# Patient Record
Sex: FEMALE | Race: WHITE | Employment: OTHER | URBAN - METROPOLITAN AREA
[De-identification: names, ages, dates, MRNs, and addresses within clinical notes are randomized per-mention and may not be internally consistent; named-entity substitution may affect disease eponyms.]

---

## 2017-05-30 ENCOUNTER — HOSPITAL ENCOUNTER (OUTPATIENT)
Dept: LAB | Age: 52
Discharge: HOME | End: 2017-05-30
Payer: MEDICARE

## 2017-05-30 PROCEDURE — 88175 CYTOPATH C/V AUTO FLUID REDO: CPT | Performed by: PHYSICIAN ASSISTANT

## 2017-05-30 PROCEDURE — 87624 HPV HI-RISK TYP POOLED RSLT: CPT | Performed by: PHYSICIAN ASSISTANT

## 2017-05-30 PROCEDURE — 87491 CHLMYD TRACH DNA AMP PROBE: CPT | Performed by: PHYSICIAN ASSISTANT

## 2017-05-31 LAB
C TRACH RRNA SPEC QL NAA+PROBE: NEGATIVE
N GONORRHOEA RRNA SPEC QL NAA+PROBE: NEGATIVE
SPECIMEN SOURCE: NORMAL

## 2017-09-12 ENCOUNTER — APPOINTMENT (OUTPATIENT)
Dept: CT IMAGING | Age: 52
DRG: 438 | End: 2017-09-12
Attending: EMERGENCY MEDICINE
Payer: MEDICARE

## 2017-09-12 ENCOUNTER — HOSPITAL ENCOUNTER (EMERGENCY)
Age: 52
Discharge: HOME | DRG: 438 | End: 2017-09-12
Attending: EMERGENCY MEDICINE | Admitting: EMERGENCY MEDICINE
Payer: MEDICARE

## 2017-09-12 VITALS
HEIGHT: 63 IN | SYSTOLIC BLOOD PRESSURE: 129 MMHG | WEIGHT: 120 LBS | HEART RATE: 85 BPM | OXYGEN SATURATION: 97 % | TEMPERATURE: 98.1 F | DIASTOLIC BLOOD PRESSURE: 78 MMHG | RESPIRATION RATE: 18 BRPM | BODY MASS INDEX: 21.26 KG/M2

## 2017-09-12 DIAGNOSIS — K85.00 IDIOPATHIC ACUTE PANCREATITIS, UNSPECIFIED COMPLICATION STATUS: Primary | ICD-10-CM

## 2017-09-12 DIAGNOSIS — R79.89 ELEVATED LFTS: ICD-10-CM

## 2017-09-12 LAB
ALBUMIN SERPL-MCNC: 3.2 G/DL (ref 3.4–5)
ALBUMIN/GLOB SERPL: 1 {RATIO} (ref 0.8–1.7)
ALP SERPL-CCNC: 143 U/L (ref 45–117)
ALT SERPL-CCNC: 158 U/L (ref 13–56)
ANION GAP SERPL CALC-SCNC: 11 MMOL/L (ref 3–18)
APPEARANCE UR: CLEAR
AST SERPL-CCNC: 472 U/L (ref 15–37)
BASOPHILS # BLD: 0 K/UL (ref 0–0.06)
BASOPHILS NFR BLD: 0 % (ref 0–2)
BILIRUB SERPL-MCNC: 1.3 MG/DL (ref 0.2–1)
BILIRUB UR QL: NEGATIVE
BUN SERPL-MCNC: 6 MG/DL (ref 7–18)
BUN/CREAT SERPL: 10 (ref 12–20)
CALCIUM SERPL-MCNC: 9.6 MG/DL (ref 8.5–10.1)
CHLORIDE SERPL-SCNC: 100 MMOL/L (ref 100–108)
CO2 SERPL-SCNC: 24 MMOL/L (ref 21–32)
COLOR UR: YELLOW
CREAT SERPL-MCNC: 0.62 MG/DL (ref 0.6–1.3)
DIFFERENTIAL METHOD BLD: ABNORMAL
EOSINOPHIL # BLD: 0.1 K/UL (ref 0–0.4)
EOSINOPHIL NFR BLD: 1 % (ref 0–5)
ERYTHROCYTE [DISTWIDTH] IN BLOOD BY AUTOMATED COUNT: 15.7 % (ref 11.6–14.5)
ETHANOL SERPL-MCNC: <3 MG/DL (ref 0–3)
GLOBULIN SER CALC-MCNC: 3.3 G/DL (ref 2–4)
GLUCOSE SERPL-MCNC: 111 MG/DL (ref 74–99)
GLUCOSE UR STRIP.AUTO-MCNC: NEGATIVE MG/DL
HCT VFR BLD AUTO: 36.7 % (ref 35–45)
HGB BLD-MCNC: 12.7 G/DL (ref 12–16)
HGB UR QL STRIP: NEGATIVE
KETONES UR QL STRIP.AUTO: ABNORMAL MG/DL
LEUKOCYTE ESTERASE UR QL STRIP.AUTO: NEGATIVE
LIPASE SERPL-CCNC: 1091 U/L (ref 73–393)
LYMPHOCYTES # BLD: 1.3 K/UL (ref 0.9–3.6)
LYMPHOCYTES NFR BLD: 29 % (ref 21–52)
MAGNESIUM SERPL-MCNC: 1.5 MG/DL (ref 1.6–2.6)
MCH RBC QN AUTO: 34.8 PG (ref 24–34)
MCHC RBC AUTO-ENTMCNC: 34.6 G/DL (ref 31–37)
MCV RBC AUTO: 100.5 FL (ref 74–97)
MONOCYTES # BLD: 0.2 K/UL (ref 0.05–1.2)
MONOCYTES NFR BLD: 5 % (ref 3–10)
NEUTS SEG # BLD: 2.9 K/UL (ref 1.8–8)
NEUTS SEG NFR BLD: 65 % (ref 40–73)
NITRITE UR QL STRIP.AUTO: NEGATIVE
PH UR STRIP: 7 [PH] (ref 5–8)
PLATELET # BLD AUTO: 91 K/UL (ref 135–420)
PMV BLD AUTO: 11.2 FL (ref 9.2–11.8)
POTASSIUM SERPL-SCNC: 3.7 MMOL/L (ref 3.5–5.5)
PROT SERPL-MCNC: 6.5 G/DL (ref 6.4–8.2)
PROT UR STRIP-MCNC: NEGATIVE MG/DL
RBC # BLD AUTO: 3.65 M/UL (ref 4.2–5.3)
SODIUM SERPL-SCNC: 135 MMOL/L (ref 136–145)
SP GR UR REFRACTOMETRY: 1.01 (ref 1–1.03)
UROBILINOGEN UR QL STRIP.AUTO: 0.2 EU/DL (ref 0.2–1)
WBC # BLD AUTO: 4.5 K/UL (ref 4.6–13.2)

## 2017-09-12 RX ORDER — HYDROMORPHONE HYDROCHLORIDE 2 MG/1
2 TABLET ORAL
Status: COMPLETED | OUTPATIENT
Start: 2017-09-12 | End: 2017-09-12

## 2017-09-12 RX ORDER — LORAZEPAM 2 MG/ML
1 INJECTION INTRAMUSCULAR
Status: DISCONTINUED | OUTPATIENT
Start: 2017-09-12 | End: 2017-09-12 | Stop reason: HOSPADM

## 2017-09-12 RX ORDER — ONDANSETRON 8 MG/1
8 TABLET, ORALLY DISINTEGRATING ORAL
Qty: 6 TAB | Refills: 0 | Status: ON HOLD | OUTPATIENT
Start: 2017-09-12 | End: 2018-03-15

## 2017-09-12 RX ORDER — LORAZEPAM 2 MG/ML
3 INJECTION INTRAMUSCULAR
Status: DISCONTINUED | OUTPATIENT
Start: 2017-09-12 | End: 2017-09-12 | Stop reason: HOSPADM

## 2017-09-12 RX ORDER — LORAZEPAM 1 MG/1
1 TABLET ORAL
Status: DISCONTINUED | OUTPATIENT
Start: 2017-09-12 | End: 2017-09-12 | Stop reason: HOSPADM

## 2017-09-12 RX ORDER — SODIUM CHLORIDE 0.9 % (FLUSH) 0.9 %
5-10 SYRINGE (ML) INJECTION AS NEEDED
Status: DISCONTINUED | OUTPATIENT
Start: 2017-09-12 | End: 2017-09-12 | Stop reason: HOSPADM

## 2017-09-12 RX ORDER — LORAZEPAM 2 MG/ML
2 INJECTION INTRAMUSCULAR
Status: DISCONTINUED | OUTPATIENT
Start: 2017-09-12 | End: 2017-09-12 | Stop reason: HOSPADM

## 2017-09-12 RX ORDER — HYDROMORPHONE HYDROCHLORIDE 2 MG/1
2 TABLET ORAL
Qty: 2 TAB | Refills: 0 | Status: ON HOLD | OUTPATIENT
Start: 2017-09-12 | End: 2017-09-15

## 2017-09-12 RX ORDER — LORAZEPAM 1 MG/1
2 TABLET ORAL
Status: DISCONTINUED | OUTPATIENT
Start: 2017-09-12 | End: 2017-09-12 | Stop reason: HOSPADM

## 2017-09-12 RX ORDER — SODIUM CHLORIDE 0.9 % (FLUSH) 0.9 %
5-10 SYRINGE (ML) INJECTION EVERY 8 HOURS
Status: DISCONTINUED | OUTPATIENT
Start: 2017-09-12 | End: 2017-09-12 | Stop reason: HOSPADM

## 2017-09-12 RX ADMIN — FOLIC ACID: 5 INJECTION, SOLUTION INTRAMUSCULAR; INTRAVENOUS; SUBCUTANEOUS at 09:39

## 2017-09-12 RX ADMIN — HYDROMORPHONE HYDROCHLORIDE 2 MG: 2 TABLET ORAL at 12:57

## 2017-09-12 RX ADMIN — IOPAMIDOL 100 ML: 612 INJECTION, SOLUTION INTRAVENOUS at 10:25

## 2017-09-12 RX ADMIN — LORAZEPAM 1 MG: 2 INJECTION INTRAMUSCULAR; INTRAVENOUS at 09:14

## 2017-09-12 NOTE — ED PROVIDER NOTES
HPI Comments: 9:11 AM Trini Banegas is a 47 y.o. female with h/o Bipolar disorder, COPD, Anxiety, Depression, Alchol Abuse, and HTN who presents to ED complaining of abdominal pain, CP, and alcohol intoxication. The pt states that the symptoms started after being on a 3 week alchol binge due to her being depressed about her son going to college. Pt says she also has not been eating or sleeping for the past three days. Pt also reports some vomiting and feeling some nausea. Pt states she does smoke marijuana. Pt denies having problems with alchol withdrawls, syncope, SI, blood in stool, hematuria, trauma, drug abuse, and smoking. Pt had no other complaints or concerns. PCP: Jamila Canela MD        The history is provided by the patient. No  was used. Past Medical History:   Diagnosis Date    Alcohol abuse     Anxiety     Asthma     Bipolar disorder ([address]     Common migraine     COPD (chronic obstructive pulmonary disease) (HCC)     Depression     Esophageal reflux     Gout     Hypertension     Inverted nipple     Left breast always have. Past Surgical History:   Procedure Laterality Date    HX CARPAL TUNNEL RELEASE      HX GYN      tubal ligation    HX HEENT      HX LUMBAR LAMINECTOMY      HX ORTHOPAEDIC           Family History:   Problem Relation Age of Onset    Family history unknown: Yes       Social History     Social History    Marital status:      Spouse name: N/A    Number of children: N/A    Years of education: N/A     Occupational History    Not on file.      Social History Main Topics    Smoking status: Current Every Day Smoker     Packs/day: 1.00    Smokeless tobacco: Never Used    Alcohol use Yes      Comment: daily, liquor    Drug use: No    Sexual activity: Not on file     Other Topics Concern    Not on file     Social History Narrative         ALLERGIES: Lithium; Elavil; and Morphine    Review of Systems   Cardiovascular: Positive for chest pain. Gastrointestinal: Positive for abdominal pain, nausea and vomiting. Negative for blood in stool. Genitourinary: Negative for hematuria. Neurological: Negative for tremors and syncope. Psychiatric/Behavioral: Negative for suicidal ideas. Feeling Depressed    All other systems reviewed and are negative. Vitals:    09/12/17 0833 09/12/17 1017   BP: 137/85 141/70   Pulse: 95 85   Resp: 17 18   Temp: 98.1 °F (36.7 °C)    SpO2: 99% 100%   Weight: 54.4 kg (120 lb)    Height: 5' 2.5\" (1.588 m)             Physical Exam   Constitutional: She is oriented to person, place, and time. She appears well-developed and well-nourished. No distress. HENT:   Head: Normocephalic and atraumatic. Mouth/Throat: Oropharynx is clear and moist.   Dry mucosal membrane   Eyes: Conjunctivae and EOM are normal. Pupils are equal, round, and reactive to light. No scleral icterus. Neck: Normal range of motion. Neck supple. Cardiovascular: Normal rate, regular rhythm and normal heart sounds. No murmur heard. Pulmonary/Chest: Effort normal and breath sounds normal. No respiratory distress. Abdominal: Soft. Bowel sounds are normal. She exhibits no distension. There is tenderness in the epigastric area. Epigastric tenderness   Musculoskeletal: She exhibits no edema. Lymphadenopathy:     She has no cervical adenopathy. Neurological: She is alert and oriented to person, place, and time. Coordination normal.   Skin: Skin is warm and dry. No rash noted. Psychiatric: She has a normal mood and affect. Her behavior is normal.   Nursing note and vitals reviewed.        MDM  Number of Diagnoses or Management Options  Elevated LFTs:   Idiopathic acute pancreatitis, unspecified complication status:   Diagnosis management comments: H/o alcohol abuse currently coming off binge with increased n/v pancreatitis by labs placed on ciwa protocol in Ed       Ekg: nsr rate 97 no acute st changes     Pt has had no vomiting in ED would like opportunity to try outpt management discussed with CY Pemberton NP agrees to see in 24 hours for recheck will dc home        Amount and/or Complexity of Data Reviewed  Clinical lab tests: ordered and reviewed  Tests in the radiology section of CPT®: ordered and reviewed  Independent visualization of images, tracings, or specimens: yes    Risk of Complications, Morbidity, and/or Mortality  Presenting problems: high  Diagnostic procedures: moderate  Management options: moderate      ED Course       Procedures    Vitals:  Patient Vitals for the past 12 hrs:   Temp Pulse Resp BP SpO2   09/12/17 1017 - 85 18 141/70 100 %   09/12/17 0833 98.1 °F (36.7 °C) 95 17 137/85 99 %       Medications Ordered:  Medications   sodium chloride (NS) flush 5-10 mL (not administered)   sodium chloride (NS) flush 5-10 mL (not administered)   LORazepam (ATIVAN) tablet 1 mg ( Oral See Alternative 9/12/17 0914)     Or   LORazepam (ATIVAN) injection 1 mg (1 mg IntraVENous Given 9/12/17 0914)   LORazepam (ATIVAN) tablet 2 mg (not administered)     Or   LORazepam (ATIVAN) injection 2 mg (not administered)   LORazepam (ATIVAN) injection 3 mg (not administered)   0.9% sodium chloride 1,000 mL with mvi, adult no. 4 with vit K 10 mL, thiamine 083 mg, folic acid 1 mg, magnesium sulfate 2 g infusion ( IntraVENous New Bag 9/12/17 0939)   iopamidol (ISOVUE 300) 61 % contrast injection 100 mL (100 mL IntraVENous Given 9/12/17 1025)       Lab Findings:  Recent Results (from the past 12 hour(s))   EKG, 12 LEAD, INITIAL    Collection Time: 09/12/17  8:32 AM   Result Value Ref Range    Ventricular Rate 97 BPM    Atrial Rate 97 BPM    P-R Interval 130 ms    QRS Duration 76 ms    Q-T Interval 352 ms    QTC Calculation (Bezet) 447 ms    Calculated P Axis 55 degrees    Calculated R Axis 59 degrees    Calculated T Axis 52 degrees    Diagnosis       Normal sinus rhythm  Possible Left atrial enlargement  Borderline ECG  When compared with ECG of 04-NOV-2016 12:00,  No significant change was found     CBC WITH AUTOMATED DIFF    Collection Time: 09/12/17  8:40 AM   Result Value Ref Range    WBC 4.5 (L) 4.6 - 13.2 K/uL    RBC 3.65 (L) 4.20 - 5.30 M/uL    HGB 12.7 12.0 - 16.0 g/dL    HCT 36.7 35.0 - 45.0 %    .5 (H) 74.0 - 97.0 FL    MCH 34.8 (H) 24.0 - 34.0 PG    MCHC 34.6 31.0 - 37.0 g/dL    RDW 15.7 (H) 11.6 - 14.5 %    PLATELET 91 (L) 332 - 420 K/uL    MPV 11.2 9.2 - 11.8 FL    NEUTROPHILS 65 40 - 73 %    LYMPHOCYTES 29 21 - 52 %    MONOCYTES 5 3 - 10 %    EOSINOPHILS 1 0 - 5 %    BASOPHILS 0 0 - 2 %    ABS. NEUTROPHILS 2.9 1.8 - 8.0 K/UL    ABS. LYMPHOCYTES 1.3 0.9 - 3.6 K/UL    ABS. MONOCYTES 0.2 0.05 - 1.2 K/UL    ABS. EOSINOPHILS 0.1 0.0 - 0.4 K/UL    ABS. BASOPHILS 0.0 0.0 - 0.06 K/UL    DF AUTOMATED     METABOLIC PANEL, COMPREHENSIVE    Collection Time: 09/12/17  8:40 AM   Result Value Ref Range    Sodium 135 (L) 136 - 145 mmol/L    Potassium 3.7 3.5 - 5.5 mmol/L    Chloride 100 100 - 108 mmol/L    CO2 24 21 - 32 mmol/L    Anion gap 11 3.0 - 18 mmol/L    Glucose 111 (H) 74 - 99 mg/dL    BUN 6 (L) 7.0 - 18 MG/DL    Creatinine 0.62 0.6 - 1.3 MG/DL    BUN/Creatinine ratio 10 (L) 12 - 20      GFR est AA >60 >60 ml/min/1.73m2    GFR est non-AA >60 >60 ml/min/1.73m2    Calcium 9.6 8.5 - 10.1 MG/DL    Bilirubin, total 1.3 (H) 0.2 - 1.0 MG/DL    ALT (SGPT) 158 (H) 13 - 56 U/L    AST (SGOT) 472 (H) 15 - 37 U/L    Alk.  phosphatase 143 (H) 45 - 117 U/L    Protein, total 6.5 6.4 - 8.2 g/dL    Albumin 3.2 (L) 3.4 - 5.0 g/dL    Globulin 3.3 2.0 - 4.0 g/dL    A-G Ratio 1.0 0.8 - 1.7     LIPASE    Collection Time: 09/12/17  8:40 AM   Result Value Ref Range    Lipase 1091 (H) 73 - 393 U/L   MAGNESIUM    Collection Time: 09/12/17  8:40 AM   Result Value Ref Range    Magnesium 1.5 (L) 1.6 - 2.6 mg/dL   ETHYL ALCOHOL    Collection Time: 09/12/17  8:40 AM   Result Value Ref Range    ALCOHOL(ETHYL),SERUM <3 0 - 3 MG/DL   URINALYSIS W/ RFLX MICROSCOPIC    Collection Time: 09/12/17  8:55 AM   Result Value Ref Range    Color YELLOW      Appearance CLEAR      Specific gravity 1.006 1.005 - 1.030      pH (UA) 7.0 5.0 - 8.0      Protein NEGATIVE  NEG mg/dL    Glucose NEGATIVE  NEG mg/dL    Ketone TRACE (A) NEG mg/dL    Bilirubin NEGATIVE  NEG      Blood NEGATIVE  NEG      Urobilinogen 0.2 0.2 - 1.0 EU/dL    Nitrites NEGATIVE  NEG      Leukocyte Esterase NEGATIVE  NEG         EKG Interpretation by ED physician:      X-ray, CT or radiology findings or impressions:  CT ABD PELV W CONT   Final Result          Progress notes, consult notes, or additional procedure notes:      Reevaluation of the patient:   Stable in ED     Diagnosis:   1. Idiopathic acute pancreatitis, unspecified complication status    2. Elevated LFTs        Disposition: home     Follow-up Information     Follow up With Details Comments [facility] EMERGENCY DEPT  As needed, If symptoms worsen [address] [facility]  [address] [place] [address]) [address]    C Mamadou Rose MD Schedule an appointment as soon as possible for a visit to be seen tomorrow for recheck  [address] [place] [address] [phone]             Patient's Medications   Start Taking    HYDROMORPHONE (DILAUDID) 2 MG TABLET    Take 1 Tab by mouth every four (4) hours as needed for Pain. Max Daily Amount: 12 mg.    ONDANSETRON (ZOFRAN ODT) 8 MG DISINTEGRATING TABLET    Take 1 Tab by mouth every eight (8) hours as needed for Nausea. Continue Taking    ALBUTEROL (PROVENTIL VENTOLIN) 2.5 MG /3 ML (0.083 %) NEBULIZER SOLUTION    1.5 mL by Nebulization route every four (4) hours as needed for Wheezing or Shortness of Breath. AZITHROMYCIN (ZITHROMAX Z-RICHA) 250 MG TABLET    Take two tablets today then one tablet daily    BUPROPION XL (WELLBUTRIN XL) 300 MG XL TABLET    Take 300 mg by mouth every morning. CLONIDINE HCL (CATAPRES) 0.2 MG TABLET    Take 0.5 Tabs by mouth two (2) times a day.     DIAZEPAM (VALIUM) 5 MG TABLET    Take 0.5 Tabs by mouth every eight (8) hours as needed for Anxiety. Max Daily Amount: 7.5 mg. GABAPENTIN (NEURONTIN) 600 MG TABLET    Take 600 mg by mouth four (4) times daily. IBUPROFEN (MOTRIN) 600 MG TABLET    Take 1 Tab by mouth every six (6) hours as needed for Pain. IPRATROPIUM-ALBUTEROL (COMBIVENT)  MCG/ACTUATION INHALER    Take 2 Puffs by inhalation every six (6) hours. LIPASE-PROTEASE-AMYLASE (ZENPEP 10,000) CAPSULE    Take 1 Cap by mouth three (3) times daily (with meals). NEBULIZER & COMPRESSOR MACHINE    1 Each by Does Not Apply route every six (6) hours as needed. OXYCODONE IR (ROXICODONE) 5 MG IMMEDIATE RELEASE TABLET    Take 1 Tab by mouth every eight (8) hours as needed for Pain. Max Daily Amount: 15 mg. PREDNISONE (DELTASONE) 10 MG TABLET    Take 5 tabs daily for 1 days followed by 4 tabs daily for one day followed by 3 tabs daily for one day followed by 2 tabs daily for 1 day then 1 tab daily for one day then stop . Annamary Ripa .  15 tabs    QUETIAPINE FUMARATE (QUETIAPINE PO)    Take 3 Tabs by mouth daily. Patient states she takes 3 tablets of the 200 mg strength to equal 600 mg total dose once a day    SILVER SULFADIAZINE (SILVADENE) 1 % TOPICAL CREAM    Apply  to affected area two (2) times a day. THIAMINE MONONITRATE (B-1) 100 MG TABLET    Take 1 Tab by mouth daily. TOPIRAMATE (TOPAMAX) 200 MG TABLET    Take 100 mg by mouth daily. These Medications have changed    No medications on file   Stop Taking    No medications on file       Scribe Attestation      Yessica Casarez acting as a scribe for and in the presence of Eloisa Matamoros      September 12, 2017 at 9:11 AM       Provider Attestation:      I personally performed the services described in the documentation, reviewed the documentation, as recorded by the scribe in my presence, and it accurately and completely records my words and actions.  September 12, 2017 at 9:11 AM - Kervin Loaiza MD

## 2017-09-12 NOTE — DISCHARGE INSTRUCTIONS
Pancreatitis: Care Instructions  Your Care Instructions    The pancreas is an organ behind the stomach. It makes hormones and enzymes to help your body digest food. But if these enzymes attack the pancreas, it can get inflamed. This is called pancreatitis. Most cases are caused by gallstones or by heavy alcohol use. If you take care of yourself at home, it will help you get better. It will also help you avoid more problems with your pancreas. Follow-up care is a key part of your treatment and safety. Be sure to make and go to all appointments, and call your doctor if you are having problems. It's also a good idea to know your test results and keep a list of the medicines you take. How can you care for yourself at home? · Drink clear liquids and eat bland foods until you feel better. [place] foods include rice, dry toast, and crackers. They also include bananas and applesauce. · Eat a low-fat diet until your doctor says your pancreas is healed. · Do not drink alcohol. Tell your doctor if you need help to quit. Counseling, support groups, and sometimes medicines can help you stay sober. · Be safe with medicines. Read and follow all instructions on the label. ¨ If the doctor gave you a prescription medicine for pain, take it as prescribed. ¨ If you are not taking a prescription pain medicine, ask your doctor if you can take an over-the-counter medicine. · If your doctor prescribed antibiotics, take them as directed. Do not stop taking them just because you feel better. You need to take the full course of antibiotics. · Get extra rest until you feel better. To prevent future problems with your pancreas  · Do not drink alcohol. · Tell your doctors and pharmacist that you've had pancreatitis. They can help you avoid medicines that may cause this problem again. When should you call for help? Call your doctor now or seek immediate medical care if:  · You have new or severe belly pain.   · You have a new or higher fever. · You can't keep fluid or medicines down. Watch closely for changes in your health, and be sure to contact your doctor if:  · The symptoms you had when you first started feeling sick come back. · You do not get better as expected. · You need help to stop drinking alcohol. Where can you learn more? Go to http://shannon-heriberto.info/. Enter T678 in the search box to learn more about \"Pancreatitis: Care Instructions. \"  Current as of: August 9, 2016  Content Version: 11.3  © 3208-6661 [employer]. Care instructions adapted under license by [employer] (which disclaims liability or warranty for this information). If you have questions about a medical condition or this instruction, always ask your healthcare professional. [address] any warranty or liability for your use of this information.

## 2017-09-12 NOTE — ED TRIAGE NOTES
Pt with 3 week alcohol binge due to stressors in life. States last drank 2200 last night, been drinking daily as much liquor as she can. Pt with LUQ abdominal pain and left sided chest pain that started this morning with diarrhea. Pt has history of pancreatitis.

## 2017-09-12 NOTE — ED NOTES
Attempted to discharge patient. Patient very drowsy and wakes to sternal rub then goes back to sleep.  Will allow patient to sleep for another 30 minutes and attempt to get patient up and discharge at that time

## 2017-09-13 ENCOUNTER — HOSPITAL ENCOUNTER (INPATIENT)
Age: 52
LOS: 2 days | Discharge: HOME | DRG: 438 | End: 2017-09-16
Attending: EMERGENCY MEDICINE | Admitting: FAMILY MEDICINE
Payer: MEDICARE

## 2017-09-13 DIAGNOSIS — R42 DIZZINESS: ICD-10-CM

## 2017-09-13 DIAGNOSIS — K85.20 ALCOHOL-INDUCED ACUTE PANCREATITIS, UNSPECIFIED COMPLICATION STATUS: Primary | ICD-10-CM

## 2017-09-13 DIAGNOSIS — E87.6 HYPOKALEMIA: ICD-10-CM

## 2017-09-13 DIAGNOSIS — T50.905A MEDICATION ADVERSE EFFECT, INITIAL ENCOUNTER: ICD-10-CM

## 2017-09-13 LAB
ATRIAL RATE: 97 BPM
CALCULATED P AXIS, ECG09: 55 DEGREES
CALCULATED R AXIS, ECG10: 59 DEGREES
CALCULATED T AXIS, ECG11: 52 DEGREES
DIAGNOSIS, 93000: NORMAL
P-R INTERVAL, ECG05: 130 MS
Q-T INTERVAL, ECG07: 352 MS
QRS DURATION, ECG06: 76 MS
QTC CALCULATION (BEZET), ECG08: 447 MS
VENTRICULAR RATE, ECG03: 97 BPM

## 2017-09-13 PROCEDURE — 96365 THER/PROPH/DIAG IV INF INIT: CPT

## 2017-09-13 PROCEDURE — 96367 TX/PROPH/DG ADDL SEQ IV INF: CPT

## 2017-09-13 PROCEDURE — 96366 THER/PROPH/DIAG IV INF ADDON: CPT

## 2017-09-13 PROCEDURE — 96375 TX/PRO/DX INJ NEW DRUG ADDON: CPT

## 2017-09-13 PROCEDURE — 99285 EMERGENCY DEPT VISIT HI MDM: CPT

## 2017-09-13 RX ORDER — MAGNESIUM SULFATE HEPTAHYDRATE 40 MG/ML
2 INJECTION, SOLUTION INTRAVENOUS ONCE
Status: DISCONTINUED | OUTPATIENT
Start: 2017-09-13 | End: 2017-09-14

## 2017-09-13 NOTE — IP AVS SNAPSHOT
[facility] [address] [address] 
[phone] Patient: Manuel Cheema MRN: [id number] :1965 Current Discharge Medication List  
  
CONTINUE these medications which have CHANGED Dose & Instructions Dispensing Information Comments Morning Noon Evening Bedtime  
 oxyCODONE IR 5 mg immediate release tablet Commonly known as:  Luis Alonso What changed:   
- how much to take - when to take this Your last dose was: Your next dose is:    
   
   
 Dose:  10 mg Take 2 Tabs by mouth every six (6) hours as needed for Pain. Max Daily Amount: 40 mg.  
 Quantity:  20 Tab Refills:  0 CONTINUE these medications which have NOT CHANGED Dose & Instructions Dispensing Information Comments Morning Noon Evening Bedtime  
 albuterol 2.5 mg /3 mL (0.083 %) nebulizer solution Commonly known as:  PROVENTIL VENTOLIN Your last dose was: Your next dose is:    
   
   
 Dose:  1.25 mg  
1.5 mL by Nebulization route every four (4) hours as needed for Wheezing or Shortness of Breath. Quantity:  24 Each Refills:  0  
     
   
   
   
  
 cloNIDine HCl 0.2 mg tablet Commonly known as:  CATAPRES Your last dose was: Your next dose is:    
   
   
 Dose:  0.1 mg Take 0.5 Tabs by mouth two (2) times a day. Quantity:  30 Tab Refills:  0  
     
   
   
   
  
 diazePAM 5 mg tablet Commonly known as:  VALIUM Your last dose was: Your next dose is:    
   
   
 Dose:  2.5 mg Take 0.5 Tabs by mouth every eight (8) hours as needed for Anxiety. Max Daily Amount: 7.5 mg.  
 Quantity:  20 Tab Refills:  0  
     
   
   
   
  
 gabapentin 600 mg tablet Commonly known as:  NEURONTIN Your last dose was: Your next dose is:    
   
   
 Dose:  600 mg Take 600 mg by mouth four (4) times daily. Refills:  0  
     
   
   
   
  
 ibuprofen 600 mg tablet Commonly known as:  MOTRIN Your last dose was: Your next dose is:    
   
   
 Dose:  600 mg Take 1 Tab by mouth every six (6) hours as needed for Pain. Quantity:  20 Tab Refills:  0  
     
   
   
   
  
 ipratropium-albuterol  mcg/actuation inhaler Commonly known as:  COMBIVENT Your last dose was: Your next dose is:    
   
   
 Dose:  2 Puff Take 2 Puffs by inhalation every six (6) hours. Refills:  0  
     
   
   
   
  
 lipase-protease-amylase capsule Commonly known as:  ZENPEP 10,000 Your last dose was: Your next dose is:    
   
   
 Dose:  1 Cap Take 1 Cap by mouth three (3) times daily (with meals). Quantity:  200 Cap Refills:  5 Nebulizer & Compressor machine Your last dose was: Your next dose is:    
   
   
 Dose:  1 Each  
1 Each by Does Not Apply route every six (6) hours as needed. Quantity:  1 Each Refills:  0  
     
   
   
   
  
 ondansetron 8 mg disintegrating tablet Commonly known as:  ZOFRAN ODT Your last dose was: Your next dose is:    
   
   
 Dose:  8 mg Take 1 Tab by mouth every eight (8) hours as needed for Nausea. Quantity:  6 Tab Refills:  0 QUETIAPINE PO Your last dose was: Your next dose is:    
   
   
 Dose:  3 Tab Take 3 Tabs by mouth daily. Patient states she takes 3 tablets of the 200 mg strength to equal 600 mg total dose once a day Refills:  0  
     
   
   
   
  
 silver sulfADIAZINE 1 % topical cream  
Commonly known as:  SILVADENE Your last dose was: Your next dose is:    
   
   
 Apply  to affected area two (2) times a day. Quantity:  50 g Refills:  0 Thiamine Mononitrate 100 mg tablet Commonly known as:  B-1 Your last dose was: Your next dose is:    
   
   
 Dose:  100 mg Take 1 Tab by mouth daily. Quantity:  7 Tab Refills:  0  
     
   
   
   
  
 TOPAMAX 200 mg tablet Generic drug:  topiramate Your last dose was: Your next dose is:    
   
   
 Dose:  100 mg Take 100 mg by mouth daily. Refills:  0 WELLBUTRIN  mg XL tablet Generic drug:  buPROPion XL Your last dose was: Your next dose is:    
   
   
 Dose:  300 mg Take 300 mg by mouth every morning. Refills:  0 STOP taking these medications   
 azithromycin 250 mg tablet Commonly known as:  ZITHROMAX Z-RICHA  
   
  
 predniSONE 10 mg tablet Commonly known as:  Suzanne Narrow your doctor about these medications Dose & Instructions Dispensing Information Comments Morning Noon Evening Bedtime HYDROmorphone 2 mg tablet Commonly known as:  DILAUDID Your last dose was: Your next dose is:    
   
   
 Dose:  2 mg Take 1 Tab by mouth every four (4) hours as needed for Pain. Max Daily Amount: 12 mg. Quantity:  2 Tab Refills:  0 Where to Get Your Medications Information on where to get these meds will be given to you by the nurse or doctor. ! Ask your nurse or doctor about these medications  
  oxyCODONE IR 5 mg immediate release tablet

## 2017-09-13 NOTE — IP AVS SNAPSHOT
Michelle Bailey 
 
 
 [address] [place] [address] 
[phone] Patient: Wally Selby MRN: [id number] :1965 You are allergic to the following Allergen Reactions Lithium Anaphylaxis Elavil Other (comments) Left leg went numb Morphine Hives Recent Documentation OB Status Smoking Status Postmenopausal Current Every Day Smoker Emergency Contacts Name Discharge Info Relation Home Work Mobile Teddy Gutierrez A DISCHARGE CAREGIVER [3] Spouse [3] [phone] About your hospitalization You were admitted on:  2017 You last received care in the:  [facility] [address] You were discharged on:  2017 Unit phone number:  [phone] Why you were hospitalized Your primary diagnosis was:  Encephalopathy Your diagnoses also included:  Dizziness, Pancreatitis, Elevated Lfts, Hypokalemia, Anemia Providers Seen During Your Hospitalizations Provider Role Specialty Primary office phone Gunnar Branham MD Attending Provider Emergency Medicine [phone] Abby Bear MD Attending Provider [facility] [phone] Oz Peterson DO Attending Provider Internal Medicine [phone] Your Primary Care Physician (PCP) Primary Care Physician Office Phone Office Fax Anderson Dickeyt [phone] Follow-up Information Follow up With Details Comments Contact Info Nila Rogers MD On 2017 1pm  [address] [address] [phone] Denise Aguirre MD  GI for chronic pancreatitis [address] [facility] [address] [facility] [address] [phone] Current Discharge Medication List  
  
CONTINUE these medications which have CHANGED Dose & Instructions Dispensing Information Comments Morning Noon Evening Bedtime  
 oxyCODONE IR 5 mg immediate release tablet Commonly known as:  Bertrand Oleary What changed:   
- how much to take - when to take this Your last dose was: Your next dose is:    
   
   
 Dose:  10 mg Take 2 Tabs by mouth every six (6) hours as needed for Pain. Max Daily Amount: 40 mg.  
 Quantity:  20 Tab Refills:  0 CONTINUE these medications which have NOT CHANGED Dose & Instructions Dispensing Information Comments Morning Noon Evening Bedtime  
 albuterol 2.5 mg /3 mL (0.083 %) nebulizer solution Commonly known as:  PROVENTIL VENTOLIN Your last dose was: Your next dose is:    
   
   
 Dose:  1.25 mg  
1.5 mL by Nebulization route every four (4) hours as needed for Wheezing or Shortness of Breath. Quantity:  24 Each Refills:  0  
     
   
   
   
  
 cloNIDine HCl 0.2 mg tablet Commonly known as:  CATAPRES Your last dose was: Your next dose is:    
   
   
 Dose:  0.1 mg Take 0.5 Tabs by mouth two (2) times a day. Quantity:  30 Tab Refills:  0  
     
   
   
   
  
 diazePAM 5 mg tablet Commonly known as:  VALIUM Your last dose was: Your next dose is:    
   
   
 Dose:  2.5 mg Take 0.5 Tabs by mouth every eight (8) hours as needed for Anxiety. Max Daily Amount: 7.5 mg.  
 Quantity:  20 Tab Refills:  0  
     
   
   
   
  
 gabapentin 600 mg tablet Commonly known as:  NEURONTIN Your last dose was: Your next dose is:    
   
   
 Dose:  600 mg Take 600 mg by mouth four (4) times daily. Refills:  0  
     
   
   
   
  
 ibuprofen 600 mg tablet Commonly known as:  MOTRIN Your last dose was: Your next dose is:    
   
   
 Dose:  600 mg Take 1 Tab by mouth every six (6) hours as needed for Pain. Quantity:  20 Tab Refills:  0  
     
   
   
   
  
 ipratropium-albuterol  mcg/actuation inhaler Commonly known as:  COMBIVENT Your last dose was: Your next dose is:    
   
   
 Dose:  2 Puff Take 2 Puffs by inhalation every six (6) hours. Refills:  0  
     
   
   
   
  
 lipase-protease-amylase capsule Commonly known as:  ZENPEP 10,000 Your last dose was: Your next dose is:    
   
   
 Dose:  1 Cap Take 1 Cap by mouth three (3) times daily (with meals). Quantity:  200 Cap Refills:  5 Nebulizer & Compressor machine Your last dose was: Your next dose is:    
   
   
 Dose:  1 Each  
1 Each by Does Not Apply route every six (6) hours as needed. Quantity:  1 Each Refills:  0  
     
   
   
   
  
 ondansetron 8 mg disintegrating tablet Commonly known as:  ZOFRAN ODT Your last dose was: Your next dose is:    
   
   
 Dose:  8 mg Take 1 Tab by mouth every eight (8) hours as needed for Nausea. Quantity:  6 Tab Refills:  0 QUETIAPINE PO Your last dose was: Your next dose is:    
   
   
 Dose:  3 Tab Take 3 Tabs by mouth daily. Patient states she takes 3 tablets of the 200 mg strength to equal 600 mg total dose once a day Refills:  0  
     
   
   
   
  
 silver sulfADIAZINE 1 % topical cream  
Commonly known as:  SILVADENE Your last dose was: Your next dose is:    
   
   
 Apply  to affected area two (2) times a day. Quantity:  50 g Refills:  0 Thiamine Mononitrate 100 mg tablet Commonly known as:  B-1 Your last dose was: Your next dose is:    
   
   
 Dose:  100 mg Take 1 Tab by mouth daily. Quantity:  7 Tab Refills:  0  
     
   
   
   
  
 TOPAMAX 200 mg tablet Generic drug:  topiramate Your last dose was: Your next dose is:    
   
   
 Dose:  100 mg Take 100 mg by mouth daily. Refills:  0 WELLBUTRIN  mg XL tablet Generic drug:  buPROPion XL Your last dose was: Your next dose is:    
   
   
 Dose:  300 mg Take 300 mg by mouth every morning. Refills:  0 STOP taking these medications   
 azithromycin 250 mg tablet Commonly known as:  ZITHROMAX Z-RICHA  
   
  
 predniSONE 10 mg tablet Commonly known as:  Kimani Kahn your doctor about these medications Dose & Instructions Dispensing Information Comments Morning Noon Evening Bedtime HYDROmorphone 2 mg tablet Commonly known as:  DILAUDID Your last dose was: Your next dose is:    
   
   
 Dose:  2 mg Take 1 Tab by mouth every four (4) hours as needed for Pain. Max Daily Amount: 12 mg. Quantity:  2 Tab Refills:  0 Where to Get Your Medications Information on where to get these meds will be given to you by the nurse or doctor. ! Ask your nurse or doctor about these medications  
  oxyCODONE IR 5 mg immediate release tablet Discharge Instructions DISCHARGE SUMMARY from Nurse The following personal items are in your possession at time of discharge: 
 
Dental Appliances: None Visual Aid: Glasses, With patient Home Medications:  (states taken in ER) Jewelry: Earrings, Bracelet, Ring Clothing: Jolene Villanueva Other Valuables: Teresita, [address] [facility], [facility] [address] PATIENT INSTRUCTIONS: 
 
 
F-face looks uneven A-arms unable to move or move unevenly S-speech slurred or non-existent T-time-call 911 as soon as signs and symptoms begin-DO NOT go Back to bed or wait to see if you get better-TIME IS BRAIN. Warning Signs of HEART ATTACK Call 911 if you have these symptoms: 
? Chest discomfort. Most heart attacks involve discomfort in the center of the chest that lasts more than a few minutes, or that goes away and comes back. It can feel like uncomfortable pressure, squeezing, fullness, or pain. ? Discomfort in other areas of the upper body. Symptoms can include pain or discomfort in one or both arms, the back, neck, jaw, or stomach. ? Shortness of breath with or without chest discomfort. ? Other signs may include breaking out in a cold sweat, nausea, or lightheadedness. Don't wait more than five minutes to call [address]! Fast action can save your life. Calling 911 is almost always the fastest way to get lifesaving treatment. Emergency Medical Services staff can begin treatment when they arrive  up to an hour sooner than if someone gets to the hospital by car. The discharge information has been reviewed with the patient. The patient verbalized understanding. Discharge medications reviewed with the patient and spouse and appropriate educational materials and side effects teaching were provided. DISCHARGE SUMMARY from Nurse The following personal items are in your possession at time of discharge: 
 
Dental Appliances: None Visual Aid: Glasses, With patient Home Medications:  (states taken in ER) Jewelry: Earrings, Bracelet, Ring Clothing: Sanjana Ketyoel Other Valuables: Teresita, [address] [facility], [employer], [address] PATIENT INSTRUCTIONS: 
 
 
F-face looks uneven A-arms unable to move or move unevenly S-speech slurred or non-existent T-time-call 911 as soon as signs and symptoms begin-DO NOT go Back to bed or wait to see if you get better-TIME IS BRAIN. Warning Signs of HEART ATTACK Call 911 if you have these symptoms: 
? Chest discomfort. Most heart attacks involve discomfort in the center of the chest that lasts more than a few minutes, or that goes away and comes back. It can feel like uncomfortable pressure, squeezing, fullness, or pain. ? Discomfort in other areas of the upper body. Symptoms can include pain or discomfort in one or both arms, the back, neck, jaw, or stomach. ? Shortness of breath with or without chest discomfort. ? Other signs may include breaking out in a cold sweat, nausea, or lightheadedness. Don't wait more than five minutes to call [address]! Fast action can save your life. Calling 911 is almost always the fastest way to get lifesaving treatment. Emergency Medical Services staff can begin treatment when they arrive  up to an hour sooner than if someone gets to the hospital by car. The discharge information has been reviewed with the patient. The patient verbalized understanding. Discharge medications reviewed with the patient and appropriate educational materials and side effects teaching were provided. Learning About Acute Pancreatitis What is acute pancreatitis? The pancreas is an organ behind the stomach. It makes hormones like insulin that help control how your body uses sugar. It also makes enzymes that help you digest food. Usually these enzymes flow from the pancreas to the intestines. But if they leak into the pancreas, they can irritate it and cause pain and swelling. When this happens suddenly, it's called acute pancreatitis. What causes it? Most of the time, acute pancreatitis is caused by gallstones or by heavy alcohol use. But several other things can cause it, such as: 
· High levels of fats in the blood. · An injury. · A problem after a surgery or a procedure. · Certain medicines. What are the symptoms? The main symptom is pain in the upper belly. The pain can be severe. You also may have a fever, nausea, or vomiting. Some people get so sick that they have problems breathing. They also may have low blood pressure. How is it diagnosed? Your doctor will diagnose pancreatitis with an exam and by looking at your lab tests. Your doctor may think that you have this problem based on your symptoms and where you have pain in your belly. You may have blood tests of enzymes called amylase and lipase. In pancreatitis, the level of these enzymes is usually much higher than normal. 
You also may have imaging tests of the belly. These may include an ultrasound, a CT scan, or an MRI. A special MRI called MRCP can show images of the bile ducts. This test can be very helpful when gallstones are causing the problem. How is it treated? Treatment of acute pancreatitis usually takes place in the hospital. It focuses on taking care of pain and supporting your body while your pancreas heals. In severe cases, treatment may occur in an intensive care unit to support breathing, treat serious infections, or help raise very low blood pressure. If a gallstone is causing the problem, the gallstone may need to be removed. This is done during a procedure called ERCP. The doctor puts a scope in your mouth and moves it gently through the stomach and into the ducts from the pancreas and gallbladder. The doctor is then able to see a stone and remove it. Sometimes the gallbladder, which makes gallstones, needs to be removed by surgery. People with pancreatitis often need a lot of fluid to help support their other organs and their blood pressure. They get fluids through a vein (intravenous, or IV). Instead of food by mouth, nutrition is sometimes given through a vein while the pancreas heals. Follow-up care is a key part of your treatment and safety. Be sure to make and go to all appointments, and call your doctor if you are having problems. It's also a good idea to know your test results and keep a list of the medicines you take. Where can you learn more? Go to http://shannon-heriberto.info/. Enter X880 in the search box to learn more about \"Learning About Acute Pancreatitis. \" Current as of: March 22, 2017 Content Version: 11.3 © 6101-4963 [employer]. Care instructions adapted under license by [employer] (which disclaims liability or warranty for this information). If you have questions about a medical condition or this instruction, always ask your healthcare professional. [address] any warranty or liability for your use of this information. Pain Medicine Side Effects: Care Instructions Your Care Instructions When you go to a medical facility in pain, you may get a strong medicine to give you relief. The medicine may be given in a vein (by IV) or as an injection (shot). Examples of this type of pain medicine include fentanyl, hydromorphone, and morphine. While these medicines help relieve pain, they also have side effects. For your safety, it's important that you know how this strong pain medicine affects you. Common side effects can include: 
· Nausea or vomiting. · Feeling dizzy or lightheaded. · Feeling sleepy. The doctor has checked you carefully, but problems can develop later. If you notice any problems or new symptoms, get medical treatment right away. Follow-up care is a key part of your treatment and safety. Be sure to make and go to all appointments, and call your doctor if you are having problems. It's also a good idea to know your test results and keep a list of the medicines you take. How can you care for yourself at home? Activity · Don't do anything for 24 hours that requires attention to detail.  This medicine makes your mind foggy. It takes time for the effects to wear off completely. · Don't drive a car until you are sure the effects from the medicine are gone. Medicines · Be safe with medicines. Read and follow all instructions on the label. ¨ If the doctor gave you a prescription medicine for pain, take it as prescribed. ¨ If you are not taking a prescription pain medicine, ask your doctor if you can take an over-the-counter medicine. Diet · You can eat your normal diet, unless your doctor gives you other instructions. If your stomach is upset, try clear liquids and bland, low-fat foods like plain toast or rice. · Drink plenty of fluids (unless your doctor tells you not to). · Don't drink alcohol for 24 hours. When should you call for help? Call 911 anytime you think you may need emergency care. For example, call if: 
· You have trouble breathing. · You passed out (lost consciousness). Call your doctor now or seek immediate medical care if: 
· You have new or worse pain. Watch closely for changes in your health, and be sure to contact your doctor if: 
· You do not get better as expected. Where can you learn more? Go to http://shannon-heriberto.info/. Enter G910 in the search box to learn more about \"Pain Medicine Side Effects: Care Instructions. \" Current as of: October 14, 2016 Content Version: 11.3 © 8251-8585 [employer]. Care instructions adapted under license by [employer] (which disclaims liability or warranty for this information). If you have questions about a medical condition or this instruction, always ask your healthcare professional. [place] [address] any warranty or liability for your use of this information. Electrolyte Imbalance: Care Instructions Your Care Instructions Electrolytes are minerals in your blood.  They include sodium, potassium, calcium, and magnesium. When they are not at the right levels, you can feel very ill. You may not know what is causing it, but you know something is wrong. You may feel weak or numb, have muscle spasms, or twitch. Your heart may beat fast. Symptoms are different with each mineral. Too much is as bad as too little. Minerals help keep your body working as it should. Vomiting, diarrhea, and fever can cause you to lose minerals. A problem with your kidneys can tip a mineral out of balance. So can taking certain medicines. Your doctor may do more tests. He or she may change your medicine and diet. If you are low in one or more minerals, they may be given through a tube into your vein (IV). Your doctor may have you take or drink special fluids or pills. If your kidneys are failing, your blood may be filtered. This is called dialysis. It can put the minerals back in balance. Follow-up care is a key part of your treatment and safety. Be sure to make and go to all appointments, and call your doctor if you are having problems. It's also a good idea to know your test results and keep a list of the medicines you take. How can you care for yourself at home? · Take your medicines exactly as prescribed. Call your doctor if you have any problems with your medicines. You will get more details on the specific medicines your doctor prescribes. · Do not take any medicine without talking to your doctor first. This includes prescription, over-the-counter, and herbal medicines. · If you have kidney, heart, or liver disease and have to limit fluids, talk with your doctor before you increase the amount of fluids you drink. · Your doctor or dietitian may give you a diet plan to help balance your minerals. Follow the diet carefully. When should you call for help? Call 911 anytime you think you may need emergency care. For example, call if: 
· You passed out (lost consciousness). · Your heart seems to be speeding up and then slowing down or skipping beats. Call your doctor now or seek immediate medical care if: 
· You are very tired and have no energy. · You have trouble thinking or concentrating. Watch closely for changes in your health, and be sure to contact your doctor if: 
· You want advice on what to do to keep your minerals in balance. · You do not get better as expected. Where can you learn more? Go to http://shannon-heriberto.info/. Enter H916 in the search box to learn more about \"Electrolyte Imbalance: Care Instructions. \" Current as of: July 26, 2016 Content Version: 11.3 © 1615-1555 OneTouch. Care instructions adapted under license by [employer] (which disclaims liability or warranty for this information). If you have questions about a medical condition or this instruction, always ask your healthcare professional. [address] any warranty or liability for your use of this information. Hypokalemia: Care Instructions Your Care Instructions Hypokalemia (say \"xu-lc-[id number]-BLANCA-gonzalo-uh\") is a low level of potassium. The heart, muscles, kidneys, and nervous system all need potassium to work well. This problem has many different causes. Kidney problems, diet, and medicines like diuretics and laxatives can cause it. So can vomiting or diarrhea. In some cases, cancer is the cause. Your doctor may do tests to find the cause of your low potassium levels. You may need medicines to bring your potassium levels back to normal. You may also need regular blood tests to check your potassium. If you have very low potassium, you may need intravenous (IV) medicines. You also may need tests to check the electrical activity of your heart. Heart problems caused by low potassium levels can be very serious. Follow-up care is a key part of your treatment and safety.  Be sure to make and go to all appointments, and call your doctor if you are having problems. It's also a good idea to know your test results and keep a list of the medicines you take. How can you care for yourself at home? · If your doctor recommends it, eat foods that have a lot of potassium. These include fresh fruits, juices, and vegetables. They also include nuts, beans, and milk. · Be safe with medicines. If your doctor prescribes medicines or potassium supplements, take them exactly as directed. Call your doctor if you have any problems with your medicines. · Get your potassium levels tested as often as your doctor tells you. When should you call for help? Call 911 anytime you think you may need emergency care. For example, call if: 
· You feel like your heart is missing beats. Heart problems caused by low potassium can cause death. · You passed out (lost consciousness). · You have a seizure. Call your doctor now or seek immediate medical care if: 
· You feel weak or unusually tired. · You have severe arm or leg cramps. · You have tingling or numbness. · You feel sick to your stomach, or you vomit. · You have belly cramps. · You feel bloated or constipated. · You have to urinate a lot. · You feel very thirsty most of the time. · You are dizzy or lightheaded, or you feel like you may faint. · You feel depressed, or you lose touch with reality. Watch closely for changes in your health, and be sure to contact your doctor if: 
· You do not get better as expected. Where can you learn more? Go to http://shannon-heriberto.info/. Enter G358 in the search box to learn more about \"Hypokalemia: Care Instructions. \" Current as of: July 28, 2016 Content Version: 11.3 © 1456-5355 [employer]ace[employer]. Care instructions adapted under license by [employer]istica (which disclaims liability or warranty for this information).  If you have questions about a medical condition or this instruction, always ask your healthcare professional. [address] any warranty or liability for your use of this information. Patient armband removed and shredded. Discharge Instructions Attachments/References ANEMIA (ENGLISH) PANCREATITIS (ENGLISH) PANCREATITIS: CHRONIC DIET (ENGLISH) METABOLIC ENCEPHALOPATHY: GENERAL INFO (ENGLISH) OXYCODONE, RAPID RELEASE (BY MOUTH) (ENGLISH) Discharge Orders None [facility] Mobile Announcement We are excited to announce that we are making your provider's discharge notes available to you in Power2Switch. You will see these notes when they are completed and signed by the physician that discharged you from your recent hospital stay. If you have any questions or concerns about any information you see in Power2Switch, please call the Health Information Department where you were seen or reach out to your Primary Care Provider for more information about your plan of care. Introducing [facility] [place] [facility]! Dear Agustina Nixon: Thank you for requesting a Power2Switch account. Our records indicate that you already have an active Power2Switch account. You can access your account anytime at [email or web address] Did you know that you can access your hospital and ER discharge instructions at any time in Power2Switch? You can also review all of your test results from your hospital stay or ER visit. Additional Information If you have questions, please visit the Frequently Asked Questions section of the Power2Switch website at [email or web address] Remember, Power2Switch is NOT to be used for urgent needs. For medical emergencies, dial 911. Now available from your iPhone and Android! General Information Please provide this summary of care documentation to your next provider. Patient Signature:  ____________________________________________________________ Date:  ____________________________________________________________  
  
Paulina Kingsleyncer Provider Signature:  ____________________________________________________________ Date:  ____________________________________________________________ More Information Anemia: Care Instructions Your Care Instructions Anemia is a low level of red blood cells, which carry oxygen throughout your body. Many things can cause anemia. Lack of iron is one of the most common causes. Your body needs iron to make hemoglobin, a substance in red blood cells that carries oxygen from the lungs to your body's cells. Without enough iron, the body produces fewer and smaller red blood cells. As a result, your body's cells do not get enough oxygen, and you feel tired and weak. And you may have trouble concentrating. Bleeding is the most common cause of a lack of iron. You may have heavy menstrual bleeding or bleeding caused by conditions such as ulcers, hemorrhoids, or cancer. Regular use of aspirin or other anti-inflammatory medicines (such as ibuprofen) also can cause bleeding in some people. A lack of iron in your diet also can cause anemia, especially at times when the body needs more iron, such as during pregnancy, infancy, and the teen years. Your doctor may have prescribed iron pills. It may take several months of treatment for your iron levels to return to normal. Your doctor also may suggest that you eat foods that are rich in iron, such as meat and beans. There are many other causes of anemia. It is not always due to a lack of iron. Finding the specific cause of your anemia will help your doctor find the right treatment for you. Follow-up care is a key part of your treatment and safety. Be sure to make and go to all appointments, and call your doctor if you are having problems. It's also a good idea to know your test results and keep a list of the medicines you take. How can you care for yourself at home? · Take your medicines exactly as prescribed. Call your doctor if you think you are having a problem with your medicine. · If your doctor recommends iron pills, take them as directed: ¨ Try to take the pills on an empty stomach about 1 hour before or 2 hours after meals. But you may need to take iron with food to avoid an upset stomach. ¨ Do not take antacids or drink milk or caffeine drinks (such as coffee, tea, or cola) at the same time or within 2 hours of the time that you take your iron. They can make it hard for your body to absorb the iron. ¨ Vitamin C (from food or supplements) helps your body absorb iron. Try taking iron pills with a glass of orange juice or some other food that is high in vitamin C, such as citrus fruits. ¨ Iron pills may cause stomach problems, such as heartburn, nausea, diarrhea, constipation, and cramps. Be sure to drink plenty of fluids, and include fruits, vegetables, and fiber in your diet each day. Iron pills often make your bowel movements dark or green. ¨ If you forget to take an iron pill, do not take a double dose of iron the next time you take a pill. ¨ Keep iron pills out of the reach of small children. An overdose of iron can be very dangerous. · Follow your doctor's advice about eating iron-rich foods. These include red meat, shellfish, poultry, eggs, beans, raisins, whole-grain bread, and leafy green vegetables. · Steam vegetables to help them keep their iron content. When should you call for help? Call 911 anytime you think you may need emergency care. For example, call if: 
· You have symptoms of a heart attack. These may include: ¨ Chest pain or pressure, or a strange feeling in the chest. 
¨ Sweating. ¨ Shortness of breath. ¨ Nausea or vomiting. ¨ Pain, pressure, or a strange feeling in the back, neck, jaw, or upper belly or in one or both shoulders or arms. ¨ Lightheadedness or sudden weakness. ¨ A fast or irregular heartbeat. After you call 911, the  may tell you to chew 1 adult-strength or 2 to 4 low-dose aspirin. Wait for an ambulance. Do not try to drive yourself. · You passed out (lost consciousness). Call your doctor now or seek immediate medical care if: 
· You have new or increased shortness of breath. · You are dizzy or lightheaded, or you feel like you may faint. · Your fatigue and weakness continue or get worse. · You have any abnormal bleeding, such as: 
¨ Nosebleeds. ¨ Vaginal bleeding that is different (heavier, more frequent, at a different time of the month) than what you are used to. ¨ Bloody or black stools, or rectal bleeding. ¨ Bloody or pink urine. Watch closely for changes in your health, and be sure to contact your doctor if: 
· You do not get better as expected. Where can you learn more? Go to http://shannon-heriberto.info/. Enter R301 in the search box to learn more about \"Anemia: Care Instructions. \" Current as of: October 13, 2016 Content Version: 11.3 © 5973-6171 [employer]. Care instructions adapted under license by [employer] (which disclaims liability or warranty for this information). If you have questions about a medical condition or this instruction, always ask your healthcare professional. [place] [address] any warranty or liability for your use of this information. Pancreatitis: Care Instructions Your Care Instructions The pancreas is an organ behind the stomach. It makes hormones and enzymes to help your body digest food. But if these enzymes attack the pancreas, it can get inflamed. This is called pancreatitis. Most cases are caused by gallstones or by heavy alcohol use. If you take care of yourself at home, it will help you get better. It will also help you avoid more problems with your pancreas. Follow-up care is a key part of your treatment and safety.  Be sure to make and go to all appointments, and call your doctor if you are having problems. It's also a good idea to know your test results and keep a list of the medicines you take. How can you care for yourself at home? · Drink clear liquids and eat bland foods until you feel better. [place] foods include rice, dry toast, and crackers. They also include bananas and applesauce. · Eat a low-fat diet until your doctor says your pancreas is healed. · Do not drink alcohol. Tell your doctor if you need help to quit. Counseling, support groups, and sometimes medicines can help you stay sober. · Be safe with medicines. Read and follow all instructions on the label. ¨ If the doctor gave you a prescription medicine for pain, take it as prescribed. ¨ If you are not taking a prescription pain medicine, ask your doctor if you can take an over-the-counter medicine. · If your doctor prescribed antibiotics, take them as directed. Do not stop taking them just because you feel better. You need to take the full course of antibiotics. · Get extra rest until you feel better. To prevent future problems with your pancreas · Do not drink alcohol. · Tell your doctors and pharmacist that you've had pancreatitis. They can help you avoid medicines that may cause this problem again. When should you call for help? Call your doctor now or seek immediate medical care if: 
· You have new or severe belly pain. · You have a new or higher fever. · You can't keep fluid or medicines down. Watch closely for changes in your health, and be sure to contact your doctor if: · The symptoms you had when you first started feeling sick come back. · You do not get better as expected. · You need help to stop drinking alcohol. Where can you learn more? Go to http://shannon-heriberto.info/. Enter X786 in the search box to learn more about \"Pancreatitis: Care Instructions. \" Current as of: August 9, 2016 Content Version: 11.3 © 6714-4540 [employer]. Care instructions adapted under license by [employer] (which disclaims liability or warranty for this information). If you have questions about a medical condition or this instruction, always ask your healthcare professional. [address] any warranty or liability for your use of this information. Diet for Chronic Pancreatitis: Care Instructions Your Care Instructions The pancreas is an organ behind the stomach that makes hormones and enzymes to help your body digest food. Sometimes the enzymes attack another part of the pancreas, which can cause pain and swelling. This is called pancreatitis. Chronic pancreatitis may cause you to be in pain much of the time. You may be able to help the pain by avoiding alcohol and eating a low-fat diet. Your doctor and dietitian can help you make an eating plan that does not irritate your digestive system. Always talk with your doctor or dietitian before you make changes in your diet. Follow-up care is a key part of your treatment and safety. Be sure to make and go to all appointments, and call your doctor if you are having problems. It's also a good idea to know your test results and keep a list of the medicines you take. How can you care for yourself at home? · Do not drink alcohol. It may make your pain worse and cause other problems. Tell your doctor if you need help to quit. Counseling, support groups, and sometimes medicines can help you stay sober. · Ask your doctor if you need to take pancreatic enzyme pills to help your body digest fat and protein. · Drink plenty of fluids, enough so that your urine is light yellow or clear like water. If you have kidney, heart, or liver disease and have to limit fluids, talk with your doctor before you increase the amount of fluids you drink. Eat a low-fat diet · Eat many small meals and snacks each day instead of three large meals. · Choose lean meats. ¨ Eat no more than 5 to 6½ ounces of meat a day. ¨ Cut off all fat you can see. ¨ Eat chicken and turkey without the skin. ¨ Many types of fish, such as salmon, lake trout, tuna, and herring, provide healthy omega-3 fat. But avoid fish canned in oil, such as sardines in olive oil. ¨ Bake, broil, or grill meats, poultry, or fish instead of frying them in butter or fat. · Drink or eat nonfat or low-fat milk, yogurt, cheese, or other milk products each day. ¨ Read the labels on cheeses, and choose those with less than 5 grams of fat an ounce. ¨ Try fat-free sour cream, cream cheese, or yogurt. ¨ Avoid cream soups and cream sauces on pasta. ¨ Eat low-fat ice cream, frozen yogurt, or sorbet. Avoid regular ice cream. 
· Eat whole-grain cereals, breads, crackers, rice, or pasta. Avoid high-fat foods such as croissants, scones, biscuits, waffles, doughnuts, muffins, granola, and high-fat breads. · Flavor your foods with herbs and spices (such as basil, tarragon, or mint), fat-free sauces, or lemon juice instead of butter. You can also use butter substitutes, fat-free mayonnaise, or fat-free dressing. · Try applesauce, prune puree, or mashed bananas to replace some or all of the fat when you bake. · Limit fats and oils, such as butter, margarine, mayonnaise, and salad dressing, to no more than 1 tablespoon a meal. 
· Avoid high-fat foods, such as: 
¨ Chocolate, whole milk, ice cream, processed cheese, and egg yolks. ¨ Fried or buttered foods. ¨ Sausage, salami, and mi. ¨ Cinnamon rolls, cakes, pies, cookies, and other pastries. ¨ Prepared snack foods, such as potato chips, nut and granola bars, and mixed nuts. ¨ Coconut and avocado. · Learn how to read food labels for serving sizes and ingredients. Fast-food and convenience-food meals often have lots of fat. Where can you learn more? Go to http://shannon-heriberto.info/. Enter O456 in the search box to learn more about \"Diet for Chronic Pancreatitis: Care Instructions. \" Current as of: July 26, 2016 Content Version: 11.3 © 2995-5365 [employer]. Care instructions adapted under license by [employer] (which disclaims liability or warranty for this information). If you have questions about a medical condition or this instruction, always ask your healthcare professional. [address] any warranty or liability for your use of this information. Learning About Metabolic Encephalopathy What is metabolic encephalopathy? Metabolic encephalopathy is a problem in the brain. It is caused by a chemical imbalance in the blood. The imbalance is caused by an illness or organs that are not working as well as they should. It is not caused by a head injury. When the imbalance affects the brain, it can lead to personality changes. It can also make it harder to think clearly and remember things. The problems may only last a short time if you get treatment right away. But this depends on the cause. If the imbalance has been building up because you've been sick for a long time, the mental changes may be more severe. They may also last longer. What happens when you have this problem? When things are working right, your body has many ways to keep the chemicals in your blood in balance. For example, your liver and kidneys remove waste from your blood. The kidneys also help keep fluids and sodium in balance. And your pancreas makes insulin. It is a hormone that helps control the amount of sugar in your blood. But the chemicals in your blood can get out of balance and damage parts of your body because of a medical problem. This may be kidney or liver failure. Or it could be diabetes that isn't controlled well. When the imbalance affects the brain, normal thinking and behavior can change. What are the symptoms? Symptoms may include: · Confusion. · Problems with thinking and remembering. · Being grouchy and depressed. · Feeling drowsy. · Not being able to sleep. · Passing out (fainting) now and then. How is it treated? The doctor will try to find the illness that's causing the problem. He or she may ask questions about your past health. The doctor will also do tests to find what is causing the chemical imbalance and to see how severe it is. The doctor may treat the organ system that's causing the problem. For example, if it's a kidney problem, you may have treatment to help your kidneys work better. If you have an infection, you may need antibiotics. If the doctor can't treat the cause of the problem, he or she will treat the symptoms. The doctor will carefully watch your blood chemicals to make sure that your treatment is being done safely. Follow-up care is a key part of your treatment and safety. Be sure to make and go to all appointments, and call your doctor if you are having problems. It's also a good idea to know your test results and keep a list of the medicines you take. Where can you learn more? Go to http://shannon-heriberto.info/. Enter M706 in the search box to learn more about \"Learning About Metabolic Encephalopathy. \" Current as of: August 9, 2016 Content Version: 11.3 © 7285-9801 [employer]. Care instructions adapted under license by [employer] (which disclaims liability or warranty for this information). If you have questions about a medical condition or this instruction, always ask your healthcare professional. [place] [address] any warranty or liability for your use of this information. Oxycodone, Rapid Release (By mouth) Oxycodone Hydrochloride (vt-a-GQL-done orville-droe-KLOR-yajaira) Treats moderate to severe pain. This medicine is a narcotic pain reliever. Brand Name(s): Oxaydo, Oxy IR, Roxicodone There may be other brand names for this medicine. When This Medicine Should Not Be Used: This medicine is not right for everyone. Do not use it if you had an allergic reaction to oxycodone, codeine, hydrocodone, dihydrocodeine, or morphine, or you have a stomach or bowel blockage. How to Use This Medicine:  
Capsule, Liquid, Tablet · Take your medicine as directed. Your dose may need to be changed several times to find what works best for you. · An overdose can be dangerous. Follow directions carefully so you do not get too much medicine at one time. · Oral liquid: Measure the oral liquid medicine with a marked measuring spoon, oral syringe, or medicine cup. · Oxaydo® tablet: Swallow it whole with enough water to swallow it completely. Do not break, crush, chew, or dissolve it. Do not wet the tablet before you put it in your mouth. · This medicine should come with a Medication Guide. Ask your pharmacist for a copy if you do not have one. · Missed dose: Take a dose as soon as you remember. If it is almost time for your next dose, wait until then and take a regular dose. Do not take extra medicine to make up for a missed dose. · Store the medicine in a closed container at room temperature, away from heat, moisture, and direct light. Store the medicine in a secure place to prevent others from getting it. Ask your pharmacist about the best way to dispose of medicine you do not use. Drugs and Foods to Avoid: Ask your doctor or pharmacist before using any other medicine, including over-the-counter medicines, vitamins, and herbal products. · Do not use this medicine if you are using or have used an MAO inhibitor within the past 14 days. · Some medicines can affect how oxycodone works. Tell your doctor if you are using any of the following: ¨ Amiodarone, carbamazepine, erythromycin, ketoconazole, phenytoin, quinidine, rifampin, ritonavir ¨ Diuretic (water pill) ¨ Medicine to treat depression or anxiety ¨ Medicine to treat migraine headaches ¨ Phenothiazine medicine · Tell your doctor if you use anything else that makes you sleepy. Some examples are allergy medicine, narcotic pain medicine, and alcohol. Tell your doctor if you are using buprenorphine, butorphanol, nalbuphine, pentazocine, or a muscle relaxer. · Do not drink alcohol while you are using this medicine. Warnings While Using This Medicine: · Tell your doctor if you are pregnant or breastfeeding, or if you have kidney disease, liver disease, heart disease, low blood pressure, lung disease or breathing problems (such as asthma, COPD), scoliosis, an enlarged prostate or trouble urinating, an underactive thyroid, [place] disease, gallbladder or pancreas problems, or digestion problems. Tell your doctor if you have a history of head injury, brain tumor, mental health problems, seizures, or alcohol or drug addiction. · This medicine may cause the following problems: 
¨ High risk of overdose, which can lead to death ¨ Respiratory depression (serious breathing problem that can be life-threatening) ¨ Serotonin syndrome, when used with certain medicines · This medicine may make you dizzy, drowsy, or faint. Do not drive or do anything else that could be dangerous until you know how this medicine affects you. Sit or lie down if you feel dizzy. Stand up carefully. · This medicine can be habit-forming. Do not use more than your prescribed dose. Call your doctor if you think your medicine is not working. · Do not stop using this medicine suddenly. Your doctor will need to slowly decrease your dose before you stop it completely. · This medicine may cause constipation, especially with long-term use. Ask your doctor if you should use a laxative to prevent and treat constipation. Drink plenty of liquids to help avoid constipation. · This medicine could cause infertility. Talk with your doctor before using this medicine if you plan to have children. · Keep all medicine out of the reach of children. Never share your medicine with anyone. Possible Side Effects While Using This Medicine:  
Call your doctor right away if you notice any of these side effects: · Allergic reaction: Itching or hives, swelling in your face or hands, swelling or tingling in your mouth or throat, chest tightness, trouble breathing · Anxiety, restlessness, fast heartbeat, fever, sweating, muscle spasms, twitching, nausea, vomiting, diarrhea, seeing or hearing things that are not there · Blue lips, fingernails, or skin, trouble breathing · Extreme dizziness or weakness, shallow breathing, slow heartbeat, sweating, cold or clammy skin, seizures · Lightheadedness, dizziness, fainting · Severe constipation, stomach pain If you notice these less serious side effects, talk with your doctor: · Mild constipation · Sleepiness, tiredness If you notice other side effects that you think are caused by this medicine, tell your doctor. Call your doctor for medical advice about side effects. You may report side effects to FDA at 6-036-FDA-9969 © 2017 [facility] Information is for End User's use only and may not be sold, redistributed or otherwise used for commercial purposes. The above information is an  only. It is not intended as medical advice for individual conditions or treatments. Talk to your doctor, nurse or pharmacist before following any medical regimen to see if it is safe and effective for you.

## 2017-09-14 ENCOUNTER — APPOINTMENT (OUTPATIENT)
Dept: MRI IMAGING | Age: 52
DRG: 438 | End: 2017-09-14
Attending: INTERNAL MEDICINE
Payer: MEDICARE

## 2017-09-14 ENCOUNTER — APPOINTMENT (OUTPATIENT)
Dept: CT IMAGING | Age: 52
DRG: 438 | End: 2017-09-14
Attending: EMERGENCY MEDICINE
Payer: MEDICARE

## 2017-09-14 PROBLEM — G93.40 ENCEPHALOPATHY: Status: ACTIVE | Noted: 2017-09-14

## 2017-09-14 PROBLEM — K85.90 PANCREATITIS: Status: ACTIVE | Noted: 2017-09-14

## 2017-09-14 PROBLEM — R42 DIZZINESS: Status: ACTIVE | Noted: 2017-09-14

## 2017-09-14 PROBLEM — D64.9 ANEMIA: Status: ACTIVE | Noted: 2017-09-14

## 2017-09-14 PROBLEM — E87.6 HYPOKALEMIA: Status: ACTIVE | Noted: 2017-09-14

## 2017-09-14 LAB
ALBUMIN SERPL-MCNC: 2.8 G/DL (ref 3.4–5)
ALBUMIN SERPL-MCNC: 2.9 G/DL (ref 3.4–5)
ALBUMIN/GLOB SERPL: 0.9 {RATIO} (ref 0.8–1.7)
ALBUMIN/GLOB SERPL: 1 {RATIO} (ref 0.8–1.7)
ALP SERPL-CCNC: 154 U/L (ref 45–117)
ALP SERPL-CCNC: 164 U/L (ref 45–117)
ALT SERPL-CCNC: 148 U/L (ref 13–56)
ALT SERPL-CCNC: 153 U/L (ref 13–56)
AMPHET UR QL SCN: NEGATIVE
ANION GAP SERPL CALC-SCNC: 12 MMOL/L (ref 3–18)
ANION GAP SERPL CALC-SCNC: 7 MMOL/L (ref 3–18)
APPEARANCE UR: CLEAR
AST SERPL-CCNC: 358 U/L (ref 15–37)
AST SERPL-CCNC: 396 U/L (ref 15–37)
ATRIAL RATE: 78 BPM
BARBITURATES UR QL SCN: NEGATIVE
BASOPHILS # BLD: 0 K/UL (ref 0–0.06)
BASOPHILS # BLD: 0 K/UL (ref 0–0.06)
BASOPHILS NFR BLD: 0 % (ref 0–2)
BASOPHILS NFR BLD: 0 % (ref 0–2)
BENZODIAZ UR QL: NEGATIVE
BILIRUB DIRECT SERPL-MCNC: 0.2 MG/DL (ref 0–0.2)
BILIRUB DIRECT SERPL-MCNC: 0.3 MG/DL (ref 0–0.2)
BILIRUB SERPL-MCNC: 0.4 MG/DL (ref 0.2–1)
BILIRUB SERPL-MCNC: 0.5 MG/DL (ref 0.2–1)
BILIRUB UR QL: NEGATIVE
BUN SERPL-MCNC: 4 MG/DL (ref 7–18)
BUN SERPL-MCNC: 5 MG/DL (ref 7–18)
BUN/CREAT SERPL: 8 (ref 12–20)
BUN/CREAT SERPL: 8 (ref 12–20)
CALCIUM SERPL-MCNC: 7.8 MG/DL (ref 8.5–10.1)
CALCIUM SERPL-MCNC: 8.1 MG/DL (ref 8.5–10.1)
CALCULATED P AXIS, ECG09: 59 DEGREES
CALCULATED R AXIS, ECG10: 62 DEGREES
CALCULATED T AXIS, ECG11: 53 DEGREES
CANNABINOIDS UR QL SCN: NEGATIVE
CHLORIDE SERPL-SCNC: 103 MMOL/L (ref 100–108)
CHLORIDE SERPL-SCNC: 108 MMOL/L (ref 100–108)
CO2 SERPL-SCNC: 24 MMOL/L (ref 21–32)
CO2 SERPL-SCNC: 26 MMOL/L (ref 21–32)
COCAINE UR QL SCN: NEGATIVE
COLOR UR: YELLOW
CREAT SERPL-MCNC: 0.52 MG/DL (ref 0.6–1.3)
CREAT SERPL-MCNC: 0.59 MG/DL (ref 0.6–1.3)
DIAGNOSIS, 93000: NORMAL
DIFFERENTIAL METHOD BLD: ABNORMAL
DIFFERENTIAL METHOD BLD: ABNORMAL
EOSINOPHIL # BLD: 0.1 K/UL (ref 0–0.4)
EOSINOPHIL # BLD: 0.1 K/UL (ref 0–0.4)
EOSINOPHIL NFR BLD: 2 % (ref 0–5)
EOSINOPHIL NFR BLD: 3 % (ref 0–5)
ERYTHROCYTE [DISTWIDTH] IN BLOOD BY AUTOMATED COUNT: 15.8 % (ref 11.6–14.5)
ERYTHROCYTE [DISTWIDTH] IN BLOOD BY AUTOMATED COUNT: 16.2 % (ref 11.6–14.5)
ETHANOL SERPL-MCNC: <3 MG/DL (ref 0–3)
FOLATE SERPL-MCNC: >20 NG/ML (ref 3.1–17.5)
GLOBULIN SER CALC-MCNC: 2.9 G/DL (ref 2–4)
GLOBULIN SER CALC-MCNC: 3.1 G/DL (ref 2–4)
GLUCOSE BLD STRIP.AUTO-MCNC: 68 MG/DL (ref 70–110)
GLUCOSE BLD STRIP.AUTO-MCNC: 77 MG/DL (ref 70–110)
GLUCOSE BLD STRIP.AUTO-MCNC: 82 MG/DL (ref 70–110)
GLUCOSE BLD STRIP.AUTO-MCNC: 91 MG/DL (ref 70–110)
GLUCOSE SERPL-MCNC: 84 MG/DL (ref 74–99)
GLUCOSE SERPL-MCNC: 85 MG/DL (ref 74–99)
GLUCOSE UR STRIP.AUTO-MCNC: NEGATIVE MG/DL
HCT VFR BLD AUTO: 32 % (ref 35–45)
HCT VFR BLD AUTO: 33.3 % (ref 35–45)
HDSCOM,HDSCOM: ABNORMAL
HGB BLD-MCNC: 10.9 G/DL (ref 12–16)
HGB BLD-MCNC: 10.9 G/DL (ref 12–16)
HGB UR QL STRIP: NEGATIVE
INR PPP: 1 (ref 0.8–1.2)
KETONES UR QL STRIP.AUTO: NEGATIVE MG/DL
LEUKOCYTE ESTERASE UR QL STRIP.AUTO: NEGATIVE
LIPASE SERPL-CCNC: 1826 U/L (ref 73–393)
LYMPHOCYTES # BLD: 2.2 K/UL (ref 0.9–3.6)
LYMPHOCYTES # BLD: 2.5 K/UL (ref 0.9–3.6)
LYMPHOCYTES NFR BLD: 44 % (ref 21–52)
LYMPHOCYTES NFR BLD: 47 % (ref 21–52)
MAGNESIUM SERPL-MCNC: 1.7 MG/DL (ref 1.6–2.6)
MAGNESIUM SERPL-MCNC: 2.5 MG/DL (ref 1.6–2.6)
MCH RBC QN AUTO: 34.1 PG (ref 24–34)
MCH RBC QN AUTO: 34.2 PG (ref 24–34)
MCHC RBC AUTO-ENTMCNC: 32.7 G/DL (ref 31–37)
MCHC RBC AUTO-ENTMCNC: 34.1 G/DL (ref 31–37)
MCV RBC AUTO: 100.3 FL (ref 74–97)
MCV RBC AUTO: 104.1 FL (ref 74–97)
METHADONE UR QL: NEGATIVE
MONOCYTES # BLD: 0.3 K/UL (ref 0.05–1.2)
MONOCYTES # BLD: 0.3 K/UL (ref 0.05–1.2)
MONOCYTES NFR BLD: 6 % (ref 3–10)
MONOCYTES NFR BLD: 6 % (ref 3–10)
NEUTS SEG # BLD: 2 K/UL (ref 1.8–8)
NEUTS SEG # BLD: 2.7 K/UL (ref 1.8–8)
NEUTS SEG NFR BLD: 44 % (ref 40–73)
NEUTS SEG NFR BLD: 48 % (ref 40–73)
NITRITE UR QL STRIP.AUTO: NEGATIVE
OPIATES UR QL: POSITIVE
P-R INTERVAL, ECG05: 152 MS
PCP UR QL: NEGATIVE
PH UR STRIP: 7 [PH] (ref 5–8)
PLATELET # BLD AUTO: 93 K/UL (ref 135–420)
PLATELET # BLD AUTO: 95 K/UL (ref 135–420)
PMV BLD AUTO: 10 FL (ref 9.2–11.8)
PMV BLD AUTO: 10.1 FL (ref 9.2–11.8)
POTASSIUM SERPL-SCNC: 2.8 MMOL/L (ref 3.5–5.5)
POTASSIUM SERPL-SCNC: 3.1 MMOL/L (ref 3.5–5.5)
PROT SERPL-MCNC: 5.8 G/DL (ref 6.4–8.2)
PROT SERPL-MCNC: 5.9 G/DL (ref 6.4–8.2)
PROT UR STRIP-MCNC: NEGATIVE MG/DL
PROTHROMBIN TIME: 12.4 SEC (ref 11.5–15.2)
Q-T INTERVAL, ECG07: 392 MS
QRS DURATION, ECG06: 82 MS
QTC CALCULATION (BEZET), ECG08: 446 MS
RBC # BLD AUTO: 3.19 M/UL (ref 4.2–5.3)
RBC # BLD AUTO: 3.2 M/UL (ref 4.2–5.3)
SODIUM SERPL-SCNC: 139 MMOL/L (ref 136–145)
SODIUM SERPL-SCNC: 141 MMOL/L (ref 136–145)
SP GR UR REFRACTOMETRY: 1.01 (ref 1–1.03)
UROBILINOGEN UR QL STRIP.AUTO: 1 EU/DL (ref 0.2–1)
VALPROATE SERPL-MCNC: 53 UG/ML (ref 50–100)
VENTRICULAR RATE, ECG03: 78 BPM
VIT B12 SERPL-MCNC: >2000 PG/ML (ref 211–911)
WBC # BLD AUTO: 4.5 K/UL (ref 4.6–13.2)
WBC # BLD AUTO: 5.6 K/UL (ref 4.6–13.2)

## 2017-09-14 PROCEDURE — 74011250636 HC RX REV CODE- 250/636: Performed by: FAMILY MEDICINE

## 2017-09-14 PROCEDURE — 82962 GLUCOSE BLOOD TEST: CPT

## 2017-09-14 PROCEDURE — 74011250636 HC RX REV CODE- 250/636: Performed by: EMERGENCY MEDICINE

## 2017-09-14 PROCEDURE — 74011250637 HC RX REV CODE- 250/637: Performed by: HOSPITALIST

## 2017-09-14 PROCEDURE — 80076 HEPATIC FUNCTION PANEL: CPT | Performed by: EMERGENCY MEDICINE

## 2017-09-14 PROCEDURE — 80307 DRUG TEST PRSMV CHEM ANLYZR: CPT | Performed by: EMERGENCY MEDICINE

## 2017-09-14 PROCEDURE — 74011000250 HC RX REV CODE- 250: Performed by: EMERGENCY MEDICINE

## 2017-09-14 PROCEDURE — 85025 COMPLETE CBC W/AUTO DIFF WBC: CPT | Performed by: EMERGENCY MEDICINE

## 2017-09-14 PROCEDURE — 97116 GAIT TRAINING THERAPY: CPT

## 2017-09-14 PROCEDURE — 85610 PROTHROMBIN TIME: CPT | Performed by: EMERGENCY MEDICINE

## 2017-09-14 PROCEDURE — 36415 COLL VENOUS BLD VENIPUNCTURE: CPT | Performed by: FAMILY MEDICINE

## 2017-09-14 PROCEDURE — 83735 ASSAY OF MAGNESIUM: CPT | Performed by: EMERGENCY MEDICINE

## 2017-09-14 PROCEDURE — 93005 ELECTROCARDIOGRAM TRACING: CPT

## 2017-09-14 PROCEDURE — 77030020263 HC SOL INJ SOD CL0.9% LFCR 1000ML

## 2017-09-14 PROCEDURE — 80048 BASIC METABOLIC PNL TOTAL CA: CPT | Performed by: FAMILY MEDICINE

## 2017-09-14 PROCEDURE — 70551 MRI BRAIN STEM W/O DYE: CPT

## 2017-09-14 PROCEDURE — 80048 BASIC METABOLIC PNL TOTAL CA: CPT | Performed by: EMERGENCY MEDICINE

## 2017-09-14 PROCEDURE — 65660000000 HC RM CCU STEPDOWN

## 2017-09-14 PROCEDURE — 74011250637 HC RX REV CODE- 250/637: Performed by: EMERGENCY MEDICINE

## 2017-09-14 PROCEDURE — 97162 PT EVAL MOD COMPLEX 30 MIN: CPT

## 2017-09-14 PROCEDURE — 83690 ASSAY OF LIPASE: CPT | Performed by: EMERGENCY MEDICINE

## 2017-09-14 PROCEDURE — 95819 EEG AWAKE AND ASLEEP: CPT | Performed by: FAMILY MEDICINE

## 2017-09-14 PROCEDURE — 82746 ASSAY OF FOLIC ACID SERUM: CPT | Performed by: FAMILY MEDICINE

## 2017-09-14 PROCEDURE — 82607 VITAMIN B-12: CPT | Performed by: FAMILY MEDICINE

## 2017-09-14 PROCEDURE — 80164 ASSAY DIPROPYLACETIC ACD TOT: CPT | Performed by: EMERGENCY MEDICINE

## 2017-09-14 PROCEDURE — 80076 HEPATIC FUNCTION PANEL: CPT | Performed by: FAMILY MEDICINE

## 2017-09-14 PROCEDURE — 70450 CT HEAD/BRAIN W/O DYE: CPT

## 2017-09-14 PROCEDURE — 81003 URINALYSIS AUTO W/O SCOPE: CPT | Performed by: EMERGENCY MEDICINE

## 2017-09-14 PROCEDURE — 83735 ASSAY OF MAGNESIUM: CPT | Performed by: FAMILY MEDICINE

## 2017-09-14 PROCEDURE — 74011250637 HC RX REV CODE- 250/637: Performed by: FAMILY MEDICINE

## 2017-09-14 RX ORDER — ONDANSETRON 2 MG/ML
4 INJECTION INTRAMUSCULAR; INTRAVENOUS
Status: COMPLETED | OUTPATIENT
Start: 2017-09-14 | End: 2017-09-14

## 2017-09-14 RX ORDER — GUAIFENESIN 100 MG/5ML
324 LIQUID (ML) ORAL
Status: COMPLETED | OUTPATIENT
Start: 2017-09-14 | End: 2017-09-14

## 2017-09-14 RX ORDER — LORAZEPAM 1 MG/1
1 TABLET ORAL
Status: DISCONTINUED | OUTPATIENT
Start: 2017-09-14 | End: 2017-09-16 | Stop reason: HOSPADM

## 2017-09-14 RX ORDER — LORAZEPAM 1 MG/1
2 TABLET ORAL
Status: DISCONTINUED | OUTPATIENT
Start: 2017-09-14 | End: 2017-09-16 | Stop reason: HOSPADM

## 2017-09-14 RX ORDER — LORAZEPAM 2 MG/ML
3 INJECTION INTRAMUSCULAR
Status: DISCONTINUED | OUTPATIENT
Start: 2017-09-14 | End: 2017-09-16 | Stop reason: HOSPADM

## 2017-09-14 RX ORDER — IBUPROFEN 200 MG
1 TABLET ORAL EVERY 24 HOURS
Status: DISCONTINUED | OUTPATIENT
Start: 2017-09-14 | End: 2017-09-16 | Stop reason: HOSPADM

## 2017-09-14 RX ORDER — LORAZEPAM 2 MG/ML
2 INJECTION INTRAMUSCULAR
Status: DISCONTINUED | OUTPATIENT
Start: 2017-09-14 | End: 2017-09-16 | Stop reason: HOSPADM

## 2017-09-14 RX ORDER — SODIUM CHLORIDE 0.9 % (FLUSH) 0.9 %
5-10 SYRINGE (ML) INJECTION AS NEEDED
Status: DISCONTINUED | OUTPATIENT
Start: 2017-09-14 | End: 2017-09-16 | Stop reason: HOSPADM

## 2017-09-14 RX ORDER — MAGNESIUM SULFATE HEPTAHYDRATE 40 MG/ML
2 INJECTION, SOLUTION INTRAVENOUS ONCE
Status: COMPLETED | OUTPATIENT
Start: 2017-09-14 | End: 2017-09-14

## 2017-09-14 RX ORDER — DIAZEPAM 5 MG/1
2.5 TABLET ORAL
Status: DISCONTINUED | OUTPATIENT
Start: 2017-09-14 | End: 2017-09-14

## 2017-09-14 RX ORDER — QUETIAPINE FUMARATE 50 MG/1
200 TABLET, EXTENDED RELEASE ORAL DAILY
Status: COMPLETED | OUTPATIENT
Start: 2017-09-14 | End: 2017-09-15

## 2017-09-14 RX ORDER — LORAZEPAM 2 MG/ML
1 INJECTION INTRAMUSCULAR
Status: DISCONTINUED | OUTPATIENT
Start: 2017-09-14 | End: 2017-09-16 | Stop reason: HOSPADM

## 2017-09-14 RX ORDER — POTASSIUM CHLORIDE 750 MG/1
40 TABLET, FILM COATED, EXTENDED RELEASE ORAL 2 TIMES DAILY
Status: DISCONTINUED | OUTPATIENT
Start: 2017-09-14 | End: 2017-09-16 | Stop reason: HOSPADM

## 2017-09-14 RX ORDER — HYDROMORPHONE HYDROCHLORIDE 1 MG/ML
0.5 INJECTION, SOLUTION INTRAMUSCULAR; INTRAVENOUS; SUBCUTANEOUS
Status: COMPLETED | OUTPATIENT
Start: 2017-09-14 | End: 2017-09-14

## 2017-09-14 RX ORDER — ENOXAPARIN SODIUM 100 MG/ML
40 INJECTION SUBCUTANEOUS EVERY 24 HOURS
Status: DISCONTINUED | OUTPATIENT
Start: 2017-09-14 | End: 2017-09-16 | Stop reason: HOSPADM

## 2017-09-14 RX ORDER — SODIUM CHLORIDE 0.9 % (FLUSH) 0.9 %
5-10 SYRINGE (ML) INJECTION EVERY 8 HOURS
Status: DISCONTINUED | OUTPATIENT
Start: 2017-09-14 | End: 2017-09-16 | Stop reason: HOSPADM

## 2017-09-14 RX ORDER — POTASSIUM CHLORIDE 750 MG/1
40 TABLET, FILM COATED, EXTENDED RELEASE ORAL DAILY
Status: DISCONTINUED | OUTPATIENT
Start: 2017-09-14 | End: 2017-09-14

## 2017-09-14 RX ORDER — NALOXONE HYDROCHLORIDE 0.4 MG/ML
0.4 INJECTION, SOLUTION INTRAMUSCULAR; INTRAVENOUS; SUBCUTANEOUS AS NEEDED
Status: DISCONTINUED | OUTPATIENT
Start: 2017-09-14 | End: 2017-09-16 | Stop reason: HOSPADM

## 2017-09-14 RX ORDER — GABAPENTIN 300 MG/1
600 CAPSULE ORAL 4 TIMES DAILY
Status: DISCONTINUED | OUTPATIENT
Start: 2017-09-14 | End: 2017-09-16 | Stop reason: HOSPADM

## 2017-09-14 RX ORDER — HYDROMORPHONE HYDROCHLORIDE 1 MG/ML
1 INJECTION, SOLUTION INTRAMUSCULAR; INTRAVENOUS; SUBCUTANEOUS
Status: DISCONTINUED | OUTPATIENT
Start: 2017-09-14 | End: 2017-09-15

## 2017-09-14 RX ORDER — SODIUM CHLORIDE 9 MG/ML
100 INJECTION, SOLUTION INTRAVENOUS CONTINUOUS
Status: DISCONTINUED | OUTPATIENT
Start: 2017-09-14 | End: 2017-09-16

## 2017-09-14 RX ORDER — POTASSIUM CHLORIDE 20 MEQ/1
40 TABLET, EXTENDED RELEASE ORAL
Status: COMPLETED | OUTPATIENT
Start: 2017-09-14 | End: 2017-09-14

## 2017-09-14 RX ADMIN — Medication 10 ML: at 21:12

## 2017-09-14 RX ADMIN — POTASSIUM CHLORIDE 40 MEQ: 750 TABLET, FILM COATED, EXTENDED RELEASE ORAL at 08:40

## 2017-09-14 RX ADMIN — Medication 10 ML: at 14:00

## 2017-09-14 RX ADMIN — GABAPENTIN 600 MG: 300 CAPSULE ORAL at 21:09

## 2017-09-14 RX ADMIN — HYDROMORPHONE HYDROCHLORIDE 1 MG: 1 INJECTION, SOLUTION INTRAMUSCULAR; INTRAVENOUS; SUBCUTANEOUS at 05:55

## 2017-09-14 RX ADMIN — POTASSIUM CHLORIDE 40 MEQ: 750 TABLET, FILM COATED, EXTENDED RELEASE ORAL at 18:36

## 2017-09-14 RX ADMIN — ASPIRIN 81 MG 324 MG: 81 TABLET ORAL at 02:59

## 2017-09-14 RX ADMIN — ONDANSETRON 4 MG: 2 INJECTION INTRAMUSCULAR; INTRAVENOUS at 01:37

## 2017-09-14 RX ADMIN — GABAPENTIN 600 MG: 300 CAPSULE ORAL at 14:58

## 2017-09-14 RX ADMIN — HYDROMORPHONE HYDROCHLORIDE 1 MG: 1 INJECTION, SOLUTION INTRAMUSCULAR; INTRAVENOUS; SUBCUTANEOUS at 11:09

## 2017-09-14 RX ADMIN — HYDROMORPHONE HYDROCHLORIDE 1 MG: 1 INJECTION, SOLUTION INTRAMUSCULAR; INTRAVENOUS; SUBCUTANEOUS at 15:55

## 2017-09-14 RX ADMIN — HYDROMORPHONE HYDROCHLORIDE 0.5 MG: 1 INJECTION, SOLUTION INTRAMUSCULAR; INTRAVENOUS; SUBCUTANEOUS at 01:40

## 2017-09-14 RX ADMIN — SODIUM CHLORIDE 100 ML/HR: 900 INJECTION, SOLUTION INTRAVENOUS at 05:26

## 2017-09-14 RX ADMIN — POTASSIUM CHLORIDE 40 MEQ: 1500 TABLET, FILM COATED, EXTENDED RELEASE ORAL at 01:35

## 2017-09-14 RX ADMIN — GABAPENTIN 600 MG: 300 CAPSULE ORAL at 08:40

## 2017-09-14 RX ADMIN — HYDROMORPHONE HYDROCHLORIDE 1 MG: 1 INJECTION, SOLUTION INTRAMUSCULAR; INTRAVENOUS; SUBCUTANEOUS at 21:09

## 2017-09-14 RX ADMIN — QUETIAPINE 200 MG: 50 TABLET, EXTENDED RELEASE ORAL at 08:41

## 2017-09-14 RX ADMIN — GABAPENTIN 600 MG: 300 CAPSULE ORAL at 18:37

## 2017-09-14 RX ADMIN — SODIUM CHLORIDE 1000 ML: 900 INJECTION, SOLUTION INTRAVENOUS at 01:11

## 2017-09-14 RX ADMIN — MAGNESIUM SULFATE HEPTAHYDRATE 2 G: 40 INJECTION, SOLUTION INTRAVENOUS at 01:13

## 2017-09-14 RX ADMIN — FOLIC ACID: 5 INJECTION, SOLUTION INTRAMUSCULAR; INTRAVENOUS; SUBCUTANEOUS at 01:11

## 2017-09-14 RX ADMIN — Medication 10 ML: at 08:43

## 2017-09-14 NOTE — PROGRESS NOTES
Speech Therapy Note:    12:15 attempted to arouse pt for speech therapy evaluation; unable to remain alert. 14:00 2nd attempt for evaluation; pt unable to remain alert; d/w RN. Will follow up later this day if time permits. [address]aquina [address]., [id number] [facility]  Office: [phone]  Pager: [phone]

## 2017-09-14 NOTE — ROUTINE PROCESS
0- Assumed care. No acute distress. In bed awake. 0800- Shift assessment completed. No respiratory distress noted. Alert and oriented x4. No complaints of pain. Concerned about when she can eat. Will consult Dr. Rene Zhu. Due meds given. Tolerated well. Lovenox held for platelets 26,698. Call bell in reach. 0830- Diet order clear liquids now per Dr. Tien Blanco verbal order. 0845- Pt taken to EEG. 1100- 1 mg IV dilaudid given for abdominal pain 8/10.     1130- Pt states pain is now a 6/10. No acute distress. 1400- Pt sleeping. No respiratory distress. 1151- 1 mg dilaudid given for abdominal pain 8/10. Tolerated well. 1230- Pt sleeping. No respiratory distress. 1654- Pt taken to MRI. 1800- Pt back from MRI. Pt diet changed to full liquid.

## 2017-09-14 NOTE — PROGRESS NOTES
Daily Progress Note: 9/14/2017 1:15 PM   Admit Date: 9/13/2017    Patient seen in follow up for multiple medical problems as listed below:  Patient Active Problem List   Diagnosis Code    Bipolar disorder (manic depression) ([facility] F31.9    Tachycardia R00.0    Alcohol dependence ([facility] F10.20    Esophageal stricture K22.2    Dilated pancreatic duct K86.89    Common bile duct dilation K83.8    Elevated LFTs R94.5    Bipolar depression ([facility] F31.30    HTN (hypertension) I10    Acute pancreatitis K85.90    Pancreatitis, alcoholic, acute T61.75    Tylenol overdose T39.1X1A    SIRS (systemic inflammatory response syndrome) ([facility]) R65.10    Pneumonia J18.9    COPD (chronic obstructive pulmonary disease) ([facility]) J44.9    Nicotine withdrawal F17.203    Dizziness R42    Pancreatitis K85.90    Hypokalemia E87.6    Anemia D64.9    Encephalopathy G93.40       Assesment     47 y.o. female who presents with dizziness onset 2 days ago. She denies vertigo. She however claims to have left sides facial numbness. She had gait abnormality due to the dizziness and had to support he self by holding objects while walking. No visual change, no speech change , no focal weakness. In the ED she was seen by tele neurology. CT head done. No acute change. She also has pancreatitis 2 days ago . She is a known alcoholic. Her last drink was 2 days ago. Patient has elevated LFT's but alcohol level is < 3. Encephalopathy/AMS - likely 2/2 polypharmacy. Per tele neurology d/c Depakote and hold Topamax. resolved 9/14. EEGx1     Dizziness - No vertigo. CT head negative. PT/OT eval. Fall precaution. MRI x1. Resolved. Suspect anxiety/drug associated     Acute Pancreatitis - alcohol induced. CT abd 9/12 with confirmation. On clears. Monitor pain. On IV dilaudid     Hypokalemia - repleting     Anemia - Macrocytic. B12, Folate wnl.  Likely ETOH and liver disease     Copd - Home O2, not needed while in hospital     Smoker - Transdermal nicotine patch / 24 hr. Advise smoking cessation     Bipolar disorder - d/c Depakote per tele neurology. Now on Seroquel      Alcohol abuse - CIWA protocol . High risk for withdrawal     Depression/ Anxiety -BDZ prn on CIWA -high risk for misuse will stop on discharge    DVT Protocol Active: yes  Code Status:  Full Code     Disposition: Home tomorrow likely    Subjective:     CC: Dizziness    Interval History: mentation at baseline. Her dizzy/tingling symptoms resolved and the history is unclear. Still with epigastric pain but quite hungry. Planned lipase in am. Need MRI head. ROS: 11 point ROS negative except for    Objective:     Visit Vitals    /70    Pulse (!) 112    Temp 98.2 °F (36.8 °C)    Resp 16    LMP 2013    SpO2 96%       Temp (24hrs), Av.2 °F (36.8 °C), Min:97.8 °F (36.6 °C), Max:98.5 °F (36.9 °C)      No intake or output data in the 24 hours ending 17 1315    Gen: AOx3, NAD  HEENT:  ARIS, EOMI. Neck: No Bruits/JVD   Lungs:   CTAB. Good respiratory effort  Heart:   RR S1 S2 without M/R/G  Abdomen: ND,mild upper abd ttp, BSX4,   Extremities:   No LE edema. No cyanosis. Skin:  no jaundice/lesions  Neuro: CN2-12 intact. Strength 5/5 UE/LE sensation 5/5 UE/LE no pronator drift.  Normal FNF      Data Review:     Meds/Labs/Tests reviewed    Current Shift:     Last three shifts:     Recent Labs      17   0717   0020  17   0840   WBC  4.5*  5.6  4.5*   RBC  3.20*  3.19*  3.65*   HGB  10.9*  10.9*  12.7   HCT  33.3*  32.0*  36.7   PLT  95*  93*  91*   GRANS  44  48  65   LYMPH  47  44  29   EOS  3  2  1       Recent Labs      17   0717   0020  17   0840   BUN  4*  5*  6*   CREA  0.52*  0.59*  0.62   CA  7.8*  8.1*  9.6   ALB  2.9*  2.8*  3.2*   K  3.1*  2.8*  3.7   NA  141  139  135*   CL  108  103  100   CO2  26  24  24   GLU  85  84  111*        Lab Results   Component Value Date/Time    Glucose 85 2017 07:05 AM Glucose 84 09/14/2017 12:20 AM    Glucose 111 09/12/2017 08:40 AM    Glucose 88 11/04/2016 12:18 PM    Glucose 96 02/25/2016 10:57 AM          Care coordination with Nursing/Consultants/staff: 15  Prior history, labs, and charting reviewed: 15    Procedures/Imaging:  CT head  CT abd  MRI head  EEG    Total time spent with chart review, patient examination/education, discussion with staff on case,documentation and medication management / adjustment  :  30 Minutes      Dr Do Vera  [phone]

## 2017-09-14 NOTE — PROGRESS NOTES
Problem: Pancreatitis  Goal: *Labs within defined limits  Outcome: Not Met  Lipase 1826    Problem: Falls - Risk of  Goal: *Absence of Falls  Document Thelma Fall Risk and appropriate interventions in the flowsheet.    Outcome: Progressing Towards Goal  Fall Risk Interventions:  Mobility Interventions: Patient to call before getting OOB           Medication Interventions: Patient to call before getting OOB

## 2017-09-14 NOTE — ED NOTES
Assumed care of pt. Pt awake and alert, currently going through her own medications stating \"I can keep my patches right? \"

## 2017-09-14 NOTE — ED TRIAGE NOTES
Alert female c/o worsening dizziness x2 days, pt reports last etoh 2 days ago when dx with pancreatitis. Pt reports normally has 1/2gallon vodka every day.

## 2017-09-14 NOTE — ED NOTES
In at pt bs to restart peripheral IV, at this time pt yelling \"that other nurse was a bitch because she didn't take all my blankets off\". pts blankets removed and pt given emotional support. Pt sobbing states \"it scared me when the doctor asked me that\" when referring to code status. Pt notified of ready room once IV restarted and inpatient orders available. Pt again given emotional support. Pt asking for ED RN name, given follow up information. Molly BRIDGES RN to transport pt to telemetry unit.

## 2017-09-14 NOTE — PROGRESS NOTES
OT order received and chart reviewed. 3 attempts for OT evaluation. 1054: 1st attempt; pt long sitting in bed, requesting that she eat breakfast prior to working with therapy as she just got back from EEG.  1249: 2nd attempt; pt sleeping very deeply, not waking to voice or touch. 8121-1839: 3rd attempt; attempted to wake pt again. Per JARRETT Hurd, she was also unable to wake pt. Pt opened eyes breifly with sternal rub, however, immediately began snoring again. 1345: 4th attempt; still unable to wake pt. Will follow up.     Nisa Collins MS OTR/L  Office Ext: [phone]  Pager: [phone]

## 2017-09-14 NOTE — PROGRESS NOTES
Nutrition initial assessment/Plan of care      RECOMMENDATIONS:     1. Advance diet when medically indicated/as tolerated  2. Monitor weight, labs and PO intake  3. RD to follow     GOALS:     1. PO intake meets >75% of protein/calorie needs by 9/19  2. Weight Maintenance (+/- 1-2 lb by 9/19)    ASSESSMENT:     Weight status is classified as normal per BMI of 20.6. Currently not meeting nutrition needs due to NPO diet order. Labs noted. Elevated LFT's. Nutrition recommendations listed. RD to follow. Nutrition Diagnoses: Altered nutrition related lab values related to pancreatitis as evidenced by lipase level of 1826. Nutrition Risk:  [] High  [x] Moderate []  Low    SUBJECTIVE/OBJECTIVE:      Patient admitted with pancreatitis. History of alcohol abuse. Patient not wanting to answer questions as she has been interrupted by several staff already. Asked me to leave. Per chart review- normally drinking 1/2 gallon vodka every day. No known food allergies on record. Will monitor diet advancement. Information Obtained from:    [x] Chart Review   [x] Patient   [] Family/Caregiver   [] Nurse/Physician   [] Interdisciplinary Meeting/Rounds    Diet: NPO  Medications: [x] Reviewed    Allergies: [x] Reviewed   Encounter Diagnoses     ICD-10-CM ICD-9-CM   1. Alcohol-induced acute pancreatitis, unspecified complication status N71.36 577.0   2. Hypokalemia E87.6 276.8   3. Dizziness R42 780.4   4. Medication adverse effect, initial encounter T88. Naomi Foot U406.0     Past Medical History:   Diagnosis Date    Alcohol abuse     Anxiety     Asthma     [facility]     Common migraine     [facility] obstructive [facility] disease) [facility])     Depression     Esophageal reflux     Gout     Hypertension     Inverted nipple     Left breast always have.       Labs:    Lab Results   Component Value Date/Time    Sodium 141 09/14/2017 07:05 AM    Potassium 3.1 09/14/2017 07:05 AM    Chloride 108 09/14/2017 07:05 AM    CO2 26 09/14/2017 07:05 AM    Anion gap 7 09/14/2017 07:05 AM    Glucose 85 09/14/2017 07:05 AM    BUN 4 09/14/2017 07:05 AM    Creatinine 0.52 09/14/2017 07:05 AM    Calcium 7.8 09/14/2017 07:05 AM    Magnesium 2.5 09/14/2017 07:05 AM    Phosphorus 3.0 02/20/2016 03:30 AM    Albumin 2.9 09/14/2017 07:05 AM     Anthropometrics:  BMI: 20.6  There were no vitals filed for this visit. Ht Readings from Last 1 Encounters:   09/12/17 5' 2.5\" (1.588 m)   Current weight not on record. 2/22/17  114 lb  No data found. ( nutrition needs assessed with weight from 2/22/17- will reassess when current weight available.)    IBW: 113 lb %IBW: 101%    Estimated Nutrition Needs: [x] MSJ   Calories: 1440 Kcal Based on:   [x] Actual BW    Protein:  62 g Based on:   [x] Actual BW    Fluid:      0125-8028 ml Based on:   [x] Actual BW      [x] No Cultural, Muslim or ethnic dietary need identified.     [] Cultural, Muslim and ethnic food preferences identified and addressed     Wt Status:  [x] Normal (18.6 - 24.9) [] Underweight (<18.5) [] Overweight (25 - 29.9) [] Mild Obesity (30 - 34.9)  [] Moderate Obesity (35 - 39.9) [] Morbid Obesity (40+)     Nutrition Problems Identified:   [x] Suboptimal PO intake vs NPO  [] Food Allergies  [] Difficulty chewing/swallowing/poor dentition  [] Constipation/Diarrhea   [x] Nausea/Vomiting   [] None  [] Other:     Plan:   [] Therapeutic Diet  []  Obtained/adjusted food preferences/tolerances and/or snacks options   []  Supplements added   [] Occupational therapy following for feeding techniques  []  HS snack added   []  Modify diet texture   []  Modify diet for food allergies   []  Educate patient   []  Assist with menu selection   []  Monitor PO intake on meal rounds   [x]  Continue inpatient monitoring and intervention   []  Participated in discharge planning/Interdisciplinary rounds/Team meetings   []  Other:     Education Needs:   [x] Not appropriate for teaching at this time due to: NPO   [] Identified and addressed    Nutrition Monitoring and Evaluation:  [x] Continue ongoing monitoring and intervention  [] Chyna Johnson

## 2017-09-14 NOTE — PROGRESS NOTES
0515: Assumed patient care. Patient ambulated to the bathroom with weak gait required assistance with one person. In no signs of distress. States she is experiencing abdominal pain. 0730: Bedside and Verbal shift change report given to Sonido Jarrett and Tamia RN (oncoming nurse) by Connie Montoya RN (offgoing nurse). Report included the following information SBAR, Kardex and MAR.

## 2017-09-14 NOTE — PROGRESS NOTES
Problem: Mobility Impaired (Adult and Pediatric)  Goal: *Acute Goals and Plan of Care (Insert Text)  Physical Therapy Goals  Initiated 9/14/2017 and to be accomplished within 7 day(s)  1. Patient will transfer from bed to chair and chair to bed with modified independence using the least restrictive device. 2. Patient will perform sit to stand with modified independence. 3. Patient will ambulate with modified independence for 150 feet with the least restrictive device. 4. Patient will ascend/descend 5 stairs with handrail(s) with supervision/set-up. Outcome: Progressing Towards Goal  PHYSICAL THERAPY EVALUATION     Patient: Ramses Morales (25 y.o. female)  Date: 9/14/2017  Primary Diagnosis: Pancreatitis  Dizziness  Encephalopathy  Hypokalemia  Elevated LFTs  Anemia  Precautions:  (none)      ASSESSMENT :  Patient requires between supervision/set-up and modified independence for bed mobility, transfers and ambulation. Mod I with bed mobility. Seated balance intact. Supervision for sit to stand. Donned hospital gown with min A for line management. Standing without symptoms fair balance. Upon beginning amb, patient reports dizziness. Amb 15ft x2 with supervision and IV pole as AD. Dizziness remained. Returned to seated at EOB and assessed vitals. /70, O2 saturation 96%, . Assisted patient back to bed. Mod I for bed mobility. Reports dizziness ceased upon sitting down. Denies shortness of breath; however reports using 2.5L home oxygen. Unable to receive clear answer on if O2 is continuous or PRN. No O2 in room. O2 saturation is 96% post mobility on room air. SEB Echevarria made aware. Patient tearful throughout session about symptoms and reasons for hospital stay. Offered therapeutic listening. Seated in bed; all needs in reach at end of session. SEB Echevarria notified.    Patient presents with deficits in:  Transfers, Gait, Balance and Stairs     Patient will benefit from skilled intervention to address the above impairments. Patients rehabilitation potential is considered to be Good  Factors which may influence rehabilitation potential include:   [ ]         None noted  [ ]         Mental ability/status  [X]         Medical condition  [ ]         Home/family situation and support systems  [ ]         Safety awareness  [ ]         Pain tolerance/management  [ ]         Other:        PLAN :  Recommendations and Planned Interventions:  [X]           Bed Mobility Training             [X]    Neuromuscular Re-Education  [X]           Transfer Training                   [ ]    Orthotic/Prosthetic Training  [X]           Gait Training                          [ ]    Modalities  [X]           Therapeutic Exercises          [ ]    Edema Management/Control  [X]           Therapeutic Activities            [X]    Patient and Family Training/Education  [ ]           Other (comment):     Frequency/Duration: Patient will be followed by physical therapy 3-5 times a week to address goals. Discharge Recommendations: Home Health  Further Equipment Recommendations for Discharge: N/A       SUBJECTIVE:   Patient stated We can do this.       OBJECTIVE DATA SUMMARY:       Past Medical History:   Diagnosis Date    Alcohol abuse      Anxiety      Asthma      [facility]      Common migraine      COPD [facility]chronic obstructive pulmonary disease) ([facility]      Depression      Esophageal reflux      Gout      Hypertension      Inverted nipple       Left breast always have. Past Surgical History:   Procedure Laterality Date    HX CARPAL TUNNEL RELEASE        HX GYN         tubal ligation    HX HEENT        HX LUMBAR LAMINECTOMY        HX ORTHOPAEDIC         Barriers to Learning/Limitations: None  Compensate with: visual, verbal, tactile, kinesthetic cues/model     G CODES:Mobility  Current  CK= 40-59%   Goal  CI= 1-19%.   The severity rating is based on the Other [employer] Scale 3/5     Eval Complexity: History: MEDIUM  Complexity : 1-2 comorbidities / personal factors will impact the outcome/ POC Exam:MEDIUM Complexity : 3 Standardized tests and measures addressing body structure, function, activity limitation and / or participation in recreation  Presentation: MEDIUM Complexity : Evolving with changing characteristics  Clinical Decision Making:Medium Complexity [facility] Standing Balance Scale 3/5 Overall Complexity:MEDIUM     [address] Sitting Balance Scale 4/5  0: Pt performs 25% or less of sitting activity (Max assist) CN, 100% impaired. 1: Pt supports self with upper extremities but requires therapist assistance. Pt performs 25-50% of effort (Mod assist) CM, 80% to <100% impaired. 1+: Pt supports self with upper extremities but requires therapist assistance. Pt performs >50% effort. (Min assist). CL, 60% to <80% impaired. 2: Pt supports self independently with both upper extremities. CL, 60% to <80% impaired. 2+: Pt support self independently with 1 upper extremity. CK, 40% to <60% impaired. 3: Pt sits without upper extremity support for up to 30 seconds. CK, 40% to <60% impaired. 3+: Pt sits without upper extremity support for 30 seconds or greater. CJ, 20% to <40% impaired. 4: Pt moves and returns trunkal midpoint 1-2 inches in one plane. CJ, 20% to <40% impaired. 4+: Pt moves and returns trunkal midpoint 1-2 inches in multiple planes. CI, 1% to <20% impaired. 5: Pt moves and returns trunkal midpoint in all planes greater than 2 inches. CH, 0% impaired. [address] Standing Balance Scale 3/5  0: Pt performs 25% or less of standing activity (Max assist) CN, 100% impaired. 1: Pt supports self with upper extremities but requires therapist assistance. Pt performs 25-50% of effort (Mod assist) CM, 80% to <100% impaired. 1+: Pt supports self with upper extremities but requires therapist assistance. Pt performs >50% effort. (Min assist).  CL, 60% to <80% impaired. 2: Pt supports self independently with both upper extremities (walker, crutches, parallel bars). CL, 60% to <80% impaired. 2+: Pt support self independently with 1 upper extremity (cane, crutch, 1 parallel bar). CK, 40% to <60% impaired. 3: Pt stands without upper extremity support for up to 30 seconds. CK, 40% to <60% impaired. 3+: Pt stands without upper extremity support for 30 seconds or greater. CJ, 20% to <40% impaired. 4: Pt independently moves and returns center of gravity 1-2 inches in one plane. CJ, 20% to <40% impaired. 4+: Pt independently moves and returns center of gravity 1-2 inches in multiple planes. CI, 1% to <20% impaired. 5: Pt independently moves and returns center of gravity in all planes greater than 2 inches. CH, 0% impaired. Prior Level of Function/Home Situation:   Home Situation  Home Environment: Private residence  # Steps to Enter: 5  One/Two Story Residence: One story  # of Interior Steps: 14  Height of Each Step (in): 6 inches  Interior Rails: Right  Lift Chair Available: No  Living Alone: No  Support Systems: Spouse/Significant Other/Partner  Patient Expects to be Discharged to[de-identified] Private residence  Current DME Used/Available at Home: Oxygen, portable  Critical Behavior:  Neurologic State: Alert  Orientation Level: Oriented X4  Cognition: Follows commands  Safety/Judgement: Awareness of environment; Fall prevention  Strength:    Strength:  Within functional limits  Tone & Sensation:   Tone: Normal  Sensation: Intact  Range Of Motion:  AROM: Within functional limits  Functional Mobility:  Bed Mobility:  Supine to Sit: Modified independent  Sit to Supine: Modified independent  Transfers:  Sit to Stand: Supervision  Stand to Sit: Supervision  Balance:   Sitting: Intact  Standing: Impaired  Standing - Static: Fair  Standing - Dynamic : Fair  Ambulation/Gait Training:  Distance (ft):  (15x2)  Assistive Device: Other (comment) (IV pole)  Ambulation - Level of Assistance: Supervision  Gait Description (WDL): Exceptions to WDL  Gait Abnormalities: Trunk sway increased  Therapeutic Exercises:   Sit to stand  Standing balance 1 min   Amb   Pain:  Pre treatment pain level:  Post treatment pain level:  Pain Scale 1: Numeric (0 - 10)  Pain Intensity 1: 6  Pain Location 1: Abdomen  Pain Orientation 1: Anterior  Pain Description 1: Constant  Pain Intervention(s) 1: Medication (see MAR)  Activity Tolerance:   Fair  Please refer to the flowsheet for vital signs taken during this treatment. After treatment:   [ ]         Patient left in no apparent distress sitting up in chair  [X]         Patient left in no apparent distress in bed  [X]         Call bell left within reach  [X]         Nursing notified  [ ]         Caregiver present  [ ]         Bed alarm activated      COMMUNICATION/EDUCATION:   [X]         Fall prevention education was provided and the patient/caregiver indicated understanding. [X]         Patient/family have participated as able in goal setting and plan of care. [X]         Patient/family agree to work toward stated goals and plan of care. [ ]         Patient understands intent and goals of therapy, but is neutral about his/her participation. [ ]         Patient is unable to participate in goal setting and plan of care. Patient educated on the role of physical therapy during the acute stay  and the importance of mobility. TRAE.        Thank you for this referral.  Ying Gutierres, PT   Time Calculation: 13 mins

## 2017-09-14 NOTE — ED PROVIDER NOTES
HPI Comments: Alexandra Guzman is a 47 y.o. Female who was seen day prior to presentation here for pancreatitis, offered admission refused, returns for concerns of possible tia, seizure. States she had taken her depakote and topamax and several min later noted feeling lightheaded, off balance with numbness to left side. No syncope, change in vision. Sx improved after arrival. Also c/o continued upper abd pain, nv. No melena. Pain is constant, severe sharp. No fever. Pain worse with po intake. No prior h/o cva in past.     The history is provided by the patient and medical records. Past Medical History:   Diagnosis Date    Alcohol abuse     Anxiety     Asthma     Bipolar disorder ([facility] [address]     Common migraine     COPD (chronic obstructive pulmonary disease) ([facility])     Depression     Esophageal reflux     Gout     Hypertension     Inverted nipple     Left breast always have. Past Surgical History:   Procedure Laterality Date    HX CARPAL TUNNEL RELEASE      HX GYN      tubal ligation    HX HEENT      HX LUMBAR LAMINECTOMY      HX ORTHOPAEDIC           Family History:   Problem Relation Age of Onset    Family history unknown: Yes       Social History     Social History    Marital status:      Spouse name: N/A    Number of children: N/A    Years of education: N/A     Occupational History    Not on file. Social History Main Topics    Smoking status: Current Every Day Smoker     Packs/day: 1.00    Smokeless tobacco: Never Used    Alcohol use Yes      Comment: daily, liquor    Drug use: No    Sexual activity: Not on file     Other Topics Concern    Not on file     Social History Narrative         ALLERGIES: Lithium; Elavil; and Morphine    Review of Systems   Constitutional: Positive for appetite change. Negative for fever. HENT: Negative for sore throat and trouble swallowing. Eyes: Negative for visual disturbance. Respiratory: Negative for shortness of breath. Cardiovascular: Negative for chest pain. Gastrointestinal: Positive for abdominal pain. Endocrine: Negative for polyuria. Genitourinary: Negative for difficulty urinating. Musculoskeletal: Positive for gait problem. Skin: Negative for rash. Allergic/Immunologic: Negative for immunocompromised state. Neurological: Positive for dizziness and light-headedness. Negative for syncope and speech difficulty. Psychiatric/Behavioral: Positive for confusion and sleep disturbance. Vitals:    09/14/17 0115 09/14/17 0130 09/14/17 0230 09/14/17 0245   BP: 92/75 107/72 119/84 (!) 137/117   Pulse: 82 84  84   Resp: 14 20 17   Temp:       SpO2: 100% 99%  94%            Physical Exam   Constitutional: She is oriented to person, place, and time. She appears well-developed and well-nourished. She appears distressed (appears in pain). HENT:   Head: Normocephalic and atraumatic. Right Ear: External ear normal.   Left Ear: External ear normal.   Nose: Nose normal.   Mouth/Throat: Uvula is midline, oropharynx is clear and moist and mucous membranes are normal.   Eyes: Conjunctivae are normal. No scleral icterus. Neck: Neck supple. Cardiovascular: Normal rate, regular rhythm, normal heart sounds and intact distal pulses. Pulmonary/Chest: Effort normal and breath sounds normal.   Abdominal: Soft. She exhibits no distension and no mass. There is tenderness in the epigastric area and left upper quadrant. There is no rebound and no guarding. Musculoskeletal: She exhibits no edema. Neurological: She is alert and oriented to person, place, and time. She displays no tremor. No cranial nerve deficit (3-12) or sensory deficit (gross touch intact). She exhibits normal muscle tone. She displays no seizure activity. Coordination (slight ataxia FN) abnormal. GCS eye subscore is 4. GCS verbal subscore is 5. GCS motor subscore is 6. Skin: Skin is warm and dry. She is not diaphoretic.    Psychiatric: Her behavior is normal.   Nursing note and vitals reviewed. [facility]  ED Course       Procedures    Vitals:  Patient Vitals for the past 12 hrs:   Temp Pulse Resp BP SpO2   09/14/17 0245 - 84 17 (!) 137/117 94 %   09/14/17 0230 - - - 119/84 -   09/14/17 0130 - 84 20 107/72 99 %   09/14/17 0115 - 82 14 92/75 100 %   09/14/17 0100 - 80 14 109/69 100 %   09/14/17 0045 - 81 11 98/64 99 %   09/14/17 0000 - 88 14 - 97 %   09/13/17 2345 - 91 13 100/68 100 %   09/13/17 2334 98.5 °F (36.9 °C) 100 14 112/68 99 %   09/13/17 2330 - 94 15 103/59 100 %         Medications ordered:   Medications   0.9% sodium chloride 1,000 mL with mvi, adult no. 4 with vit K 10 mL, thiamine 994 mg, folic acid 1 mg infusion ( IntraVENous Stopped 9/14/17 0343)   sodium chloride 0.9 % bolus infusion 1,000 mL (0 mL IntraVENous Stopped 9/14/17 0343)   magnesium sulfate 2 g/50 ml IVPB (premix or compounded) (0 g IntraVENous IV Completed 9/14/17 0151)   potassium chloride (K-DUR, KLOR-CON) SR tablet 40 mEq (40 mEq Oral Given 9/14/17 0135)   HYDROmorphone (PF) (DILAUDID) injection 0.5 mg (0.5 mg IntraVENous Given 9/14/17 0140)   ondansetron (ZOFRAN) injection 4 mg (4 mg IntraVENous Given 9/14/17 0137)   aspirin chewable tablet 324 mg (324 mg Oral Given 9/14/17 0259)         Lab findings:  Recent Results (from the past 12 hour(s))   URINALYSIS W/ RFLX MICROSCOPIC    Collection Time: 09/14/17 12:15 AM   Result Value Ref Range    Color YELLOW      Appearance CLEAR      Specific gravity 1.007 1.005 - 1.030      pH (UA) 7.0 5.0 - 8.0      Protein NEGATIVE  NEG mg/dL    Glucose NEGATIVE  NEG mg/dL    Ketone NEGATIVE  NEG mg/dL    Bilirubin NEGATIVE  NEG      Blood NEGATIVE  NEG      Urobilinogen 1.0 0.2 - 1.0 EU/dL    Nitrites NEGATIVE  NEG      Leukocyte Esterase NEGATIVE  NEG     DRUG SCREEN, URINE    Collection Time: 09/14/17 12:15 AM   Result Value Ref Range    BENZODIAZEPINE NEGATIVE  NEG      BARBITURATES NEGATIVE  NEG      THC (TH-CANNABINOL) NEGATIVE  NEG OPIATES POSITIVE (A) NEG      PCP(PHENCYCLIDINE) NEGATIVE  NEG      COCAINE NEGATIVE  NEG      AMPHETAMINES NEGATIVE  NEG      METHADONE NEGATIVE       HDSCOM (NOTE)    CBC WITH AUTOMATED DIFF    Collection Time: 09/14/17 12:20 AM   Result Value Ref Range    WBC 5.6 4.6 - 13.2 K/uL    RBC 3.19 (L) 4.20 - 5.30 M/uL    HGB 10.9 (L) 12.0 - 16.0 g/dL    HCT 32.0 (L) 35.0 - 45.0 %    .3 (H) 74.0 - 97.0 FL    MCH 34.2 (H) 24.0 - 34.0 PG    MCHC 34.1 31.0 - 37.0 g/dL    RDW 15.8 (H) 11.6 - 14.5 %    PLATELET 93 (L) 162 - 420 K/uL    MPV 10.0 9.2 - 11.8 FL    NEUTROPHILS 48 40 - 73 %    LYMPHOCYTES 44 21 - 52 %    MONOCYTES 6 3 - 10 %    EOSINOPHILS 2 0 - 5 %    BASOPHILS 0 0 - 2 %    ABS. NEUTROPHILS 2.7 1.8 - 8.0 K/UL    ABS. LYMPHOCYTES 2.5 0.9 - 3.6 K/UL    ABS. MONOCYTES 0.3 0.05 - 1.2 K/UL    ABS. EOSINOPHILS 0.1 0.0 - 0.4 K/UL    ABS. BASOPHILS 0.0 0.0 - 0.06 K/UL    DF AUTOMATED     PROTHROMBIN TIME + INR    Collection Time: 09/14/17 12:20 AM   Result Value Ref Range    Prothrombin time 12.4 11.5 - 15.2 sec    INR 1.0 0.8 - 1.2     HEPATIC FUNCTION PANEL    Collection Time: 09/14/17 12:20 AM   Result Value Ref Range    Protein, total 5.9 (L) 6.4 - 8.2 g/dL    Albumin 2.8 (L) 3.4 - 5.0 g/dL    Globulin 3.1 2.0 - 4.0 g/dL    A-G Ratio 0.9 0.8 - 1.7      Bilirubin, total 0.4 0.2 - 1.0 MG/DL    Bilirubin, direct 0.2 0.0 - 0.2 MG/DL    Alk.  phosphatase 154 (H) 45 - 117 U/L    AST (SGOT) 358 (H) 15 - 37 U/L    ALT (SGPT) 148 (H) 13 - 56 U/L   LIPASE    Collection Time: 09/14/17 12:20 AM   Result Value Ref Range    Lipase 1826 (H) 73 - 289 U/L   METABOLIC PANEL, BASIC    Collection Time: 09/14/17 12:20 AM   Result Value Ref Range    Sodium 139 136 - 145 mmol/L    Potassium 2.8 (LL) 3.5 - 5.5 mmol/L    Chloride 103 100 - 108 mmol/L    CO2 24 21 - 32 mmol/L    Anion gap 12 3.0 - 18 mmol/L    Glucose 84 74 - 99 mg/dL    BUN 5 (L) 7.0 - 18 MG/DL    Creatinine 0.59 (L) 0.6 - 1.3 MG/DL    BUN/Creatinine ratio 8 (L) 12 - 20      GFR est AA >60 >60 ml/min/1.73m2    GFR est non-AA >60 >60 ml/min/1.73m2    Calcium 8.1 (L) 8.5 - 10.1 MG/DL   ETHYL ALCOHOL    Collection Time: 09/14/17 12:20 AM   Result Value Ref Range    ALCOHOL(ETHYL),SERUM <3 0 - 3 MG/DL   MAGNESIUM    Collection Time: 09/14/17 12:20 AM   Result Value Ref Range    Magnesium 1.7 1.6 - 2.6 mg/dL   EKG, 12 LEAD, INITIAL    Collection Time: 09/14/17  1:19 AM   Result Value Ref Range    Ventricular Rate 78 BPM    Atrial Rate 78 BPM    P-R Interval 152 ms    QRS Duration 82 ms    Q-T Interval 392 ms    QTC Calculation (Bezet) 446 ms    Calculated P Axis 59 degrees    Calculated R Axis 62 degrees    Calculated T Axis 53 degrees    Diagnosis       Normal sinus rhythm  Normal ECG  When compared with ECG of 12-SEP-2017 08:32,  No significant change was found         EKG interpretation by ED Physician:  nsr with no acute st tw changes  Rate 78, qtc 446  No sig change from recent    Cardiac monitor: nl rate, regular rhythm. No ectopy    X-Ray, CT or other radiology findings or impressions:  CT HEAD WO CONT    (Results Pending)     Nothing acute prelim read  Progress notes, Consult notes or additional Procedure notes:   Seen by dr Fred Hansen from [employer] who feels not c/w cva, but more likely medication related. rec admission for further work up  D/w pt need for admission  Doubt need for repeat ct abd    D/w Dr Chetna Carter who will admit    ED Critical Care Note    System at risk for life threatening failure: cardiac, gi, neuro  Associated problems: hypokalemia, pancreatitis, anemia    Critical Care services provided: bedside management, consult  Excluded procedures (time not included in critical care): ecg interp    Total Critical Care Time (in minutes) 48          Reevaluation of patient:   Stable for admission    Disposition:  Diagnosis:   1. Alcohol-induced acute pancreatitis, unspecified complication status    2. Hypokalemia    3. Dizziness    4.  Medication adverse effect, initial encounter        Disposition: admit    Follow-up Information     None            Patient's Medications   Start Taking    No medications on file   Continue Taking    ALBUTEROL (PROVENTIL VENTOLIN) 2.5 MG /3 ML (0.083 %) NEBULIZER SOLUTION    1.5 mL by Nebulization route every four (4) hours as needed for Wheezing or Shortness of Breath. AZITHROMYCIN (ZITHROMAX Z-RICHA) 250 MG TABLET    Take two tablets today then one tablet daily    BUPROPION XL (WELLBUTRIN XL) 300 MG XL TABLET    Take 300 mg by mouth every morning. CLONIDINE HCL (CATAPRES) 0.2 MG TABLET    Take 0.5 Tabs by mouth two (2) times a day. DIAZEPAM (VALIUM) 5 MG TABLET    Take 0.5 Tabs by mouth every eight (8) hours as needed for Anxiety. Max Daily Amount: 7.5 mg. GABAPENTIN (NEURONTIN) 600 MG TABLET    Take 600 mg by mouth four (4) times daily. HYDROMORPHONE (DILAUDID) 2 MG TABLET    Take 1 Tab by mouth every four (4) hours as needed for Pain. Max Daily Amount: 12 mg. IBUPROFEN (MOTRIN) 600 MG TABLET    Take 1 Tab by mouth every six (6) hours as needed for Pain. IPRATROPIUM-ALBUTEROL (COMBIVENT)  MCG/ACTUATION INHALER    Take 2 Puffs by inhalation every six (6) hours. LIPASE-PROTEASE-AMYLASE (ZENPEP 10,000) CAPSULE    Take 1 Cap by mouth three (3) times daily (with meals). NEBULIZER & COMPRESSOR MACHINE    1 Each by Does Not Apply route every six (6) hours as needed. ONDANSETRON (ZOFRAN ODT) 8 MG DISINTEGRATING TABLET    Take 1 Tab by mouth every eight (8) hours as needed for Nausea. OXYCODONE IR (ROXICODONE) 5 MG IMMEDIATE RELEASE TABLET    Take 1 Tab by mouth every eight (8) hours as needed for Pain. Max Daily Amount: 15 mg. PREDNISONE (DELTASONE) 10 MG TABLET    Take 5 tabs daily for 1 days followed by 4 tabs daily for one day followed by 3 tabs daily for one day followed by 2 tabs daily for 1 day then 1 tab daily for one day then stop . Arbutus Ring .  15 tabs    QUETIAPINE FUMARATE (QUETIAPINE PO)    Take 3 Tabs by mouth daily. Patient states she takes 3 tablets of the 200 mg strength to equal 600 mg total dose once a day    SILVER SULFADIAZINE (SILVADENE) 1 % TOPICAL CREAM    Apply  to affected area two (2) times a day. THIAMINE MONONITRATE (B-1) 100 MG TABLET    Take 1 Tab by mouth daily. TOPIRAMATE (TOPAMAX) 200 MG TABLET    Take 100 mg by mouth daily.    These Medications have changed    No medications on file   Stop Taking    No medications on file

## 2017-09-14 NOTE — ED NOTES
pts medications taken to house supervisor Leslie GRIGSBY, seroquel counted and verified with chrissy RN. 3 bags to house supervisor.  Janeal Mcardle [id number], [id number], [id number]

## 2017-09-14 NOTE — PROGRESS NOTES
[facility]   Discharge Planning/ Assessment    Reasons for Intervention: Chart reviewed. Met with pt., verified all demographics. [facility] has MCR/ ins. [facility] Dr. Johanna Sim is her PCP. NOK: Arthur Lee, spouse: [phone], with whom she lives with. Has nebulizer & home O2([employer]). Independent with ADL's prior to admit. PLAN: home when medically stable. Will cont to follow for any needs. Meg ElliottRN,ext. [phone].     High Risk Criteria  [x] Yes  []No   Physician Referral  [] Yes  [x]No        Date    Nursing Referral  [] Yes  [x]No        Date    Patient/Family Request  [] Yes  [x]No        Date       Resources:    Medicare  [x] Yes  []No   Medicaid  [] Yes  [x]No   No Resources  [] Yes  [x]No   Private Insurance  [x] Yes  []No    Name/Phone Number    Other  [] Yes  [x]No        (i.e. Workman's Comp)         Prior Services:    Prior Services  [] Yes  [x]No   Home Health  [] Yes  [x]No   [address]  [] Yes  [x]No        Number of [address]  [] Yes  [x]No       Meals on Wheels  [] Yes  [x]No   Office on Aging  [] Yes  [x]No   Transportation Services  [] Yes  [x]No   Nursing Home  [] Yes  [x]No        Nursing Home Name    [address]  [] Yes  [x]No        [address] Name    Other       Information Source:      Information obtained from  [x] Patient  [] Parent   [] [address]  [] Child  [] Spouse   [] Significant Other/Partner   [] Friend      [] EMS    [] Nursing Home Chart          [] Other:   Chart Review  [x] Yes  []No     Family/Support System:    Patient lives with  [] Alone    [x] Spouse   [] Significant Other  [] Children  [] Caretaker   [] Parent  [] Sibling     [] Other       Other Support System:    Is the patient responsible for care of others  [] Yes  [x]No   Information of person caring for patient on  discharge    Managers financial affairs independently  [x] Yes  []No   If no, explain:      Status Prior to Admission:    Mental Status  [x] Awake  [x] Alert  [x] Oriented  [x] Quiet/Calm [] Lethargic/Sedated   [] Disoriented  [] Restless/Anxious  [] Combative   Personal Care  [] Dependent  [x] Independent Personal Care  [] Requires Assistance   Meal Preparation Ability  [x] Independent   [] Standby Assistance   [] Minimal Assistance   [] Moderate Assistance  [] Maximum Assistance     [] Total Assistance   Chores  [x] Independent with Chores   [] N/A Nursing Home Resident   [] Requires Assistance   Bowel/Bladder  [x] Continent  [] Catheter  [] Incontinent  [] Ostomy Self-Care    [] Urine Diversion Self-Care  [] Maximum Assistance     [] Total Assistance   Number of Persons needed for assistance    DME at home  [] [address], [place]  [] [address] [facility], [address], Straight   [] Commode    [] Bathroom/Grab Bars  [] Hospital Bed  [x] Nebulizer  [x] Oxygen           [] Raised Toilet Seat  [] Shower Chair  [] Side Rails for Bed   [] Tub Transfer Bench   [] Mick Powell  [] Antelmo Najera      [] Other:   Vendor      Treatment Presently Receiving:    Current Treatments  [] Chemotherapy  [] Dialysis  [] Insulin  [] IVAB [x] IVF   [] O2  [] PCA   [x] PT   [] RT   [] Tube Feedings   [] Wound Care     Psychosocial Evaluation:    Verbalized Knowledge of Disease Process  [] Patient  []Family   Coping with Disease Process  [] Patient  []Family   Requires Further Counseling Coping with Disease Process  [] Patient  []Family     Identified Projected Needs:    Home Health Aid  [] Yes  [x]No   Transportation  [] Yes  [x]No   Education  [] Yes  [x]No        Specific Education     Financial Counseling  [] Yes  [x]No   Inability to Care for Self/Will Require 24 hour care  [] Yes  [x]No   Pain Management  [] Yes  [x]No   Home Infusion Therapy  [] Yes  [x]No   Oxygen Therapy  [] Yes  [x]No   DME  [] Yes  [x]No   Long Term Care Placement  [] Yes  [x]No   Rehab  [] Yes  [x]No   Physical Therapy  [] Yes  [x]No   Needs Anticipated At This Time  [] Yes  [x]No Intra-Hospital Referral:    [address]  [] Yes  [x]No     [] Yes  [x]No   Patient Representative  [] Yes  [x]No   Staff for Teaching Needs  [] Yes  [x]No   Specialty Teaching Needs     Diabetic Educator  [] Yes  [x]No   Referral for Diabetic Educator Needed  [] Yes  [x]No  If Yes, place order for Nutritionist or Diabetic Consult     Tentative Discharge Plan:    Home with No Services  [x] Yes  []No   Home with [address]  [] Yes  [x]No        If Yes, specify type    Home Care Program  [] Yes  [x]No        If Yes, specify type    Meals on Wheels  [] Yes  [x]No   Office of Aging  [] Yes  [x]No   NHP  [] Yes  [x]No   Return to the Nursing Home  [] Yes  [x]No   Rehab Therapy  [] Yes  [x]No   Acute Rehab  [] Yes  [x]No   Subacute Rehab  [] Yes  [x]No   Private Care  [] Yes  [x]No   Substance Abuse Referral  [] Yes  [x]No   Transportation  [] Yes  [x]No   Chore Service  [] Yes  [x]No   Inpatient Hospice  [] Yes  [x]No   OP RT  [] Yes  [x] No   OP Hemo  [] Yes  [x] No   OP PT  [] Yes  [x]No   Support Group  [] Yes  [x]No   Reach to Recovery  [] Yes  [x]No   OP Oncology Clinic  [] Yes  [x]No   Clinic Appointment  [] Yes  [x]No   DME  [] Yes  [x]No   Comments    Name of D/C Planner or  Given to Patient or Family Dakota Ramírez   Phone Number Pager: [phone]  [phone]   Date 09-   Time    If you are discharged home, whom do you designate to participate in your discharge plan and receive any information needed?      Enter name of designee         Phone # of designee         Address of designee         Updated         Patient refused to designate any           individual Declined

## 2017-09-14 NOTE — PROGRESS NOTES
5665: 2nd PT attempt. Patient off floor at EEG. Will follow up.   8765: PT orders received and chart reviewed. SE[facility] [place] in room administering morning meds. Educated patient on role of PT. Will follow up.      Thank you,     Kyle Pederson, PT, DPT

## 2017-09-14 NOTE — ACP (ADVANCE CARE PLANNING)
Patient has designated ________________________ to participate in his/her discharge plan and to receive any needed information.      Name:   Address:  Phone number:    [employer]

## 2017-09-14 NOTE — ED NOTES
Pt requesting a warm blanket, notified pt that there are no warm blankets at this time. Pt given 2 room temperature blankets. Pt not satisfied. States \"there should be warm blankets when I ask for one. I saw other people getting warm blankets. \"  Notified pt that blanket warmer was just refilled and the blankets are not warm yet. Pt making rude comments to staff.

## 2017-09-14 NOTE — H&P
History and Physical    Patient: Lui Louis               Sex: female          DOA: 9/13/2017       YOB: 1965      Age:  47 y.o.        LOS:  LOS: 0 days        Chief Complaint   Patient presents with    Dizziness         HPI:     Lui Louis is a 47 y.o. female who presents with dizziness onset 2 days ago. She denies vertigo. She however claims to have left sides facial numbness. She had gait abnormality due to the dizziness and had to support he self by holding objects while walking. No visual change, no speech change , no focal weakness. In the ED she was seen by tele neurology. CT head done. No acute change. She also has pancreatitis 2 days ago . She is a known alcoholic. Her last drink was 2 days ago. Patient has elevated LFT's but alcohol level is < 3. Past Medical History:   Diagnosis Date    Alcohol abuse     Anxiety     Asthma     Bipolar disorder ([address]     Common migraine     COPD (chronic obstructive pulmonary disease) (HCC)     Depression     Esophageal reflux     Gout     Hypertension     Inverted nipple     Left breast always have. .    Past Surgical History:   Procedure Laterality Date    HX CARPAL TUNNEL RELEASE      HX GYN      tubal ligation    HX HEENT      HX LUMBAR LAMINECTOMY      HX ORTHOPAEDIC         No current facility-administered medications on file prior to encounter. Current Outpatient Prescriptions on File Prior to Encounter   Medication Sig Dispense Refill    HYDROmorphone (DILAUDID) 2 mg tablet Take 1 Tab by mouth every four (4) hours as needed for Pain. Max Daily Amount: 12 mg. 2 Tab 0    ondansetron (ZOFRAN ODT) 8 mg disintegrating tablet Take 1 Tab by mouth every eight (8) hours as needed for Nausea. 6 Tab 0    ibuprofen (MOTRIN) 600 mg tablet Take 1 Tab by mouth every six (6) hours as needed for Pain. 20 Tab 0    lipase-protease-amylase (ZENPEP 10,000) capsule Take 1 Cap by mouth three (3) times daily (with meals).  200 Cap 5    diazepam (VALIUM) 5 mg tablet Take 0.5 Tabs by mouth every eight (8) hours as needed for Anxiety. Max Daily Amount: 7.5 mg. 20 Tab 0    silver sulfADIAZINE (SILVADENE) 1 % topical cream Apply  to affected area two (2) times a day. 50 g 0    Thiamine Mononitrate (B-1) 100 mg tablet Take 1 Tab by mouth daily. 7 Tab 0    albuterol (PROVENTIL VENTOLIN) 2.5 mg /3 mL (0.083 %) nebulizer solution 1.5 mL by Nebulization route every four (4) hours as needed for Wheezing or Shortness of Breath. 24 Each 0    predniSONE (DELTASONE) 10 mg tablet Take 5 tabs daily for 1 days followed by 4 tabs daily for one day followed by 3 tabs daily for one day followed by 2 tabs daily for 1 day then 1 tab daily for one day then stop . Marly Almeida .  15 tabs 15 Tab 0    Nebulizer & Compressor machine 1 Each by Does Not Apply route every six (6) hours as needed. 1 Each 0    azithromycin (ZITHROMAX Z-RICHA) 250 mg tablet Take two tablets today then one tablet daily 6 Tab 0    cloNIDine HCl (CATAPRES) 0.2 mg tablet Take 0.5 Tabs by mouth two (2) times a day. 30 Tab 0    oxyCODONE IR (ROXICODONE) 5 mg immediate release tablet Take 1 Tab by mouth every eight (8) hours as needed for Pain. Max Daily Amount: 15 mg. 6 Tab 0    QUETIAPINE FUMARATE (QUETIAPINE PO) Take 3 Tabs by mouth daily. Patient states she takes 3 tablets of the 200 mg strength to equal 600 mg total dose once a day      ipratropium-albuterol (COMBIVENT)  mcg/actuation inhaler Take 2 Puffs by inhalation every six (6) hours.  gabapentin (NEURONTIN) 600 mg tablet Take 600 mg by mouth four (4) times daily.  buPROPion XL (WELLBUTRIN XL) 300 mg XL tablet Take 300 mg by mouth every morning.  topiramate (TOPAMAX) 200 mg tablet Take 100 mg by mouth daily. Social History     Social History    Marital status:      Spouse name: N/A    Number of children: N/A    Years of education: N/A     Occupational History    Not on file.      Social History Main Topics    Smoking status: Current Every Day Smoker     Packs/day: 1.00    Smokeless tobacco: Never Used    Alcohol use Yes      Comment: daily, liquor    Drug use: No    Sexual activity: Not on file     Other Topics Concern    Not on file     Social History Narrative       Prior to Admission Medications   Prescriptions Last Dose Informant Patient Reported? Taking? HYDROmorphone (DILAUDID) 2 mg tablet   No No   Sig: Take 1 Tab by mouth every four (4) hours as needed for Pain. Max Daily Amount: 12 mg. Nebulizer & Compressor machine   No No   Si Each by Does Not Apply route every six (6) hours as needed. QUETIAPINE FUMARATE (QUETIAPINE PO)   Yes No   Sig: Take 3 Tabs by mouth daily. Patient states she takes 3 tablets of the 200 mg strength to equal 600 mg total dose once a day   Thiamine Mononitrate (B-1) 100 mg tablet   No No   Sig: Take 1 Tab by mouth daily. albuterol (PROVENTIL VENTOLIN) 2.5 mg /3 mL (0.083 %) nebulizer solution   No No   Si.5 mL by Nebulization route every four (4) hours as needed for Wheezing or Shortness of Breath. azithromycin (ZITHROMAX Z-RICHA) 250 mg tablet   No No   Sig: Take two tablets today then one tablet daily   buPROPion XL (WELLBUTRIN XL) 300 mg XL tablet   Yes No   Sig: Take 300 mg by mouth every morning. cloNIDine HCl (CATAPRES) 0.2 mg tablet   No No   Sig: Take 0.5 Tabs by mouth two (2) times a day. diazepam (VALIUM) 5 mg tablet   No No   Sig: Take 0.5 Tabs by mouth every eight (8) hours as needed for Anxiety. Max Daily Amount: 7.5 mg.   gabapentin (NEURONTIN) 600 mg tablet   Yes No   Sig: Take 600 mg by mouth four (4) times daily. ibuprofen (MOTRIN) 600 mg tablet   No No   Sig: Take 1 Tab by mouth every six (6) hours as needed for Pain.   ipratropium-albuterol (COMBIVENT)  mcg/actuation inhaler   Yes No   Sig: Take 2 Puffs by inhalation every six (6) hours.    lipase-protease-amylase (ZENPEP 10,000) capsule   No No   Sig: Take 1 Cap by mouth three (3) times daily (with meals). ondansetron (ZOFRAN ODT) 8 mg disintegrating tablet   No No   Sig: Take 1 Tab by mouth every eight (8) hours as needed for Nausea. oxyCODONE IR (ROXICODONE) 5 mg immediate release tablet   No No   Sig: Take 1 Tab by mouth every eight (8) hours as needed for Pain. Max Daily Amount: 15 mg.   predniSONE (DELTASONE) 10 mg tablet   No No   Sig: Take 5 tabs daily for 1 days followed by 4 tabs daily for one day followed by 3 tabs daily for one day followed by 2 tabs daily for 1 day then 1 tab daily for one day then stop . Adrianne Warren .  15 tabs   silver sulfADIAZINE (SILVADENE) 1 % topical cream   No No   Sig: Apply  to affected area two (2) times a day. topiramate (TOPAMAX) 200 mg tablet   Yes No   Sig: Take 100 mg by mouth daily. Facility-Administered Medications: None       Family History   Problem Relation Age of Onset    Family history unknown: Yes       Allergies   Allergen Reactions    Lithium Anaphylaxis    Elavil Other (comments)     Left leg went numb    Morphine Hives       Review of Systems   Constitutional: Negative. HENT: Negative. Eyes: Negative. Respiratory: Negative. Cardiovascular: Negative. Gastrointestinal: Positive for abdominal pain. Genitourinary: Negative. Musculoskeletal: Negative. Skin: Negative. Neurological: Positive for dizziness and tingling. Negative for sensory change, speech change, focal weakness, seizures and loss of consciousness. Psychiatric/Behavioral: Negative. Physical Exam:       Visit Vitals    /76 (BP 1 Location: Left arm, BP Patient Position: At rest)    Pulse 79    Temp 97.8 °F (36.6 °C)    Resp 20    SpO2 97%       Physical Exam   Constitutional: She is oriented to person, place, and time. She appears well-developed and well-nourished. No distress. HENT:   Head: Normocephalic and atraumatic. Mouth/Throat: No oropharyngeal exudate.    Eyes: Conjunctivae and EOM are normal. Pupils are equal, round, and reactive to light. No scleral icterus. Neck: Neck supple. Cardiovascular: Normal rate, regular rhythm and normal heart sounds. No murmur heard. Pulmonary/Chest: Effort normal and breath sounds normal. No respiratory distress. She has no wheezes. She has no rales. Abdominal: Soft. Bowel sounds are normal. She exhibits no distension. There is tenderness. There is no guarding. Musculoskeletal: She exhibits no edema. Neurological: She is alert and oriented to person, place, and time. Skin: Skin is warm. No rash noted. She is not diaphoretic. No erythema. Psychiatric: Her mood appears anxious. Ancillary Studies: All lab and imaging reviewed for the past 24 hours.     Recent Results (from the past 24 hour(s))   URINALYSIS W/ RFLX MICROSCOPIC    Collection Time: 09/14/17 12:15 AM   Result Value Ref Range    Color YELLOW      Appearance CLEAR      Specific gravity 1.007 1.005 - 1.030      pH (UA) 7.0 5.0 - 8.0      Protein NEGATIVE  NEG mg/dL    Glucose NEGATIVE  NEG mg/dL    Ketone NEGATIVE  NEG mg/dL    Bilirubin NEGATIVE  NEG      Blood NEGATIVE  NEG      Urobilinogen 1.0 0.2 - 1.0 EU/dL    Nitrites NEGATIVE  NEG      Leukocyte Esterase NEGATIVE  NEG     DRUG SCREEN, URINE    Collection Time: 09/14/17 12:15 AM   Result Value Ref Range    BENZODIAZEPINE NEGATIVE  NEG      BARBITURATES NEGATIVE  NEG      THC (TH-CANNABINOL) NEGATIVE  NEG      OPIATES POSITIVE (A) NEG      PCP(PHENCYCLIDINE) NEGATIVE  NEG      COCAINE NEGATIVE  NEG      AMPHETAMINES NEGATIVE  NEG      METHADONE NEGATIVE       HDSCOM (NOTE)    CBC WITH AUTOMATED DIFF    Collection Time: 09/14/17 12:20 AM   Result Value Ref Range    WBC 5.6 4.6 - 13.2 K/uL    RBC 3.19 (L) 4.20 - 5.30 M/uL    HGB 10.9 (L) 12.0 - 16.0 g/dL    HCT 32.0 (L) 35.0 - 45.0 %    .3 (H) 74.0 - 97.0 FL    MCH 34.2 (H) 24.0 - 34.0 PG    MCHC 34.1 31.0 - 37.0 g/dL    RDW 15.8 (H) 11.6 - 14.5 %    PLATELET 93 (L) 737 - 420 K/uL    MPV 10.0 9.2 - 11.8 FL NEUTROPHILS 48 40 - 73 %    LYMPHOCYTES 44 21 - 52 %    MONOCYTES 6 3 - 10 %    EOSINOPHILS 2 0 - 5 %    BASOPHILS 0 0 - 2 %    ABS. NEUTROPHILS 2.7 1.8 - 8.0 K/UL    ABS. LYMPHOCYTES 2.5 0.9 - 3.6 K/UL    ABS. MONOCYTES 0.3 0.05 - 1.2 K/UL    ABS. EOSINOPHILS 0.1 0.0 - 0.4 K/UL    ABS. BASOPHILS 0.0 0.0 - 0.06 K/UL    DF AUTOMATED     PROTHROMBIN TIME + INR    Collection Time: 09/14/17 12:20 AM   Result Value Ref Range    Prothrombin time 12.4 11.5 - 15.2 sec    INR 1.0 0.8 - 1.2     HEPATIC FUNCTION PANEL    Collection Time: 09/14/17 12:20 AM   Result Value Ref Range    Protein, total 5.9 (L) 6.4 - 8.2 g/dL    Albumin 2.8 (L) 3.4 - 5.0 g/dL    Globulin 3.1 2.0 - 4.0 g/dL    A-G Ratio 0.9 0.8 - 1.7      Bilirubin, total 0.4 0.2 - 1.0 MG/DL    Bilirubin, direct 0.2 0.0 - 0.2 MG/DL    Alk.  phosphatase 154 (H) 45 - 117 U/L    AST (SGOT) 358 (H) 15 - 37 U/L    ALT (SGPT) 148 (H) 13 - 56 U/L   LIPASE    Collection Time: 09/14/17 12:20 AM   Result Value Ref Range    Lipase 1826 (H) 73 - 804 U/L   METABOLIC PANEL, BASIC    Collection Time: 09/14/17 12:20 AM   Result Value Ref Range    Sodium 139 136 - 145 mmol/L    Potassium 2.8 (LL) 3.5 - 5.5 mmol/L    Chloride 103 100 - 108 mmol/L    CO2 24 21 - 32 mmol/L    Anion gap 12 3.0 - 18 mmol/L    Glucose 84 74 - 99 mg/dL    BUN 5 (L) 7.0 - 18 MG/DL    Creatinine 0.59 (L) 0.6 - 1.3 MG/DL    BUN/Creatinine ratio 8 (L) 12 - 20      GFR est AA >60 >60 ml/min/1.73m2    GFR est non-AA >60 >60 ml/min/1.73m2    Calcium 8.1 (L) 8.5 - 10.1 MG/DL   ETHYL ALCOHOL    Collection Time: 09/14/17 12:20 AM   Result Value Ref Range    ALCOHOL(ETHYL),SERUM <3 0 - 3 MG/DL   MAGNESIUM    Collection Time: 09/14/17 12:20 AM   Result Value Ref Range    Magnesium 1.7 1.6 - 2.6 mg/dL   VALPROIC ACID    Collection Time: 09/14/17 12:20 AM   Result Value Ref Range    Valproic acid 53 50 - 100 ug/ml   EKG, 12 LEAD, INITIAL    Collection Time: 09/14/17  1:19 AM   Result Value Ref Range    Ventricular Rate 78 BPM    Atrial Rate 78 BPM    P-R Interval 152 ms    QRS Duration 82 ms    Q-T Interval 392 ms    QTC Calculation (Bezet) 446 ms    Calculated P Axis 59 degrees    Calculated R Axis 62 degrees    Calculated T Axis 53 degrees    Diagnosis       Normal sinus rhythm  Normal ECG  When compared with ECG of 12-SEP-2017 08:32,  No significant change was found     GLUCOSE, POC    Collection Time: 09/14/17  5:20 AM   Result Value Ref Range    Glucose (POC) 68 (L) 70 - 110 mg/dL   GLUCOSE, POC    Collection Time: 09/14/17  5:56 AM   Result Value Ref Range    Glucose (POC) 77 70 - 110 mg/dL       Assessment/Plan     Principal Problem:    Encephalopathy (9/14/2017)    Active Problems:    Elevated LFTs (1/28/2016)      Dizziness (9/14/2017)      Pancreatitis (9/14/2017)      Hypokalemia (9/14/2017)      Anemia (9/14/2017)      Alcohol abuse disorder    Active smoker       PLAN:    Encephalopathy - likely 2/2 polypharmacy. Per tele neurology d/c Depakote and hold Topamax. Dizziness - No vertigo. CT head negative. PT/OT eval. Fall precaution. Neurology consult in AM. Recommend MRI brain if no improvement in 48 hr     Pancreatitis - alcohol induced. Pain control , advance diet as tolerable. Hypokalemia - replete     Anemia - Macrocytic. B12, Folate pending    Copd - Home O2    Smoker - Transdermal nicotine patch / 24 hr. Advise smoking cessation    Bipolar disorder - d/c Depakote per tele neurology. Start Seroquel and increase dose as tolerable     Alcohol abuse - CIWA protocol .  High risk for withdrawal    Depression/ Anxiety - Valium prn     DVT prophylaxis     Full code      Hannah Aguayo MD  9/14/2017  3:35 AM

## 2017-09-15 ENCOUNTER — APPOINTMENT (OUTPATIENT)
Dept: ULTRASOUND IMAGING | Age: 52
DRG: 438 | End: 2017-09-15
Attending: INTERNAL MEDICINE
Payer: MEDICARE

## 2017-09-15 LAB
ANION GAP SERPL CALC-SCNC: 11 MMOL/L (ref 3–18)
BUN SERPL-MCNC: 4 MG/DL (ref 7–18)
BUN/CREAT SERPL: 10 (ref 12–20)
CALCIUM SERPL-MCNC: 8.1 MG/DL (ref 8.5–10.1)
CHLORIDE SERPL-SCNC: 111 MMOL/L (ref 100–108)
CO2 SERPL-SCNC: 21 MMOL/L (ref 21–32)
CREAT SERPL-MCNC: 0.39 MG/DL (ref 0.6–1.3)
GLUCOSE SERPL-MCNC: 73 MG/DL (ref 74–99)
INR PPP: 0.9 (ref 0.8–1.2)
LIPASE SERPL-CCNC: 1326 U/L (ref 73–393)
POTASSIUM SERPL-SCNC: 4 MMOL/L (ref 3.5–5.5)
PROTHROMBIN TIME: 11.9 SEC (ref 11.5–15.2)
SODIUM SERPL-SCNC: 143 MMOL/L (ref 136–145)

## 2017-09-15 PROCEDURE — 85610 PROTHROMBIN TIME: CPT | Performed by: FAMILY MEDICINE

## 2017-09-15 PROCEDURE — 83690 ASSAY OF LIPASE: CPT | Performed by: FAMILY MEDICINE

## 2017-09-15 PROCEDURE — 74011250637 HC RX REV CODE- 250/637: Performed by: HOSPITALIST

## 2017-09-15 PROCEDURE — 77030020263 HC SOL INJ SOD CL0.9% LFCR 1000ML

## 2017-09-15 PROCEDURE — 65660000000 HC RM CCU STEPDOWN

## 2017-09-15 PROCEDURE — 74011250637 HC RX REV CODE- 250/637: Performed by: INTERNAL MEDICINE

## 2017-09-15 PROCEDURE — 74011250637 HC RX REV CODE- 250/637: Performed by: FAMILY MEDICINE

## 2017-09-15 PROCEDURE — 76705 ECHO EXAM OF ABDOMEN: CPT

## 2017-09-15 PROCEDURE — 74011250636 HC RX REV CODE- 250/636: Performed by: FAMILY MEDICINE

## 2017-09-15 PROCEDURE — 80048 BASIC METABOLIC PNL TOTAL CA: CPT | Performed by: INTERNAL MEDICINE

## 2017-09-15 PROCEDURE — 97165 OT EVAL LOW COMPLEX 30 MIN: CPT

## 2017-09-15 PROCEDURE — 36415 COLL VENOUS BLD VENIPUNCTURE: CPT | Performed by: FAMILY MEDICINE

## 2017-09-15 RX ORDER — CLONIDINE HYDROCHLORIDE 0.1 MG/1
0.1 TABLET ORAL 2 TIMES DAILY
Status: DISCONTINUED | OUTPATIENT
Start: 2017-09-15 | End: 2017-09-16 | Stop reason: HOSPADM

## 2017-09-15 RX ORDER — BUPROPION HYDROCHLORIDE 150 MG/1
300 TABLET ORAL
Status: DISCONTINUED | OUTPATIENT
Start: 2017-09-16 | End: 2017-09-16 | Stop reason: HOSPADM

## 2017-09-15 RX ORDER — HYDROCODONE BITARTRATE AND ACETAMINOPHEN 5; 325 MG/1; MG/1
1 TABLET ORAL
Status: DISCONTINUED | OUTPATIENT
Start: 2017-09-15 | End: 2017-09-15

## 2017-09-15 RX ORDER — OXYCODONE HYDROCHLORIDE 5 MG/1
10 TABLET ORAL
Status: DISCONTINUED | OUTPATIENT
Start: 2017-09-15 | End: 2017-09-16 | Stop reason: HOSPADM

## 2017-09-15 RX ADMIN — HYDROMORPHONE HYDROCHLORIDE 1 MG: 1 INJECTION, SOLUTION INTRAMUSCULAR; INTRAVENOUS; SUBCUTANEOUS at 03:01

## 2017-09-15 RX ADMIN — OXYCODONE HYDROCHLORIDE 10 MG: 5 TABLET ORAL at 17:25

## 2017-09-15 RX ADMIN — CLONIDINE HYDROCHLORIDE 0.1 MG: 0.1 TABLET ORAL at 18:00

## 2017-09-15 RX ADMIN — OXYCODONE HYDROCHLORIDE 10 MG: 5 TABLET ORAL at 21:27

## 2017-09-15 RX ADMIN — LORAZEPAM 1 MG: 1 TABLET ORAL at 12:56

## 2017-09-15 RX ADMIN — SODIUM CHLORIDE 100 ML/HR: 900 INJECTION, SOLUTION INTRAVENOUS at 22:51

## 2017-09-15 RX ADMIN — POTASSIUM CHLORIDE 40 MEQ: 750 TABLET, FILM COATED, EXTENDED RELEASE ORAL at 09:48

## 2017-09-15 RX ADMIN — Medication 10 ML: at 17:25

## 2017-09-15 RX ADMIN — GABAPENTIN 600 MG: 300 CAPSULE ORAL at 12:30

## 2017-09-15 RX ADMIN — LORAZEPAM 1 MG: 1 TABLET ORAL at 23:44

## 2017-09-15 RX ADMIN — GABAPENTIN 600 MG: 300 CAPSULE ORAL at 17:25

## 2017-09-15 RX ADMIN — POTASSIUM CHLORIDE 40 MEQ: 750 TABLET, FILM COATED, EXTENDED RELEASE ORAL at 17:24

## 2017-09-15 RX ADMIN — QUETIAPINE 200 MG: 50 TABLET, EXTENDED RELEASE ORAL at 09:48

## 2017-09-15 RX ADMIN — GABAPENTIN 600 MG: 300 CAPSULE ORAL at 09:47

## 2017-09-15 RX ADMIN — SODIUM CHLORIDE 100 ML/HR: 900 INJECTION, SOLUTION INTRAVENOUS at 03:06

## 2017-09-15 RX ADMIN — OXYCODONE HYDROCHLORIDE 10 MG: 5 TABLET ORAL at 12:30

## 2017-09-15 RX ADMIN — HYDROMORPHONE HYDROCHLORIDE 1 MG: 1 INJECTION, SOLUTION INTRAMUSCULAR; INTRAVENOUS; SUBCUTANEOUS at 06:53

## 2017-09-15 RX ADMIN — Medication 10 ML: at 06:57

## 2017-09-15 RX ADMIN — CLONIDINE HYDROCHLORIDE 0.1 MG: 0.1 TABLET ORAL at 12:56

## 2017-09-15 RX ADMIN — GABAPENTIN 600 MG: 300 CAPSULE ORAL at 21:27

## 2017-09-15 NOTE — ROUTINE PROCESS
0930- Assumed care. Pt in bed watching TV. No acute distress. Call bell in reach. 1000- Due medications given. Tolerated well. Shift assessment completed. No respiratory distress noted. Alert and oriented x4. Complaints of abdominal pain rated 7/10. Pt educated on timing of next dose of dilaudid. She states \"it is not that bad, I can wait\". No further concerns at this time. Call bell in reach. Will continue to monitor. 1145- Interdisciplinary roundiong completed. Dr. Velazquez made aware of pt systolic BP in the 652'S. New order: Clonidine placed. No further concerns at this time. 1230- Pt made NPO for abdominal U/S.    1235- Prn oxycodone given with sips of water for abdominal pain 7/10. Tolerated well. No acute distress. 1300- Pt sleeping. 1430- Pt taken to ultrasound. 1525-Pt back from ultrasound. 1545- Reassessment complete. No changes to previous. Pt says she \"just wants to sleep\". Will continue to monitor. 1940-Bedside and Verbal shift change report given to  Ana Villalta (oncoming nurse) by Melisa Dubin, RN   (offgoing nurse). Report included the following information SBAR, Kardex, Intake/Output, MAR and Recent Results.

## 2017-09-15 NOTE — PROGRESS NOTES
conducted an initial consultation and Spiritual Assessment for Damion Hahn, who is a 47 y.o.,female. Patients Primary Language is: [place]. According to the patients EMR Hoahaoism Affiliation is: [place]. The reason the Patient came to the hospital is:   Patient Active Problem List    Diagnosis Date Noted    Dizziness 09/14/2017    Pancreatitis 09/14/2017    Hypokalemia 09/14/2017    Anemia 09/14/2017    Encephalopathy 09/14/2017    Nicotine withdrawal 02/27/2016    Pneumonia 02/25/2016    COPD (chronic obstructive pulmonary disease) ([facility] [facility] 02/25/2016    Tylenol overdose 02/20/2016    SIRS (systemic inflammatory response syndrome) ([facility] [facility] 02/20/2016    Acute pancreatitis 02/19/2016    Pancreatitis, alcoholic, acute 74/61/9246    Tachycardia 01/28/2016    Alcohol dependence ([facility] 01/28/2016    Esophageal stricture 01/28/2016    Dilated pancreatic duct 01/28/2016    Common bile duct dilation 01/28/2016    Elevated LFTs 01/28/2016    Bipolar depression ([facility] 01/28/2016    HTN (hypertension) 01/28/2016    Bipolar disorder (manic depression) ([facility] 03/08/2013        The  provided the following Interventions:  Initiated a relationship of care and support. Explored issues of owen, spirituality and/or Restoration needs while hospitalized. Listened empathically. Provided chaplaincy education. Provided information about Spiritual Care Services. Offered prayer and assurance of continued prayers on patient's behalf. Chart reviewed. The following outcomes were achieved:  Patient shared some information about their medical narrative and spiritual journey/beliefs. Patient processed feeling about current hospitalization. Patient expressed gratitude for the 's visit. Assessment:  Patient did not indicate any spiritual or Restoration issues which require Spiritual Care Services interventions at this time.    Patient does not have any Restoration/cultural needs that will affect patients preferences in health care. Plan:  Chaplains will continue to follow and will provide pastoral care on an as needed or requested basis.  recommends bedside caregivers page  on duty if patient shows signs of acute spiritual or emotional distress.     [facility]   Staff [address] [place] [address]   [phone]

## 2017-09-15 NOTE — PROGRESS NOTES
1444: 2nd PT attempt. Patient off the floor. 1336: 1st PT attempt. Patient sleeping soundly. Will follow up.      Thank you,     Shaye Pina, PT, DPT

## 2017-09-15 NOTE — PROGRESS NOTES
Problem: Pancreatitis  Goal: *Labs within defined limits  Outcome: Not Met  Lipase 1326    Problem: Falls - Risk of  Goal: *Absence of Falls  Document Thelma Fall Risk and appropriate interventions in the flowsheet.    Outcome: Progressing Towards Goal  Fall Risk Interventions:  Mobility Interventions: Patient to call before getting OOB           Medication Interventions: Patient to call before getting OOB

## 2017-09-15 NOTE — PROGRESS NOTES
Bedside shift change report given to Elizabeth (oncoming nurse) by Iain Garcia RN (offgoing nurse). Report included the following information SBAR and Kardex.

## 2017-09-15 NOTE — PROCEDURES
[facility]    Name:  Hilda Guillaume  MR#:  [id number]  :  1965  Account #:  [de-identified]  Date of Adm:  2017  Date of Service:  2017      ELECTROENCEPHALOGRAM NUMBER:     REFERRING PHYSICIAN: Erika Dawn MD    This routine EEG was requested for this 48-year-old woman to  evaluate for encephalopathy. MEDICATIONS  1. Dilaudid. 2. Lovenox. 3. Neurontin. 4. Seroquel. The EEG was obtained with the patient in the awake, drowsy and  sleeping states. The International 10-20 system was used during this  recording. In the beginning of the recording the patient was asleep with normal  sleep pattern with vertex sharp waves and sleep spindles. When the  patient was awakened there was symmetrical posterior dominant 9 Hz  alpha rhythm that attenuated well with eye opening. A mixture of alpha  and beta activity were seen anteriorly. Five minutes of hyperventilation did not produce any significant change  in tracing. Photic stimulation did not produce any significant change in tracing. CLINICAL INTERPRETATION: This is a normal EEG which was  obtained in the awake, drowsy and sleeping states.         MD SCOUT Pimentel / JONATAN  D:  2017   15:48  T:  2017   20:12  Job #:  [id number]

## 2017-09-15 NOTE — ROUTINE PROCESS
0840 - Bedside, Verbal and Written shift change report given to Alma Oreilly, RN/Leisa Salinas RN (oncoming nurse) by Gail Houser RN (offgoing nurse). Report included the following information SBAR, Kardex, Intake/Output, MAR, Recent Results, Med Rec Status, Procedure Summary and Cardiac Rhythm NSR.       2930 - Dr Velazquez returned call, notified pt is NPO and unsure the reasoning, as well as pt's BP elevated this morning and pt has not resumed home medications. Dr Velazquez ordered Full Liquid Diet and will review pt's medication list.    1935 - Bedside, Verbal and Written shift change report given to Americo Charles RN (oncoming nurse) by Alma Oreilly RN/Leisa Rodrigez RN (offgoing nurse). Report included the following information SBAR, Kardex, Intake/Output, MAR, Recent Results, Med Rec Status, Procedure Summary and Cardiac Rhythm NSR.

## 2017-09-15 NOTE — PROGRESS NOTES
Problem: Self Care Deficits Care Plan (Adult)  Goal: *Acute Goals and Plan of Care (Insert Text)  Outcome: Resolved/Met Date Met:  09/15/17  OCCUPATIONAL THERAPY EVALUATION/DISCHARGE     Patient: Richard Hughes (33 y.o. female)  Date: 9/15/2017  Primary Diagnosis: Pancreatitis  Dizziness  Encephalopathy  Hypokalemia  Elevated LFTs  Anemia        Precautions:    (None)      ASSESSMENT AND RECOMMENDATIONS:  Based on the objective data described below, the patient is able to perform basic self care tasks and functional transfers without assistance. Discussed home safety tips especially related to her dizziness in standing. Patient verbalized understanding. Skilled occupational therapy is not indicated at this time. Discharge Recommendations: None  Further Equipment Recommendations for Discharge: N/A        SUBJECTIVE:   Patient stated I anticipate no problems when I get home.       OBJECTIVE DATA SUMMARY:       Past Medical History:   Diagnosis Date    Alcohol abuse      Anxiety      Asthma      [facility] disorder [facility]      Common migraine      COPD (chronic obstructive pulmonary disease) ([facility]      Depression      Esophageal reflux      Gout      Hypertension      Inverted nipple       Left breast always have. Past Surgical History:   Procedure Laterality Date    HX CARPAL TUNNEL RELEASE        HX GYN         tubal ligation    HX HEENT        HX LUMBAR LAMINECTOMY        HX ORTHOPAEDIC         Barriers to Learning/Limitations: None  Compensate with: visual, verbal, tactile, kinesthetic cues/model  G CODES:  Self Care  Current  CI= 1-19%   Goal  CI= 1-19%   D/C  CI= 1-19%. The severity rating is based on the Other :functional assessment     Eval Complexity: History: LOW Complexity : Brief history review ;  Examination: LOW Complexity : 1-3 performance deficits relating to physical, cognitive , or psychosocial skils that result in activity limitations and / or participation restrictions ; Decision Making:LOW Complexity : No comorbidities that affect functional and no verbal or physical assistance needed to complete eval tasks   Prior Level of Function/Home Situation: Pt was independent with basic self care tasks and functional mobility PTA. Home Situation  Home Environment: Private residence  # Steps to Enter: 5  One/Two Story Residence: One story  # of Interior Steps: 14  Height of Each Step (in): 6 inches  Interior Rails: Right  Lift Chair Available: No  Living Alone: No  Support Systems: Spouse/Significant Other/Partner  Patient Expects to be Discharged to[de-identified] Private residence  Current DME Used/Available at Home: Oxygen, portable  Tub or Shower Type: Tub/Shower combination  [X]     Right hand dominant       [ ]     Left hand dominant  Cognitive/Behavioral Status:  Neurologic State: Alert  Orientation Level: Oriented X4  Cognition: Appropriate decision making; Follows commands  Safety/Judgement: Awareness of environment; Fall prevention  Skin: Intact on UEs  Edema: None noted in UEs  Vision/Perceptual:    Acuity: Within Defined Limits    Coordination:  Coordination: Within functional limits (UEs)  Fine Motor Skills-Upper: Left Intact; Right Intact    Gross Motor Skills-Upper: Left Intact; Right Intact  Balance:  Sitting: Intact  Standing: Intact  Strength:  Strength:  Within functional limits (UEs)  Tone & Sensation:  Tone: Normal (UEs)  Sensation: Intact (UEs)  Range of Motion:  AROM: Within functional limits (UEs)  Functional Mobility and Transfers for ADLs:  Bed Mobility:  Supine to Sit: Modified independent  Sit to Supine: Modified independent  Transfers:  Sit to Stand: Modified independent              Toilet Transfer : Modified independent  ADL Assessment:  Feeding: Independent     Oral Facial Hygiene/Grooming: Independent     Bathing: Modified independent     Upper Body Dressing: Independent     Lower Body Dressing: Modified independent     Toileting: Independent Pain:  Pre-treatment pain level :7/10; nurse notified  Post treatment pain level  Pain Scale 1: Numeric (0 - 10)  Pain Intensity 1: 7  Pain Location 1: Abdomen  Pain Orientation 1: Anterior  Pain Description 1: Constant  Pain Intervention(s) 1: Repositioned;Relaxation technique (Teaching on when next dose of prn pain meds done)  Activity Tolerance:   Good  Please refer to the flowsheet for vital signs taken during this treatment. After treatment:   [ ]  Patient left in no apparent distress sitting up in chair  [X]  Patient left in no apparent distress in bed  [X]  Call bell left within reach  [ ]  Nursing notified  [ ]  Caregiver present  [ ]  Bed alarm activated      COMMUNICATION/EDUCATION: Patient educated on role of OT and POC. She verbalized understanding. Communication/Collaboration:  [X]      Home safety education was provided and the patient/caregiver indicated understanding. [X]      Patient/family have participated as able and agree with findings and recommendations. [ ]      Patient is unable to participate in plan of care at this time.      Anish Faria MS OTR/L  Time Calculation: 15 mins

## 2017-09-15 NOTE — PROGRESS NOTES
Chart reviewed. Plan remains home when medically stable. Will cont to follow for any needs. Meg Elliott RN,ext. [phone].

## 2017-09-15 NOTE — PROGRESS NOTES
Problem: Falls - Risk of  Goal: *Absence of Falls  Document Thelma Fall Risk and appropriate interventions in the flowsheet.    Outcome: Progressing Towards Goal  Fall Risk Interventions:  Mobility Interventions: Patient to call before getting OOB           Medication Interventions: Patient to call before getting OOB

## 2017-09-15 NOTE — PROGRESS NOTES
Daily Progress Note: 9/15/2017 1:15 PM   Admit Date: 9/13/2017    Patient seen in follow up for multiple medical problems as listed below:  Patient Active Problem List   Diagnosis Code    Bipolar disorder (manic depression) ([facility] F31.9    Tachycardia R00.0    Alcohol dependence ([facility] F10.20    Esophageal stricture K22.2    Dilated pancreatic duct K86.89    Common bile duct dilation K83.8    Elevated LFTs R94.5    Bipolar depression ([facility] F31.30    HTN (hypertension) I10    Acute pancreatitis K85.90    Pancreatitis, alcoholic, acute K09.06    Tylenol overdose T39.1X1A    SIRS (systemic inflammatory response syndrome) (HCC) R65.10    Pneumonia J18.9    COPD (chronic obstructive pulmonary disease) (HCC) J44.9    Nicotine withdrawal F17.203    Dizziness R42    Pancreatitis K85.90    Hypokalemia E87.6    Anemia D64.9    Encephalopathy G93.40       Assesment     47 y.o. female who presents with dizziness onset 2 days ago. She denies vertigo. She however claims to have left sides facial numbness. She had gait abnormality due to the dizziness and had to support he self by holding objects while walking. No visual change, no speech change , no focal weakness. In the ED she was seen by tele neurology. CT head done. No acute change. She also has pancreatitis 2 days ago . She is a known alcoholic. Her last drink was 2 days ago. Patient has elevated LFT's suggestive of alcoholic pancreatitis. Encephalopathy/AMS - likely 2/2 polypharmacy. Per tele neurology d/c Depakote and hold Topamax. resolved 9/14. EEGx1     Dizziness - No vertigo. CT head negative. PT/OT eval. Fall precaution. MRI x1. Resolved. Suspect anxiety/drug associated. resolved     Acute Pancreatitis - alcohol induced. CT abd 9/12 with confirmation. On clears. Monitor pain. On IV dilaudid. RUQ Us x1     Alcoholic hepatitis - monitor LFTs. T bili only 0.4 AST /153    Hypokalemia - repleted     Anemia - Macrocytic. B12, Folate wnl. Likely ETOH and liver disease     Copd - Home O2, not needed while in hospital     Smoker - Transdermal nicotine patch / 24 hr. Advise smoking cessation     Bipolar disorder - d/c Depakote per tele neurology. Now on Seroquel      Alcohol abuse - CIWA protocol . High risk for withdrawal. Stop dilaudid due to drug seeking behavoir     Depression/ Anxiety -BDZ prn on CIWA -high risk for misuse will stop on discharge    HTN - clonidine    DVT Protocol Active: yes  Code Status:  Full Code     Disposition: Home tomorrow likely    Subjective:     CC: Dizziness    Interval History: mentation at baseline. MRI neg. Lipase still elevated need RUQ US r/o stones although alcohol binge c/w alcoholic pancreatitis. Advance to soft diet tonight/tomorrow. Epigastric pain 7/10 but hungry    ROS: 11 point ROS negative except for headache. Objective:     Visit Vitals    BP (!) 151/91 (BP 1 Location: Left arm, BP Patient Position: At rest)    Pulse 99    Temp 98.7 °F (37.1 °C)    Resp 16    LMP 2013    SpO2 97%       Temp (24hrs), Av.4 °F (36.9 °C), Min:97.6 °F (36.4 °C), Max:98.8 °F (37.1 °C)        Intake/Output Summary (Last 24 hours) at 09/15/17 1156  Last data filed at 09/15/17 0600   Gross per 24 hour   Intake           998.33 ml   Output                0 ml   Net           998.33 ml       Gen: AOx3, NAD  HEENT:  ARIS, EOMI. Neck: No Bruits/JVD   Lungs:   CTAB. Good respiratory effort  Heart:   RR S1 S2 without M/R/G  Abdomen: ND,mild upper abd ttp, BSX4,   Extremities:   No LE edema. No cyanosis. Skin:  no jaundice/lesions  Neuro: CN2-12 intact. Strength 5/5 UE/LE sensation 5/5 UE/LE no pronator drift.  Normal FNF      Data Review:     Meds/Labs/Tests reviewed    Current Shift:     Last three shifts:   1901 - 09/15 0700  In: 998.3 [I.V.:998.3]  Out: -   Recent Labs      17   0705  17   0020   WBC  4.5*  5.6   RBC  3.20*  3.19*   HGB  10.9*  10.9*   HCT  33.3*  32.0*   PLT  95*  93*   GRANS 44  48   LYMPH  47  44   EOS  3  2       Recent Labs      09/15/17   0420  09/14/17   0705  09/14/17   0020   BUN  4*  4*  5*   CREA  0.39*  0.52*  0.59*   CA  8.1*  7.8*  8.1*   ALB   --   2.9*  2.8*   K  4.0  3.1*  2.8*   NA  143  141  139   CL  111*  108  103   CO2  21  26  24   GLU  73*  85  84        Lab Results   Component Value Date/Time    Glucose 73 09/15/2017 04:20 AM    Glucose 85 09/14/2017 07:05 AM    Glucose 84 09/14/2017 12:20 AM    Glucose 111 09/12/2017 08:40 AM    Glucose 88 11/04/2016 12:18 PM          Care coordination with Nursing/Consultants/staff: 15  Prior history, labs, and charting reviewed: 15    Procedures/Imaging:  CT head  CT abd  MRI head  EEG  RUQ US    Total time spent with chart review, patient examination/education, discussion with staff on case,documentation and medication management / adjustment  :  30 Minutes      Dr Lauren Meza  [phone]

## 2017-09-15 NOTE — PROGRESS NOTES
@2024 Assumed care of patient. Received report from [facility]. Patient currently in. No complaints/concerns at at this time. Belongings within reach. RN to continue to monitor patient.

## 2017-09-15 NOTE — PROGRESS NOTES
SLP rec'd evaluation & treat orders. Chart reviewed and upon initial interview, it is deemed that a formal evaluation is not indicated. Pt A&Ox4; effective communicator. Pt's basic cognitive-linguistic function appears intact at this time. Observed pt with serial swallows of thin liquids; 0 overt distress noted. After d/w Dr. Aristeo Riley, per pt labs, she cannot have solids  Accordingly, SLP will discontinue MD orders, as evaluation not indicated. SLP available for formal evaluation if further indicated by MD.    SLP educated pt on role of speech therapist in current setting with re: speech/swallow; verbalized comprehension. Results d/w MD.    Thank you for this referral.  Malik Colón.  [place] [id number] [facility]  Office: [phone]  Pager: [phone]

## 2017-09-16 VITALS
TEMPERATURE: 98.8 F | SYSTOLIC BLOOD PRESSURE: 159 MMHG | DIASTOLIC BLOOD PRESSURE: 95 MMHG | OXYGEN SATURATION: 97 % | RESPIRATION RATE: 17 BRPM | HEART RATE: 100 BPM

## 2017-09-16 LAB
ANION GAP SERPL CALC-SCNC: 9 MMOL/L (ref 3–18)
BASOPHILS # BLD: 0 K/UL (ref 0–0.06)
BASOPHILS NFR BLD: 0 % (ref 0–2)
BUN SERPL-MCNC: 3 MG/DL (ref 7–18)
BUN/CREAT SERPL: 8 (ref 12–20)
CALCIUM SERPL-MCNC: 7.8 MG/DL (ref 8.5–10.1)
CHLORIDE SERPL-SCNC: 109 MMOL/L (ref 100–108)
CO2 SERPL-SCNC: 24 MMOL/L (ref 21–32)
CREAT SERPL-MCNC: 0.4 MG/DL (ref 0.6–1.3)
DIFFERENTIAL METHOD BLD: ABNORMAL
EOSINOPHIL # BLD: 0.1 K/UL (ref 0–0.4)
EOSINOPHIL NFR BLD: 2 % (ref 0–5)
ERYTHROCYTE [DISTWIDTH] IN BLOOD BY AUTOMATED COUNT: 16.8 % (ref 11.6–14.5)
GLUCOSE SERPL-MCNC: 121 MG/DL (ref 74–99)
HCT VFR BLD AUTO: 33.4 % (ref 35–45)
HGB BLD-MCNC: 10.8 G/DL (ref 12–16)
LYMPHOCYTES # BLD: 1.7 K/UL (ref 0.9–3.6)
LYMPHOCYTES NFR BLD: 43 % (ref 21–52)
MCH RBC QN AUTO: 34.1 PG (ref 24–34)
MCHC RBC AUTO-ENTMCNC: 32.3 G/DL (ref 31–37)
MCV RBC AUTO: 105.4 FL (ref 74–97)
MONOCYTES # BLD: 0.5 K/UL (ref 0.05–1.2)
MONOCYTES NFR BLD: 13 % (ref 3–10)
NEUTS SEG # BLD: 1.7 K/UL (ref 1.8–8)
NEUTS SEG NFR BLD: 42 % (ref 40–73)
PLATELET # BLD AUTO: 89 K/UL (ref 135–420)
PMV BLD AUTO: 10.7 FL (ref 9.2–11.8)
POTASSIUM SERPL-SCNC: 4.2 MMOL/L (ref 3.5–5.5)
RBC # BLD AUTO: 3.17 M/UL (ref 4.2–5.3)
SODIUM SERPL-SCNC: 142 MMOL/L (ref 136–145)
WBC # BLD AUTO: 3.9 K/UL (ref 4.6–13.2)

## 2017-09-16 PROCEDURE — 74011250637 HC RX REV CODE- 250/637: Performed by: INTERNAL MEDICINE

## 2017-09-16 PROCEDURE — 36415 COLL VENOUS BLD VENIPUNCTURE: CPT | Performed by: INTERNAL MEDICINE

## 2017-09-16 PROCEDURE — 74011250637 HC RX REV CODE- 250/637: Performed by: FAMILY MEDICINE

## 2017-09-16 PROCEDURE — 80048 BASIC METABOLIC PNL TOTAL CA: CPT | Performed by: INTERNAL MEDICINE

## 2017-09-16 PROCEDURE — 74011250637 HC RX REV CODE- 250/637: Performed by: HOSPITALIST

## 2017-09-16 PROCEDURE — 74011250636 HC RX REV CODE- 250/636: Performed by: FAMILY MEDICINE

## 2017-09-16 PROCEDURE — 85025 COMPLETE CBC W/AUTO DIFF WBC: CPT | Performed by: INTERNAL MEDICINE

## 2017-09-16 RX ORDER — ONDANSETRON 2 MG/ML
4 INJECTION INTRAMUSCULAR; INTRAVENOUS
Status: DISCONTINUED | OUTPATIENT
Start: 2017-09-16 | End: 2017-09-16 | Stop reason: HOSPADM

## 2017-09-16 RX ORDER — OXYCODONE HYDROCHLORIDE 5 MG/1
10 TABLET ORAL
Qty: 20 TAB | Refills: 0 | Status: ON HOLD | OUTPATIENT
Start: 2017-09-16 | End: 2018-03-15

## 2017-09-16 RX ADMIN — OXYCODONE HYDROCHLORIDE 10 MG: 5 TABLET ORAL at 09:32

## 2017-09-16 RX ADMIN — POTASSIUM CHLORIDE 40 MEQ: 750 TABLET, FILM COATED, EXTENDED RELEASE ORAL at 08:59

## 2017-09-16 RX ADMIN — OXYCODONE HYDROCHLORIDE 10 MG: 5 TABLET ORAL at 01:23

## 2017-09-16 RX ADMIN — ONDANSETRON 4 MG: 2 INJECTION INTRAMUSCULAR; INTRAVENOUS at 04:21

## 2017-09-16 RX ADMIN — CLONIDINE HYDROCHLORIDE 0.1 MG: 0.1 TABLET ORAL at 08:59

## 2017-09-16 RX ADMIN — OXYCODONE HYDROCHLORIDE 10 MG: 5 TABLET ORAL at 05:31

## 2017-09-16 RX ADMIN — Medication 10 ML: at 05:31

## 2017-09-16 RX ADMIN — GABAPENTIN 600 MG: 300 CAPSULE ORAL at 08:59

## 2017-09-16 RX ADMIN — ENOXAPARIN SODIUM 40 MG: 40 INJECTION SUBCUTANEOUS at 05:30

## 2017-09-16 RX ADMIN — GABAPENTIN 600 MG: 300 CAPSULE ORAL at 12:28

## 2017-09-16 RX ADMIN — OXYCODONE HYDROCHLORIDE 10 MG: 5 TABLET ORAL at 14:09

## 2017-09-16 NOTE — ROUTINE PROCESS
Bedside shift change report given to Radha ANGELES RN (oncoming nurse) by Pascale Rodriguez RN (offgoing nurse). Report included the following information SBAR, Kardex, Intake/Output, MAR, Recent Results and Cardiac Rhythm ST, .

## 2017-09-16 NOTE — PROGRESS NOTES
Problem: Mobility Impaired (Adult and Pediatric)  Goal: *Acute Goals and Plan of Care (Insert Text)  Physical Therapy Goals  Initiated 9/14/2017 and to be accomplished within 7 day(s)  1. Patient will transfer from bed to chair and chair to bed with modified independence using the least restrictive device. 2. Patient will perform sit to stand with modified independence. 3. Patient will ambulate with modified independence for 150 feet with the least restrictive device. 4. Patient will ascend/descend 5 stairs with handrail(s) with supervision/set-up. Outcome: Progressing Towards Goal  PHYSICAL THERAPY TREATMENT     Patient: Michael Green (40 y.o. female)  Date: 9/16/2017  Diagnosis: Pancreatitis  Dizziness  Encephalopathy  Hypokalemia  Elevated LFTs  Anemia Encephalopathy  Precautions:  (None)   Chart, physical therapy assessment, plan of care and goals were reviewed. ASSESSMENT:  Pt ambulated 120ft without an assistive device, steady slow pace, reciprocal gt pattern, no LOB or path deviations. Safely negotiated a full flight of stairs holding R hand rail only and a reciprocal pattern. Education: ambulate at home to build strength, avoid straining  Progression toward goals:  [ ]      Improving appropriately and progressing toward goals  [X]      Improving slowly and progressing toward goals  [ ]      Not making progress toward goals and plan of care will be adjusted       PLAN:  Patient continues to benefit from skilled intervention to address the above impairments. Continue treatment per established plan of care. Discharge Recommendations:  None  Further Equipment Recommendations for Discharge:  N/A       SUBJECTIVE:   Patient stated I am real tender through here.  (placing hands on lower anterior rib cage)      OBJECTIVE DATA SUMMARY:   Critical Behavior:  Neurologic State: Alert  Orientation Level: Oriented X4  Cognition: Follows commands  Safety/Judgement: Awareness of environment, Fall prevention  Functional Mobility Training:  Bed Mobility:  Supine to Sit: Independent  Sit to Supine: Independent  Transfers:  Sit to Stand: Independent  Stand to Sit: Independent  Balance:  Sitting: Intact; Without support  Standing: Intact; Without support  Standing - Static: Good  Standing - Dynamic : Good  Ambulation/Gait Training:  Distance (ft): 120 Feet (ft)  Assistive Device: Gait belt  Ambulation - Level of Assistance: Independent  Speed/Padma: Slow  Stairs:  Number of Stairs Trained: 10  Stairs - Level of Assistance: Supervision  Rail Use: Right   Pain:  Pre:8  Post:8  Pain Scale 1: Numeric (0 - 10)     Pain Location 1: Abdomen  Pain Orientation 1: Anterior  Pain Description 1: Aching     Activity Tolerance:   Good  Please refer to the flowsheet for vital signs taken during this treatment.   After treatment:   [ ] Patient left in no apparent distress sitting up in chair  [X] Patient left in no apparent distress in bed  [X] Call bell left within reach  [ ] Nursing notified  [ ] Caregiver present  [ ] Bed alarm activated      [address], PTA   Time Calculation: 13 mins

## 2017-09-16 NOTE — DISCHARGE INSTRUCTIONS
DISCHARGE SUMMARY from Nurse    The following personal items are in your possession at time of discharge:    Dental Appliances: None  Visual Aid: Glasses, With patient     Home Medications:  (states taken in ER)  Jewelry: Earrings, Bracelet, Ring  Clothing: Shirt, Shorts  Other Valuables: Mariel Lo Purse             PATIENT INSTRUCTIONS:    After general anesthesia or intravenous sedation, for 24 hours or while taking prescription Narcotics:  · Limit your activities  · Do not drive and operate hazardous machinery  · Do not make important personal or business decisions  · Do  not drink alcoholic beverages  · If you have not urinated within 8 hours after discharge, please contact your surgeon on call. Report the following to your surgeon:  · Excessive pain, swelling, redness or odor of or around the surgical area  · Temperature over 100.5  · Nausea and vomiting lasting longer than 4 hours or if unable to take medications  · Any signs of decreased circulation or nerve impairment to extremity: change in color, persistent  numbness, tingling, coldness or increase pain  · Any questions        What to do at Home:  Recommended activity: Activity as tolerated. If you experience any of the following symptoms chest pain, shortness of breath or abdominal pain or any concerns, please follow up with Primary Care physician. *  Please give a list of your current medications to your Primary Care Provider. *  Please update this list whenever your medications are discontinued, doses are      changed, or new medications (including over-the-counter products) are added. *  Please carry medication information at all times in case of emergency situations. These are general instructions for a healthy lifestyle:    No smoking/ No tobacco products/ Avoid exposure to second hand smoke    Surgeon General's Warning:  Quitting smoking now greatly reduces serious risk to your health.     Obesity, smoking, and sedentary lifestyle greatly increases your risk for illness    A healthy diet, regular physical exercise & weight monitoring are important for maintaining a healthy lifestyle    You may be retaining fluid if you have a history of heart failure or if you experience any of the following symptoms:  Weight gain of 3 pounds or more overnight or 5 pounds in a week, increased swelling in our hands or feet or shortness of breath while lying flat in bed. Please call your doctor as soon as you notice any of these symptoms; do not wait until your next office visit. Recognize signs and symptoms of STROKE:    F-face looks uneven    A-arms unable to move or move unevenly    S-speech slurred or non-existent    T-time-call 911 as soon as signs and symptoms begin-DO NOT go       Back to bed or wait to see if you get better-TIME IS BRAIN. Warning Signs of HEART ATTACK     Call 911 if you have these symptoms:   Chest discomfort. Most heart attacks involve discomfort in the center of the chest that lasts more than a few minutes, or that goes away and comes back. It can feel like uncomfortable pressure, squeezing, fullness, or pain.  Discomfort in other areas of the upper body. Symptoms can include pain or discomfort in one or both arms, the back, neck, jaw, or stomach.  Shortness of breath with or without chest discomfort.  Other signs may include breaking out in a cold sweat, nausea, or lightheadedness. Don't wait more than five minutes to call 911 - MINUTES MATTER! Fast action can save your life. Calling 911 is almost always the fastest way to get lifesaving treatment. Emergency Medical Services staff can begin treatment when they arrive -- up to an hour sooner than if someone gets to the hospital by car. The discharge information has been reviewed with the patient. The patient verbalized understanding.     Discharge medications reviewed with the patient and spouse and appropriate educational materials and side effects teaching were provided. DISCHARGE SUMMARY from Nurse    The following personal items are in your possession at time of discharge:    Dental Appliances: None  Visual Aid: Glasses, With patient     Home Medications:  (states taken in ER)  Jewelry: Earrings, Bracelet, Ring  Clothing: Shirt, Shorts  Other Valuables: [employer], Colene Snellen, Purse             PATIENT INSTRUCTIONS:    After general anesthesia or intravenous sedation, for 24 hours or while taking prescription Narcotics:  · Limit your activities  · Do not drive and operate hazardous machinery  · Do not make important personal or business decisions  · Do  not drink alcoholic beverages  · If you have not urinated within 8 hours after discharge, please contact your surgeon on call. Report the following to your surgeon:  · Excessive pain, swelling, redness or odor of or around the surgical area  · Temperature over 100.5  · Nausea and vomiting lasting longer than 4 hours or if unable to take medications  · Any signs of decreased circulation or nerve impairment to extremity: change in color, persistent  numbness, tingling, coldness or increase pain  · Any questions        What to do at Home:  Recommended activity: as physician advised    If you experience any of the following symptoms listed under Warning Signs of a Heart Attack, Signs and Symptoms of a Stroke, and \"When to Call for Help\" listed under discharge teaching, please follow up with your primary care physician and/or call 911. *  Please give a list of your current medications to your Primary Care Provider. *  Please update this list whenever your medications are discontinued, doses are      changed, or new medications (including over-the-counter products) are added. *  Please carry medication information at all times in case of emergency situations.           These are general instructions for a healthy lifestyle:    No smoking/ No tobacco products/ Avoid exposure to second hand smoke    Surgeon General's Warning:  Quitting smoking now greatly reduces serious risk to your health. Obesity, smoking, and sedentary lifestyle greatly increases your risk for illness    A healthy diet, regular physical exercise & weight monitoring are important for maintaining a healthy lifestyle    You may be retaining fluid if you have a history of heart failure or if you experience any of the following symptoms:  Weight gain of 3 pounds or more overnight or 5 pounds in a week, increased swelling in our hands or feet or shortness of breath while lying flat in bed. Please call your doctor as soon as you notice any of these symptoms; do not wait until your next office visit. Recognize signs and symptoms of STROKE:    F-face looks uneven    A-arms unable to move or move unevenly    S-speech slurred or non-existent    T-time-call 911 as soon as signs and symptoms begin-DO NOT go       Back to bed or wait to see if you get better-TIME IS BRAIN. Warning Signs of HEART ATTACK     Call 911 if you have these symptoms:   Chest discomfort. Most heart attacks involve discomfort in the center of the chest that lasts more than a few minutes, or that goes away and comes back. It can feel like uncomfortable pressure, squeezing, fullness, or pain.  Discomfort in other areas of the upper body. Symptoms can include pain or discomfort in one or both arms, the back, neck, jaw, or stomach.  Shortness of breath with or without chest discomfort.  Other signs may include breaking out in a cold sweat, nausea, or lightheadedness. Don't wait more than five minutes to call 911 - MINUTES MATTER! Fast action can save your life. Calling 911 is almost always the fastest way to get lifesaving treatment. Emergency Medical Services staff can begin treatment when they arrive -- up to an hour sooner than if someone gets to the hospital by car.        The discharge information has been reviewed with the patient. The patient verbalized understanding. Discharge medications reviewed with the patient and appropriate educational materials and side effects teaching were provided. Learning About Acute Pancreatitis  What is acute pancreatitis? The pancreas is an organ behind the stomach. It makes hormones like insulin that help control how your body uses sugar. It also makes enzymes that help you digest food. Usually these enzymes flow from the pancreas to the intestines. But if they leak into the pancreas, they can irritate it and cause pain and swelling. When this happens suddenly, it's called acute pancreatitis. What causes it? Most of the time, acute pancreatitis is caused by gallstones or by heavy alcohol use. But several other things can cause it, such as:  · High levels of fats in the blood. · An injury. · A problem after a surgery or a procedure. · Certain medicines. What are the symptoms? The main symptom is pain in the upper belly. The pain can be severe. You also may have a fever, nausea, or vomiting. Some people get so sick that they have problems breathing. They also may have low blood pressure. How is it diagnosed? Your doctor will diagnose pancreatitis with an exam and by looking at your lab tests. Your doctor may think that you have this problem based on your symptoms and where you have pain in your belly. You may have blood tests of enzymes called amylase and lipase. In pancreatitis, the level of these enzymes is usually much higher than normal.  You also may have imaging tests of the belly. These may include an ultrasound, a CT scan, or an MRI. A special MRI called MRCP can show images of the bile ducts. This test can be very helpful when gallstones are causing the problem. How is it treated? Treatment of acute pancreatitis usually takes place in the hospital. It focuses on taking care of pain and supporting your body while your pancreas heals.  In severe cases, treatment may occur in an intensive care unit to support breathing, treat serious infections, or help raise very low blood pressure. If a gallstone is causing the problem, the gallstone may need to be removed. This is done during a procedure called ERCP. The doctor puts a scope in your mouth and moves it gently through the stomach and into the ducts from the pancreas and gallbladder. The doctor is then able to see a stone and remove it. Sometimes the gallbladder, which makes gallstones, needs to be removed by surgery. People with pancreatitis often need a lot of fluid to help support their other organs and their blood pressure. They get fluids through a vein (intravenous, or IV). Instead of food by mouth, nutrition is sometimes given through a vein while the pancreas heals. Follow-up care is a key part of your treatment and safety. Be sure to make and go to all appointments, and call your doctor if you are having problems. It's also a good idea to know your test results and keep a list of the medicines you take. Where can you learn more? Go to http://shannon-heriberto.info/. Enter C556 in the search box to learn more about \"Learning About Acute Pancreatitis. \"  Current as of: March 22, 2017  Content Version: 11.3  © 5620-9015 [employer]. Care instructions adapted under license by [employer] (which disclaims liability or warranty for this information). If you have questions about a medical condition or this instruction, always ask your healthcare professional. [place] [address] any warranty or liability for your use of this information. Pain Medicine Side Effects: Care Instructions  Your Care Instructions  When you go to a medical facility in pain, you may get a strong medicine to give you relief. The medicine may be given in a vein (by IV) or as an injection (shot). Examples of this type of pain medicine include fentanyl, hydromorphone, and morphine.  While these medicines help relieve pain, they also have side effects. For your safety, it's important that you know how this strong pain medicine affects you. Common side effects can include:  · Nausea or vomiting. · Feeling dizzy or lightheaded. · Feeling sleepy. The doctor has checked you carefully, but problems can develop later. If you notice any problems or new symptoms, get medical treatment right away. Follow-up care is a key part of your treatment and safety. Be sure to make and go to all appointments, and call your doctor if you are having problems. It's also a good idea to know your test results and keep a list of the medicines you take. How can you care for yourself at home? Activity  · Don't do anything for 24 hours that requires attention to detail. This medicine makes your mind foggy. It takes time for the effects to wear off completely. · Don't drive a car until you are sure the effects from the medicine are gone. Medicines  · Be safe with medicines. Read and follow all instructions on the label. ¨ If the doctor gave you a prescription medicine for pain, take it as prescribed. ¨ If you are not taking a prescription pain medicine, ask your doctor if you can take an over-the-counter medicine. Diet  · You can eat your normal diet, unless your doctor gives you other instructions. If your stomach is upset, try clear liquids and bland, low-fat foods like plain toast or rice. · Drink plenty of fluids (unless your doctor tells you not to). · Don't drink alcohol for 24 hours. When should you call for help? Call 911 anytime you think you may need emergency care. For example, call if:  · You have trouble breathing. · You passed out (lost consciousness). Call your doctor now or seek immediate medical care if:  · You have new or worse pain. Watch closely for changes in your health, and be sure to contact your doctor if:  · You do not get better as expected. Where can you learn more?   Go to http://shannon-heriberto.info/. Enter G910 in the search box to learn more about \"Pain Medicine Side Effects: Care Instructions. \"  Current as of: October 14, 2016  Content Version: 11.3  © 2787-3087 [employer]. Care instructions adapted under license by [employer] (which disclaims liability or warranty for this information). If you have questions about a medical condition or this instruction, always ask your healthcare professional. [address] any warranty or liability for your use of this information. Electrolyte Imbalance: Care Instructions  Your Care Instructions  Electrolytes are minerals in your blood. They include sodium, potassium, calcium, and magnesium. When they are not at the right levels, you can feel very ill. You may not know what is causing it, but you know something is wrong. You may feel weak or numb, have muscle spasms, or twitch. Your heart may beat fast. Symptoms are different with each mineral. Too much is as bad as too little. Minerals help keep your body working as it should. Vomiting, diarrhea, and fever can cause you to lose minerals. A problem with your kidneys can tip a mineral out of balance. So can taking certain medicines. Your doctor may do more tests. He or she may change your medicine and diet. If you are low in one or more minerals, they may be given through a tube into your vein (IV). Your doctor may have you take or drink special fluids or pills. If your kidneys are failing, your blood may be filtered. This is called dialysis. It can put the minerals back in balance. Follow-up care is a key part of your treatment and safety. Be sure to make and go to all appointments, and call your doctor if you are having problems. It's also a good idea to know your test results and keep a list of the medicines you take. How can you care for yourself at home? · Take your medicines exactly as prescribed.  Call your doctor if you have any problems with your medicines. You will get more details on the specific medicines your doctor prescribes. · Do not take any medicine without talking to your doctor first. This includes prescription, over-the-counter, and herbal medicines. · If you have kidney, heart, or liver disease and have to limit fluids, talk with your doctor before you increase the amount of fluids you drink. · Your doctor or dietitian may give you a diet plan to help balance your minerals. Follow the diet carefully. When should you call for help? Call 911 anytime you think you may need emergency care. For example, call if:  · You passed out (lost consciousness). · Your heart seems to be speeding up and then slowing down or skipping beats. Call your doctor now or seek immediate medical care if:  · You are very tired and have no energy. · You have trouble thinking or concentrating. Watch closely for changes in your health, and be sure to contact your doctor if:  · You want advice on what to do to keep your minerals in balance. · You do not get better as expected. Where can you learn more? Go to http://shannon-heriberto.info/. Enter E169 in the search box to learn more about \"Electrolyte Imbalance: Care Instructions. \"  Current as of: July 26, 2016  Content Version: 11.3  © 4566-9606 [employer]. Care instructions adapted under license by [employer] (which disclaims liability or warranty for this information). If you have questions about a medical condition or this instruction, always ask your healthcare professional. [place] [address] any warranty or liability for your use of this information. Hypokalemia: Care Instructions  Your Care Instructions  Hypokalemia (say \"[id number]-BLANCA-gonzalo-[id number]\") is a low level of potassium. The heart, muscles, kidneys, and nervous system all need potassium to work well. This problem has many different causes.  Kidney problems, diet, and medicines like diuretics and laxatives can cause it. So can vomiting or diarrhea. In some cases, cancer is the cause. Your doctor may do tests to find the cause of your low potassium levels. You may need medicines to bring your potassium levels back to normal. You may also need regular blood tests to check your potassium. If you have very low potassium, you may need intravenous (IV) medicines. You also may need tests to check the electrical activity of your heart. Heart problems caused by low potassium levels can be very serious. Follow-up care is a key part of your treatment and safety. Be sure to make and go to all appointments, and call your doctor if you are having problems. It's also a good idea to know your test results and keep a list of the medicines you take. How can you care for yourself at home? · If your doctor recommends it, eat foods that have a lot of potassium. These include fresh fruits, juices, and vegetables. They also include nuts, beans, and milk. · Be safe with medicines. If your doctor prescribes medicines or potassium supplements, take them exactly as directed. Call your doctor if you have any problems with your medicines. · Get your potassium levels tested as often as your doctor tells you. When should you call for help? Call 911 anytime you think you may need emergency care. For example, call if:  · You feel like your heart is missing beats. Heart problems caused by low potassium can cause death. · You passed out (lost consciousness). · You have a seizure. Call your doctor now or seek immediate medical care if:  · You feel weak or unusually tired. · You have severe arm or leg cramps. · You have tingling or numbness. · You feel sick to your stomach, or you vomit. · You have belly cramps. · You feel bloated or constipated. · You have to urinate a lot. · You feel very thirsty most of the time.   · You are dizzy or lightheaded, or you feel like you may faint.  · You feel depressed, or you lose touch with reality. Watch closely for changes in your health, and be sure to contact your doctor if:  · You do not get better as expected. Where can you learn more? Go to http://shannon-heriberto.info/. Enter G358 in the search box to learn more about \"Hypokalemia: Care Instructions. \"  Current as of: July 28, 2016  Content Version: 11.3  © 0755-3531 [employer]. Care instructions adapted under license by [employer] (which disclaims liability or warranty for this information). If you have questions about a medical condition or this instruction, always ask your healthcare professional. [address] any warranty or liability for your use of this information. Patient armband removed and shredded.

## 2017-09-16 NOTE — PROGRESS NOTES
Assumed pt's care while she was asleep, in no apparent distress. 2130: During shift assessment, pt have some piv complain issues, new piv placed and prn pain med administered. Pt request for Ativan, I explained to her that ativan administration is based on CIWA protocol grading scale as well as physical assessment. No signs of distress/withdrawal noted at this moment and CIWA score is 6.

## 2017-09-16 NOTE — PROGRESS NOTES
2240: Bedside verbal shift report received from [facility]. Pt is awake in bed, no signs of distress, instructed to press call light if assistance is needed, call light within reach. 0400: pt reports nausea, paged Dr. Asael oMrris, received an order for zofran 4mg IV Q4 PRN    0700: Pt has scheduled Wellbutrin scheduled at this time. Pt states that per her psychiatrist, she is not to take this medicine. Pt refused this medicine. 5011: Bedside and Verbal shift change report given to Juan J Rose RN (oncoming nurse) by Araseli Vallejo RN (offgoing nurse). Report included the following information SBAR, Kardex, Intake/Output, MAR and Recent Results.

## 2017-09-16 NOTE — PROGRESS NOTES
0791 Received report from off going RN. Patient sitting up in bed with respirations even and nonlabored. On telemetry Box #36. Callbell within reach. 0935 Up ambulating in room. 1310 IV lock and telemetry discontinued. Discharge instructions given with questions answered. Prescription given. Waiting for  for pickup. [phone] Patient discharged home with belongings.

## 2017-09-16 NOTE — PROGRESS NOTES
Problem: Falls - Risk of  Goal: *Absence of Falls  Document Thelma Fall Risk and appropriate interventions in the flowsheet.    Outcome: Progressing Towards Goal  Fall Risk Interventions:  Mobility Interventions: Patient to call before getting OOB           Medication Interventions: Patient to call before getting OOB, Teach patient to arise slowly

## 2017-09-16 NOTE — DISCHARGE SUMMARY
2 [facility]  Hospitalist Division    Discharge Summary      Patient: Reza Morton MRN: [id number]  CSN: [id number]    YOB: 1965  Age: 47 y.o. Sex: female    DOA: 9/13/2017 LOS:  LOS: 2 days   Discharge Date: 09/16/17     PCP:  Michael Quigley MD    Chief Complaint:    Chief Complaint   Patient presents with    Dizziness     Encephalopathy    Admission Diagnosis:   Hospital Problems as of 9/16/2017  Date Reviewed: 2/2/2016          Codes Class Noted - Resolved POA    Dizziness ICD-10-CM: R42  ICD-9-CM: 780.4  9/14/2017 - Present Unknown        Pancreatitis ICD-10-CM: K85.90  ICD-9-CM: 882.6  9/14/2017 - Present Unknown        Hypokalemia ICD-10-CM: E87.6  ICD-9-CM: 276.8  9/14/2017 - Present Unknown        Anemia ICD-10-CM: D64.9  ICD-9-CM: 285.9  9/14/2017 - Present Unknown        * (Principal)Encephalopathy ICD-10-CM: G93.40  ICD-9-CM: 348.30  9/14/2017 - Present Unknown        Elevated LFTs ICD-10-CM: R94.5  ICD-9-CM: 790.6  1/28/2016 - Present Unknown              Discharge Diagnoses:    Encephalopathy/AMS - likely 2/2 polypharmacy. Per tele neurology d/c Depakote and hold Topamax. resolved 9/14. EEGx1      Dizziness - No vertigo. CT head negative. PT/OT eval. Fall precaution. MRI x1. Resolved. Suspect anxiety/drug associated. resolved      Acute on chronic Pancreatitis - alcohol induced. CT abd 9/12 with confirmation. On clears. Monitor pain. Already on pancreatic enzymes. Long history of ETOH induced pancreatitis. RUQ US no stone obstruction.       Alcoholic hepatitis - monitor LFTs. T bili only 0.4 AST /153     Hypokalemia - repleted      Anemia - Macrocytic. B12, Folate wnl. Likely ETOH and liver disease      Copd - Home O2, not needed while in hospital      Smoker - Transdermal nicotine patch / 24 hr. Advise smoking cessation      Bipolar disorder - d/c Depakote per tele neurology. Now on Seroquel       Alcohol abuse - CIWA protocol .  High risk for withdrawal. Stop dilaudid due to drug seeking behavoir      Depression/ Anxiety -BDZ prn on CIWA -high risk for misuse will stop on discharge     HTN - clonidine    Hospital Course:   47 y. o. female who presents with dizziness onset 2 days ago. She denies vertigo. She however claims to have left sides facial numbness. She had gait abnormality due to the dizziness and had to support he self by holding objects while walking. No visual change, no speech change , no focal weakness. In the ED she was seen by tele neurology. CT head done. No acute change. MRI completed with no acute findings. She is a known alcoholic. Her last drink was 2 days prior to admission. Patient has elevated LFT's suggestive of alcoholic hepatitis, fatty changes and disease seen already on RU US. She has a history of acute on chronic pancreatitis from alcohol abuse and is already on pancreatic enzymes. She was given information for alcohol rehabilitation last admission and again this admission. She was tolerating a regular diet on discharge with mild epigastric pain. Significant Diagnostic Studies:  CT head  CT abd  MRI head  EEG-not read  RU US    Consults:  Treatment Team: Attending Provider: Skyler Ramirez DO; Consulting Provider: Greg Young MD; Care Manager: David Rose; Utilization Review: Miles Valenzuela; Occupational Therapist: Krystyna Jung OT; Care Manager: Amber Lagos RN; Physical Therapy Assistant: Harshad Chadwick PTA    Operative Procedures:  N/A    Disposition:  Home    Diet:  Gastric bland. Discharge Condition:   Good    Discharge Medications:    Current Discharge Medication List      CONTINUE these medications which have CHANGED    Details   oxyCODONE IR (ROXICODONE) 5 mg immediate release tablet Take 2 Tabs by mouth every six (6) hours as needed for Pain.  Max Daily Amount: 40 mg.  Qty: 20 Tab, Refills: 0         CONTINUE these medications which have NOT CHANGED    Details ondansetron (ZOFRAN ODT) 8 mg disintegrating tablet Take 1 Tab by mouth every eight (8) hours as needed for Nausea. Qty: 6 Tab, Refills: 0      ibuprofen (MOTRIN) 600 mg tablet Take 1 Tab by mouth every six (6) hours as needed for Pain. Qty: 20 Tab, Refills: 0      lipase-protease-amylase (ZENPEP 10,000) capsule Take 1 Cap by mouth three (3) times daily (with meals). Qty: 200 Cap, Refills: 5      diazepam (VALIUM) 5 mg tablet Take 0.5 Tabs by mouth every eight (8) hours as needed for Anxiety. Max Daily Amount: 7.5 mg.  Qty: 20 Tab, Refills: 0      silver sulfADIAZINE (SILVADENE) 1 % topical cream Apply  to affected area two (2) times a day. Qty: 50 g, Refills: 0      Thiamine Mononitrate (B-1) 100 mg tablet Take 1 Tab by mouth daily. Qty: 7 Tab, Refills: 0      albuterol (PROVENTIL VENTOLIN) 2.5 mg /3 mL (0.083 %) nebulizer solution 1.5 mL by Nebulization route every four (4) hours as needed for Wheezing or Shortness of Breath. Qty: 24 Each, Refills: 0      Nebulizer & Compressor machine 1 Each by Does Not Apply route every six (6) hours as needed. Qty: 1 Each, Refills: 0      cloNIDine HCl (CATAPRES) 0.2 mg tablet Take 0.5 Tabs by mouth two (2) times a day. Qty: 30 Tab, Refills: 0      QUETIAPINE FUMARATE (QUETIAPINE PO) Take 3 Tabs by mouth daily. Patient states she takes 3 tablets of the 200 mg strength to equal 600 mg total dose once a day      ipratropium-albuterol (COMBIVENT)  mcg/actuation inhaler Take 2 Puffs by inhalation every six (6) hours. gabapentin (NEURONTIN) 600 mg tablet Take 600 mg by mouth four (4) times daily. buPROPion XL (WELLBUTRIN XL) 300 mg XL tablet Take 300 mg by mouth every morning. topiramate (TOPAMAX) 200 mg tablet Take 100 mg by mouth daily.          STOP taking these medications       predniSONE (DELTASONE) 10 mg tablet Comments:   Reason for Stopping:         azithromycin (ZITHROMAX Z-RICHA) 250 mg tablet Comments:   Reason for Stopping: Recent Results (from the past 24 hour(s))   CBC WITH AUTOMATED DIFF    Collection Time: 09/16/17  5:05 AM   Result Value Ref Range    WBC 3.9 (L) 4.6 - 13.2 K/uL    RBC 3.17 (L) 4.20 - 5.30 M/uL    HGB 10.8 (L) 12.0 - 16.0 g/dL    HCT 33.4 (L) 35.0 - 45.0 %    .4 (H) 74.0 - 97.0 FL    MCH 34.1 (H) 24.0 - 34.0 PG    MCHC 32.3 31.0 - 37.0 g/dL    RDW 16.8 (H) 11.6 - 14.5 %    PLATELET 89 (L) 816 - 420 K/uL    MPV 10.7 9.2 - 11.8 FL    NEUTROPHILS 42 40 - 73 %    LYMPHOCYTES 43 21 - 52 %    MONOCYTES 13 (H) 3 - 10 %    EOSINOPHILS 2 0 - 5 %    BASOPHILS 0 0 - 2 %    ABS. NEUTROPHILS 1.7 (L) 1.8 - 8.0 K/UL    ABS. LYMPHOCYTES 1.7 0.9 - 3.6 K/UL    ABS. MONOCYTES 0.5 0.05 - 1.2 K/UL    ABS. EOSINOPHILS 0.1 0.0 - 0.4 K/UL    ABS. BASOPHILS 0.0 0.0 - 0.06 K/UL    DF AUTOMATED     METABOLIC PANEL, BASIC    Collection Time: 09/16/17  5:05 AM   Result Value Ref Range    Sodium 142 136 - 145 mmol/L    Potassium 4.2 3.5 - 5.5 mmol/L    Chloride 109 (H) 100 - 108 mmol/L    CO2 24 21 - 32 mmol/L    Anion gap 9 3.0 - 18 mmol/L    Glucose 121 (H) 74 - 99 mg/dL    BUN 3 (L) 7.0 - 18 MG/DL    Creatinine 0.40 (L) 0.6 - 1.3 MG/DL    BUN/Creatinine ratio 8 (L) 12 - 20      GFR est AA >60 >60 ml/min/1.73m2    GFR est non-AA >60 >60 ml/min/1.73m2    Calcium 7.8 (L) 8.5 - 10.1 MG/DL       Follow-Up And Discharge Instructions:    Follow-up Information     Follow up With Details Comments [address] [facility] Mallika Nolan MD On 9/18/2017 1pm  [address]      Laila Thapa MD  GI for chronic pancreatitis [address] [facility]              Wound Care/Supplies:   N/A      [facility]  Hospitalist Division        Time Spent:  35min    Cc: Vanesa Swan MD

## 2017-10-04 NOTE — ANCILLARY DISCHARGE INSTRUCTIONS
[facility]  Discharge Phone Call       After-Care Discharge Phone Call Questions: no answer     Were you able to get your prescriptions filled? Comment:      [] Yes  []No    Comment if answer is \"No\"   Are you taking your medication(s) as your doctor ordered? Do you understand the purpose of your medications? Comment:    [] Yes  []No    Comment if answer is \"No\"   Are you taking any other medications that are not on the list?  Comment:      [] Yes  []No    Comment if answer is \"Yes\"   Do you have any questions about your medications? Are you aware of potential side effects? Comment:    [] Yes  []No    Comment if answer is \"Yes\"   Did you make your follow-up appointments (if the hospital did not do this before  discharge)? Comment:    [] Yes  []No    Comment if answer is \"No\"   Is there any reason you might not be able to keep your follow-up appointments? Comment:     [] Yes  []No    Comment if answer is \"Yes\"   Do you have any questions about your care plan? Are you aware of what health problems to be alert for? Comment:    [] Yes  []No    Comment if answer is \"Yes\"   Do you have a good understanding of how you should manage your health? Comment:    [] Yes  []No    Comment if answer is \"Yes\"   Do you know which symptoms to watch for that would mean you would need to call your doctor right away? Comment:      [] Yes  []No    Comment if answer is \"No\"   Do you have any questions about the follow up process or any instructions that we have provided? Comment:    [] Yes  []No    Comment if answer is \"Yes\"   Did staff take your preferences into account?         [] Yes  []No    Comment if answer is \"Yes\"

## 2018-02-09 ENCOUNTER — HOSPITAL ENCOUNTER (EMERGENCY)
Age: 53
Discharge: HOME | End: 2018-02-09
Attending: EMERGENCY MEDICINE
Payer: MEDICARE

## 2018-02-09 ENCOUNTER — APPOINTMENT (OUTPATIENT)
Dept: GENERAL RADIOLOGY | Age: 53
End: 2018-02-09
Attending: PHYSICIAN ASSISTANT
Payer: MEDICARE

## 2018-02-09 VITALS
TEMPERATURE: 98.3 F | SYSTOLIC BLOOD PRESSURE: 115 MMHG | DIASTOLIC BLOOD PRESSURE: 81 MMHG | RESPIRATION RATE: 18 BRPM | OXYGEN SATURATION: 100 % | HEART RATE: 100 BPM

## 2018-02-09 DIAGNOSIS — F10.920 ALCOHOLIC INTOXICATION WITHOUT COMPLICATION (HCC): Primary | ICD-10-CM

## 2018-02-09 DIAGNOSIS — R07.9 CHEST PAIN, UNSPECIFIED TYPE: ICD-10-CM

## 2018-02-09 LAB
AMPHET UR QL SCN: NEGATIVE
ANION GAP SERPL CALC-SCNC: 25 MMOL/L (ref 3–18)
APPEARANCE UR: ABNORMAL
BACTERIA URNS QL MICRO: ABNORMAL /HPF
BARBITURATES UR QL SCN: NEGATIVE
BASOPHILS # BLD: 0.1 K/UL (ref 0–0.06)
BASOPHILS NFR BLD: 1 % (ref 0–2)
BENZODIAZ UR QL: NEGATIVE
BILIRUB UR QL: NEGATIVE
BNP SERPL-MCNC: 102 PG/ML (ref 0–900)
BUN SERPL-MCNC: 26 MG/DL (ref 7–18)
BUN/CREAT SERPL: 27 (ref 12–20)
CALCIUM SERPL-MCNC: 8.4 MG/DL (ref 8.5–10.1)
CANNABINOIDS UR QL SCN: POSITIVE
CHLORIDE SERPL-SCNC: 102 MMOL/L (ref 100–108)
CK MB CFR SERPL CALC: 1.5 % (ref 0–4)
CK MB SERPL-MCNC: 1.9 NG/ML (ref 5–25)
CK SERPL-CCNC: 131 U/L (ref 26–192)
CO2 SERPL-SCNC: 14 MMOL/L (ref 21–32)
COCAINE UR QL SCN: NEGATIVE
COLOR UR: YELLOW
CREAT SERPL-MCNC: 0.96 MG/DL (ref 0.6–1.3)
DIFFERENTIAL METHOD BLD: ABNORMAL
EOSINOPHIL # BLD: 0 K/UL (ref 0–0.4)
EOSINOPHIL NFR BLD: 0 % (ref 0–5)
EPITH CASTS URNS QL MICRO: ABNORMAL /LPF (ref 0–5)
ERYTHROCYTE [DISTWIDTH] IN BLOOD BY AUTOMATED COUNT: 15.5 % (ref 11.6–14.5)
ETHANOL SERPL-MCNC: 248 MG/DL (ref 0–3)
GLUCOSE BLD STRIP.AUTO-MCNC: 180 MG/DL (ref 70–110)
GLUCOSE SERPL-MCNC: 31 MG/DL (ref 74–99)
GLUCOSE UR STRIP.AUTO-MCNC: NEGATIVE MG/DL
HCT VFR BLD AUTO: 40.8 % (ref 35–45)
HDSCOM,HDSCOM: ABNORMAL
HGB BLD-MCNC: 13.5 G/DL (ref 12–16)
HGB UR QL STRIP: ABNORMAL
KETONES UR QL STRIP.AUTO: ABNORMAL MG/DL
LEUKOCYTE ESTERASE UR QL STRIP.AUTO: NEGATIVE
LYMPHOCYTES # BLD: 4.6 K/UL (ref 0.9–3.6)
LYMPHOCYTES NFR BLD: 54 % (ref 21–52)
MCH RBC QN AUTO: 33.5 PG (ref 24–34)
MCHC RBC AUTO-ENTMCNC: 33.1 G/DL (ref 31–37)
MCV RBC AUTO: 101.2 FL (ref 74–97)
METHADONE UR QL: NEGATIVE
MONOCYTES # BLD: 0.3 K/UL (ref 0.05–1.2)
MONOCYTES NFR BLD: 4 % (ref 3–10)
NEUTS SEG # BLD: 3.5 K/UL (ref 1.8–8)
NEUTS SEG NFR BLD: 41 % (ref 40–73)
NITRITE UR QL STRIP.AUTO: NEGATIVE
OPIATES UR QL: NEGATIVE
PCP UR QL: NEGATIVE
PH UR STRIP: 5 [PH] (ref 5–8)
PLATELET # BLD AUTO: 271 K/UL (ref 135–420)
PMV BLD AUTO: 10.3 FL (ref 9.2–11.8)
POTASSIUM SERPL-SCNC: 4.1 MMOL/L (ref 3.5–5.5)
PROT UR STRIP-MCNC: ABNORMAL MG/DL
RBC # BLD AUTO: 4.03 M/UL (ref 4.2–5.3)
RBC #/AREA URNS HPF: NEGATIVE /HPF (ref 0–5)
SODIUM SERPL-SCNC: 141 MMOL/L (ref 136–145)
SP GR UR REFRACTOMETRY: 1.02 (ref 1–1.03)
TROPONIN I SERPL-MCNC: <0.02 NG/ML (ref 0–0.04)
UROBILINOGEN UR QL STRIP.AUTO: 0.2 EU/DL (ref 0.2–1)
WBC # BLD AUTO: 8.5 K/UL (ref 4.6–13.2)
WBC URNS QL MICRO: ABNORMAL /HPF (ref 0–4)

## 2018-02-09 PROCEDURE — 96361 HYDRATE IV INFUSION ADD-ON: CPT

## 2018-02-09 PROCEDURE — 74011250636 HC RX REV CODE- 250/636: Performed by: PHYSICIAN ASSISTANT

## 2018-02-09 PROCEDURE — 81001 URINALYSIS AUTO W/SCOPE: CPT

## 2018-02-09 PROCEDURE — 82553 CREATINE MB FRACTION: CPT

## 2018-02-09 PROCEDURE — 96374 THER/PROPH/DIAG INJ IV PUSH: CPT

## 2018-02-09 PROCEDURE — 85025 COMPLETE CBC W/AUTO DIFF WBC: CPT

## 2018-02-09 PROCEDURE — 71045 X-RAY EXAM CHEST 1 VIEW: CPT

## 2018-02-09 PROCEDURE — 80307 DRUG TEST PRSMV CHEM ANLYZR: CPT

## 2018-02-09 PROCEDURE — 83880 ASSAY OF NATRIURETIC PEPTIDE: CPT

## 2018-02-09 PROCEDURE — 74011250637 HC RX REV CODE- 250/637: Performed by: PHYSICIAN ASSISTANT

## 2018-02-09 PROCEDURE — 82962 GLUCOSE BLOOD TEST: CPT

## 2018-02-09 PROCEDURE — 99284 EMERGENCY DEPT VISIT MOD MDM: CPT

## 2018-02-09 PROCEDURE — 93005 ELECTROCARDIOGRAM TRACING: CPT

## 2018-02-09 PROCEDURE — 96375 TX/PRO/DX INJ NEW DRUG ADDON: CPT

## 2018-02-09 PROCEDURE — 74011000250 HC RX REV CODE- 250: Performed by: EMERGENCY MEDICINE

## 2018-02-09 PROCEDURE — 80048 BASIC METABOLIC PNL TOTAL CA: CPT

## 2018-02-09 RX ORDER — DEXTROSE 50 % IN WATER (D50W) INTRAVENOUS SYRINGE
25
Status: COMPLETED | OUTPATIENT
Start: 2018-02-09 | End: 2018-02-09

## 2018-02-09 RX ORDER — ONDANSETRON 2 MG/ML
4 INJECTION INTRAMUSCULAR; INTRAVENOUS
Status: COMPLETED | OUTPATIENT
Start: 2018-02-09 | End: 2018-02-09

## 2018-02-09 RX ORDER — GUAIFENESIN 100 MG/5ML
324 LIQUID (ML) ORAL
Status: COMPLETED | OUTPATIENT
Start: 2018-02-09 | End: 2018-02-09

## 2018-02-09 RX ADMIN — DEXTROSE MONOHYDRATE 25 G: 25 INJECTION, SOLUTION INTRAVENOUS at 20:42

## 2018-02-09 RX ADMIN — ONDANSETRON 4 MG: 2 INJECTION INTRAMUSCULAR; INTRAVENOUS at 20:29

## 2018-02-09 RX ADMIN — SODIUM CHLORIDE 1000 ML: 900 INJECTION, SOLUTION INTRAVENOUS at 20:25

## 2018-02-09 RX ADMIN — ASPIRIN 81 MG 324 MG: 81 TABLET ORAL at 20:29

## 2018-02-10 LAB
ATRIAL RATE: 98 BPM
CALCULATED P AXIS, ECG09: 64 DEGREES
CALCULATED R AXIS, ECG10: 82 DEGREES
CALCULATED T AXIS, ECG11: 75 DEGREES
DIAGNOSIS, 93000: NORMAL
P-R INTERVAL, ECG05: 140 MS
Q-T INTERVAL, ECG07: 380 MS
QRS DURATION, ECG06: 84 MS
QTC CALCULATION (BEZET), ECG08: 485 MS
VENTRICULAR RATE, ECG03: 98 BPM

## 2018-02-10 NOTE — ED NOTES
I have reviewed discharge instructions with the patient. The patient verbalized understanding. Patient armband removed and shredded. Patient discharged ambulatory to lobby with mother at the bedside to drive home.

## 2018-02-10 NOTE — ED PROVIDER NOTES
HPI Comments: Patient is a 46 y/o female w/ PMH HTN, Bipolar D/O, Gout, Migraines, Asthma, COPD, Depression, ETOH abuse, who presents to the ER via EMS c/o chest pain. Patient states her symptoms began about 3-4 hours ago while she was laying down. Patient is unable to rate her pain and states it just hurts. She reports associated nausea, but no vomiting. She denied any ETOH tonight, but has a strong smell about her. She denied any fevers, chills, SOB, abd pain, vomiting, dysuria, palpitations and has no other complaints. Patient is a 47 y.o. female presenting with chest pain. The history is provided by the patient. Chest Pain (Angina)    Pertinent negatives include no abdominal pain, no cough, no dizziness, no fever, no headaches, no nausea, no palpitations, no shortness of breath, no vomiting and no weakness. Past Medical History:   Diagnosis Date    Alcohol abuse     Anxiety     Asthma     Bipolar disorder ([address]     Common migraine     [facility]     Depression     Esophageal reflux     Gout     Hypertension     Inverted nipple     Left breast always have. Past Surgical History:   Procedure Laterality Date    HX CARPAL TUNNEL RELEASE      HX GYN      tubal ligation    HX HEENT      HX LUMBAR LAMINECTOMY      HX ORTHOPAEDIC           Family History:   Problem Relation Age of Onset    Family history unknown: Yes       Social History     Social History    Marital status:      Spouse name: N/A    Number of children: N/A    Years of education: N/A     Occupational History    Not on file.      Social History Main Topics    Smoking status: Current Every Day Smoker     Packs/day: 1.00    Smokeless tobacco: Never Used    Alcohol use Yes      Comment: daily, liquor    Drug use: No    Sexual activity: Not on file     Other Topics Concern    Not on file     Social History Narrative         ALLERGIES: Lithium; Elavil; and Morphine    Review of Systems   Constitutional: Negative for chills, fatigue and fever. HENT: Negative. Negative for sore throat. Eyes: Negative. Respiratory: Negative for cough and shortness of breath. Cardiovascular: Positive for chest pain. Negative for palpitations. Gastrointestinal: Negative for abdominal pain, nausea and vomiting. Genitourinary: Negative for dysuria. Musculoskeletal: Negative. Skin: Negative. Neurological: Negative for dizziness, weakness, light-headedness and headaches. Psychiatric/Behavioral: Negative. All other systems reviewed and are negative. Vitals:    02/09/18 2047 02/09/18 2058 02/09/18 2145   BP:   94/53   Pulse: 100     Resp: 18     Temp:  98.3 °F (36.8 °C)    SpO2: 100%              Physical Exam   Constitutional: She is oriented to person, place, and time. She appears well-developed and well-nourished. She appears distressed. Strong smell of ETOH about herself   HENT:   Head: Normocephalic and atraumatic. Mouth/Throat: Oropharynx is clear and moist.   Eyes: Conjunctivae are normal. No scleral icterus. Neck: Neck supple. No JVD present. No tracheal deviation present. Cardiovascular: Normal rate, regular rhythm and normal heart sounds. Pulmonary/Chest: Effort normal and breath sounds normal. No respiratory distress. She has no wheezes. She has no rales. She exhibits tenderness. Left chest wall tenderness on exam   Abdominal: Soft. Bowel sounds are normal. She exhibits no distension. There is no tenderness. There is no rebound and no guarding. Musculoskeletal: Normal range of motion. Neurological: She is alert and oriented to person, place, and time. She has normal strength. Gait normal. GCS eye subscore is 4. GCS verbal subscore is 5. GCS motor subscore is 6. Skin: Skin is warm and dry. She is not diaphoretic. Psychiatric: She has a normal mood and affect. Nursing note and vitals reviewed.        MDM  Number of Diagnoses or Management Options  Alcoholic intoxication without complication [facility]):   Chest pain, unspecified type:   Diagnosis management comments: 9:15 PM  46 y/o female brought in by EMS c/o chest pain x 3-4 hours. Strong suspicion for ETOH abuse tonight. EKG NSR with peaked T waves. Will plan on labs, fluid bolus and will reeval.  Per RN, critical lab value of glucose of 31. Pt given D50 and will reeval.  Keren Loomis PA-C    9:30 PM  Pt ripped out IV, stating she wants to put on her jacket. No cooperating with anyone at this time. Will reassess glucose. Noted to be 180. Pt with ETOH of 248. Keren Loomis PA-C    Patient refusing any further labs or blood work. Pt does not want any more IV's placed and states she has no pain. 10:29 PM  \"I informed the pt that she needed repeat troponin/labs for adequate evaluation for her chest pain and that by refusing the above, she is at risk for worsening pain, shortness of breath, heart attack, any including death. She is awake, alert, and she understands her condition and the risks involved in leaving. She is clinically aware of her surroundings and able to ask appropriate questions, the patient and the nurse present confirms she is not clinically intoxicated and able to make medical decisions. She verbalized knowing the risks and understood it was recommended that she stay and could also return at any time. She was provided with warnings regarding worsening of her condition and were provided instructions to follow up with Mehrdad Alvarez MD on Monday or return to the Emergency Room as soon as possible. This discussion was witnessed by nurse Rae Kathleen.       Clinical Impression:  ETOH intoxication, chest pain         Amount and/or Complexity of Data Reviewed  Clinical lab tests: ordered and reviewed  Tests in the radiology section of CPT®: ordered and reviewed    Risk of Complications, Morbidity, and/or Mortality  Presenting problems: moderate  Diagnostic procedures: moderate  Management options: moderate    Patient Progress  Patient progress: stable        ED Course       Procedures           Vitals:  Patient Vitals for the past 12 hrs:   Temp Pulse Resp BP SpO2   02/09/18 2145 - - - 94/53 -   02/09/18 2058 98.3 °F (36.8 °C) - - - -   02/09/18 2047 - 100 18 - 100 %         Medications ordered:   Medications   ondansetron (ZOFRAN) injection 4 mg (4 mg IntraVENous Given 2/9/18 2029)   sodium chloride 0.9 % bolus infusion 1,000 mL (1,000 mL IntraVENous New Bag 2/9/18 2025)   aspirin chewable tablet 324 mg (324 mg Oral Given 2/9/18 2029)   dextrose (D50W) injection syrg 25 g (25 g IntraVENous Given 2/9/18 2042)         Lab findings:  Recent Results (from the past 12 hour(s))   CBC WITH AUTOMATED DIFF    Collection Time: 02/09/18  8:00 PM   Result Value Ref Range    WBC 8.5 4.6 - 13.2 K/uL    RBC 4.03 (L) 4.20 - 5.30 M/uL    HGB 13.5 12.0 - 16.0 g/dL    HCT 40.8 35.0 - 45.0 %    .2 (H) 74.0 - 97.0 FL    MCH 33.5 24.0 - 34.0 PG    MCHC 33.1 31.0 - 37.0 g/dL    RDW 15.5 (H) 11.6 - 14.5 %    PLATELET 023 156 - 181 K/uL    MPV 10.3 9.2 - 11.8 FL    NEUTROPHILS 41 40 - 73 %    LYMPHOCYTES 54 (H) 21 - 52 %    MONOCYTES 4 3 - 10 %    EOSINOPHILS 0 0 - 5 %    BASOPHILS 1 0 - 2 %    ABS. NEUTROPHILS 3.5 1.8 - 8.0 K/UL    ABS. LYMPHOCYTES 4.6 (H) 0.9 - 3.6 K/UL    ABS. MONOCYTES 0.3 0.05 - 1.2 K/UL    ABS. EOSINOPHILS 0.0 0.0 - 0.4 K/UL    ABS.  BASOPHILS 0.1 (H) 0.0 - 0.06 K/UL    DF AUTOMATED     METABOLIC PANEL, BASIC    Collection Time: 02/09/18  8:00 PM   Result Value Ref Range    Sodium 141 136 - 145 mmol/L    Potassium 4.1 3.5 - 5.5 mmol/L    Chloride 102 100 - 108 mmol/L    CO2 14 (L) 21 - 32 mmol/L    Anion gap 25 (H) 3.0 - 18 mmol/L    Glucose 31 (LL) 74 - 99 mg/dL    BUN 26 (H) 7.0 - 18 MG/DL    Creatinine 0.96 0.6 - 1.3 MG/DL    BUN/Creatinine ratio 27 (H) 12 - 20      GFR est AA >60 >60 ml/min/1.73m2    GFR est non-AA >60 >60 ml/min/1.73m2    Calcium 8.4 (L) 8.5 - 10.1 MG/DL   CARDIAC PANEL,(CK, CKMB & TROPONIN)    Collection Time: 02/09/18  8:00 PM   Result Value Ref Range     26 - 192 U/L    CK - MB 1.9 <3.6 ng/ml    CK-MB Index 1.5 0.0 - 4.0 %    Troponin-I, Qt. <0.02 0.0 - 0.045 NG/ML   ETHYL ALCOHOL    Collection Time: 02/09/18  8:00 PM   Result Value Ref Range    ALCOHOL(ETHYL),SERUM 248 (H) 0 - 3 MG/DL   NT-PRO BNP    Collection Time: 02/09/18  8:00 PM   Result Value Ref Range    NT pro- 0 - 900 PG/ML   DRUG SCREEN, URINE    Collection Time: 02/09/18  8:05 PM   Result Value Ref Range    BENZODIAZEPINES NEGATIVE  NEG      BARBITURATES NEGATIVE  NEG      THC (TH-CANNABINOL) POSITIVE (A) NEG      OPIATES NEGATIVE  NEG      PCP(PHENCYCLIDINE) NEGATIVE  NEG      COCAINE NEGATIVE  NEG      AMPHETAMINES NEGATIVE  NEG      METHADONE NEGATIVE  NEG      HDSCOM (NOTE)    URINALYSIS W/ RFLX MICROSCOPIC    Collection Time: 02/09/18  8:05 PM   Result Value Ref Range    Color YELLOW      Appearance CLOUDY      Specific gravity 1.021 1.005 - 1.030      pH (UA) 5.0 5.0 - 8.0      Protein TRACE (A) NEG mg/dL    Glucose NEGATIVE  NEG mg/dL    Ketone TRACE (A) NEG mg/dL    Bilirubin NEGATIVE  NEG      Blood TRACE (A) NEG      Urobilinogen 0.2 0.2 - 1.0 EU/dL    Nitrites NEGATIVE  NEG      Leukocyte Esterase NEGATIVE  NEG     URINE MICROSCOPIC ONLY    Collection Time: 02/09/18  8:05 PM   Result Value Ref Range    WBC 4 to 10 0 - 4 /hpf    RBC NEGATIVE  0 - 5 /hpf    Epithelial cells 2+ 0 - 5 /lpf    Bacteria 4+ (A) NEG /hpf   EKG, 12 LEAD, INITIAL    Collection Time: 02/09/18  8:20 PM   Result Value Ref Range    Ventricular Rate 98 BPM    Atrial Rate 98 BPM    P-R Interval 140 ms    QRS Duration 84 ms    Q-T Interval 380 ms    QTC Calculation (Bezet) 485 ms    Calculated P Axis 64 degrees    Calculated R Axis 82 degrees    Calculated T Axis 75 degrees    Diagnosis       Normal sinus rhythm  Possible Left atrial enlargement  Prolonged QT  Abnormal ECG  When compared with ECG of 14-SEP-2017 01:19,  No significant change was found     GLUCOSE, POC    Collection Time: 02/09/18  9:30 PM   Result Value Ref Range    Glucose (POC) 180 (H) 70 - 110 mg/dL       EKG interpretation by ED Physician:  NSR rate 98 w/ peaked T waves; no STEMI    X-Ray, CT or other radiology findings or impressions:  XR CHEST PORT    (Results Pending)       Progress notes, Consult notes or additional Procedure notes:       Reevaluation of patient:       Disposition:  Diagnosis:   1. Alcoholic intoxication without complication ([address]    2. Chest pain, unspecified type        Disposition: Discharged    Follow-up Information     Follow up With Details Comments Contact Info    [facility] EMERGENCY DEPT  If symptoms worsen [address] [place] [address] 51    Levell Level., MD Call in 3 days As needed for ER follow up [address] [place] [address]  [phone]             Patient's Medications   Start Taking    No medications on file   Continue Taking    ALBUTEROL (PROVENTIL VENTOLIN) 2.5 MG /3 ML (0.083 %) NEBULIZER SOLUTION    1.5 mL by Nebulization route every four (4) hours as needed for Wheezing or Shortness of Breath. BUPROPION XL (WELLBUTRIN XL) 300 MG XL TABLET    Take 300 mg by mouth every morning. CLONIDINE HCL (CATAPRES) 0.2 MG TABLET    Take 0.5 Tabs by mouth two (2) times a day. DIAZEPAM (VALIUM) 5 MG TABLET    Take 0.5 Tabs by mouth every eight (8) hours as needed for Anxiety. Max Daily Amount: 7.5 mg. GABAPENTIN (NEURONTIN) 600 MG TABLET    Take 600 mg by mouth four (4) times daily. IBUPROFEN (MOTRIN) 600 MG TABLET    Take 1 Tab by mouth every six (6) hours as needed for Pain. IPRATROPIUM-ALBUTEROL (COMBIVENT)  MCG/ACTUATION INHALER    Take 2 Puffs by inhalation every six (6) hours. LIPASE-PROTEASE-AMYLASE (ZENPEP 10,000) CAPSULE    Take 1 Cap by mouth three (3) times daily (with meals).     NEBULIZER & COMPRESSOR MACHINE    1 Each by Does Not Apply route every six (6) hours as needed. ONDANSETRON (ZOFRAN ODT) 8 MG DISINTEGRATING TABLET    Take 1 Tab by mouth every eight (8) hours as needed for Nausea. OXYCODONE IR (ROXICODONE) 5 MG IMMEDIATE RELEASE TABLET    Take 2 Tabs by mouth every six (6) hours as needed for Pain. Max Daily Amount: 40 mg. QUETIAPINE FUMARATE (QUETIAPINE PO)    Take 3 Tabs by mouth daily. Patient states she takes 3 tablets of the 200 mg strength to equal 600 mg total dose once a day    SILVER SULFADIAZINE (SILVADENE) 1 % TOPICAL CREAM    Apply  to affected area two (2) times a day. THIAMINE MONONITRATE (B-1) 100 MG TABLET    Take 1 Tab by mouth daily. TOPIRAMATE (TOPAMAX) 200 MG TABLET    Take 100 mg by mouth daily.    These Medications have changed    No medications on file   Stop Taking    No medications on file

## 2018-02-10 NOTE — ED TRIAGE NOTES
Patients chief complaint for EMS was that she has pain all over. Patient complained only of chest pain for 3 hours.

## 2018-02-10 NOTE — DISCHARGE INSTRUCTIONS
Chest Pain: Care Instructions  Your Care Instructions    There are many things that can cause chest pain. Some are not serious and will get better on their own in a few days. But some kinds of chest pain need more testing and treatment. Your doctor may have recommended a follow-up visit in the next 8 to 12 hours. If you are not getting better, you may need more tests or treatment. Even though your doctor has released you, you still need to watch for any problems. The doctor carefully checked you, but sometimes problems can develop later. If you have new symptoms or if your symptoms do not get better, get medical care right away. If you have worse or different chest pain or pressure that lasts more than 5 minutes or you passed out (lost consciousness), call 911 or seek other emergency help right away. A medical visit is only one step in your treatment. Even if you feel better, you still need to do what your doctor recommends, such as going to all suggested follow-up appointments and taking medicines exactly as directed. This will help you recover and help prevent future problems. How can you care for yourself at home? · Rest until you feel better. · Take your medicine exactly as prescribed. Call your doctor if you think you are having a problem with your medicine. · Do not drive after taking a prescription pain medicine. When should you call for help? Call 911 if:  ? · You passed out (lost consciousness). ? · You have severe difficulty breathing. ? · You have symptoms of a heart attack. These may include:  ¨ Chest pain or pressure, or a strange feeling in your chest.  ¨ Sweating. ¨ Shortness of breath. ¨ Nausea or vomiting. ¨ Pain, pressure, or a strange feeling in your back, neck, jaw, or upper belly or in one or both shoulders or arms. ¨ Lightheadedness or sudden weakness. ¨ A fast or irregular heartbeat.   After you call 911, the  may tell you to chew 1 adult-strength or 2 to 4 low-dose aspirin. Wait for an ambulance. Do not try to drive yourself. ?Call your doctor today if:  ? · You have any trouble breathing. ? · Your chest pain gets worse. ? · You are dizzy or lightheaded, or you feel like you may faint. ? · You are not getting better as expected. ? · You are having new or different chest pain. Where can you learn more? Go to http://shannon-heriberto.info/. Enter A120 in the search box to learn more about \"Chest Pain: Care Instructions. \"  Current as of: March 20, 2017  Content Version: 11.4  © 0080-8704 [employer]. Care instructions adapted under license by [employer] (which disclaims liability or warranty for this information). If you have questions about a medical condition or this instruction, always ask your healthcare professional. [address] any warranty or liability for your use of this information.

## 2018-02-14 ENCOUNTER — HOSPITAL ENCOUNTER (EMERGENCY)
Age: 53
Discharge: HOME | End: 2018-02-14
Attending: EMERGENCY MEDICINE
Payer: MEDICARE

## 2018-02-14 VITALS
SYSTOLIC BLOOD PRESSURE: 120 MMHG | TEMPERATURE: 97.6 F | OXYGEN SATURATION: 99 % | RESPIRATION RATE: 16 BRPM | DIASTOLIC BLOOD PRESSURE: 77 MMHG

## 2018-02-14 DIAGNOSIS — K85.20 ALCOHOL-INDUCED ACUTE PANCREATITIS, UNSPECIFIED COMPLICATION STATUS: Primary | ICD-10-CM

## 2018-02-14 LAB
ALBUMIN SERPL-MCNC: 4.3 G/DL (ref 3.4–5)
ALBUMIN/GLOB SERPL: 1 {RATIO} (ref 0.8–1.7)
ALP SERPL-CCNC: 108 U/L (ref 45–117)
ALT SERPL-CCNC: 32 U/L (ref 13–56)
ANION GAP SERPL CALC-SCNC: 12 MMOL/L (ref 3–18)
AST SERPL-CCNC: 55 U/L (ref 15–37)
BASOPHILS # BLD: 0 K/UL (ref 0–0.06)
BASOPHILS NFR BLD: 0 % (ref 0–2)
BILIRUB SERPL-MCNC: 1 MG/DL (ref 0.2–1)
BUN SERPL-MCNC: 12 MG/DL (ref 7–18)
BUN/CREAT SERPL: 11 (ref 12–20)
CALCIUM SERPL-MCNC: 9.9 MG/DL (ref 8.5–10.1)
CHLORIDE SERPL-SCNC: 89 MMOL/L (ref 100–108)
CK MB CFR SERPL CALC: NORMAL % (ref 0–4)
CK MB SERPL-MCNC: <1 NG/ML (ref 5–25)
CK SERPL-CCNC: 54 U/L (ref 26–192)
CO2 SERPL-SCNC: 29 MMOL/L (ref 21–32)
CREAT SERPL-MCNC: 1.11 MG/DL (ref 0.6–1.3)
DIFFERENTIAL METHOD BLD: ABNORMAL
EOSINOPHIL # BLD: 0 K/UL (ref 0–0.4)
EOSINOPHIL NFR BLD: 0 % (ref 0–5)
ERYTHROCYTE [DISTWIDTH] IN BLOOD BY AUTOMATED COUNT: 15.7 % (ref 11.6–14.5)
ETHANOL SERPL-MCNC: <3 MG/DL (ref 0–3)
GLOBULIN SER CALC-MCNC: 4.3 G/DL (ref 2–4)
GLUCOSE SERPL-MCNC: 95 MG/DL (ref 74–99)
HCT VFR BLD AUTO: 41.7 % (ref 35–45)
HGB BLD-MCNC: 14.3 G/DL (ref 12–16)
LIPASE SERPL-CCNC: 397 U/L (ref 73–393)
LYMPHOCYTES # BLD: 2.6 K/UL (ref 0.9–3.6)
LYMPHOCYTES NFR BLD: 37 % (ref 21–52)
MCH RBC QN AUTO: 33.5 PG (ref 24–34)
MCHC RBC AUTO-ENTMCNC: 34.3 G/DL (ref 31–37)
MCV RBC AUTO: 97.7 FL (ref 74–97)
MONOCYTES # BLD: 0.3 K/UL (ref 0.05–1.2)
MONOCYTES NFR BLD: 4 % (ref 3–10)
NEUTS SEG # BLD: 4.2 K/UL (ref 1.8–8)
NEUTS SEG NFR BLD: 59 % (ref 40–73)
PLATELET # BLD AUTO: 154 K/UL (ref 135–420)
PMV BLD AUTO: 10 FL (ref 9.2–11.8)
POTASSIUM SERPL-SCNC: 3 MMOL/L (ref 3.5–5.5)
PROT SERPL-MCNC: 8.6 G/DL (ref 6.4–8.2)
RBC # BLD AUTO: 4.27 M/UL (ref 4.2–5.3)
SODIUM SERPL-SCNC: 130 MMOL/L (ref 136–145)
TROPONIN I SERPL-MCNC: <0.02 NG/ML (ref 0–0.04)
WBC # BLD AUTO: 7.2 K/UL (ref 4.6–13.2)

## 2018-02-14 PROCEDURE — 85025 COMPLETE CBC W/AUTO DIFF WBC: CPT | Performed by: EMERGENCY MEDICINE

## 2018-02-14 PROCEDURE — 80307 DRUG TEST PRSMV CHEM ANLYZR: CPT | Performed by: EMERGENCY MEDICINE

## 2018-02-14 PROCEDURE — 74011250636 HC RX REV CODE- 250/636: Performed by: EMERGENCY MEDICINE

## 2018-02-14 PROCEDURE — 93005 ELECTROCARDIOGRAM TRACING: CPT

## 2018-02-14 PROCEDURE — 83690 ASSAY OF LIPASE: CPT | Performed by: EMERGENCY MEDICINE

## 2018-02-14 PROCEDURE — 80053 COMPREHEN METABOLIC PANEL: CPT | Performed by: EMERGENCY MEDICINE

## 2018-02-14 PROCEDURE — 99284 EMERGENCY DEPT VISIT MOD MDM: CPT

## 2018-02-14 PROCEDURE — 82553 CREATINE MB FRACTION: CPT | Performed by: EMERGENCY MEDICINE

## 2018-02-14 PROCEDURE — 74011250637 HC RX REV CODE- 250/637: Performed by: EMERGENCY MEDICINE

## 2018-02-14 PROCEDURE — 96374 THER/PROPH/DIAG INJ IV PUSH: CPT

## 2018-02-14 RX ORDER — HYDROMORPHONE HYDROCHLORIDE 1 MG/ML
1 INJECTION, SOLUTION INTRAMUSCULAR; INTRAVENOUS; SUBCUTANEOUS ONCE
Status: COMPLETED | OUTPATIENT
Start: 2018-02-14 | End: 2018-02-14

## 2018-02-14 RX ORDER — FAMOTIDINE 20 MG/1
20 TABLET, FILM COATED ORAL
Status: COMPLETED | OUTPATIENT
Start: 2018-02-14 | End: 2018-02-14

## 2018-02-14 RX ADMIN — SODIUM CHLORIDE 1000 ML: 900 INJECTION, SOLUTION INTRAVENOUS at 11:51

## 2018-02-14 RX ADMIN — HYDROMORPHONE HYDROCHLORIDE 1 MG: 1 INJECTION, SOLUTION INTRAMUSCULAR; INTRAVENOUS; SUBCUTANEOUS at 14:35

## 2018-02-14 RX ADMIN — FAMOTIDINE 20 MG: 20 TABLET, FILM COATED ORAL at 13:37

## 2018-02-14 NOTE — ED NOTES
I have reviewed discharge instructions with the patient. The patient verbalized understanding. Patient armband removed and given to patient to take home. Patient was informed of the privacy risks if armband lost or stolen. Pt alert, oriented x4 and ambulatory out of ER in [facility] at this time. VSS.

## 2018-02-14 NOTE — ED PROVIDER NOTES
EMERGENCY DEPARTMENT HISTORY AND PHYSICAL EXAM    11:50 AM      Date: 2/14/2018  Patient Name: Alena Hickman    History of Presenting Illness     Chief Complaint   Patient presents with    Abdominal Pain         History Provided By: Patient     Chief Complaint: Abd pain  Duration:  Hours  Timing:  Worsening  Location: Upper abd  Quality: Burning  Severity: Severe   Modifying Factors: none   Associated Symptoms: vomiting      Additional History (Context): Alena Hickman is a 47 y.o. female presents with a h/o pancreatitis, EtOH abuse, HTN, COPD, and Bipolar Disorder presents to the ED complaining of severe burning diffuse upper abd pain that began this morning with associated vomiting. The patient that she has been trying to detox from EtOH at home. She remarks that the only thing she is able to keep down is water. She reports coming to the ED 2/11/18 for similar sx. Patient notes that she has Toradol at home prescribed by her GI but did not take it because she could not find it. No modifying factors noted. No other complaints or concerns at this time. The patient's GI is Marga Ricci MD.     PCP: Stu Carr., MD    Current Outpatient Prescriptions   Medication Sig Dispense Refill    oxyCODONE IR (ROXICODONE) 5 mg immediate release tablet Take 2 Tabs by mouth every six (6) hours as needed for Pain. Max Daily Amount: 40 mg. 20 Tab 0    ondansetron (ZOFRAN ODT) 8 mg disintegrating tablet Take 1 Tab by mouth every eight (8) hours as needed for Nausea. 6 Tab 0    ibuprofen (MOTRIN) 600 mg tablet Take 1 Tab by mouth every six (6) hours as needed for Pain. 20 Tab 0    lipase-protease-amylase (ZENPEP 10,000) capsule Take 1 Cap by mouth three (3) times daily (with meals). 200 Cap 5    diazepam (VALIUM) 5 mg tablet Take 0.5 Tabs by mouth every eight (8) hours as needed for Anxiety.  Max Daily Amount: 7.5 mg. 20 Tab 0    silver sulfADIAZINE (SILVADENE) 1 % topical cream Apply  to affected area two (2) times a day. 50 g 0    Thiamine Mononitrate (B-1) 100 mg tablet Take 1 Tab by mouth daily. 7 Tab 0    albuterol (PROVENTIL VENTOLIN) 2.5 mg /3 mL (0.083 %) nebulizer solution 1.5 mL by Nebulization route every four (4) hours as needed for Wheezing or Shortness of Breath. 24 Each 0    Nebulizer & Compressor machine 1 Each by Does Not Apply route every six (6) hours as needed. 1 Each 0    cloNIDine HCl (CATAPRES) 0.2 mg tablet Take 0.5 Tabs by mouth two (2) times a day. 30 Tab 0    QUETIAPINE FUMARATE (QUETIAPINE PO) Take 3 Tabs by mouth daily. Patient states she takes 3 tablets of the 200 mg strength to equal 600 mg total dose once a day      ipratropium-albuterol (COMBIVENT)  mcg/actuation inhaler Take 2 Puffs by inhalation every six (6) hours.  gabapentin (NEURONTIN) 600 mg tablet Take 600 mg by mouth four (4) times daily.  buPROPion XL (WELLBUTRIN XL) 300 mg XL tablet Take 300 mg by mouth every morning.  topiramate (TOPAMAX) 200 mg tablet Take 100 mg by mouth daily. Past History     Past Medical History:  Past Medical History:   Diagnosis Date    Alcohol abuse     Anxiety     Asthma     [facility]     Common migraine     COPD [facility]chronic obstructive pulmonary disease) ([facility]     Depression     Esophageal reflux     Gout     Hypertension     Inverted nipple     Left breast always have. Past Surgical History:  Past Surgical History:   Procedure Laterality Date    HX CARPAL TUNNEL RELEASE      HX GYN      tubal ligation    HX HEENT      HX LUMBAR LAMINECTOMY      HX ORTHOPAEDIC         Family History:  Family History   Problem Relation Age of Onset    Family history unknown: Yes       Social History:  Social History   Substance Use Topics    Smoking status: Current Every Day Smoker     Packs/day: 1.00    Smokeless tobacco: Never Used    Alcohol use Yes      Comment: daily, liquor       Allergies:   Allergies   Allergen Reactions    Lithium Anaphylaxis    Elavil Other (comments)     Left leg went numb    Morphine Hives         Review of Systems     Review of Systems   Constitutional: Negative for diaphoresis and fever. HENT: Negative for congestion and sore throat. Eyes: Negative for pain and itching. Respiratory: Negative for cough and shortness of breath. Cardiovascular: Negative for chest pain and palpitations. Gastrointestinal: Positive for abdominal pain, nausea and vomiting. Negative for diarrhea. Endocrine: Negative for polydipsia and polyuria. Genitourinary: Negative for dysuria and hematuria. Musculoskeletal: Negative for arthralgias and myalgias. Skin: Negative for rash and wound. Neurological: Negative for seizures and syncope. Hematological: Does not bruise/bleed easily. Psychiatric/Behavioral: Negative for agitation and hallucinations. Physical Exam     Visit Vitals    /77 (BP 1 Location: Right arm, BP Patient Position: At rest)    Temp 97.6 °F (36.4 °C)    Resp 16    LMP 04/01/2013    SpO2 99%       Physical Exam   Constitutional: She appears well-developed and well-nourished. She appears distressed (mild). HENT:   Head: Normocephalic and atraumatic. Eyes: Conjunctivae are normal. No scleral icterus. Neck: Normal range of motion. Neck supple. No JVD present. Cardiovascular: Regular rhythm and normal heart sounds. Tachycardia present. 4 intact extremity pulses   Pulmonary/Chest: Effort normal and breath sounds normal.   Abdominal: Soft. She exhibits no mass. There is tenderness (diffuse, upper abd). Musculoskeletal: Normal range of motion. Lymphadenopathy:     She has no cervical adenopathy. Neurological: She is alert. Skin: Skin is warm and dry. Nursing note and vitals reviewed.         Diagnostic Study Results     Labs -  Recent Results (from the past 12 hour(s))   EKG, 12 LEAD, INITIAL    Collection Time: 02/14/18 11:55 AM   Result Value Ref Range    Ventricular Rate 121 BPM    Atrial Rate 125 BPM    P-R Interval 118 ms    QRS Duration 74 ms    Q-T Interval 312 ms    QTC Calculation (Bezet) 443 ms    Calculated P Axis 80 degrees    Calculated R Axis 89 degrees    Calculated T Axis 81 degrees    Diagnosis       Sinus tachycardia  Nonspecific ST abnormality  Abnormal ECG  When compared with ECG of 09-FEB-2018 20:20,  No significant change was found     CBC WITH AUTOMATED DIFF    Collection Time: 02/14/18 12:10 PM   Result Value Ref Range    WBC 7.2 4.6 - 13.2 K/uL    RBC 4.27 4.20 - 5.30 M/uL    HGB 14.3 12.0 - 16.0 g/dL    HCT 41.7 35.0 - 45.0 %    MCV 97.7 (H) 74.0 - 97.0 FL    MCH 33.5 24.0 - 34.0 PG    MCHC 34.3 31.0 - 37.0 g/dL    RDW 15.7 (H) 11.6 - 14.5 %    PLATELET 875 617 - 681 K/uL    MPV 10.0 9.2 - 11.8 FL    NEUTROPHILS 59 40 - 73 %    LYMPHOCYTES 37 21 - 52 %    MONOCYTES 4 3 - 10 %    EOSINOPHILS 0 0 - 5 %    BASOPHILS 0 0 - 2 %    ABS. NEUTROPHILS 4.2 1.8 - 8.0 K/UL    ABS. LYMPHOCYTES 2.6 0.9 - 3.6 K/UL    ABS. MONOCYTES 0.3 0.05 - 1.2 K/UL    ABS. EOSINOPHILS 0.0 0.0 - 0.4 K/UL    ABS. BASOPHILS 0.0 0.0 - 0.06 K/UL    DF AUTOMATED     METABOLIC PANEL, COMPREHENSIVE    Collection Time: 02/14/18 12:10 PM   Result Value Ref Range    Sodium 130 (L) 136 - 145 mmol/L    Potassium 3.0 (L) 3.5 - 5.5 mmol/L    Chloride 89 (L) 100 - 108 mmol/L    CO2 29 21 - 32 mmol/L    Anion gap 12 3.0 - 18 mmol/L    Glucose 95 74 - 99 mg/dL    BUN 12 7.0 - 18 MG/DL    Creatinine 1.11 0.6 - 1.3 MG/DL    BUN/Creatinine ratio 11 (L) 12 - 20      GFR est AA >60 >60 ml/min/1.73m2    GFR est non-AA 51 (L) >60 ml/min/1.73m2    Calcium 9.9 8.5 - 10.1 MG/DL    Bilirubin, total 1.0 0.2 - 1.0 MG/DL    ALT (SGPT) 32 13 - 56 U/L    AST (SGOT) 55 (H) 15 - 37 U/L    Alk.  phosphatase 108 45 - 117 U/L    Protein, total 8.6 (H) 6.4 - 8.2 g/dL    Albumin 4.3 3.4 - 5.0 g/dL    Globulin 4.3 (H) 2.0 - 4.0 g/dL    A-G Ratio 1.0 0.8 - 1.7     CARDIAC PANEL,(CK, CKMB & TROPONIN)    Collection Time: 02/14/18 12:10 PM   Result Value Ref Range    CK 54 26 - 192 U/L    CK - MB <1.0 <3.6 ng/ml    CK-MB Index  0.0 - 4.0 %     CALCULATION NOT PERFORMED WHEN RESULT IS BELOW LINEAR LIMIT    Troponin-I, Qt. <0.02 0.0 - 0.045 NG/ML   LIPASE    Collection Time: 02/14/18 12:10 PM   Result Value Ref Range    Lipase 397 (H) 73 - 393 U/L   ETHYL ALCOHOL    Collection Time: 02/14/18 12:10 PM   Result Value Ref Range    ALCOHOL(ETHYL),SERUM <3 0 - 3 MG/DL       Radiologic Studies -   No orders to display     No results found. Medical Decision Making   Initial Medical Decision Making and DDx:  EtOH withdrawal, pancreatitis, dehydration, doubt ACS    ED Course: Progress Notes, Reevaluation, and Consults:  3:24 PM The patient states that she now has minimal pain when swallowing. She knows that she needs to stop drinking. She has a supply of Tramadol at home. Patient states that she will follow up with PCP. All questions answered and the patient is happy with the plan for discharge. Patient took po successfully. I am the first provider for this patient. I reviewed the vital signs, available nursing notes, past medical history, past surgical history, family history and social history. Vital Signs-Reviewed the patient's vital signs. EKG: Interpreted by the EP. Time Interpreted: 1155   Rate: 121   Rhythm: sinus tachycardia    Interpretation: no acute process   Comparison:       Diagnosis     Clinical Impression:   1.  Alcohol-induced acute pancreatitis, unspecified complication status        Disposition: Discharged     Follow-up Information     Follow up With Details Comments Contact Info    Raissa Neely MD In 2 days  [address] [place] [address]  [phone]             Patient's Medications   Start Taking    No medications on file   Continue Taking    ALBUTEROL (PROVENTIL VENTOLIN) 2.5 MG /3 ML (0.083 %) NEBULIZER SOLUTION    1.5 mL by Nebulization route every four (4) hours as needed for Wheezing or Shortness of Breath. BUPROPION XL (WELLBUTRIN XL) 300 MG XL TABLET    Take 300 mg by mouth every morning. CLONIDINE HCL (CATAPRES) 0.2 MG TABLET    Take 0.5 Tabs by mouth two (2) times a day. DIAZEPAM (VALIUM) 5 MG TABLET    Take 0.5 Tabs by mouth every eight (8) hours as needed for Anxiety. Max Daily Amount: 7.5 mg. GABAPENTIN (NEURONTIN) 600 MG TABLET    Take 600 mg by mouth four (4) times daily. IBUPROFEN (MOTRIN) 600 MG TABLET    Take 1 Tab by mouth every six (6) hours as needed for Pain. IPRATROPIUM-ALBUTEROL (COMBIVENT)  MCG/ACTUATION INHALER    Take 2 Puffs by inhalation every six (6) hours. LIPASE-PROTEASE-AMYLASE (ZENPEP 10,000) CAPSULE    Take 1 Cap by mouth three (3) times daily (with meals). NEBULIZER & COMPRESSOR MACHINE    1 Each by Does Not Apply route every six (6) hours as needed. ONDANSETRON (ZOFRAN ODT) 8 MG DISINTEGRATING TABLET    Take 1 Tab by mouth every eight (8) hours as needed for Nausea. OXYCODONE IR (ROXICODONE) 5 MG IMMEDIATE RELEASE TABLET    Take 2 Tabs by mouth every six (6) hours as needed for Pain. Max Daily Amount: 40 mg. QUETIAPINE FUMARATE (QUETIAPINE PO)    Take 3 Tabs by mouth daily. Patient states she takes 3 tablets of the 200 mg strength to equal 600 mg total dose once a day    SILVER SULFADIAZINE (SILVADENE) 1 % TOPICAL CREAM    Apply  to affected area two (2) times a day. THIAMINE MONONITRATE (B-1) 100 MG TABLET    Take 1 Tab by mouth daily. TOPIRAMATE (TOPAMAX) 200 MG TABLET    Take 100 mg by mouth daily.    These Medications have changed    No medications on file   Stop Taking    No medications on file     _______________________________    Attestations:  [id number] acting as a scribe for and in the presence of Shaunna Murray MD      February 14, 2018 at 2:47 PM       Provider Attestation:      I personally performed the services described in the documentation, reviewed the documentation, as recorded by the scribe in my presence, and it accurately and completely records my words and actions.  February 14, 2018 at 2:47 PM - Lorenza Mata MD    _______________________________

## 2018-02-14 NOTE — DISCHARGE INSTRUCTIONS
Pancreatitis: Care Instructions  Your Care Instructions    The pancreas is an organ behind the stomach. It makes hormones and enzymes to help your body digest food. But if these enzymes attack the pancreas, it can get inflamed. This is called pancreatitis. Most cases are caused by gallstones or by heavy alcohol use. If you take care of yourself at home, it will help you get better. It will also help you avoid more problems with your pancreas. Follow-up care is a key part of your treatment and safety. Be sure to make and go to all appointments, and call your doctor if you are having problems. It's also a good idea to know your test results and keep a list of the medicines you take. How can you care for yourself at home? · Drink clear liquids and eat bland foods until you feel better. Evangeline foods include rice, dry toast, and crackers. They also include bananas and applesauce. · Eat a low-fat diet until your doctor says your pancreas is healed. · Do not drink alcohol. Tell your doctor if you need help to quit. Counseling, support groups, and sometimes medicines can help you stay sober. · Be safe with medicines. Read and follow all instructions on the label. ¨ If the doctor gave you a prescription medicine for pain, take it as prescribed. ¨ If you are not taking a prescription pain medicine, ask your doctor if you can take an over-the-counter medicine. · If your doctor prescribed antibiotics, take them as directed. Do not stop taking them just because you feel better. You need to take the full course of antibiotics. · Get extra rest until you feel better. To prevent future problems with your pancreas  · Do not drink alcohol. · Tell your doctors and pharmacist that you've had pancreatitis. They can help you avoid medicines that may cause this problem again. When should you call for help? Call 911 anytime you think you may need emergency care.  For example, call if:  ? · You vomit blood or what looks like coffee grounds. ? · Your stools are maroon or very bloody. ?Call your doctor now or seek immediate medical care if:  ? · You have new or worse belly pain. ? · Your stools are black and look like tar, or they have streaks of blood. ? · You are vomiting. ? Watch closely for changes in your health, and be sure to contact your doctor if:  ? · You do not get better as expected. Where can you learn more? Go to http://shannon-heriberto.info/. Enter M871 in the search box to learn more about \"Pancreatitis: Care Instructions. \"  Current as of: May 12, 2017  Content Version: 11.4  © 4969-7051 [employer]. Care instructions adapted under license by [employer] (which disclaims liability or warranty for this information). If you have questions about a medical condition or this instruction, always ask your healthcare professional. [address] any warranty or liability for your use of this information.

## 2018-02-14 NOTE — ED TRIAGE NOTES
Per EMS-\"Patient complains of abdominal pain and states she was here on Sunday for the same. Patient states she is having anxiety, also. \"

## 2018-02-15 LAB
ATRIAL RATE: 125 BPM
CALCULATED P AXIS, ECG09: 80 DEGREES
CALCULATED R AXIS, ECG10: 89 DEGREES
CALCULATED T AXIS, ECG11: 81 DEGREES
DIAGNOSIS, 93000: NORMAL
P-R INTERVAL, ECG05: 118 MS
Q-T INTERVAL, ECG07: 312 MS
QRS DURATION, ECG06: 74 MS
QTC CALCULATION (BEZET), ECG08: 443 MS
VENTRICULAR RATE, ECG03: 121 BPM

## 2018-03-08 ENCOUNTER — APPOINTMENT (OUTPATIENT)
Dept: GENERAL RADIOLOGY | Age: 53
DRG: 380 | End: 2018-03-08
Attending: EMERGENCY MEDICINE
Payer: MEDICARE

## 2018-03-08 ENCOUNTER — APPOINTMENT (OUTPATIENT)
Dept: CT IMAGING | Age: 53
DRG: 380 | End: 2018-03-08
Attending: EMERGENCY MEDICINE
Payer: MEDICARE

## 2018-03-08 ENCOUNTER — HOSPITAL ENCOUNTER (INPATIENT)
Age: 53
LOS: 7 days | Discharge: NURSING, REHAB OR LONG-TERM CARE | DRG: 380 | End: 2018-03-15
Attending: EMERGENCY MEDICINE | Admitting: HOSPITALIST
Payer: MEDICARE

## 2018-03-08 DIAGNOSIS — F10.930 ALCOHOL WITHDRAWAL SYNDROME WITHOUT COMPLICATION (HCC): ICD-10-CM

## 2018-03-08 DIAGNOSIS — K92.0 GASTROINTESTINAL HEMORRHAGE WITH HEMATEMESIS: ICD-10-CM

## 2018-03-08 DIAGNOSIS — E87.6 HYPOKALEMIA: ICD-10-CM

## 2018-03-08 DIAGNOSIS — E87.1 HYPONATREMIA: ICD-10-CM

## 2018-03-08 DIAGNOSIS — E83.52 HYPERCALCEMIA: ICD-10-CM

## 2018-03-08 DIAGNOSIS — R55 SYNCOPE, UNSPECIFIED SYNCOPE TYPE: Primary | ICD-10-CM

## 2018-03-08 DIAGNOSIS — K85.20 ALCOHOL-INDUCED ACUTE PANCREATITIS WITHOUT INFECTION OR NECROSIS: ICD-10-CM

## 2018-03-08 DIAGNOSIS — K85.20 ALCOHOL-INDUCED ACUTE PANCREATITIS, UNSPECIFIED COMPLICATION STATUS: ICD-10-CM

## 2018-03-08 PROBLEM — K92.2 GI BLEED: Status: ACTIVE | Noted: 2018-03-08

## 2018-03-08 PROBLEM — F10.939 ALCOHOL WITHDRAWAL (HCC): Status: ACTIVE | Noted: 2018-03-08

## 2018-03-08 LAB
25(OH)D3 SERPL-MCNC: 36.1 NG/ML (ref 30–100)
ABO + RH BLD: NORMAL
ALBUMIN SERPL-MCNC: 3.8 G/DL (ref 3.4–5)
ALBUMIN/GLOB SERPL: 1.1 {RATIO} (ref 0.8–1.7)
ALP SERPL-CCNC: 90 U/L (ref 45–117)
ALT SERPL-CCNC: 40 U/L (ref 13–56)
AMMONIA PLAS-SCNC: 34 UMOL/L (ref 11–32)
ANION GAP SERPL CALC-SCNC: 30 MMOL/L (ref 3–18)
APTT PPP: <20 SEC (ref 23–36.4)
AST SERPL-CCNC: 86 U/L (ref 15–37)
ATRIAL RATE: 122 BPM
BASOPHILS # BLD: 0 K/UL (ref 0–0.06)
BASOPHILS NFR BLD: 0 % (ref 0–2)
BILIRUB SERPL-MCNC: 0.7 MG/DL (ref 0.2–1)
BLOOD GROUP ANTIBODIES SERPL: NORMAL
BUN SERPL-MCNC: 46 MG/DL (ref 7–18)
BUN/CREAT SERPL: 39 (ref 12–20)
CALCIUM SERPL-MCNC: 17.5 MG/DL (ref 8.5–10.1)
CALCIUM SERPL-MCNC: 17.5 MG/DL (ref 8.5–10.1)
CALCULATED P AXIS, ECG09: 69 DEGREES
CALCULATED R AXIS, ECG10: 72 DEGREES
CALCULATED T AXIS, ECG11: 68 DEGREES
CHLORIDE SERPL-SCNC: 68 MMOL/L (ref 100–108)
CK MB CFR SERPL CALC: 4.8 % (ref 0–4)
CK MB SERPL-MCNC: 5.4 NG/ML (ref 5–25)
CK SERPL-CCNC: 112 U/L (ref 26–192)
CO2 SERPL-SCNC: 29 MMOL/L (ref 21–32)
CREAT SERPL-MCNC: 1.18 MG/DL (ref 0.6–1.3)
DIAGNOSIS, 93000: NORMAL
DIFFERENTIAL METHOD BLD: ABNORMAL
EOSINOPHIL # BLD: 0 K/UL (ref 0–0.4)
EOSINOPHIL NFR BLD: 0 % (ref 0–5)
ERYTHROCYTE [DISTWIDTH] IN BLOOD BY AUTOMATED COUNT: 15.1 % (ref 11.6–14.5)
ETHANOL SERPL-MCNC: 134 MG/DL (ref 0–3)
GLOBULIN SER CALC-MCNC: 3.4 G/DL (ref 2–4)
GLUCOSE SERPL-MCNC: 86 MG/DL (ref 74–99)
HCT VFR BLD AUTO: 41.9 % (ref 35–45)
HGB BLD-MCNC: 14.5 G/DL (ref 12–16)
INR PPP: 1 (ref 0.8–1.2)
LIPASE SERPL-CCNC: 2172 U/L (ref 73–393)
LYMPHOCYTES # BLD: 3 K/UL (ref 0.9–3.6)
LYMPHOCYTES NFR BLD: 21 % (ref 21–52)
MAGNESIUM SERPL-MCNC: 1.9 MG/DL (ref 1.6–2.6)
MCH RBC QN AUTO: 32.8 PG (ref 24–34)
MCHC RBC AUTO-ENTMCNC: 34.6 G/DL (ref 31–37)
MCV RBC AUTO: 94.8 FL (ref 74–97)
MONOCYTES # BLD: 0.8 K/UL (ref 0.05–1.2)
MONOCYTES NFR BLD: 5 % (ref 3–10)
NEUTS SEG # BLD: 10.4 K/UL (ref 1.8–8)
NEUTS SEG NFR BLD: 74 % (ref 40–73)
P-R INTERVAL, ECG05: 130 MS
PHOSPHATE SERPL-MCNC: 4.6 MG/DL (ref 2.5–4.9)
PLATELET # BLD AUTO: 406 K/UL (ref 135–420)
PMV BLD AUTO: 9.3 FL (ref 9.2–11.8)
POTASSIUM SERPL-SCNC: 3.2 MMOL/L (ref 3.5–5.5)
PROT SERPL-MCNC: 7.2 G/DL (ref 6.4–8.2)
PROTHROMBIN TIME: 12.4 SEC (ref 11.5–15.2)
PTH-INTACT SERPL-MCNC: 13.1 PG/ML (ref 18.4–88)
Q-T INTERVAL, ECG07: 344 MS
QRS DURATION, ECG06: 86 MS
QTC CALCULATION (BEZET), ECG08: 490 MS
RBC # BLD AUTO: 4.42 M/UL (ref 4.2–5.3)
SODIUM SERPL-SCNC: 127 MMOL/L (ref 136–145)
SPECIMEN EXP DATE BLD: NORMAL
TROPONIN I SERPL-MCNC: 0.02 NG/ML (ref 0–0.04)
VENTRICULAR RATE, ECG03: 122 BPM
VIT B12 SERPL-MCNC: 606 PG/ML (ref 211–911)
WBC # BLD AUTO: 14.2 K/UL (ref 4.6–13.2)

## 2018-03-08 PROCEDURE — 86901 BLOOD TYPING SEROLOGIC RH(D): CPT

## 2018-03-08 PROCEDURE — 74011250636 HC RX REV CODE- 250/636: Performed by: EMERGENCY MEDICINE

## 2018-03-08 PROCEDURE — 82306 VITAMIN D 25 HYDROXY: CPT | Performed by: HOSPITALIST

## 2018-03-08 PROCEDURE — 83735 ASSAY OF MAGNESIUM: CPT | Performed by: HOSPITALIST

## 2018-03-08 PROCEDURE — C9113 INJ PANTOPRAZOLE SODIUM, VIA: HCPCS | Performed by: HOSPITALIST

## 2018-03-08 PROCEDURE — 80053 COMPREHEN METABOLIC PANEL: CPT

## 2018-03-08 PROCEDURE — C9113 INJ PANTOPRAZOLE SODIUM, VIA: HCPCS | Performed by: EMERGENCY MEDICINE

## 2018-03-08 PROCEDURE — 82607 VITAMIN B-12: CPT | Performed by: HOSPITALIST

## 2018-03-08 PROCEDURE — 83690 ASSAY OF LIPASE: CPT

## 2018-03-08 PROCEDURE — 82397 CHEMILUMINESCENT ASSAY: CPT | Performed by: HOSPITALIST

## 2018-03-08 PROCEDURE — 74011250636 HC RX REV CODE- 250/636: Performed by: INTERNAL MEDICINE

## 2018-03-08 PROCEDURE — 77030032490 HC SLV COMPR SCD KNE COVD -B

## 2018-03-08 PROCEDURE — 82553 CREATINE MB FRACTION: CPT

## 2018-03-08 PROCEDURE — 65660000000 HC RM CCU STEPDOWN

## 2018-03-08 PROCEDURE — 93306 TTE W/DOPPLER COMPLETE: CPT

## 2018-03-08 PROCEDURE — 70450 CT HEAD/BRAIN W/O DYE: CPT

## 2018-03-08 PROCEDURE — 74011000250 HC RX REV CODE- 250: Performed by: EMERGENCY MEDICINE

## 2018-03-08 PROCEDURE — 74011000258 HC RX REV CODE- 258: Performed by: EMERGENCY MEDICINE

## 2018-03-08 PROCEDURE — 74022 RADEX COMPL AQT ABD SERIES: CPT

## 2018-03-08 PROCEDURE — 99285 EMERGENCY DEPT VISIT HI MDM: CPT

## 2018-03-08 PROCEDURE — 85730 THROMBOPLASTIN TIME PARTIAL: CPT

## 2018-03-08 PROCEDURE — 84100 ASSAY OF PHOSPHORUS: CPT | Performed by: HOSPITALIST

## 2018-03-08 PROCEDURE — 96375 TX/PRO/DX INJ NEW DRUG ADDON: CPT

## 2018-03-08 PROCEDURE — 36415 COLL VENOUS BLD VENIPUNCTURE: CPT | Performed by: HOSPITALIST

## 2018-03-08 PROCEDURE — 82140 ASSAY OF AMMONIA: CPT | Performed by: HOSPITALIST

## 2018-03-08 PROCEDURE — 85610 PROTHROMBIN TIME: CPT

## 2018-03-08 PROCEDURE — 80307 DRUG TEST PRSMV CHEM ANLYZR: CPT

## 2018-03-08 PROCEDURE — 96374 THER/PROPH/DIAG INJ IV PUSH: CPT

## 2018-03-08 PROCEDURE — 77030020263 HC SOL INJ SOD CL0.9% LFCR 1000ML

## 2018-03-08 PROCEDURE — 85025 COMPLETE CBC W/AUTO DIFF WBC: CPT

## 2018-03-08 PROCEDURE — 83970 ASSAY OF PARATHORMONE: CPT | Performed by: HOSPITALIST

## 2018-03-08 PROCEDURE — 96361 HYDRATE IV INFUSION ADD-ON: CPT

## 2018-03-08 PROCEDURE — 74011250636 HC RX REV CODE- 250/636: Performed by: HOSPITALIST

## 2018-03-08 PROCEDURE — 93005 ELECTROCARDIOGRAM TRACING: CPT

## 2018-03-08 PROCEDURE — 77030020255 HC SOL INJ LR 1000ML BG

## 2018-03-08 PROCEDURE — 74011250637 HC RX REV CODE- 250/637: Performed by: HOSPITALIST

## 2018-03-08 RX ORDER — LORAZEPAM 2 MG/ML
1 INJECTION INTRAMUSCULAR
Status: DISCONTINUED | OUTPATIENT
Start: 2018-03-08 | End: 2018-03-15 | Stop reason: HOSPADM

## 2018-03-08 RX ORDER — SODIUM CHLORIDE 0.9 % (FLUSH) 0.9 %
5-10 SYRINGE (ML) INJECTION AS NEEDED
Status: DISCONTINUED | OUTPATIENT
Start: 2018-03-08 | End: 2018-03-15 | Stop reason: HOSPADM

## 2018-03-08 RX ORDER — SODIUM CHLORIDE 9 MG/ML
125 INJECTION, SOLUTION INTRAVENOUS CONTINUOUS
Status: DISCONTINUED | OUTPATIENT
Start: 2018-03-08 | End: 2018-03-08

## 2018-03-08 RX ORDER — LORAZEPAM 1 MG/1
2 TABLET ORAL
Status: DISCONTINUED | OUTPATIENT
Start: 2018-03-08 | End: 2018-03-15 | Stop reason: HOSPADM

## 2018-03-08 RX ORDER — SODIUM CHLORIDE 0.9 % (FLUSH) 0.9 %
5-10 SYRINGE (ML) INJECTION EVERY 8 HOURS
Status: DISCONTINUED | OUTPATIENT
Start: 2018-03-08 | End: 2018-03-10

## 2018-03-08 RX ORDER — PANTOPRAZOLE SODIUM 40 MG/10ML
40 INJECTION, POWDER, LYOPHILIZED, FOR SOLUTION INTRAVENOUS
Status: COMPLETED | OUTPATIENT
Start: 2018-03-08 | End: 2018-03-08

## 2018-03-08 RX ORDER — ALBUTEROL SULFATE 0.83 MG/ML
2.5 SOLUTION RESPIRATORY (INHALATION)
Status: DISCONTINUED | OUTPATIENT
Start: 2018-03-08 | End: 2018-03-15 | Stop reason: HOSPADM

## 2018-03-08 RX ORDER — SODIUM CHLORIDE, SODIUM LACTATE, POTASSIUM CHLORIDE, CALCIUM CHLORIDE 600; 310; 30; 20 MG/100ML; MG/100ML; MG/100ML; MG/100ML
250 INJECTION, SOLUTION INTRAVENOUS CONTINUOUS
Status: DISPENSED | OUTPATIENT
Start: 2018-03-08 | End: 2018-03-09

## 2018-03-08 RX ORDER — POTASSIUM CHLORIDE 7.45 MG/ML
10 INJECTION INTRAVENOUS
Status: COMPLETED | OUTPATIENT
Start: 2018-03-08 | End: 2018-03-09

## 2018-03-08 RX ORDER — CLONIDINE HYDROCHLORIDE 0.1 MG/1
0.1 TABLET ORAL 2 TIMES DAILY
Status: DISCONTINUED | OUTPATIENT
Start: 2018-03-08 | End: 2018-03-15 | Stop reason: HOSPADM

## 2018-03-08 RX ORDER — HYDROMORPHONE HYDROCHLORIDE 2 MG/ML
0.5 INJECTION, SOLUTION INTRAMUSCULAR; INTRAVENOUS; SUBCUTANEOUS
Status: DISCONTINUED | OUTPATIENT
Start: 2018-03-08 | End: 2018-03-14 | Stop reason: CLARIF

## 2018-03-08 RX ORDER — ENOXAPARIN SODIUM 100 MG/ML
40 INJECTION SUBCUTANEOUS EVERY 24 HOURS
Status: DISCONTINUED | OUTPATIENT
Start: 2018-03-08 | End: 2018-03-08 | Stop reason: ALTCHOICE

## 2018-03-08 RX ORDER — LORAZEPAM 2 MG/ML
2 INJECTION INTRAMUSCULAR
Status: DISCONTINUED | OUTPATIENT
Start: 2018-03-08 | End: 2018-03-15 | Stop reason: HOSPADM

## 2018-03-08 RX ORDER — HYDROMORPHONE HYDROCHLORIDE 1 MG/ML
0.5 INJECTION, SOLUTION INTRAMUSCULAR; INTRAVENOUS; SUBCUTANEOUS
Status: COMPLETED | OUTPATIENT
Start: 2018-03-08 | End: 2018-03-08

## 2018-03-08 RX ORDER — LORAZEPAM 2 MG/ML
3 INJECTION INTRAMUSCULAR
Status: DISCONTINUED | OUTPATIENT
Start: 2018-03-08 | End: 2018-03-15 | Stop reason: HOSPADM

## 2018-03-08 RX ORDER — BUPROPION HYDROCHLORIDE 150 MG/1
300 TABLET ORAL
Status: DISCONTINUED | OUTPATIENT
Start: 2018-03-08 | End: 2018-03-13

## 2018-03-08 RX ORDER — THERA TABS 400 MCG
1 TAB ORAL DAILY
Status: DISCONTINUED | OUTPATIENT
Start: 2018-03-08 | End: 2018-03-15 | Stop reason: HOSPADM

## 2018-03-08 RX ORDER — TOPIRAMATE 100 MG/1
100 TABLET, FILM COATED ORAL DAILY
Status: DISCONTINUED | OUTPATIENT
Start: 2018-03-08 | End: 2018-03-13

## 2018-03-08 RX ORDER — FOLIC ACID 1 MG/1
1 TABLET ORAL DAILY
Status: DISCONTINUED | OUTPATIENT
Start: 2018-03-08 | End: 2018-03-15 | Stop reason: HOSPADM

## 2018-03-08 RX ORDER — ONDANSETRON 2 MG/ML
4 INJECTION INTRAMUSCULAR; INTRAVENOUS
Status: COMPLETED | OUTPATIENT
Start: 2018-03-08 | End: 2018-03-08

## 2018-03-08 RX ORDER — ASPIRIN 325 MG/1
100 TABLET, FILM COATED ORAL DAILY
Status: DISCONTINUED | OUTPATIENT
Start: 2018-03-08 | End: 2018-03-15 | Stop reason: HOSPADM

## 2018-03-08 RX ORDER — ONDANSETRON 2 MG/ML
4 INJECTION INTRAMUSCULAR; INTRAVENOUS
Status: DISCONTINUED | OUTPATIENT
Start: 2018-03-08 | End: 2018-03-15 | Stop reason: HOSPADM

## 2018-03-08 RX ORDER — LORAZEPAM 1 MG/1
1 TABLET ORAL
Status: DISCONTINUED | OUTPATIENT
Start: 2018-03-08 | End: 2018-03-15 | Stop reason: HOSPADM

## 2018-03-08 RX ORDER — PANTOPRAZOLE SODIUM 40 MG/1
40 TABLET, DELAYED RELEASE ORAL DAILY
COMMUNITY
End: 2018-03-15

## 2018-03-08 RX ORDER — DIAZEPAM 5 MG/1
5 TABLET ORAL EVERY 8 HOURS
Status: DISCONTINUED | OUTPATIENT
Start: 2018-03-08 | End: 2018-03-15 | Stop reason: HOSPADM

## 2018-03-08 RX ADMIN — HYDROMORPHONE HYDROCHLORIDE 0.5 MG: 1 INJECTION, SOLUTION INTRAMUSCULAR; INTRAVENOUS; SUBCUTANEOUS at 06:35

## 2018-03-08 RX ADMIN — Medication 10 ML: at 16:49

## 2018-03-08 RX ADMIN — Medication 10 ML: at 21:41

## 2018-03-08 RX ADMIN — DIAZEPAM 5 MG: 5 TABLET ORAL at 23:39

## 2018-03-08 RX ADMIN — SODIUM CHLORIDE, SODIUM LACTATE, POTASSIUM CHLORIDE, AND CALCIUM CHLORIDE 250 ML/HR: 600; 310; 30; 20 INJECTION, SOLUTION INTRAVENOUS at 23:40

## 2018-03-08 RX ADMIN — SODIUM CHLORIDE, SODIUM LACTATE, POTASSIUM CHLORIDE, AND CALCIUM CHLORIDE 250 ML/HR: 600; 310; 30; 20 INJECTION, SOLUTION INTRAVENOUS at 19:39

## 2018-03-08 RX ADMIN — SODIUM CHLORIDE 1000 ML: 900 INJECTION, SOLUTION INTRAVENOUS at 04:42

## 2018-03-08 RX ADMIN — HYDROMORPHONE HYDROCHLORIDE 0.5 MG: 2 INJECTION INTRAMUSCULAR; INTRAVENOUS; SUBCUTANEOUS at 16:45

## 2018-03-08 RX ADMIN — PROMETHAZINE HYDROCHLORIDE 12.5 MG: 25 INJECTION INTRAMUSCULAR; INTRAVENOUS at 05:46

## 2018-03-08 RX ADMIN — Medication 10 ML: at 09:44

## 2018-03-08 RX ADMIN — CLONIDINE HYDROCHLORIDE 0.1 MG: 0.1 TABLET ORAL at 09:46

## 2018-03-08 RX ADMIN — ONDANSETRON 4 MG: 2 INJECTION INTRAMUSCULAR; INTRAVENOUS at 09:38

## 2018-03-08 RX ADMIN — PANTOPRAZOLE SODIUM 40 MG: 40 INJECTION, POWDER, FOR SOLUTION INTRAVENOUS at 21:40

## 2018-03-08 RX ADMIN — DIAZEPAM 5 MG: 5 TABLET ORAL at 09:46

## 2018-03-08 RX ADMIN — CLONIDINE HYDROCHLORIDE 0.1 MG: 0.1 TABLET ORAL at 18:00

## 2018-03-08 RX ADMIN — FOLIC ACID: 5 INJECTION, SOLUTION INTRAMUSCULAR; INTRAVENOUS; SUBCUTANEOUS at 05:30

## 2018-03-08 RX ADMIN — PANTOPRAZOLE SODIUM 40 MG: 40 INJECTION, POWDER, FOR SOLUTION INTRAVENOUS at 09:37

## 2018-03-08 RX ADMIN — POTASSIUM CHLORIDE 10 MEQ: 7.46 INJECTION, SOLUTION INTRAVENOUS at 07:35

## 2018-03-08 RX ADMIN — HYDROMORPHONE HYDROCHLORIDE 0.5 MG: 2 INJECTION INTRAMUSCULAR; INTRAVENOUS; SUBCUTANEOUS at 21:40

## 2018-03-08 RX ADMIN — HYDROMORPHONE HYDROCHLORIDE 0.5 MG: 2 INJECTION INTRAMUSCULAR; INTRAVENOUS; SUBCUTANEOUS at 12:38

## 2018-03-08 RX ADMIN — SODIUM CHLORIDE 125 ML/HR: 900 INJECTION, SOLUTION INTRAVENOUS at 09:57

## 2018-03-08 RX ADMIN — PANTOPRAZOLE SODIUM 40 MG: 40 INJECTION, POWDER, FOR SOLUTION INTRAVENOUS at 04:42

## 2018-03-08 RX ADMIN — ONDANSETRON 4 MG: 2 INJECTION INTRAMUSCULAR; INTRAVENOUS at 04:42

## 2018-03-08 RX ADMIN — POTASSIUM CHLORIDE 10 MEQ: 7.46 INJECTION, SOLUTION INTRAVENOUS at 07:01

## 2018-03-08 RX ADMIN — DIAZEPAM 5 MG: 5 TABLET ORAL at 16:49

## 2018-03-08 NOTE — PROGRESS NOTES
conducted an initial consultation and Spiritual Assessment for Raegan Fontanez, who is a 47 y.o.,female. Patients Primary Language is: [place]. According to the patients EMR Scientologist Affiliation is: Lazarus Barlow. The reason the Patient came to the hospital is:   Patient Active Problem List    Diagnosis Date Noted    Syncope 03/08/2018    Alcohol withdrawal ([facility] 03/08/2018    GI bleed 03/08/2018    Hyponatremia 03/08/2018    Hypercalcemia 03/08/2018    Dizziness 09/14/2017    Pancreatitis 09/14/2017    Hypokalemia 09/14/2017    Anemia 09/14/2017    Encephalopathy 09/14/2017    Nicotine withdrawal 02/27/2016    Pneumonia 02/25/2016    COPD [facility] 02/25/2016    Tylenol overdose 02/20/2016    SIRS (systemic inflammatory response syndrome) ([facility] 02/20/2016    Acute pancreatitis 02/19/2016    Pancreatitis, alcoholic, acute 85/66/3061    Tachycardia 01/28/2016    Alcohol dependence ([facility] 01/28/2016    Esophageal stricture 01/28/2016    Dilated pancreatic duct 01/28/2016    Common bile duct dilation 01/28/2016    Elevated LFTs 01/28/2016    Bipolar depression ([facility] 01/28/2016    HTN (hypertension) 01/28/2016    Bipolar disorder (manic depression) ([facility] 03/08/2013        The  provided the following Interventions:  Initiated a relationship of care and support. Explored issues of owen, spirituality and/or Anabaptist needs while hospitalized. Listened empathically. Provided chaplaincy education. Provided information about Spiritual Care Services. Offered prayer and assurance of continued prayers on patient's behalf. Chart reviewed. The following outcomes were achieved:  Patient shared some information about their medical narrative and spiritual journey/beliefs. Patient processed feeling about current hospitalization. Patient expressed gratitude for the 's visit.     Assessment:  Patient did not indicate any spiritual or Gnosticist issues which require Spiritual Care Services interventions at this time. Patient does not have any Gnosticist/cultural needs that will affect patients preferences in health care. Plan:  Chaplains will continue to follow and will provide pastoral care on an as needed or requested basis.  recommends bedside caregivers page  on duty if patient shows signs of acute spiritual or emotional distress.     [facility]   Staff [facility]   [phone] Pulses equal bilaterally, no edema present.

## 2018-03-08 NOTE — H&P
Medicine History and Physical    Patient: Ha Lacy   Age:  47 y.o. Assessment   Principal Problem:    Syncope (3/8/2018)    Active Problems:    Acute pancreatitis (2/19/2016)      Alcohol withdrawal ([facility] [address] (3/8/2018)      GI bleed (3/8/2018)      Hyponatremia (3/8/2018)      Hypercalcemia (3/8/2018)          Plan     1)  Syncope   - tele monitor   - echo   - likely 2/2 gi/dehydration ? Passed out from alcoholism    2)  Hyponatremia   - 2/2 dehydration    3)  GI bleed   - ordered GI consult- please call in am   - protonix bid iv   - monitor CBC    4)  Hypercalcemia   - very high wouldn't expect this high from dehydration alone   - monitor with IVF   - PTH, pTHrp and vit d ordered    5)  Acute pancreatitis   - NPO, IVF, monitor    DISPO  -Pt to be admitted  at this time for reasons addressed above, continued hospitalization for ongoing assessment and treatment indicated     Anticipated Date of Discharge: 2-3 days  Anticipated Disposition (home, SNF) : home    Chief Complaint:   Chief Complaint   Patient presents with    Syncope    Abdominal Pain    Vomiting         HPI:   Ha Lacy is a 47y.o. year old female who presents with  Syncope and GI bleed. For the last 3 days she has been having coffee ground type emesis. Complains of diffuse abdominal pain. No CP SOB or fever. She was using the restroom when she passed out. Her son apparently found her. In ed she continues to have some vomiting. To note she has an alcohol level is a chronic alcoholic and has a hx of acute pancreatits which she has today. She is noted in ED to have low Na level and very high Calcium levels. Review of Systems - positive responses in bold type   Constitutional: Negative for fever, chills, diaphoresis and unexpected weight change. HENT: Negative for ear pain, congestion, sore throat, rhinorrhea, drooling, trouble swallowing, neck pain and tinnitus. Eyes: Negative for photophobia, pain, redness and visual disturbance. Respiratory: negative for shortness of breath, cough, choking, chest tightness, wheezing or stridor. Cardiovascular: Negative for chest pain, palpitations and leg swelling. Gastrointestinal: Negative for nausea, vomiting, abdominal pain, diarrhea, constipation, blood in stool, abdominal distention and anal bleeding. Genitourinary: Negative for dysuria, urgency, frequency, hematuria, flank pain and difficulty urinating. Musculoskeletal: Negative for back pain and arthralgias. Skin: Negative for color change, rash and wound. Neurological: Negative for dizziness, seizures, syncope, speech difficulty, light-headedness or headaches. Hematological: Does not bruise/bleed easily. Psychiatric/Behavioral: Negative for suicidal ideas, hallucinations, behavioral problems, self-injury or agitation       Past Medical History:  Past Medical History:   Diagnosis Date    Alcohol abuse     Anxiety     Asthma     Bipolar disorder ([facility] [address]     Common migraine     COPD (chronic obstructive pulmonary disease) ([facility] [address]     Depression     Esophageal reflux     Gout     Hypertension     Inverted nipple     Left breast always have.        Past Surgical History:  Past Surgical History:   Procedure Laterality Date    HX CARPAL TUNNEL RELEASE      HX GYN      tubal ligation    HX HEENT      HX LUMBAR LAMINECTOMY      HX ORTHOPAEDIC         Family History:  Family History   Problem Relation Age of Onset    Family history unknown: Yes       Social History:  Social History     Social History    Marital status:      Spouse name: N/A    Number of children: N/A    Years of education: N/A     Social History Main Topics    Smoking status: Current Every Day Smoker     Packs/day: 1.00    Smokeless tobacco: Never Used    Alcohol use Yes      Comment: daily, liquor    Drug use: No    Sexual activity: Not on file     Other Topics Concern    Not on file     Social History Narrative       Home Medications:  Prior to Admission medications    Medication Sig Start Date End Date Taking? Authorizing Provider   oxyCODONE IR (ROXICODONE) 5 mg immediate release tablet Take 2 Tabs by mouth every six (6) hours as needed for Pain. Max Daily Amount: 40 mg. 9/16/17   Baldo Dixon DO   ondansetron (ZOFRAN ODT) 8 mg disintegrating tablet Take 1 Tab by mouth every eight (8) hours as needed for Nausea. 9/12/17   Khushboo Duenas MD   ibuprofen (MOTRIN) 600 mg tablet Take 1 Tab by mouth every six (6) hours as needed for Pain. 11/4/16   Yonathan Senior NP   lipase-protease-amylase (ZENPEP 10,000) capsule Take 1 Cap by mouth three (3) times daily (with meals). 2/25/16   Saige Hawkins MD   diazepam (VALIUM) 5 mg tablet Take 0.5 Tabs by mouth every eight (8) hours as needed for Anxiety. Max Daily Amount: 7.5 mg. 2/25/16   Saige Hawkins MD   silver sulfADIAZINE (SILVADENE) 1 % topical cream Apply  to affected area two (2) times a day. 2/25/16   Saige Hawkins MD   Thiamine Mononitrate (B-1) 100 mg tablet Take 1 Tab by mouth daily. 2/25/16   Saige Hawkins MD   albuterol (PROVENTIL VENTOLIN) 2.5 mg /3 mL (0.083 %) nebulizer solution 1.5 mL by Nebulization route every four (4) hours as needed for Wheezing or Shortness of Breath. 2/25/16   Saige Hawkins MD   Nebulizer & Compressor machine 1 Each by Does Not Apply route every six (6) hours as needed. 2/25/16   Saige Hawkins MD   cloNIDine HCl (CATAPRES) 0.2 mg tablet Take 0.5 Tabs by mouth two (2) times a day. 2/25/16   Saige Hawkins MD   QUETIAPINE FUMARATE (QUETIAPINE PO) Take 3 Tabs by mouth daily. Patient states she takes 3 tablets of the 200 mg strength to equal 600 mg total dose once a day    Historical Provider   ipratropium-albuterol (COMBIVENT)  mcg/actuation inhaler Take 2 Puffs by inhalation every six (6) hours. Historical Provider   gabapentin (NEURONTIN) 600 mg tablet Take 600 mg by mouth four (4) times daily.     Historical Provider   buPROPion XL (WELLBUTRIN XL) 300 mg XL tablet Take 300 mg by mouth every morning. Nawaf Clemente MD   topiramate (TOPAMAX) 200 mg tablet Take 100 mg by mouth daily. Nawaf Clemente MD       Allergies:   Allergies   Allergen Reactions    Lithium Anaphylaxis    Elavil Other (comments)     Left leg went numb    Morphine Hives           Physical Exam:     Visit Vitals    /82    Pulse (!) 127    Resp 18    SpO2 96%       Physical Exam:  General appearance: alert, cooperative, no distress, appears stated age  Head: Normocephalic, without obvious abnormality, atraumatic  Neck: supple, trachea midline  Lungs: clear to auscultation bilaterally  Heart: regular rate and rhythm, S1, S2 normal, no murmur, click, rub or gallop  Abdomen: soft, mild diffuse b/l lower quadrant pain  Extremities: extremities normal, atraumatic, no cyanosis or edema  Skin: Skin color, texture, turgor normal. No rashes or lesions  Neurologic: Grossly normal    Intake and Output:  Current Shift:     Last three shifts:       Lab/Data Reviewed:  CMP:   Lab Results   Component Value Date/Time     (L) 03/08/2018 04:40 AM    K 3.2 (L) 03/08/2018 04:40 AM    CL 68 (L) 03/08/2018 04:40 AM    CO2 29 03/08/2018 04:40 AM    AGAP 30 (H) 03/08/2018 04:40 AM    GLU 86 03/08/2018 04:40 AM    BUN 46 (H) 03/08/2018 04:40 AM    CREA 1.18 03/08/2018 04:40 AM    GFRAA 58 (L) 03/08/2018 04:40 AM    GFRNA 48 (L) 03/08/2018 04:40 AM    CA 17.5 (HH) 03/08/2018 04:40 AM    ALB 3.8 03/08/2018 04:40 AM    TP 7.2 03/08/2018 04:40 AM    GLOB 3.4 03/08/2018 04:40 AM    AGRAT 1.1 03/08/2018 04:40 AM    SGOT 86 (H) 03/08/2018 04:40 AM    ALT 40 03/08/2018 04:40 AM     CBC:   Lab Results   Component Value Date/Time    WBC 14.2 (H) 03/08/2018 04:40 AM    HGB 14.5 03/08/2018 04:40 AM    HCT 41.9 03/08/2018 04:40 AM     03/08/2018 04:40 AM     All Cardiac Markers in the last 24 hours:   Lab Results   Component Value Date/Time     03/08/2018 04:40 AM    CKMB 5.4 (H) 03/08/2018 04:40 AM    CKND1 4.8 (H) 03/08/2018 04:40 AM    TROIQ 0.02 03/08/2018 04:40 AM         Florence Leo MD    March 8, 2018

## 2018-03-08 NOTE — ED NOTES
Verbal shift change report given to [address] (oncoming nurse) by Cori Rodriguez RN (offgoing nurse). Report included the following information SBAR.

## 2018-03-08 NOTE — CONSULTS
Consult Note    Patient: Real Rojas               Sex: female          DOA: 3/8/2018         YOB: 1965      Age:  47 y.o.        LOS:  LOS: 0 days              HPI:     Real Rojas is a 47 y.o. female with a hx of known alcohol abuse, and previous pancreatitis admitted early this morning with a three day hx of a severe, constant, sharp, epigastric pain spreading throughout the abdomen, with nausea and dark red and coffee-ground emesis. The patient sustained a syncopal episode at home and was found by her son who called emergency services who brought her to the ER. The patient admitted to drinking a gallon of alcohol over two days prior to the onset of the sx. Past Medical History:   Diagnosis Date    Alcohol abuse     Anxiety     Asthma     Bipolar disorder ([address]     Common migraine     [facility]     Depression     Esophageal reflux     Gout     Hypertension     Inverted nipple     Left breast always have. Past Surgical History:   Procedure Laterality Date    HX CARPAL TUNNEL RELEASE      HX GYN      tubal ligation    HX HEENT      HX LUMBAR LAMINECTOMY      HX ORTHOPAEDIC         Family History   Problem Relation Age of Onset    Family history unknown: Yes       Social History     Social History    Marital status:      Spouse name: N/A    Number of children: N/A    Years of education: N/A     Social History Main Topics    Smoking status: Current Every Day Smoker     Packs/day: 1.00    Smokeless tobacco: Never Used    Alcohol use Yes      Comment: daily, liquor    Drug use: No    Sexual activity: Not on file     Other Topics Concern    Not on file     Social History Narrative       Prior to Admission medications    Medication Sig Start Date End Date Taking? Authorizing Provider   pantoprazole (PROTONIX) 40 mg tablet Take 40 mg by mouth daily.    Yes Historical Provider   ibuprofen (MOTRIN) 600 mg tablet Take 1 Tab by mouth every six (6) hours as needed for Pain. 11/4/16  Yes Ashley Clark NP   lipase-protease-amylase (ZENPEP 10,000) capsule Take 1 Cap by mouth three (3) times daily (with meals). Patient taking differently: Take 1 Cap by mouth three (3) times daily (with meals). Indications: takes the creon 2/25/16  Yes Brandie Root MD   Thiamine Mononitrate (B-1) 100 mg tablet Take 1 Tab by mouth daily. 2/25/16  Yes Brandie Root MD   cloNIDine HCl (CATAPRES) 0.2 mg tablet Take 0.5 Tabs by mouth two (2) times a day. 2/25/16  Yes Brandie Root MD   QUETIAPINE FUMARATE (QUETIAPINE PO) Take 3 Tabs by mouth daily. Patient states she takes 3 tablets of the 200 mg strength to equal 600 mg total dose once a day   Yes Historical Provider   ipratropium-albuterol (COMBIVENT)  mcg/actuation inhaler Take 2 Puffs by inhalation every six (6) hours. Yes Historical Provider   topiramate (TOPAMAX) 200 mg tablet Take 100 mg by mouth daily. Yes Nawaf Clemente MD   oxyCODONE IR (ROXICODONE) 5 mg immediate release tablet Take 2 Tabs by mouth every six (6) hours as needed for Pain. Max Daily Amount: 40 mg. 9/16/17   Leydi Rodriguez DO   ondansetron (ZOFRAN ODT) 8 mg disintegrating tablet Take 1 Tab by mouth every eight (8) hours as needed for Nausea. 9/12/17   Nicolas Adrian MD   diazepam (VALIUM) 5 mg tablet Take 0.5 Tabs by mouth every eight (8) hours as needed for Anxiety. Max Daily Amount: 7.5 mg. 2/25/16   Brandie Root MD   silver sulfADIAZINE (SILVADENE) 1 % topical cream Apply  to affected area two (2) times a day. 2/25/16   Brandie Root MD   albuterol (PROVENTIL VENTOLIN) 2.5 mg /3 mL (0.083 %) nebulizer solution 1.5 mL by Nebulization route every four (4) hours as needed for Wheezing or Shortness of Breath. 2/25/16   Brandie Root MD   Nebulizer & Compressor machine 1 Each by Does Not Apply route every six (6) hours as needed.  2/25/16   Brandie Root MD   gabapentin (NEURONTIN) 600 mg tablet Take 600 mg by mouth four (4) times daily. Historical Provider   buPROPion XL (WELLBUTRIN XL) 300 mg XL tablet Take 300 mg by mouth every morning. Nawaf Clemente, MD       Allergies   Allergen Reactions    Lithium Anaphylaxis    Elavil Other (comments)     Left leg went numb    Morphine Hives       Review of Systems  A comprehensive review of systems was negative except for that written in the History of Present Illness. Physical Exam:      Visit Vitals    /86    Pulse (!) 129    Temp 98.1 °F (36.7 °C)    Resp 17    Ht 5' 2\" (1.575 m)    Wt 49.1 kg (108 lb 3.2 oz)    SpO2 96%    BMI 19.79 kg/m2       Physical Exam:  Constitutional: negative  Eyes: negative  Ears, nose, mouth, throat, and face: negative  Respiratory: negative  Cardiovascular: negative  Gastrointestinal: soft, diffusely tender, but especially in the epigastrium, no rebound, no masses.   Genitourinary:negative  Integument/breast: negative  Hematologic/lymphatic: negative  Musculoskeletal:negative  Neurological: negative    Labs Reviewed:  CMP:   Lab Results   Component Value Date/Time     (L) 03/08/2018 04:40 AM    K 3.2 (L) 03/08/2018 04:40 AM    CL 68 (L) 03/08/2018 04:40 AM    CO2 29 03/08/2018 04:40 AM    AGAP 30 (H) 03/08/2018 04:40 AM    GLU 86 03/08/2018 04:40 AM    BUN 46 (H) 03/08/2018 04:40 AM    CREA 1.18 03/08/2018 04:40 AM    GFRAA 58 (L) 03/08/2018 04:40 AM    GFRNA 48 (L) 03/08/2018 04:40 AM    CA 17.5 (HH) 03/08/2018 08:50 AM    MG 1.9 03/08/2018 08:50 AM    PHOS 4.6 03/08/2018 08:50 AM    ALB 3.8 03/08/2018 04:40 AM    TP 7.2 03/08/2018 04:40 AM    GLOB 3.4 03/08/2018 04:40 AM    AGRAT 1.1 03/08/2018 04:40 AM    SGOT 86 (H) 03/08/2018 04:40 AM    ALT 40 03/08/2018 04:40 AM     CBC:   Lab Results   Component Value Date/Time    WBC 14.2 (H) 03/08/2018 04:40 AM    HGB 14.5 03/08/2018 04:40 AM    HCT 41.9 03/08/2018 04:40 AM     03/08/2018 04:40 AM       Lab Results   Component Value Date/Time    APTT <20.0 (L) 03/08/2018 04:40 AM    PTP 12.4 03/08/2018 04:40 AM    INR 1.0 03/08/2018 04:40 AM     Liver Panel:   Lab Results   Component Value Date/Time    ALB 3.8 03/08/2018 04:40 AM    TP 7.2 03/08/2018 04:40 AM    GLOB 3.4 03/08/2018 04:40 AM    AGRAT 1.1 03/08/2018 04:40 AM    SGOT 86 (H) 03/08/2018 04:40 AM    ALT 40 03/08/2018 04:40 AM    AP 90 03/08/2018 04:40 AM     Pancreatic Markers:   Lab Results   Component Value Date/Time    LPSE 2172 (H) 03/08/2018 04:40 AM       Imaging:  plain films abdomen unremarkable (left renal calculus). Assessment/Plan   Wilian Gonzales is a 48 yo alcoholic woman admitted with exacerbation of pancreatitis and profound intravascular volume depletion related to pancreatitis and vomiting. The hematemesis is likely secondary to a MW tear. Acid peptic disease or variceal bleeding are additional considerations. She is hemodynamically stable at this time. Aggressive hydration is critical in the first 24-48 hours of presentation. Change IV to Lactated Ringers at 250 cc/hour. Monitor labs closely  Continue PPI therapy. Dilaudid for pain. Titrate dose per attending. EGD tomorrow. CT of the abdomen down the road if she does not improve.      Teddy Gomez MD.

## 2018-03-08 NOTE — PROGRESS NOTES
Nutrition initial assessment/Plan of care    RECOMMENDATIONS:     1. NPO; Advance diet when medically indicated  2. Monitor weight, labs and PO intake  3. RD to follow     GOALS:     1. PO intake meets >75% of protein/calorie needs by 3/13  2. Weight Maintenance (+/- 1-2 lb by 3/13)       ASSESSMENT:     Weight status is classified as normal per BMI of 19.8. However, patient with 12.9% unintentional weight loss over past 1.5 months. Currently not meeting nutrition needs due to NPO diet order. Labs noted. Nutrition recommendations listed. RD to follow. Nutrition Diagnoses: Altered nutrition related lab values related to pancreatitis as evidenced by lipase level of 2172. Unintentional weight loss related to excessive alcohol intake as evidenced by 12.9% weight loss over past 1.5 months. Nutrition Risk:  [] High  [x] Moderate []  Low    SUBJECTIVE/OBJECTIVE:      Admitted with acute pancreatitis and alcohol withdrawal syndrome. Patient reports she is detoxing from alcohol and has lost 16 lb in past 1.5 months. States weight was 124 lb 1.5 months ago. Reports not eating anything for past 3 days and had poor appetite prior to 3-day time period. No known food allergies. Will monitor diet advancement. Information Obtained from:    [x] Chart Review   [x] Patient   [] Family/Caregiver   [] Nurse/Physician   [] Interdisciplinary Meeting/Rounds    Diet: NPO  Medications: [x] Reviewed    Allergies: [x] Reviewed   Encounter Diagnoses     ICD-10-CM ICD-9-CM   1. Syncope, unspecified syncope type R55 780.2   2. Alcohol withdrawal syndrome without complication (HCC) V58.404 291.81   3. Gastrointestinal hemorrhage with hematemesis K92.0 578.0   4. Hyponatremia E87.1 276.1   5. Hypercalcemia E83.52 275.42   6. Alcohol-induced acute pancreatitis, unspecified complication status L30.82 577.0   7.  Hypokalemia E87.6 276.8     Past Medical History:   Diagnosis Date    Alcohol abuse     Anxiety     Asthma     B[facility] disorder ([facility] [address]     Common migraine     COPD (chronic obstructive pulmonary disease) (HCC)     Depression     Esophageal reflux     Gout     Hypertension     Inverted nipple     Left breast always have. Labs:    Lab Results   Component Value Date/Time    Sodium 127 (L) 03/08/2018 04:40 AM    Potassium 3.2 (L) 03/08/2018 04:40 AM    Chloride 68 (L) 03/08/2018 04:40 AM    CO2 29 03/08/2018 04:40 AM    Anion gap 30 (H) 03/08/2018 04:40 AM    Glucose 86 03/08/2018 04:40 AM    BUN 46 (H) 03/08/2018 04:40 AM    Creatinine 1.18 03/08/2018 04:40 AM    Calcium PENDING 03/08/2018 08:50 AM    Magnesium 1.9 03/08/2018 08:50 AM    Phosphorus 4.6 03/08/2018 08:50 AM    Albumin 3.8 03/08/2018 04:40 AM     Anthropometrics: BMI (calculated): 19.8  Last 3 Recorded Weights in this Encounter    03/08/18 0808   Weight: 49.1 kg (108 lb 3.2 oz)      Ht Readings from Last 1 Encounters:   03/08/18 5' 2\" (1.575 m)     No data found. IBW: 110 lb %IBW: 98% UBW: 124lb %UBW: 87%   [x] Weight Loss (12.9% x 1.5 mth) [] Weight Gain [] Weight Stable    Estimated Nutrition Needs: [x] MSJ  [] Other:  Calories: 1365 Kcal Based on:   [x] Actual BW    Protein:   60 g Based on:   [x] Actual BW    Fluid:       1500 ml Based on:   [x] Actual BW      [x] No Cultural, Christianity or ethnic dietary need identified.     [] Cultural, Christianity and ethnic food preferences identified and addressed     Wt Status:  [x] Normal (18.6 - 24.9) [] Underweight (<18.5) [] Overweight (25 - 29.9) [] Mild Obesity (30 - 34.9)  [] Moderate Obesity (35 - 39.9) [] Morbid Obesity (40+)     Nutrition Problems Identified:   [x] Suboptimal PO intake vs NPO  [] Food Allergies  [] Difficulty chewing/swallowing/poor dentition  [] Constipation/Diarrhea   [x] Nausea/Vomiting   [] None  [] Other:     Plan:   [] Therapeutic Diet  []  Obtained/adjusted food preferences/tolerances and/or snacks options   []  Supplements added   [] Occupational therapy following for feeding techniques  []  HS snack added   []  Modify diet texture   []  Modify diet for food allergies   []  Educate patient   []  Assist with menu selection   []  Monitor PO intake on meal rounds   [x]  Continue inpatient monitoring and intervention   []  Participated in discharge planning/Interdisciplinary rounds/Team meetings   []  Other:     Education Needs:   [x] Not appropriate for teaching at this time due to: NPO   [] Identified and addressed    Nutrition Monitoring and Evaluation:  [x] Continue ongoing monitoring and intervention  [] Other    Abdias Kaba

## 2018-03-08 NOTE — ROUTINE PROCESS
0740-received patient from ED, oriented to room, initial assessment completed, tele-box applied by [facility], call bell within reach, no distress noted. 0943-am due medications given. 1238-medicated for pain per patient request. No changes in condition. 1330-patient taken to [facility] via stretcher. 1440-patient returned from [facility], tele-box reapplied. 1645-no change in condition, no distress noted, medicated for pain per patient request.  1800-pm due medications given. 1945-Bedside and Verbal shift change report given to Fransisca gonzalez (oncoming nurse) by Ortiz Duran (offgoing nurse). Report included the following information SBAR, MAR and Recent Results.

## 2018-03-08 NOTE — PROGRESS NOTES
[facility]   Discharge Planning/ Assessment    Reasons for Intervention: Spoke with patient in room, she stated that she lives with her spouse. She verified demographics, PCP,  Insurance. She stated that  she uses oxygen at home supplied via [employer] and has a portable and concentrator at home. She also has a nebulizer at home. She stated that she has been independent with ADL's. She plans to return home upon discharge and may be interested in [facility].     Plan Home with [facility]    High Risk Criteria  [x] Yes  []No   Physician Referral  [] Yes  []No        Date    Nursing Referral  [] Yes  []No        Date    Patient/Family Request  [] Yes  []No        Date       Resources:    Medicare  [x] Yes  []No   Medicaid  [] Yes  []No   No Resources  [] Yes  []No   Private Insurance  [x] Yes  []No    Name/Phone Number    Other  [] Yes  []No        (i.e. Workman's Comp)         Prior Services:    Prior Services  [] Yes  [x]No   Home Health  [] Yes  []No   [facility]  [] Yes  []No        Number of [address]  [] Yes  []No       Meals on Wheels  [] Yes  []No   Office on Aging  [] Yes  []No   Transportation Services  [] Yes  []No   Nursing Home  [] Yes  []No        Nursing Home Name    [address]  [] Yes  []No        [address] Name    Other       Information Source:      Information obtained from  [x] Patient  [] Parent   [] [address]  [] Child  [] Spouse   [] Significant Other/Partner   [] Friend      [] EMS    [] Nursing Home Chart          [] Other:   Chart Review  [x] Yes  []No     Family/Support System:    Patient lives with  [] Alone    [x] Spouse   [] Significant Other  [] Children  [] Caretaker   [] Parent  [] Sibling     [] Other       Other Support System:    Is the patient responsible for care of others  [] Yes  []No   Information of person caring for patient on  discharge    Managers financial affairs independently  [] Yes  []No   If no, explain:      Status Prior to Admission:    Mental Status  [x] Awake  [x] Alert  [x] Oriented  [] Quiet/Calm [] Lethargic/Sedated   [] Disoriented  [] Restless/Anxious  [] Combative   Personal Care  [] Dependent  [x] Independent Personal Care  [] Requires Assistance   Meal Preparation Ability  [x] Independent   [] Standby Assistance   [] Minimal Assistance   [] Moderate Assistance  [] Maximum Assistance     [] Total Assistance   Chores  [x] Independent with Chores   [] N/A Nursing Home Resident   [] Requires Assistance   Bowel/Bladder  [] Continent  [] Catheter  [] Incontinent  [] Ostomy Self-Care    [] Urine Diversion Self-Care  [] Maximum Assistance     [] Total Assistance   Number of Persons needed for assistance    DME at home  [] Zohra Cheantwan, Thuan Pastures  [] Maralyn Cheadle, Straight   [] Commode    [] Bathroom/Grab Bars  [] Hospital Bed  [x] Nebulizer  [] Oxygen           [] Raised Toilet Seat  [] Shower Chair  [] Side Rails for Bed   [] Tub Transfer Bench   [] Wolfgang Pierre  [] Polk Serum, Standard      [] Other:   Vendor      Treatment Presently Receiving:    Current Treatments  [] Chemotherapy  [] Dialysis  [] Insulin  [] IVAB [] IVF   [] O2  [] PCA   [] PT   [] RT   [] Tube Feedings   [] Wound Care     Psychosocial Evaluation:    Verbalized Knowledge of Disease Process  [] Patient  []Family   Coping with Disease Process  [] Patient  []Family   Requires Further Counseling Coping with Disease Process  [] Patient  []Family     Identified Projected Needs:    Home Health Aid  [] Yes  []No   Transportation  [] Yes  []No   Education  [] Yes  []No        Specific Education     Financial Counseling  [] Yes  []No   Inability to Care for Self/Will Require 24 hour care  [] Yes  []No   Pain Management  [] Yes  []No   Home Infusion Therapy  [] Yes  []No   Oxygen Therapy  [] Yes  []No   DME  [] Yes  []No   Long Term Care Placement  [] Yes  []No   Rehab  [] Yes  []No   Physical Therapy  [] Yes  []No   Needs Anticipated At This Time  [] Yes  []No Intra-Hospital Referral:    [address]  [] Yes  []No     [] Yes  []No   Patient Representative  [] Yes  []No   Staff for Teaching Needs  [] Yes  []No   Specialty Teaching Needs     Diabetic Educator  [] Yes  []No   Referral for Diabetic Educator Needed  [] Yes  []No  If Yes, place order for Nutritionist or Diabetic Consult     Tentative Discharge Plan:    Home with No Services  [] Yes  []No   Home with [address]  [] Yes  []No        If Yes, specify type    [address]  [] Yes  []No        If Yes, specify type    Meals on Wheels  [] Yes  []No   Office of Aging  [] Yes  []No   NHP  [] Yes  []No   Return to the Nursing Home  [] Yes  []No   Rehab Therapy  [] Yes  []No   Acute Rehab  [] Yes  []No   Subacute Rehab  [] Yes  []No   Private Care  [] Yes  []No   Substance Abuse Referral  [] Yes  []No   Transportation  [] Yes  []No   Chore Service  [] Yes  []No   Inpatient Hospice  [] Yes  []No   OP RT  [] Yes  [] No   OP Hemo  [] Yes  [] No   OP PT  [] Yes  []No   Support Group  [] Yes  []No   Reach to Recovery  [] Yes  []No   OP Oncology Clinic  [] Yes  []No   Clinic Appointment  [] Yes  []No   DME  [] Yes  []No   Comments    Name of D/C Planner or  Given to Patient or [facility] [address]  [phone]   Phone Number         Extension    Date 3/8/17   Time    If you are discharged home, whom do you designate to participate in your discharge plan and receive any information needed?      Enter name of designee         Phone # of designee         Address of designee         Updated         Patient refused to designate any           individual

## 2018-03-08 NOTE — ED TRIAGE NOTES
Pt presents by EMS with c/o syncope after trying to go to the bathroom tonight. Pt states she woke up in the bathtub. States she has been drinking today. Drinks daily, and has been having severe abd pains with coffee grounds emesis x 2 days.

## 2018-03-08 NOTE — PROGRESS NOTES
IDR Summary      Patient: Ha Lacy MRN: [id number]    Age: 47 y.o.  : 1965     Admit Diagnosis: Syncope      Problems pertinent to hospital stay:     Consults:Case Management     Testing due for patient today? YES    Nutrition plan:Yes     Mobility needs: Yes      Lines/Tubes:   IV: YES   Needed: YES  Stanley: NO  Needed:NO  Central Line: NO Needed: NO      VTE Prophylaxis: Mechanical    Influenza Vaccine received? NO- to receive prior to discharge       Care Management:  Discharge plan: home    PCP: Severiano Blessing., MD    : NO  Financial concerns:No   Interventions:       LOS: 0 days     Expected days until discharge: TBD      Signed:      [facility]  Hospitalist Division  Pager:  [phone]  Office:  [phone]

## 2018-03-08 NOTE — ED PROVIDER NOTES
EMERGENCY DEPARTMENT HISTORY AND PHYSICAL EXAM    5:05 AM      Date: 3/8/2018  Patient Name: Frankey Pacini    History of Presenting Illness     Chief Complaint   Patient presents with    Syncope    Abdominal Pain    Vomiting         History Provided By: Patient and EMS    Chief Complaint: Fall, Abdominal pain  Duration:  PTA  Timing:  Acute  Location: Diffuse abdomin  Quality: Cramping  Severity: N/A  Modifying Factors: No identifiable modifying factors   Associated Symptoms: Exhibits nausea and emesis; Denies diarrhea, CP, SOB, and diarrhea      Additional History (Context): Frankey Pacini is a 47 y.o. female with HTN, asthma, COPD, gout, GERD, EtOH abuse, bipolar, anxiety, and depression who presents to the ED for evaluation following an unwitnessed fall PTA. Per EMS, pt was in a bathroom of her home this morning when her son heard a \"thud\"; pt's son went to the bathroom and found that the pt had fallen backwards, landing in the bathtub. Pt remembers going to the bathroom but does not remember falling, however she was A&O upon EMS arrival. Pt is a chronic alcoholic and notes she has been trying to detoxify from alcohol. Although she has been trying to \"slowly wean off\" she admits to drinking two gallons of vodka over the last seven days. Pt does not think she hit her head because she has no pain in her head at this time. Pt has been nauseous since the fall and has experienced multiple episodes of very dark emesis. Pt takes a baby Asprin daily however she denies taking blood thinners; pt has also taken one tylenol everyday for the last four days for her abdominal pain. Pt denies diarrhea, CP, SOB, and diarrhea. Pt's last drink was about three hours ago and she denies seizures and hallucinations. Pt notes she was diagnosed with esophagitis early last week by her GI doctor.     PCP: Jimena Jimenez MD    Current Facility-Administered Medications   Medication Dose Route Frequency Provider Last Rate Last Dose    promethazine (PHENERGAN) 12.5 mg in 0.9% sodium chloride 50 mL IVPB  12.5 mg IntraVENous ONCE Ruby Lincoln MD   12.5 mg at 03/08/18 0546    HYDROmorphone (PF) (DILAUDID) injection 0.5 mg  0.5 mg IntraVENous Buzz Chavira MD        0.9% sodium chloride 1,000 mL with mvi, adult no. 4 with vit K 10 mL, thiamine 694 mg, folic acid 1 mg infusion   IntraVENous ONCE Clifford Rios MD        sodium chloride (NS) flush 5-10 mL  5-10 mL IntraVENous Q8H Clifford Rios MD        sodium chloride (NS) flush 5-10 mL  5-10 mL IntraVENous PRN Clifford Rios MD        LORazepam (ATIVAN) tablet 1 mg  1 mg Oral Q1H PRN Ruby Lincoln MD        Or    LORazepam (ATIVAN) injection 1 mg  1 mg IntraVENous Q1H PRN Clifford Rios MD        LORazepam (ATIVAN) tablet 2 mg  2 mg Oral Q1H PRN Ruby Lincoln MD        Or    LORazepam (ATIVAN) injection 2 mg  2 mg IntraVENous Q1H PRN Clifford Rios MD        LORazepam (ATIVAN) injection 3 mg  3 mg IntraVENous Q15MIN PRN Ruby Lincoln MD         Current Outpatient Prescriptions   Medication Sig Dispense Refill    oxyCODONE IR (ROXICODONE) 5 mg immediate release tablet Take 2 Tabs by mouth every six (6) hours as needed for Pain. Max Daily Amount: 40 mg. 20 Tab 0    ondansetron (ZOFRAN ODT) 8 mg disintegrating tablet Take 1 Tab by mouth every eight (8) hours as needed for Nausea. 6 Tab 0    ibuprofen (MOTRIN) 600 mg tablet Take 1 Tab by mouth every six (6) hours as needed for Pain. 20 Tab 0    lipase-protease-amylase (ZENPEP 10,000) capsule Take 1 Cap by mouth three (3) times daily (with meals). 200 Cap 5    diazepam (VALIUM) 5 mg tablet Take 0.5 Tabs by mouth every eight (8) hours as needed for Anxiety. Max Daily Amount: 7.5 mg. 20 Tab 0    silver sulfADIAZINE (SILVADENE) 1 % topical cream Apply  to affected area two (2) times a day. 50 g 0    Thiamine Mononitrate (B-1) 100 mg tablet Take 1 Tab by mouth daily.  7 Tab 0    albuterol (PROVENTIL VENTOLIN) 2.5 mg /3 mL (0.083 %) nebulizer solution 1.5 mL by Nebulization route every four (4) hours as needed for Wheezing or Shortness of Breath. 24 Each 0    Nebulizer & Compressor machine 1 Each by Does Not Apply route every six (6) hours as needed. 1 Each 0    cloNIDine HCl (CATAPRES) 0.2 mg tablet Take 0.5 Tabs by mouth two (2) times a day. 30 Tab 0    QUETIAPINE FUMARATE (QUETIAPINE PO) Take 3 Tabs by mouth daily. Patient states she takes 3 tablets of the 200 mg strength to equal 600 mg total dose once a day      ipratropium-albuterol (COMBIVENT)  mcg/actuation inhaler Take 2 Puffs by inhalation every six (6) hours.  gabapentin (NEURONTIN) 600 mg tablet Take 600 mg by mouth four (4) times daily.  buPROPion XL (WELLBUTRIN XL) 300 mg XL tablet Take 300 mg by mouth every morning.  topiramate (TOPAMAX) 200 mg tablet Take 100 mg by mouth daily. Past History     Past Medical History:  Past Medical History:   Diagnosis Date    Alcohol abuse     Anxiety     Asthma     [facility] disorder [facility] [address]     Common migraine     COPD (chronic obstructive pulmonary disease) ([facility] [address]     Depression     Esophageal reflux     Gout     Hypertension     Inverted nipple     Left breast always have. Past Surgical History:  Past Surgical History:   Procedure Laterality Date    HX CARPAL TUNNEL RELEASE      HX GYN      tubal ligation    HX HEENT      HX LUMBAR LAMINECTOMY      HX ORTHOPAEDIC         Family History:  Family History   Problem Relation Age of Onset    Family history unknown: Yes       Social History:  Social History   Substance Use Topics    Smoking status: Current Every Day Smoker     Packs/day: 1.00    Smokeless tobacco: Never Used    Alcohol use Yes      Comment: daily, liquor       Allergies: Allergies   Allergen Reactions    Lithium Anaphylaxis    Elavil Other (comments)     Left leg went numb    Morphine Hives         Review of Systems     Review of Systems   Constitutional: Negative for chills.    HENT: Negative for congestion and sore throat. Respiratory: Negative for cough and shortness of breath. Cardiovascular: Negative for chest pain. Gastrointestinal: Positive for abdominal pain, nausea and vomiting. Negative for diarrhea. Genitourinary: Negative for dysuria. Musculoskeletal: Negative for back pain. Skin: Negative for rash. Neurological: Negative for dizziness and headaches. All other systems reviewed and are negative. Physical Exam     Visit Vitals    /82    Pulse (!) 127    Resp 18    LMP 04/01/2013    SpO2 96%       Physical Exam   Constitutional: She is oriented to person, place, and time. She appears well-developed and well-nourished. Non-toxic appearance. She appears ill (chronic). She appears distressed. HENT:   Head: Normocephalic and atraumatic. Mouth/Throat: Oropharynx is clear and moist.   Eyes: Conjunctivae and EOM are normal. Pupils are equal, round, and reactive to light. Right eye exhibits no discharge. Left eye exhibits no discharge. Neck: Normal range of motion. Neck supple. Cardiovascular: Regular rhythm, normal heart sounds and intact distal pulses. Tachycardia present. Exam reveals no gallop and no friction rub. No murmur heard. Pulmonary/Chest: Effort normal and breath sounds normal. No respiratory distress. She has no wheezes. She has no rales. Abdominal: Soft. Bowel sounds are normal. She exhibits no distension. There is tenderness in the epigastric area. There is no rebound and no guarding. Actively vomiting coffee ground emesis   Musculoskeletal: Normal range of motion. She exhibits no edema or tenderness. Lymphadenopathy:     She has no cervical adenopathy. Neurological: She is alert and oriented to person, place, and time. She has normal strength. No cranial nerve deficit or sensory deficit. Gait normal. GCS eye subscore is 4. GCS verbal subscore is 5. GCS motor subscore is 6. Skin: Skin is warm and dry. No rash noted.    Psychiatric: She has a normal mood and affect. Nursing note and vitals reviewed. Diagnostic Study Results     Labs -  Recent Results (from the past 12 hour(s))   CBC WITH AUTOMATED DIFF    Collection Time: 03/08/18  4:40 AM   Result Value Ref Range    WBC 14.2 (H) 4.6 - 13.2 K/uL    RBC 4.42 4.20 - 5.30 M/uL    HGB 14.5 12.0 - 16.0 g/dL    HCT 41.9 35.0 - 45.0 %    MCV 94.8 74.0 - 97.0 FL    MCH 32.8 24.0 - 34.0 PG    MCHC 34.6 31.0 - 37.0 g/dL    RDW 15.1 (H) 11.6 - 14.5 %    PLATELET 066 996 - 219 K/uL    MPV 9.3 9.2 - 11.8 FL    NEUTROPHILS 74 (H) 40 - 73 %    LYMPHOCYTES 21 21 - 52 %    MONOCYTES 5 3 - 10 %    EOSINOPHILS 0 0 - 5 %    BASOPHILS 0 0 - 2 %    ABS. NEUTROPHILS 10.4 (H) 1.8 - 8.0 K/UL    ABS. LYMPHOCYTES 3.0 0.9 - 3.6 K/UL    ABS. MONOCYTES 0.8 0.05 - 1.2 K/UL    ABS. EOSINOPHILS 0.0 0.0 - 0.4 K/UL    ABS. BASOPHILS 0.0 0.0 - 0.06 K/UL    DF AUTOMATED     METABOLIC PANEL, COMPREHENSIVE    Collection Time: 03/08/18  4:40 AM   Result Value Ref Range    Sodium 127 (L) 136 - 145 mmol/L    Potassium 3.2 (L) 3.5 - 5.5 mmol/L    Chloride 68 (L) 100 - 108 mmol/L    CO2 29 21 - 32 mmol/L    Anion gap 30 (H) 3.0 - 18 mmol/L    Glucose 86 74 - 99 mg/dL    BUN 46 (H) 7.0 - 18 MG/DL    Creatinine 1.18 0.6 - 1.3 MG/DL    BUN/Creatinine ratio 39 (H) 12 - 20      GFR est AA 58 (L) >60 ml/min/1.73m2    GFR est non-AA 48 (L) >60 ml/min/1.73m2    Calcium 17.5 (HH) 8.5 - 10.1 MG/DL    Bilirubin, total 0.7 0.2 - 1.0 MG/DL    ALT (SGPT) 40 13 - 56 U/L    AST (SGOT) 86 (H) 15 - 37 U/L    Alk.  phosphatase 90 45 - 117 U/L    Protein, total 7.2 6.4 - 8.2 g/dL    Albumin 3.8 3.4 - 5.0 g/dL    Globulin 3.4 2.0 - 4.0 g/dL    A-G Ratio 1.1 0.8 - 1.7     LIPASE    Collection Time: 03/08/18  4:40 AM   Result Value Ref Range    Lipase 2172 (H) 73 - 393 U/L   TYPE & SCREEN    Collection Time: 03/08/18  4:40 AM   Result Value Ref Range    Crossmatch Expiration 03/11/2018     ABO/Rh(D) PENDING     Antibody screen PENDING    ETHYL ALCOHOL Collection Time: 03/08/18  4:40 AM   Result Value Ref Range    ALCOHOL(ETHYL),SERUM 134 (H) 0 - 3 MG/DL   PROTHROMBIN TIME + INR    Collection Time: 03/08/18  4:40 AM   Result Value Ref Range    Prothrombin time 12.4 11.5 - 15.2 sec    INR 1.0 0.8 - 1.2     PTT    Collection Time: 03/08/18  4:40 AM   Result Value Ref Range    aPTT <20.0 (L) 23.0 - 36.4 SEC   CARDIAC PANEL,(CK, CKMB & TROPONIN)    Collection Time: 03/08/18  4:40 AM   Result Value Ref Range     26 - 192 U/L    CK - MB 5.4 (H) <3.6 ng/ml    CK-MB Index 4.8 (H) 0.0 - 4.0 %    Troponin-I, Qt. 0.02 0.0 - 0.045 NG/ML   EKG, 12 LEAD, INITIAL    Collection Time: 03/08/18  5:03 AM   Result Value Ref Range    Ventricular Rate 122 BPM    Atrial Rate 122 BPM    P-R Interval 130 ms    QRS Duration 86 ms    Q-T Interval 344 ms    QTC Calculation (Bezet) 490 ms    Calculated P Axis 69 degrees    Calculated R Axis 72 degrees    Calculated T Axis 68 degrees    Diagnosis       Sinus tachycardia  Possible Left atrial enlargement  ST abnormality, possible digitalis effect  Abnormal ECG  When compared with ECG of 14-FEB-2018 11:55,  ST no longer elevated in Inferior leads  Non-specific change in ST segment in Lateral leads         Radiologic Studies -   CT HEAD WO CONT    (Results Pending)   XR ABD ACUTE W 1 V CHEST    (Results Pending)         Medical Decision Making   I am the first provider for this patient. I reviewed the vital signs, available nursing notes, past medical history, past surgical history, family history and social history. Vital Signs-Reviewed the patient's vital signs. Pulse Oximetry Analysis -  96% on room air     Cardiac Monitor:  Rate: 127  Rhythm:  Sinus Tachycardia     EKG: Interpreted by the EP.    Time Interpreted: 5:03 AM   Rate: 122   Rhythm: Sinus Tachycardia    Interpretation: No STEMI    Records Reviewed: Nursing Notes and Old Medical Records (Time of Review: 5:05 AM)    ED Course: Progress Notes, Reevaluation, and Consults:    6:34 AM, 3/8/2018   Consult:  Discussed care with Dr. Amanda Espinosa, hospitalist. Standard discussion; including history of patients chief complaint, available diagnostic results, and treatment course. Agrees to admit. Provider Notes (Medical Decision Making): Pt with h/o etoh abuse presenting after questionable syncope at home with signs of upper GI bleed. Tachycardic on arrival and actively vomiting coffee ground emesis. Labs and IV established, fluid bolus given. CT head without acute process. EKG and cardiac panel reviewed. Syncope likely related to ETOH use. Vomiting improved. No free air on XR. Abdomen with epigastric tenderness, lipase elevated - h/o alcohol induced pancreatitis. No need for CT abd/pelv at this time. Pt meets SIRS criteria with WBC and HR. However, I do not feel her presentation is consistent with sepsis and feel her HR is likely from pain, dehydration from poor PO intake and start of alcohol withdrawal. Will hold on cultures and antibiotics at this time. Noted to have low Na and very high Ca. K replacement initiated. Pt also received banana bag and started on CIWA protocol. Procedures:   US guided IV  Date/Time: 3/8/2018 6:28 AM  Performed by: OLAF CARLSON  Authorized by: CARLSON, [employer]     Consent:     Consent obtained:  Verbal    Consent given by:  Patient    Risks discussed:  Bleeding, infection and pain    Alternatives discussed:  No treatment  Indications:     Indications:  Poor venous access   Pre-procedure details:     Skin preparation:  ChloraPrep    Preparation: Patient was prepped and draped in the usual sterile fashion    Post-procedure details:     Patient tolerance of procedure: Tolerated well, no immediate complications  Comments:      20 G diffusic placed in L AC under US guidance. Draws and flushes well, secured with tegaderm. Critical Care Time:   Total critical care time [60 min].  Total critical care time documented does not include time spent on separately billed procedures or the services of residents, students, nurses or physician assistants. I personally saw and examined the patient. I have reviewed all diagnostic interpretations and treatment plans as written. I was present for the key portions of any procedures performed and the inclusive time noted in any critical care statement. Critical care time includes patient management by me, time spent at the patients bedside, time to review lab and imaging results, discussing patient care, documentation in the medical record, and time spent with the family or caregiver. For Hospitalized Patients:    1. Hospitalization Decision Time:  The decision to hospitalize the patient was made by Dr. Tanisha Rodriguez at 6:34 AM  on 3/8/2018    2. Aspirin: Aspirin was not given because the patient did not present with a stroke at the time of their Emergency Department evaluation    Diagnosis     Clinical Impression:     ICD-10-CM ICD-9-CM   1. Syncope, unspecified syncope type R55 780.2   2. Alcohol withdrawal syndrome without complication (HCC) X11.618 291.81   3. Gastrointestinal hemorrhage with hematemesis K92.0 578.0   4. Hyponatremia E87.1 276.1   5. Hypercalcemia E83.52 275.42   6. Alcohol-induced acute pancreatitis, unspecified complication status N92.86 577.0   7. Hypokalemia E87.6 276.8         Disposition: Admit    Follow-up Information     None           Patient's Medications   Start Taking    No medications on file   Continue Taking    ALBUTEROL (PROVENTIL VENTOLIN) 2.5 MG /3 ML (0.083 %) NEBULIZER SOLUTION    1.5 mL by Nebulization route every four (4) hours as needed for Wheezing or Shortness of Breath. BUPROPION XL (WELLBUTRIN XL) 300 MG XL TABLET    Take 300 mg by mouth every morning. CLONIDINE HCL (CATAPRES) 0.2 MG TABLET    Take 0.5 Tabs by mouth two (2) times a day. DIAZEPAM (VALIUM) 5 MG TABLET    Take 0.5 Tabs by mouth every eight (8) hours as needed for Anxiety.  Max Daily Amount: 7.5 mg. GABAPENTIN (NEURONTIN) 600 MG TABLET    Take 600 mg by mouth four (4) times daily. IBUPROFEN (MOTRIN) 600 MG TABLET    Take 1 Tab by mouth every six (6) hours as needed for Pain. IPRATROPIUM-ALBUTEROL (COMBIVENT)  MCG/ACTUATION INHALER    Take 2 Puffs by inhalation every six (6) hours. LIPASE-PROTEASE-AMYLASE (ZENPEP 10,000) CAPSULE    Take 1 Cap by mouth three (3) times daily (with meals). NEBULIZER & COMPRESSOR MACHINE    1 Each by Does Not Apply route every six (6) hours as needed. ONDANSETRON (ZOFRAN ODT) 8 MG DISINTEGRATING TABLET    Take 1 Tab by mouth every eight (8) hours as needed for Nausea. OXYCODONE IR (ROXICODONE) 5 MG IMMEDIATE RELEASE TABLET    Take 2 Tabs by mouth every six (6) hours as needed for Pain. Max Daily Amount: 40 mg. QUETIAPINE FUMARATE (QUETIAPINE PO)    Take 3 Tabs by mouth daily. Patient states she takes 3 tablets of the 200 mg strength to equal 600 mg total dose once a day    SILVER SULFADIAZINE (SILVADENE) 1 % TOPICAL CREAM    Apply  to affected area two (2) times a day. THIAMINE MONONITRATE (B-1) 100 MG TABLET    Take 1 Tab by mouth daily. TOPIRAMATE (TOPAMAX) 200 MG TABLET    Take 100 mg by mouth daily. These Medications have changed    No medications on file   Stop Taking    No medications on file     _______________________________    Attestations:  [address] [place] [address] acting as a scribe for and in the presence of Melre Stafford MD      March 08, 2018 at 5:05 AM       Provider Attestation:      I personally performed the services described in the documentation, reviewed the documentation, as recorded by the scribe in my presence, and it accurately and completely records my words and actions.  March 08, 2018 at 5:05 AM - Merle Stafford MD    _______________________________

## 2018-03-08 NOTE — PROGRESS NOTES
1330: PT orders received and chart reviewed. Off floor for ECHO. Will follow up.   1354: 2nd PT attempt. Remains off the floor. Will follow up.      Thank you,     Tri Will, PT, DPT

## 2018-03-08 NOTE — PROGRESS NOTES
Problem: Discharge Planning  Goal: *Discharge to safe environment  Outcome: Progressing Towards Goal  Home with [facility]

## 2018-03-09 ENCOUNTER — APPOINTMENT (OUTPATIENT)
Dept: GENERAL RADIOLOGY | Age: 53
DRG: 380 | End: 2018-03-09
Attending: INTERNAL MEDICINE
Payer: MEDICARE

## 2018-03-09 ENCOUNTER — APPOINTMENT (OUTPATIENT)
Dept: CT IMAGING | Age: 53
DRG: 380 | End: 2018-03-09
Attending: INTERNAL MEDICINE
Payer: MEDICARE

## 2018-03-09 ENCOUNTER — ANESTHESIA EVENT (OUTPATIENT)
Dept: ENDOSCOPY | Age: 53
DRG: 380 | End: 2018-03-09
Payer: MEDICARE

## 2018-03-09 ENCOUNTER — ANESTHESIA (OUTPATIENT)
Dept: ENDOSCOPY | Age: 53
DRG: 380 | End: 2018-03-09
Payer: MEDICARE

## 2018-03-09 LAB
ALBUMIN SERPL-MCNC: 2.6 G/DL (ref 3.4–5)
ALBUMIN/GLOB SERPL: 1.2 {RATIO} (ref 0.8–1.7)
ALP SERPL-CCNC: 70 U/L (ref 45–117)
ALT SERPL-CCNC: 56 U/L (ref 13–56)
ANION GAP SERPL CALC-SCNC: 9 MMOL/L (ref 3–18)
APPEARANCE UR: CLEAR
AST SERPL-CCNC: 134 U/L (ref 15–37)
BACTERIA URNS QL MICRO: ABNORMAL /HPF
BASOPHILS # BLD: 0 K/UL (ref 0–0.06)
BASOPHILS NFR BLD: 0 % (ref 0–2)
BILIRUB SERPL-MCNC: 0.8 MG/DL (ref 0.2–1)
BILIRUB UR QL: NEGATIVE
BUN SERPL-MCNC: 49 MG/DL (ref 7–18)
BUN/CREAT SERPL: 49 (ref 12–20)
CALCIUM SERPL-MCNC: 13.9 MG/DL (ref 8.5–10.1)
CHLORIDE SERPL-SCNC: 89 MMOL/L (ref 100–108)
CO2 SERPL-SCNC: 34 MMOL/L (ref 21–32)
COLOR UR: YELLOW
CREAT SERPL-MCNC: 0.99 MG/DL (ref 0.6–1.3)
DIFFERENTIAL METHOD BLD: ABNORMAL
EOSINOPHIL # BLD: 0 K/UL (ref 0–0.4)
EOSINOPHIL NFR BLD: 0 % (ref 0–5)
EPITH CASTS URNS QL MICRO: NEGATIVE /LPF (ref 0–5)
ERYTHROCYTE [DISTWIDTH] IN BLOOD BY AUTOMATED COUNT: 15.3 % (ref 11.6–14.5)
GLOBULIN SER CALC-MCNC: 2.2 G/DL (ref 2–4)
GLUCOSE BLD STRIP.AUTO-MCNC: 92 MG/DL (ref 70–110)
GLUCOSE SERPL-MCNC: 105 MG/DL (ref 74–99)
GLUCOSE UR STRIP.AUTO-MCNC: NEGATIVE MG/DL
HCT VFR BLD AUTO: 36 % (ref 35–45)
HGB BLD-MCNC: 12.1 G/DL (ref 12–16)
HGB UR QL STRIP: NEGATIVE
KETONES UR QL STRIP.AUTO: 40 MG/DL
LEUKOCYTE ESTERASE UR QL STRIP.AUTO: ABNORMAL
LYMPHOCYTES # BLD: 1 K/UL (ref 0.9–3.6)
LYMPHOCYTES NFR BLD: 5 % (ref 21–52)
MCH RBC QN AUTO: 31.9 PG (ref 24–34)
MCHC RBC AUTO-ENTMCNC: 33.6 G/DL (ref 31–37)
MCV RBC AUTO: 95 FL (ref 74–97)
MONOCYTES # BLD: 0.5 K/UL (ref 0.05–1.2)
MONOCYTES NFR BLD: 3 % (ref 3–10)
NEUTS SEG # BLD: 17.6 K/UL (ref 1.8–8)
NEUTS SEG NFR BLD: 92 % (ref 40–73)
NITRITE UR QL STRIP.AUTO: NEGATIVE
PH UR STRIP: 6 [PH] (ref 5–8)
PLATELET # BLD AUTO: 243 K/UL (ref 135–420)
PMV BLD AUTO: 10.1 FL (ref 9.2–11.8)
POTASSIUM SERPL-SCNC: 4.3 MMOL/L (ref 3.5–5.5)
PROT SERPL-MCNC: 4.8 G/DL (ref 6.4–8.2)
PROT UR STRIP-MCNC: NEGATIVE MG/DL
RBC # BLD AUTO: 3.79 M/UL (ref 4.2–5.3)
RBC #/AREA URNS HPF: ABNORMAL /HPF (ref 0–5)
SODIUM SERPL-SCNC: 132 MMOL/L (ref 136–145)
SP GR UR REFRACTOMETRY: 1.02 (ref 1–1.03)
UROBILINOGEN UR QL STRIP.AUTO: 0.2 EU/DL (ref 0.2–1)
WBC # BLD AUTO: 19.2 K/UL (ref 4.6–13.2)
WBC URNS QL MICRO: ABNORMAL /HPF (ref 0–4)

## 2018-03-09 PROCEDURE — 82962 GLUCOSE BLOOD TEST: CPT

## 2018-03-09 PROCEDURE — 65660000000 HC RM CCU STEPDOWN

## 2018-03-09 PROCEDURE — 74011250636 HC RX REV CODE- 250/636

## 2018-03-09 PROCEDURE — 74011000258 HC RX REV CODE- 258: Performed by: INTERNAL MEDICINE

## 2018-03-09 PROCEDURE — 81001 URINALYSIS AUTO W/SCOPE: CPT | Performed by: INTERNAL MEDICINE

## 2018-03-09 PROCEDURE — 87086 URINE CULTURE/COLONY COUNT: CPT | Performed by: INTERNAL MEDICINE

## 2018-03-09 PROCEDURE — 77030020255 HC SOL INJ LR 1000ML BG

## 2018-03-09 PROCEDURE — 76040000019: Performed by: INTERNAL MEDICINE

## 2018-03-09 PROCEDURE — 74011250636 HC RX REV CODE- 250/636: Performed by: INTERNAL MEDICINE

## 2018-03-09 PROCEDURE — 85025 COMPLETE CBC W/AUTO DIFF WBC: CPT | Performed by: HOSPITALIST

## 2018-03-09 PROCEDURE — 74011000250 HC RX REV CODE- 250

## 2018-03-09 PROCEDURE — 74011000258 HC RX REV CODE- 258

## 2018-03-09 PROCEDURE — 88305 TISSUE EXAM BY PATHOLOGIST: CPT | Performed by: INTERNAL MEDICINE

## 2018-03-09 PROCEDURE — 87186 SC STD MICRODIL/AGAR DIL: CPT | Performed by: INTERNAL MEDICINE

## 2018-03-09 PROCEDURE — 74011250637 HC RX REV CODE- 250/637: Performed by: HOSPITALIST

## 2018-03-09 PROCEDURE — 76060000031 HC ANESTHESIA FIRST 0.5 HR: Performed by: INTERNAL MEDICINE

## 2018-03-09 PROCEDURE — 77030009426 HC FCPS BIOP ENDOSC BSC -B: Performed by: INTERNAL MEDICINE

## 2018-03-09 PROCEDURE — 74011250636 HC RX REV CODE- 250/636: Performed by: HOSPITALIST

## 2018-03-09 PROCEDURE — 71045 X-RAY EXAM CHEST 1 VIEW: CPT

## 2018-03-09 PROCEDURE — 77030011943

## 2018-03-09 PROCEDURE — 0DB38ZX EXCISION OF LOWER ESOPHAGUS, VIA NATURAL OR ARTIFICIAL OPENING ENDOSCOPIC, DIAGNOSTIC: ICD-10-PCS | Performed by: INTERNAL MEDICINE

## 2018-03-09 PROCEDURE — 77030032490 HC SLV COMPR SCD KNE COVD -B

## 2018-03-09 PROCEDURE — 87040 BLOOD CULTURE FOR BACTERIA: CPT | Performed by: INTERNAL MEDICINE

## 2018-03-09 PROCEDURE — 71250 CT THORAX DX C-: CPT

## 2018-03-09 PROCEDURE — 87077 CULTURE AEROBIC IDENTIFY: CPT | Performed by: INTERNAL MEDICINE

## 2018-03-09 PROCEDURE — C9113 INJ PANTOPRAZOLE SODIUM, VIA: HCPCS | Performed by: HOSPITALIST

## 2018-03-09 PROCEDURE — 36415 COLL VENOUS BLD VENIPUNCTURE: CPT | Performed by: HOSPITALIST

## 2018-03-09 PROCEDURE — 80053 COMPREHEN METABOLIC PANEL: CPT | Performed by: HOSPITALIST

## 2018-03-09 RX ORDER — SODIUM CHLORIDE, SODIUM LACTATE, POTASSIUM CHLORIDE, CALCIUM CHLORIDE 600; 310; 30; 20 MG/100ML; MG/100ML; MG/100ML; MG/100ML
250 INJECTION, SOLUTION INTRAVENOUS CONTINUOUS
Status: CANCELLED | OUTPATIENT
Start: 2018-03-09 | End: 2018-03-10

## 2018-03-09 RX ORDER — PROPOFOL 10 MG/ML
INJECTION, EMULSION INTRAVENOUS
Status: DISCONTINUED | OUTPATIENT
Start: 2018-03-09 | End: 2018-03-09 | Stop reason: HOSPADM

## 2018-03-09 RX ORDER — SODIUM CHLORIDE, SODIUM LACTATE, POTASSIUM CHLORIDE, CALCIUM CHLORIDE 600; 310; 30; 20 MG/100ML; MG/100ML; MG/100ML; MG/100ML
INJECTION, SOLUTION INTRAVENOUS
Status: DISCONTINUED | OUTPATIENT
Start: 2018-03-09 | End: 2018-03-09 | Stop reason: HOSPADM

## 2018-03-09 RX ORDER — PROPOFOL 10 MG/ML
INJECTION, EMULSION INTRAVENOUS AS NEEDED
Status: DISCONTINUED | OUTPATIENT
Start: 2018-03-09 | End: 2018-03-09 | Stop reason: HOSPADM

## 2018-03-09 RX ORDER — LIDOCAINE HYDROCHLORIDE 20 MG/ML
INJECTION, SOLUTION EPIDURAL; INFILTRATION; INTRACAUDAL; PERINEURAL AS NEEDED
Status: DISCONTINUED | OUTPATIENT
Start: 2018-03-09 | End: 2018-03-09 | Stop reason: HOSPADM

## 2018-03-09 RX ADMIN — HYDROMORPHONE HYDROCHLORIDE 0.5 MG: 2 INJECTION INTRAMUSCULAR; INTRAVENOUS; SUBCUTANEOUS at 01:40

## 2018-03-09 RX ADMIN — HYDROMORPHONE HYDROCHLORIDE 0.5 MG: 2 INJECTION INTRAMUSCULAR; INTRAVENOUS; SUBCUTANEOUS at 21:22

## 2018-03-09 RX ADMIN — Medication 100 MG: at 10:42

## 2018-03-09 RX ADMIN — HYDROMORPHONE HYDROCHLORIDE 0.5 MG: 2 INJECTION INTRAMUSCULAR; INTRAVENOUS; SUBCUTANEOUS at 05:59

## 2018-03-09 RX ADMIN — SODIUM CHLORIDE, SODIUM LACTATE, POTASSIUM CHLORIDE, AND CALCIUM CHLORIDE 250 ML/HR: 600; 310; 30; 20 INJECTION, SOLUTION INTRAVENOUS at 07:39

## 2018-03-09 RX ADMIN — SODIUM CHLORIDE, SODIUM LACTATE, POTASSIUM CHLORIDE, CALCIUM CHLORIDE: 600; 310; 30; 20 INJECTION, SOLUTION INTRAVENOUS at 16:40

## 2018-03-09 RX ADMIN — LIDOCAINE HYDROCHLORIDE 40 MG: 20 INJECTION, SOLUTION EPIDURAL; INFILTRATION; INTRACAUDAL; PERINEURAL at 16:46

## 2018-03-09 RX ADMIN — FOLIC ACID 1 MG: 1 TABLET ORAL at 10:42

## 2018-03-09 RX ADMIN — PROPOFOL 100 MG: 10 INJECTION, EMULSION INTRAVENOUS at 16:46

## 2018-03-09 RX ADMIN — THERA TABS 1 TABLET: TAB at 10:43

## 2018-03-09 RX ADMIN — Medication 10 ML: at 14:25

## 2018-03-09 RX ADMIN — Medication 10 ML: at 05:59

## 2018-03-09 RX ADMIN — Medication 10 ML: at 21:23

## 2018-03-09 RX ADMIN — PIPERACILLIN SODIUM,TAZOBACTAM SODIUM 3.38 G: 3; .375 INJECTION, POWDER, FOR SOLUTION INTRAVENOUS at 10:45

## 2018-03-09 RX ADMIN — SODIUM CHLORIDE, SODIUM LACTATE, POTASSIUM CHLORIDE, AND CALCIUM CHLORIDE 250 ML/HR: 600; 310; 30; 20 INJECTION, SOLUTION INTRAVENOUS at 03:29

## 2018-03-09 RX ADMIN — PROPOFOL 300 MCG/KG/MIN: 10 INJECTION, EMULSION INTRAVENOUS at 16:46

## 2018-03-09 RX ADMIN — PROPOFOL 100 MG: 10 INJECTION, EMULSION INTRAVENOUS at 16:48

## 2018-03-09 RX ADMIN — CLONIDINE HYDROCHLORIDE 0.1 MG: 0.1 TABLET ORAL at 10:42

## 2018-03-09 RX ADMIN — DIAZEPAM 5 MG: 5 TABLET ORAL at 21:22

## 2018-03-09 RX ADMIN — Medication 10 ML: at 14:24

## 2018-03-09 RX ADMIN — PIPERACILLIN SODIUM,TAZOBACTAM SODIUM 3.38 G: 3; .375 INJECTION, POWDER, FOR SOLUTION INTRAVENOUS at 18:51

## 2018-03-09 RX ADMIN — HYDROMORPHONE HYDROCHLORIDE 0.5 MG: 2 INJECTION INTRAMUSCULAR; INTRAVENOUS; SUBCUTANEOUS at 11:55

## 2018-03-09 RX ADMIN — TOPIRAMATE 100 MG: 100 TABLET, FILM COATED ORAL at 10:43

## 2018-03-09 RX ADMIN — PANTOPRAZOLE SODIUM 40 MG: 40 INJECTION, POWDER, FOR SOLUTION INTRAVENOUS at 10:44

## 2018-03-09 RX ADMIN — DIAZEPAM 5 MG: 5 TABLET ORAL at 05:59

## 2018-03-09 RX ADMIN — PANTOPRAZOLE SODIUM 40 MG: 40 INJECTION, POWDER, FOR SOLUTION INTRAVENOUS at 21:22

## 2018-03-09 NOTE — PROGRESS NOTES
OT order received, chart reviewed. 1532: 1st attempt for OT evaluation, pt extremely drowsy, unable to respond to therapist's questions. Per RN, pt received valium earlier. -130.   1303: 2nd attempt; pt sleeping soundly, does not wake to voice. Family member at bedside. Pt's HR 120s at rest.     Holding OT evaluation for today.     Flo Eugene MS OTR/L  Office Ext: [phone]  Pager: [phone]

## 2018-03-09 NOTE — PROGRESS NOTES
Hospitalist Progress Note  Yuri Best MD  Internal medicine/ Hospitalist    Daily Progress Note: 3/9/2018 9:48 AM      Interval history / Subjective:   Ellie Burt is a 47y.o. year old female who presented with  Syncope and GI bleed. Reported  3 days of coffee ground type emesis prior er visit,diffuse abdominal pain. She was using the restroom when she passed out. Continued to have some vomiting. Noted with elevated alcohol level,h/o chronic alcoholic and pancreatitis. In ED,amylase 2172,calcium 17.5,hyponatremia. Admitted to [facility] for acute pancreatitis,hypercalcemia,hyponatremia,gi bleed,alcohol abuse. Started on iv fluid,ppi,ciwa protocol. GI consulted,for EGD today. Work-up for hypercalcemia initiated.     Current Facility-Administered Medications   Medication Dose Route Frequency    piperacillin-tazobactam (ZOSYN) 3.375 g in 0.9% sodium chloride (MBP/ADV) 100 mL MBP  3.375 g IntraVENous Q6H    sodium chloride (NS) flush 5-10 mL  5-10 mL IntraVENous Q8H    sodium chloride (NS) flush 5-10 mL  5-10 mL IntraVENous PRN    LORazepam (ATIVAN) tablet 1 mg  1 mg Oral Q1H PRN    Or    LORazepam (ATIVAN) injection 1 mg  1 mg IntraVENous Q1H PRN    LORazepam (ATIVAN) tablet 2 mg  2 mg Oral Q1H PRN    Or    LORazepam (ATIVAN) injection 2 mg  2 mg IntraVENous Q1H PRN    LORazepam (ATIVAN) injection 3 mg  3 mg IntraVENous Q15MIN PRN    albuterol (PROVENTIL VENTOLIN) nebulizer solution 2.5 mg  2.5 mg Nebulization Q4H PRN    buPROPion XL (WELLBUTRIN XL) tablet 300 mg  300 mg Oral 7am    cloNIDine HCl (CATAPRES) tablet 0.1 mg  0.1 mg Oral BID    diazePAM (VALIUM) tablet 5 mg  5 mg Oral Q8H    lipase-protease-amylase (ZENPEP 10,000) capsule 1 Cap  1 Cap Oral TID WITH MEALS    topiramate (TOPAMAX) tablet 100 mg  100 mg Oral DAILY    pantoprazole (PROTONIX) 40 mg in sodium chloride 10 mL injection  40 mg IntraVENous Q12H    sodium chloride (NS) flush 5-10 mL  5-10 mL IntraVENous Q8H    sodium chloride (NS) flush 5-10 mL  5-10 mL IntraVENous PRN    LORazepam (ATIVAN) tablet 1 mg  1 mg Oral Q1H PRN    Or    LORazepam (ATIVAN) injection 1 mg  1 mg IntraVENous Q1H PRN    LORazepam (ATIVAN) tablet 2 mg  2 mg Oral Q1H PRN    Or    LORazepam (ATIVAN) injection 2 mg  2 mg IntraVENous Q1H PRN    LORazepam (ATIVAN) injection 3 mg  3 mg IntraVENous [id number] PRN    folic acid (FOLVITE) tablet 1 mg  1 mg Oral DAILY    Thiamine Mononitrate (B-1) tablet 100 mg  100 mg Oral DAILY    therapeutic multivitamin (THERAGRAN) tablet 1 Tab  1 Tab Oral DAILY    ondansetron (ZOFRAN) injection 4 mg  4 mg IntraVENous Q6H PRN    influenza vaccine 2017- (3 yrs+)(PF) (FLUZONE QUAD/FLUARIX QUAD) injection 0.5 mL  0.5 mL IntraMUSCular PRIOR TO DISCHARGE    HYDROmorphone (DILAUDID) injection 0.5 mg  0.5 mg IntraVENous Q4H PRN    lactated Ringers infusion  250 mL/hr IntraVENous CONTINUOUS        Review of Systems  Patient feeling sick this morning,asking for help,saying that she knows that she is alcoholic. Nurse has reported elevated wbc and patient looking anxious. Objective:     Visit Vitals    /77 (BP 1 Location: Right arm, BP Patient Position: Supine)    Pulse (!) 129    Temp 98 °F (36.7 °C)    Resp 18    Ht 5' 2\" (1.575 m)    Wt 49.1 kg (108 lb 3.2 oz)    LMP 2013    SpO2 (!) 84%    BMI 19.79 kg/m2      O2 Device: Room air    Temp (24hrs), Av.8 °F (36.6 °C), Min:97.2 °F (36.2 °C), Max:98 °F (36.7 °C)          1901 -  0700  In: 4248.3 [I.V.:4248.3]  Out: 4944 [Urine:1150]  P/E  NAD,looking anxious,weak. Heent:perrla,at/nc,mouth dry. Lungs ctab  Heart s1s2+,tachycardic. Abdm:soft,diffuse discomfort,bs present. Extr:no edema,pedis pulses present. Neuro:awake,looking weak,answering questions appropriately.   Data Review    Recent Results (from the past 12 hour(s))   METABOLIC PANEL, COMPREHENSIVE    Collection Time: 18  3:55 AM   Result Value Ref Range    Sodium 132 (L) 136 - 145 mmol/L    Potassium 4.3 3.5 - 5.5 mmol/L    Chloride 89 (L) 100 - 108 mmol/L    CO2 34 (H) 21 - 32 mmol/L    Anion gap 9 3.0 - 18 mmol/L    Glucose 105 (H) 74 - 99 mg/dL    BUN 49 (H) 7.0 - 18 MG/DL    Creatinine 0.99 0.6 - 1.3 MG/DL    BUN/Creatinine ratio 49 (H) 12 - 20      GFR est AA >60 >60 ml/min/1.73m2    GFR est non-AA 59 (L) >60 ml/min/1.73m2    Calcium 13.9 (HH) 8.5 - 10.1 MG/DL    Bilirubin, total 0.8 0.2 - 1.0 MG/DL    ALT (SGPT) 56 13 - 56 U/L    AST (SGOT) 134 (H) 15 - 37 U/L    Alk. phosphatase 70 45 - 117 U/L    Protein, total 4.8 (L) 6.4 - 8.2 g/dL    Albumin 2.6 (L) 3.4 - 5.0 g/dL    Globulin 2.2 2.0 - 4.0 g/dL    A-G Ratio 1.2 0.8 - 1.7     CBC WITH AUTOMATED DIFF    Collection Time: 03/09/18  3:55 AM   Result Value Ref Range    WBC 19.2 (H) 4.6 - 13.2 K/uL    RBC 3.79 (L) 4.20 - 5.30 M/uL    HGB 12.1 12.0 - 16.0 g/dL    HCT 36.0 35.0 - 45.0 %    MCV 95.0 74.0 - 97.0 FL    MCH 31.9 24.0 - 34.0 PG    MCHC 33.6 31.0 - 37.0 g/dL    RDW 15.3 (H) 11.6 - 14.5 %    PLATELET 658 946 - 071 K/uL    MPV 10.1 9.2 - 11.8 FL    NEUTROPHILS 92 (H) 40 - 73 %    LYMPHOCYTES 5 (L) 21 - 52 %    MONOCYTES 3 3 - 10 %    EOSINOPHILS 0 0 - 5 %    BASOPHILS 0 0 - 2 %    ABS. NEUTROPHILS 17.6 (H) 1.8 - 8.0 K/UL    ABS. LYMPHOCYTES 1.0 0.9 - 3.6 K/UL    ABS. MONOCYTES 0.5 0.05 - 1.2 K/UL    ABS. EOSINOPHILS 0.0 0.0 - 0.4 K/UL    ABS.  BASOPHILS 0.0 0.0 - 0.06 K/UL    DF AUTOMATED     CULTURE, BLOOD    Collection Time: 03/09/18  8:55 AM   Result Value Ref Range    Special Requests: PERIPHERAL      Culture result: PENDING    CULTURE, BLOOD    Collection Time: 03/09/18  9:00 AM   Result Value Ref Range    Special Requests: PERIPHERAL      Culture result: PENDING    URINALYSIS W/ RFLX MICROSCOPIC    Collection Time: 03/09/18  9:20 AM   Result Value Ref Range    Color YELLOW      Appearance CLEAR      Specific gravity 1.024 1.005 - 1.030      pH (UA) 6.0 5.0 - 8.0      Protein NEGATIVE  NEG mg/dL    Glucose NEGATIVE  NEG mg/dL    Ketone 40 (A) NEG mg/dL    Bilirubin NEGATIVE  NEG      Blood NEGATIVE  NEG      Urobilinogen 0.2 0.2 - 1.0 EU/dL    Nitrites NEGATIVE  NEG      Leukocyte Esterase TRACE (A) NEG           Assessment/Plan:     Principal Problem:    Syncope (3/8/2018)    Active Problems:    Acute pancreatitis (2/19/2016)      Alcohol withdrawal ([facility] [address] (3/8/2018)      GI bleed (3/8/2018)      Hyponatremia (3/8/2018)      Hypercalcemia (3/8/2018)      Leukocytosis      Care Plan   1-Syncope - multi-factorial:dehydration,intoxicated with etoh,hypercalcemia,gi bleed,nausea/vomiting. -CT head w/o acute process. -ECHO with EF 70%,no wall abnormality,grade 1 diastolic dysfunction    -On iv fluid    -Continue tele monitoring. Monitor mentation    -Continue treating pt for potential causes. 2-Acute pancreatitis    -NPO    -Pain management    -IV fluid    -Zofran for nausea. 3-GI bleed    -On ppi    -GI consulted. EGD today. 4-Hypercalcemia:    -Improving.    -On iv fluid. -PTH- related peptide pending results    -PTH intact 13.1    -Vit D 25 hydroxy 36.1    -CXR:possible left upper lobe pulmonary nodule ->will order CT chest.    5-Leukocytosis:    -CXR w/o pneumonia    -UA ordered.    -Started empirically on zosyn    6-Alcohol abuse with signs of Withdrawal    -On [facility] protocol    -MVI,Thiamine,Folic acid    -Ongoing problem for this patient who has been unable to quit despite multiple admissions to psych institutions - 11 admissions the last few years as per patient. Reporting strong family support.     7-Hyponatremia due to alcohol abuse    -Na 127 on admission    -Improving - on iv fluid - Na 132 today    8-Mental benigno issues    -Continue routine psych medications    DVT prophylaxis:scds  Full code  Disposition:tbd

## 2018-03-09 NOTE — ROUTINE PROCESS
1945: Assumed care. Sleepy. In bed. Denies any pain or discomfort at this time. Call light within reach. IVF changed to LR at 250 ml/hr per order. Will continue to monitor. 2120: Patient awake. A&Ox4. Consent form for EGD signed & witnessed. 2140: Due med given. Medicated for pain per patient request. Will continue to monitor. 2339: Due med given. 0140: Medicated for pain per patient request. Will  continue to monitor. 0455: No change from previous assessment. 0559: Due med given. Medicated for pain per patient request. Will continue to monitor. 4754: Slept on & off thru night. Needs attended. Received critical result from lab. Calcium level is 13.9. Paged Dr. Vincent Hope: Dr. Ayers Bounds back. Informed of the critical result. No order received. 7454: Bedside and Verbal shift change report given to SEB Rodas (oncoming nurse) by me (offgoing nurse). Report included the following information SBAR, Kardex, Intake/Output, MAR and Recent Results.

## 2018-03-09 NOTE — PROGRESS NOTES
Orders recieved and chart reviewed patient currently off the floor for  Procedure with discussion with nurse, Amy Wong RN wait until tomorrow to see

## 2018-03-09 NOTE — PERIOP NOTES
Patient transferred to PACU from Phase II because of  low blood pressure readings and elevated heart rate.

## 2018-03-09 NOTE — PROCEDURES
Endoscopy Procedure Note    Patient: Priscilla Jennings MRN: [id number]  SSN: [id number]    YOB: 1965  Age: 47 y.o. Sex: female      Date/Time:  3/9/2018 5:03 PM    Esophagogastroduodenoscopy (EGD) Procedure Note    Procedure: Esophagogastroduodenoscopy with biopsy    IMPRESSION:   1. Severe, erosive/ulcerative esophagitis in volving the distal two thirds of the esophagus. 2. Sliding hiatus hernia. 3. Thick coffee-ground adherent material in the stomach precluding optimal visualization. 4. No evidence of active bleeding. RECOMMENDATIONS:  1. -Pantoprazole 40 IV q 12.   2. -Await pathology. 3. -Continue management of ETOH pancreatitis. 4. -Will need repeat EGD after 2-3 months of high-dose PPI therapy to re-evaluate the esophagus. Indication: Acute pancreatitis, with hematemesis. :  Klever Morris MD  Referring Provider:   Clovis Mckeon MD  History: The history and physical exam were reviewed and updated. Endoscope: [id number]  Extent of Exam: second portion of the duodenum  ASA: ASA 3 - Patient with moderate systemic disease with functional limitations  Anethesia/Sedation:  MAC anesthesia    Description of the procedure: The procedure was discussed with the patient including risks, benefits, alternatives including risks of iv sedation, bleeding, perforation and aspiration. A safety timeout was performed. The patient was placed in the left lateral decubitus position. A bite block was placed. The patient was given incremental doses of intravenous sedation until moderate sedation was achieved. The patients vital signs were monitored at all times including heart rate/rhythm, blood pressure and oxygen saturation. The endoscope was then passed under direct visualization to the second portion of the duodenum. The endoscope was then slowly withdrawn while visualizing the mucosa. In the stomach a retroflexion was performed and gastric fundus and cardia visualized.   The endoscope was then slowly withdrawn. The patient was then transferred to recovery in stable condition. Findings:    Esophagus: Diffuse, continuous, circumferential erosion/ulceration of the esophagus, involving the distal two thirds of the esophagus. Overlying coffee-ground material noted. Biopsies taken. A sliding hiatus hernia was seen. Ring-like narrowing of the lumen noted in the proximal third of the esophagus. No varices were seen. Stomach: The gastric mucosa was without ulcers, erosions, vascular, or mass lesions. The gastric folds were normal. The mucosa of the stomach was covered with thick adherent coffee-ground material precluding optimal visualization. No fresh blood or signs of active bleeding were noted. The pylorus was not deformed. Duodenum: The duodenum mucosa was without ulcers, erosions, inflammatory changes, vascular or mass lesions. No fresh blood was seen. Specimens:   ID Type Source Tests Collected by Time Destination   1 : bx esophagus r/o  severe esophagitis Preservative Esophagus  Desiree Jackson MD 3/9/2018 4869 Pathology               Complications:   None; patient tolerated the procedure well.     EBL:Minimal    Desiree Jackson MD  March 9, 2018  5:03 PM

## 2018-03-09 NOTE — ANESTHESIA POSTPROCEDURE EVALUATION
Post-Anesthesia Evaluation and Assessment    Patient: Raegan Fontanez MRN: [id number]  SSN: [id number]    YOB: 1965  Age: 47 y.o. Sex: female       Cardiovascular Function/Vital Signs  Visit Vitals    /84    Pulse (!) 125    Temp 36.4 °C (97.5 °F)    Resp 20    Ht 5' 2\" (1.575 m)    Wt 49.1 kg (108 lb 3.2 oz)    SpO2 97%    BMI 19.79 kg/m2       Patient is status post MAC anesthesia for Procedure(s):  ESOPHAGOGASTRODUODENOSCOPY (EGD). Nausea/Vomiting: None    Postoperative hydration reviewed and adequate. Pain:  Pain Scale 1: Numeric (0 - 10) (03/09/18 1157)  Pain Intensity 1: 10 (03/09/18 1157)   Managed    Neurological Status:   Neuro  Neurologic State: Alert (03/08/18 1945)  Orientation Level: Oriented X4 (03/08/18 1945)  Cognition: Follows commands (03/08/18 1945)  Speech: Clear (03/08/18 1945)  Assessment L Pupil: Brisk;Round (03/08/18 0750)  Size L Pupil (mm): 2 (03/08/18 0750)  Assessment R Pupil: Brisk;Round (03/08/18 0750)  Size R Pupil (mm): 2 (03/08/18 0750)  LUE Motor Response: Purposeful (03/08/18 0750)  LLE Motor Response: Purposeful (03/08/18 0750)  RUE Motor Response: Purposeful (03/08/18 0750)  RLE Motor Response: Purposeful (03/08/18 0750)   At baseline    Mental Status and Level of Consciousness: Alert and oriented     Pulmonary Status:   O2 Device: Nasal cannula (03/09/18 1659)   Adequate oxygenation and airway patent    Complications related to anesthesia: None    Post-anesthesia assessment completed.  No concerns    Signed By: Sol CourserMARGARETH     March 9, 2018

## 2018-03-09 NOTE — ANESTHESIA PREPROCEDURE EVALUATION
Anesthetic History               Review of Systems / Medical History  Patient summary reviewed and pertinent labs reviewed    Pulmonary    COPD      Smoker  Asthma        Neuro/Psych   Within defined limits           Cardiovascular    Hypertension                   GI/Hepatic/Renal           Liver disease     Endo/Other        Anemia     Other Findings   Comments: Documentation of current medication  Current medications obtained, documented and obtained? YES      Risk Factors for Postoperative nausea/vomiting:       History of postoperative nausea/vomiting? NO       Female? YES       Motion sickness? NO       Intended opioid administration for postoperative analgesia? NO      Smoking Abstinence:  Current Smoker? YES  Elective Surgery? YES  Seen preoperatively by anesthesiologist or proxy prior to day of surgery? YES  Pt abstained from smoking 24 hours prior to anesthesia?  YES    Preventive care/screening for High Blood Pressure:  Aged 18 years and older: YES  Screened for high blood pressure: YES  Patients with high blood pressure referred to primary care provider   for BP management: YES                 Physical Exam    Airway  Mallampati: III  TM Distance: 4 - 6 cm  Neck ROM: decreased range of motion   Mouth opening: Diminished (comment)     Cardiovascular    Rhythm: irregular  Rate: normal         Dental    Dentition: Poor dentition and Upper partial plate     Pulmonary  Breath sounds clear to auscultation               Abdominal  GI exam deferred       Other Findings            Anesthetic Plan    ASA: 3  Anesthesia type: MAC            Anesthetic plan and risks discussed with: Patient

## 2018-03-09 NOTE — PERIOP NOTES
1710:  Patient arrived to PACU [facility] assigned as Nurse, Report received from Anesthesia & OR Nurse, (Procedure, I &O, Pain Meds & Vitals)  Pt connected to monitor, Post Op pain & nursing assessment complete, will continue to monitor. Pt arousasble, Follows command  Face mask placed       1719: Pt placed on 4L NC; continues @ baseline pre-procedure    1735:  TRANSFER - OUT REPORT:    Verbal report given to [facility] [place], operated by [facility], RN(name) on Chema Levine  being transferred to [facility] Diboll Peyman  (unit) for routine progression of care       Report consisted of patients Situation, Background, Assessment and   Recommendations(SBAR). Information from the following report(s) SBAR, Procedure Summary, Intake/Output and Cardiac Rhythm ST was reviewed with the receiving nurse. Lines:   Peripheral IV 01/06/05 Left Basilic (Active)   Site Assessment Clean, dry, & intact 3/8/2018  5:40 PM   Phlebitis Assessment 0 3/8/2018  5:40 PM   Infiltration Assessment 0 3/8/2018  5:40 PM   Dressing Status Clean, dry, & intact 3/8/2018  5:40 PM   Dressing Type Transparent 3/8/2018  5:40 PM   Hub Color/Line Status Pink 3/8/2018  5:40 PM   Alcohol Cap Used Yes 3/8/2018  5:40 PM       Peripheral IV 03/08/18 Left Hand (Active)        Opportunity for questions and clarification was provided.       Patient transported with:   O2 @ 4 liters        Visit Vitals    /62    Pulse (!) 120    Temp 98.1 °F (36.7 °C)    Resp 20    Ht 5' 2\" (1.575 m)    Wt 49.1 kg (108 lb 3.2 oz)    SpO2 100%    BMI 19.79 kg/m2

## 2018-03-09 NOTE — PROGRESS NOTES
IDR Summary      Patient: Caroline Kingston MRN: [id number]    Age: 47 y.o.  : 1965     Admit Diagnosis: Syncope  epigastric pain and coffee-ground emesis      Problems pertinent to hospital stay:     Consults:Case Management     Testing due for patient today? YES- EGD    Nutrition plan:Yes     Mobility needs: Yes      Lines/Tubes:   IV: YES   Needed: YES  Stanley: NO  Needed:NO  Central Line: NO Needed: NO      VTE Prophylaxis: Mechanical    Influenza Vaccine received? NO- to receive prior to discharge       Care Management:  Discharge plan: home w/ [facility]   PCP: Sheryle Drilling., MD    : NO  Financial concerns:No   Interventions:       LOS: 1 days     Expected days until discharge: TBD      Signed:      BRIDGER Parks  [facility]  Hospitalist Division  Pager:  [phone]  Office:  [phone]

## 2018-03-10 LAB
ALBUMIN SERPL-MCNC: 2.4 G/DL (ref 3.4–5)
ALBUMIN/GLOB SERPL: 1 {RATIO} (ref 0.8–1.7)
ALP SERPL-CCNC: 84 U/L (ref 45–117)
ALT SERPL-CCNC: 91 U/L (ref 13–56)
ANION GAP SERPL CALC-SCNC: 6 MMOL/L (ref 3–18)
AST SERPL-CCNC: 151 U/L (ref 15–37)
BASOPHILS # BLD: 0 K/UL (ref 0–0.06)
BASOPHILS NFR BLD: 0 % (ref 0–2)
BILIRUB SERPL-MCNC: 0.5 MG/DL (ref 0.2–1)
BUN SERPL-MCNC: 38 MG/DL (ref 7–18)
BUN/CREAT SERPL: 48 (ref 12–20)
CALCIUM SERPL-MCNC: 11.3 MG/DL (ref 8.5–10.1)
CHLORIDE SERPL-SCNC: 98 MMOL/L (ref 100–108)
CO2 SERPL-SCNC: 32 MMOL/L (ref 21–32)
CREAT SERPL-MCNC: 0.79 MG/DL (ref 0.6–1.3)
DIFFERENTIAL METHOD BLD: ABNORMAL
EOSINOPHIL # BLD: 0.4 K/UL (ref 0–0.4)
EOSINOPHIL NFR BLD: 3 % (ref 0–5)
ERYTHROCYTE [DISTWIDTH] IN BLOOD BY AUTOMATED COUNT: 15.5 % (ref 11.6–14.5)
GLOBULIN SER CALC-MCNC: 2.5 G/DL (ref 2–4)
GLUCOSE SERPL-MCNC: 72 MG/DL (ref 74–99)
HCT VFR BLD AUTO: 31.8 % (ref 35–45)
HGB BLD-MCNC: 10.6 G/DL (ref 12–16)
LIPASE SERPL-CCNC: 1194 U/L (ref 73–393)
LYMPHOCYTES # BLD: 1.7 K/UL (ref 0.9–3.6)
LYMPHOCYTES NFR BLD: 14 % (ref 21–52)
MCH RBC QN AUTO: 32.4 PG (ref 24–34)
MCHC RBC AUTO-ENTMCNC: 33.3 G/DL (ref 31–37)
MCV RBC AUTO: 97.2 FL (ref 74–97)
MONOCYTES # BLD: 0.3 K/UL (ref 0.05–1.2)
MONOCYTES NFR BLD: 3 % (ref 3–10)
NEUTS SEG # BLD: 9.9 K/UL (ref 1.8–8)
NEUTS SEG NFR BLD: 80 % (ref 40–73)
PLATELET # BLD AUTO: 129 K/UL (ref 135–420)
PMV BLD AUTO: 10.1 FL (ref 9.2–11.8)
POTASSIUM SERPL-SCNC: 4.1 MMOL/L (ref 3.5–5.5)
PROT SERPL-MCNC: 4.9 G/DL (ref 6.4–8.2)
RBC # BLD AUTO: 3.27 M/UL (ref 4.2–5.3)
SODIUM SERPL-SCNC: 136 MMOL/L (ref 136–145)
WBC # BLD AUTO: 12.3 K/UL (ref 4.6–13.2)

## 2018-03-10 PROCEDURE — 77010033678 HC OXYGEN DAILY

## 2018-03-10 PROCEDURE — 36415 COLL VENOUS BLD VENIPUNCTURE: CPT | Performed by: INTERNAL MEDICINE

## 2018-03-10 PROCEDURE — 97530 THERAPEUTIC ACTIVITIES: CPT

## 2018-03-10 PROCEDURE — 74011250636 HC RX REV CODE- 250/636: Performed by: HOSPITALIST

## 2018-03-10 PROCEDURE — 85025 COMPLETE CBC W/AUTO DIFF WBC: CPT | Performed by: INTERNAL MEDICINE

## 2018-03-10 PROCEDURE — 97161 PT EVAL LOW COMPLEX 20 MIN: CPT

## 2018-03-10 PROCEDURE — C9113 INJ PANTOPRAZOLE SODIUM, VIA: HCPCS | Performed by: HOSPITALIST

## 2018-03-10 PROCEDURE — 74011250636 HC RX REV CODE- 250/636: Performed by: EMERGENCY MEDICINE

## 2018-03-10 PROCEDURE — 74011250637 HC RX REV CODE- 250/637: Performed by: HOSPITALIST

## 2018-03-10 PROCEDURE — 80053 COMPREHEN METABOLIC PANEL: CPT | Performed by: INTERNAL MEDICINE

## 2018-03-10 PROCEDURE — 74011000258 HC RX REV CODE- 258: Performed by: INTERNAL MEDICINE

## 2018-03-10 PROCEDURE — 77030020256 HC SOL INJ NACL 0.9%  500ML

## 2018-03-10 PROCEDURE — 83690 ASSAY OF LIPASE: CPT | Performed by: INTERNAL MEDICINE

## 2018-03-10 PROCEDURE — 65660000000 HC RM CCU STEPDOWN

## 2018-03-10 PROCEDURE — 74011250636 HC RX REV CODE- 250/636: Performed by: INTERNAL MEDICINE

## 2018-03-10 RX ORDER — SODIUM CHLORIDE 9 MG/ML
100 INJECTION, SOLUTION INTRAVENOUS CONTINUOUS
Status: DISCONTINUED | OUTPATIENT
Start: 2018-03-10 | End: 2018-03-11

## 2018-03-10 RX ORDER — IPRATROPIUM BROMIDE AND ALBUTEROL SULFATE 2.5; .5 MG/3ML; MG/3ML
3 SOLUTION RESPIRATORY (INHALATION)
Status: DISCONTINUED | OUTPATIENT
Start: 2018-03-10 | End: 2018-03-15 | Stop reason: HOSPADM

## 2018-03-10 RX ADMIN — PANTOPRAZOLE SODIUM 40 MG: 40 INJECTION, POWDER, FOR SOLUTION INTRAVENOUS at 10:34

## 2018-03-10 RX ADMIN — Medication 10 ML: at 13:15

## 2018-03-10 RX ADMIN — PIPERACILLIN SODIUM,TAZOBACTAM SODIUM 3.38 G: 3; .375 INJECTION, POWDER, FOR SOLUTION INTRAVENOUS at 10:31

## 2018-03-10 RX ADMIN — Medication 10 ML: at 05:44

## 2018-03-10 RX ADMIN — TOPIRAMATE 100 MG: 100 TABLET, FILM COATED ORAL at 10:38

## 2018-03-10 RX ADMIN — PANTOPRAZOLE SODIUM 40 MG: 40 INJECTION, POWDER, FOR SOLUTION INTRAVENOUS at 20:39

## 2018-03-10 RX ADMIN — FOLIC ACID 1 MG: 1 TABLET ORAL at 10:38

## 2018-03-10 RX ADMIN — Medication 100 MG: at 10:39

## 2018-03-10 RX ADMIN — DIAZEPAM 5 MG: 5 TABLET ORAL at 05:43

## 2018-03-10 RX ADMIN — HYDROMORPHONE HYDROCHLORIDE 0.5 MG: 2 INJECTION INTRAMUSCULAR; INTRAVENOUS; SUBCUTANEOUS at 07:01

## 2018-03-10 RX ADMIN — PIPERACILLIN SODIUM,TAZOBACTAM SODIUM 3.38 G: 3; .375 INJECTION, POWDER, FOR SOLUTION INTRAVENOUS at 18:54

## 2018-03-10 RX ADMIN — HYDROMORPHONE HYDROCHLORIDE 0.5 MG: 2 INJECTION INTRAMUSCULAR; INTRAVENOUS; SUBCUTANEOUS at 22:00

## 2018-03-10 RX ADMIN — PIPERACILLIN SODIUM,TAZOBACTAM SODIUM 3.38 G: 3; .375 INJECTION, POWDER, FOR SOLUTION INTRAVENOUS at 04:01

## 2018-03-10 RX ADMIN — LORAZEPAM 1 MG: 2 INJECTION INTRAMUSCULAR; INTRAVENOUS at 23:22

## 2018-03-10 RX ADMIN — SODIUM CHLORIDE 100 ML/HR: 900 INJECTION, SOLUTION INTRAVENOUS at 16:10

## 2018-03-10 RX ADMIN — THERA TABS 1 TABLET: TAB at 10:38

## 2018-03-10 RX ADMIN — HYDROMORPHONE HYDROCHLORIDE 0.5 MG: 2 INJECTION INTRAMUSCULAR; INTRAVENOUS; SUBCUTANEOUS at 01:39

## 2018-03-10 RX ADMIN — CLONIDINE HYDROCHLORIDE 0.1 MG: 0.1 TABLET ORAL at 17:33

## 2018-03-10 RX ADMIN — HYDROMORPHONE HYDROCHLORIDE 0.5 MG: 2 INJECTION INTRAMUSCULAR; INTRAVENOUS; SUBCUTANEOUS at 13:14

## 2018-03-10 RX ADMIN — HYDROMORPHONE HYDROCHLORIDE 0.5 MG: 2 INJECTION INTRAMUSCULAR; INTRAVENOUS; SUBCUTANEOUS at 17:45

## 2018-03-10 RX ADMIN — DIAZEPAM 5 MG: 5 TABLET ORAL at 22:55

## 2018-03-10 NOTE — PROGRESS NOTES
1900 -- Bedside, Verbal and Written shift change report given to [facility] (oncoming nurse) by [address] [place] [address]). Allergy band placed on pt. NPO except meds sign placed at door. Report included the following information SBAR, Kardex, Intake/Output, MAR and Recent Results. Skin assessment completed.      2122 -- PM and PRN pain medications administered, pt tolerated with ease, will continue to monitor.      0000 -- Shift reassessment, pt condition unchanged, will continue to monitor. 0145 -- PRN pain medications administered, pt tolerated with ease, will continue to monitor. Pt provided with incontinence care, gown and bedding changed.      0400 --  Shift reassessment, pt condition unchanged, will continue to monitor.      0655 -- PRN pain medications administered, pt tolerated with ease, will continue to monitor. 0715 -- Bedside, Verbal and Written shift change report given to [facility] (oncoming nurse) by ANABELLA (offgoing nurse). Report included the following information SBAR, Kardex, Intake/Output, MAR and Recent Results. Skin assessment completed.

## 2018-03-10 NOTE — PROGRESS NOTES
Problem: Falls - Risk of  Goal: *Absence of Falls  Document Thelma Fall Risk and appropriate interventions in the flowsheet.    Outcome: Progressing Towards Goal  Fall Risk Interventions:  Mobility Interventions: Bed/chair exit alarm, Patient to call before getting OOB         Medication Interventions: Patient to call before getting OOB, Teach patient to arise slowly    Elimination Interventions: Call light in reach, Toileting schedule/hourly rounds, Toilet paper/wipes in reach, Patient to call for help with toileting needs    History of Falls Interventions: Door open when patient unattended, Investigate reason for fall, Room close to nurse's station

## 2018-03-10 NOTE — PROGRESS NOTES
Problem: Mobility Impaired (Adult and Pediatric)  Goal: *Acute Goals and Plan of Care (Insert Text)  Physical Therapy Goals  Initiated 3/10/2018 and to be accomplished within 7 day(s)  1. Patient will move from supine to sit and sit to supine , scoot up and down and roll side to side in bed with independence. 2.  Patient will transfer from bed to chair and chair to bed with independence using the least restrictive device. 3.  Patient will perform sit to stand with independence. 4.  Patient will ambulate with independence for >/= 150 feet with the least restrictive device. physical Therapy EVALUATION    Patient: Matthew Herrera (68 y.o. female)  Date: 3/10/2018  Primary Diagnosis: Syncope  epigastric pain and coffee-ground emesis  Procedure(s) (LRB):  ESOPHAGOGASTRODUODENOSCOPY (EGD) (N/A) 1 Day Post-Op   Precautions:       PLOF: Independent gait with no assistive device. ASSESSMENT :   Patient requires between minimal assistance/contact guard assist for bed mobility, transfers and ambulation. Patient anxious and tearful requiring increase encouragement to participate with therapy. C/o feeling weak, thinks she is dying and c/o of 10/10 pain abdominal area. Nurse [facility] made aware of above. Will benefit from skilled PT intervention to increase overall functional mobility independence and safety. Per mom, patient will be going home with her when discharged from hospital.    Patient presents with deficits in:   Bed Mobility, Transfers, Gait, Strength and Balance    Patient will benefit from skilled intervention to address the above impairments.   Patients rehabilitation potential is considered to be Good  Factors which may influence rehabilitation potential include:   []         None noted  []         Mental ability/status  [x]         Medical condition  []         Home/family situation and support systems  []         Safety awareness  [x]         Pain tolerance/management  []         Other: Recommendations for nursing:   Written on communication board: OOB with assist of 1  Verbally communicated to: nurse Yasemin     PLAN :  Recommendations and Planned Interventions:*  [x]           Bed Mobility Training             []    Neuromuscular Re-Education  [x]           Transfer Training                   []    Orthotic/Prosthetic Training  [x]           Gait Training                          []    Modalities  []           Therapeutic Exercises          []    Edema Management/Control  []           Therapeutic Activities            [x]    Patient and Family Training/Education*  [x]           Other (comment):Plan of care, importance of OOB activities to facilitate recovery, bed mobility, transfers and gait with rolling walker    Frequency/Duration: Patient will be followed by physical therapy 1 time per day/3-5 days per week to address goals. Discharge Recommendations: ? Home Health  Further Equipment Recommendations for Discharge: ? - per mother, has a rolling walker and BSC at home they can use if needed     SUBJECTIVE:   Patient stated I'm really weak. \"  I'm dying. \"  \"I'm having a lot of pain. \"  \"I need to use the restroom.     OBJECTIVE DATA SUMMARY:     Past Medical History:   Diagnosis Date    Alcohol abuse     Anxiety     Asthma     [facility] disorder [facility]     Common migraine     COPD (chronic obstructive pulmonary disease) ([facility]     Depression     Esophageal reflux     Gout     Hypertension     Inverted nipple     Left breast always have. Past Surgical History:   Procedure Laterality Date    HX CARPAL TUNNEL RELEASE      HX GYN      tubal ligation    HX HEENT      HX LUMBAR LAMINECTOMY      HX ORTHOPAEDIC       Barriers to Learning/Limitations: yes;  emotional  Compensate with: visual, verbal, tactile, kinesthetic cues/model    G CODE:Mobility  Current  CL= 60-79%   Goal  CH= 0%.   The severity rating is based on the Other [employer] Scale    Eval Complexity: History: LOW Complexity : Zero comorbidities / personal factors that will impact the outcome / POCExam:MEDIUM Complexity : 3 Standardized tests and measures addressing body structure, function, activity limitation and / or participation in recreation  Presentation: LOW Complexity : Stable, uncomplicated  Clinical Decision Making:Medium Complexity [facility] Standing Balance Scale Overall Complexity:LOW     [facility] Sitting Balance Scale  0: Pt performs 25% or less of sitting activity (Max assist) CN, 100% impaired. 1: Pt supports self with upper extremities but requires therapist assistance. Pt performs 25-50% of effort (Mod assist) CM, 80% to <100% impaired. 1+: Pt supports self with upper extremities but requires therapist assistance. Pt performs >50% effort. (Min assist). CL, 60% to <80% impaired. 2: Pt supports self independently with both upper extremities. CL, 60% to <80% impaired. 2+: Pt support self independently with 1 upper extremity. CK, 40% to <60% impaired. 3: Pt sits without upper extremity support for up to 30 seconds. CK, 40% to <60% impaired. 3+: Pt sits without upper extremity support for 30 seconds or greater. CJ, 20% to <40% impaired. 4: Pt moves and returns trunkal midpoint 1-2 inches in one plane. CJ, 20% to <40% impaired. 4+: Pt moves and returns trunkal midpoint 1-2 inches in multiple planes. CI, 1% to <20% impaired. 5: Pt moves and returns trunkal midpoint in all planes greater than 2 inches. CH, 0% impaired. [employer] Standing Balance Scale  0: Pt performs 25% or less of standing activity (Max assist) CN, 100% impaired. 1: Pt supports self with upper extremities but requires therapist assistance. Pt performs 25-50% of effort (Mod assist) CM, 80% to <100% impaired. 1+: Pt supports self with upper extremities but requires therapist assistance. Pt performs >50% effort. (Min assist). CL, 60% to <80% impaired.   2: Pt supports self independently with both upper extremities (walker, crutches, parallel bars). CL, 60% to <80% impaired. 2+: Pt support self independently with 1 upper extremity (cane, crutch, 1 parallel bar). CK, 40% to <60% impaired. 3: Pt stands without upper extremity support for up to 30 seconds. CK, 40% to <60% impaired. 3+: Pt stands without upper extremity support for 30 seconds or greater. CJ, 20% to <40% impaired. 4: Pt independently moves and returns center of gravity 1-2 inches in one plane. CJ, 20% to <40% impaired. 4+: Pt independently moves and returns center of gravity 1-2 inches in multiple planes. CI, 1% to <20% impaired. 5: Pt independently moves and returns center of gravity in all planes greater than 2 inches. CH, 0% impaired. Prior Level of Function/Home Situation: Independent gait with no assistive device. Home Situation  Home Environment: Private residence  # Steps to Enter: 5  One/Two Story Residence: Two story  Living Alone: No  Support Systems: Family member(s)  Patient Expects to be Discharged to[de-identified] Private residence  Current DME Used/Available at Home: Oxygen, portable  Critical Behavior:  Neurologic State: Alert  Orientation Level: Oriented to person;Oriented to place  Cognition: Follows commands  Psychosocial  Patient Behaviors: Anxious; Tearful  Family  Behaviors: Calm;Supportive  Purposeful Interaction: Yes  Family  Behaviors: Calm;Supportive      Strength:    Strength: Generally decreased, functional (both LE)      Tone & Sensation:   Tone: Normal (both LE)       Range Of Motion:  AROM: Generally decreased, functional (both LE)      Functional Mobility:  Bed Mobility:  Supine to Sit: Contact guard assistance;Minimum assistance  Sit to Supine: Minimum assistance  Transfers:  Sit to Stand: Contact guard assistance;Minimum assistance  Stand to Sit: Contact guard assistance;Minimum assistance  Bed to Chair: Minimum assistance (bed<>BSC)  Balance:   Sitting: With support  Sitting - Static: Fair (occasional)  Sitting - Dynamic: Fair (occasional) (Minus)  Standing: With support  Standing - Static: Poor (Plus/Fair Minus)  Standing - Dynamic : Poor (Plus)  Ambulation/Gait Training:  Distance (ft): 4 Feet (ft)  Assistive Device: Walker, rolling  Ambulation - Level of Assistance: Minimal assistance  Gait Abnormalities: Decreased step clearance  Base of Support: Center of gravity altered;Narrowed  Step Length: Left shortened;Right shortened (side-stepping)     Pain:  Pre treatment pain level:  10, abdominal area  Post treatment pain level:  10, abdominal area  Pain Scale 1: Numeric (0 - 10)  Pain Intensity 1: 10  Pain Location 1: Abdomen  Pain Description 1: Aching  Pain Intervention(s) 1: Medication (see MAR)     Activity Tolerance:  Fair Minus    Please refer to the flowsheet for vital signs taken during this treatment. After treatment:   []         Patient left in no apparent distress sitting up in chair  [x]         Patient left in no apparent distress in bed  [x]         Call bell left within reach  [x]         Nursing notified  [x]         Mother present  []         Bed alarm activated    COMMUNICATION/EDUCATION:   [x]         Fall prevention education was provided and the patient/caregiver indicated understanding. [x]         Patient/family have participated as able in goal setting and plan of care. [x]         Patient/family agree to work toward stated goals and plan of care. []         Patient understands intent and goals of therapy, but is neutral about his/her participation. []         Patient is unable to participate in goal setting and plan of care.     Thank you for this referral.  Joshua Olivera, PT   Time Calculation: 26 mins

## 2018-03-10 NOTE — PROGRESS NOTES
Hospitalist Progress Note  Austin Francois MD  Internal medicine/ Hospitalist    Daily Progress Note: 3/10/2018 9:48 AM      Interval history / Subjective:   Ha Lacy is a 47y.o. year old female who presented with  Syncope and GI bleed. Reported  3 days of coffee ground type emesis prior er visit,diffuse abdominal pain. She was using the restroom when she passed out. Continued to have some vomiting. Noted with elevated alcohol level,h/o chronic alcoholic and pancreatitis. In ED,amylase lipase,calcium 17.5,hyponatremia. Admitted to [facility] for acute pancreatitis,hypercalcemia,hyponatremia,gi bleed,alcohol abuse. Started on iv fluid,ppi,ciwa protocol. GI consulted,EGD on 3/9 showing severe, erosive/ulcerative esophagitis  . Work-up for hypercalcemia initiated. PTH intact 13.1,vit D nl. PTH-related peptide pending result.     Current Facility-Administered Medications   Medication Dose Route Frequency    0.9% sodium chloride infusion  100 mL/hr IntraVENous CONTINUOUS    piperacillin-tazobactam (ZOSYN) 3.375 g in  mL ##EXTENDED 4 HRS INFUSION  3.375 g IntraVENous Q8H    sodium chloride (NS) flush 5-10 mL  5-10 mL IntraVENous Q8H    sodium chloride (NS) flush 5-10 mL  5-10 mL IntraVENous PRN    LORazepam (ATIVAN) tablet 1 mg  1 mg Oral Q1H PRN    Or    LORazepam (ATIVAN) injection 1 mg  1 mg IntraVENous Q1H PRN    LORazepam (ATIVAN) tablet 2 mg  2 mg Oral Q1H PRN    Or    LORazepam (ATIVAN) injection 2 mg  2 mg IntraVENous Q1H PRN    LORazepam (ATIVAN) injection 3 mg  3 mg IntraVENous Q15MIN PRN    albuterol (PROVENTIL VENTOLIN) nebulizer solution 2.5 mg  2.5 mg Nebulization Q4H PRN    buPROPion XL (WELLBUTRIN XL) tablet 300 mg  300 mg Oral 7am    cloNIDine HCl (CATAPRES) tablet 0.1 mg  0.1 mg Oral BID    diazePAM (VALIUM) tablet 5 mg  5 mg Oral Q8H    lipase-protease-amylase (ZENPEP 10,000) capsule 1 Cap  1 Cap Oral TID WITH MEALS    topiramate (TOPAMAX) tablet 100 mg  100 mg Oral DAILY    pantoprazole (PROTONIX) 40 mg in sodium chloride 10 mL injection  40 mg IntraVENous Q12H    sodium chloride (NS) flush 5-10 mL  5-10 mL IntraVENous Q8H    sodium chloride (NS) flush 5-10 mL  5-10 mL IntraVENous PRN    LORazepam (ATIVAN) tablet 1 mg  1 mg Oral Q1H PRN    Or    LORazepam (ATIVAN) injection 1 mg  1 mg IntraVENous Q1H PRN    LORazepam (ATIVAN) tablet 2 mg  2 mg Oral Q1H PRN    Or    LORazepam (ATIVAN) injection 2 mg  2 mg IntraVENous Q1H PRN    LORazepam (ATIVAN) injection 3 mg  3 mg IntraVENous O66LIT PRN    folic acid (FOLVITE) tablet 1 mg  1 mg Oral DAILY    Thiamine Mononitrate (B-1) tablet 100 mg  100 mg Oral DAILY    therapeutic multivitamin (THERAGRAN) tablet 1 Tab  1 Tab Oral DAILY    ondansetron (ZOFRAN) injection 4 mg  4 mg IntraVENous Q6H PRN    influenza vaccine - (3 yrs+)(PF) (FLUZONE QUAD/FLUARIX QUAD) injection 0.5 mL  0.5 mL IntraMUSCular PRIOR TO DISCHARGE    HYDROmorphone (DILAUDID) injection 0.5 mg  0.5 mg IntraVENous Q4H PRN        Review of Systems  Patient feeling sick this morning. Objective:     Visit Vitals    /80 (BP 1 Location: Right arm, BP Patient Position: At rest)    Pulse (!) 108    Temp 97.4 °F (36.3 °C)    Resp 20    Ht 5' 2\" (1.575 m)    Wt 49.1 kg (108 lb 3.2 oz)    LMP 2013    SpO2 99%    BMI 19.79 kg/m2    O2 Flow Rate (L/min): 2 l/min O2 Device: Nasal cannula    Temp (24hrs), Av.8 °F (36.6 °C), Min:97.2 °F (36.2 °C), Max:98.1 °F (36.7 °C)         1901 - 03/10 0700  In: 3448.3 [I.V.:3448.3]  Out:  [Urine:]  P/E  NAD,looking anxious,weak. Heent:perrla,at/nc,mouth dry. Lungs ctab  Heart s1s2+,tachycardic. Abdm:soft,diffuse tenderness to palpation,bs present. Extr:no edema,pedis pulses present. Neuro:awake,looking weak,answering questions appropriately.   Data Review    Recent Results (from the past 12 hour(s))   CBC WITH AUTOMATED DIFF    Collection Time: 03/10/18  4:31 AM   Result Value Ref Range    WBC 12.3 4.6 - 13.2 K/uL    RBC 3.27 (L) 4.20 - 5.30 M/uL    HGB 10.6 (L) 12.0 - 16.0 g/dL    HCT 31.8 (L) 35.0 - 45.0 %    MCV 97.2 (H) 74.0 - 97.0 FL    MCH 32.4 24.0 - 34.0 PG    MCHC 33.3 31.0 - 37.0 g/dL    RDW 15.5 (H) 11.6 - 14.5 %    PLATELET 127 (L) 505 - 420 K/uL    MPV 10.1 9.2 - 11.8 FL    NEUTROPHILS 80 (H) 40 - 73 %    LYMPHOCYTES 14 (L) 21 - 52 %    MONOCYTES 3 3 - 10 %    EOSINOPHILS 3 0 - 5 %    BASOPHILS 0 0 - 2 %    ABS. NEUTROPHILS 9.9 (H) 1.8 - 8.0 K/UL    ABS. LYMPHOCYTES 1.7 0.9 - 3.6 K/UL    ABS. MONOCYTES 0.3 0.05 - 1.2 K/UL    ABS. EOSINOPHILS 0.4 0.0 - 0.4 K/UL    ABS. BASOPHILS 0.0 0.0 - 0.06 K/UL    DF AUTOMATED     METABOLIC PANEL, COMPREHENSIVE    Collection Time: 03/10/18  4:31 AM   Result Value Ref Range    Sodium 136 136 - 145 mmol/L    Potassium 4.1 3.5 - 5.5 mmol/L    Chloride 98 (L) 100 - 108 mmol/L    CO2 32 21 - 32 mmol/L    Anion gap 6 3.0 - 18 mmol/L    Glucose 72 (L) 74 - 99 mg/dL    BUN 38 (H) 7.0 - 18 MG/DL    Creatinine 0.79 0.6 - 1.3 MG/DL    BUN/Creatinine ratio 48 (H) 12 - 20      GFR est AA >60 >60 ml/min/1.73m2    GFR est non-AA >60 >60 ml/min/1.73m2    Calcium 11.3 (H) 8.5 - 10.1 MG/DL    Bilirubin, total 0.5 0.2 - 1.0 MG/DL    ALT (SGPT) 91 (H) 13 - 56 U/L    AST (SGOT) 151 (H) 15 - 37 U/L    Alk. phosphatase 84 45 - 117 U/L    Protein, total 4.9 (L) 6.4 - 8.2 g/dL    Albumin 2.4 (L) 3.4 - 5.0 g/dL    Globulin 2.5 2.0 - 4.0 g/dL    A-G Ratio 1.0 0.8 - 1.7     LIPASE    Collection Time: 03/10/18 12:48 PM   Result Value Ref Range    Lipase 1194 (H) 73 - 393 U/L         Assessment/Plan:     Principal Problem:    Syncope (3/8/2018)    Active Problems:    Acute pancreatitis (2/19/2016)      [facility] (3/8/2018)      GI bleed (3/8/2018)      Hyponatremia (3/8/2018)      Hypercalcemia (3/8/2018)      Leukocytosis      Care Plan   1-Syncope - multi-factorial:dehydration,intoxicated with etoh,hypercalcemia,gi bleed,nausea/vomiting. -CT head w/o acute process.     -ECHO with EF 70%,no wall abnormality,grade 1 diastolic dysfunction    -On iv fluid    -Continue tele monitoring. Monitor mentation    -Continue treating pt for potential causes. No recurrence of syncope so far    2-Acute pancreatitis:    -Lipase 2172 on admission. Today 1194    -Still c/o a lot of abdominal pain with dilaudid    -NPO    -Pain management     -IV fluid    -Zofran for nausea. -GI following    -Lipase in am    3-GI bleed    -On ppi    -GI consulted. -EGD on 3/9 c/w severe, erosive/ulcerative esophagitis in volving the distal two thirds of the esophagus. Hiatus hernia. Thick coffee-ground adherent material in the stomach precluding optimal visualization. 4-Hypercalcemia:17.5 on admission    -Improving.    -On iv fluid. -PTH- related peptide pending results    -PTH intact 13.1    -Vit D 25 hydroxy 36.1    -CXR:possible left upper lobe pulmonary nodule ->CT chest did not reveal any mass. -Differential including dehydration,hyperthyroid,malignancy. PTH intact low,If PTH related peptide high will order malignancy work-up,such as ordering  Bone scan    5-UTI    -Ucx on 3/9 c/w GNRs     -On atbx. Adjust atbx based on results. 6-acute bronchitis on CT scan    -Duo-neb    -On atbx    7-Alcohol abuse with signs of Withdrawal    -On [facility] protocol    -MVI,Thiamine,Folic acid    -Ongoing problem for this patient who has been unable to quit despite multiple admissions to psych institutions - 11 admissions the last few years as per patient. Reporting strong family support.     8-Hyponatremia due to alcohol abuse    -Na 127 on admission    -Resolved    9-Mental benigno issues    -Continue routine psych medications    DVT prophylaxis:scds  Full code  Disposition:tbd

## 2018-03-10 NOTE — ROUTINE PROCESS
0800-Assessment completed, call bell within reach, no distress noted, voiced no complaints at this time. 1043-am due medications given. 1430-patient gone to endo. 1750-patient returned from Endo, tele-box reapplied, no distress noted. Held clonidine since b/p is low. 1935-Bedside and Verbal shift change report given to Juana Villasenor (oncoming nurse) by Anabella Moody (offgoing nurse). Report included the following information SBAR, MAR and Recent Results.

## 2018-03-11 PROBLEM — J40 BRONCHITIS: Status: ACTIVE | Noted: 2018-03-11

## 2018-03-11 PROBLEM — N39.0 UTI (URINARY TRACT INFECTION): Status: ACTIVE | Noted: 2018-03-11

## 2018-03-11 LAB
ALBUMIN SERPL-MCNC: 2 G/DL (ref 3.4–5)
ALBUMIN/GLOB SERPL: 0.8 {RATIO} (ref 0.8–1.7)
ALP SERPL-CCNC: 87 U/L (ref 45–117)
ALT SERPL-CCNC: 81 U/L (ref 13–56)
ANION GAP SERPL CALC-SCNC: 6 MMOL/L (ref 3–18)
AST SERPL-CCNC: 116 U/L (ref 15–37)
BASOPHILS # BLD: 0 K/UL (ref 0–0.06)
BASOPHILS NFR BLD: 0 % (ref 0–2)
BILIRUB SERPL-MCNC: 0.5 MG/DL (ref 0.2–1)
BUN SERPL-MCNC: 25 MG/DL (ref 7–18)
BUN/CREAT SERPL: 51 (ref 12–20)
CALCIUM SERPL-MCNC: 9.5 MG/DL (ref 8.5–10.1)
CHLORIDE SERPL-SCNC: 103 MMOL/L (ref 100–108)
CO2 SERPL-SCNC: 31 MMOL/L (ref 21–32)
CREAT SERPL-MCNC: 0.49 MG/DL (ref 0.6–1.3)
DIFFERENTIAL METHOD BLD: ABNORMAL
EOSINOPHIL # BLD: 0.3 K/UL (ref 0–0.4)
EOSINOPHIL NFR BLD: 3 % (ref 0–5)
ERYTHROCYTE [DISTWIDTH] IN BLOOD BY AUTOMATED COUNT: 15.7 % (ref 11.6–14.5)
GLOBULIN SER CALC-MCNC: 2.6 G/DL (ref 2–4)
GLUCOSE BLD STRIP.AUTO-MCNC: 107 MG/DL (ref 70–110)
GLUCOSE BLD STRIP.AUTO-MCNC: 67 MG/DL (ref 70–110)
GLUCOSE SERPL-MCNC: 63 MG/DL (ref 74–99)
HCT VFR BLD AUTO: 28.1 % (ref 35–45)
HGB BLD-MCNC: 9 G/DL (ref 12–16)
LIPASE SERPL-CCNC: 441 U/L (ref 73–393)
LYMPHOCYTES # BLD: 1.8 K/UL (ref 0.9–3.6)
LYMPHOCYTES NFR BLD: 21 % (ref 21–52)
MCH RBC QN AUTO: 32.4 PG (ref 24–34)
MCHC RBC AUTO-ENTMCNC: 32 G/DL (ref 31–37)
MCV RBC AUTO: 101.1 FL (ref 74–97)
MONOCYTES # BLD: 0.3 K/UL (ref 0.05–1.2)
MONOCYTES NFR BLD: 4 % (ref 3–10)
NEUTS SEG # BLD: 6 K/UL (ref 1.8–8)
NEUTS SEG NFR BLD: 72 % (ref 40–73)
PLATELET # BLD AUTO: 65 K/UL (ref 135–420)
PMV BLD AUTO: 10.5 FL (ref 9.2–11.8)
POTASSIUM SERPL-SCNC: 3.9 MMOL/L (ref 3.5–5.5)
PROT SERPL-MCNC: 4.6 G/DL (ref 6.4–8.2)
RBC # BLD AUTO: 2.78 M/UL (ref 4.2–5.3)
SODIUM SERPL-SCNC: 140 MMOL/L (ref 136–145)
WBC # BLD AUTO: 8.4 K/UL (ref 4.6–13.2)

## 2018-03-11 PROCEDURE — 74011000250 HC RX REV CODE- 250: Performed by: INTERNAL MEDICINE

## 2018-03-11 PROCEDURE — 80053 COMPREHEN METABOLIC PANEL: CPT | Performed by: INTERNAL MEDICINE

## 2018-03-11 PROCEDURE — 74011000258 HC RX REV CODE- 258: Performed by: INTERNAL MEDICINE

## 2018-03-11 PROCEDURE — C9113 INJ PANTOPRAZOLE SODIUM, VIA: HCPCS | Performed by: HOSPITALIST

## 2018-03-11 PROCEDURE — 74011250636 HC RX REV CODE- 250/636: Performed by: INTERNAL MEDICINE

## 2018-03-11 PROCEDURE — 77030020263 HC SOL INJ SOD CL0.9% LFCR 1000ML

## 2018-03-11 PROCEDURE — 97535 SELF CARE MNGMENT TRAINING: CPT

## 2018-03-11 PROCEDURE — 65660000000 HC RM CCU STEPDOWN

## 2018-03-11 PROCEDURE — 97165 OT EVAL LOW COMPLEX 30 MIN: CPT

## 2018-03-11 PROCEDURE — 83690 ASSAY OF LIPASE: CPT | Performed by: INTERNAL MEDICINE

## 2018-03-11 PROCEDURE — 74011250637 HC RX REV CODE- 250/637: Performed by: HOSPITALIST

## 2018-03-11 PROCEDURE — 74011250636 HC RX REV CODE- 250/636: Performed by: HOSPITALIST

## 2018-03-11 PROCEDURE — 77010033678 HC OXYGEN DAILY

## 2018-03-11 PROCEDURE — 82962 GLUCOSE BLOOD TEST: CPT

## 2018-03-11 PROCEDURE — 85025 COMPLETE CBC W/AUTO DIFF WBC: CPT | Performed by: INTERNAL MEDICINE

## 2018-03-11 RX ORDER — DEXTROSE MONOHYDRATE 25 G/50ML
INJECTION, SOLUTION INTRAVENOUS
Status: DISPENSED
Start: 2018-03-11 | End: 2018-03-12

## 2018-03-11 RX ORDER — DEXTROSE MONOHYDRATE AND SODIUM CHLORIDE 5; .9 G/100ML; G/100ML
100 INJECTION, SOLUTION INTRAVENOUS CONTINUOUS
Status: DISCONTINUED | OUTPATIENT
Start: 2018-03-11 | End: 2018-03-15 | Stop reason: HOSPADM

## 2018-03-11 RX ORDER — DEXTROSE 50 % IN WATER (D50W) INTRAVENOUS SYRINGE
25
Status: COMPLETED | OUTPATIENT
Start: 2018-03-11 | End: 2018-03-11

## 2018-03-11 RX ADMIN — PIPERACILLIN SODIUM,TAZOBACTAM SODIUM 3.38 G: 3; .375 INJECTION, POWDER, FOR SOLUTION INTRAVENOUS at 03:18

## 2018-03-11 RX ADMIN — TOPIRAMATE 100 MG: 100 TABLET, FILM COATED ORAL at 08:03

## 2018-03-11 RX ADMIN — HYDROMORPHONE HYDROCHLORIDE 0.5 MG: 2 INJECTION INTRAMUSCULAR; INTRAVENOUS; SUBCUTANEOUS at 07:58

## 2018-03-11 RX ADMIN — THERA TABS 1 TABLET: TAB at 08:00

## 2018-03-11 RX ADMIN — HYDROMORPHONE HYDROCHLORIDE 0.5 MG: 2 INJECTION INTRAMUSCULAR; INTRAVENOUS; SUBCUTANEOUS at 03:24

## 2018-03-11 RX ADMIN — PANTOPRAZOLE SODIUM 40 MG: 40 INJECTION, POWDER, FOR SOLUTION INTRAVENOUS at 21:36

## 2018-03-11 RX ADMIN — PANCRELIPASE 1 CAPSULE: 10000; 34000; 55000 CAPSULE, DELAYED RELEASE ORAL at 08:00

## 2018-03-11 RX ADMIN — DEXTROSE MONOHYDRATE 12.5 G: 25 INJECTION, SOLUTION INTRAVENOUS at 14:00

## 2018-03-11 RX ADMIN — CLONIDINE HYDROCHLORIDE 0.1 MG: 0.1 TABLET ORAL at 08:00

## 2018-03-11 RX ADMIN — DIAZEPAM 5 MG: 5 TABLET ORAL at 15:47

## 2018-03-11 RX ADMIN — DIAZEPAM 5 MG: 5 TABLET ORAL at 21:36

## 2018-03-11 RX ADMIN — HYDROMORPHONE HYDROCHLORIDE 0.5 MG: 2 INJECTION INTRAMUSCULAR; INTRAVENOUS; SUBCUTANEOUS at 23:06

## 2018-03-11 RX ADMIN — PANTOPRAZOLE SODIUM 40 MG: 40 INJECTION, POWDER, FOR SOLUTION INTRAVENOUS at 08:00

## 2018-03-11 RX ADMIN — HYDROMORPHONE HYDROCHLORIDE 0.5 MG: 2 INJECTION INTRAMUSCULAR; INTRAVENOUS; SUBCUTANEOUS at 12:09

## 2018-03-11 RX ADMIN — FOLIC ACID 1 MG: 1 TABLET ORAL at 08:03

## 2018-03-11 RX ADMIN — PIPERACILLIN SODIUM,TAZOBACTAM SODIUM 3.38 G: 3; .375 INJECTION, POWDER, FOR SOLUTION INTRAVENOUS at 17:24

## 2018-03-11 RX ADMIN — DIAZEPAM 5 MG: 5 TABLET ORAL at 07:37

## 2018-03-11 RX ADMIN — DEXTROSE AND SODIUM CHLORIDE 100 ML/HR: 5; 900 INJECTION, SOLUTION INTRAVENOUS at 13:21

## 2018-03-11 RX ADMIN — Medication 100 MG: at 08:03

## 2018-03-11 RX ADMIN — PIPERACILLIN SODIUM,TAZOBACTAM SODIUM 3.38 G: 3; .375 INJECTION, POWDER, FOR SOLUTION INTRAVENOUS at 12:09

## 2018-03-11 RX ADMIN — CLONIDINE HYDROCHLORIDE 0.1 MG: 0.1 TABLET ORAL at 17:23

## 2018-03-11 RX ADMIN — BUPROPION HYDROCHLORIDE 300 MG: 150 TABLET, FILM COATED, EXTENDED RELEASE ORAL at 07:36

## 2018-03-11 RX ADMIN — SODIUM CHLORIDE 100 ML/HR: 900 INJECTION, SOLUTION INTRAVENOUS at 11:08

## 2018-03-11 NOTE — PROGRESS NOTES
Hospitalist Progress Note  Eileen Edwards MD  Internal medicine/ Hospitalist    Daily Progress Note: 3/11/2018 9:48 AM      Interval history / Subjective:   Krystin Martinez is a 47y.o. year old female who presented with  Syncope and GI bleed. Reported  3 days of coffee ground type emesis prior er visit,diffuse abdominal pain. She was using the restroom when she passed out. Continued to have some vomiting. Noted with elevated alcohol level,h/o chronic alcoholic and pancreatitis. In ED,amylase lipase,calcium 17.5,hyponatremia. Admitted to tele for acute pancreatitis,hypercalcemia,hyponatremia,gi bleed,alcohol abuse. Started on iv fluid,ppi,ciwa protocol. GI consulted,EGD on 3/9 showing severe, erosive/ulcerative esophagitis  . Work-up for hypercalcemia initiated. PTH intact 13.1,vit D nl. PTH-related peptide pending result. Ucx grew Klebsiella and possible 2nd GNRs. Pt currently on zosyn.     Current Facility-Administered Medications   Medication Dose Route Frequency    dextrose 5% and 0.9% NaCl infusion  100 mL/hr IntraVENous CONTINUOUS    dextrose (D50) infusion        albuterol-ipratropium (DUO-NEB) 2.5 MG-0.5 MG/3 ML  3 mL Nebulization Q4H PRN    piperacillin-tazobactam (ZOSYN) 3.375 g in  mL ##EXTENDED 4 HRS INFUSION  3.375 g IntraVENous Q8H    sodium chloride (NS) flush 5-10 mL  5-10 mL IntraVENous PRN    LORazepam (ATIVAN) tablet 1 mg  1 mg Oral Q1H PRN    Or    LORazepam (ATIVAN) injection 1 mg  1 mg IntraVENous Q1H PRN    LORazepam (ATIVAN) tablet 2 mg  2 mg Oral Q1H PRN    Or    LORazepam (ATIVAN) injection 2 mg  2 mg IntraVENous Q1H PRN    LORazepam (ATIVAN) injection 3 mg  3 mg IntraVENous Q15MIN PRN    albuterol (PROVENTIL VENTOLIN) nebulizer solution 2.5 mg  2.5 mg Nebulization Q4H PRN    buPROPion XL (WELLBUTRIN XL) tablet 300 mg  300 mg Oral 7am    cloNIDine HCl (CATAPRES) tablet 0.1 mg  0.1 mg Oral BID    diazePAM (VALIUM) tablet 5 mg  5 mg Oral Q8H    lipase-protease-amylase (ZENPEP 10,000) capsule 1 Cap  1 Cap Oral TID WITH MEALS    topiramate (TOPAMAX) tablet 100 mg  100 mg Oral DAILY    pantoprazole (PROTONIX) 40 mg in sodium chloride 10 mL injection  40 mg IntraVENous Q12H    sodium chloride (NS) flush 5-10 mL  5-10 mL IntraVENous PRN    LORazepam (ATIVAN) tablet 1 mg  1 mg Oral Q1H PRN    Or    LORazepam (ATIVAN) injection 1 mg  1 mg IntraVENous Q1H PRN    LORazepam (ATIVAN) tablet 2 mg  2 mg Oral Q1H PRN    Or    LORazepam (ATIVAN) injection 2 mg  2 mg IntraVENous Q1H PRN    LORazepam (ATIVAN) injection 3 mg  3 mg IntraVENous [id number] PRN    folic acid (FOLVITE) tablet 1 mg  1 mg Oral DAILY    Thiamine Mononitrate (B-1) tablet 100 mg  100 mg Oral DAILY    therapeutic multivitamin (THERAGRAN) tablet 1 Tab  1 Tab Oral DAILY    ondansetron (ZOFRAN) injection 4 mg  4 mg IntraVENous Q6H PRN    influenza vaccine 2017- (3 yrs+)(PF) (FLUZONE QUAD/FLUARIX QUAD) injection 0.5 mL  0.5 mL IntraMUSCular PRIOR TO DISCHARGE    HYDROmorphone (DILAUDID) injection 0.5 mg  0.5 mg IntraVENous Q4H PRN        Review of Systems  Still c/o abdominal pain. Objective:     Visit Vitals    /76 (BP 1 Location: Left arm)    Pulse 97    Temp 98 °F (36.7 °C)    Resp 18    Ht 5' 2\" (1.575 m)    Wt 54.6 kg (120 lb 4.8 oz)    LMP 2013    SpO2 97%    BMI 22 kg/m2    O2 Flow Rate (L/min): 2 l/min O2 Device: Nasal cannula    Temp (24hrs), Av.9 °F (36.6 °C), Min:97.4 °F (36.3 °C), Max:98.2 °F (36.8 °C)          1901 -  0700  In: 581.7 [I.V.:581.7]  Out: 701 [Urine:700]  P/E  NAD,looking anxious,weak. Heent:perrla,at/nc,mouth dry. Lungs ctab  Heart s1s2+,tachycardic. Abdm:soft,diffuse tenderness to palpation,bs present. Extr:no edema,pedis pulses present. Neuro:awake,looking weak,answering questions appropriately.   Data Review    Recent Results (from the past 12 hour(s))   METABOLIC PANEL, COMPREHENSIVE    Collection Time: 18 11:30 AM   Result Value Ref Range    Sodium 140 136 - 145 mmol/L    Potassium 3.9 3.5 - 5.5 mmol/L    Chloride 103 100 - 108 mmol/L    CO2 31 21 - 32 mmol/L    Anion gap 6 3.0 - 18 mmol/L    Glucose 63 (L) 74 - 99 mg/dL    BUN 25 (H) 7.0 - 18 MG/DL    Creatinine 0.49 (L) 0.6 - 1.3 MG/DL    BUN/Creatinine ratio 51 (H) 12 - 20      GFR est AA >60 >60 ml/min/1.73m2    GFR est non-AA >60 >60 ml/min/1.73m2    Calcium 9.5 8.5 - 10.1 MG/DL    Bilirubin, total 0.5 0.2 - 1.0 MG/DL    ALT (SGPT) 81 (H) 13 - 56 U/L    AST (SGOT) 116 (H) 15 - 37 U/L    Alk. phosphatase 87 45 - 117 U/L    Protein, total 4.6 (L) 6.4 - 8.2 g/dL    Albumin 2.0 (L) 3.4 - 5.0 g/dL    Globulin 2.6 2.0 - 4.0 g/dL    A-G Ratio 0.8 0.8 - 1.7     LIPASE    Collection Time: 03/11/18 11:30 AM   Result Value Ref Range    Lipase 441 (H) 73 - 393 U/L   CBC WITH AUTOMATED DIFF    Collection Time: 03/11/18 11:30 AM   Result Value Ref Range    WBC 8.4 4.6 - 13.2 K/uL    RBC 2.78 (L) 4.20 - 5.30 M/uL    HGB 9.0 (L) 12.0 - 16.0 g/dL    HCT 28.1 (L) 35.0 - 45.0 %    .1 (H) 74.0 - 97.0 FL    MCH 32.4 24.0 - 34.0 PG    MCHC 32.0 31.0 - 37.0 g/dL    RDW 15.7 (H) 11.6 - 14.5 %    PLATELET 65 (L) 413 - 420 K/uL    MPV 10.5 9.2 - 11.8 FL    NEUTROPHILS 72 40 - 73 %    LYMPHOCYTES 21 21 - 52 %    MONOCYTES 4 3 - 10 %    EOSINOPHILS 3 0 - 5 %    BASOPHILS 0 0 - 2 %    ABS. NEUTROPHILS 6.0 1.8 - 8.0 K/UL    ABS. LYMPHOCYTES 1.8 0.9 - 3.6 K/UL    ABS. MONOCYTES 0.3 0.05 - 1.2 K/UL    ABS. EOSINOPHILS 0.3 0.0 - 0.4 K/UL    ABS.  BASOPHILS 0.0 0.0 - 0.06 K/UL    DF AUTOMATED     GLUCOSE, POC    Collection Time: 03/11/18  1:04 PM   Result Value Ref Range    Glucose (POC) 67 (L) 70 - 110 mg/dL   GLUCOSE, POC    Collection Time: 03/11/18  1:43 PM   Result Value Ref Range    Glucose (POC) 107 70 - 110 mg/dL         Assessment/Plan:     Principal Problem:    Syncope (3/8/2018)    Active Problems:    Acute pancreatitis (2/19/2016)      Alcohol withdrawal ([facility] [address] (3/8/2018)      GI bleed (3/8/2018)      Hyponatremia (3/8/2018)      Hypercalcemia (3/8/2018)      Leukocytosis      UTI    Bronchitis      Care Plan   1-Syncope - multi-factorial:dehydration,intoxicated with etoh,hypercalcemia,gi bleed,nausea/vomiting,infection    -CT head w/o acute process. -ECHO with EF 70%,no wall abnormality,grade 1 diastolic dysfunction    -On iv fluid    -Continue tele monitoring. Monitor mentation    -Continue treating pt for potential causes. No recurrence of syncope so far    2-Acute pancreatitis:    -Lipase 2172 on admission. Today 441    -Still c/o a lot of abdominal pain. -NPO    -Pain management with dilaudid. On oral pancreatic enzymes. -IV fluid    -Zofran for nausea. -GI following    -Lipase in am    -If persistent pain,will order CT abdomen    3-GI bleed    -On ppi    -GI consulted. -EGD on 3/9 c/w severe, erosive/ulcerative esophagitis in volving the distal two thirds of the esophagus. Hiatus hernia. Thick coffee-ground adherent material in the stomach precluding optimal visualization. 4-Hypercalcemia:17.5 on admission    -Resolved    -On iv fluid. -PTH- related peptide pending results    -PTH intact 13.1    -Vit D 25 hydroxy 36.1    -CXR:possible left upper lobe pulmonary nodule ->CT chest did not reveal any mass. -Differential including dehydration,hyperthyroid,malignancy. PTH intact low. If PTH related peptide high will continue malignancy work-up,such as ordering  Bone scan    5-UTI    -Ucx on 3/9 c/w Klebsiella and possible 2nd GNR    -Currently on zosyn - to be adjusted if necessary    6-acute bronchitis on CT scan    -Duo-neb    -On atbx    7-Alcohol abuse with signs of Withdrawal    -On ciwa protocol    -MVI,Thiamine,Folic acid    -Ongoing problem for this patient who has been unable to quit despite multiple admissions to [facility] institutions - 11 admissions the last few years as per patient. Reporting strong family support.     8-Hyponatremia due to alcohol abuse    -Na 127 on admission -Resolved    9-Mental benigno issues    -Continue routine psych medications    DVT prophylaxis:scds  Full code  Disposition:tbd

## 2018-03-11 NOTE — ROUTINE PROCESS
0730-Assessment completed, call bell within reach, no distress noted, patient very drowsy but arouses with touch. 1030-am medications given, mother at bedside. 1230-no change in condition, no distress noted. 1300-PT into see patient. 1520-mediated for pain per patient request.  1630-no change in condition, no distress noted. 1754-medicated for pain per patient request.  1850-pm due medications given. 1915-Bedside and Verbal shift change report given to Carolina (oncoming nurse) by Oritz Duran (offgoing nurse). Report included the following information SBAR, MAR and Recent Results.

## 2018-03-11 NOTE — ROUTINE PROCESS
0715 Bedside, Verbal and Written shift change report given to SEB Zelaya (oncoming nurse) by Connie Pak RN (offgoing nurse). Report included the following information SBAR and Kardex. Pt resting in bed, drowsy, alert to self only.     1300 glucose 63- notified Dr Clark Arreaga- ordered D5 0.9% normal saline at 100 ml/hr, 25 ml D50 now    1343 glucose 107

## 2018-03-11 NOTE — PROGRESS NOTES
1912: Assumed pt care from [facility]. Received pt resting in bed with eyes closed, opens eyes to stimulus. No signs of distress. On 2 Lpm via NC. Bed on lowest position, wheels locked, call bell within reach. 2258: patient anxious, repeatedly saying \"I'm dead, I'm dead! \". Reassured patient. Scheduled valium given. 2355: patient sleeping, no signs of distress. 3-11-18    2836: Bedside and Verbal shift change report given to SEB Painting (oncoming nurse) by Marly Lakhani   (offgoing nurse). Report included the following information SBAR, Kardex, Intake/Output, MAR and Recent Results.

## 2018-03-11 NOTE — PROGRESS NOTES
Problem: Falls - Risk of  Goal: *Absence of Falls  Document Thelma Fall Risk and appropriate interventions in the flowsheet.    Outcome: Progressing Towards Goal  Fall Risk Interventions:  Mobility Interventions: Bed/chair exit alarm, Communicate number of staff needed for ambulation/transfer, Patient to call before getting OOB         Medication Interventions: Bed/chair exit alarm, Evaluate medications/consider consulting pharmacy, Teach patient to arise slowly, Patient to call before getting OOB    Elimination Interventions: Call light in reach, Bed/chair exit alarm, Patient to call for help with toileting needs, Toileting schedule/hourly rounds    History of Falls Interventions: Door open when patient unattended, Evaluate medications/consider consulting pharmacy

## 2018-03-11 NOTE — PROGRESS NOTES
Problem: Self Care Deficits Care Plan (Adult)  Goal: *Acute Goals and Plan of Care (Insert Text)  Occupational Therapy Goals  Initiated 3/11/2018 within 7 day(s). Will implement 3 day trial to determine pt's ability to actively participate with skilled therapy. Continue per POC if appropriate. 1.  Patient will perform grooming tasks at EOB with supervision for balance. 2.  Patient will perform lower body dressing with supervision. 3.  Patient will perform functional task in standing for 8 minutes with supervision for balance to increase activity tolerance for ADLs. 4.  Patient will perform toilet transfers with supervision/set-up. 5.  Patient will perform all aspects of toileting with supervision/set-up. 6.  Patient will participate in upper extremity therapeutic exercise/activities with supervision/set-up for 8 minutes to increase BUE strength for functional transfers & ADLs. 7.  Patient will utilize energy conservation techniques during functional activities with minimal verbal cues. Outcome: Progressing Towards Goal  Occupational Therapy EVALUATION    Patient: Nadia Corrales (30 y.o. female)  Date: 3/11/2018  Primary Diagnosis: Syncope  epigastric pain and coffee-ground emesis  Procedure(s) (LRB):  ESOPHAGOGASTRODUODENOSCOPY (EGD) (N/A) 2 Days Post-Op   Precautions:  Fall  PLOF: Pt was independent with basic self care tasks and functional mobility PTA. ASSESSMENT :  Based on the objective data described below, the patient presents with  impairments with regard to bed mobility, activity tolerance and independence in ADLs. Pt supine on arrival, A&O x2; decreased attention & command following due to drowsiness. Pt difficult to understand majority of session; no pain reported & cooperative t/o session. Complaining that she has not yet eaten. SBA for bed mobility. Min A/additional time to maneuver from EOB-->BSC.  Pt voided; mod A for clothing management & pericare due to fair/fair- sitting/standing balance and poor activity tolerance. Set-up/min A for facial hygiene at bed level due to decreased attention & command following. Will implement 3 day trial to determine pt's ability to actively participate with therapy. Pt left supine with bd alarm on and needs within reach. Patient will benefit from skilled intervention to address the above impairments. Patients rehabilitation potential is considered to be Fair  Factors which may influence rehabilitation potential include:   []             None noted  []             Mental ability/status  [x]             Medical condition  []             Home/family situation and support systems  []             Safety awareness  []             Pain tolerance/management  []             Other:     Recommendations for nursing: up with assist x1 to [facility]       PLAN :  Recommendations and Planned Interventions:  [x]               Self Care Training                  [x]        Therapeutic Activities  [x]               Functional Mobility Training    []        Cognitive Retraining  [x]               Therapeutic Exercises           [x]        Endurance Activities  [x]               Balance Training                   []        Neuromuscular Re-Education  []               Visual/Perceptual Training     [x]   Home Safety Training  [x]               Patient Education                 [x]        Family Training/Education  []               Other (comment):    Frequency/Duration: Patient will be followed by occupational therapy 3 times a week for a 3 day trial to address goals. Discharge Recommendations: Rehab vs. Home health (pending progress during hospital stay)  Further Equipment Recommendations for Discharge: shower chair; bedside commode     SUBJECTIVE:   Patient stated \"Am I giving you the right answers?     OBJECTIVE DATA SUMMARY:     Past Medical History:   Diagnosis Date    Alcohol abuse     Anxiety     Asthma     Bipolar disorder ([facility] [address]     Common migraine     COPD [facility]chronic obstructive pulmonary disease) ([facility] [address]     Depression     Esophageal reflux     Gout     Hypertension     Inverted nipple     Left breast always have. Past Surgical History:   Procedure Laterality Date    HX CARPAL TUNNEL RELEASE      HX GYN      tubal ligation    HX HEENT      HX LUMBAR LAMINECTOMY      HX ORTHOPAEDIC       Barriers to Learning/Limitations: yes;  altered mental status (i.e.Sedation, Confusion)  Compensate with: visual, verbal, tactile, kinesthetic cues/model    GCODES:  Self Care  Current  CK= 40-59%   Goal  CI= 1-19%. The severity rating is based on the Other Fucntional Assessment, MMT, ROM    Eval Complexity: History: MEDIUM Complexity : Expanded review of history including physical, cognitive and psychosocial  history ; Examination: LOW Complexity : 1-3 performance deficits relating to physical, cognitive , or psychosocial skils that result in activity limitations and / or participation restrictions ; Decision Making:MEDIUM Complexity : Patient may present with comorbidities that affect occupational performnce. Miniml to moderate modification of tasks or assistance (eg, physical or verbal ) with assesment(s) is necessary to enable patient to complete evaluation     Prior Level of Function/Home Situation: Pt was independent with basic self care tasks and functional mobility PTA. Home Situation  Home Environment: Private residence  # Steps to Enter: 5  One/Two Story Residence: Two story  Living Alone: No  Support Systems: Family member(s)  Patient Expects to be Discharged to[de-identified] Private residence  Current DME Used/Available at Home: Oxygen, portable  Tub or Shower Type: Tub/Shower combination  [x]  Right hand dominant   []  Left hand dominant    Cognitive/Behavioral Status:  Neurologic State: Drowsy  Orientation Level: Oriented to person;Oriented to place  Cognition: Decreased attention/concentration;Decreased command following  Safety/Judgement: Awareness of environment; Fall prevention Skin: Intact (BUEs)  Edema: None noted (BUEs)    Vision/Perceptual:    Acuity:  (unable to accurately assess)      Coordination:  Coordination: Within functional limits (BUES)  Fine Motor Skills-Upper: Right Intact; Left Intact    Gross Motor Skills-Upper: Right Intact; Left Intact     Balance:  Sitting: Impaired  Sitting - Static: Fair (occasional)  Sitting - Dynamic: Fair (occasional)  Standing: Impaired; With support  Standing - Static: Fair  Standing - Dynamic : Fair (Fair-)     Strength:  Strength: Generally decreased, functional (BUEs: 4/5)    Tone & Sensation:  Tone: Normal (BUEs)    Range of Motion:  AROM: Generally decreased, functional (BUEs)    Functional Mobility and Transfers for ADLs:  Bed Mobility:   Supine to Sit: Stand-by assistance  Sit to Supine: Stand-by assistance     Transfers:  Sit to Stand: Contact guard assistance;Minimum assistance   Toilet Transfer : Minimum assistance; Additional time    ADL Assessment:  Feeding: Setup;Minimum assistance  Oral Facial Hygiene/Grooming: Setup;Minimum assistance  Bathing: Moderate assistance  Upper Body Dressing: Minimum assistance  Lower Body Dressing: Moderate assistance  Toileting: Moderate assistance    ADL Intervention:  Grooming  Grooming Assistance: Supervision/set up;Minimum assistance  Washing Face: Supervision/set-up; Minimum assistance  Cues: Physical assistance;Verbal cues provided    Toileting  Toileting Assistance: Moderate assistance  Bladder Hygiene: Moderate assistance  Clothing Management: Moderate assistance  Cues: Physical assistance for pants up;Physical assistance for pants down;Verbal cues provided  Adaptive Equipment: Elevated seat    Min A/additional time to maneuver from EOB-->BSC. Pt voided; mod A for clothing management & pericare due to fair/fair- sitting/standing balance and poor activity tolerance. Set-up/min A for facial hygiene due to decreased attention & command following.     Cognitive Retraining  Safety/Judgement: Awareness of environment; Fall prevention    Pain:  Pre treatment pain level: 0/10  Post treatment pain level: 0/10  Pain Scale 1: Adult Nonverbal Pain Scale    Activity Tolerance:  Poor+  Please refer to the flowsheet for vital signs taken during this treatment. After treatment:   [] Patient left in no apparent distress sitting up in chair  [x] Patient left in no apparent distress in bed  [x] Call bell left within reach  [x] Nursing notified  [] Caregiver present  [] Bed alarm activated    COMMUNICATION/EDUCATION: Pt educated on role of OT, POC and home safety. She needs reinforcement. [] Home safety education was provided and the patient/caregiver indicated understanding. [x] Patient/family have participated as able in goal setting and plan of care. [x] Patient/family agree to work toward stated goals and plan of care. [] Patient understands intent and goals of therapy, but is neutral about his/her participation. [] Patient is unable to participate in goal setting and plan of care.     Thank you for this referral.    Arjun Farris MS OTR/L  Time Calculation: 17 mins

## 2018-03-11 NOTE — PROGRESS NOTES
Gastrointestinal Progress Note    Patient Name: Priscilla Jennings    AYYUT'V Date: 3/11/2018    Admit Date: 3/8/2018    Subjective:     Priscilla Jennings is a 47 y.o. Female who we are seeing b/o alcoholic pancreatitis and severe erosive esophagitis resulting  In coffee ground emesis. The patient reports significant improvement in her epigastric pain since admission and return of hunger. She requests liquids but also wants solid food. She denies chest pain/burning.       Current Facility-Administered Medications   Medication Dose Route Frequency    dextrose 5% and 0.9% NaCl infusion  100 mL/hr IntraVENous CONTINUOUS    dextrose (D50) infusion        albuterol-ipratropium (DUO-NEB) 2.5 MG-0.5 MG/3 ML  3 mL Nebulization Q4H PRN    piperacillin-tazobactam (ZOSYN) 3.375 g in  mL ##EXTENDED 4 HRS INFUSION  3.375 g IntraVENous Q8H    sodium chloride (NS) flush 5-10 mL  5-10 mL IntraVENous PRN    LORazepam (ATIVAN) tablet 1 mg  1 mg Oral Q1H PRN    Or    LORazepam (ATIVAN) injection 1 mg  1 mg IntraVENous Q1H PRN    LORazepam (ATIVAN) tablet 2 mg  2 mg Oral Q1H PRN    Or    LORazepam (ATIVAN) injection 2 mg  2 mg IntraVENous Q1H PRN    LORazepam (ATIVAN) injection 3 mg  3 mg IntraVENous Q15MIN PRN    albuterol (PROVENTIL VENTOLIN) nebulizer solution 2.5 mg  2.5 mg Nebulization Q4H PRN    buPROPion XL (WELLBUTRIN XL) tablet 300 mg  300 mg Oral 7am    cloNIDine HCl (CATAPRES) tablet 0.1 mg  0.1 mg Oral BID    diazePAM (VALIUM) tablet 5 mg  5 mg Oral Q8H    lipase-protease-amylase (ZENPEP 10,000) capsule 1 Cap  1 Cap Oral TID WITH MEALS    topiramate (TOPAMAX) tablet 100 mg  100 mg Oral DAILY    pantoprazole (PROTONIX) 40 mg in sodium chloride 10 mL injection  40 mg IntraVENous Q12H    sodium chloride (NS) flush 5-10 mL  5-10 mL IntraVENous PRN    LORazepam (ATIVAN) tablet 1 mg  1 mg Oral Q1H PRN    Or    LORazepam (ATIVAN) injection 1 mg  1 mg IntraVENous Q1H PRN    LORazepam (ATIVAN) tablet 2 mg  2 mg Oral Q1H PRN    Or    LORazepam (ATIVAN) injection 2 mg  2 mg IntraVENous Q1H PRN    LORazepam (ATIVAN) injection 3 mg  3 mg IntraVENous K68LDH PRN    folic acid (FOLVITE) tablet 1 mg  1 mg Oral DAILY    Thiamine Mononitrate (B-1) tablet 100 mg  100 mg Oral DAILY    therapeutic multivitamin (THERAGRAN) tablet 1 Tab  1 Tab Oral DAILY    ondansetron (ZOFRAN) injection 4 mg  4 mg IntraVENous Q6H PRN    influenza vaccine 2017-18 (3 yrs+)(PF) (FLUZONE QUAD/FLUARIX QUAD) injection 0.5 mL  0.5 mL IntraMUSCular PRIOR TO DISCHARGE    HYDROmorphone (DILAUDID) injection 0.5 mg  0.5 mg IntraVENous Q4H PRN          Objective:     Visit Vitals    /76 (BP 1 Location: Left arm)    Pulse 97    Temp 98 °F (36.7 °C)    Resp 18    Ht 5' 2\" (1.575 m)    Wt 54.6 kg (120 lb 4.8 oz)    SpO2 97%    BMI 22 kg/m2       General appearance: alert, cooperative, no distress, appears stated age, frail, chronically ill appearing  Abdomen: soft, minimal tenderness to deep palpation in the epigastrium. Bowel sounds normal. No masses,  no organomegaly    Data Review:  Esophageal Bx: pending    Labs: Results:       Chemistry Recent Labs      03/11/18   1130  03/10/18   0431  03/09/18   0355   GLU  63*  72*  105*   NA  140  136  132*   K  3.9  4.1  4.3   CL  103  98*  89*   CO2  31  32  34*   BUN  25*  38*  49*   CREA  0.49*  0.79  0.99   CA  9.5  11.3*  13.9*   AGAP  6  6  9   BUCR  51*  48*  49*      CBC w/Diff Recent Labs      03/11/18   1130  03/10/18   0431  03/09/18   0355   WBC  8.4  12.3  19.2*   RBC  2.78*  3.27*  3.79*   HGB  9.0*  10.6*  12.1   HCT  28.1*  31.8*  36.0   PLT  65*  129*  243   GRANS  72  80*  92*   LYMPH  21  14*  5*   EOS  3  3  0      Coagulation No results for input(s): PTP, INR, APTT in the last 72 hours.     No lab exists for component: INREXT    Liver Enzymes Recent Labs      03/11/18   1130  03/10/18   0431  03/09/18   0355   TP  4.6*  4.9*  4.8*   ALB  2.0*  2.4*  2.6*   TBILI  0.5  0.5  0.8   AP  87  84  70 SGOT  116*  151*  134*   ALT  81*  91*  56      Lipase Recent Labs      03/11/18   1130  03/10/18   1248   LPSE  441*  1194*               Assessment:   1. Acute alcoholic pancreatitis----improving with conservative management  2. Severe erosive esophagitis---?? Related to vomiting from pancreatitis or GERD or a combination of the 2    Recommendation:   1. Low fat diet  2. Continue IV hydration for now  3. Continue Pantoprazole--change to PO tomorrow if continues to improve and tolerates food  4. Will need PO Pantoprazole and PO Iron at discharege  5. No Alcoholic beverages in the future  6.  ?? Additional imaging of the pancreas as outpatient after 2-3 months of healing to further exclude small pancreatic neoplasm        Shady Langford MD, Shelby Wiggins, [address]  March 11, 2018  Woodie Cowden

## 2018-03-11 NOTE — PROGRESS NOTES
Problem: Falls - Risk of  Goal: *Absence of Falls  Document Thelma Fall Risk and appropriate interventions in the flowsheet.    Outcome: Progressing Towards Goal  Fall Risk Interventions:  Mobility Interventions: Patient to call before getting OOB, Bed/chair exit alarm         Medication Interventions: Patient to call before getting OOB    Elimination Interventions: Call light in reach, Patient to call for help with toileting needs    History of Falls Interventions: Door open when patient unattended

## 2018-03-12 LAB
BACTERIA SPEC CULT: ABNORMAL
BACTERIA SPEC CULT: ABNORMAL
SERVICE CMNT-IMP: ABNORMAL

## 2018-03-12 PROCEDURE — 97530 THERAPEUTIC ACTIVITIES: CPT

## 2018-03-12 PROCEDURE — 74011000258 HC RX REV CODE- 258: Performed by: INTERNAL MEDICINE

## 2018-03-12 PROCEDURE — 74011250636 HC RX REV CODE- 250/636: Performed by: HOSPITALIST

## 2018-03-12 PROCEDURE — 77010033678 HC OXYGEN DAILY

## 2018-03-12 PROCEDURE — 97535 SELF CARE MNGMENT TRAINING: CPT

## 2018-03-12 PROCEDURE — 74011250637 HC RX REV CODE- 250/637: Performed by: HOSPITALIST

## 2018-03-12 PROCEDURE — 77030020245 HC SOL INJ 5% D/0.9%NACL

## 2018-03-12 PROCEDURE — 74011250636 HC RX REV CODE- 250/636: Performed by: INTERNAL MEDICINE

## 2018-03-12 PROCEDURE — C9113 INJ PANTOPRAZOLE SODIUM, VIA: HCPCS | Performed by: HOSPITALIST

## 2018-03-12 PROCEDURE — 65660000000 HC RM CCU STEPDOWN

## 2018-03-12 RX ADMIN — PANTOPRAZOLE SODIUM 40 MG: 40 INJECTION, POWDER, FOR SOLUTION INTRAVENOUS at 21:03

## 2018-03-12 RX ADMIN — DIAZEPAM 5 MG: 5 TABLET ORAL at 06:32

## 2018-03-12 RX ADMIN — PANTOPRAZOLE SODIUM 40 MG: 40 INJECTION, POWDER, FOR SOLUTION INTRAVENOUS at 08:27

## 2018-03-12 RX ADMIN — THERA TABS 1 TABLET: TAB at 08:33

## 2018-03-12 RX ADMIN — HYDROMORPHONE HYDROCHLORIDE 0.5 MG: 2 INJECTION INTRAMUSCULAR; INTRAVENOUS; SUBCUTANEOUS at 13:15

## 2018-03-12 RX ADMIN — PIPERACILLIN SODIUM,TAZOBACTAM SODIUM 3.38 G: 3; .375 INJECTION, POWDER, FOR SOLUTION INTRAVENOUS at 03:01

## 2018-03-12 RX ADMIN — CLONIDINE HYDROCHLORIDE 0.1 MG: 0.1 TABLET ORAL at 08:27

## 2018-03-12 RX ADMIN — PIPERACILLIN SODIUM,TAZOBACTAM SODIUM 3.38 G: 3; .375 INJECTION, POWDER, FOR SOLUTION INTRAVENOUS at 21:02

## 2018-03-12 RX ADMIN — TOPIRAMATE 100 MG: 100 TABLET, FILM COATED ORAL at 08:27

## 2018-03-12 RX ADMIN — HYDROMORPHONE HYDROCHLORIDE 0.5 MG: 2 INJECTION INTRAMUSCULAR; INTRAVENOUS; SUBCUTANEOUS at 18:33

## 2018-03-12 RX ADMIN — BUPROPION HYDROCHLORIDE 300 MG: 150 TABLET, FILM COATED, EXTENDED RELEASE ORAL at 08:38

## 2018-03-12 RX ADMIN — Medication 100 MG: at 08:39

## 2018-03-12 RX ADMIN — PANCRELIPASE 1 CAPSULE: 10000; 34000; 55000 CAPSULE, DELAYED RELEASE ORAL at 18:34

## 2018-03-12 RX ADMIN — DEXTROSE AND SODIUM CHLORIDE 100 ML/HR: 5; 900 INJECTION, SOLUTION INTRAVENOUS at 08:41

## 2018-03-12 RX ADMIN — FOLIC ACID 1 MG: 1 TABLET ORAL at 08:33

## 2018-03-12 RX ADMIN — CLONIDINE HYDROCHLORIDE 0.1 MG: 0.1 TABLET ORAL at 18:33

## 2018-03-12 RX ADMIN — HYDROMORPHONE HYDROCHLORIDE 0.5 MG: 2 INJECTION INTRAMUSCULAR; INTRAVENOUS; SUBCUTANEOUS at 23:05

## 2018-03-12 RX ADMIN — PANCRELIPASE 1 CAPSULE: 10000; 34000; 55000 CAPSULE, DELAYED RELEASE ORAL at 08:45

## 2018-03-12 RX ADMIN — PIPERACILLIN SODIUM,TAZOBACTAM SODIUM 3.38 G: 3; .375 INJECTION, POWDER, FOR SOLUTION INTRAVENOUS at 13:08

## 2018-03-12 RX ADMIN — HYDROMORPHONE HYDROCHLORIDE 0.5 MG: 2 INJECTION INTRAMUSCULAR; INTRAVENOUS; SUBCUTANEOUS at 08:27

## 2018-03-12 RX ADMIN — DIAZEPAM 5 MG: 5 TABLET ORAL at 21:03

## 2018-03-12 NOTE — PROGRESS NOTES
conducted a Follow up consultation and Spiritual Assessment for Gladys Porras, who is a 47 y.o.,female. The  provided the following Interventions:  Continued the relationship of care and support. Listened empathically. Offered prayer and assurance of continued prayer on patients behalf. Chart reviewed. The following outcomes were achieved:  Patient expressed gratitude for pastoral care visit. Assessment:  There are no further spiritual or Yarsanism issues which require Spiritual Care Services interventions at this time. Plan:  Chaplains will continue to follow and will provide pastoral care on an as needed/requested basis.  recommends bedside caregivers page  on duty if patient shows signs of acute spiritual or emotional distress.      [address] [facility]   Staff [address] [place] [address]   [phone]

## 2018-03-12 NOTE — PROGRESS NOTES
PROGRESS NOTE   PATIENT:  Real Rojas           MRN: [id number]           Jaime, [birth year]           3/12/2018, 6:59 PM      SUBJECTIVE:  Abdominal pain better, no vomiting. Could not tolerate a regular diet. Made the pain worse. OBJECTIVE:  Patient Vitals for the past 24 hrs:   Temp Pulse Resp BP SpO2   03/12/18 1823 98 °F (36.7 °C) 98 20 104/72 99 %   03/12/18 1244 97.8 °F (36.6 °C) 91 18 126/84 99 %   03/12/18 0848 98.1 °F (36.7 °C) 94 18 122/83 94 %   03/12/18 0322 97.6 °F (36.4 °C) 89 18 114/76 100 %   03/11/18 2320 97.5 °F (36.4 °C) 90 18 112/73 100 %   03/11/18 1946 97.7 °F (36.5 °C) (!) 105 18 119/79 96 %         Intake/Output Summary (Last 24 hours) at 03/12/18 1859  Last data filed at 03/12/18 1446   Gross per 24 hour   Intake             1320 ml   Output              400 ml   Net              920 ml       Gen: NAD  Heent: No pallor, icterus  Lungs: Clear B/L   CVS exam: Regular rate and rhythm   Abd  : Soft, less tender in the epigastrium, BS +, No masses felt. Labs: Results:   Chemistry Recent Labs      03/11/18   1130  03/10/18   0431   GLU  63*  72*   NA  140  136   K  3.9  4.1   CL  103  98*   CO2  31  32   BUN  25*  38*   CREA  0.49*  0.79   CA  9.5  11.3*   AGAP  6  6   BUCR  51*  48*   AP  87  84   TP  4.6*  4.9*   ALB  2.0*  2.4*   GLOB  2.6  2.5   AGRAT  0.8  1.0    Estimated Creatinine Clearance: 105 mL/min (based on Cr of 0.49). CBC w/Diff Recent Labs      03/11/18   1130  03/10/18   0431   WBC  8.4  12.3   RBC  2.78*  3.27*   HGB  9.0*  10.6*   HCT  28.1*  31.8*   PLT  65*  129*   GRANS  72  80*   LYMPH  21  14*   EOS  3  3      Cardiac Enzymes No results for input(s): CPK, CKND1, GUSTAVO in the last 72 hours. No lab exists for component: CKRMB, TROIP   Coagulation No results for input(s): PTP, INR, APTT in the last 72 hours.     No lab exists for component: INREXT    Hepatitis Panel Lab Results   Component Value Date/Time    Hepatitis A, IgM NEGATIVE  01/28/2016 09:35 AM    Hepatitis B surface Ag 0.1 01/28/2016 09:35 AM    Hepatitis B core, IgM NEGATIVE  01/28/2016 09:35 AM    Hepatitis C virus Ab <0.02 01/28/2016 09:35 AM      Amylase Lipase    Liver Enzymes Recent Labs      03/11/18   1130  03/10/18   0431   TP  4.6*  4.9*   ALB  2.0*  2.4*   TBILI  0.5  0.5   AP  87  84   SGOT  116*  151*   ALT  81*  91*      Thyroid Studies No results for input(s): T4, T3U, TSH, TSHEXT in the last 72 hours.     No lab exists for component: T3RU     Pathology pathology         Allergies   Allergen Reactions    Lithium Anaphylaxis    Elavil Other (comments)     Left leg went numb    Morphine Hives       Current Facility-Administered Medications   Medication Dose Route Frequency    dextrose 5% and 0.9% NaCl infusion  100 mL/hr IntraVENous CONTINUOUS    albuterol-ipratropium (DUO-NEB) 2.5 MG-0.5 MG/3 ML  3 mL Nebulization Q4H PRN    piperacillin-tazobactam (ZOSYN) 3.375 g in  mL ##EXTENDED 4 HRS INFUSION  3.375 g IntraVENous Q8H    sodium chloride (NS) flush 5-10 mL  5-10 mL IntraVENous PRN    LORazepam (ATIVAN) tablet 1 mg  1 mg Oral Q1H PRN    Or    LORazepam (ATIVAN) injection 1 mg  1 mg IntraVENous Q1H PRN    LORazepam (ATIVAN) tablet 2 mg  2 mg Oral Q1H PRN    Or    LORazepam (ATIVAN) injection 2 mg  2 mg IntraVENous Q1H PRN    LORazepam (ATIVAN) injection 3 mg  3 mg IntraVENous Q15MIN PRN    albuterol (PROVENTIL VENTOLIN) nebulizer solution 2.5 mg  2.5 mg Nebulization Q4H PRN    buPROPion XL (WELLBUTRIN XL) tablet 300 mg  300 mg Oral 7am    cloNIDine HCl (CATAPRES) tablet 0.1 mg  0.1 mg Oral BID    diazePAM (VALIUM) tablet 5 mg  5 mg Oral Q8H    lipase-protease-amylase (ZENPEP 10,000) capsule 1 Cap  1 Cap Oral TID WITH MEALS    topiramate (TOPAMAX) tablet 100 mg  100 mg Oral DAILY    pantoprazole (PROTONIX) 40 mg in sodium chloride 10 mL injection  40 mg IntraVENous Q12H    sodium chloride (NS) flush 5-10 mL  5-10 mL IntraVENous PRN    LORazepam (ATIVAN) tablet 1 mg  1 mg Oral Q1H PRN    Or    LORazepam (ATIVAN) injection 1 mg  1 mg IntraVENous Q1H PRN    LORazepam (ATIVAN) tablet 2 mg  2 mg Oral Q1H PRN    Or    LORazepam (ATIVAN) injection 2 mg  2 mg IntraVENous Q1H PRN    LORazepam (ATIVAN) injection 3 mg  3 mg IntraVENous [id number] PRN    folic acid (FOLVITE) tablet 1 mg  1 mg Oral DAILY    Thiamine Mononitrate (B-1) tablet 100 mg  100 mg Oral DAILY    therapeutic multivitamin (THERAGRAN) tablet 1 Tab  1 Tab Oral DAILY    ondansetron (ZOFRAN) injection 4 mg  4 mg IntraVENous Q6H PRN    influenza vaccine 2017-18 (3 yrs+)(PF) (FLUZONE QUAD/FLUARIX QUAD) injection 0.5 mL  0.5 mL IntraMUSCular PRIOR TO DISCHARGE    HYDROmorphone (DILAUDID) injection 0.5 mg  0.5 mg IntraVENous Q4H PRN       ASSESSMENT:  Principal Problem:    Syncope (3/8/2018)    Active Problems:    Acute pancreatitis (2/19/2016)      Alcohol withdrawal ([facility] [address] (3/8/2018)      GI bleed (3/8/2018)      Hyponatremia (3/8/2018)      Hypercalcemia (3/8/2018)      UTI (urinary tract infection) (3/11/2018)      Bronchitis (3/11/2018)        ETOH pancreatitis. Improving. Try full liquids (low fat) tomorrow.      Klever Morris MD

## 2018-03-12 NOTE — PROGRESS NOTES
Problem: Self Care Deficits Care Plan (Adult)  Goal: *Acute Goals and Plan of Care (Insert Text)  Occupational Therapy Goals  Initiated 3/11/2018 within 7 day(s). Will implement 3 day trial to determine pt's ability to actively participate with skilled therapy. Continue per POC if appropriate. 1.  Patient will perform grooming tasks at EOB with supervision for balance. 2.  Patient will perform lower body dressing with supervision. 3.  Patient will perform functional task in standing for 8 minutes with supervision for balance to increase activity tolerance for ADLs. 4.  Patient will perform toilet transfers with supervision/set-up. 5.  Patient will perform all aspects of toileting with supervision/set-up. 6.  Patient will participate in upper extremity therapeutic exercise/activities with supervision/set-up for 8 minutes to increase BUE strength for functional transfers & ADLs. 7.  Patient will utilize energy conservation techniques during functional activities with minimal verbal cues. Outcome: Progressing Towards Goal  Occupational Therapy TREATMENT    Patient: Paola Alas (73 y.o. female)  Date: 3/12/2018  Diagnosis: Syncope  epigastric pain and coffee-ground emesis Syncope  Procedure(s) (LRB):  ESOPHAGOGASTRODUODENOSCOPY (EGD) (N/A) 3 Days Post-Op  Precautions: Fall  Chart, occupational therapy assessment, plan of care, and goals were reviewed. PLOF: Pt was independent with basic self care tasks and functional mobility PTA. ASSESSMENT: Pt sleeping on arrival, wakes to voice. No c/o pain, Agreeable to tx session. Pt demo's decreased strength & activity tolerance. SBA/supervision for bed mobility. Fair sitting balance. CGA/min A to transfer to [facility]. Mod A for clothing management; CGA for bladder hygiene while seated to ensure balance. ADLs performed at EOB with supervision/min A & fair balance to conserve energy due to decreased activity tolerance & strength.  Pt becoming tearful at end of session due to feelings with regard to generalized weakness & inability to perform at [facility]; pt educated on importance of mobility and increasing tolerance as pt's medical stability improves; pt verbalized understanding. Left supine, needs within reach, bed alarm on. Recommend continued therapy. EDUCATION: Pt educated on importance of mobility. Reinforced BUE positioning to ensure safety during functional transfers. Progression toward goals:  [x]          Improving appropriately and progressing toward goals  []          Improving slowly and progressing toward goals  []          Not making progress toward goals and plan of care will be adjusted     PLAN:  Patient continues to benefit from skilled intervention to address the above impairments. Continue treatment per established plan of care. Discharge Recommendations:  Rehab  Further Equipment Recommendations for Discharge:  Shower chair     SUBJECTIVE:   Patient stated Milderd Tato is my body doing this to me.     OBJECTIVE DATA SUMMARY:     G CODES:  Self Care  Current  CK= 40-59%  [id number] Goal  CI= 1-19%. The severity rating is based on the Other Functional Assessment, MMT< ROM    Cognitive/Behavioral Status:  Neurologic State: Alert  Orientation Level: Oriented X4  Cognition: Appropriate decision making, Follows commands  Safety/Judgement: Awareness of environment, Fall prevention     Functional Mobility and Transfers for ADLs:   Bed Mobility:  Supine to Sit: Stand-by assistance  Sit to Supine: Stand-by assistance     Transfers:  Sit to Stand: Contact guard assistance   Toilet Transfer : Contact guard assistance;Minimum assistance    Balance:  Sitting - Static: Fair (occasional)  Sitting - Dynamic: Fair (occasional)  Standing: With support  Standing - Static: Fair  Standing - Dynamic :  (fair-)     ADL Intervention:  Grooming  Grooming Assistance: Supervision/set up;Minimum assistance  Washing Face: Supervision/set-up  Brushing Teeth: Supervision/set-up; Minimum assistance (for container management)  Cues: Physical assistance;Verbal cues provided    Toileting  Toileting Assistance: Moderate assistance  Bladder Hygiene: Contact guard assistance;Minimum assistance  Clothing Management: Moderate assistance  Cues: Physical assistance for pants up;Physical assistance for pants down;Verbal cues provided  Adaptive Equipment: Elevated seat    CGA/min A to transfer to [facility]. Mod A for clothing management; CGA for bladder hygiene while seated to ensure balance. ADLs (facial hygiene & oral care) performed at EOB with fair balance to conserve energy due to decreased activity tolerance & strength. Min A for container management & kidney basin management. Cognitive Retraining  Safety/Judgement: Awareness of environment; Fall prevention    Therapeutic Activity:  Functional reach & retrieve at EOB to challenge sitting balance in prep for item retrieval during ADLs. Functional transfer from EOB-->BSC with CGA/min A and skilled instruction on BUE positioning to ensure safety. Pain:  Pre Treatment:  Post Treatment:  Pain Scale 1: Visual  Pain Intensity 1: 0    Activity Tolerance:  Fair  Please refer to the flowsheet for vital signs taken during this treatment.   After treatment:   []  Patient left in no apparent distress sitting up in chair  [x]  Patient left in no apparent distress in bed  [x]  Call bell left within reach  [x]  Nursing notified  []  Caregiver present  [x]  Bed alarm activated    Stella Cabot, MS OTR/L  Time Calculation: 26 mins

## 2018-03-12 NOTE — PROGRESS NOTES
Progress Note      Patient: Real Rojas               Sex: female          DOA: 3/8/2018       YOB: 1965      Age:  47 y.o.        LOS:  LOS: 4 days             CHIEF COMPLAINT:  Pancreatitis    Subjective:     Abdominal pain, sometimes with food  Little nausea    Objective:      Visit Vitals    /84 (BP 1 Location: Left arm, BP Patient Position: At rest)    Pulse 91    Temp 97.8 °F (36.6 °C)    Resp 18    Ht 5' 2\" (1.575 m)    Wt 55.9 kg (123 lb 3.2 oz)    SpO2 99%    BMI 22.53 kg/m2       Physical Exam:  Gen:  No distress, no complaint  Lungs:  Clear bilaterally, no wheeze or rhonchi  Heart:  Regular rate and rhythm, no murmurs or gallops  Abdomen:  Soft, non-tender, normal bowel sounds        Lab/Data Reviewed:    Labs reviewed      Assessment/Plan     Principal Problem:    Syncope (3/8/2018)    Active Problems:    Acute pancreatitis (2/19/2016)      Alcohol withdrawal ([facility] [address] (3/8/2018)      GI bleed (3/8/2018)      Hyponatremia (3/8/2018)      Hypercalcemia (3/8/2018)      UTI (urinary tract infection) (3/11/2018)      Bronchitis (3/11/2018)        Plan:  Patient has ongoing pain. She says this is her second episode of pancreatitis. Go back to clear liquids  Lipase in AM  Patient has significant sedation. Will try to decrease pain medication/Sedation.

## 2018-03-12 NOTE — PROGRESS NOTES
Problem: Mobility Impaired (Adult and Pediatric)  Goal: *Acute Goals and Plan of Care (Insert Text)  Physical Therapy Goals  Initiated 3/10/2018 and to be accomplished within 7 day(s)  1. Patient will move from supine to sit and sit to supine , scoot up and down and roll side to side in bed with independence. 2.  Patient will transfer from bed to chair and chair to bed with independence using the least restrictive device. 3.  Patient will perform sit to stand with independence. 4.  Patient will ambulate with independence for >/= 150 feet with the least restrictive device. Outcome: Progressing Towards Goal  physical Therapy TREATMENT    Patient: Alena Hickman (55 y.o. female)  Date: 3/12/2018  Diagnosis: Syncope  epigastric pain and coffee-ground emesis Syncope  Procedure(s) (LRB):  ESOPHAGOGASTRODUODENOSCOPY (EGD) (N/A) 3 Days Post-Op  Precautions: Fall  Chart, physical therapy assessment, plan of care and goals were reviewed. ASSESSMENT:  Pt transferred supine to sit with SBA. Pt complained of dizziness with initial position change that decreased with time. Pt stood with CGA from the edge of the bed and ambulated 4 feet with RW. Pt declined further mobility due to not feeling well and patient was not left sitting up in chair due to drowsiness. Progression toward goals:  []      Improving appropriately and progressing toward goals  [x]      Improving slowly and progressing toward goals  []      Not making progress toward goals and plan of care will be adjusted     PLAN:  Patient continues to benefit from skilled intervention to address the above impairments. Continue treatment per established plan of care. Discharge Recommendations:  Sandeep Dudley  Further Equipment Recommendations for Discharge:  N/A     G-CODES:     Mobility  Current  CJ= 20-39%   Goal  CI= 1-19%. The severity rating is based on the Level of Assistance required for Functional Mobility and ADLs.       SUBJECTIVE: Patient stated I feel so weak.     OBJECTIVE DATA SUMMARY:   Critical Behavior:  Neurologic State: Alert  Orientation Level: Oriented X4  Cognition: Follows commands  Safety/Judgement: Awareness of environment, Fall prevention  Functional Mobility Training:  Bed Mobility:  Supine to Sit: Stand-by assistance  Sit to Supine: Stand-by assistance      Transfers:  Sit to Stand: Contact guard assistance  Stand to Sit: Contact guard assistance  Balance:  Sitting - Static: Fair (occasional)  Sitting - Dynamic: Fair (occasional)  Standing: With support  Standing - Static: Fair  Standing - Dynamic :  (fair-)  Ambulation/Gait Training:  Distance (ft): 4 Feet (ft)  Assistive Device: Walker, rolling  Ambulation - Level of Assistance: Contact guard assistance  Gait Abnormalities: Shuffling gait  Base of Support: Narrowed  Step Length: Left shortened;Right shortened  Therapeutic Exercises: Ankle pumps, heel slides  Pain:  Pt reports 3/10 pain or discomfort prior to treatment.    Pt reports 3/10 pain or discomfort post treatment. Activity Tolerance:   poor  Please refer to the flowsheet for vital signs taken during this treatment.   After treatment:   [] Patient left in no apparent distress sitting up in chair  [x] Patient left in no apparent distress in bed  [x] Call bell left within reach  [x] Nursing notified  [] Caregiver present  [] Bed alarm activated   Educated patient on increasing activity with assistance and performing LE exercise during the day     Rod Marquez, PT   Time Calculation: 15 mins

## 2018-03-13 LAB
ALBUMIN SERPL-MCNC: 1.9 G/DL (ref 3.4–5)
ALBUMIN/GLOB SERPL: 0.7 {RATIO} (ref 0.8–1.7)
ALP SERPL-CCNC: 85 U/L (ref 45–117)
ALT SERPL-CCNC: 70 U/L (ref 13–56)
ANION GAP SERPL CALC-SCNC: 8 MMOL/L (ref 3–18)
AST SERPL-CCNC: 70 U/L (ref 15–37)
BASOPHILS # BLD: 0 K/UL (ref 0–0.06)
BASOPHILS NFR BLD: 0 % (ref 0–2)
BILIRUB SERPL-MCNC: 0.4 MG/DL (ref 0.2–1)
BUN SERPL-MCNC: 10 MG/DL (ref 7–18)
BUN/CREAT SERPL: 22 (ref 12–20)
CALCIUM SERPL-MCNC: 8.3 MG/DL (ref 8.5–10.1)
CHLORIDE SERPL-SCNC: 108 MMOL/L (ref 100–108)
CO2 SERPL-SCNC: 25 MMOL/L (ref 21–32)
CREAT SERPL-MCNC: 0.46 MG/DL (ref 0.6–1.3)
DIFFERENTIAL METHOD BLD: ABNORMAL
EOSINOPHIL # BLD: 0.2 K/UL (ref 0–0.4)
EOSINOPHIL NFR BLD: 5 % (ref 0–5)
ERYTHROCYTE [DISTWIDTH] IN BLOOD BY AUTOMATED COUNT: 16.1 % (ref 11.6–14.5)
GLOBULIN SER CALC-MCNC: 2.7 G/DL (ref 2–4)
GLUCOSE SERPL-MCNC: 114 MG/DL (ref 74–99)
HCT VFR BLD AUTO: 22.1 % (ref 35–45)
HGB BLD-MCNC: 7.1 G/DL (ref 12–16)
LIPASE SERPL-CCNC: 1069 U/L (ref 73–393)
LIPASE SERPL-CCNC: 848 U/L (ref 73–393)
LYMPHOCYTES # BLD: 1.4 K/UL (ref 0.9–3.6)
LYMPHOCYTES NFR BLD: 31 % (ref 21–52)
MAGNESIUM SERPL-MCNC: 1.3 MG/DL (ref 1.6–2.6)
MCH RBC QN AUTO: 31.8 PG (ref 24–34)
MCHC RBC AUTO-ENTMCNC: 32.1 G/DL (ref 31–37)
MCV RBC AUTO: 99.1 FL (ref 74–97)
MONOCYTES # BLD: 0.6 K/UL (ref 0.05–1.2)
MONOCYTES NFR BLD: 14 % (ref 3–10)
NEUTS SEG # BLD: 2.2 K/UL (ref 1.8–8)
NEUTS SEG NFR BLD: 50 % (ref 40–73)
PLATELET # BLD AUTO: 127 K/UL (ref 135–420)
PMV BLD AUTO: 10.3 FL (ref 9.2–11.8)
POTASSIUM SERPL-SCNC: 3 MMOL/L (ref 3.5–5.5)
PROT SERPL-MCNC: 4.6 G/DL (ref 6.4–8.2)
RBC # BLD AUTO: 2.23 M/UL (ref 4.2–5.3)
SODIUM SERPL-SCNC: 141 MMOL/L (ref 136–145)
WBC # BLD AUTO: 4.5 K/UL (ref 4.6–13.2)

## 2018-03-13 PROCEDURE — 74011000258 HC RX REV CODE- 258: Performed by: INTERNAL MEDICINE

## 2018-03-13 PROCEDURE — 74011250637 HC RX REV CODE- 250/637: Performed by: HOSPITALIST

## 2018-03-13 PROCEDURE — 65660000000 HC RM CCU STEPDOWN

## 2018-03-13 PROCEDURE — 85025 COMPLETE CBC W/AUTO DIFF WBC: CPT | Performed by: INTERNAL MEDICINE

## 2018-03-13 PROCEDURE — 97535 SELF CARE MNGMENT TRAINING: CPT

## 2018-03-13 PROCEDURE — 83690 ASSAY OF LIPASE: CPT | Performed by: INTERNAL MEDICINE

## 2018-03-13 PROCEDURE — 83690 ASSAY OF LIPASE: CPT | Performed by: HOSPITALIST

## 2018-03-13 PROCEDURE — 74011250637 HC RX REV CODE- 250/637: Performed by: INTERNAL MEDICINE

## 2018-03-13 PROCEDURE — 83735 ASSAY OF MAGNESIUM: CPT | Performed by: NURSE PRACTITIONER

## 2018-03-13 PROCEDURE — 74011250636 HC RX REV CODE- 250/636: Performed by: INTERNAL MEDICINE

## 2018-03-13 PROCEDURE — 80053 COMPREHEN METABOLIC PANEL: CPT | Performed by: INTERNAL MEDICINE

## 2018-03-13 PROCEDURE — 74011250636 HC RX REV CODE- 250/636: Performed by: NURSE PRACTITIONER

## 2018-03-13 PROCEDURE — 74011250637 HC RX REV CODE- 250/637: Performed by: NURSE PRACTITIONER

## 2018-03-13 PROCEDURE — 97530 THERAPEUTIC ACTIVITIES: CPT

## 2018-03-13 PROCEDURE — 36415 COLL VENOUS BLD VENIPUNCTURE: CPT | Performed by: INTERNAL MEDICINE

## 2018-03-13 PROCEDURE — 77030020245 HC SOL INJ 5% D/0.9%NACL

## 2018-03-13 RX ORDER — SUCRALFATE 1 G/10ML
1 SUSPENSION ORAL
Status: DISCONTINUED | OUTPATIENT
Start: 2018-03-13 | End: 2018-03-15 | Stop reason: HOSPADM

## 2018-03-13 RX ORDER — PANTOPRAZOLE SODIUM 40 MG/1
40 TABLET, DELAYED RELEASE ORAL
Status: DISCONTINUED | OUTPATIENT
Start: 2018-03-13 | End: 2018-03-14

## 2018-03-13 RX ORDER — QUETIAPINE FUMARATE 200 MG/1
600 TABLET, FILM COATED ORAL
Status: DISCONTINUED | OUTPATIENT
Start: 2018-03-13 | End: 2018-03-15 | Stop reason: HOSPADM

## 2018-03-13 RX ORDER — ALBUMIN HUMAN 250 G/1000ML
50 SOLUTION INTRAVENOUS ONCE
Status: COMPLETED | OUTPATIENT
Start: 2018-03-13 | End: 2018-03-14

## 2018-03-13 RX ORDER — MAGNESIUM SULFATE HEPTAHYDRATE 40 MG/ML
2 INJECTION, SOLUTION INTRAVENOUS ONCE
Status: COMPLETED | OUTPATIENT
Start: 2018-03-13 | End: 2018-03-13

## 2018-03-13 RX ORDER — TOPIRAMATE 100 MG/1
200 TABLET, FILM COATED ORAL 2 TIMES DAILY
Status: DISCONTINUED | OUTPATIENT
Start: 2018-03-13 | End: 2018-03-15 | Stop reason: HOSPADM

## 2018-03-13 RX ORDER — POTASSIUM CHLORIDE 20 MEQ/1
40 TABLET, EXTENDED RELEASE ORAL EVERY 4 HOURS
Status: COMPLETED | OUTPATIENT
Start: 2018-03-13 | End: 2018-03-13

## 2018-03-13 RX ORDER — BUSPIRONE HYDROCHLORIDE 5 MG/1
15 TABLET ORAL 2 TIMES DAILY
Status: DISCONTINUED | OUTPATIENT
Start: 2018-03-13 | End: 2018-03-15 | Stop reason: HOSPADM

## 2018-03-13 RX ADMIN — HYDROMORPHONE HYDROCHLORIDE 0.5 MG: 2 INJECTION INTRAMUSCULAR; INTRAVENOUS; SUBCUTANEOUS at 19:53

## 2018-03-13 RX ADMIN — HYDROMORPHONE HYDROCHLORIDE 0.5 MG: 2 INJECTION INTRAMUSCULAR; INTRAVENOUS; SUBCUTANEOUS at 15:37

## 2018-03-13 RX ADMIN — FOLIC ACID 1 MG: 1 TABLET ORAL at 08:25

## 2018-03-13 RX ADMIN — CLONIDINE HYDROCHLORIDE 0.1 MG: 0.1 TABLET ORAL at 19:02

## 2018-03-13 RX ADMIN — CLONIDINE HYDROCHLORIDE 0.1 MG: 0.1 TABLET ORAL at 08:24

## 2018-03-13 RX ADMIN — TOPIRAMATE 200 MG: 100 TABLET, FILM COATED ORAL at 19:02

## 2018-03-13 RX ADMIN — PANTOPRAZOLE SODIUM 40 MG: 40 TABLET, DELAYED RELEASE ORAL at 07:02

## 2018-03-13 RX ADMIN — POTASSIUM CHLORIDE 40 MEQ: 1500 TABLET, FILM COATED, EXTENDED RELEASE ORAL at 15:38

## 2018-03-13 RX ADMIN — QUETIAPINE FUMARATE 600 MG: 200 TABLET ORAL at 21:28

## 2018-03-13 RX ADMIN — PIPERACILLIN SODIUM,TAZOBACTAM SODIUM 3.38 G: 3; .375 INJECTION, POWDER, FOR SOLUTION INTRAVENOUS at 03:11

## 2018-03-13 RX ADMIN — TOPIRAMATE 100 MG: 100 TABLET, FILM COATED ORAL at 08:25

## 2018-03-13 RX ADMIN — POTASSIUM CHLORIDE 40 MEQ: 1500 TABLET, FILM COATED, EXTENDED RELEASE ORAL at 12:20

## 2018-03-13 RX ADMIN — DEXTROSE AND SODIUM CHLORIDE 100 ML/HR: 5; 900 INJECTION, SOLUTION INTRAVENOUS at 08:52

## 2018-03-13 RX ADMIN — HYDROMORPHONE HYDROCHLORIDE 0.5 MG: 2 INJECTION INTRAMUSCULAR; INTRAVENOUS; SUBCUTANEOUS at 08:24

## 2018-03-13 RX ADMIN — PIPERACILLIN SODIUM,TAZOBACTAM SODIUM 3.38 G: 3; .375 INJECTION, POWDER, FOR SOLUTION INTRAVENOUS at 10:34

## 2018-03-13 RX ADMIN — PANCRELIPASE 1 CAPSULE: 10000; 34000; 55000 CAPSULE, DELAYED RELEASE ORAL at 19:02

## 2018-03-13 RX ADMIN — HYDROMORPHONE HYDROCHLORIDE 0.5 MG: 2 INJECTION INTRAMUSCULAR; INTRAVENOUS; SUBCUTANEOUS at 03:11

## 2018-03-13 RX ADMIN — DIAZEPAM 5 MG: 5 TABLET ORAL at 13:48

## 2018-03-13 RX ADMIN — PANCRELIPASE 1 CAPSULE: 10000; 34000; 55000 CAPSULE, DELAYED RELEASE ORAL at 08:25

## 2018-03-13 RX ADMIN — POTASSIUM CHLORIDE 40 MEQ: 1500 TABLET, FILM COATED, EXTENDED RELEASE ORAL at 10:34

## 2018-03-13 RX ADMIN — Medication 100 MG: at 08:25

## 2018-03-13 RX ADMIN — PIPERACILLIN SODIUM,TAZOBACTAM SODIUM 3.38 G: 3; .375 INJECTION, POWDER, FOR SOLUTION INTRAVENOUS at 19:54

## 2018-03-13 RX ADMIN — PANTOPRAZOLE SODIUM 40 MG: 40 TABLET, DELAYED RELEASE ORAL at 15:37

## 2018-03-13 RX ADMIN — MAGNESIUM SULFATE HEPTAHYDRATE 2 G: 40 INJECTION, SOLUTION INTRAVENOUS at 12:20

## 2018-03-13 RX ADMIN — BUSPIRONE HYDROCHLORIDE 15 MG: 5 TABLET ORAL at 19:10

## 2018-03-13 RX ADMIN — THERA TABS 1 TABLET: TAB at 08:25

## 2018-03-13 RX ADMIN — SUCRALFATE 1 G: 1 SUSPENSION ORAL at 21:28

## 2018-03-13 RX ADMIN — DIAZEPAM 5 MG: 5 TABLET ORAL at 07:02

## 2018-03-13 RX ADMIN — DIAZEPAM 5 MG: 5 TABLET ORAL at 21:28

## 2018-03-13 NOTE — PROGRESS NOTES
Problem: Self Care Deficits Care Plan (Adult)  Goal: *Acute Goals and Plan of Care (Insert Text)  Occupational Therapy Goals  Initiated 3/11/2018 within 7 day(s). Will implement 3 day trial to determine pt's ability to actively participate with skilled therapy. Continue per POC if appropriate. 1.  Patient will perform grooming tasks at EOB with supervision for balance. 2.  Patient will perform lower body dressing with supervision. 3.  Patient will perform functional task in standing for 8 minutes with supervision for balance to increase activity tolerance for ADLs. 4.  Patient will perform toilet transfers with supervision/set-up. 5.  Patient will perform all aspects of toileting with supervision/set-up. 6.  Patient will participate in upper extremity therapeutic exercise/activities with supervision/set-up for 8 minutes to increase BUE strength for functional transfers & ADLs. 7.  Patient will utilize energy conservation techniques during functional activities with minimal verbal cues. Outcome: Progressing Towards Goal  Occupational Therapy TREATMENT    Patient: Krystin Martinez (86 y.o. female)  Date: 3/13/2018  Diagnosis: Syncope  epigastric pain and coffee-ground emesis Syncope  Procedure(s) (LRB):  ESOPHAGOGASTRODUODENOSCOPY (EGD) (N/A) 4 Days Post-Op  Precautions: Fall  Chart, occupational therapy assessment, plan of care, and goals were reviewed. PLOF: Pt was independent with basic self care tasks and functional mobility PTA. ASSESSMENT: Pt supine on arrival, agreeable to therapy. Supervision for bed mobility; intact sitting balance. CGA to maneuver to [facility] (therapist increased challenge by increasing distance from EOB-->BS); fair balance. Pt demo'd improved function in  managed brief management w/ CGA this session; mod A for bowel hygiene. Lunch arrived, pt deferred further tx due to lunch. Pt declined to sit at EOB or up in chair. Left supine with HOB elevated.  Min A for container & utensil management. Pt reporting 9/10 pain when eating. Needs within reach. Pt demo'ing slow improvement towards functional goals. EDUCATION: Pt educated on gradation of functional tasks to increase challenge as pt improves functionally. Progression toward goals:  [x]          Improving appropriately and progressing toward goals  []          Improving slowly and progressing toward goals  []          Not making progress toward goals and plan of care will be adjusted     PLAN:  Patient continues to benefit from skilled intervention to address the above impairments. Continue treatment per established plan of care. Discharge Recommendations:  Rehab  Further Equipment Recommendations for Discharge:  Shower chair     SUBJECTIVE:   Patient stated It hurts when I swallow.     OBJECTIVE DATA SUMMARY:     G CODES:  Self Care  Current  CJ= 20-39%  [id number] Goal  CI= 1-19%. The severity rating is based on the Other Functional Assessment, MMT, ROM    Cognitive/Behavioral Status:  Neurologic State: Alert  Orientation Level: Oriented X4  Cognition: Appropriate decision making, Appropriate for age attention/concentration, Follows commands  Safety/Judgement: Awareness of environment, Fall prevention     Functional Mobility and Transfers for ADLs:   Bed Mobility:  Supine to Sit: Supervision  Sit to Supine: Supervision     Transfers:  Sit to Stand: Contact guard assistance   Toilet Transfer : Contact guard assistance     Balance:  Sitting: Intact  Standing: Impaired  Standing - Static: Fair  Standing - Dynamic : Fair     ADL Intervention:  Feeding  Feeding Assistance: Supervision/set-up  Container Management: Stand-by assistance  Utensil Management: Supervision/set-up  Cues: Verbal cues provided;Physical assistance (physical assistance to set up)    Toileting  Toileting Assistance: Moderate assistance  Bladder Hygiene: Contact guard assistance  Bowel Hygiene:  Moderate assistance  Clothing Management: Contact guard assistance  Cues: Verbal cues provided  Adaptive Equipment: Elevated seat    CGA to maneuver to [facility] (therapist increased challenge by increasing distance from EOB-->BSC); fair balance. Pt managed brief management with CGA this session; mod A for bowel hygiene. Cognitive Retraining  Safety/Judgement: Awareness of environment; Fall prevention    Pain:  Pre Treatment: 9/10  Post Treatment: 9/10 (when swallowing food)  Pain Scale 1: Numeric (0 - 10)    Activity Tolerance:  Fair  Please refer to the flowsheet for vital signs taken during this treatment.   After treatment:   []  Patient left in no apparent distress sitting up in chair  [x]  Patient left in no apparent distress in bed  [x]  Call bell left within reach  [x]  Nursing notified  []  Caregiver present  []  Bed alarm activated    Tony Daniels MS OTR/L  Time Calculation: 12 mins

## 2018-03-13 NOTE — PROGRESS NOTES
PROGRESS NOTE   PATIENT:  Dawood Pena           MRN: [id number]           [facility], [address]           3/13/2018, 7:09 PM          SUBJECTIVE:  Abdominal pain continues to improve. No vomiting. Tolerating full liquids. OBJECTIVE:  Patient Vitals for the past 24 hrs:   Temp Pulse Resp BP SpO2   03/13/18 1415 98.6 °F (37 °C) 88 20 124/88 97 %   03/13/18 1221 98.2 °F (36.8 °C) 81 18 130/80 99 %   03/13/18 0758 97.9 °F (36.6 °C) 87 18 133/87 98 %   03/13/18 0414 97.4 °F (36.3 °C) 89 20 125/81 97 %   03/12/18 2313 98 °F (36.7 °C) 88 20 110/72 98 %   03/12/18 1945 98.2 °F (36.8 °C) 90 20 110/68 96 %         Intake/Output Summary (Last 24 hours) at 03/13/18 1909  Last data filed at 03/13/18 1906   Gross per 24 hour   Intake          2221.67 ml   Output             1700 ml   Net           521.67 ml       Gen: NAD  Heent: No pallor, icterus  Lungs: Clear B/L   CVS exam: Regular rate and rhythm   Abd  : Soft, mild tenderness in the upper abdomen, BS present, No masses felt. Labs: Results:   Chemistry Recent Labs      03/13/18   0400  03/11/18   1130   GLU  114*  63*   NA  141  140   K  3.0*  3.9   CL  108  103   CO2  25  31   BUN  10  25*   CREA  0.46*  0.49*   CA  8.3*  9.5   AGAP  8  6   BUCR  22*  51*   AP  85  87   TP  4.6*  4.6*   ALB  1.9*  2.0*   GLOB  2.7  2.6   AGRAT  0.7*  0.8    Estimated Creatinine Clearance: 111.9 mL/min (based on Cr of 0.46). CBC w/Diff Recent Labs      03/13/18   0400  03/11/18   1130   WBC  4.5*  8.4   RBC  2.23*  2.78*   HGB  7.1*  9.0*   HCT  22.1*  28.1*   PLT  127*  65*   GRANS  50  72   LYMPH  31  21   EOS  5  3      Cardiac Enzymes No results for input(s): CPK, CKND1, GUSTAVO in the last 72 hours. No lab exists for component: CKRMB, TROIP   Coagulation No results for input(s): PTP, INR, APTT in the last 72 hours.     No lab exists for component: INREXT    Hepatitis Panel Lab Results   Component Value Date/Time    Hepatitis A, IgM NEGATIVE  01/28/2016 09:35 AM Hepatitis B surface Ag 0.1 01/28/2016 09:35 AM    Hepatitis B core, IgM NEGATIVE  01/28/2016 09:35 AM    Hepatitis C virus Ab <0.02 01/28/2016 09:35 AM      Amylase Lipase    Liver Enzymes Recent Labs      03/13/18   0400  03/11/18   1130   TP  4.6*  4.6*   ALB  1.9*  2.0*   TBILI  0.4  0.5   AP  85  87   SGOT  70*  116*   ALT  70*  81*      Thyroid Studies No results for input(s): T4, T3U, TSH, TSHEXT in the last 72 hours.     No lab exists for component: T3RU     Pathology pathology         Allergies   Allergen Reactions    Lithium Anaphylaxis    Elavil Other (comments)     Left leg went numb    Morphine Hives       Current Facility-Administered Medications   Medication Dose Route Frequency    pantoprazole (PROTONIX) tablet 40 mg  40 mg Oral ACB&D    topiramate (TOPAMAX) tablet 200 mg  200 mg Oral BID    busPIRone (BUSPAR) tablet 15 mg  15 mg Oral BID    QUEtiapine (SEROquel) tablet 600 mg  600 mg Oral QHS    dextrose 5% and 0.9% NaCl infusion  100 mL/hr IntraVENous CONTINUOUS    albuterol-ipratropium (DUO-NEB) 2.5 MG-0.5 MG/3 ML  3 mL Nebulization Q4H PRN    piperacillin-tazobactam (ZOSYN) 3.375 g in  mL ##EXTENDED 4 HRS INFUSION  3.375 g IntraVENous Q8H    sodium chloride (NS) flush 5-10 mL  5-10 mL IntraVENous PRN    LORazepam (ATIVAN) tablet 1 mg  1 mg Oral Q1H PRN    Or    LORazepam (ATIVAN) injection 1 mg  1 mg IntraVENous Q1H PRN    LORazepam (ATIVAN) tablet 2 mg  2 mg Oral Q1H PRN    Or    LORazepam (ATIVAN) injection 2 mg  2 mg IntraVENous Q1H PRN    LORazepam (ATIVAN) injection 3 mg  3 mg IntraVENous Q15MIN PRN    albuterol (PROVENTIL VENTOLIN) nebulizer solution 2.5 mg  2.5 mg Nebulization Q4H PRN    cloNIDine HCl (CATAPRES) tablet 0.1 mg  0.1 mg Oral BID    diazePAM (VALIUM) tablet 5 mg  5 mg Oral Q8H    lipase-protease-amylase (ZENPEP 10,000) capsule 1 Cap  1 Cap Oral TID WITH MEALS    sodium chloride (NS) flush 5-10 mL  5-10 mL IntraVENous PRN    LORazepam (ATIVAN) tablet 1 mg  1 mg Oral Q1H PRN    Or    LORazepam (ATIVAN) injection 1 mg  1 mg IntraVENous Q1H PRN    LORazepam (ATIVAN) tablet 2 mg  2 mg Oral Q1H PRN    Or    LORazepam (ATIVAN) injection 2 mg  2 mg IntraVENous Q1H PRN    LORazepam (ATIVAN) injection 3 mg  3 mg IntraVENous [id number] PRN    folic acid (FOLVITE) tablet 1 mg  1 mg Oral DAILY    Thiamine Mononitrate (B-1) tablet 100 mg  100 mg Oral DAILY    therapeutic multivitamin (THERAGRAN) tablet 1 Tab  1 Tab Oral DAILY    ondansetron (ZOFRAN) injection 4 mg  4 mg IntraVENous Q6H PRN    influenza vaccine 2017-18 (3 yrs+)(PF) (FLUZONE QUAD/FLUARIX QUAD) injection 0.5 mL  0.5 mL IntraMUSCular PRIOR TO DISCHARGE    HYDROmorphone (DILAUDID) injection 0.5 mg  0.5 mg IntraVENous Q4H PRN       ASSESSMENT:  Acute ETOH pancreatitis, improving. Alcoholic liver disease. Dropping Hg in the absence of overt Gi bleeding, partly hemodilution, rule out splenic vein thrombosis related to pancreatitis, as platelet and white count are dropping as well. Rule out B12/folate deficiency. Intra-abdominal bleed less likely. Continue Full liquids for now in spite of bump in lipase. Check B12 and Folate. Check iron studies. Get a CT of the abdomen and pelvis.      Nupur Malone MD

## 2018-03-13 NOTE — ANCILLARY DISCHARGE INSTRUCTIONS
Patient and/or next of kin has been given the [facility] Important Message From Medicare About Your Rights\" letter and all questions were answered.

## 2018-03-13 NOTE — PROGRESS NOTES
Patient has accepting beds from [facility] and [facility] and spoke with the patient and her mother Jakob Raman via phone. They have chosen [facility] LVM left for [place] Stephen / admission  notify her of patient's choice.

## 2018-03-13 NOTE — PROGRESS NOTES
IDR Summary      Patient: Raegan Fontanez MRN: [id number]    Age: 47 y.o.  : 1965     Admit Diagnosis: Syncope  epigastric pain and coffee-ground emesis      Problems pertinent to hospital stay:     Consults:Case Management     Testing due for patient today? YES- EGD    Nutrition plan:Yes     Mobility needs: Yes      Lines/Tubes:   IV: YES   Needed: YES  Stanley: NO  Needed:NO  Central Line: NO Needed: NO      VTE Prophylaxis: Mechanical    Influenza Vaccine received? NO- to receive prior to discharge       Care Management:  Discharge plan: [address]  PCP: Carmen Dahl MD    : NO  Financial concerns:No   Interventions:       LOS: 5 days     Expected days until discharge: TBD      Signed:      [facility]  Hospitalist Division  Pager:  [phone]  Office:  [phone]

## 2018-03-13 NOTE — PROGRESS NOTES
Spoke with patient in room and she stated that she is interested in short term rehab Referred to [facility]. SNF list given to patient.

## 2018-03-13 NOTE — PROGRESS NOTES
[facility]  Hospitalist Division        Inpatient Daily Progress Note    Daily progress Note    Patient: Wilian Gonzales MRN: [id number]  CSN: [id number]    YOB: 1965  Age: 47 y.o. Sex: female    DOA: 3/8/2018 LOS:  LOS: 5 days                    Chief Complaint:  Syncope, Pancreatitis, GIB    Interval History: 47 y.o.  female with hx of COPD, HTN, bipolar disorder, ETOH abuse, prior hx of pancreatitis, depression, presented to the ED on 3/8/2018 with syncope and GI bleed. Reported three days of coffee ground type emesis and diffuse abdominal pain. She was using the restroom when she passed out. Noted with elevated alcohol level. Pt admits to drinking a gallon of alcohol over two days prior to onset of symptoms. Lipase on admission 2172, ammonia 34, AST 86, electrolyte imbalance, WBC 14.2, normal H/H.  UA on admission with trace leukocyte esterase, 4+ bacteria and 4 to 10 WBC's. Urine culture collected 3/9/2018 with >100,000 colonies Klebsiella pneumonia, 100,000 colonies klebsiella pneumonia 2nd morphotye. Susceptible to Zosyn (started 3/9/2018). Blood cultures collected 3/9/2018 NGTD x 2. She was started on IV fluids, PPI, CIWA protocol and admitted to [facility] for further progression of care. GI consulted. She had an EGD on 3/9/2018 showing severe, erosive/ulcerative esophagitis. Work-up for hypercalcemia initiated.   PTH intact 13.1, Vitamin D normal.  PTH-related peptide pending result.           Assessment/Plan:     Patient Active Problem List   Diagnosis Code    Bipolar disorder (manic depression) ([facility]) F31.9    Tachycardia R00.0    Alcohol dependence ([facility] [address] F10.20    Esophageal stricture K22.2    Dilated pancreatic duct K86.89    Common bile duct dilation K83.8    Elevated LFTs R79.89    Bipolar depression ([facility]) F31.30    HTN (hypertension) I10    Acute pancreatitis K85.90    Pancreatitis, alcoholic, acute N42.22    Tylenol overdose T39.1X1A    SIRS (systemic inflammatory response syndrome) ([facility]) R65.10    Pneumonia J18.9    COPD (chronic obstructive pulmonary disease) ([facility]) J44.9    Nicotine withdrawal F17.203    Dizziness R42    Pancreatitis K85.90    Hypokalemia E87.6    Anemia D64.9    Encephalopathy G93.40    Syncope R55    Alcohol withdrawal ([facility]) F10.239    GI bleed K92.2    Hyponatremia E87.1    Hypercalcemia E83.52    UTI (urinary tract infection) N39.0    Bronchitis J40       A/P:  Acute alcoholic pancreatitis  - GI Following, appreciate input  - Lipase 2172 - -> 1194 - -> 441 - -> 848  - tried to advance to regular diet yesterday but experienced abdominal pain  - back to full liquids  - Lipase this morning increased from 441 to 848- still has abdominal pain and nausea despite pain meds  - ALT 70 / AST 70 today  - continue pancreatic enzymes TID w/ meals  - Zofran PRN nausea  - pain control  - per GI, will need additional imaging of the pancreas outpatient after 2-3 months of healing to r/o small pancreatic neoplasm    Syncope  - multifactorial- dehydration, ETOH intoxication, hypercalcemia, GIB, N/V, infection  - CT head 3/8/2018 no acute intracranial abnormality  - Echo on 3/8/2018 with EF 70%, no wall abnormalities, Grade 1 Diastolic Dysfunction  - continue tele monitoring  - monitor mental status    GI Bleed  - GI following, appreciate input  - s/p EGD on 3/9/2018 with severe, erosive/ulcerative esophagitis involving distal two thirds of esophagus, hiatal hernia, thick coffee-ground adherent material in the stomach precluding optimal visualization  - Protonix 40 mg po BID    UTI  - UA collected 3/9/2018 trace leukocyte esterase, 4+ bacteria and 4 to 10 WBC's  - Urine culture collected 3/9/2018 with >100,000 colonies Klebsiella pneumonia, 100,000 colonies klebsiella pneumonia 2nd morphotype  - Susceptible to Zosyn (started 3/9/2018)- continue    ETOH abuse and withdrawal  - [facility] protocol  - MVI, thiamine, folic acid  - Ongoing problem for this patient who has been unable to quit despite multiple admissions to [facility] institutions - 11 admissions the last few years as per patient. Reporting strong family support    PT/OT following  - recommend rehab/SNF, shower chair    DVT Prophylaxis  - SCD's BLE    Disposition  - per GI, will need additional imaging of the pancreas outpatient after 2-3 months of healing to r/o small pancreatic neoplasm  - Short term rehab, case management following  - has been accepted to [address]  Pager:  [phone]  Office:  [phone]        Subjective:      Still c/o abdominal pain and nausea, even with full liquid diet. Drowsy, medicated @ 8:30am for pain w/ dilaudid but still c/o significant abdominal pain. Objective:      Visit Vitals    /87    Pulse 87    Temp 97.9 °F (36.6 °C)    Resp 18    Ht 5' 2\" (1.575 m)    Wt 57.1 kg (125 lb 14.1 oz)    SpO2 98%    BMI 23.02 kg/m2           Physical Exam:  General appearance: alert, cooperative, no distress, appears stated age  Head: Normocephalic, without obvious abnormality, atraumatic  Lungs: clear to auscultation bilaterally  Heart: regular rate and rhythm, S1, S2 normal, no murmur, click, rub or gallop  Abdomen: soft, TTP upper abdomen, non distended. Normoactive bowel sounds. Extremities: extremities normal, atraumatic, no cyanosis or edema  Skin: Skin color, texture, turgor normal. No rashes or lesions  Neurologic: drowsy, arouses to verbal stimuli, A/O x 4, VARELA, follows commands  PSY: drowsy, arouses to verbal stimuli        Intake and Output:  Current Shift:  03/13 0701 - 03/13 1900  In: -   Out: 400 [Urine:400]  Last three shifts:  03/11 1901 - 03/13 0700  In: 3918.3 [P.O.:120;  I.V.:3798.3]  Out: 1400 [Urine:1400]    Recent Results (from the past 24 hour(s))   CBC WITH AUTOMATED DIFF    Collection Time: 03/13/18  4:00 AM   Result Value Ref Range    WBC 4.5 (L) 4.6 - 13.2 K/uL    RBC 2.23 (L) 4.20 - 5.30 M/uL    HGB 7.1 (L) 12.0 - 16.0 g/dL    HCT 22.1 (L) 35.0 - 45.0 %    MCV 99.1 (H) 74.0 - 97.0 FL    MCH 31.8 24.0 - 34.0 PG    MCHC 32.1 31.0 - 37.0 g/dL    RDW 16.1 (H) 11.6 - 14.5 %    PLATELET 188 (L) 344 - 420 K/uL    MPV 10.3 9.2 - 11.8 FL    NEUTROPHILS 50 40 - 73 %    LYMPHOCYTES 31 21 - 52 %    MONOCYTES 14 (H) 3 - 10 %    EOSINOPHILS 5 0 - 5 %    BASOPHILS 0 0 - 2 %    ABS. NEUTROPHILS 2.2 1.8 - 8.0 K/UL    ABS. LYMPHOCYTES 1.4 0.9 - 3.6 K/UL    ABS. MONOCYTES 0.6 0.05 - 1.2 K/UL    ABS. EOSINOPHILS 0.2 0.0 - 0.4 K/UL    ABS. BASOPHILS 0.0 0.0 - 0.06 K/UL    DF AUTOMATED     METABOLIC PANEL, COMPREHENSIVE    Collection Time: 03/13/18  4:00 AM   Result Value Ref Range    Sodium 141 136 - 145 mmol/L    Potassium 3.0 (L) 3.5 - 5.5 mmol/L    Chloride 108 100 - 108 mmol/L    CO2 25 21 - 32 mmol/L    Anion gap 8 3.0 - 18 mmol/L    Glucose 114 (H) 74 - 99 mg/dL    BUN 10 7.0 - 18 MG/DL    Creatinine 0.46 (L) 0.6 - 1.3 MG/DL    BUN/Creatinine ratio 22 (H) 12 - 20      GFR est AA >60 >60 ml/min/1.73m2    GFR est non-AA >60 >60 ml/min/1.73m2    Calcium 8.3 (L) 8.5 - 10.1 MG/DL    Bilirubin, total 0.4 0.2 - 1.0 MG/DL    ALT (SGPT) 70 (H) 13 - 56 U/L    AST (SGOT) 70 (H) 15 - 37 U/L    Alk. phosphatase 85 45 - 117 U/L    Protein, total 4.6 (L) 6.4 - 8.2 g/dL    Albumin 1.9 (L) 3.4 - 5.0 g/dL    Globulin 2.7 2.0 - 4.0 g/dL    A-G Ratio 0.7 (L) 0.8 - 1.7     LIPASE    Collection Time: 03/13/18  4:00 AM   Result Value Ref Range    Lipase 848 (H) 73 - 393 U/L           Lab Results   Component Value Date/Time    Glucose 114 (H) 03/13/2018 04:00 AM    Glucose 63 (L) 03/11/2018 11:30 AM    Glucose 72 (L) 03/10/2018 04:31 AM    Glucose 105 (H) 03/09/2018 03:55 AM    Glucose 86 03/08/2018 04:40 AM        Imaging:  No results found.     Medication List Reviewed:  Current Facility-Administered Medications   Medication Dose Route Frequency    pantoprazole (PROTONIX) tablet 40 mg  40 mg Oral ACB&D    dextrose 5% and 0.9% NaCl infusion  100 mL/hr IntraVENous CONTINUOUS    albuterol-ipratropium (DUO-NEB) 2.5 MG-0.5 MG/3 ML  3 mL Nebulization Q4H PRN    piperacillin-tazobactam (ZOSYN) 3.375 g in  mL ##EXTENDED 4 HRS INFUSION  3.375 g IntraVENous Q8H    sodium chloride (NS) flush 5-10 mL  5-10 mL IntraVENous PRN    LORazepam (ATIVAN) tablet 1 mg  1 mg Oral Q1H PRN    Or    LORazepam (ATIVAN) injection 1 mg  1 mg IntraVENous Q1H PRN    LORazepam (ATIVAN) tablet 2 mg  2 mg Oral Q1H PRN    Or    LORazepam (ATIVAN) injection 2 mg  2 mg IntraVENous Q1H PRN    LORazepam (ATIVAN) injection 3 mg  3 mg IntraVENous Q15MIN PRN    albuterol (PROVENTIL VENTOLIN) nebulizer solution 2.5 mg  2.5 mg Nebulization Q4H PRN    buPROPion XL (WELLBUTRIN XL) tablet 300 mg  300 mg Oral 7am    cloNIDine HCl (CATAPRES) tablet 0.1 mg  0.1 mg Oral BID    diazePAM (VALIUM) tablet 5 mg  5 mg Oral Q8H    lipase-protease-amylase (ZENPEP 10,000) capsule 1 Cap  1 Cap Oral TID WITH MEALS    topiramate (TOPAMAX) tablet 100 mg  100 mg Oral DAILY    sodium chloride (NS) flush 5-10 mL  5-10 mL IntraVENous PRN    LORazepam (ATIVAN) tablet 1 mg  1 mg Oral Q1H PRN    Or    LORazepam (ATIVAN) injection 1 mg  1 mg IntraVENous Q1H PRN    LORazepam (ATIVAN) tablet 2 mg  2 mg Oral Q1H PRN    Or    LORazepam (ATIVAN) injection 2 mg  2 mg IntraVENous Q1H PRN    LORazepam (ATIVAN) injection 3 mg  3 mg IntraVENous [id number] PRN    folic acid (FOLVITE) tablet 1 mg  1 mg Oral DAILY    Thiamine Mononitrate (B-1) tablet 100 mg  100 mg Oral DAILY    therapeutic multivitamin (THERAGRAN) tablet 1 Tab  1 Tab Oral DAILY    ondansetron (ZOFRAN) injection 4 mg  4 mg IntraVENous Q6H PRN    influenza vaccine 2017-18 (3 yrs+)(PF) (FLUZONE QUAD/FLUARIX QUAD) injection 0.5 mL  0.5 mL IntraMUSCular PRIOR TO DISCHARGE    HYDROmorphone (DILAUDID) injection 0.5 mg  0.5 mg IntraVENous Q4H PRN

## 2018-03-13 NOTE — PROGRESS NOTES
I was called to patient's bedside to talk with pt and her mom. Mom, Ms Wil Jimenez said she traveled out to [address] [facility] in [place] but the traffic was horrible getting there. The pt and her mom knows they have been accepted and confirmed to [address] [facility], but want to try to post to facilities nearer to [address] [facility]. Mom says that now, since she see's all the options on the snf list, would like a chance to be accepted to facilities near her. I posted in [place] to [address] [facility], [facility], [facility] and [address] [facility], and [facility]. I will email to  Rafael to let her know also. Eric Bill.  [facility]  [phone], beeper [phone]

## 2018-03-13 NOTE — PROGRESS NOTES
Nutrition follow up/Plan of care    RECOMMENDATIONS:     1. Full liquids; low fat diet  2. Ensure Clear BID  3. Monitor weight, labs and PO intake  4. RD to follow     GOALS:     1. Ongoing: PO intake meets >75% of protein/calorie needs by 3/18  2. Met/Ongoing: Weight Maintenance (+/- 1-2 lb by 3/18)       ASSESSMENT:     Weight status is classified as normal per BMI of 23. However, patient with poor intake and not meeting nutrition needs. Added Ensure Clear BID for additional calories/protein. Labs noted. Nutrition recommendations listed. RD to follow. Nutrition Risk:  [] High  [x] Moderate []  Low    SUBJECTIVE/OBJECTIVE:      Admitted with acute pancreatitis and alcohol withdrawal syndrome. Patient reports continuing to have nausea and abdominal pain. Observed patient drinking liquids during lunch period. Will monitor diet advancement. Information Obtained from:    [x] Chart Review   [x] Patient   [] Family/Caregiver   [] Nurse/Physician   [] Interdisciplinary Meeting/Rounds    Diet: Full liquids; low fat diet  Medications: [x] Reviewed    Allergies: [x] Reviewed   Encounter Diagnoses     ICD-10-CM ICD-9-CM   1. Syncope, unspecified syncope type R55 780.2   2. Alcohol withdrawal syndrome without complication (HCC) Y57.867 291.81   3. Gastrointestinal hemorrhage with hematemesis K92.0 578.0   4. Hyponatremia E87.1 276.1   5. Hypercalcemia E83.52 275.42   6. Alcohol-induced acute pancreatitis, unspecified complication status I43.92 577.0   7. Hypokalemia E87.6 276.8     Past Medical History:   Diagnosis Date    Alcohol abuse     Anxiety     Asthma     [facility] disorder [facility]     Common migraine     COPD (chronic obstructive pulmonary disease) ([facility]     Depression     Esophageal reflux     Gout     Hypertension     Inverted nipple     Left breast always have.       Labs:    Lab Results   Component Value Date/Time    Sodium 141 03/13/2018 04:00 AM    Potassium 3.0 (L) 03/13/2018 04:00 AM Chloride 108 03/13/2018 04:00 AM    CO2 25 03/13/2018 04:00 AM    Anion gap 8 03/13/2018 04:00 AM    Glucose 114 (H) 03/13/2018 04:00 AM    BUN 10 03/13/2018 04:00 AM    Creatinine 0.46 (L) 03/13/2018 04:00 AM    Calcium 8.3 (L) 03/13/2018 04:00 AM    Magnesium 1.3 (L) 03/13/2018 04:00 AM    Phosphorus 4.6 03/08/2018 08:50 AM    Albumin 1.9 (L) 03/13/2018 04:00 AM     Anthropometrics: BMI (calculated): 23  Last 3 Recorded Weights in this Encounter    03/11/18 0506 03/12/18 0322 03/13/18 0414   Weight: 54.6 kg (120 lb 4.8 oz) 55.9 kg (123 lb 3.2 oz) 57.1 kg (125 lb 14.1 oz)      Ht Readings from Last 1 Encounters:   03/13/18 5' 2\" (1.575 m)     Patient Vitals for the past 100 hrs:   % Diet Eaten   03/12/18 1446 25 %     Estimated Nutrition Needs: [x] MSJ  [] Other:  Calories: 1365 Kcal Based on:   [x] Actual BW    Protein:   60 g Based on:   [x] Actual BW    Fluid:       1500 ml Based on:   [x] Actual BW      [x] No Cultural, Rastafari or ethnic dietary need identified.     [] Cultural, Rastafari and ethnic food preferences identified and addressed     Wt Status:  [x] Normal (18.6 - 24.9) [] Underweight (<18.5) [] Overweight (25 - 29.9) [] Mild Obesity (30 - 34.9)  [] Moderate Obesity (35 - 39.9) [] Morbid Obesity (40+)     Nutrition Problems Identified:   [x] Suboptimal PO intake   [] Food Allergies  [] Difficulty chewing/swallowing/poor dentition  [] Constipation/Diarrhea   [x] Nausea/Vomiting   [] None  [] Other:     Plan:   [x] Therapeutic Diet  []  Obtained/adjusted food preferences/tolerances and/or snacks options   [x]  Supplements added   [] Occupational therapy following for feeding techniques  []  HS snack added   []  Modify diet texture   []  Modify diet for food allergies   []  Assist with menu selection   [x]  Monitor PO intake on meal rounds   [x]  Continue inpatient monitoring and intervention   []  Participated in discharge planning/Interdisciplinary rounds/Team meetings   []  Other:     Education Needs:   [] Not appropriate for teaching at this time due to:    [x] Identified and addressed    Nutrition Monitoring and Evaluation:  [x] Continue ongoing monitoring and intervention  [] Chyna Tejeda

## 2018-03-14 ENCOUNTER — APPOINTMENT (OUTPATIENT)
Dept: CT IMAGING | Age: 53
DRG: 380 | End: 2018-03-14
Attending: INTERNAL MEDICINE
Payer: MEDICARE

## 2018-03-14 LAB
ALBUMIN SERPL-MCNC: 2.5 G/DL (ref 3.4–5)
ALBUMIN/GLOB SERPL: 0.9 {RATIO} (ref 0.8–1.7)
ALP SERPL-CCNC: 86 U/L (ref 45–117)
ALT SERPL-CCNC: 60 U/L (ref 13–56)
ANION GAP SERPL CALC-SCNC: 5 MMOL/L (ref 3–18)
AST SERPL-CCNC: 38 U/L (ref 15–37)
BASOPHILS # BLD: 0 K/UL (ref 0–0.06)
BASOPHILS NFR BLD: 0 % (ref 0–2)
BILIRUB SERPL-MCNC: 0.3 MG/DL (ref 0.2–1)
BUN SERPL-MCNC: 5 MG/DL (ref 7–18)
BUN/CREAT SERPL: 11 (ref 12–20)
CALCIUM SERPL-MCNC: 8 MG/DL (ref 8.5–10.1)
CHLORIDE SERPL-SCNC: 116 MMOL/L (ref 100–108)
CO2 SERPL-SCNC: 23 MMOL/L (ref 21–32)
CREAT SERPL-MCNC: 0.45 MG/DL (ref 0.6–1.3)
DIFFERENTIAL METHOD BLD: ABNORMAL
EOSINOPHIL # BLD: 0.1 K/UL (ref 0–0.4)
EOSINOPHIL NFR BLD: 3 % (ref 0–5)
ERYTHROCYTE [DISTWIDTH] IN BLOOD BY AUTOMATED COUNT: 16.3 % (ref 11.6–14.5)
FERRITIN SERPL-MCNC: 341 NG/ML (ref 8–388)
FOLATE SERPL-MCNC: 17.8 NG/ML (ref 3.1–17.5)
GLOBULIN SER CALC-MCNC: 2.7 G/DL (ref 2–4)
GLUCOSE SERPL-MCNC: 111 MG/DL (ref 74–99)
HCT VFR BLD AUTO: 22.2 % (ref 35–45)
HGB BLD-MCNC: 7.2 G/DL (ref 12–16)
IRON SATN MFR SERPL: 10 %
IRON SERPL-MCNC: 15 UG/DL (ref 50–175)
LIPASE SERPL-CCNC: 952 U/L (ref 73–393)
LYMPHOCYTES # BLD: 1.8 K/UL (ref 0.9–3.6)
LYMPHOCYTES NFR BLD: 40 % (ref 21–52)
MCH RBC QN AUTO: 32 PG (ref 24–34)
MCHC RBC AUTO-ENTMCNC: 32.4 G/DL (ref 31–37)
MCV RBC AUTO: 98.7 FL (ref 74–97)
MONOCYTES # BLD: 0.7 K/UL (ref 0.05–1.2)
MONOCYTES NFR BLD: 17 % (ref 3–10)
NEUTS SEG # BLD: 1.8 K/UL (ref 1.8–8)
NEUTS SEG NFR BLD: 40 % (ref 40–73)
PLATELET # BLD AUTO: 188 K/UL (ref 135–420)
PMV BLD AUTO: 10.6 FL (ref 9.2–11.8)
POTASSIUM SERPL-SCNC: 3.6 MMOL/L (ref 3.5–5.5)
PROT SERPL-MCNC: 5.2 G/DL (ref 6.4–8.2)
RBC # BLD AUTO: 2.25 M/UL (ref 4.2–5.3)
SODIUM SERPL-SCNC: 144 MMOL/L (ref 136–145)
TIBC SERPL-MCNC: 155 UG/DL (ref 250–450)
VIT B12 SERPL-MCNC: 827 PG/ML (ref 211–911)
WBC # BLD AUTO: 4.5 K/UL (ref 4.6–13.2)

## 2018-03-14 PROCEDURE — 77030020245 HC SOL INJ 5% D/0.9%NACL

## 2018-03-14 PROCEDURE — 74011250637 HC RX REV CODE- 250/637: Performed by: HOSPITALIST

## 2018-03-14 PROCEDURE — 80053 COMPREHEN METABOLIC PANEL: CPT | Performed by: INTERNAL MEDICINE

## 2018-03-14 PROCEDURE — 83690 ASSAY OF LIPASE: CPT | Performed by: INTERNAL MEDICINE

## 2018-03-14 PROCEDURE — 82728 ASSAY OF FERRITIN: CPT | Performed by: INTERNAL MEDICINE

## 2018-03-14 PROCEDURE — 74011250637 HC RX REV CODE- 250/637: Performed by: INTERNAL MEDICINE

## 2018-03-14 PROCEDURE — 74177 CT ABD & PELVIS W/CONTRAST: CPT

## 2018-03-14 PROCEDURE — 82607 VITAMIN B-12: CPT | Performed by: INTERNAL MEDICINE

## 2018-03-14 PROCEDURE — 74011636320 HC RX REV CODE- 636/320: Performed by: HOSPITALIST

## 2018-03-14 PROCEDURE — 85025 COMPLETE CBC W/AUTO DIFF WBC: CPT | Performed by: INTERNAL MEDICINE

## 2018-03-14 PROCEDURE — 74011000258 HC RX REV CODE- 258: Performed by: INTERNAL MEDICINE

## 2018-03-14 PROCEDURE — 74011250636 HC RX REV CODE- 250/636: Performed by: INTERNAL MEDICINE

## 2018-03-14 PROCEDURE — 83540 ASSAY OF IRON: CPT | Performed by: INTERNAL MEDICINE

## 2018-03-14 PROCEDURE — 82746 ASSAY OF FOLIC ACID SERUM: CPT | Performed by: INTERNAL MEDICINE

## 2018-03-14 PROCEDURE — 65660000000 HC RM CCU STEPDOWN

## 2018-03-14 PROCEDURE — 36415 COLL VENOUS BLD VENIPUNCTURE: CPT | Performed by: INTERNAL MEDICINE

## 2018-03-14 PROCEDURE — 77030020256 HC SOL INJ NACL 0.9%  500ML

## 2018-03-14 PROCEDURE — 74011250636 HC RX REV CODE- 250/636: Performed by: HOSPITALIST

## 2018-03-14 PROCEDURE — P9047 ALBUMIN (HUMAN), 25%, 50ML: HCPCS | Performed by: INTERNAL MEDICINE

## 2018-03-14 RX ORDER — HYDROMORPHONE HYDROCHLORIDE 2 MG/ML
0.5 INJECTION, SOLUTION INTRAMUSCULAR; INTRAVENOUS; SUBCUTANEOUS
Status: DISCONTINUED | OUTPATIENT
Start: 2018-03-14 | End: 2018-03-15 | Stop reason: HOSPADM

## 2018-03-14 RX ORDER — PANTOPRAZOLE SODIUM 40 MG/1
40 GRANULE, DELAYED RELEASE ORAL
Status: DISCONTINUED | OUTPATIENT
Start: 2018-03-14 | End: 2018-03-15

## 2018-03-14 RX ORDER — MORPHINE SULFATE 4 MG/ML
3-4 INJECTION, SOLUTION INTRAMUSCULAR; INTRAVENOUS
Status: DISCONTINUED | OUTPATIENT
Start: 2018-03-14 | End: 2018-03-14

## 2018-03-14 RX ADMIN — CLONIDINE HYDROCHLORIDE 0.1 MG: 0.1 TABLET ORAL at 09:39

## 2018-03-14 RX ADMIN — DIAZEPAM 5 MG: 5 TABLET ORAL at 14:31

## 2018-03-14 RX ADMIN — HYDROMORPHONE HYDROCHLORIDE 0.5 MG: 2 INJECTION INTRAMUSCULAR; INTRAVENOUS; SUBCUTANEOUS at 11:44

## 2018-03-14 RX ADMIN — SUCRALFATE 1 G: 1 SUSPENSION ORAL at 09:40

## 2018-03-14 RX ADMIN — PANCRELIPASE 1 CAPSULE: 10000; 34000; 55000 CAPSULE, DELAYED RELEASE ORAL at 09:40

## 2018-03-14 RX ADMIN — PANTOPRAZOLE SODIUM 40 MG: 40 TABLET, DELAYED RELEASE ORAL at 09:39

## 2018-03-14 RX ADMIN — SUCRALFATE 1 G: 1 SUSPENSION ORAL at 21:41

## 2018-03-14 RX ADMIN — HYDROMORPHONE HYDROCHLORIDE 0.5 MG: 2 INJECTION INTRAMUSCULAR; INTRAVENOUS; SUBCUTANEOUS at 21:34

## 2018-03-14 RX ADMIN — PIPERACILLIN SODIUM,TAZOBACTAM SODIUM 3.38 G: 3; .375 INJECTION, POWDER, FOR SOLUTION INTRAVENOUS at 18:06

## 2018-03-14 RX ADMIN — ALBUMIN (HUMAN) 50 G: 0.25 INJECTION, SOLUTION INTRAVENOUS at 00:16

## 2018-03-14 RX ADMIN — HYDROMORPHONE HYDROCHLORIDE 0.5 MG: 2 INJECTION INTRAMUSCULAR; INTRAVENOUS; SUBCUTANEOUS at 16:33

## 2018-03-14 RX ADMIN — FOLIC ACID 1 MG: 1 TABLET ORAL at 09:39

## 2018-03-14 RX ADMIN — TOPIRAMATE 200 MG: 100 TABLET, FILM COATED ORAL at 09:39

## 2018-03-14 RX ADMIN — PIPERACILLIN SODIUM,TAZOBACTAM SODIUM 3.38 G: 3; .375 INJECTION, POWDER, FOR SOLUTION INTRAVENOUS at 11:43

## 2018-03-14 RX ADMIN — THERA TABS 1 TABLET: TAB at 09:39

## 2018-03-14 RX ADMIN — HYDROMORPHONE HYDROCHLORIDE 0.5 MG: 2 INJECTION INTRAMUSCULAR; INTRAVENOUS; SUBCUTANEOUS at 00:13

## 2018-03-14 RX ADMIN — TOPIRAMATE 200 MG: 100 TABLET, FILM COATED ORAL at 17:35

## 2018-03-14 RX ADMIN — PANCRELIPASE 1 CAPSULE: 10000; 34000; 55000 CAPSULE, DELAYED RELEASE ORAL at 11:45

## 2018-03-14 RX ADMIN — SUCRALFATE 1 G: 1 SUSPENSION ORAL at 16:31

## 2018-03-14 RX ADMIN — HYDROMORPHONE HYDROCHLORIDE 0.5 MG: 2 INJECTION INTRAMUSCULAR; INTRAVENOUS; SUBCUTANEOUS at 04:32

## 2018-03-14 RX ADMIN — CLONIDINE HYDROCHLORIDE 0.1 MG: 0.1 TABLET ORAL at 17:35

## 2018-03-14 RX ADMIN — PANTOPRAZOLE SODIUM 40 MG: 40 GRANULE, DELAYED RELEASE ORAL at 16:31

## 2018-03-14 RX ADMIN — PIPERACILLIN SODIUM,TAZOBACTAM SODIUM 3.38 G: 3; .375 INJECTION, POWDER, FOR SOLUTION INTRAVENOUS at 03:54

## 2018-03-14 RX ADMIN — Medication 100 MG: at 09:40

## 2018-03-14 RX ADMIN — DEXTROSE AND SODIUM CHLORIDE 100 ML/HR: 5; 900 INJECTION, SOLUTION INTRAVENOUS at 18:05

## 2018-03-14 RX ADMIN — QUETIAPINE FUMARATE 600 MG: 200 TABLET ORAL at 21:34

## 2018-03-14 RX ADMIN — PANCRELIPASE 1 CAPSULE: 10000; 34000; 55000 CAPSULE, DELAYED RELEASE ORAL at 16:31

## 2018-03-14 RX ADMIN — DEXTROSE AND SODIUM CHLORIDE 100 ML/HR: 5; 900 INJECTION, SOLUTION INTRAVENOUS at 02:10

## 2018-03-14 RX ADMIN — IOPAMIDOL 100 ML: 612 INJECTION, SOLUTION INTRAVENOUS at 08:33

## 2018-03-14 RX ADMIN — BUSPIRONE HYDROCHLORIDE 15 MG: 5 TABLET ORAL at 17:35

## 2018-03-14 RX ADMIN — BUSPIRONE HYDROCHLORIDE 15 MG: 5 TABLET ORAL at 09:40

## 2018-03-14 RX ADMIN — SUCRALFATE 1 G: 1 SUSPENSION ORAL at 11:45

## 2018-03-14 NOTE — PROGRESS NOTES
Problem: Falls - Risk of  Goal: *Absence of Falls  Document Thelma Fall Risk and appropriate interventions in the flowsheet.    Outcome: Progressing Towards Goal  Fall Risk Interventions:  Mobility Interventions: Patient to call before getting OOB         Medication Interventions: Evaluate medications/consider consulting pharmacy, Patient to call before getting OOB, Teach patient to arise slowly    Elimination Interventions: Call light in reach    History of Falls Interventions: Door open when patient unattended, Room close to nurse's station

## 2018-03-14 NOTE — PROGRESS NOTES
[facility]  Hospitalist Division        Inpatient Daily Progress Note    Daily progress Note    Patient: Nash Vega MRN: [id number]  CSN: [id number]    YOB: 1965  Age: 47 y.o. Sex: female    DOA: 3/8/2018 LOS:  LOS: 6 days                    Chief Complaint:  Syncope, Pancreatitis, GIB    Interval History: 47 y.o.  female with hx of COPD, HTN, bipolar disorder, ETOH abuse, prior hx of pancreatitis, depression, presented to the ED on 3/8/2018 with syncope and GI bleed. Reported three days of coffee ground type emesis and diffuse abdominal pain. She was using the restroom when she passed out. Noted with elevated alcohol level. Pt admits to drinking a gallon of alcohol over two days prior to onset of symptoms. Lipase on admission 2172, ammonia 34, AST 86, electrolyte imbalance, WBC 14.2, normal H/H.  UA on admission with trace leukocyte esterase, 4+ bacteria and 4 to 10 WBC's. Urine culture collected 3/9/2018 with >100,000 colonies Klebsiella pneumonia, 100,000 colonies klebsiella pneumonia 2nd morphotye. Susceptible to Zosyn (started 3/9/2018). Blood cultures collected 3/9/2018 NGTD x 2. She was started on IV fluids, PPI, CIWA protocol and admitted to [facility] for further progression of care. GI consulted. She had an EGD on 3/9/2018 showing severe, erosive/ulcerative esophagitis. Work-up for hypercalcemia initiated. PTH intact 13.1, Vitamin D normal.  PTH-related peptide pending result. Lipase increased from 441 to 848 on 3/13/2018. Rechecked later that day, increased to 1069 and decreased to 952 on 3/14/2018.   She had a CT abdomen/pelvis w/ contrast on 3/14/2018 note mild edematous changes involving distal pancreas suggesting acute pancreatitis, small to moderate sized layering pleural effusions w/ adjacent atelectasis, staghorn calculus lower pole of left kidney.       Assessment/Plan:     Patient Active Problem List   Diagnosis Code    Bipolar disorder (manic depression) ([facility]) F31.9    Tachycardia R00.0    Alcohol dependence ([facility]) F10.20    Esophageal stricture K22.2    Dilated pancreatic duct K86.89    Common bile duct dilation K83.8    Elevated LFTs R79.89    Bipolar depression ([facility]) F31.30    HTN (hypertension) I10    Acute pancreatitis K85.90    Pancreatitis, alcoholic, acute V53.16    Tylenol overdose T39.1X1A    SIRS (systemic inflammatory response syndrome) ([facility]) R65.10    Pneumonia J18.9    COPD (chronic obstructive pulmonary disease) ([facility]) J44.9    Nicotine withdrawal F17.203    Dizziness R42    Pancreatitis K85.90    Hypokalemia E87.6    Anemia D64.9    Encephalopathy G93.40    Syncope R55    Alcohol withdrawal ([facility]) F10.239    GI bleed K92.2    Hyponatremia E87.1    Hypercalcemia E83.52    UTI (urinary tract infection) N39.0    Bronchitis J40       A/P:  Acute alcoholic pancreatitis  - GI Following, appreciate input  - Lipase 2172 - -> 1194 - -> 441 - -> 848 - -> 1069 - -> 952  - unable to tolerate advancement to regular diet, continue full liquids  - ALT / AST improving  - CT abdomen/pelvis w/ contrast on 3/14/2018 note mild edematous changes involving distal pancreas suggesting acute pancreatitis, small to moderate sized layering pleural effusions w/ adjacent atelectasis, staghorn calculus lower pole of left kidney  - continue pancreatic enzymes TID w/ meals  - Zofran PRN nausea  - pain control  - per GI, will need additional imaging of the pancreas outpatient after 2-3 months of healing to r/o small pancreatic neoplasm    Syncope  - multifactorial- dehydration, ETOH intoxication, hypercalcemia, GIB, N/V, infection  - CT head 3/8/2018 no acute intracranial abnormality  - Echo on 3/8/2018 with EF 70%, no wall abnormalities, Grade 1 Diastolic Dysfunction  - continue tele monitoring  - monitor mental status    GI Bleed  - GI following, appreciate input  - s/p EGD on 3/9/2018 with severe, erosive/ulcerative esophagitis involving distal two thirds of esophagus, hiatal hernia, thick coffee-ground adherent material in the stomach precluding optimal visualization  - 3/14/2018 iron 15, iron sat 10%, H/H 7.2 / 22.2  - B12 and folate normal  - per GI r/o splenic vein thrombosis related to pancreatitis, intra-abdominal bleed less likely  - Protonix 40 mg po BID    UTI  - UA collected 3/9/2018 trace leukocyte esterase, 4+ bacteria and 4 to 10 WBC's  - Urine culture collected 3/9/2018 with >100,000 colonies Klebsiella pneumonia, 100,000 colonies klebsiella pneumonia 2nd morphotype  - Susceptible to Zosyn (started 3/9/2018)- continue    ETOH abuse and withdrawal  - [facility] protocol  - MVI, thiamine, folic acid  - Ongoing problem for this patient who has been unable to quit despite multiple admissions to [facility] institutions - 11 admissions the last few years as per patient. Reporting strong family support    PT/OT following  - recommend rehab/SNF, shower chair    DVT Prophylaxis  - SCD's BLE    Disposition  - per GI, will need additional imaging of the pancreas outpatient after 2-3 months of healing to r/o small pancreatic neoplasm  - Short term rehab, case management following  - has been accepted to [facility] but family wants to choose facilities closer to home      Aliyah Sierra 15  Pager:  [phone]  Office:  [phone]        Subjective:      No events overnight. Drowsy. C/o abdominal pain, points to epigastric region. Mild nausea, no vomiting.     Objective:      Visit Vitals    /83    Pulse 96    Temp 97 °F (36.1 °C)    Resp 19    Ht 5' 2\" (1.575 m)    Wt 56.7 kg (125 lb)    SpO2 98%    BMI 22.86 kg/m2           Physical Exam:  General appearance: alert, cooperative, no distress, appears stated age  Head: Normocephalic, without obvious abnormality, atraumatic  Lungs: clear to auscultation bilaterally  Heart: regular rate and rhythm, S1, S2 normal, no murmur, click, rub or gallop  Abdomen: soft, TTP upper abdomen, non distended. Normoactive bowel sounds. Extremities: extremities normal, atraumatic, no cyanosis or edema  Skin: Skin color, texture, turgor normal. No rashes or lesions  Neurologic: drowsy, arouses to verbal stimuli, A/O x 4, VARELA, follows commands  PSY: drowsy, arouses to verbal stimuli        Intake and Output:  Current Shift:     Last three shifts:  03/12 1901 - 03/14 0700  In: 4061.7 [P.O.:1260; I.V.:2801.7]  Out: 2901 [Urine:2900]    Recent Results (from the past 24 hour(s))   LIPASE    Collection Time: 03/13/18  3:55 PM   Result Value Ref Range    Lipase 1069 (H) 73 - 393 U/L   CBC WITH AUTOMATED DIFF    Collection Time: 03/14/18  3:45 AM   Result Value Ref Range    WBC 4.5 (L) 4.6 - 13.2 K/uL    RBC 2.25 (L) 4.20 - 5.30 M/uL    HGB 7.2 (L) 12.0 - 16.0 g/dL    HCT 22.2 (L) 35.0 - 45.0 %    MCV 98.7 (H) 74.0 - 97.0 FL    MCH 32.0 24.0 - 34.0 PG    MCHC 32.4 31.0 - 37.0 g/dL    RDW 16.3 (H) 11.6 - 14.5 %    PLATELET 435 852 - 200 K/uL    MPV 10.6 9.2 - 11.8 FL    NEUTROPHILS 40 40 - 73 %    LYMPHOCYTES 40 21 - 52 %    MONOCYTES 17 (H) 3 - 10 %    EOSINOPHILS 3 0 - 5 %    BASOPHILS 0 0 - 2 %    ABS. NEUTROPHILS 1.8 1.8 - 8.0 K/UL    ABS. LYMPHOCYTES 1.8 0.9 - 3.6 K/UL    ABS. MONOCYTES 0.7 0.05 - 1.2 K/UL    ABS. EOSINOPHILS 0.1 0.0 - 0.4 K/UL    ABS.  BASOPHILS 0.0 0.0 - 0.06 K/UL    DF AUTOMATED     METABOLIC PANEL, COMPREHENSIVE    Collection Time: 03/14/18  3:45 AM   Result Value Ref Range    Sodium 144 136 - 145 mmol/L    Potassium 3.6 3.5 - 5.5 mmol/L    Chloride 116 (H) 100 - 108 mmol/L    CO2 23 21 - 32 mmol/L    Anion gap 5 3.0 - 18 mmol/L    Glucose 111 (H) 74 - 99 mg/dL    BUN 5 (L) 7.0 - 18 MG/DL    Creatinine 0.45 (L) 0.6 - 1.3 MG/DL    BUN/Creatinine ratio 11 (L) 12 - 20      GFR est AA >60 >60 ml/min/1.73m2    GFR est non-AA >60 >60 ml/min/1.73m2    Calcium 8.0 (L) 8.5 - 10.1 MG/DL    Bilirubin, total 0.3 0.2 - 1.0 MG/DL    ALT (SGPT) 60 (H) 13 - 56 U/L    AST (SGOT) 38 (H) 15 - 37 U/L    Alk. phosphatase 86 45 - 117 U/L    Protein, total 5.2 (L) 6.4 - 8.2 g/dL    Albumin 2.5 (L) 3.4 - 5.0 g/dL    Globulin 2.7 2.0 - 4.0 g/dL    A-G Ratio 0.9 0.8 - 1.7     LIPASE    Collection Time: 03/14/18  3:45 AM   Result Value Ref Range    Lipase 952 (H) 73 - 393 U/L   FERRITIN    Collection Time: 03/14/18  3:45 AM   Result Value Ref Range    Ferritin 341 8 - 388 NG/ML   FOLATE    Collection Time: 03/14/18  3:45 AM   Result Value Ref Range    Folate 17.8 (H) 3.10 - 17.50 ng/mL   IRON PROFILE    Collection Time: 03/14/18  3:45 AM   Result Value Ref Range    Iron 15 (L) 50 - 175 ug/dL    TIBC 155 (L) 250 - 450 ug/dL    Iron % saturation 10 %   VITAMIN B12    Collection Time: 03/14/18  3:45 AM   Result Value Ref Range    Vitamin B12 827 211 - 911 pg/mL           Lab Results   Component Value Date/Time    Glucose 111 (H) 03/14/2018 03:45 AM    Glucose 114 (H) 03/13/2018 04:00 AM    Glucose 63 (L) 03/11/2018 11:30 AM    Glucose 72 (L) 03/10/2018 04:31 AM    Glucose 105 (H) 03/09/2018 03:55 AM        Imaging:  No results found.     Medication List Reviewed:  Current Facility-Administered Medications   Medication Dose Route Frequency    pantoprazole (PROTONIX) tablet 40 mg  40 mg Oral ACB&D    topiramate (TOPAMAX) tablet 200 mg  200 mg Oral BID    busPIRone (BUSPAR) tablet 15 mg  15 mg Oral BID    QUEtiapine (SEROquel) tablet 600 mg  600 mg Oral QHS    sucralfate (CARAFATE) 100 mg/mL oral suspension 1 g  1 g Oral AC&HS    dextrose 5% and 0.9% NaCl infusion  100 mL/hr IntraVENous CONTINUOUS    albuterol-ipratropium (DUO-NEB) 2.5 MG-0.5 MG/3 ML  3 mL Nebulization Q4H PRN    piperacillin-tazobactam (ZOSYN) 3.375 g in  mL ##EXTENDED 4 HRS INFUSION  3.375 g IntraVENous Q8H    sodium chloride (NS) flush 5-10 mL  5-10 mL IntraVENous PRN    LORazepam (ATIVAN) tablet 1 mg  1 mg Oral Q1H PRN    Or    LORazepam (ATIVAN) injection 1 mg  1 mg IntraVENous Q1H PRN    LORazepam (ATIVAN) tablet 2 mg  2 mg Oral Q1H PRN    Or    LORazepam (ATIVAN) injection 2 mg  2 mg IntraVENous Q1H PRN    LORazepam (ATIVAN) injection 3 mg  3 mg IntraVENous Q15MIN PRN    albuterol (PROVENTIL VENTOLIN) nebulizer solution 2.5 mg  2.5 mg Nebulization Q4H PRN    cloNIDine HCl (CATAPRES) tablet 0.1 mg  0.1 mg Oral BID    diazePAM (VALIUM) tablet 5 mg  5 mg Oral Q8H    lipase-protease-amylase (ZENPEP 10,000) capsule 1 Cap  1 Cap Oral TID WITH MEALS    sodium chloride (NS) flush 5-10 mL  5-10 mL IntraVENous PRN    LORazepam (ATIVAN) tablet 1 mg  1 mg Oral Q1H PRN    Or    LORazepam (ATIVAN) injection 1 mg  1 mg IntraVENous Q1H PRN    LORazepam (ATIVAN) tablet 2 mg  2 mg Oral Q1H PRN    Or    LORazepam (ATIVAN) injection 2 mg  2 mg IntraVENous Q1H PRN    LORazepam (ATIVAN) injection 3 mg  3 mg IntraVENous [id number] PRN    folic acid (FOLVITE) tablet 1 mg  1 mg Oral DAILY    Thiamine Mononitrate (B-1) tablet 100 mg  100 mg Oral DAILY    therapeutic multivitamin (THERAGRAN) tablet 1 Tab  1 Tab Oral DAILY    ondansetron (ZOFRAN) injection 4 mg  4 mg IntraVENous Q6H PRN    influenza vaccine 2017-18 (3 yrs+)(PF) (FLUZONE QUAD/FLUARIX QUAD) injection 0.5 mL  0.5 mL IntraMUSCular PRIOR TO DISCHARGE    HYDROmorphone (DILAUDID) injection 0.5 mg  0.5 mg IntraVENous Q4H PRN

## 2018-03-14 NOTE — ROUTINE PROCESS
Bedside and Verbal shift change report given to Erica RN by Violette Shafer RN. Report included the following information SBAR, Kardex, Intake/Output and MAR.

## 2018-03-14 NOTE — PROGRESS NOTES
08:10- Assessment completed- see charting. Patient withdrawn, flat affect and teary eyed at times. TLC given. Continue to monitor- Call light in reach. Patient reports there are meds she usually takes at home but have not been given while in hospital. Encouraged patient that it would be discussed during rounding this afternoon. INstructed patient to call for assistance when needing to get out of bed to use commode. 12:00- No change in condition. Continue to monitor. 16:00- See MAR for times of medication for pain (abdomen)   19:20- Report to oncoming nurse.

## 2018-03-14 NOTE — PROGRESS NOTES
Problem: Pancreatitis  Goal: *Control of acute pain  Outcome: Progressing Towards Goal  Pt appears overly sedated at this time. PRN Pain meds held  Goal: *Absence of nausea/vomiting  Outcome: Progressing Towards Goal  Pt tolerating full liquids and denies nausea    Problem: Falls - Risk of  Goal: *Absence of Falls  Document Thelma Fall Risk and appropriate interventions in the flowsheet.    Outcome: Progressing Towards Goal  Fall Risk Interventions:  Mobility Interventions: Communicate number of staff needed for ambulation/transfer, Patient to call before getting OOB    Mentation Interventions: Adequate sleep, hydration, pain control, Toileting rounds, Update white board, More frequent rounding    Medication Interventions: Patient to call before getting OOB, Teach patient to arise slowly    Elimination Interventions: Bed/chair exit alarm, Call light in reach, Toileting schedule/hourly rounds, Patient to call for help with toileting needs    History of Falls Interventions: Consult care management for discharge planning, Door open when patient unattended

## 2018-03-14 NOTE — ROUTINE PROCESS
1920  Bedside and Verbal shift change report given to Lyn Ledesma (oncoming nurse) by Rachael Duran (offgoing nurse). Report included the following information SBAR, Kardex, Intake/Output and MAR. Pt sleeping at this time with respirations even and unlabored. 2030  Shift assessment complete. 0400  Reassessment complete. No change in pt condition. 0740  Bedside and Verbal shift change report given to  [facility] (oncoming nurse) by Lyn Ledesma (offgoing nurse). Report included the following information SBAR, Kardex, Intake/Output and MAR.

## 2018-03-14 NOTE — ROUTINE PROCESS
1950  Bedside and Verbal shift change report given to Dorothea Epstein (oncoming nurse) by Tony Damon (offgoing nurse). Report included the following information SBAR, Kardex, Intake/Output and MAR. Pt awake and alert at this time. Requesting pain medication    2100  Shift assessment complete and due meds given    0300  Reassessment complete. No change in pt condition    0720  Bedside and Verbal shift change report given to Priyanka Rush (oncoming nurse) by Dorothea Epstein (offgoing nurse). Report included the following information SBAR, Kardex, Intake/Output and MAR.

## 2018-03-14 NOTE — INTERDISCIPLINARY ROUNDS
IDR Summary      Patient: Donnei Santa MRN: [id number]    Age: 47 y.o.  : 1965     Admit Diagnosis: Syncope  epigastric pain and coffee-ground emesis  Pancreatitis    Problems pertinent to hospital stay:     Consults:Case Management     Testing due for patient today? YES- CT abdomen showed pancreatitis- Lipase increased and now decreased    Nutrition plan:Yes     Mobility needs: Yes      Lines/Tubes:   IV: YES   Needed: YES  Stanley: NO  Needed:NO  Central Line: NO Needed: NO      VTE Prophylaxis: Mechanical    Influenza Vaccine received? NO- to receive prior to discharge       Care Management:  Discharge plan: [facility]  PCP: Raissa Neely MD    : NO  Financial concerns:No   Interventions:       LOS: 6 days     Expected days until discharge: TBD      Signed:      BRIDGER Craig  [facility]  Hospitalist Division  Pager:  [phone]  Office:  [phone]

## 2018-03-15 VITALS
HEART RATE: 106 BPM | RESPIRATION RATE: 18 BRPM | BODY MASS INDEX: 23.46 KG/M2 | WEIGHT: 127.5 LBS | TEMPERATURE: 97.7 F | DIASTOLIC BLOOD PRESSURE: 86 MMHG | SYSTOLIC BLOOD PRESSURE: 131 MMHG | OXYGEN SATURATION: 97 % | HEIGHT: 62 IN

## 2018-03-15 LAB
ALBUMIN SERPL-MCNC: 2.4 G/DL (ref 3.4–5)
ALBUMIN/GLOB SERPL: 0.8 {RATIO} (ref 0.8–1.7)
ALP SERPL-CCNC: 88 U/L (ref 45–117)
ALT SERPL-CCNC: 53 U/L (ref 13–56)
ANION GAP SERPL CALC-SCNC: 8 MMOL/L (ref 3–18)
AST SERPL-CCNC: 28 U/L (ref 15–37)
BACTERIA SPEC CULT: NORMAL
BACTERIA SPEC CULT: NORMAL
BILIRUB SERPL-MCNC: 0.3 MG/DL (ref 0.2–1)
BUN SERPL-MCNC: 4 MG/DL (ref 7–18)
BUN/CREAT SERPL: 8 (ref 12–20)
CALCIUM SERPL-MCNC: 8.5 MG/DL (ref 8.5–10.1)
CHLORIDE SERPL-SCNC: 115 MMOL/L (ref 100–108)
CO2 SERPL-SCNC: 20 MMOL/L (ref 21–32)
CREAT SERPL-MCNC: 0.5 MG/DL (ref 0.6–1.3)
ERYTHROCYTE [DISTWIDTH] IN BLOOD BY AUTOMATED COUNT: 16.4 % (ref 11.6–14.5)
GLOBULIN SER CALC-MCNC: 2.9 G/DL (ref 2–4)
GLUCOSE BLD STRIP.AUTO-MCNC: 103 MG/DL (ref 70–110)
GLUCOSE BLD STRIP.AUTO-MCNC: 114 MG/DL (ref 70–110)
GLUCOSE SERPL-MCNC: 96 MG/DL (ref 74–99)
HCT VFR BLD AUTO: 23.6 % (ref 35–45)
HGB BLD-MCNC: 7.6 G/DL (ref 12–16)
MCH RBC QN AUTO: 31.5 PG (ref 24–34)
MCHC RBC AUTO-ENTMCNC: 32.2 G/DL (ref 31–37)
MCV RBC AUTO: 97.9 FL (ref 74–97)
PLATELET # BLD AUTO: 252 K/UL (ref 135–420)
PMV BLD AUTO: 10 FL (ref 9.2–11.8)
POTASSIUM SERPL-SCNC: 3.5 MMOL/L (ref 3.5–5.5)
PROT SERPL-MCNC: 5.3 G/DL (ref 6.4–8.2)
PTH RELATED PROT SERPL-SCNC: <1.1 PMOL/L
RBC # BLD AUTO: 2.41 M/UL (ref 4.2–5.3)
SERVICE CMNT-IMP: NORMAL
SERVICE CMNT-IMP: NORMAL
SODIUM SERPL-SCNC: 143 MMOL/L (ref 136–145)
WBC # BLD AUTO: 5.3 K/UL (ref 4.6–13.2)

## 2018-03-15 PROCEDURE — 74011250637 HC RX REV CODE- 250/637: Performed by: INTERNAL MEDICINE

## 2018-03-15 PROCEDURE — 97116 GAIT TRAINING THERAPY: CPT

## 2018-03-15 PROCEDURE — 74011250637 HC RX REV CODE- 250/637: Performed by: HOSPITALIST

## 2018-03-15 PROCEDURE — 74011250636 HC RX REV CODE- 250/636: Performed by: HOSPITALIST

## 2018-03-15 PROCEDURE — 85027 COMPLETE CBC AUTOMATED: CPT | Performed by: NURSE PRACTITIONER

## 2018-03-15 PROCEDURE — 82962 GLUCOSE BLOOD TEST: CPT

## 2018-03-15 PROCEDURE — 90686 IIV4 VACC NO PRSV 0.5 ML IM: CPT | Performed by: INTERNAL MEDICINE

## 2018-03-15 PROCEDURE — 36415 COLL VENOUS BLD VENIPUNCTURE: CPT | Performed by: NURSE PRACTITIONER

## 2018-03-15 PROCEDURE — 74011000258 HC RX REV CODE- 258: Performed by: INTERNAL MEDICINE

## 2018-03-15 PROCEDURE — 74011250636 HC RX REV CODE- 250/636: Performed by: INTERNAL MEDICINE

## 2018-03-15 PROCEDURE — 90471 IMMUNIZATION ADMIN: CPT

## 2018-03-15 PROCEDURE — 80053 COMPREHEN METABOLIC PANEL: CPT | Performed by: NURSE PRACTITIONER

## 2018-03-15 RX ORDER — PANTOPRAZOLE SODIUM 40 MG/1
40 TABLET, DELAYED RELEASE ORAL
Qty: 60 TAB | Refills: 0 | Status: SHIPPED | OUTPATIENT
Start: 2018-03-15 | End: 2018-07-31

## 2018-03-15 RX ORDER — FOLIC ACID 1 MG/1
1 TABLET ORAL DAILY
Qty: 30 TAB | Refills: 0 | Status: SHIPPED | OUTPATIENT
Start: 2018-03-16 | End: 2018-07-31

## 2018-03-15 RX ORDER — BUSPIRONE HYDROCHLORIDE 15 MG/1
15 TABLET ORAL 2 TIMES DAILY
Qty: 60 TAB | Refills: 0 | Status: SHIPPED | OUTPATIENT
Start: 2018-03-15 | End: 2020-08-21

## 2018-03-15 RX ORDER — ONDANSETRON 8 MG/1
8 TABLET, ORALLY DISINTEGRATING ORAL
Qty: 15 TAB | Refills: 0 | Status: SHIPPED | OUTPATIENT
Start: 2018-03-15 | End: 2018-07-31

## 2018-03-15 RX ORDER — SUCRALFATE 1 G/10ML
1 SUSPENSION ORAL
Qty: 1200 ML | Refills: 0 | Status: SHIPPED | OUTPATIENT
Start: 2018-03-15 | End: 2018-07-31

## 2018-03-15 RX ORDER — PANTOPRAZOLE SODIUM 40 MG/1
40 TABLET, DELAYED RELEASE ORAL
Status: DISCONTINUED | OUTPATIENT
Start: 2018-03-15 | End: 2018-03-15 | Stop reason: HOSPADM

## 2018-03-15 RX ORDER — ASPIRIN 325 MG/1
100 TABLET, FILM COATED ORAL DAILY
Qty: 30 TAB | Refills: 0 | Status: SHIPPED | OUTPATIENT
Start: 2018-03-16 | End: 2018-04-16 | Stop reason: SDUPTHER

## 2018-03-15 RX ORDER — OXYCODONE HYDROCHLORIDE 5 MG/1
5 TABLET ORAL
Qty: 20 TAB | Refills: 0 | Status: SHIPPED | OUTPATIENT
Start: 2018-03-15 | End: 2018-07-31

## 2018-03-15 RX ORDER — THERA TABS 400 MCG
1 TAB ORAL DAILY
Qty: 30 TAB | Refills: 0 | Status: SHIPPED | OUTPATIENT
Start: 2018-03-16 | End: 2018-04-16 | Stop reason: ALTCHOICE

## 2018-03-15 RX ADMIN — SUCRALFATE 1 G: 1 SUSPENSION ORAL at 09:34

## 2018-03-15 RX ADMIN — HYDROMORPHONE HYDROCHLORIDE 0.5 MG: 2 INJECTION INTRAMUSCULAR; INTRAVENOUS; SUBCUTANEOUS at 02:50

## 2018-03-15 RX ADMIN — PANCRELIPASE 1 CAPSULE: 10000; 34000; 55000 CAPSULE, DELAYED RELEASE ORAL at 13:13

## 2018-03-15 RX ADMIN — SUCRALFATE 1 G: 1 SUSPENSION ORAL at 13:15

## 2018-03-15 RX ADMIN — BUSPIRONE HYDROCHLORIDE 15 MG: 5 TABLET ORAL at 09:34

## 2018-03-15 RX ADMIN — CLONIDINE HYDROCHLORIDE 0.1 MG: 0.1 TABLET ORAL at 09:33

## 2018-03-15 RX ADMIN — PIPERACILLIN SODIUM,TAZOBACTAM SODIUM 3.38 G: 3; .375 INJECTION, POWDER, FOR SOLUTION INTRAVENOUS at 02:50

## 2018-03-15 RX ADMIN — PANCRELIPASE 1 CAPSULE: 10000; 34000; 55000 CAPSULE, DELAYED RELEASE ORAL at 09:31

## 2018-03-15 RX ADMIN — FOLIC ACID 1 MG: 1 TABLET ORAL at 09:33

## 2018-03-15 RX ADMIN — ONDANSETRON 4 MG: 2 INJECTION INTRAMUSCULAR; INTRAVENOUS at 09:34

## 2018-03-15 RX ADMIN — INFLUENZA VIRUS VACCINE 0.5 ML: 15; 15; 15; 15 SUSPENSION INTRAMUSCULAR at 12:49

## 2018-03-15 RX ADMIN — TOPIRAMATE 200 MG: 100 TABLET, FILM COATED ORAL at 09:31

## 2018-03-15 RX ADMIN — HYDROMORPHONE HYDROCHLORIDE 0.5 MG: 2 INJECTION INTRAMUSCULAR; INTRAVENOUS; SUBCUTANEOUS at 09:34

## 2018-03-15 RX ADMIN — Medication 100 MG: at 09:33

## 2018-03-15 RX ADMIN — HYDROMORPHONE HYDROCHLORIDE 0.5 MG: 2 INJECTION INTRAMUSCULAR; INTRAVENOUS; SUBCUTANEOUS at 13:12

## 2018-03-15 RX ADMIN — THERA TABS 1 TABLET: TAB at 09:31

## 2018-03-15 NOTE — PROGRESS NOTES
Day 9 Started 3/9    Indication: UTI  Current regimen: Zosyn 3.375g IV q8h extended dosing    Recent Labs      03/15/18   0400  18   0345  18   0400   WBC  5.3  4.5*  4.5*   CREA  0.50*  0.45*  0.46*        Est Crcl: 102 ml/min    Temp (24hrs), Av °F (36.7 °C), Min:97.7 °F (36.5 °C), Max:98.3 °F (36.8 °C)      Cultures:  3/9 blood- no growth  3/9 urine- klebsiella pneumoniae     Recommendation:   Day 9 of Zosyn treatment for UTI  Please consider streamlining and end date on abx.        Thank you,  Isaiah Wan

## 2018-03-15 NOTE — PROGRESS NOTES
Spoke with patient's mother yesterday and chose [address] as it is closer to her home. [address] able to accept patient today as confirmed through [employer] via liaison/ coordinator Pedro Almendarez. Will set up transport. Transport setup for [employer]  All parties made aware, nurse, patient, Patient's mother and facility.  Nurse to call report to Connie [facility] of [address]

## 2018-03-15 NOTE — PROGRESS NOTES
Problem: Falls - Risk of  Goal: *Absence of Falls  Document Thelma Fall Risk and appropriate interventions in the flowsheet.    Outcome: Progressing Towards Goal  Fall Risk Interventions:  Mobility Interventions: Communicate number of staff needed for ambulation/transfer, Patient to call before getting OOB, Strengthening exercises (ROM-active/passive)    Mentation Interventions: Adequate sleep, hydration, pain control, Eyeglasses and hearing aids, More frequent rounding, Room close to nurse's station, Update white board    Medication Interventions: Patient to call before getting OOB, Teach patient to arise slowly    Elimination Interventions: Bed/chair exit alarm, Call light in reach, Patient to call for help with toileting needs    History of Falls Interventions: Room close to nurse's station, Door open when patient unattended

## 2018-03-15 NOTE — DISCHARGE SUMMARY
[facility]    Discharge Summary    Patient: Natalie Ibarra MRN: [id number]  CSN: [id number]    YOB: 1965  Age: 47 y.o.   Sex: female    DOA: 3/8/2018 LOS:  LOS: 7 days   Discharge Date:      Admission Diagnoses: Syncope  epigastric pain and coffee-ground emesis    Discharge Diagnoses:    Problem List as of 3/15/2018  Date Reviewed: 3/11/2018          Codes Class Noted - Resolved    UTI (urinary tract infection) ICD-10-CM: N39.0  ICD-9-CM: 599.0  3/11/2018 - Present        Bronchitis ICD-10-CM: J40  ICD-9-CM: 490  3/11/2018 - Present        * (Principal)Syncope ICD-10-CM: R55  ICD-9-CM: 780.2  3/8/2018 - Present        Alcohol withdrawal ([facility] ICD-10-CM: N21.854  ICD-9-CM: 291.81  3/8/2018 - Present        GI bleed ICD-10-CM: K92.2  ICD-9-CM: 578.9  3/8/2018 - Present        Hyponatremia ICD-10-CM: E87.1  ICD-9-CM: 276.1  3/8/2018 - Present        Hypercalcemia ICD-10-CM: E83.52  ICD-9-CM: 275.42  3/8/2018 - Present        Dizziness ICD-10-CM: R42  ICD-9-CM: 780.4  9/14/2017 - Present        Pancreatitis ICD-10-CM: K85.90  ICD-9-CM: 577.0  9/14/2017 - Present        Hypokalemia ICD-10-CM: E87.6  ICD-9-CM: 276.8  9/14/2017 - Present        Anemia ICD-10-CM: D64.9  ICD-9-CM: 285.9  9/14/2017 - Present        Encephalopathy ICD-10-CM: G93.40  ICD-9-CM: 348.30  9/14/2017 - Present        Nicotine withdrawal ICD-10-CM: F17.203  ICD-9-CM: 292.0, 305.1  2/27/2016 - Present        Pneumonia ICD-10-CM: J18.9  ICD-9-CM: 283  2/25/2016 - Present        COPD (chronic obstructive pulmonary disease) ([facility] ICD-10-CM: J44.9  ICD-9-CM: 496  2/25/2016 - Present        Tylenol overdose ICD-10-CM: T39.1X1A  ICD-9-CM: 965.4, E980.0  2/20/2016 - Present        SIRS (systemic inflammatory response syndrome) ([facility] ICD-10-CM: R65.10  ICD-9-CM: 995.90  2/20/2016 - Present        Acute pancreatitis ICD-10-CM: K85.90  ICD-9-CM: 577.0  2/19/2016 - Present        Pancreatitis, alcoholic, acute PRV-90-EP: K85.20  ICD-9-CM: 577.0  2/19/2016 - Present        Tachycardia ICD-10-CM: R00.0  ICD-9-CM: 785.0  1/28/2016 - Present        Alcohol dependence ([facility] (Chronic) ICD-10-CM: F10.20  ICD-9-CM: 303.90  1/28/2016 - Present        Esophageal stricture (Chronic) ICD-10-CM: K22.2  ICD-9-CM: 530.3  1/28/2016 - Present        Dilated pancreatic duct ICD-10-CM: K86.89  ICD-9-CM: 577.8  1/28/2016 - Present        Common bile duct dilation ICD-10-CM: K83.8  ICD-9-CM: 576.8  1/28/2016 - Present        Elevated LFTs ICD-10-CM: R79.89  ICD-9-CM: 790.6  1/28/2016 - Present        Bipolar depression ([facility] (Chronic) ICD-10-CM: F31.30  ICD-9-CM: 296.50  1/28/2016 - Present        HTN (hypertension) (Chronic) ICD-10-CM: I10  ICD-9-CM: 401.9  1/28/2016 - Present        Bipolar disorder (manic depression) (HCC) (Chronic) ICD-10-CM: F31.9  ICD-9-CM: 296.80  3/8/2013 - Present        RESOLVED: Alcohol dependence with withdrawal (HCC) (Chronic) ICD-10-CM: F10.239  ICD-9-CM: 303.90, 291.81  3/8/2013 - 1/28/2016              Discharge Condition: Stable    Discharge To: Rehab    Consults: Gastroenterology and [facility] Course: 47 y.o.  female with hx of COPD, HTN, bipolar disorder, ETOH abuse, prior hx of pancreatitis, depression, presented to the ED on 3/8/2018 with syncope and GI bleed. Reported three days of coffee ground type emesis and diffuse abdominal pain. She was using the restroom when she passed out. Noted with elevated alcohol level. Pt admits to drinking a gallon of alcohol over two days prior to onset of symptoms. Lipase on admission 2172, ammonia 34, AST 86, electrolyte imbalance, WBC 14.2, normal H/H.  UA on admission with trace leukocyte esterase, 4+ bacteria and 4 to 10 WBC's. Urine culture collected 3/9/2018 with >100,000 colonies Klebsiella pneumonia, 100,000 colonies klebsiella pneumonia 2nd morphotye. Susceptible to Zosyn (started 3/9/2018). Blood cultures collected 3/9/2018 NGTD x 2.   She was started on IV fluids, PPI, CIWA protocol and admitted to [facility] for further progression of care. GI consulted. She had an EGD on 3/9/2018 showing severe, erosive/ulcerative esophagitis. Work-up for hypercalcemia initiated. PTH intact 13.1, Vitamin D normal.  PTH-related peptide pending result. Lipase increased from 441 to 848 on 3/13/2018. Rechecked later that day, increased to 1069 and decreased to 952 on 3/14/2018. She had a CT abdomen/pelvis w/ contrast on 3/14/2018 note mild edematous changes involving distal pancreas suggesting acute pancreatitis, small to moderate sized layering pleural effusions w/ adjacent atelectasis, staghorn calculus lower pole of left kidney.  GI signed off on 3/14/2018. No further episodes of vomiting in ~4 days, has occasional nausea. Labs and vital signs stable. She is appropriate to discharge to [address] [place] [address]. She is to follow up with PCP within one week. She is to follow up with GI, Dr. Milan Dawson within 2-3 weeks - will need additional imaging of the pancreas outpatient after 2-3 months of healing to r/o small pancreatic neoplasm. Physical Exam:  General appearance: alert, cooperative, no distress, appears stated age  Head: Normocephalic, without obvious abnormality, atraumatic  Lungs: clear to auscultation bilaterally  Heart: regular rate and rhythm, S1, S2 normal, no murmur, click, rub or gallop  Abdomen: soft, TTP upper abdomen, non distended. Normoactive bowel sounds.   Extremities: extremities normal, atraumatic, no cyanosis or edema  Skin: Skin color, texture, turgor normal. No rashes or lesions  Neurologic: drowsy, arouses to verbal stimuli, A/O x 4, VARELA, follows commands  PSY: drowsy, arouses to verbal stimuli    Significant Diagnostic Studies: labs:   Recent Results (from the past 24 hour(s))   METABOLIC PANEL, COMPREHENSIVE    Collection Time: 03/15/18  4:00 AM   Result Value Ref Range    Sodium 143 136 - 145 mmol/L    Potassium 3.5 3.5 - 5.5 mmol/L    Chloride 115 (H) 100 - 108 mmol/L    CO2 20 (L) 21 - 32 mmol/L    Anion gap 8 3.0 - 18 mmol/L    Glucose 96 74 - 99 mg/dL    BUN 4 (L) 7.0 - 18 MG/DL    Creatinine 0.50 (L) 0.6 - 1.3 MG/DL    BUN/Creatinine ratio 8 (L) 12 - 20      GFR est AA >60 >60 ml/min/1.73m2    GFR est non-AA >60 >60 ml/min/1.73m2    Calcium 8.5 8.5 - 10.1 MG/DL    Bilirubin, total 0.3 0.2 - 1.0 MG/DL    ALT (SGPT) 53 13 - 56 U/L    AST (SGOT) 28 15 - 37 U/L    Alk. phosphatase 88 45 - 117 U/L    Protein, total 5.3 (L) 6.4 - 8.2 g/dL    Albumin 2.4 (L) 3.4 - 5.0 g/dL    Globulin 2.9 2.0 - 4.0 g/dL    A-G Ratio 0.8 0.8 - 1.7     CBC W/O DIFF    Collection Time: 03/15/18  4:00 AM   Result Value Ref Range    WBC 5.3 4.6 - 13.2 K/uL    RBC 2.41 (L) 4.20 - 5.30 M/uL    HGB 7.6 (L) 12.0 - 16.0 g/dL    HCT 23.6 (L) 35.0 - 45.0 %    MCV 97.9 (H) 74.0 - 97.0 FL    MCH 31.5 24.0 - 34.0 PG    MCHC 32.2 31.0 - 37.0 g/dL    RDW 16.4 (H) 11.6 - 14.5 %    PLATELET 683 949 - 098 K/uL    MPV 10.0 9.2 - 11.8 FL   GLUCOSE, POC    Collection Time: 03/15/18  4:12 AM   Result Value Ref Range    Glucose (POC) 103 70 - 110 mg/dL         Discharge Medications:     Current Discharge Medication List      START taking these medications    Details   folic acid (FOLVITE) 1 mg tablet Take 1 Tab by mouth daily. Qty: 30 Tab, Refills: 0      therapeutic multivitamin (THERAGRAN) tablet Take 1 Tab by mouth daily. Qty: 30 Tab, Refills: 0      !! Thiamine Mononitrate (B-1) 100 mg tablet Take 1 Tab by mouth daily. Qty: 30 Tab, Refills: 0      busPIRone (BUSPAR) 15 mg tablet Take 1 Tab by mouth two (2) times a day. Qty: 60 Tab, Refills: 0      sucralfate (CARAFATE) 100 mg/mL suspension Take 10 mL by mouth Before breakfast, lunch, dinner and at bedtime. Qty: 1200 mL, Refills: 0       !! - Potential duplicate medications found. Please discuss with provider.       CONTINUE these medications which have CHANGED    Details   ondansetron (ZOFRAN ODT) 8 mg disintegrating tablet Take 1 Tab by mouth every eight (8) hours as needed for Nausea. Qty: 15 Tab, Refills: 0      pantoprazole (PROTONIX) 40 mg tablet Take 1 Tab by mouth Before breakfast and dinner. Qty: 60 Tab, Refills: 0      oxyCODONE IR (ROXICODONE) 5 mg immediate release tablet Take 1 Tab by mouth every six (6) hours as needed for Pain. Max Daily Amount: 20 mg.  Qty: 20 Tab, Refills: 0    Associated Diagnoses: Alcohol-induced acute pancreatitis without infection or necrosis         CONTINUE these medications which have NOT CHANGED    Details   lipase-protease-amylase (ZENPEP 10,000) capsule Take 1 Cap by mouth three (3) times daily (with meals). Qty: 200 Cap, Refills: 5      !! Thiamine Mononitrate (B-1) 100 mg tablet Take 1 Tab by mouth daily. Qty: 7 Tab, Refills: 0      cloNIDine HCl (CATAPRES) 0.2 mg tablet Take 0.5 Tabs by mouth two (2) times a day. Qty: 30 Tab, Refills: 0      QUETIAPINE FUMARATE (QUETIAPINE PO) Take 3 Tabs by mouth daily. Patient states she takes 3 tablets of the 200 mg strength to equal 600 mg total dose once a day      ipratropium-albuterol (COMBIVENT)  mcg/actuation inhaler Take 2 Puffs by inhalation every six (6) hours. topiramate (TOPAMAX) 200 mg tablet Take 100 mg by mouth daily. diazepam (VALIUM) 5 mg tablet Take 0.5 Tabs by mouth every eight (8) hours as needed for Anxiety. Max Daily Amount: 7.5 mg.  Qty: 20 Tab, Refills: 0      silver sulfADIAZINE (SILVADENE) 1 % topical cream Apply  to affected area two (2) times a day. Qty: 50 g, Refills: 0      albuterol (PROVENTIL VENTOLIN) 2.5 mg /3 mL (0.083 %) nebulizer solution 1.5 mL by Nebulization route every four (4) hours as needed for Wheezing or Shortness of Breath. Qty: 24 Each, Refills: 0      Nebulizer & Compressor machine 1 Each by Does Not Apply route every six (6) hours as needed. Qty: 1 Each, Refills: 0      buPROPion XL (WELLBUTRIN XL) 300 mg XL tablet Take 300 mg by mouth every morning. !! - Potential duplicate medications found.  Please discuss with provider. STOP taking these medications       ibuprofen (MOTRIN) 600 mg tablet Comments:   Reason for Stopping:         gabapentin (NEURONTIN) 600 mg tablet Comments:   Reason for Stopping:               Activity: Activity as tolerated    Diet: Full liquids, advance as tolerated to soft, low fat diet    Wound Care: None needed    Follow-up: Follow up with PCP within 1 week.   Follow up with GI, Dr. Sarai Matthews within 2-3 weeks- may need repeat imaging of pancreas in 2-3 months    Discharge time: > 35 mins  Duane Dolphin, NP  3/15/2018, 10:40 AM

## 2018-03-15 NOTE — PROGRESS NOTES
PROGRESS NOTE   PATIENT:  Chema Levine           MRN: [id number]           [facility]           3/14/2018, 8:31 PM          SUBJECTIVE:  Abdominal pain continues to improve. No vomiting. Tolerating po. OBJECTIVE:  Patient Vitals for the past 24 hrs:   Temp Pulse Resp BP SpO2   03/14/18 1948 98.1 °F (36.7 °C) 98 16 131/87 97 %   03/14/18 1735 98 °F (36.7 °C) 100 16 135/86 100 %   03/14/18 1200 97.7 °F (36.5 °C) 98 20 150/90 100 %   03/14/18 0946 97.5 °F (36.4 °C) (!) 105 18 134/85 100 %   03/14/18 0501 97 °F (36.1 °C) 96 19 126/83 98 %   03/14/18 0022 98 °F (36.7 °C) 89 18 133/84 97 %   03/13/18 2044 98 °F (36.7 °C) 78 18 136/88 99 %         Intake/Output Summary (Last 24 hours) at 03/14/18 2031  Last data filed at 03/14/18 1900   Gross per 24 hour   Intake             2890 ml   Output             2451 ml   Net              439 ml       Gen: NAD  Lungs: Clear B/L   CVS exam: Regular rate and rhythm   Abd  : Soft, mild epigastric tenderness, BS +, No masses felt. Labs: Results:   Chemistry Recent Labs      03/14/18   0345  03/13/18   0400   GLU  111*  114*   NA  144  141   K  3.6  3.0*   CL  116*  108   CO2  23  25   BUN  5*  10   CREA  0.45*  0.46*   CA  8.0*  8.3*   AGAP  5  8   BUCR  11*  22*   AP  86  85   TP  5.2*  4.6*   ALB  2.5*  1.9*   GLOB  2.7  2.7   AGRAT  0.9  0.7*    Estimated Creatinine Clearance: 114.3 mL/min (based on Cr of 0.45). CBC w/Diff Recent Labs      03/14/18   0345  03/13/18   0400   WBC  4.5*  4.5*   RBC  2.25*  2.23*   HGB  7.2*  7.1*   HCT  22.2*  22.1*   PLT  188  127*   GRANS  40  50   LYMPH  40  31   EOS  3  5      Cardiac Enzymes No results for input(s): CPK, CKND1, GUSTAVO in the last 72 hours. No lab exists for component: CKRMB, TROIP   Coagulation No results for input(s): PTP, INR, APTT in the last 72 hours.     No lab exists for component: INREXT    Hepatitis Panel Lab Results   Component Value Date/Time    Hepatitis A, IgM NEGATIVE  01/28/2016 09:35 AM    Hepatitis B surface Ag 0.1 01/28/2016 09:35 AM    Hepatitis B core, IgM NEGATIVE  01/28/2016 09:35 AM    Hepatitis C virus Ab <0.02 01/28/2016 09:35 AM      Amylase Lipase    Liver Enzymes Recent Labs      03/14/18   0345  03/13/18   0400   TP  5.2*  4.6*   ALB  2.5*  1.9*   TBILI  0.3  0.4   AP  86  85   SGOT  38*  70*   ALT  60*  70*      Thyroid Studies No results for input(s): T4, T3U, TSH, TSHEXT in the last 72 hours.     No lab exists for component: T3RU     Pathology pathology         Allergies   Allergen Reactions    Lithium Anaphylaxis    Elavil Other (comments)     Left leg went numb    Morphine Hives       Current Facility-Administered Medications   Medication Dose Route Frequency    HYDROmorphone (DILAUDID) injection 0.5 mg  0.5 mg IntraVENous Q4H PRN    pantoprazole (PROTONIX) granules for oral suspension 40 mg  40 mg Oral ACB&D    topiramate (TOPAMAX) tablet 200 mg  200 mg Oral BID    busPIRone (BUSPAR) tablet 15 mg  15 mg Oral BID    QUEtiapine (SEROquel) tablet 600 mg  600 mg Oral QHS    sucralfate (CARAFATE) 100 mg/mL oral suspension 1 g  1 g Oral AC&HS    dextrose 5% and 0.9% NaCl infusion  100 mL/hr IntraVENous CONTINUOUS    albuterol-ipratropium (DUO-NEB) 2.5 MG-0.5 MG/3 ML  3 mL Nebulization Q4H PRN    piperacillin-tazobactam (ZOSYN) 3.375 g in  mL ##EXTENDED 4 HRS INFUSION  3.375 g IntraVENous Q8H    sodium chloride (NS) flush 5-10 mL  5-10 mL IntraVENous PRN    LORazepam (ATIVAN) tablet 1 mg  1 mg Oral Q1H PRN    Or    LORazepam (ATIVAN) injection 1 mg  1 mg IntraVENous Q1H PRN    LORazepam (ATIVAN) tablet 2 mg  2 mg Oral Q1H PRN    Or    LORazepam (ATIVAN) injection 2 mg  2 mg IntraVENous Q1H PRN    LORazepam (ATIVAN) injection 3 mg  3 mg IntraVENous Q15MIN PRN    albuterol (PROVENTIL VENTOLIN) nebulizer solution 2.5 mg  2.5 mg Nebulization Q4H PRN    cloNIDine HCl (CATAPRES) tablet 0.1 mg  0.1 mg Oral BID    diazePAM (VALIUM) tablet 5 mg  5 mg Oral Q8H    lipase-protease-amylase (ZENPEP 10,000) capsule 1 Cap  1 Cap Oral TID WITH MEALS    sodium chloride (NS) flush 5-10 mL  5-10 mL IntraVENous PRN    LORazepam (ATIVAN) tablet 1 mg  1 mg Oral Q1H PRN    Or    LORazepam (ATIVAN) injection 1 mg  1 mg IntraVENous Q1H PRN    LORazepam (ATIVAN) tablet 2 mg  2 mg Oral Q1H PRN    Or    LORazepam (ATIVAN) injection 2 mg  2 mg IntraVENous Q1H PRN    LORazepam (ATIVAN) injection 3 mg  3 mg IntraVENous R83GFH PRN    folic acid (FOLVITE) tablet 1 mg  1 mg Oral DAILY    Thiamine Mononitrate (B-1) tablet 100 mg  100 mg Oral DAILY    therapeutic multivitamin (THERAGRAN) tablet 1 Tab  1 Tab Oral DAILY    ondansetron (ZOFRAN) injection 4 mg  4 mg IntraVENous Q6H PRN    influenza vaccine 2017-18 (3 yrs+)(PF) (FLUZONE QUAD/FLUARIX QUAD) injection 0.5 mL  0.5 mL IntraMUSCular PRIOR TO DISCHARGE       ASSESSMENT:  Acute ETOH pancreatitis, improving. CT only showed edematous changes in the tail of the pancreas. Alcoholic liver disease. Advance diet as toleratied to soft, low fat diet. Fu with Dr Mendel Hoff in 2-3 weeks. Stress importance of alcohol abstinence.    Will sign off for now.         Jaguar Anne MD

## 2018-03-15 NOTE — PROGRESS NOTES
Discharge jerry uploaded in [place] to [address] [facility] and also faxed to them at #[phone]. Antonio Walden.  [facility]  [phone], beeper [phone]

## 2018-03-15 NOTE — PROGRESS NOTES
Care Management Interventions  PCP Verified by CM: Yes  Palliative Care Criteria Met (RRAT>21 & CHF Dx)?: No  Transition of Care Consult (CM Consult): Discharge Planning  Discharge Durable Medical Equipment: No  Physical Therapy Consult: No  Occupational Therapy Consult: No  Speech Therapy Consult: No  Current Support Network: Lives with Spouse  Confirm Follow Up Transport: Family  Discharge Location  Discharge Placement: Skilled nursing facility [address] [place] [address].

## 2018-03-15 NOTE — PROGRESS NOTES
Transportation at Discharge:  3/15/2018    Transport Company: Reginaldo Castillo)  Reference number:    Estimated pick-up time: 1:00p    Method of Transport: [address] [facility] [address]: Medicare/  Authorization: No auth req for either insurance    Made D/C transport arrangements at the request of Maegan [address] [place] [address] Specialist ext [phone]

## 2018-03-15 NOTE — PROGRESS NOTES
Problem: Mobility Impaired (Adult and Pediatric)  Goal: *Acute Goals and Plan of Care (Insert Text)  Physical Therapy Goals  Initiated 3/10/2018 and to be accomplished within 7 day(s)  1. Patient will move from supine to sit and sit to supine , scoot up and down and roll side to side in bed with independence. 2.  Patient will transfer from bed to chair and chair to bed with independence using the least restrictive device. 3.  Patient will perform sit to stand with independence. 4.  Patient will ambulate with independence for >/= 150 feet with the least restrictive device. Outcome: Progressing Towards Goal  physical Therapy TREATMENT    Patient: Brianna Banks (25 y.o. female)  Date: 3/15/2018  Diagnosis: Syncope  epigastric pain and coffee-ground emesis Syncope  Procedure(s) (LRB):  ESOPHAGOGASTRODUODENOSCOPY (EGD) (N/A) 6 Days Post-Op  Precautions: Fall  Chart, physical therapy assessment, plan of care and goals were reviewed. PLOF: Independent    ASSESSMENT:  Supervision for supine to sit. Seated EOB with supervision for balance. Performed seated BLE exercise with encouragement from PT and visitor. Min A for sit to stand. Amb 20ft x2 with min A and hand held assistance to/from bathroom. Completed self care post toileting with supervision for balance. Completed lower body dressing with mod A. Decreased safety awareness; requesting to sit half-way from bathroom to bed with no chair available. Educated on safety and need for chair prior to sitting; verbalized understanding. Educated on breathing techniques and cues for gait to return to bed prior to sitting. Shortened step length bilaterally and decreased gait speed. Returned to supine in bed with supervision. Verbalized use of call norman. RN Yasemin present at end of session.    EDUCATION: bed mobility, transfers, amb, balance, ADLs, breathing, exercise  Progression toward goals:              Improving slowly and progressing toward goals     PLAN:  Patient continues to benefit from skilled intervention to address the above impairments. Continue treatment per established plan of care. Discharge Recommendations:  Rehab  Further Equipment Recommendations for Discharge:  rolling walker     SUBJECTIVE:   Patient stated I need to go to the bathroom.     OBJECTIVE DATA SUMMARY:   Critical Behavior:  Neurologic State: Alert  Orientation Level: Oriented to person, Oriented to time, Oriented to place  Cognition: Follows commands  Safety/Judgement: Decreased awareness of need for safety    [employer] Balance Scale 1+/5  0: Pt performs 25% or less of standing activity (Max assist) CN, 100% impaired. 1: Pt supports self with upper extremities but requires therapist assistance. Pt performs 25-50% of effort (Mod assist) CM, 80% to <100% impaired. 1+: Pt supports self with upper extremities but requires therapist assistance. Pt performs >50% effort. (Min assist). CL, 60% to <80% impaired. 2: Pt supports self independently with both upper extremities (walker, crutches, parallel bars). CL, 60% to <80% impaired. 2+: Pt support self independently with 1 upper extremity (cane, crutch, 1 parallel bar). CK, 40% to <60% impaired. 3: Pt stands without upper extremity support for up to 30 seconds. CK, 40% to <60% impaired. 3+: Pt stands without upper extremity support for 30 seconds or greater. CJ, 20% to <40% impaired. 4: Pt independently moves and returns center of gravity 1-2 inches in one plane. CJ, 20% to <40% impaired. 4+: Pt independently moves and returns center of gravity 1-2 inches in multiple planes. CI, 1% to <20% impaired. 5: Pt independently moves and returns center of gravity in all planes greater than 2 inches. CH, 0% impaired. G CODE:Mobility [id number] Current  CL= 60-79%   Goal  CI= 1-19%.   The severity rating is based on the [employer] Balance Scale 1+/5  Functional Mobility Training:  Bed Mobility:  Supine to Sit: Supervision  Sit to Supine: Supervision  Transfers:  Sit to Stand: Minimum assistance  Stand to Sit: Minimum assistance  Balance:  Sitting: Impaired  Sitting - Static: Good (unsupported)  Sitting - Dynamic: Good (unsupported)  Standing: Impaired  Standing - Static: Occassional  Standing - Dynamic : Poor  Ambulation/Gait Training:  Distance (ft):  (20 ftx2)  Assistive Device:  (handheld)  Ambulation - Level of Assistance: Minimal assistance  Gait Description (WDL): Exceptions to WDL  Neuro Re-Education:  Sitting EOB 3 minutes  Therapeutic Exercises:   Sit to stand x2  Lower body dressing  BLE knee extension, ankle pumps, hip flexion (sitting) x10 each  Vital Signs  Temp: 97.7 °F (36.5 °C)     Pulse (Heart Rate): (!) 106     BP: 131/86     Resp Rate: 18     O2 Sat (%): 97 %  Pain:  Pre treatment pain level: 9  Post treatment pain level: 9  Activity Tolerance:   Fair     After treatment:   Patient left in no apparent distress in bed  Call bell left within reach  Nursing notified    Ivan Carranza PT   Time Calculation: 16 mins

## 2018-03-17 NOTE — ROUTINE PROCESS
0730-Report received, resting quietly in bed, no distress noted. 0930-am due medications given. Medicated for pain per patient request.  1300-discharged to SNF via ambulance.

## 2018-04-24 PROBLEM — N39.0 RECURRENT UTI (URINARY TRACT INFECTION): Status: ACTIVE | Noted: 2018-04-24

## 2018-04-24 PROBLEM — N20.0 STAGHORN RENAL CALCULUS: Status: ACTIVE | Noted: 2018-04-24

## 2018-07-31 ENCOUNTER — HOSPITAL ENCOUNTER (EMERGENCY)
Age: 53
Discharge: HOME | DRG: 439 | End: 2018-07-31
Attending: EMERGENCY MEDICINE
Payer: MEDICARE

## 2018-07-31 ENCOUNTER — HOSPITAL ENCOUNTER (INPATIENT)
Age: 53
LOS: 3 days | Discharge: HOME | DRG: 439 | End: 2018-08-03
Attending: EMERGENCY MEDICINE | Admitting: HOSPITALIST
Payer: MEDICARE

## 2018-07-31 ENCOUNTER — APPOINTMENT (OUTPATIENT)
Dept: ULTRASOUND IMAGING | Age: 53
DRG: 439 | End: 2018-07-31
Attending: EMERGENCY MEDICINE
Payer: MEDICARE

## 2018-07-31 ENCOUNTER — APPOINTMENT (OUTPATIENT)
Dept: CT IMAGING | Age: 53
DRG: 439 | End: 2018-07-31
Attending: EMERGENCY MEDICINE
Payer: MEDICARE

## 2018-07-31 VITALS
HEIGHT: 62 IN | SYSTOLIC BLOOD PRESSURE: 157 MMHG | OXYGEN SATURATION: 98 % | BODY MASS INDEX: 22.08 KG/M2 | HEART RATE: 111 BPM | WEIGHT: 120 LBS | DIASTOLIC BLOOD PRESSURE: 91 MMHG | TEMPERATURE: 98.5 F | RESPIRATION RATE: 11 BRPM

## 2018-07-31 DIAGNOSIS — R10.13 ABDOMINAL PAIN, ACUTE, EPIGASTRIC: Primary | ICD-10-CM

## 2018-07-31 DIAGNOSIS — F10.10 ALCOHOL ABUSE: ICD-10-CM

## 2018-07-31 DIAGNOSIS — E16.2 HYPOGLYCEMIA: ICD-10-CM

## 2018-07-31 DIAGNOSIS — R11.2 NON-INTRACTABLE VOMITING WITH NAUSEA, UNSPECIFIED VOMITING TYPE: ICD-10-CM

## 2018-07-31 DIAGNOSIS — K85.20 ALCOHOL-INDUCED ACUTE PANCREATITIS, UNSPECIFIED COMPLICATION STATUS: Primary | ICD-10-CM

## 2018-07-31 DIAGNOSIS — R07.9 ACUTE CHEST PAIN: ICD-10-CM

## 2018-07-31 DIAGNOSIS — E86.0 DEHYDRATION: ICD-10-CM

## 2018-07-31 LAB
ALBUMIN SERPL-MCNC: 3.6 G/DL (ref 3.4–5)
ALBUMIN SERPL-MCNC: 3.9 G/DL (ref 3.4–5)
ALBUMIN/GLOB SERPL: 0.9 {RATIO} (ref 0.8–1.7)
ALBUMIN/GLOB SERPL: 0.9 {RATIO} (ref 0.8–1.7)
ALP SERPL-CCNC: 117 U/L (ref 45–117)
ALP SERPL-CCNC: 132 U/L (ref 45–117)
ALT SERPL-CCNC: 56 U/L (ref 13–56)
ALT SERPL-CCNC: 60 U/L (ref 13–56)
AMMONIA PLAS-SCNC: 31 UMOL/L (ref 11–32)
AMPHET UR QL SCN: NEGATIVE
ANION GAP SERPL CALC-SCNC: 26 MMOL/L (ref 3–18)
ANION GAP SERPL CALC-SCNC: 9 MMOL/L (ref 3–18)
APPEARANCE UR: CLEAR
AST SERPL-CCNC: 127 U/L (ref 15–37)
AST SERPL-CCNC: 173 U/L (ref 15–37)
BARBITURATES UR QL SCN: NEGATIVE
BASOPHILS # BLD: 0 K/UL (ref 0–0.1)
BASOPHILS # BLD: 0 K/UL (ref 0–0.1)
BASOPHILS NFR BLD: 0 % (ref 0–2)
BASOPHILS NFR BLD: 0 % (ref 0–2)
BENZODIAZ UR QL: NEGATIVE
BILIRUB DIRECT SERPL-MCNC: 0.3 MG/DL (ref 0–0.2)
BILIRUB SERPL-MCNC: 0.5 MG/DL (ref 0.2–1)
BILIRUB SERPL-MCNC: 1.2 MG/DL (ref 0.2–1)
BILIRUB UR QL: NEGATIVE
BUN SERPL-MCNC: 18 MG/DL (ref 7–18)
BUN SERPL-MCNC: 21 MG/DL (ref 7–18)
BUN/CREAT SERPL: 24 (ref 12–20)
BUN/CREAT SERPL: 26 (ref 12–20)
CALCIUM SERPL-MCNC: 12.4 MG/DL (ref 8.5–10.1)
CALCIUM SERPL-MCNC: 9.9 MG/DL (ref 8.5–10.1)
CANNABINOIDS UR QL SCN: NEGATIVE
CHLORIDE SERPL-SCNC: 90 MMOL/L (ref 100–108)
CHLORIDE SERPL-SCNC: 98 MMOL/L (ref 100–108)
CK MB CFR SERPL CALC: 4.5 % (ref 0–4)
CK MB CFR SERPL CALC: 6.5 % (ref 0–4)
CK MB SERPL-MCNC: 10.1 NG/ML (ref 5–25)
CK MB SERPL-MCNC: 7 NG/ML (ref 5–25)
CK SERPL-CCNC: 108 U/L (ref 26–192)
CK SERPL-CCNC: 222 U/L (ref 26–192)
CO2 SERPL-SCNC: 17 MMOL/L (ref 21–32)
CO2 SERPL-SCNC: 29 MMOL/L (ref 21–32)
COCAINE UR QL SCN: NEGATIVE
COLOR UR: YELLOW
CREAT SERPL-MCNC: 0.76 MG/DL (ref 0.6–1.3)
CREAT SERPL-MCNC: 0.8 MG/DL (ref 0.6–1.3)
D DIMER PPP FEU-MCNC: 6.83 UG/ML(FEU)
DIFFERENTIAL METHOD BLD: ABNORMAL
DIFFERENTIAL METHOD BLD: ABNORMAL
EOSINOPHIL # BLD: 0 K/UL (ref 0–0.4)
EOSINOPHIL # BLD: 0 K/UL (ref 0–0.4)
EOSINOPHIL NFR BLD: 0 % (ref 0–5)
EOSINOPHIL NFR BLD: 0 % (ref 0–5)
ERYTHROCYTE [DISTWIDTH] IN BLOOD BY AUTOMATED COUNT: 18.7 % (ref 11.6–14.5)
ERYTHROCYTE [DISTWIDTH] IN BLOOD BY AUTOMATED COUNT: 19.3 % (ref 11.6–14.5)
ETHANOL SERPL-MCNC: 34 MG/DL (ref 0–3)
ETHANOL SERPL-MCNC: <3 MG/DL (ref 0–3)
GLOBULIN SER CALC-MCNC: 3.8 G/DL (ref 2–4)
GLOBULIN SER CALC-MCNC: 4.3 G/DL (ref 2–4)
GLUCOSE BLD STRIP.AUTO-MCNC: 377 MG/DL (ref 70–110)
GLUCOSE SERPL-MCNC: 112 MG/DL (ref 74–99)
GLUCOSE SERPL-MCNC: 55 MG/DL (ref 74–99)
GLUCOSE UR STRIP.AUTO-MCNC: >1000 MG/DL
HCT VFR BLD AUTO: 39.9 % (ref 35–45)
HCT VFR BLD AUTO: 46.1 % (ref 35–45)
HDSCOM,HDSCOM: NORMAL
HGB BLD-MCNC: 13.2 G/DL (ref 12–16)
HGB BLD-MCNC: 14.7 G/DL (ref 12–16)
HGB UR QL STRIP: NEGATIVE
INR PPP: 1 (ref 0.8–1.2)
KETONES UR QL STRIP.AUTO: 40 MG/DL
LEUKOCYTE ESTERASE UR QL STRIP.AUTO: NEGATIVE
LIPASE SERPL-CCNC: 76 U/L (ref 73–393)
LIPASE SERPL-CCNC: 952 U/L (ref 73–393)
LYMPHOCYTES # BLD: 1.8 K/UL (ref 0.9–3.6)
LYMPHOCYTES # BLD: 2 K/UL (ref 0.9–3.6)
LYMPHOCYTES NFR BLD: 15 % (ref 21–52)
LYMPHOCYTES NFR BLD: 20 % (ref 21–52)
MAGNESIUM SERPL-MCNC: 2.1 MG/DL (ref 1.6–2.6)
MAGNESIUM SERPL-MCNC: 2.2 MG/DL (ref 1.6–2.6)
MCH RBC QN AUTO: 29.9 PG (ref 24–34)
MCH RBC QN AUTO: 30.2 PG (ref 24–34)
MCHC RBC AUTO-ENTMCNC: 31.9 G/DL (ref 31–37)
MCHC RBC AUTO-ENTMCNC: 33.1 G/DL (ref 31–37)
MCV RBC AUTO: 91.3 FL (ref 74–97)
MCV RBC AUTO: 93.7 FL (ref 74–97)
METHADONE UR QL: NEGATIVE
MONOCYTES # BLD: 0.5 K/UL (ref 0.05–1.2)
MONOCYTES # BLD: 0.7 K/UL (ref 0.05–1.2)
MONOCYTES NFR BLD: 4 % (ref 3–10)
MONOCYTES NFR BLD: 7 % (ref 3–10)
NEUTS SEG # BLD: 10.3 K/UL (ref 1.8–8)
NEUTS SEG # BLD: 7.4 K/UL (ref 1.8–8)
NEUTS SEG NFR BLD: 73 % (ref 40–73)
NEUTS SEG NFR BLD: 81 % (ref 40–73)
NITRITE UR QL STRIP.AUTO: NEGATIVE
OPIATES UR QL: NEGATIVE
PCP UR QL: NEGATIVE
PH UR STRIP: 5 [PH] (ref 5–8)
PLATELET # BLD AUTO: 266 K/UL (ref 135–420)
PLATELET # BLD AUTO: 344 K/UL (ref 135–420)
PMV BLD AUTO: 9.5 FL (ref 9.2–11.8)
PMV BLD AUTO: 9.7 FL (ref 9.2–11.8)
POTASSIUM SERPL-SCNC: 3.7 MMOL/L (ref 3.5–5.5)
POTASSIUM SERPL-SCNC: 4 MMOL/L (ref 3.5–5.5)
PROT SERPL-MCNC: 7.4 G/DL (ref 6.4–8.2)
PROT SERPL-MCNC: 8.2 G/DL (ref 6.4–8.2)
PROT UR STRIP-MCNC: NEGATIVE MG/DL
PROTHROMBIN TIME: 12.6 SEC (ref 11.5–15.2)
RBC # BLD AUTO: 4.37 M/UL (ref 4.2–5.3)
RBC # BLD AUTO: 4.92 M/UL (ref 4.2–5.3)
SODIUM SERPL-SCNC: 133 MMOL/L (ref 136–145)
SODIUM SERPL-SCNC: 136 MMOL/L (ref 136–145)
SP GR UR REFRACTOMETRY: 1.02 (ref 1–1.03)
TROPONIN I SERPL-MCNC: <0.02 NG/ML (ref 0–0.04)
TROPONIN I SERPL-MCNC: <0.02 NG/ML (ref 0–0.04)
UROBILINOGEN UR QL STRIP.AUTO: 0.2 EU/DL (ref 0.2–1)
VIT B12 SERPL-MCNC: 669 PG/ML (ref 211–911)
WBC # BLD AUTO: 10.1 K/UL (ref 4.6–13.2)
WBC # BLD AUTO: 12.6 K/UL (ref 4.6–13.2)

## 2018-07-31 PROCEDURE — 87186 SC STD MICRODIL/AGAR DIL: CPT | Performed by: EMERGENCY MEDICINE

## 2018-07-31 PROCEDURE — 93005 ELECTROCARDIOGRAM TRACING: CPT

## 2018-07-31 PROCEDURE — 65270000029 HC RM PRIVATE

## 2018-07-31 PROCEDURE — 96366 THER/PROPH/DIAG IV INF ADDON: CPT

## 2018-07-31 PROCEDURE — 74011250636 HC RX REV CODE- 250/636: Performed by: EMERGENCY MEDICINE

## 2018-07-31 PROCEDURE — 80307 DRUG TEST PRSMV CHEM ANLYZR: CPT | Performed by: EMERGENCY MEDICINE

## 2018-07-31 PROCEDURE — 96375 TX/PRO/DX INJ NEW DRUG ADDON: CPT

## 2018-07-31 PROCEDURE — 82140 ASSAY OF AMMONIA: CPT | Performed by: EMERGENCY MEDICINE

## 2018-07-31 PROCEDURE — C9113 INJ PANTOPRAZOLE SODIUM, VIA: HCPCS | Performed by: EMERGENCY MEDICINE

## 2018-07-31 PROCEDURE — 96376 TX/PRO/DX INJ SAME DRUG ADON: CPT

## 2018-07-31 PROCEDURE — 71275 CT ANGIOGRAPHY CHEST: CPT

## 2018-07-31 PROCEDURE — 80053 COMPREHEN METABOLIC PANEL: CPT | Performed by: EMERGENCY MEDICINE

## 2018-07-31 PROCEDURE — 83690 ASSAY OF LIPASE: CPT | Performed by: EMERGENCY MEDICINE

## 2018-07-31 PROCEDURE — 74011000258 HC RX REV CODE- 258: Performed by: EMERGENCY MEDICINE

## 2018-07-31 PROCEDURE — 81003 URINALYSIS AUTO W/O SCOPE: CPT | Performed by: EMERGENCY MEDICINE

## 2018-07-31 PROCEDURE — 74011636320 HC RX REV CODE- 636/320: Performed by: EMERGENCY MEDICINE

## 2018-07-31 PROCEDURE — 96365 THER/PROPH/DIAG IV INF INIT: CPT

## 2018-07-31 PROCEDURE — 99285 EMERGENCY DEPT VISIT HI MDM: CPT

## 2018-07-31 PROCEDURE — 87077 CULTURE AEROBIC IDENTIFY: CPT | Performed by: EMERGENCY MEDICINE

## 2018-07-31 PROCEDURE — 74011000250 HC RX REV CODE- 250: Performed by: EMERGENCY MEDICINE

## 2018-07-31 PROCEDURE — 82607 VITAMIN B-12: CPT | Performed by: HOSPITALIST

## 2018-07-31 PROCEDURE — 74011250636 HC RX REV CODE- 250/636: Performed by: HOSPITALIST

## 2018-07-31 PROCEDURE — 83735 ASSAY OF MAGNESIUM: CPT | Performed by: EMERGENCY MEDICINE

## 2018-07-31 PROCEDURE — 74011000258 HC RX REV CODE- 258: Performed by: HOSPITALIST

## 2018-07-31 PROCEDURE — 83735 ASSAY OF MAGNESIUM: CPT | Performed by: HOSPITALIST

## 2018-07-31 PROCEDURE — 74011250637 HC RX REV CODE- 250/637: Performed by: EMERGENCY MEDICINE

## 2018-07-31 PROCEDURE — 82962 GLUCOSE BLOOD TEST: CPT

## 2018-07-31 PROCEDURE — 85025 COMPLETE CBC W/AUTO DIFF WBC: CPT | Performed by: EMERGENCY MEDICINE

## 2018-07-31 PROCEDURE — 76705 ECHO EXAM OF ABDOMEN: CPT

## 2018-07-31 PROCEDURE — 80076 HEPATIC FUNCTION PANEL: CPT | Performed by: EMERGENCY MEDICINE

## 2018-07-31 PROCEDURE — 74011250637 HC RX REV CODE- 250/637: Performed by: HOSPITALIST

## 2018-07-31 PROCEDURE — 96368 THER/DIAG CONCURRENT INF: CPT

## 2018-07-31 PROCEDURE — 82550 ASSAY OF CK (CPK): CPT | Performed by: EMERGENCY MEDICINE

## 2018-07-31 PROCEDURE — 85379 FIBRIN DEGRADATION QUANT: CPT | Performed by: EMERGENCY MEDICINE

## 2018-07-31 PROCEDURE — 87086 URINE CULTURE/COLONY COUNT: CPT | Performed by: EMERGENCY MEDICINE

## 2018-07-31 PROCEDURE — 96374 THER/PROPH/DIAG INJ IV PUSH: CPT

## 2018-07-31 PROCEDURE — 94761 N-INVAS EAR/PLS OXIMETRY MLT: CPT

## 2018-07-31 PROCEDURE — 85610 PROTHROMBIN TIME: CPT | Performed by: EMERGENCY MEDICINE

## 2018-07-31 RX ORDER — OXYCODONE HYDROCHLORIDE 5 MG/1
5 TABLET ORAL
Status: DISCONTINUED | OUTPATIENT
Start: 2018-07-31 | End: 2018-07-31

## 2018-07-31 RX ORDER — GABAPENTIN 300 MG/1
300 CAPSULE ORAL 3 TIMES DAILY
Qty: 30 CAP | Refills: 0 | Status: ON HOLD | OUTPATIENT
Start: 2018-07-31 | End: 2018-08-10

## 2018-07-31 RX ORDER — ASPIRIN 325 MG/1
100 TABLET, FILM COATED ORAL DAILY
Status: DISCONTINUED | OUTPATIENT
Start: 2018-08-01 | End: 2018-08-03 | Stop reason: HOSPADM

## 2018-07-31 RX ORDER — BUSPIRONE HYDROCHLORIDE 5 MG/1
15 TABLET ORAL 2 TIMES DAILY
Status: DISCONTINUED | OUTPATIENT
Start: 2018-07-31 | End: 2018-08-03 | Stop reason: HOSPADM

## 2018-07-31 RX ORDER — THERA TABS 400 MCG
1 TAB ORAL DAILY
Status: DISCONTINUED | OUTPATIENT
Start: 2018-08-01 | End: 2018-08-03 | Stop reason: HOSPADM

## 2018-07-31 RX ORDER — LORAZEPAM 2 MG/ML
2 INJECTION INTRAMUSCULAR
Status: DISCONTINUED | OUTPATIENT
Start: 2018-07-31 | End: 2018-08-03 | Stop reason: HOSPADM

## 2018-07-31 RX ORDER — DIAZEPAM 10 MG/2ML
5 INJECTION INTRAMUSCULAR
Status: COMPLETED | OUTPATIENT
Start: 2018-07-31 | End: 2018-07-31

## 2018-07-31 RX ORDER — LORATADINE 10 MG/1
10 TABLET ORAL DAILY
Status: DISCONTINUED | OUTPATIENT
Start: 2018-08-01 | End: 2018-08-03 | Stop reason: HOSPADM

## 2018-07-31 RX ORDER — ALBUTEROL SULFATE 0.83 MG/ML
1.25 SOLUTION RESPIRATORY (INHALATION)
Status: DISCONTINUED | OUTPATIENT
Start: 2018-07-31 | End: 2018-07-31 | Stop reason: SDUPTHER

## 2018-07-31 RX ORDER — LORAZEPAM 2 MG/ML
1 INJECTION INTRAMUSCULAR
Status: COMPLETED | OUTPATIENT
Start: 2018-07-31 | End: 2018-07-31

## 2018-07-31 RX ORDER — PANTOPRAZOLE SODIUM 40 MG/1
40 TABLET, DELAYED RELEASE ORAL
Status: DISCONTINUED | OUTPATIENT
Start: 2018-08-01 | End: 2018-08-03 | Stop reason: HOSPADM

## 2018-07-31 RX ORDER — DEXTROSE 50 % IN WATER (D50W) INTRAVENOUS SYRINGE
25
Status: COMPLETED | OUTPATIENT
Start: 2018-07-31 | End: 2018-07-31

## 2018-07-31 RX ORDER — LORAZEPAM 1 MG/1
1 TABLET ORAL
Status: DISCONTINUED | OUTPATIENT
Start: 2018-07-31 | End: 2018-08-03 | Stop reason: HOSPADM

## 2018-07-31 RX ORDER — GABAPENTIN 300 MG/1
300 CAPSULE ORAL 3 TIMES DAILY
Status: DISCONTINUED | OUTPATIENT
Start: 2018-07-31 | End: 2018-08-03 | Stop reason: HOSPADM

## 2018-07-31 RX ORDER — SUCRALFATE 1 G/10ML
1 SUSPENSION ORAL
Status: DISCONTINUED | OUTPATIENT
Start: 2018-07-31 | End: 2018-08-03 | Stop reason: HOSPADM

## 2018-07-31 RX ORDER — ONDANSETRON 2 MG/ML
4 INJECTION INTRAMUSCULAR; INTRAVENOUS
Status: DISCONTINUED | OUTPATIENT
Start: 2018-07-31 | End: 2018-08-03 | Stop reason: HOSPADM

## 2018-07-31 RX ORDER — LORAZEPAM 2 MG/ML
1 INJECTION INTRAMUSCULAR
Status: DISCONTINUED | OUTPATIENT
Start: 2018-07-31 | End: 2018-08-03 | Stop reason: HOSPADM

## 2018-07-31 RX ORDER — IBUPROFEN 200 MG
1 TABLET ORAL DAILY
Status: DISCONTINUED | OUTPATIENT
Start: 2018-08-01 | End: 2018-08-03 | Stop reason: HOSPADM

## 2018-07-31 RX ORDER — LORAZEPAM 1 MG/1
2 TABLET ORAL
Status: DISCONTINUED | OUTPATIENT
Start: 2018-07-31 | End: 2018-08-03 | Stop reason: HOSPADM

## 2018-07-31 RX ORDER — PANTOPRAZOLE SODIUM 40 MG/1
40 TABLET, DELAYED RELEASE ORAL
Qty: 60 TAB | Refills: 0 | Status: SHIPPED | OUTPATIENT
Start: 2018-07-31 | End: 2019-05-07

## 2018-07-31 RX ORDER — PANTOPRAZOLE SODIUM 40 MG/10ML
40 INJECTION, POWDER, LYOPHILIZED, FOR SOLUTION INTRAVENOUS
Status: COMPLETED | OUTPATIENT
Start: 2018-07-31 | End: 2018-07-31

## 2018-07-31 RX ORDER — TAMSULOSIN HYDROCHLORIDE 0.4 MG/1
0.4 CAPSULE ORAL
Status: DISCONTINUED | OUTPATIENT
Start: 2018-08-01 | End: 2018-07-31 | Stop reason: ALTCHOICE

## 2018-07-31 RX ORDER — SUCRALFATE 1 G/10ML
1 SUSPENSION ORAL
Qty: 1200 ML | Refills: 0 | Status: SHIPPED | OUTPATIENT
Start: 2018-07-31 | End: 2020-04-04

## 2018-07-31 RX ORDER — SODIUM CHLORIDE 0.9 % (FLUSH) 0.9 %
5-10 SYRINGE (ML) INJECTION EVERY 8 HOURS
Status: DISCONTINUED | OUTPATIENT
Start: 2018-07-31 | End: 2018-08-03 | Stop reason: HOSPADM

## 2018-07-31 RX ORDER — SODIUM CHLORIDE 9 MG/ML
100 INJECTION, SOLUTION INTRAVENOUS ONCE
Status: COMPLETED | OUTPATIENT
Start: 2018-07-31 | End: 2018-07-31

## 2018-07-31 RX ORDER — CLONIDINE HYDROCHLORIDE 0.1 MG/1
0.2 TABLET ORAL
Status: COMPLETED | OUTPATIENT
Start: 2018-07-31 | End: 2018-07-31

## 2018-07-31 RX ORDER — MAGNESIUM SULFATE HEPTAHYDRATE 40 MG/ML
2 INJECTION, SOLUTION INTRAVENOUS ONCE
Status: COMPLETED | OUTPATIENT
Start: 2018-07-31 | End: 2018-07-31

## 2018-07-31 RX ORDER — IPRATROPIUM BROMIDE AND ALBUTEROL SULFATE 2.5; .5 MG/3ML; MG/3ML
3 SOLUTION RESPIRATORY (INHALATION)
Status: DISCONTINUED | OUTPATIENT
Start: 2018-07-31 | End: 2018-08-03 | Stop reason: HOSPADM

## 2018-07-31 RX ORDER — METOCLOPRAMIDE HYDROCHLORIDE 5 MG/ML
10 INJECTION INTRAMUSCULAR; INTRAVENOUS
Status: COMPLETED | OUTPATIENT
Start: 2018-07-31 | End: 2018-07-31

## 2018-07-31 RX ORDER — ASPIRIN 325 MG/1
100 TABLET, FILM COATED ORAL DAILY
Qty: 30 TAB | Refills: 1 | Status: ON HOLD | OUTPATIENT
Start: 2018-07-31 | End: 2018-08-10

## 2018-07-31 RX ORDER — ONDANSETRON 2 MG/ML
4 INJECTION INTRAMUSCULAR; INTRAVENOUS
Status: DISCONTINUED | OUTPATIENT
Start: 2018-07-31 | End: 2018-07-31 | Stop reason: SDUPTHER

## 2018-07-31 RX ORDER — OXYBUTYNIN CHLORIDE 5 MG/1
5 TABLET ORAL 3 TIMES DAILY
Status: DISCONTINUED | OUTPATIENT
Start: 2018-07-31 | End: 2018-08-03 | Stop reason: HOSPADM

## 2018-07-31 RX ORDER — DIAZEPAM 5 MG/1
5 TABLET ORAL
Qty: 10 TAB | Refills: 0 | Status: ON HOLD | OUTPATIENT
Start: 2018-07-31 | End: 2019-03-12

## 2018-07-31 RX ORDER — FOLIC ACID 1 MG/1
1 TABLET ORAL DAILY
Qty: 30 TAB | Refills: 0 | Status: SHIPPED | OUTPATIENT
Start: 2018-07-31 | End: 2019-05-07

## 2018-07-31 RX ORDER — KETOROLAC TROMETHAMINE 15 MG/ML
15 INJECTION, SOLUTION INTRAMUSCULAR; INTRAVENOUS
Status: DISCONTINUED | OUTPATIENT
Start: 2018-07-31 | End: 2018-08-03 | Stop reason: HOSPADM

## 2018-07-31 RX ORDER — ACETAMINOPHEN 325 MG/1
650 TABLET ORAL
Status: DISCONTINUED | OUTPATIENT
Start: 2018-07-31 | End: 2018-08-03 | Stop reason: HOSPADM

## 2018-07-31 RX ORDER — LORAZEPAM 2 MG/ML
3 INJECTION INTRAMUSCULAR
Status: DISCONTINUED | OUTPATIENT
Start: 2018-07-31 | End: 2018-08-03 | Stop reason: HOSPADM

## 2018-07-31 RX ORDER — DEXTROSE MONOHYDRATE AND SODIUM CHLORIDE 5; .9 G/100ML; G/100ML
1000 INJECTION, SOLUTION INTRAVENOUS ONCE
Status: COMPLETED | OUTPATIENT
Start: 2018-07-31 | End: 2018-07-31

## 2018-07-31 RX ORDER — HYDROMORPHONE HYDROCHLORIDE 1 MG/ML
1 INJECTION, SOLUTION INTRAMUSCULAR; INTRAVENOUS; SUBCUTANEOUS ONCE
Status: COMPLETED | OUTPATIENT
Start: 2018-07-31 | End: 2018-07-31

## 2018-07-31 RX ORDER — SODIUM CHLORIDE 0.9 % (FLUSH) 0.9 %
5-10 SYRINGE (ML) INJECTION AS NEEDED
Status: DISCONTINUED | OUTPATIENT
Start: 2018-07-31 | End: 2018-08-03 | Stop reason: HOSPADM

## 2018-07-31 RX ORDER — DEXTROSE MONOHYDRATE AND SODIUM CHLORIDE 5; .45 G/100ML; G/100ML
125 INJECTION, SOLUTION INTRAVENOUS CONTINUOUS
Status: DISPENSED | OUTPATIENT
Start: 2018-07-31 | End: 2018-08-01

## 2018-07-31 RX ORDER — TRAMADOL HYDROCHLORIDE 50 MG/1
50 TABLET ORAL
Qty: 12 TAB | Refills: 0 | Status: SHIPPED | OUTPATIENT
Start: 2018-07-31 | End: 2018-08-03

## 2018-07-31 RX ORDER — HYDROMORPHONE HYDROCHLORIDE 1 MG/ML
0.5 INJECTION, SOLUTION INTRAMUSCULAR; INTRAVENOUS; SUBCUTANEOUS
Status: DISCONTINUED | OUTPATIENT
Start: 2018-07-31 | End: 2018-08-02

## 2018-07-31 RX ORDER — CLONIDINE HYDROCHLORIDE 0.2 MG/1
0.1 TABLET ORAL 2 TIMES DAILY
Qty: 30 TAB | Refills: 0 | Status: SHIPPED | OUTPATIENT
Start: 2018-07-31 | End: 2019-05-07

## 2018-07-31 RX ORDER — CLONIDINE HYDROCHLORIDE 0.1 MG/1
0.1 TABLET ORAL
Status: DISCONTINUED | OUTPATIENT
Start: 2018-07-31 | End: 2018-07-31

## 2018-07-31 RX ORDER — ONDANSETRON 8 MG/1
8 TABLET, ORALLY DISINTEGRATING ORAL
Qty: 15 TAB | Refills: 0 | Status: SHIPPED | OUTPATIENT
Start: 2018-07-31 | End: 2019-05-07

## 2018-07-31 RX ORDER — CLONIDINE HYDROCHLORIDE 0.1 MG/1
0.1 TABLET ORAL 2 TIMES DAILY
Status: DISCONTINUED | OUTPATIENT
Start: 2018-07-31 | End: 2018-08-03 | Stop reason: HOSPADM

## 2018-07-31 RX ORDER — ENOXAPARIN SODIUM 100 MG/ML
40 INJECTION SUBCUTANEOUS EVERY 24 HOURS
Status: DISCONTINUED | OUTPATIENT
Start: 2018-07-31 | End: 2018-08-03 | Stop reason: HOSPADM

## 2018-07-31 RX ORDER — NALBUPHINE HYDROCHLORIDE 10 MG/ML
5 INJECTION, SOLUTION INTRAMUSCULAR; INTRAVENOUS; SUBCUTANEOUS
Status: COMPLETED | OUTPATIENT
Start: 2018-07-31 | End: 2018-07-31

## 2018-07-31 RX ORDER — DIAZEPAM 5 MG/1
5 TABLET ORAL
Status: DISCONTINUED | OUTPATIENT
Start: 2018-07-31 | End: 2018-07-31 | Stop reason: SDUPTHER

## 2018-07-31 RX ORDER — FOLIC ACID 1 MG/1
1 TABLET ORAL DAILY
Status: DISCONTINUED | OUTPATIENT
Start: 2018-08-01 | End: 2018-08-03 | Stop reason: HOSPADM

## 2018-07-31 RX ORDER — DIPHENHYDRAMINE HYDROCHLORIDE 50 MG/ML
12.5 INJECTION, SOLUTION INTRAMUSCULAR; INTRAVENOUS
Status: COMPLETED | OUTPATIENT
Start: 2018-07-31 | End: 2018-07-31

## 2018-07-31 RX ADMIN — CLONIDINE HYDROCHLORIDE 0.2 MG: 0.1 TABLET ORAL at 05:23

## 2018-07-31 RX ADMIN — BUSPIRONE HYDROCHLORIDE 15 MG: 5 TABLET ORAL at 22:51

## 2018-07-31 RX ADMIN — DIPHENHYDRAMINE HYDROCHLORIDE 12.5 MG: 50 INJECTION, SOLUTION INTRAMUSCULAR; INTRAVENOUS at 02:31

## 2018-07-31 RX ADMIN — NALBUPHINE HYDROCHLORIDE 5 MG: 10 INJECTION, SOLUTION INTRAMUSCULAR; INTRAVENOUS; SUBCUTANEOUS at 02:31

## 2018-07-31 RX ADMIN — FOLIC ACID: 5 INJECTION, SOLUTION INTRAMUSCULAR; INTRAVENOUS; SUBCUTANEOUS at 02:23

## 2018-07-31 RX ADMIN — DEXTROSE MONOHYDRATE 25 G: 25 INJECTION, SOLUTION INTRAVENOUS at 02:56

## 2018-07-31 RX ADMIN — DEXTROSE MONOHYDRATE AND SODIUM CHLORIDE 125 ML/HR: 5; .45 INJECTION, SOLUTION INTRAVENOUS at 21:00

## 2018-07-31 RX ADMIN — Medication 5 MG: at 16:12

## 2018-07-31 RX ADMIN — PANTOPRAZOLE SODIUM 40 MG: 40 INJECTION, POWDER, FOR SOLUTION INTRAVENOUS at 16:13

## 2018-07-31 RX ADMIN — DEXTROSE MONOHYDRATE AND SODIUM CHLORIDE 1000 ML: 5; .9 INJECTION, SOLUTION INTRAVENOUS at 02:56

## 2018-07-31 RX ADMIN — METOCLOPRAMIDE 10 MG: 5 INJECTION, SOLUTION INTRAMUSCULAR; INTRAVENOUS at 02:31

## 2018-07-31 RX ADMIN — LORAZEPAM 1 MG: 2 INJECTION, SOLUTION INTRAMUSCULAR; INTRAVENOUS at 03:55

## 2018-07-31 RX ADMIN — GABAPENTIN 300 MG: 300 CAPSULE ORAL at 22:52

## 2018-07-31 RX ADMIN — PANTOPRAZOLE SODIUM 40 MG: 40 INJECTION, POWDER, FOR SOLUTION INTRAVENOUS at 02:31

## 2018-07-31 RX ADMIN — SODIUM CHLORIDE 1000 ML: 900 INJECTION, SOLUTION INTRAVENOUS at 02:31

## 2018-07-31 RX ADMIN — ENOXAPARIN SODIUM 40 MG: 100 INJECTION SUBCUTANEOUS at 22:51

## 2018-07-31 RX ADMIN — SUCRALFATE 1 G: 1 SUSPENSION ORAL at 22:51

## 2018-07-31 RX ADMIN — IOPAMIDOL 70 ML: 755 INJECTION, SOLUTION INTRAVENOUS at 05:03

## 2018-07-31 RX ADMIN — NALBUPHINE HYDROCHLORIDE 5 MG: 10 INJECTION, SOLUTION INTRAMUSCULAR; INTRAVENOUS; SUBCUTANEOUS at 05:40

## 2018-07-31 RX ADMIN — Medication 10 ML: at 22:05

## 2018-07-31 RX ADMIN — OXYBUTYNIN CHLORIDE 5 MG: 5 TABLET ORAL at 22:51

## 2018-07-31 RX ADMIN — MAGNESIUM SULFATE HEPTAHYDRATE 2 G: 40 INJECTION, SOLUTION INTRAVENOUS at 02:31

## 2018-07-31 RX ADMIN — Medication 1 MG: at 18:28

## 2018-07-31 RX ADMIN — Medication 0.5 MG: at 23:35

## 2018-07-31 RX ADMIN — PANCRELIPASE 2 CAPSULE: 10000; 34000; 55000 CAPSULE, DELAYED RELEASE ORAL at 23:04

## 2018-07-31 RX ADMIN — SODIUM CHLORIDE 97 ML: 900 INJECTION, SOLUTION INTRAVENOUS at 05:03

## 2018-07-31 RX ADMIN — CLONIDINE HYDROCHLORIDE 0.1 MG: 0.1 TABLET ORAL at 22:55

## 2018-07-31 NOTE — IP AVS SNAPSHOT
[facility] 
 
 
 [address] [address] [facility]: Spenser Mari MRN: [id number] :1965 About your hospitalization You were admitted on:  2018 You last received care in the:  [facility] 2E GEN SURG You were discharged on:  August 3, 2018 Why you were hospitalized Your primary diagnosis was:  Acute Pancreatitis Your diagnoses also included:  Htn (Hypertension), Copd (Chronic Obstructive Pulmonary Disease) (Hcc), Alcohol Dependence (Hcc) Follow-up Information Follow up With Details Comments Contact Info Roxy Field MD On 2018 8am [address] [facility] 1 [facility] 83 [id number] 
[phone] Your Scheduled Appointments 2018  9:00 AM EDT  
ESTABLISHED PATIENT with Renate Aggarwal MD  
[facility] ([facility]) [address] [place] [id number] [phone] Friday August 10, 2018 CYSTOSCOPY URETEROSCOPY WITH HOLMIUM LASER with Renate Aggarwal MD  
[address] [facility] ([facility]) [address] [place] [address] [address] [place] [address]  
[phone] Discharge Orders None A check josé indicates which time of day the medication should be taken. My Medications START taking these medications Instructions Each Dose to Equal  
 Morning Noon Evening Bedtime  
 ciprofloxacin HCl 250 mg tablet Commonly known as:  CIPRO Your last dose was: Your next dose is: Take 2 Tabs by mouth every twelve (12) hours for 10 days. 500 mg  
    
   
   
   
  
 hydrocortisone 0.5 % topical cream  
Commonly known as:  CORTAID Your last dose was: Your next dose is:    
   
   
 Apply  to affected area two (2) times a day. use thin layer HYDROmorphone 2 mg tablet Commonly known as:  DILAUDID Your last dose was: Your next dose is: Take 0.25 Tabs by mouth every six (6) hours as needed for Pain. Max Daily Amount: 2 mg. 0.5 mg  
    
   
   
   
  
  
CONTINUE taking these medications Instructions Each Dose to Equal  
 Morning Noon Evening Bedtime  
 albuterol 2.5 mg /3 mL (0.083 %) nebulizer solution Commonly known as:  PROVENTIL VENTOLIN Your last dose was: Your next dose is:    
   
   
 1.5 mL by Nebulization route every four (4) hours as needed for Wheezing or Shortness of Breath. 1.25 mg  
    
   
   
   
  
 busPIRone 15 mg tablet Commonly known as:  BUSPAR Your last dose was: Your next dose is: Take 1 Tab by mouth two (2) times a day. 15 mg  
    
   
   
   
  
 calcium-cholecalciferol (D3) tablet Commonly known as:  CALTRATE 600+D Your last dose was: Your next dose is:    
   
   
      
   
   
   
  
 cetirizine 10 mg tablet Commonly known as:  ZYRTEC Your last dose was: Your next dose is:    
   
   
      
   
   
   
  
 cloNIDine HCl 0.2 mg tablet Commonly known as:  CATAPRES Your last dose was: Your next dose is: Take 0.5 Tabs by mouth two (2) times a day. 0.1 mg  
    
   
   
   
  
 CREON 24,000-76,000 -120,000 unit capsule Generic drug:  lipase-protease-amylase Your last dose was: Your next dose is:    
   
   
      
   
   
   
  
 diazePAM 5 mg tablet Commonly known as:  VALIUM Your last dose was: Your next dose is: Take 1 Tab by mouth every twelve (12) hours as needed for Anxiety. Max Daily Amount: 10 mg. Indications: Alcohol Withdrawal Syndrome 5 mg  
    
   
   
   
  
 folic acid 1 mg tablet Commonly known as:  Google Your last dose was: Your next dose is: Take 1 Tab by mouth daily. 1 mg  
    
   
   
   
  
 gabapentin 300 mg capsule Commonly known as:  NEURONTIN  
   
 Your last dose was: Your next dose is: Take 1 Cap by mouth three (3) times daily. 300 mg  
    
   
   
   
  
 ipratropium-albuterol  mcg/actuation inhaler Commonly known as:  COMBIVENT Your last dose was: Your next dose is: Take 2 Puffs by inhalation every six (6) hours. 2 Puff Nebulizer & Compressor machine Your last dose was: Your next dose is:    
   
   
 1 Each by Does Not Apply route every six (6) hours as needed. 1 Each  
    
   
   
   
  
 ondansetron 8 mg disintegrating tablet Commonly known as:  ZOFRAN ODT Your last dose was: Your next dose is: Take 1 Tab by mouth every eight (8) hours as needed for Nausea. 8 mg  
    
   
   
   
  
 oxybutynin 5 mg tablet Commonly known as:  BSGWQJZR Your last dose was: Your next dose is: Take 5 mg by mouth three (3) times daily. 5 mg  
    
   
   
   
  
 pantoprazole 40 mg tablet Commonly known as:  PROTONIX Your last dose was: Your next dose is: Take 1 Tab by mouth Before breakfast and dinner. 40 mg PREVIDENT 5000 PLUS 1.1 % Crea Generic drug:  fluoride (sodium) Your last dose was: Your next dose is: QUEtiapine 300 mg tablet Commonly known as:  SEROquel Your last dose was: Your next dose is:    
   
   
      
   
   
   
  
 sucralfate 100 mg/mL suspension Commonly known as:  Melissa Isles Your last dose was: Your next dose is: Take 10 mL by mouth Before breakfast, lunch, dinner and at bedtime. 1 g  
    
   
   
   
  
 SUMAtriptan 100 mg tablet Commonly known as:  IMITREX Your last dose was: Your next dose is: TAKE 1 TO 2 TABLETS BY MOUTH DAILY AS NEEDED FOR MIGRAINE . DO NOT EXCEED 200 MG IN 24 HOUR PERIOD tamsulosin 0.4 mg capsule Commonly known as:  FLOMAX Your last dose was: Your next dose is: Take 1 Cap by mouth daily (after dinner). Start one week prior to stone surgery. 0.4 mg Thiamine Mononitrate 100 mg tablet Commonly known as:  B-1 Your last dose was: Your next dose is: Take 1 Tab by mouth daily. 100 mg  
    
   
   
   
  
 topiramate 100 mg tablet Commonly known as:  TOPAMAX Your last dose was: Your next dose is: STOP taking these medications   
 traMADol 50 mg tablet Commonly known as:  ULTRAM  
   
  
  
  
Where to Get Your Medications These medications were sent to [address] [place] [address], [address] [place] [address] Phone:  [phone]  
  ciprofloxacin HCl 250 mg tablet  
 hydrocortisone 0.5 % topical cream  
  
  
Information on where to get these meds will be given to you by the nurse or doctor. ! Ask your nurse or doctor about these medications HYDROmorphone 2 mg tablet Opioid Education Prescription Opioids: What You Need to Know: 
 
 
 
F-face looks uneven A-arms unable to move or move unevenly S-speech slurred or non-existent T-time-call 911 as soon as signs and symptoms begin-DO NOT go Back to bed or wait to see if you get better-TIME IS BRAIN. Warning Signs of HEART ATTACK Call 911 if you have these symptoms: 
? Chest discomfort. Most heart attacks involve discomfort in the center of the chest that lasts more than a few minutes, or that goes away and comes back. It can feel like uncomfortable pressure, squeezing, fullness, or pain. ? Discomfort in other areas of the upper body. Symptoms can include pain or discomfort in one or both arms, the back, neck, jaw, or stomach. ? Shortness of breath with or without chest discomfort. ? Other signs may include breaking out in a cold sweat, nausea, or lightheadedness. Don't wait more than five minutes to call [address]! Fast action can save your life. Calling 911 is almost always the fastest way to get lifesaving treatment. Emergency Medical Services staff can begin treatment when they arrive  up to an hour sooner than if someone gets to the hospital by car. The discharge information has been reviewed with the patient. The patient verbalized understanding. Discharge medications reviewed with the patient and appropriate educational materials and side effects teaching were provided. ___________________________________________________________________________________________________________________________________ Pancreatitis: Care Instructions Your Care Instructions The pancreas is an organ behind the stomach. It makes hormones and enzymes to help your body digest food. But if these enzymes attack the pancreas, it can get inflamed. This is called pancreatitis. Most cases are caused by gallstones or by heavy alcohol use. If you take care of yourself at home, it will help you get better. It will also help you avoid more problems with your pancreas. Follow-up care is a key part of your treatment and safety.  Be sure to make and go to all appointments, and call your doctor if you are having problems. It's also a good idea to know your test results and keep a list of the medicines you take. How can you care for yourself at home? · Drink clear liquids and eat bland foods until you feel better. Ray foods include rice, dry toast, and crackers. They also include bananas and applesauce. · Eat a low-fat diet until your doctor says your pancreas is healed. · Do not drink alcohol. Tell your doctor if you need help to quit. Counseling, support groups, and sometimes medicines can help you stay sober. · Be safe with medicines. Read and follow all instructions on the label. ¨ If the doctor gave you a prescription medicine for pain, take it as prescribed. ¨ If you are not taking a prescription pain medicine, ask your doctor if you can take an over-the-counter medicine. · If your doctor prescribed antibiotics, take them as directed. Do not stop taking them just because you feel better. You need to take the full course of antibiotics. · Get extra rest until you feel better. To prevent future problems with your pancreas · Do not drink alcohol. · Tell your doctors and pharmacist that you've had pancreatitis. They can help you avoid medicines that may cause this problem again. When should you call for help? Call 911 anytime you think you may need emergency care. For example, call if: 
  · You vomit blood or what looks like coffee grounds.  
  · Your stools are maroon or very bloody.  
 Call your doctor now or seek immediate medical care if: 
  · You have new or worse belly pain.  
  · Your stools are black and look like tar, or they have streaks of blood.  
  · You are vomiting.  
 Watch closely for changes in your health, and be sure to contact your doctor if: 
  · You do not get better as expected. Where can you learn more? Go to http://shannon-heriberto.info/. Enter L046 in the search box to learn more about \"Pancreatitis: Care Instructions. \" Current as of: May 12, 2017 Content Version: 11.7 © 4121-6436 [employer]. Care instructions adapted under license by [employer] (which disclaims liability or warranty for this information). If you have questions about a medical condition or this instruction, always ask your healthcare professional. [address] any warranty or liability for your use of this information. Patient armband removed and shredded [employer] Announcement We are excited to announce that we are making your provider's discharge notes available to you in [employer]. You will see these notes when they are completed and signed by the physician that discharged you from your recent hospital stay. If you have any questions or concerns about any information you see in [employer], please call the Health Information Department where you were seen or reach out to your Primary Care Provider for more information about your plan of care. Introducing [facility]! Dear Roselyn Field: Thank you for requesting a [employer] account. Our records indicate that you already have an active [employer] account. You can access your account anytime at [email or web address]. [email or web address] Did you know that you can access your hospital and ER discharge instructions at any time in [employer]? You can also review all of your test results from your hospital stay or ER visit. Additional Information If you have questions, please visit the Frequently Asked Questions section of the [employer] website at [email or web address] Remember, [employer] is NOT to be used for urgent needs. For medical emergencies, dial 911. Now available from your iPhone and Android! Introducing Lucho Harris As a Joselin Monk patient, I wanted to make you aware of our electronic visit tool called Lucho Harris. [employer] allows you to connect within minutes with a medical provider 24 hours a day, seven days a week via a mobile device or tablet or logging into a secure website from your computer. You can access [employer] from anywhere in the [place]. A virtual visit might be right for you when you have a simple condition and feel like you just dont want to get out of bed, or cant get away from work for an appointment, when your regular [facility] provider is not available (evenings, weekends or holidays), or when youre out of town and need minor care. Electronic visits cost only $49 and if the [employer] provider determines a prescription is needed to treat your condition, one can be electronically transmitted to a nearby pharmacy*. Please take a moment to enroll today if you have not already done so. The enrollment process is free and takes just a few minutes. To enroll, please download the [employer] shalonda to your tablet or phone, or visit [email or web address] to enroll on your computer. And, as an [address] [place] [address] patient with a [employer] account, the results of your visits will be scanned into your electronic medical record and your primary care provider will be able to view the scanned results. We urge you to continue to see your regular [facility] provider for your ongoing medical care. And while your primary care provider may not be the one available when you seek a [employer] virtual visit, the peace of mind you get from getting a real diagnosis real time can be priceless. For more information on On[employer]ess[employer]af[employer], view our Frequently Asked Questions (FAQs) at [email or web address]. Sincerely, 
 
Awa Luciano MD 
Chief Medical Officer Jose8 Nadira Lees *:  certain medications cannot be prescribed via OnHandstacie Providers Seen During Your Hospitalization Provider Specialty Primary office phone Ezequiel Miller MD Emergency Medicine [phone] Meseret Oquendo MD Emergency Medicine [phone] José Luis Perez MD Internal Medicine [phone] Wyn Mortimer, MD [facility] [phone] Your Primary Care Physician (PCP) Primary Care Physician Office Phone Office Fax Abdoulaye Vigil [phone] You are allergic to the following Allergen Reactions Lithium Anaphylaxis Amitriptyline Other (comments) Elavil Other (comments) Left leg went numb Morphine Hives Recent Documentation Breastfeeding? OB Status Smoking Status No Postmenopausal Current Every Day Smoker Emergency Contacts Name Discharge Info Relation Home Work Mobile Teddy Gutierrez DISCHARGE CAREGIVER [3] Spouse [3]   [phone] Andreia Domingo CAREGIVER [3] Mother [14]   [phone]  
 [id number]  Spouse [3] [phone] Brett Villanueva  Spouse [3] [phone] Patient Belongings The following personal items are in your possession at time of discharge: 
  Dental Appliances: None  Visual Aid: None      Home Medications: Sent to pharmacy (tramadol 10 tab, valium 7 tab, seroquel 8 tab)   Jewelry: Ring (2 )  Clothing: Footwear, With patientRobel    Other Valuables: Cell Phone (with )  Personal Items Sent to Safe: home medication Please provide this summary of care documentation to your next provider. Signatures-by signing, you are acknowledging that this After Visit Summary has been reviewed with you and you have received a copy. Patient Signature:  ____________________________________________________________ Date:  ____________________________________________________________  
  
Randallqlronaldo Newcomer Provider Signature:  ____________________________________________________________ Date:  ____________________________________________________________

## 2018-07-31 NOTE — IP AVS SNAPSHOT
[address] Patient: Miladys Lagunas MRN: [id number] :1965 A check josé indicates which time of day the medication should be taken. My Medications START taking these medications Instructions Each Dose to Equal  
 Morning Noon Evening Bedtime  
 ciprofloxacin HCl 250 mg tablet Commonly known as:  CIPRO Your last dose was: Your next dose is: Take 2 Tabs by mouth every twelve (12) hours for 10 days. 500 mg  
    
   
   
   
  
 hydrocortisone 0.5 % topical cream  
Commonly known as:  CORTAID Your last dose was: Your next dose is:    
   
   
 Apply  to affected area two (2) times a day. use thin layer HYDROmorphone 2 mg tablet Commonly known as:  DILAUDID Your last dose was: Your next dose is: Take 0.25 Tabs by mouth every six (6) hours as needed for Pain. Max Daily Amount: 2 mg. 0.5 mg  
    
   
   
   
  
  
CONTINUE taking these medications Instructions Each Dose to Equal  
 Morning Noon Evening Bedtime  
 albuterol 2.5 mg /3 mL (0.083 %) nebulizer solution Commonly known as:  PROVENTIL VENTOLIN Your last dose was: Your next dose is:    
   
   
 1.5 mL by Nebulization route every four (4) hours as needed for Wheezing or Shortness of Breath. 1.25 mg  
    
   
   
   
  
 busPIRone 15 mg tablet Commonly known as:  BUSPAR Your last dose was: Your next dose is: Take 1 Tab by mouth two (2) times a day. 15 mg  
    
   
   
   
  
 calcium-cholecalciferol (D3) tablet Commonly known as:  CALTRATE 600+D Your last dose was: Your next dose is:    
   
   
      
   
   
   
  
 cetirizine 10 mg tablet Commonly known as:  ZYRTEC Your last dose was: Your next dose is: cloNIDine HCl 0.2 mg tablet Commonly known as:  CATAPRES Your last dose was: Your next dose is: Take 0.5 Tabs by mouth two (2) times a day. 0.1 mg  
    
   
   
   
  
 CREON 24,000-76,000 -120,000 unit capsule Generic drug:  lipase-protease-amylase Your last dose was: Your next dose is:    
   
   
      
   
   
   
  
 diazePAM 5 mg tablet Commonly known as:  VALIUM Your last dose was: Your next dose is: Take 1 Tab by mouth every twelve (12) hours as needed for Anxiety. Max Daily Amount: 10 mg. Indications: Alcohol Withdrawal Syndrome 5 mg  
    
   
   
   
  
 folic acid 1 mg tablet Commonly known as:  Google Your last dose was: Your next dose is: Take 1 Tab by mouth daily. 1 mg  
    
   
   
   
  
 gabapentin 300 mg capsule Commonly known as:  NEURONTIN Your last dose was: Your next dose is: Take 1 Cap by mouth three (3) times daily. 300 mg  
    
   
   
   
  
 ipratropium-albuterol  mcg/actuation inhaler Commonly known as:  COMBIVENT Your last dose was: Your next dose is: Take 2 Puffs by inhalation every six (6) hours. 2 Puff Nebulizer & Compressor machine Your last dose was: Your next dose is:    
   
   
 1 Each by Does Not Apply route every six (6) hours as needed. 1 Each  
    
   
   
   
  
 ondansetron 8 mg disintegrating tablet Commonly known as:  ZOFRAN ODT Your last dose was: Your next dose is: Take 1 Tab by mouth every eight (8) hours as needed for Nausea. 8 mg  
    
   
   
   
  
 oxybutynin 5 mg tablet Commonly known as:  YFKCEXMT Your last dose was: Your next dose is: Take 5 mg by mouth three (3) times daily. 5 mg  
    
   
   
   
  
 pantoprazole 40 mg tablet Commonly known as:  PROTONIX Your last dose was: Your next dose is: Take 1 Tab by mouth Before breakfast and dinner. 40 mg PREVIDENT 5000 PLUS 1.1 % Crea Generic drug:  fluoride (sodium) Your last dose was: Your next dose is: QUEtiapine 300 mg tablet Commonly known as:  SEROquel Your last dose was: Your next dose is:    
   
   
      
   
   
   
  
 sucralfate 100 mg/mL suspension Commonly known as:  Ronita Hy Your last dose was: Your next dose is: Take 10 mL by mouth Before breakfast, lunch, dinner and at bedtime. 1 g  
    
   
   
   
  
 SUMAtriptan 100 mg tablet Commonly known as:  IMITREX Your last dose was: Your next dose is: TAKE 1 TO 2 TABLETS BY MOUTH DAILY AS NEEDED FOR MIGRAINE . DO NOT EXCEED 200 MG IN 24 HOUR PERIOD  
     
   
   
   
  
 tamsulosin 0.4 mg capsule Commonly known as:  FLOMAX Your last dose was: Your next dose is: Take 1 Cap by mouth daily (after dinner). Start one week prior to stone surgery. 0.4 mg Thiamine Mononitrate 100 mg tablet Commonly known as:  B-1 Your last dose was: Your next dose is: Take 1 Tab by mouth daily. 100 mg  
    
   
   
   
  
 topiramate 100 mg tablet Commonly known as:  TOPAMAX Your last dose was: Your next dose is: STOP taking these medications   
 traMADol 50 mg tablet Commonly known as:  ULTRAM  
   
  
  
  
Where to Get Your Medications These medications were sent to [address] [place] [address]   Phone:  [phone]  
  ciprofloxacin HCl 250 mg tablet  
 hydrocortisone 0.5 % topical cream  
  
  
Information on where to get these meds will be given to you by the nurse or doctor. ! Ask your nurse or doctor about these medications HYDROmorphone 2 mg tablet

## 2018-07-31 NOTE — ED PROVIDER NOTES
EMERGENCY DEPARTMENT HISTORY AND PHYSICAL EXAM 
 
3:54 PM 
 
 
Date: 7/31/2018 Patient Name: Remi Valdez History of Presenting Illness Chief Complaint Patient presents with  Chest Pain  Abdominal Pain History Provided By: Patient Chief Complaint: pain Duration:  2 days Timing:  constant Location: all over Quality: not specified Severity: moderate severity Modifying Factors: alcoholic, last had ETOH 1 day ago Associated Symptoms: left sided cp, tightness to LUE, abd pain, n/v/d Additional History (Context): Remi Valdez, a 46 y.o. current daily smoker but non-substance user female with h/o HTN, bipolar disorder, alcohol abuse, migraines, asthma, COPD, anxiety, depression, and kidney stone, returns to the ED with c/o non-improving, persistent, constant, moderate severity, all over pain for which she was evaluated in the ED last night. Pt was prescribed medication by Dr. Leobardo Pacheco but she states her sx did not improve with medication intake. She had a mild episode of left sided cp and LUE tightness last night, reports current abd pain, n/v/d and admits to having had her last drink 1 day ago. Hx of cholecystectomy is denied. No other complaints. PCP: Meme Guadalupe MD 
 
Current Outpatient Prescriptions Medication Sig Dispense Refill  gabapentin (NEURONTIN) 300 mg capsule Take 1 Cap by mouth three (3) times daily. 30 Cap 0  
 traMADol (ULTRAM) 50 mg tablet Take 1 Tab by mouth every six (6) hours as needed for Pain. Max Daily Amount: 200 mg. Indications: Pain 12 Tab 0  
 ondansetron (ZOFRAN ODT) 8 mg disintegrating tablet Take 1 Tab by mouth every eight (8) hours as needed for Nausea. 15 Tab 0  
 folic acid (FOLVITE) 1 mg tablet Take 1 Tab by mouth daily. 30 Tab 0  
 pantoprazole (PROTONIX) 40 mg tablet Take 1 Tab by mouth Before breakfast and dinner.  60 Tab 0  
 sucralfate (CARAFATE) 100 mg/mL suspension Take 10 mL by mouth Before breakfast, lunch, dinner and at bedtime. 1200 mL 0  
 Thiamine Mononitrate (B-1) 100 mg tablet Take 1 Tab by mouth daily. 30 Tab 1  cloNIDine HCl (CATAPRES) 0.2 mg tablet Take 0.5 Tabs by mouth two (2) times a day. 30 Tab 0  
 diazePAM (VALIUM) 5 mg tablet Take 1 Tab by mouth every twelve (12) hours as needed for Anxiety. Max Daily Amount: 10 mg. Indications: Alcohol Withdrawal Syndrome 10 Tab 0  
 PREVIDENT 5000 PLUS 1.1 % crea  tamsulosin (FLOMAX) 0.4 mg capsule Take 1 Cap by mouth daily (after dinner). Start one week prior to stone surgery. 10 Cap 0  
 calcium-cholecalciferol, D3, (CALTRATE 600+D) tablet  topiramate (TOPAMAX) 100 mg tablet  QUEtiapine (SEROQUEL) 300 mg tablet  CREON 24,000-76,000 -120,000 unit capsule  cetirizine (ZYRTEC) 10 mg tablet  SUMAtriptan (IMITREX) 100 mg tablet TAKE 1 TO 2 TABLETS BY MOUTH DAILY AS NEEDED FOR MIGRAINE . DO NOT EXCEED 200 MG IN 24 HOUR PERIOD  0  
 oxybutynin (DITROPAN) 5 mg tablet Take 5 mg by mouth three (3) times daily.  busPIRone (BUSPAR) 15 mg tablet Take 1 Tab by mouth two (2) times a day. 60 Tab 0  
 albuterol (PROVENTIL VENTOLIN) 2.5 mg /3 mL (0.083 %) nebulizer solution 1.5 mL by Nebulization route every four (4) hours as needed for Wheezing or Shortness of Breath. 24 Each 0  
 Nebulizer & Compressor machine 1 Each by Does Not Apply route every six (6) hours as needed. 1 Each 0  
 ipratropium-albuterol (COMBIVENT)  mcg/actuation inhaler Take 2 Puffs by inhalation every six (6) hours. Past History Past Medical History: 
Past Medical History:  
Diagnosis Date  Alcohol abuse  Anxiety  Asthma  Bipolar disorder ([address]  Common migraine  COPD (chronic obstructive pulmonary disease) [facility])  Depression  Esophageal reflux  Gout  Hypertension  Inverted nipple Left breast always have.  Kidney stone on left side  Recurrent UTI  Staghorn renal calculus Past Surgical History: 
Past Surgical History:  
Procedure Laterality Date  HX CARPAL TUNNEL RELEASE  HX GYN    
 tubal ligation  HX HEENT    
 HX LUMBAR LAMINECTOMY  HX ORTHOPAEDIC Family History: 
Family History Problem Relation Age of Onset  Family history unknown: Yes  
 
 
Social History: 
Social History Substance Use Topics  Smoking status: Current Every Day Smoker Packs/day: 1.00 Years: 33.00 Types: Cigarettes  Smokeless tobacco: Never Used  Alcohol use Yes Comment: daily, liquor Allergies: Allergies Allergen Reactions  Lithium Anaphylaxis  Amitriptyline Other (comments)  Elavil Other (comments) Left leg went numb  Morphine Hives Review of Systems Review of Systems Constitutional: Negative for diaphoresis and fever. HENT: Negative for congestion and sore throat. Eyes: Negative for pain and itching. Respiratory: Negative for cough and shortness of breath. Cardiovascular: Positive for chest pain. Negative for palpitations. Gastrointestinal: Positive for abdominal pain, diarrhea, nausea and vomiting. Endocrine: Negative for polydipsia and polyuria. Genitourinary: Negative for dysuria and hematuria. Musculoskeletal: Negative for arthralgias and myalgias. All over pain LUE tightness Skin: Negative for rash and wound. Neurological: Negative for seizures and syncope. Hematological: Does not bruise/bleed easily. Psychiatric/Behavioral: Negative for agitation and hallucinations. All other systems reviewed and are negative. Physical Exam  
 
Visit Vitals  BP (!) 143/94  Pulse 98  Temp 98.6 °F (37 °C)  Resp 16  
 LMP 04/01/2013  SpO2 98% Physical Exam  
Constitutional: She appears well-developed and well-nourished. HENT:  
Head: Normocephalic and atraumatic. Eyes: Conjunctivae are normal. No scleral icterus. Neck: Normal range of motion. Neck supple.  No JVD present. Cardiovascular: Regular rhythm and normal heart sounds. 4 intact extremity pulses Tachycardia Pulmonary/Chest: Effort normal and breath sounds normal.  
Abdominal: Soft. She exhibits no mass. There is tenderness (mild epigastric ). Musculoskeletal: Normal range of motion. Lymphadenopathy:  
  She has no cervical adenopathy. Neurological: She is alert. Skin: Skin is warm and dry. Nursing note and vitals reviewed. Diagnostic Study Results Labs - Recent Results (from the past 12 hour(s)) EKG, 12 LEAD, INITIAL Collection Time: 07/31/18  2:40 PM  
Result Value Ref Range Ventricular Rate 108 BPM  
 Atrial Rate 108 BPM  
 P-R Interval 126 ms  
 QRS Duration 82 ms Q-T Interval 344 ms QTC Calculation (Bezet) 460 ms Calculated P Axis 70 degrees Calculated R Axis 77 degrees Calculated T Axis 66 degrees Diagnosis Sinus tachycardia Otherwise normal ECG When compared with ECG of 31-JUL-2018 02:06, No significant change was found CBC WITH AUTOMATED DIFF Collection Time: 07/31/18  3:15 PM  
Result Value Ref Range WBC 12.6 4.6 - 13.2 K/uL  
 RBC 4.37 4.20 - 5.30 M/uL  
 HGB 13.2 12.0 - 16.0 g/dL HCT 39.9 35.0 - 45.0 % MCV 91.3 74.0 - 97.0 FL  
 MCH 30.2 24.0 - 34.0 PG  
 MCHC 33.1 31.0 - 37.0 g/dL  
 RDW 18.7 (H) 11.6 - 14.5 % PLATELET 491 161 - 458 K/uL MPV 9.5 9.2 - 11.8 FL  
 NEUTROPHILS 81 (H) 40 - 73 % LYMPHOCYTES 15 (L) 21 - 52 % MONOCYTES 4 3 - 10 % EOSINOPHILS 0 0 - 5 % BASOPHILS 0 0 - 2 %  
 ABS. NEUTROPHILS 10.3 (H) 1.8 - 8.0 K/UL  
 ABS. LYMPHOCYTES 1.8 0.9 - 3.6 K/UL  
 ABS. MONOCYTES 0.5 0.05 - 1.2 K/UL  
 ABS. EOSINOPHILS 0.0 0.0 - 0.4 K/UL  
 ABS. BASOPHILS 0.0 0.0 - 0.1 K/UL  
 DF AUTOMATED METABOLIC PANEL, BASIC Collection Time: 07/31/18  3:15 PM  
Result Value Ref Range Sodium 136 136 - 145 mmol/L Potassium 4.0 3.5 - 5.5 mmol/L Chloride 98 (L) 100 - 108 mmol/L  
 CO2 29 21 - 32 mmol/L  Anion gap 9 3.0 - 18 mmol/L Glucose 112 (H) 74 - 99 mg/dL BUN 18 7.0 - 18 MG/DL Creatinine 0.76 0.6 - 1.3 MG/DL  
 BUN/Creatinine ratio 24 (H) 12 - 20 GFR est AA >60 >60 ml/min/1.73m2 GFR est non-AA >60 >60 ml/min/1.73m2 Calcium 9.9 8.5 - 10.1 MG/DL  
CARDIAC PANEL,(CK, CKMB & TROPONIN) Collection Time: 07/31/18  3:15 PM  
Result Value Ref Range  (H) 26 - 192 U/L  
 CK - MB 10.1 (H) <3.6 ng/ml CK-MB Index 4.5 (H) 0.0 - 4.0 % Troponin-I, Qt. <0.02 0.0 - 0.045 NG/ML  
LIPASE Collection Time: 07/31/18  3:15 PM  
Result Value Ref Range Lipase 952 (H) 73 - 393 U/L  
HEPATIC FUNCTION PANEL Collection Time: 07/31/18  3:15 PM  
Result Value Ref Range Protein, total 7.4 6.4 - 8.2 g/dL Albumin 3.6 3.4 - 5.0 g/dL Globulin 3.8 2.0 - 4.0 g/dL A-G Ratio 0.9 0.8 - 1.7 Bilirubin, total 1.2 (H) 0.2 - 1.0 MG/DL Bilirubin, direct 0.3 (H) 0.0 - 0.2 MG/DL Alk. phosphatase 117 45 - 117 U/L  
 AST (SGOT) 127 (H) 15 - 37 U/L  
 ALT (SGPT) 56 13 - 56 U/L  
ETHYL ALCOHOL Collection Time: 07/31/18  3:15 PM  
Result Value Ref Range ALCOHOL(ETHYL),SERUM <3 0 - 3 MG/DL Radiologic Studies -  
US ABD LTD    (Results Pending) Cta Chest W Or W Wo Cont Result Date: 7/31/2018 EXAM: CTA Chest INDICATION: History of chest pain, clinical concern for PE COMPARISON: None. TECHNIQUE: Axial CT imaging from the thoracic inlet through the diaphragm with intravenous contrast. Coronal and sagittal MIP reformats were generated. One or more dose reduction techniques were used on this CT: automated exposure control, adjustment of the mAs and/or kVp according to patient's size, and iterative reconstruction techniques. The specific techniques utilized on this CT exam have been documented in the patient's electronic medical record. _______________ FINDINGS: EXAM QUALITY: Overall exam quality is adequate PULMONARY ARTERIES: No evidence of pulmonary embolism.  MEDIASTINUM: Normal heart size. No evidence of right heart strain. Aorta is unremarkable. No pericardial effusion. LYMPH NODES: No enlarged nodes. AIRWAY: Normal. LUNGS: No suspicious nodule or mass. A few small granuloma noted. No abnormal opacities. PLEURA: Normal. Specifically, no pneumothorax or pleural effusion. UPPER ABDOMEN: Marked hepatic hypoattenuation. Mid/distal esophageal wall indistinctness and thickening. . OTHER: No acute or aggressive osseous abnormalities identified. _______________ IMPRESSION: No evidence of PE. No acute pulmonary process identified. Mid/distal esophagus is indistinct, and appears thickened circumferentially. Findings nonspecific but can be seen in the setting of esophagitis. If further evaluation is needed, endoscopy or esophagram could be considered. Marked hepatic hypoattenuation suggesting steatosis. Preliminary report was given by the on-call radiology resident. Medications ordered:  
Medications  
pantoprazole (PROTONIX) injection 40 mg (40 mg IntraVENous Given 7/31/18 1613) diazePAM (VALIUM) injection 5 mg (5 mg IntraVENous Given 7/31/18 1612) HYDROmorphone (DILAUDID) injection 1 mg (1 mg IntraVENous Given 7/31/18 1828) Medical Decision Making Initial Medical Decision Making and DDx: 
 
Dehydration, alcohol withdrawal, pancreatitis, doubt acute cholecystitis, bowel obstruction/bowel perforation. ED Course: Progress Notes, Reevaluation, and Consults: 
 
I am the first provider for this patient. I reviewed the vital signs, available nursing notes, past medical history, past surgical history, family history and social history. Vital Signs-Reviewed the patient's vital signs. Pulse Oximetry Analysis - 98% on room air Cardiac Monitor: 
Rate/Rhythm:  104/ST 
 
EKG: Interpreted by the EP. Time Interpreted: 14:40 Rate: 108 Rhythm: ST Interpretation: No acute process Records Reviewed: Nursing Notes and Old Medical Records (Time of Review: 3:54 PM) 5:34 PM pt's lipase is elevated so will get US. 7:10 PM Dr Marilynn Angulo radiology sees cbd dialated at 1.4 cm, trace pericholecystic fluid. No other comments. Images note normal GB wall thickness. 7:38 PM d/w Dr Patti Nielsen, discussed US findings and cbd. Will see and admit for alcoholic pancreatitis. For Hospitalized Patients: 
 
2. Aspirin: Aspirin was not given because the patient did not present with a stroke at the time of their Emergency Department evaluation Diagnosis Clinical Impression: 1. Alcohol-induced acute pancreatitis, unspecified complication status Disposition: admitted Follow-up Information None Patient's Medications Start Taking No medications on file Continue Taking ALBUTEROL (PROVENTIL VENTOLIN) 2.5 MG /3 ML (0.083 %) NEBULIZER SOLUTION    1.5 mL by Nebulization route every four (4) hours as needed for Wheezing or Shortness of Breath. BUSPIRONE (BUSPAR) 15 MG TABLET    Take 1 Tab by mouth two (2) times a day. CALCIUM-CHOLECALCIFEROL, D3, (CALTRATE 600+D) TABLET      
 CETIRIZINE (ZYRTEC) 10 MG TABLET      
 CLONIDINE HCL (CATAPRES) 0.2 MG TABLET    Take 0.5 Tabs by mouth two (2) times a day. CREON 24,000-76,000 -120,000 UNIT CAPSULE DIAZEPAM (VALIUM) 5 MG TABLET    Take 1 Tab by mouth every twelve (12) hours as needed for Anxiety. Max Daily Amount: 10 mg. Indications: Alcohol Withdrawal Syndrome FOLIC ACID (FOLVITE) 1 MG TABLET    Take 1 Tab by mouth daily. GABAPENTIN (NEURONTIN) 300 MG CAPSULE    Take 1 Cap by mouth three (3) times daily. IPRATROPIUM-ALBUTEROL (COMBIVENT)  MCG/ACTUATION INHALER    Take 2 Puffs by inhalation every six (6) hours. NEBULIZER & COMPRESSOR MACHINE    1 Each by Does Not Apply route every six (6) hours as needed. ONDANSETRON (ZOFRAN ODT) 8 MG DISINTEGRATING TABLET    Take 1 Tab by mouth every eight (8) hours as needed for Nausea.   
 OXYBUTYNIN (DITROPAN) 5 MG TABLET    Take 5 mg by mouth three (3) times daily. PANTOPRAZOLE (PROTONIX) 40 MG TABLET    Take 1 Tab by mouth Before breakfast and dinner. PREVIDENT 5000 PLUS 1.1 % CREA QUETIAPINE (SEROQUEL) 300 MG TABLET      
 SUCRALFATE (CARAFATE) 100 MG/ML SUSPENSION    Take 10 mL by mouth Before breakfast, lunch, dinner and at bedtime. SUMATRIPTAN (IMITREX) 100 MG TABLET    TAKE 1 TO 2 TABLETS BY MOUTH DAILY AS NEEDED FOR MIGRAINE . DO NOT EXCEED 200 MG IN 24 HOUR PERIOD  
 TAMSULOSIN (FLOMAX) 0.4 MG CAPSULE    Take 1 Cap by mouth daily (after dinner). Start one week prior to stone surgery. THIAMINE MONONITRATE (B-1) 100 MG TABLET    Take 1 Tab by mouth daily. TOPIRAMATE (TOPAMAX) 100 MG TABLET      
 TRAMADOL (ULTRAM) 50 MG TABLET    Take 1 Tab by mouth every six (6) hours as needed for Pain. Max Daily Amount: 200 mg. Indications: Pain These Medications have changed No medications on file Stop Taking No medications on file  
 
_______________________________ Attestations: 
Scribe Attestation Olya Lipscomb acting as a scribe for and in the presence of Alyssia Matt MD     
July 31, 2018 at 3:54 PM 
    
Provider Attestation:     
I personally performed the services described in the documentation, reviewed the documentation, as recorded by the scribe in my presence, and it accurately and completely records my words and actions. July 31, 2018 at 3:54 PM - Alyssia Matt MD   
_______________________________

## 2018-07-31 NOTE — ED NOTES
Attempted twice to insert #18 gauge for CTA, unsuccessful attempts both times, Dr. Carlos Pena notified

## 2018-07-31 NOTE — ED PROVIDER NOTES
HPI Comments: Ramses Palafox is a 46 y.o. Female with h/o alcohol abuse, recurrent pancreatitis with c/ worsening upper abd pain, nv for last 2 days, unable to keep much down. No diarrhea, melena, urinary sx. No fever. Pain is sharp, upper abd radiating to back, chest. No fever cough, syncope. Last drink 4 hours ago. Admitted to [facility] a month ago for alcohol withdrawal but started drinking again once she left Is pending surgery for ureteral stone and is on abx for recent uti as well The history is provided by the patient and medical records. Past Medical History:  
Diagnosis Date  Alcohol abuse  Anxiety  Asthma  Bipolar disorder ([address]  Common migraine  COPD (chronic obstructive pulmonary disease) (HCC)  Depression  Esophageal reflux  Gout  Hypertension  Inverted nipple Left breast always have.  Kidney stone on left side  Recurrent UTI  Staghorn renal calculus Past Surgical History:  
Procedure Laterality Date  HX CARPAL TUNNEL RELEASE  HX GYN    
 tubal ligation  HX HEENT    
 HX LUMBAR LAMINECTOMY  HX ORTHOPAEDIC Family History:  
Problem Relation Age of Onset  Family history unknown: Yes  
 
 
Social History Social History  Marital status:  Spouse name: N/A  
 Number of children: N/A  
 Years of education: N/A Occupational History  Not on file. Social History Main Topics  Smoking status: Current Every Day Smoker Packs/day: 1.00 Years: 33.00 Types: Cigarettes  Smokeless tobacco: Never Used  Alcohol use Yes Comment: daily, liquor  Drug use: No  
 Sexual activity: Not on file Other Topics Concern  Not on file Social History Narrative ALLERGIES: Lithium; Amitriptyline; Elavil; and Morphine Review of Systems Constitutional: Positive for appetite change and fatigue. Negative for fever.   
HENT: Negative for sore throat and trouble swallowing. Eyes: Negative for visual disturbance. Respiratory: Negative for cough and shortness of breath. Cardiovascular: Positive for chest pain. Negative for leg swelling. Gastrointestinal: Positive for abdominal pain. Negative for blood in stool. Endocrine: Negative for polyuria. Genitourinary: Positive for dysuria and hematuria. Negative for difficulty urinating. Musculoskeletal: Positive for back pain. Skin: Negative for rash. Allergic/Immunologic: Negative for immunocompromised state. Neurological: Positive for light-headedness. Negative for syncope. Hematological: Does not bruise/bleed easily. Psychiatric/Behavioral: Positive for sleep disturbance. Vitals:  
 07/31/18 0400 07/31/18 0415 07/31/18 6504 07/31/18 4122 BP: (!) 174/101 (!) 170/99  (!) 157/91 Pulse: (!) 115 (!) 112 (!) 112 (!) 111 Resp: 13 13 13 11 Temp:      
SpO2: 98% 98% 98% 98% Weight:      
Height:      
      
 
Physical Exam  
Constitutional: She is oriented to person, place, and time. She appears well-developed and well-nourished. Non-toxic appearance. She does not have a sickly appearance. She appears ill. No distress. HENT:  
Head: Normocephalic and atraumatic. Right Ear: External ear normal.  
Left Ear: External ear normal.  
Nose: Nose normal.  
Mouth/Throat: Uvula is midline, oropharynx is clear and moist and mucous membranes are normal.  
Eyes: Conjunctivae are normal. No scleral icterus. Neck: Neck supple. Cardiovascular: Regular rhythm, normal heart sounds and intact distal pulses. Tachycardia present. Pulmonary/Chest: Effort normal and breath sounds normal.  
Abdominal: Soft. Normal appearance. She exhibits no pulsatile midline mass. There is no hepatosplenomegaly. There is tenderness in the epigastric area. There is no rigidity, no rebound, no guarding and no CVA tenderness. Musculoskeletal: She exhibits no edema.   
Neurological: She is alert and oriented to person, place, and time. Gait normal.  
Skin: Skin is warm and dry. She is not diaphoretic. Psychiatric: Her behavior is normal.  
Nursing note and vitals reviewed. [facility] 
 
 
ED Course Procedures Vitals: 
Patient Vitals for the past 12 hrs: 
 Temp Pulse Resp BP SpO2  
07/31/18 0516 - (!) 111 11 (!) 157/91 98 % 07/31/18 0509 - (!) 112 13 - 98 % 07/31/18 0415 - (!) 112 13 (!) 170/99 98 % 07/31/18 0400 - (!) 115 13 (!) 174/101 98 % 07/31/18 0345 - (!) 139 20 (!) 185/97 97 % 07/31/18 0330 - (!) 115 13 (!) 163/94 98 % 07/31/18 0315 - (!) 116 13 158/89 99 % 07/31/18 0300 - (!) 120 11 (!) 152/92 99 % 07/31/18 0230 - (!) 103 13 144/83 100 % 07/31/18 0215 - (!) 108 17 133/86 100 % 07/31/18 0202 98.5 °F (36.9 °C) (!) 108 14 139/89 100 % Medications ordered:  
Medications  
nalbuphine (NUBAIN) injection 5 mg (not administered) 0.9% sodium chloride 1,000 mL with mvi, adult no. 4 with vit K 10 mL, thiamine 752 mg, folic acid 1 mg infusion ( IntraVENous IV Completed 7/31/18 0452)  
sodium chloride 0.9 % bolus infusion 1,000 mL (0 mL IntraVENous IV Completed 7/31/18 0259) pantoprazole (PROTONIX) injection 40 mg (40 mg IntraVENous Given 7/31/18 0231)  
magnesium sulfate 2 g/50 ml IVPB (premix or compounded) (0 g IntraVENous IV Completed 7/31/18 0332) metoclopramide HCl (REGLAN) injection 10 mg (10 mg IntraVENous Given 7/31/18 0231) diphenhydrAMINE (BENADRYL) injection 12.5 mg (12.5 mg IntraVENous Given 7/31/18 0231)  
nalbuphine (NUBAIN) injection 5 mg (5 mg IntraVENous Given 7/31/18 0231) dextrose (D50W) injection syrg 25 g (25 g IntraVENous Given 7/31/18 0256) dextrose 5% and 0.9% NaCl infusion 1,000 mL (1,000 mL IntraVENous Given 7/31/18 0256) LORazepam (ATIVAN) injection 1 mg (1 mg IntraVENous Given 7/31/18 0355) iopamidol (ISOVUE-370) 76 % injection 75 mL (70 mL IntraVENous Given 7/31/18 0503)  
0.9% sodium chloride infusion 100 mL (97 mL IntraVENous New Bag 7/31/18 0503) cloNIDine HCl (CATAPRES) tablet 0.2 mg (0.2 mg Oral Given 7/31/18 0523) Lab findings: 
Recent Results (from the past 12 hour(s)) EKG, 12 LEAD, INITIAL Collection Time: 07/31/18  2:06 AM  
Result Value Ref Range Ventricular Rate 112 BPM  
 Atrial Rate 112 BPM  
 P-R Interval 122 ms QRS Duration 88 ms Q-T Interval 342 ms QTC Calculation (Bezet) 466 ms Calculated P Axis 76 degrees Calculated R Axis 82 degrees Calculated T Axis 65 degrees Diagnosis Sinus tachycardia Possible Left atrial enlargement Borderline ECG When compared with ECG of 08-MAR-2018 05:03, No significant change was found CBC WITH AUTOMATED DIFF Collection Time: 07/31/18  2:16 AM  
Result Value Ref Range WBC 10.1 4.6 - 13.2 K/uL  
 RBC 4.92 4.20 - 5.30 M/uL  
 HGB 14.7 12.0 - 16.0 g/dL HCT 46.1 (H) 35.0 - 45.0 % MCV 93.7 74.0 - 97.0 FL  
 MCH 29.9 24.0 - 34.0 PG  
 MCHC 31.9 31.0 - 37.0 g/dL  
 RDW 19.3 (H) 11.6 - 14.5 % PLATELET 434 217 - 539 K/uL MPV 9.7 9.2 - 11.8 FL  
 NEUTROPHILS 73 40 - 73 % LYMPHOCYTES 20 (L) 21 - 52 % MONOCYTES 7 3 - 10 % EOSINOPHILS 0 0 - 5 % BASOPHILS 0 0 - 2 %  
 ABS. NEUTROPHILS 7.4 1.8 - 8.0 K/UL  
 ABS. LYMPHOCYTES 2.0 0.9 - 3.6 K/UL  
 ABS. MONOCYTES 0.7 0.05 - 1.2 K/UL  
 ABS. EOSINOPHILS 0.0 0.0 - 0.4 K/UL  
 ABS. BASOPHILS 0.0 0.0 - 0.1 K/UL  
 DF AUTOMATED PROTHROMBIN TIME + INR Collection Time: 07/31/18  2:16 AM  
Result Value Ref Range Prothrombin time 12.6 11.5 - 15.2 sec INR 1.0 0.8 - 1.2 METABOLIC PANEL, COMPREHENSIVE Collection Time: 07/31/18  2:16 AM  
Result Value Ref Range Sodium 133 (L) 136 - 145 mmol/L Potassium 3.7 3.5 - 5.5 mmol/L Chloride 90 (L) 100 - 108 mmol/L  
 CO2 17 (L) 21 - 32 mmol/L Anion gap 26 (H) 3.0 - 18 mmol/L Glucose 55 (L) 74 - 99 mg/dL BUN 21 (H) 7.0 - 18 MG/DL Creatinine 0.80 0.6 - 1.3 MG/DL  
 BUN/Creatinine ratio 26 (H) 12 - 20  GFR est AA >60 >60 ml/min/1.73m2 GFR est non-AA >60 >60 ml/min/1.73m2 Calcium 12.4 (H) 8.5 - 10.1 MG/DL Bilirubin, total 0.5 0.2 - 1.0 MG/DL  
 ALT (SGPT) 60 (H) 13 - 56 U/L  
 AST (SGOT) 173 (H) 15 - 37 U/L Alk. phosphatase 132 (H) 45 - 117 U/L Protein, total 8.2 6.4 - 8.2 g/dL Albumin 3.9 3.4 - 5.0 g/dL Globulin 4.3 (H) 2.0 - 4.0 g/dL A-G Ratio 0.9 0.8 - 1.7 LIPASE Collection Time: 07/31/18  2:16 AM  
Result Value Ref Range Lipase 76 73 - 393 U/L  
ETHYL ALCOHOL Collection Time: 07/31/18  2:16 AM  
Result Value Ref Range ALCOHOL(ETHYL),SERUM 34 (H) 0 - 3 MG/DL MAGNESIUM Collection Time: 07/31/18  2:16 AM  
Result Value Ref Range Magnesium 2.1 1.6 - 2.6 mg/dL AMMONIA Collection Time: 07/31/18  2:16 AM  
Result Value Ref Range Ammonia 31 11 - 32 UMOL/L  
CARDIAC PANEL,(CK, CKMB & TROPONIN) Collection Time: 07/31/18  2:16 AM  
Result Value Ref Range  26 - 192 U/L  
 CK - MB 7.0 (H) <3.6 ng/ml CK-MB Index 6.5 (H) 0.0 - 4.0 % Troponin-I, Qt. <0.02 0.0 - 0.045 NG/ML  
D DIMER Collection Time: 07/31/18  2:16 AM  
Result Value Ref Range D DIMER 6.83 (H) <0.46 ug/ml(FEU) URINALYSIS W/ RFLX MICROSCOPIC Collection Time: 07/31/18  3:51 AM  
Result Value Ref Range Color YELLOW Appearance CLEAR Specific gravity 1.020 1.005 - 1.030    
 pH (UA) 5.0 5.0 - 8.0 Protein NEGATIVE  NEG mg/dL Glucose >1000 (A) NEG mg/dL Ketone 40 (A) NEG mg/dL Bilirubin NEGATIVE  NEG Blood NEGATIVE  NEG Urobilinogen 0.2 0.2 - 1.0 EU/dL Nitrites NEGATIVE  NEG Leukocyte Esterase NEGATIVE  NEG    
DRUG SCREEN, URINE Collection Time: 07/31/18  3:51 AM  
Result Value Ref Range BENZODIAZEPINES NEGATIVE  NEG    
 BARBITURATES NEGATIVE  NEG    
 THC (TH-CANNABINOL) NEGATIVE  NEG    
 OPIATES NEGATIVE  NEG    
 PCP(PHENCYCLIDINE) NEGATIVE  NEG    
 COCAINE NEGATIVE  NEG    
 AMPHETAMINES NEGATIVE  NEG METHADONE NEGATIVE  NEG  HDSCOM (NOTE) GLUCOSE, POC Collection Time: 07/31/18  5:18 AM  
Result Value Ref Range Glucose (POC) 377 (H) 70 - 110 mg/dL EKG interpretation by ED Physician: 
Sinus tach with no acute st tw changes Rate 112, pr 122, qtc 466 Not sig changed Pulse ox: 100% ra Prehosp: sinus tach with no acute st tw changes Rate 111, pr 128, qtc 453 Not sig diff from initial here X-Ray, CT or other radiology findings or impressions: CTA CHEST W OR W WO CONT    (Results Pending)  
no pe per prelim reading Progress notes, Consult notes or additional Procedure notes:  
4:14 AM 
Pt resting comfortably in bed. No distress. abd soft. Had to wake her up to tell me she was still having abd pain. No nausea. 5:35 AM 
Still very comfortable appearing. abd soft,. No pain on exam.  
Doubt acute ischemia, need for abd ct, vascular study, abd, admission. Pt not taking clonidine as directed. Will place on meds to help with withdrawal 
I have discussed with patient and/or family/sig other the results, interpretation of any imaging if performed, suspected diagnosis and treatment plan to include instructions regarding the diagnoses listed to which understanding was expressed with all questions answered ED Critical Care Note System at risk for life threatening failure: cardiac, pulm, gi, renal 
Associated problems: tachycardia, htn, hypoglycemia Critical Care services provided: bedside management, consult, documentation Excluded procedures (time not included in critical care): ecg interp Total Critical Care Time (in minutes) 48 Reevaluation of patient:  
stable Disposition: 
Diagnosis: 1. Abdominal pain, acute, epigastric 2. Acute chest pain 3. Non-intractable vomiting with nausea, unspecified vomiting type 4. Dehydration 5. Hypoglycemia 6. Alcohol abuse Disposition: home Follow-up Information Follow up With Details Comments Contact Info  Ortiz Canseco., MD Schedule an appointment as soon as possible for a visit this week for recheck in office [address] [facility] 
[place] [address] [id number] 
[phone] [facility] EMERGENCY DEPT  If symptoms worsen [address] 
[phone] Patient's Medications Start Taking DIAZEPAM (VALIUM) 5 MG TABLET    Take 1 Tab by mouth every twelve (12) hours as needed for Anxiety. Max Daily Amount: 10 mg. Indications: Alcohol Withdrawal Syndrome Continue Taking ALBUTEROL (PROVENTIL VENTOLIN) 2.5 MG /3 ML (0.083 %) NEBULIZER SOLUTION    1.5 mL by Nebulization route every four (4) hours as needed for Wheezing or Shortness of Breath. BUSPIRONE (BUSPAR) 15 MG TABLET    Take 1 Tab by mouth two (2) times a day. CALCIUM-CHOLECALCIFEROL, D3, (CALTRATE 600+D) TABLET      
 CETIRIZINE (ZYRTEC) 10 MG TABLET      
 CREON 24,000-76,000 -120,000 UNIT CAPSULE IPRATROPIUM-ALBUTEROL (COMBIVENT)  MCG/ACTUATION INHALER    Take 2 Puffs by inhalation every six (6) hours. NEBULIZER & COMPRESSOR MACHINE    1 Each by Does Not Apply route every six (6) hours as needed. OXYBUTYNIN (DITROPAN) 5 MG TABLET    Take 5 mg by mouth three (3) times daily. PREVIDENT 5000 PLUS 1.1 % CREA QUETIAPINE (SEROQUEL) 300 MG TABLET      
 SUMATRIPTAN (IMITREX) 100 MG TABLET    TAKE 1 TO 2 TABLETS BY MOUTH DAILY AS NEEDED FOR MIGRAINE . DO NOT EXCEED 200 MG IN 24 HOUR PERIOD  
 TAMSULOSIN (FLOMAX) 0.4 MG CAPSULE    Take 1 Cap by mouth daily (after dinner). Start one week prior to stone surgery. TOPIRAMATE (TOPAMAX) 100 MG TABLET These Medications have changed Modified Medication Previous Medication CLONIDINE HCL (CATAPRES) 0.2 MG TABLET cloNIDine HCl (CATAPRES) 0.2 mg tablet Take 0.5 Tabs by mouth two (2) times a day. Take 0.5 Tabs by mouth two (2) times a day. FOLIC ACID (FOLVITE) 1 MG TABLET folic acid (FOLVITE) 1 mg tablet Take 1 Tab by mouth daily.     Take 1 Tab by mouth daily.  
 GABAPENTIN (NEURONTIN) 300 MG CAPSULE gabapentin (NEURONTIN) 300 mg capsule Take 1 Cap by mouth three (3) times daily. 800 mg two (2) times a day. ONDANSETRON (ZOFRAN ODT) 8 MG DISINTEGRATING TABLET ondansetron (ZOFRAN ODT) 8 mg disintegrating tablet Take 1 Tab by mouth every eight (8) hours as needed for Nausea. Take 1 Tab by mouth every eight (8) hours as needed for Nausea. PANTOPRAZOLE (PROTONIX) 40 MG TABLET pantoprazole (PROTONIX) 40 mg tablet Take 1 Tab by mouth Before breakfast and dinner. Take 1 Tab by mouth Before breakfast and dinner. SUCRALFATE (CARAFATE) 100 MG/ML SUSPENSION sucralfate (CARAFATE) 100 mg/mL suspension Take 10 mL by mouth Before breakfast, lunch, dinner and at bedtime. Take 10 mL by mouth Before breakfast, lunch, dinner and at bedtime. THIAMINE MONONITRATE (B-1) 100 MG TABLET Thiamine Mononitrate (B-1) 100 mg tablet Take 1 Tab by mouth daily. Take 1 Tab by mouth daily. TRAMADOL (ULTRAM) 50 MG TABLET traMADol (ULTRAM) 50 mg tablet Take 1 Tab by mouth every six (6) hours as needed for Pain. Max Daily Amount: 200 mg. Indications: Pain Stop Taking IBUPROFEN (MOTRIN) 800 MG TABLET    take 1 tablet every 6 hours if needed for pain and inflammation OXYCODONE IR (ROXICODONE) 5 MG IMMEDIATE RELEASE TABLET    Take 1 Tab by mouth every six (6) hours as needed for Pain. Max Daily Amount: 20 mg. TRIMETHOPRIM-SULFAMETHOXAZOLE (BACTRIM DS, SEPTRA DS) 160-800 MG PER TABLET    Take 1 Tab by mouth two (2) times a day.

## 2018-07-31 NOTE — ED NOTES
5:59 AM 
07/31/18 Discharge instructions given to patient (name) with verbalization of understanding. Patient accompanied by self. Patient discharged with the following prescriptions multiple prescriptions including Tramadol, Neurontin, Protonix, Zofran, Valium etc.. Patient discharged to home (destination). [place] RN After discharge papers with patient, patient stating that she wants stronger pain medication to go home with because she already has Tramadol at home. Had long, detailed conversation with patient about the use of narcotics and alcohol and about her use of continuing alcohol and the importance of taking her vitamins as well as her PPI. Patient verbalizes understanding at this time.

## 2018-07-31 NOTE — ED NOTES
Assisted patient to bed side commode and then assisted patient back to bed. Patient insisting that she have pain medication. Explained to patient that she has Ativan ordered so we will try that as soon as finish helping getting her situated. Patient states that she needs pain medication and that is not pain medication. Tried to explain to patient that Ativan can help. .and patient cut me off saying, \"I know what Ativan does. \"

## 2018-07-31 NOTE — ED TRIAGE NOTES
Patient brought in by EMS for c/o: 
 
-chest pain x4 hours 
-abdominal pain x2 days 
-have not slept or eaten anything in 2 days 
-headache x2 days. -vomiting all day Given 324 PO aspirin by EMS Patient stating \"I am an alcoholic, my last drink was about 3-4 hours ago, and I want to stop drinking\".

## 2018-07-31 NOTE — ED TRIAGE NOTES
Pt arrived through triage with c/o abdominal pain and chest pain. Seen earlier for same and discharged. Pt lethargic in triage. RN inquired as to why and pt states, \"I haven't eaten in 2 days. \" Pt having difficult time keeping eyes open while speaking with RN.

## 2018-07-31 NOTE — ED NOTES
Patient was sleeping in the room when this nurse went in to introduce herself, patient had to be wakened several times and then jumped when she woke up

## 2018-08-01 LAB
ALBUMIN SERPL-MCNC: 2.7 G/DL (ref 3.4–5)
ALBUMIN/GLOB SERPL: 1 {RATIO} (ref 0.8–1.7)
ALP SERPL-CCNC: 97 U/L (ref 45–117)
ALT SERPL-CCNC: 56 U/L (ref 13–56)
ANION GAP SERPL CALC-SCNC: 7 MMOL/L (ref 3–18)
AST SERPL-CCNC: 145 U/L (ref 15–37)
ATRIAL RATE: 108 BPM
ATRIAL RATE: 112 BPM
BILIRUB SERPL-MCNC: 0.9 MG/DL (ref 0.2–1)
BUN SERPL-MCNC: 12 MG/DL (ref 7–18)
BUN/CREAT SERPL: 20 (ref 12–20)
CALCIUM SERPL-MCNC: 8.3 MG/DL (ref 8.5–10.1)
CALCULATED P AXIS, ECG09: 70 DEGREES
CALCULATED P AXIS, ECG09: 76 DEGREES
CALCULATED R AXIS, ECG10: 77 DEGREES
CALCULATED R AXIS, ECG10: 82 DEGREES
CALCULATED T AXIS, ECG11: 65 DEGREES
CALCULATED T AXIS, ECG11: 66 DEGREES
CHLORIDE SERPL-SCNC: 103 MMOL/L (ref 100–108)
CO2 SERPL-SCNC: 29 MMOL/L (ref 21–32)
CREAT SERPL-MCNC: 0.59 MG/DL (ref 0.6–1.3)
DIAGNOSIS, 93000: NORMAL
DIAGNOSIS, 93000: NORMAL
ERYTHROCYTE [DISTWIDTH] IN BLOOD BY AUTOMATED COUNT: 19.1 % (ref 11.6–14.5)
GLOBULIN SER CALC-MCNC: 2.8 G/DL (ref 2–4)
GLUCOSE SERPL-MCNC: 102 MG/DL (ref 74–99)
HCT VFR BLD AUTO: 34 % (ref 35–45)
HGB BLD-MCNC: 10.7 G/DL (ref 12–16)
LIPASE SERPL-CCNC: 1367 U/L (ref 73–393)
MCH RBC QN AUTO: 28.9 PG (ref 24–34)
MCHC RBC AUTO-ENTMCNC: 31.5 G/DL (ref 31–37)
MCV RBC AUTO: 91.9 FL (ref 74–97)
P-R INTERVAL, ECG05: 122 MS
P-R INTERVAL, ECG05: 126 MS
PLATELET # BLD AUTO: 191 K/UL (ref 135–420)
PMV BLD AUTO: 9.3 FL (ref 9.2–11.8)
POTASSIUM SERPL-SCNC: 3.3 MMOL/L (ref 3.5–5.5)
PROT SERPL-MCNC: 5.5 G/DL (ref 6.4–8.2)
Q-T INTERVAL, ECG07: 342 MS
Q-T INTERVAL, ECG07: 344 MS
QRS DURATION, ECG06: 82 MS
QRS DURATION, ECG06: 88 MS
QTC CALCULATION (BEZET), ECG08: 460 MS
QTC CALCULATION (BEZET), ECG08: 466 MS
RBC # BLD AUTO: 3.7 M/UL (ref 4.2–5.3)
SODIUM SERPL-SCNC: 139 MMOL/L (ref 136–145)
VENTRICULAR RATE, ECG03: 108 BPM
VENTRICULAR RATE, ECG03: 112 BPM
WBC # BLD AUTO: 8.6 K/UL (ref 4.6–13.2)

## 2018-08-01 PROCEDURE — 74011250637 HC RX REV CODE- 250/637: Performed by: HOSPITALIST

## 2018-08-01 PROCEDURE — 77030029684 HC NEB SM VOL KT MONA -A

## 2018-08-01 PROCEDURE — 74011250636 HC RX REV CODE- 250/636: Performed by: NURSE PRACTITIONER

## 2018-08-01 PROCEDURE — 74011250636 HC RX REV CODE- 250/636: Performed by: HOSPITALIST

## 2018-08-01 PROCEDURE — 74011000250 HC RX REV CODE- 250: Performed by: HOSPITALIST

## 2018-08-01 PROCEDURE — 65270000029 HC RM PRIVATE

## 2018-08-01 PROCEDURE — 36415 COLL VENOUS BLD VENIPUNCTURE: CPT | Performed by: HOSPITALIST

## 2018-08-01 PROCEDURE — 80053 COMPREHEN METABOLIC PANEL: CPT | Performed by: HOSPITALIST

## 2018-08-01 PROCEDURE — 74011000258 HC RX REV CODE- 258: Performed by: HOSPITALIST

## 2018-08-01 PROCEDURE — 77030027138 HC INCENT SPIROMETER -A

## 2018-08-01 PROCEDURE — 85027 COMPLETE CBC AUTOMATED: CPT | Performed by: HOSPITALIST

## 2018-08-01 PROCEDURE — 83690 ASSAY OF LIPASE: CPT | Performed by: HOSPITALIST

## 2018-08-01 RX ORDER — POTASSIUM CHLORIDE 7.45 MG/ML
10 INJECTION INTRAVENOUS
Status: COMPLETED | OUTPATIENT
Start: 2018-08-01 | End: 2018-08-02

## 2018-08-01 RX ADMIN — DEXTROSE MONOHYDRATE AND SODIUM CHLORIDE 125 ML/HR: 5; .45 INJECTION, SOLUTION INTRAVENOUS at 03:43

## 2018-08-01 RX ADMIN — FOLIC ACID 1 MG: 1 TABLET ORAL at 09:05

## 2018-08-01 RX ADMIN — THERA TABS 1 TABLET: TAB at 09:03

## 2018-08-01 RX ADMIN — Medication 0.5 MG: at 23:03

## 2018-08-01 RX ADMIN — SUCRALFATE 1 G: 1 SUSPENSION ORAL at 09:04

## 2018-08-01 RX ADMIN — BUSPIRONE HYDROCHLORIDE 15 MG: 5 TABLET ORAL at 09:03

## 2018-08-01 RX ADMIN — OXYBUTYNIN CHLORIDE 5 MG: 5 TABLET ORAL at 09:04

## 2018-08-01 RX ADMIN — PANTOPRAZOLE SODIUM 40 MG: 40 TABLET, DELAYED RELEASE ORAL at 16:58

## 2018-08-01 RX ADMIN — GABAPENTIN 300 MG: 300 CAPSULE ORAL at 16:58

## 2018-08-01 RX ADMIN — PANCRELIPASE 2 CAPSULE: 10000; 34000; 55000 CAPSULE, DELAYED RELEASE ORAL at 16:58

## 2018-08-01 RX ADMIN — SUCRALFATE 1 G: 1 SUSPENSION ORAL at 22:41

## 2018-08-01 RX ADMIN — POTASSIUM CHLORIDE 10 MEQ: 10 INJECTION, SOLUTION INTRAVENOUS at 09:05

## 2018-08-01 RX ADMIN — Medication 0.5 MG: at 03:10

## 2018-08-01 RX ADMIN — ENOXAPARIN SODIUM 40 MG: 100 INJECTION SUBCUTANEOUS at 20:33

## 2018-08-01 RX ADMIN — Medication 10 ML: at 13:53

## 2018-08-01 RX ADMIN — Medication 10 ML: at 23:03

## 2018-08-01 RX ADMIN — LORATADINE 10 MG: 10 TABLET ORAL at 09:03

## 2018-08-01 RX ADMIN — IPRATROPIUM BROMIDE AND ALBUTEROL SULFATE 3 ML: .5; 3 SOLUTION RESPIRATORY (INHALATION) at 09:05

## 2018-08-01 RX ADMIN — Medication 10 ML: at 06:56

## 2018-08-01 RX ADMIN — BUSPIRONE HYDROCHLORIDE 15 MG: 5 TABLET ORAL at 18:04

## 2018-08-01 RX ADMIN — Medication 0.5 MG: at 20:33

## 2018-08-01 RX ADMIN — PANTOPRAZOLE SODIUM 40 MG: 40 TABLET, DELAYED RELEASE ORAL at 09:05

## 2018-08-01 RX ADMIN — Medication 100 MG: at 09:04

## 2018-08-01 RX ADMIN — PANCRELIPASE 2 CAPSULE: 10000; 34000; 55000 CAPSULE, DELAYED RELEASE ORAL at 22:41

## 2018-08-01 RX ADMIN — CLONIDINE HYDROCHLORIDE 0.1 MG: 0.1 TABLET ORAL at 09:05

## 2018-08-01 RX ADMIN — OXYBUTYNIN CHLORIDE 5 MG: 5 TABLET ORAL at 22:42

## 2018-08-01 RX ADMIN — SUCRALFATE 1 G: 1 SUSPENSION ORAL at 16:58

## 2018-08-01 RX ADMIN — DEXTROSE MONOHYDRATE AND SODIUM CHLORIDE 125 ML/HR: 5; .45 INJECTION, SOLUTION INTRAVENOUS at 12:54

## 2018-08-01 RX ADMIN — CLONIDINE HYDROCHLORIDE 0.1 MG: 0.1 TABLET ORAL at 18:04

## 2018-08-01 RX ADMIN — OXYBUTYNIN CHLORIDE 5 MG: 5 TABLET ORAL at 16:58

## 2018-08-01 RX ADMIN — Medication 0.5 MG: at 10:28

## 2018-08-01 RX ADMIN — Medication 0.5 MG: at 16:58

## 2018-08-01 RX ADMIN — Medication 0.5 MG: at 13:52

## 2018-08-01 RX ADMIN — PANCRELIPASE 2 CAPSULE: 10000; 34000; 55000 CAPSULE, DELAYED RELEASE ORAL at 09:05

## 2018-08-01 RX ADMIN — POTASSIUM CHLORIDE 10 MEQ: 10 INJECTION, SOLUTION INTRAVENOUS at 11:09

## 2018-08-01 RX ADMIN — SUCRALFATE 1 G: 1 SUSPENSION ORAL at 13:22

## 2018-08-01 RX ADMIN — GABAPENTIN 300 MG: 300 CAPSULE ORAL at 22:42

## 2018-08-01 RX ADMIN — Medication 0.5 MG: at 06:49

## 2018-08-01 RX ADMIN — GABAPENTIN 300 MG: 300 CAPSULE ORAL at 09:05

## 2018-08-01 NOTE — ED NOTES
TRANSFER - ED to INPATIENT REPORT: 
 
SBAR report made available to receiving floor on this patient being transferred to 2400  for routine progression of care Admitting diagnosis Acute pancreatitis Information from the following report(s) SBAR, ED Summary, [facility] and Recent Results was made available to receiving floor. Lines:    
 
Medication list confirmed with patient Opportunity for questions and clarification was provided. Patient is oriented to time, place, person and situation patient is a difficult stick, protect the IV Patient is  continent and ambulatory without assist 
  
Valuables transported with patient Patient transported with: 
 [employer]

## 2018-08-01 NOTE — H&P
Medicine History and Physical 
 
Patient: Fior Stone   Age:  46 y.o. Assessment Principal Problem: 
  Acute pancreatitis (2/19/2016) Active Problems: 
  Alcohol dependence ([facility] (1/28/2016) HTN (hypertension) (1/28/2016) COPD (chronic obstructive pulmonary disease) ([facility] (2/25/2016) Plan 1)  Acute alcoholic pancreatitis 
 - + alcohol level this am , - level this pm, just stopped drinking abruptly 
 - IVF, npo but sips and meds, dilaudid q 3  
 - cmp , mag, lipase in am. 
 
2)  Alcohol dependence 
 - CIWA 3)  Bipolar 
 - home meds 4) HTN/COPD 
 - home meds DISPO 
-Pt to be admitted  at this time for reasons addressed above, continued hospitalization for ongoing assessment and treatment indicated Anticipated Date of Discharge: 2-3 days Anticipated Disposition (home, SNF) : home Chief Complaint:  
Chief Complaint Patient presents with  Chest Pain  Abdominal Pain HPI:  
Fior Stone is a 46y.o. year old female who presents with  2 days of abdominal pain N/V. She is an on again off again alcoholic who restarted drinking recently and stopped abruptly 2 days ago. She states she has done this countless numbers of times previously. She has had acute pancreatitis before. On interview she is somber and regretful and wants to stop drinking all together for her 3 sons and many grandchildren. In ed elevated lipase seen and US with possible CBD dialation however this was present in past.  She will be admitted for diet advancement IVF and pain meds. Review of Systems - positive responses in bold type Constitutional: Negative for fever, chills, diaphoresis and unexpected weight change. HENT: Negative for ear pain, congestion, sore throat, rhinorrhea, drooling, trouble swallowing, neck pain and tinnitus. Eyes: Negative for photophobia, pain, redness and visual disturbance.   
Respiratory: negative for shortness of breath, cough, choking, chest tightness, wheezing or stridor. Cardiovascular: Negative for chest pain, palpitations and leg swelling. Gastrointestinal: Negative for nausea, vomiting, abdominal pain, diarrhea, constipation, blood in stool, abdominal distention and anal bleeding. Genitourinary: Negative for dysuria, urgency, frequency, hematuria, flank pain and difficulty urinating. Musculoskeletal: Negative for back pain and arthralgias. Skin: Negative for color change, rash and wound. Neurological: Negative for dizziness, seizures, syncope, speech difficulty, light-headedness or headaches. Hematological: Does not bruise/bleed easily. Psychiatric/Behavioral: Negative for suicidal ideas, hallucinations, behavioral problems, self-injury or agitation Past Medical History: 
Past Medical History:  
Diagnosis Date  Alcohol abuse  Anxiety  Asthma  Bipolar disorder ([facility]  Common migraine  COPD (chronic obstructive pulmonary disease) ([facility])  Depression  Esophageal reflux  Gout  Hypertension  Inverted nipple Left breast always have.  Kidney stone on left side  Recurrent UTI  Staghorn renal calculus Past Surgical History: 
Past Surgical History:  
Procedure Laterality Date  HX CARPAL TUNNEL RELEASE  HX GYN    
 tubal ligation  HX HEENT    
 HX LUMBAR LAMINECTOMY  HX ORTHOPAEDIC Family History: 
Family History Problem Relation Age of Onset  Family history unknown: Yes  
 
 
Social History: 
Social History Social History  Marital status:  Spouse name: N/A  
 Number of children: N/A  
 Years of education: N/A Social History Main Topics  Smoking status: Current Every Day Smoker Packs/day: 1.00 Years: 33.00 Types: Cigarettes  Smokeless tobacco: Never Used  Alcohol use Yes Comment: daily, liquor  Drug use: No  
 Sexual activity: Not on file Other Topics Concern  Not on file Social History Narrative Home Medications: 
Prior to Admission medications Medication Sig Start Date End Date Taking? Authorizing Provider  
gabapentin (NEURONTIN) 300 mg capsule Take 1 Cap by mouth three (3) times daily. 7/31/18   Irene Romero MD  
traMADol Reynaldo Alvarado) 50 mg tablet Take 1 Tab by mouth every six (6) hours as needed for Pain. Max Daily Amount: 200 mg. Indications: Pain 7/31/18   Irene Romero MD  
ondansetron [facility] ODT) 8 mg disintegrating tablet Take 1 Tab by mouth every eight (8) hours as needed for Nausea. 7/31/18   Irene Romero MD  
folic acid (FOLVITE) 1 mg tablet Take 1 Tab by mouth daily. 7/31/18   Irene Romero MD  
pantoprazole (PROTONIX) 40 mg tablet Take 1 Tab by mouth Before breakfast and dinner. 7/31/18   Irene Romero MD  
sucralfate (CARAFATE) 100 mg/mL suspension Take 10 mL by mouth Before breakfast, lunch, dinner and at bedtime. 7/31/18   Irene Romero MD  
Thiamine Mononitrate (B-1) 100 mg tablet Take 1 Tab by mouth daily. 7/31/18   Irene Romero MD  
cloNIDine HCl (CATAPRES) 0.2 mg tablet Take 0.5 Tabs by mouth two (2) times a day. 7/31/18   Irene Romero MD  
diazePAM (VALIUM) 5 mg tablet Take 1 Tab by mouth every twelve (12) hours as needed for Anxiety. Max Daily Amount: 10 mg. Indications: Alcohol Withdrawal Syndrome 7/31/18   Irene Romero MD  
PREVIDENT 5000 PLUS 1.1 % crea  5/2/18   Historical Provider  
tamsulosin (FLOMAX) 0.4 mg capsule Take 1 Cap by mouth daily (after dinner). Start one week prior to stone surgery. 5/30/18   Trino Corea MD  
calcium-cholecalciferol, D3, (CALTRATE 600+D) tablet  4/20/18   Historical Provider  
topiramate (TOPAMAX) 100 mg tablet  3/22/18   Historical Provider QUEtiapine (SEROQUEL) 300 mg tablet  3/22/18   Historical Provider CREON 24,000-76,000 -120,000 unit capsule  3/28/18   Historical Provider  
cetirizine (ZYRTEC) 10 mg tablet  3/22/18   Historical Provider SUMAtriptan (IMITREX) 100 mg tablet TAKE 1 TO 2 TABLETS BY MOUTH DAILY AS NEEDED FOR MIGRAINE . DO NOT EXCEED 200 MG IN 24 HOUR PERIOD 2/28/18   Historical Provider  
oxybutynin (DITROPAN) 5 mg tablet Take 5 mg by mouth three (3) times daily. Historical Provider  
busPIRone (BUSPAR) 15 mg tablet Take 1 Tab by mouth two (2) times a day. 3/15/18   Luis Elizabeth NP  
albuterol (PROVENTIL VENTOLIN) 2.5 mg /3 mL (0.083 %) nebulizer solution 1.5 mL by Nebulization route every four (4) hours as needed for Wheezing or Shortness of Breath. 2/25/16   Falguni Escobar MD  
Nebulizer & Compressor machine 1 Each by Does Not Apply route every six (6) hours as needed. 2/25/16   aFlguni Escobar MD  
ipratropium-albuterol (COMBIVENT)  mcg/actuation inhaler Take 2 Puffs by inhalation every six (6) hours. Historical Provider Allergies: Allergies Allergen Reactions  Lithium Anaphylaxis  Amitriptyline Other (comments)  Elavil Other (comments) Left leg went numb  Morphine Hives Physical Exam:  
 
Visit Vitals  /82  Pulse 95  Temp 98.6 °F (37 °C)  Resp 13  SpO2 95% Physical Exam: 
General appearance: alert, cooperative, no distress, appears stated age Head: Normocephalic, without obvious abnormality, atraumatic Neck: supple, trachea midline Lungs: clear to auscultation bilaterally Heart: regular rate and rhythm, S1, S2 normal, no murmur, click, rub or gallop Abdomen: soft, non-tender. Bowel sounds normal. No masses,  no organomegaly Extremities: extremities normal, atraumatic, no cyanosis or edema Skin: Skin color, texture, turgor normal. No rashes or lesions Neurologic: Grossly normal 
 
 
Intake and Output: 
Current Shift:    
Last three shifts:    
 
Lab/Data Reviewed: 
CMP:  
Lab Results Component Value Date/Time   07/31/2018 03:15 PM  
 K 4.0 07/31/2018 03:15 PM  
 CL 98 (L) 07/31/2018 03:15 PM  
 CO2 29 07/31/2018 03:15 PM  
 AGAP 9 07/31/2018 03:15 PM  
  (H) 07/31/2018 03:15 PM  
 BUN 18 07/31/2018 03:15 PM  
 CREA 0.76 07/31/2018 03:15 PM  
 GFRAA >60 07/31/2018 03:15 PM  
 GFRNA >60 07/31/2018 03:15 PM  
 CA 9.9 07/31/2018 03:15 PM  
 ALB 3.6 07/31/2018 03:15 PM  
 TP 7.4 07/31/2018 03:15 PM  
 GLOB 3.8 07/31/2018 03:15 PM  
 AGRAT 0.9 07/31/2018 03:15 PM  
 SGOT 127 (H) 07/31/2018 03:15 PM  
 ALT 56 07/31/2018 03:15 PM  
 
CBC:  
Lab Results Component Value Date/Time WBC 12.6 07/31/2018 03:15 PM  
 HGB 13.2 07/31/2018 03:15 PM  
 HCT 39.9 07/31/2018 03:15 PM  
  07/31/2018 03:15 PM  
 
All Cardiac Markers in the last 24 hours:  
Lab Results Component Value Date/Time  (H) 07/31/2018 03:15 PM  
 CKMB 10.1 (H) 07/31/2018 03:15 PM  
 CKND1 4.5 (H) 07/31/2018 03:15 PM  
 TROIQ <0.02 07/31/2018 03:15 PM  
 
 
Denver Lares, MD 
 
July 31, 2018

## 2018-08-01 NOTE — PROGRESS NOTES
Patient complained of pain to PIV site to right hand. I observed extravasation of Potassium Chloride to site. Swelling observed to patient's right hand, right wrist, and forearm. Swelling borders in right forearm marked. Potassium Chloride infusion immediately stopped and PIV site removed. Site is cool to touch. Patient has ice pack to right hand/wrist/forearm. Samara Devries RN informed of extravasation and swelling. Patient reports that she has less pain to right hand since PIV site removed.

## 2018-08-01 NOTE — INTERDISCIPLINARY ROUNDS
IDR Summary Patient: Spenser Mari MRN: [id number]    Age: 46 y.o.  : 1965 Admit Diagnosis: Acute pancreatitis DIET status: NPO Lines/Tubes:  
IV: YES   Needed: YES Stanley: NO  Needed:NO Central Line: NO Needed: NO 
 
 
VTE Prophylaxis: Chemical 
 
Mobility needs: No  
 
PT ordered:  NO PT eval on chart: NO   
OT ordered:  NO OT eval on chart: NO     
ST ordered:  NO ST eval on chart:  NO  
 
Disposition/Care Management: 
Discharge plan: Home Home Health ordered? NO Recommended DME from PT/OT:     
DME ordered? NO  
 
SNF- has patient been matched? NO Accepting bed? NO Does patient require insurance auth? NO Barriers to discharge:  
Financial concerns:No  
PCP: Roxy Field MD   
: NO Interventions: LOS: 1 days Expected days until discharge: 1-2 days Signed:  
 
MARK Parrish [address] [place] [address] Hospitalist Division Pager:  [phone] Office:  [phone]

## 2018-08-01 NOTE — PROGRESS NOTES
Problem: Falls - Risk of 
Goal: *Absence of Falls Document Venu Tomas Fall Risk and appropriate interventions in the flowsheet. Outcome: Progressing Towards Goal 
Fall Risk Interventions: 
Mobility Interventions: PT Consult for mobility concerns, PT Consult for assist device competence, OT consult for ADLs Medication Interventions: Patient to call before getting OOB, Teach patient to arise slowly, Evaluate medications/consider consulting pharmacy Elimination Interventions: Patient to call for help with toileting needs, Call light in reach History of Falls Interventions: Bed/chair exit alarm, Evaluate medications/consider consulting pharmacy, Investigate reason for fall

## 2018-08-01 NOTE — PROGRESS NOTES
Problem: Falls - Risk of 
Goal: *Absence of Falls Document Quin Bryan Fall Risk and appropriate interventions in the flowsheet. Outcome: Progressing Towards Goal 
Fall Risk Interventions: 
Mobility Interventions: Bed/chair exit alarm, Patient to call before getting OOB, PT Consult for mobility concerns Medication Interventions: Patient to call before getting OOB, Bed/chair exit alarm Elimination Interventions: Patient to call for help with toileting needs, Bed/chair exit alarm, Call light in reach, Toileting schedule/hourly rounds History of Falls Interventions: Bed/chair exit alarm, Door open when patient unattended

## 2018-08-01 NOTE — PROGRESS NOTES
conducted an initial consultation and Spiritual Assessment for Sigifredo Hills, who is a 46 y.o.,female. Patients Primary Language is: [place]. According to the patients EMR Catholic Affiliation is: [place]. The reason the Patient came to the hospital is:  
Patient Active Problem List  
 Diagnosis Date Noted  Recurrent UTI (urinary tract infection) 04/24/2018  Staghorn renal calculus 04/24/2018  UTI (urinary tract infection) 03/11/2018  Bronchitis 03/11/2018  Syncope 03/08/2018  Alcohol withdrawal ([facility] 03/08/2018  GI bleed 03/08/2018  Hyponatremia 03/08/2018  Hypercalcemia 03/08/2018  Dizziness 09/14/2017  Pancreatitis 09/14/2017  Hypokalemia 09/14/2017  Anemia 09/14/2017  Encephalopathy 09/14/2017  Nicotine withdrawal 02/27/2016  Pneumonia 02/25/2016  COPD (chronic obstructive pulmonary disease) ([facility] 02/25/2016  Tylenol overdose 02/20/2016  SIRS (systemic inflammatory response syndrome) (HCC) 02/20/2016  Acute pancreatitis 02/19/2016  Pancreatitis, alcoholic, acute 25/02/2501  Tachycardia 01/28/2016  Alcohol dependence ([facility] 01/28/2016  Esophageal stricture 01/28/2016  Dilated pancreatic duct 01/28/2016  Common bile duct dilation 01/28/2016  Elevated LFTs 01/28/2016  Bipolar depression ([facility] 01/28/2016  
 HTN (hypertension) 01/28/2016  Bipolar disorder (manic depression) [facility] 03/08/2013 The  provided the following Interventions: 
Initiated a relationship of care and support. Explored issues of owen, belief, spirituality and Orthodox/ritual needs while hospitalized. Listened empathically. Provided information about Spiritual Care Services. Offered prayer and assurance of continued prayers on patient's behalf. Chart reviewed. The following outcomes were achieved: 
Patient shared limited information about both their medical narrative and spiritual journey/beliefs.  
Patient processed feeling about current hospitalization. Patient expressed gratitude for 's visit. Assessment: 
Patient does not have any Hinduism/cultural needs that will affect patients preferences in health care. There are no further spiritual or Hinduism issues which require intervention at this time. Plan: 
Chaplains will continue to follow and will provide pastoral care on an as needed/requested basis.  recommends bedside caregivers page  on duty if patient shows signs of acute spiritual or emotional distress. Shanthi Aquino M.Div. [facility] Spiritual Care Department [phone]

## 2018-08-01 NOTE — PROGRESS NOTES
Nutrition initial assessment/Plan of care RECOMMENDATIONS:  
 
1. Advance diet when medically indicated and per Pt tolerance 2. Monitor weight, labs and PO intake 3. RD to follow GOALS:  
 
1. PO intake meets >75% of protein/calorie needs by 8/6  
2. Weight Maintenance (+/- 1-2 lb) by 8/8 ASSESSMENT:  
 
Weight status is classified as normal per BMI of 22. Per documented weights Pt w/ a 7 lb or 5.5% loss over the past 2 months. Currently NPO. Labs noted. Pt w/ hypokalemia, hypocalcemia, hypoalbuminemia, elevated LFTs and elevated lipase (1367). Nutrition recommendations listed. RD to follow. Nutrition Diagnoses: Altered nutrition related lab values related to pancreatitis as evidenced by an elevated lipase (1367). Unintentional weight loss related to inadequate energy intake PTA as evidenced by a 7 lb or 5.5% loss over the past 2 months per documented weights. Nutrition Risk:  [] High  [x] Moderate []  Low SUBJECTIVE/OBJECTIVE:  
  
Admitted with acute pancreatitis and alcohol withdrawal syndrome. PMHx significant for ETOH abuse, COPD, HTN, gout and Staghorn renal calculus. Pt w/ N/V/ABD pain x 2 days. Denies N/V today but still c/o ABD pain. Plan is NPO w/ sips of clears and monitor labs. Per documented weights Pt w/ a 7 lb or 5.5% loss over the past 2 months. Pt seen in room sleeping during visit and would not wake to sound of my voice. Will follow up to obtain Hx and will monitor diet advancement. Information Obtained from:  
 [x] Chart Review [x] Patient 
 [] Family/Caregiver 
 [] Nurse/Physician 
 [] Interdisciplinary Meeting/Rounds Diet: NPO Medications: [x] Reviewed IV: D5 1/2 NS @125 mL/hr (510 Kcal/d) Allergies: [x] Reviewed Encounter Diagnoses ICD-10-CM ICD-9-CM 1. Alcohol-induced acute pancreatitis, unspecified complication status J30.01 577.0 Past Medical History:  
Diagnosis Date  Alcohol abuse  Anxiety  Asthma  Bipolar disorder ([facility]  Common migraine  COPD (chronic obstructive pulmonary disease) (HCC)  Depression  Esophageal reflux  Gout  Hypertension  Inverted nipple Left breast always have.  Kidney stone on left side  Recurrent UTI  Staghorn renal calculus Labs:   
Lab Results Component Value Date/Time Sodium 139 08/01/2018 05:39 AM  
 Potassium 3.3 (L) 08/01/2018 05:39 AM  
 Chloride 103 08/01/2018 05:39 AM  
 CO2 29 08/01/2018 05:39 AM  
 Anion gap 7 08/01/2018 05:39 AM  
 Glucose 102 (H) 08/01/2018 05:39 AM  
 BUN 12 08/01/2018 05:39 AM  
 Creatinine 0.59 (L) 08/01/2018 05:39 AM  
 Calcium 8.3 (L) 08/01/2018 05:39 AM  
 Magnesium 2.2 07/31/2018 08:45 PM  
 Phosphorus 4.6 03/08/2018 08:50 AM  
 Albumin 2.7 (L) 08/01/2018 05:39 AM  
 
Anthropometrics: There were no vitals filed for this visit. Ht Readings from Last 1 Encounters:  
07/31/18 5' 2\" (1.575 m) Weight Metrics 7/31/2018 5/30/2018 4/24/2018 4/16/2018 3/15/2018 9/12/2017 11/4/2016 Weight 120 lb 127 lb 127 lb 127 lb 127 lb 8 oz 120 lb 120 lb BMI 21.95 kg/m2 23.23 kg/m2 23.23 kg/m2 23.23 kg/m2 23.32 kg/m2 21.6 kg/m2 21.95 kg/m2 No data found. IBW: 110 lb %IBW: 83% UBW: 124 lb [x] Weight Loss [] Weight Gain [] Weight Stable Estimated Nutrition Needs: [x] MSJ  [] Other: 
Calories: 1365 Kcal Based on:   [x] Actual BW   
Protein:   60 g Based on:   [x] Actual BW Fluid:       1500 ml Based on:   [x] Actual BW  
 
 [x] No Cultural, Rastafarian or ethnic dietary need identified. [] Cultural, Rastafarian and ethnic food preferences identified and addressed Wt Status:  [x] Normal (18.6 - 24.9) [] Underweight (<18.5) [] Overweight (25 - 29.9) [] Mild Obesity (30 - 34.9)  [] Moderate Obesity (35 - 39.9) [] Morbid Obesity (40+) Nutrition Problems Identified:  
[x] Suboptimal PO intake vs NPO 
[] Food Allergies [] Difficulty chewing/swallowing/poor dentition 
[] Constipation/Diarrhea [x] Nausea/Vomiting  
[] None 
[] Other:  
 
Plan:  
[] Therapeutic Diet 
[]  Obtained/adjusted food preferences/tolerances and/or snacks options  
[]  Supplements added  
[] Occupational therapy following for feeding techniques []  HS snack added  
[]  Modify diet texture  
[]  Modify diet for food allergies []  Educate patient  
[]  Assist with menu selection []  Monitor PO intake on meal rounds  
[x]  Continue inpatient monitoring and intervention  
[]  Participated in discharge planning/Interdisciplinary rounds/Team meetings  
[]  Other:  
 
Education Needs: 
 [x] Not appropriate for teaching at this time due to: NPO 
 [] Identified and addressed Nutrition Monitoring and Evaluation: 
[x] Continue ongoing monitoring and intervention 
[] Other Naz Vergara

## 2018-08-01 NOTE — PROGRESS NOTES
Pr brought from ED via stretcher. Pt alert and oriented x 4 states pain is much better. Pt made comfortable in bed assessment completed, dual skin assessment done with Johanny Osborne RN . Pt has a rash on the chest otherwise no skin break down noted. Plan of care for the shift explained pt verbalizes understanding. HOB elevated bed low and in locked position. Pt made aware of NPO with sips of clear liquids only. Pt verbalizes understanding. 2046- Required documentation completed. Pt's valuables at bedside. Purse, phone with , pajamas, footwear and 2 rings. Home medications: Tramadol 50 mg 10 tablets, Diazepam 5 mg - 7 tablet, Seroquel 300 mg - 8 tablets counted and witnessed by patient and SEB Villegas  and miscellaneous tablets in dispenser all these sent to pharmacy. 2144- Pt requested for nicotine patch. Dr Raymond Estrada on the unit notified order received for nicotine patch 14 mcg once daily. 0615-Pt awake resting in bed no concerns voiced. 0730- Bedside and Verbal shift change report given to Inga Pruitt RN (oncoming nurse) by Mathieu Luque RN (offgoing nurse). Report included the following information SBAR, Kardex, Intake/Output, MAR and Recent Results.

## 2018-08-01 NOTE — PROGRESS NOTES
[address] [place], [place] [address] [facility] Hospitalist Division Inpatient Daily Progress Note Patient: Odell Brandt MRN: [id number]  CSN: [id number] YOB: 1965  Age: 46 y.o. Sex: female DOA: 7/31/2018 LOS:  LOS: 1 day Chief Complaint:   
 
Interval History:   
Presents to ED 2 days of abdominal pain; N/V. Alcoholic-on/off; started drinking again recently and stopped abruptly 2 days ago. Pt has had acute pancreatitis before. In ED, elevated lipase and US with possible CBD dilation (similar to previous). Admitted for diet advancement, IVF, pain meds. 8/1/2018: as above. Still abd pain. Lipase still elevated as expected; BUN/Hct ok. Repeat lipase in am. Replace K. Subjective:  
Still c/o abdominal pain. No N/V. Objective:  
  
Visit Vitals  /84 (BP 1 Location: Left arm, BP Patient Position: At rest;Supine)  Pulse 94  Temp 98.4 °F (36.9 °C)  Resp 15  SpO2 95%  Breastfeeding No  
 
 
 
 
Physical Exam: 
General appearance: alert, cooperative, 
Lungs: clear to auscultation bilaterally Heart: regular rate and rhythm, S1, S2 normal, no murmur, click, rub or gallop Abdomen: soft,  Epigastric tenderness, non distended. Normoactive bowel sounds. No masses, no organomegaly Extremities: extremities normal, atraumatic, no cyanosis or edema Skin: Skin color, texture, turgor normal.  
Neurologic: Grossly normal 
PSY: Mood and affect normal, appropriately behaved Intake and Output: 
Current Shift:    
Last three shifts:  07/30 1901 - 08/01 0700 In: 1172.9 [I.V.:1172.9] Out: 800 [Urine:800] Recent Results (from the past 24 hour(s)) EKG, 12 LEAD, INITIAL Collection Time: 07/31/18  2:40 PM  
Result Value Ref Range Ventricular Rate 108 BPM  
 Atrial Rate 108 BPM  
 P-R Interval 126 ms  
 QRS Duration 82 ms Q-T Interval 344 ms QTC Calculation (Bezet) 460 ms Calculated P Axis 70 degrees  Calculated R Axis 77 degrees Calculated T Axis 66 degrees Diagnosis Sinus tachycardia Otherwise normal ECG When compared with ECG of 31-JUL-2018 02:06, No significant change was found CBC WITH AUTOMATED DIFF Collection Time: 07/31/18  3:15 PM  
Result Value Ref Range WBC 12.6 4.6 - 13.2 K/uL  
 RBC 4.37 4.20 - 5.30 M/uL  
 HGB 13.2 12.0 - 16.0 g/dL HCT 39.9 35.0 - 45.0 % MCV 91.3 74.0 - 97.0 FL  
 MCH 30.2 24.0 - 34.0 PG  
 MCHC 33.1 31.0 - 37.0 g/dL  
 RDW 18.7 (H) 11.6 - 14.5 % PLATELET 719 443 - 230 K/uL MPV 9.5 9.2 - 11.8 FL  
 NEUTROPHILS 81 (H) 40 - 73 % LYMPHOCYTES 15 (L) 21 - 52 % MONOCYTES 4 3 - 10 % EOSINOPHILS 0 0 - 5 % BASOPHILS 0 0 - 2 %  
 ABS. NEUTROPHILS 10.3 (H) 1.8 - 8.0 K/UL  
 ABS. LYMPHOCYTES 1.8 0.9 - 3.6 K/UL  
 ABS. MONOCYTES 0.5 0.05 - 1.2 K/UL  
 ABS. EOSINOPHILS 0.0 0.0 - 0.4 K/UL  
 ABS. BASOPHILS 0.0 0.0 - 0.1 K/UL  
 DF AUTOMATED METABOLIC PANEL, BASIC Collection Time: 07/31/18  3:15 PM  
Result Value Ref Range Sodium 136 136 - 145 mmol/L Potassium 4.0 3.5 - 5.5 mmol/L Chloride 98 (L) 100 - 108 mmol/L  
 CO2 29 21 - 32 mmol/L Anion gap 9 3.0 - 18 mmol/L Glucose 112 (H) 74 - 99 mg/dL BUN 18 7.0 - 18 MG/DL Creatinine 0.76 0.6 - 1.3 MG/DL  
 BUN/Creatinine ratio 24 (H) 12 - 20 GFR est AA >60 >60 ml/min/1.73m2 GFR est non-AA >60 >60 ml/min/1.73m2 Calcium 9.9 8.5 - 10.1 MG/DL  
CARDIAC PANEL,(CK, CKMB & TROPONIN) Collection Time: 07/31/18  3:15 PM  
Result Value Ref Range  (H) 26 - 192 U/L  
 CK - MB 10.1 (H) <3.6 ng/ml CK-MB Index 4.5 (H) 0.0 - 4.0 % Troponin-I, Qt. <0.02 0.0 - 0.045 NG/ML  
LIPASE Collection Time: 07/31/18  3:15 PM  
Result Value Ref Range Lipase 952 (H) 73 - 393 U/L  
HEPATIC FUNCTION PANEL Collection Time: 07/31/18  3:15 PM  
Result Value Ref Range Protein, total 7.4 6.4 - 8.2 g/dL Albumin 3.6 3.4 - 5.0 g/dL Globulin 3.8 2.0 - 4.0 g/dL A-G Ratio 0.9 0.8 - 1.7 Bilirubin, total 1.2 (H) 0.2 - 1.0 MG/DL Bilirubin, direct 0.3 (H) 0.0 - 0.2 MG/DL Alk. phosphatase 117 45 - 117 U/L  
 AST (SGOT) 127 (H) 15 - 37 U/L  
 ALT (SGPT) 56 13 - 56 U/L  
ETHYL ALCOHOL Collection Time: 07/31/18  3:15 PM  
Result Value Ref Range ALCOHOL(ETHYL),SERUM <3 0 - 3 MG/DL  
VITAMIN B12 Collection Time: 07/31/18  8:45 PM  
Result Value Ref Range Vitamin B12 669 211 - 911 pg/mL MAGNESIUM Collection Time: 07/31/18  8:45 PM  
Result Value Ref Range Magnesium 2.2 1.6 - 2.6 mg/dL METABOLIC PANEL, COMPREHENSIVE Collection Time: 08/01/18  5:39 AM  
Result Value Ref Range Sodium 139 136 - 145 mmol/L Potassium 3.3 (L) 3.5 - 5.5 mmol/L Chloride 103 100 - 108 mmol/L  
 CO2 29 21 - 32 mmol/L Anion gap 7 3.0 - 18 mmol/L Glucose 102 (H) 74 - 99 mg/dL BUN 12 7.0 - 18 MG/DL Creatinine 0.59 (L) 0.6 - 1.3 MG/DL  
 BUN/Creatinine ratio 20 12 - 20 GFR est AA >60 >60 ml/min/1.73m2 GFR est non-AA >60 >60 ml/min/1.73m2 Calcium 8.3 (L) 8.5 - 10.1 MG/DL Bilirubin, total 0.9 0.2 - 1.0 MG/DL  
 ALT (SGPT) 56 13 - 56 U/L  
 AST (SGOT) 145 (H) 15 - 37 U/L Alk. phosphatase 97 45 - 117 U/L Protein, total 5.5 (L) 6.4 - 8.2 g/dL Albumin 2.7 (L) 3.4 - 5.0 g/dL Globulin 2.8 2.0 - 4.0 g/dL A-G Ratio 1.0 0.8 - 1.7 LIPASE Collection Time: 08/01/18  5:39 AM  
Result Value Ref Range Lipase 1367 (H) 73 - 393 U/L  
CBC W/O DIFF Collection Time: 08/01/18  5:39 AM  
Result Value Ref Range WBC 8.6 4.6 - 13.2 K/uL  
 RBC 3.70 (L) 4.20 - 5.30 M/uL  
 HGB 10.7 (L) 12.0 - 16.0 g/dL HCT 34.0 (L) 35.0 - 45.0 % MCV 91.9 74.0 - 97.0 FL  
 MCH 28.9 24.0 - 34.0 PG  
 MCHC 31.5 31.0 - 37.0 g/dL  
 RDW 19.1 (H) 11.6 - 14.5 % PLATELET 372 172 - 140 K/uL MPV 9.3 9.2 - 11.8 FL Lab Results Component Value Date/Time  Glucose 102 (H) 08/01/2018 05:39 AM  
 Glucose 112 (H) 07/31/2018 03:15 PM  
 Glucose 55 (L) 07/31/2018 02:16 AM  
 Glucose 96 03/15/2018 04:00 AM  
 Glucose 111 (H) 03/14/2018 03:45 AM  
Ultrasound Abdomen Limited   
  
History: Right upper quadrant pain, fever 
  
Comparison: Right upper quadrant ultrasound 9/15/2017 
  
Technique: Multiple gray scale sonographic images of the abdomen were obtained 
with attention to the right upper quadrant. Grayscale, color flow Doppler 
imaging, and velocity spectral waveform analysis of the portal vein was 
performed (duplex imaging).  
  
Findings: 
  
Liver has increased echotexture diffusely. Hepatomegaly is present with 
estimated sagittal dimension of the right hepatic lobe measuring 21.4 cm. No 
focal hepatic lesions are seen. There is no evidence of intrahepatic biliary 
dilatation. Main portal vein diameter is 9 mm. Color flow Doppler and velocity 
spectral waveform analysis of the portal vein shows normal hepatopedal blood 
flow without evidence of portal vein thrombosis.   
  
The common bile duct measures approximately 14 mm in diameter. Previously, bile 
duct measures approximately 9 mm in diameter. The gallbladder is normal in size. No cholelithiasis is seen. Trace pericholecystic free fluid is seen which also 
appears to extend further along the liver. A sonographic Traylor's sign is not 
present but is not able to be accurately evaluated as patient recently was 
administered pain medication. The head of the pancreas demonstrates increased echogenicity. Pancreatic duct is 
slightly dilated estimated 2-3 mm diameter. Survey images of the right kidney demonstrate no evidence of hydronephrosis. Kidney does have mildly increased echotexture suggesting medical renal disease. The right kidney measures approximately 10.4 cm in length. Survey images of the 
IVC are unremarkable. 
  
IMPRESSION IMPRESSION: 
  
1.   No sonographic findings of cholelithiasis or acute cholecystitis. 
  
2. Dilatation of the common bile duct, increased since prior study, without 
visualized obstruction. This finding is nonspecific. Potentially follow-up MRCP 
or ERCP may be helpful. 
  
3. Hepatomegaly. Increased hepatic echotexture, nonspecific but commonly seen 
with hepatic steatosis. Similar echogenicity can be seen with hepatocellular 
dysfunction such as hepatitis or cirrhosis in the appropriate clinical setting. Clinical correlation is recommended. 
  
4. Trace pericholecystic/perihepatic free fluid. EXAM: CTA Chest 
  
INDICATION: History of chest pain, clinical concern for PE 
  
COMPARISON: None. 
  
TECHNIQUE: Axial CT imaging from the thoracic inlet through the diaphragm with 
intravenous contrast. Coronal and sagittal MIP reformats were generated. 
  
One or more dose reduction techniques were used on this CT: automated exposure 
control, adjustment of the mAs and/or kVp according to patient's size, and 
iterative reconstruction techniques. The specific techniques utilized on this CT 
exam have been documented in the patient's electronic medical record. 
_______________ 
  
FINDINGS: 
  
EXAM QUALITY: Overall exam quality is adequate 
  
PULMONARY ARTERIES: No evidence of pulmonary embolism. 
  
MEDIASTINUM: Normal heart size. No evidence of right heart strain. Aorta is 
unremarkable. No pericardial effusion. 
  
LYMPH NODES: No enlarged nodes. 
  
AIRWAY: Normal. 
  
LUNGS: No suspicious nodule or mass. A few small granuloma noted. No abnormal 
opacities. 
  
PLEURA: Normal. Specifically, no pneumothorax or pleural effusion. 
  
UPPER ABDOMEN: Marked hepatic hypoattenuation. Mid/distal esophageal wall 
indistinctness and thickening. . 
  
OTHER: No acute or aggressive osseous abnormalities identified. 
  
_______________ 
  
IMPRESSION IMPRESSION: 
  
No evidence of PE. No acute pulmonary process identified. 
  
Mid/distal esophagus is indistinct, and appears thickened circumferentially. Findings nonspecific but can be seen in the setting of esophagitis.  If further 
evaluation is needed, endoscopy or esophagram could be considered. 
  
Marked hepatic hypoattenuation suggesting steatosis. 
  
Preliminary report was given by the on-call radiology resident. Assessment/Plan:  
 
Patient Active Problem List  
Diagnosis Code  Bipolar disorder (manic depression) ([facility] F31.9  Tachycardia R00.0  Alcohol dependence ([facility] F10.20  Esophageal stricture K22.2  Dilated pancreatic duct K86.89  
 Common bile duct dilation K83.8  Elevated LFTs R79.89  Bipolar depression ([facility] F31.30  
 HTN (hypertension) I10  
 Acute pancreatitis K85.90  Pancreatitis, alcoholic, acute V73.50  Tylenol overdose T39.1X1A  
 SIRS (systemic inflammatory response syndrome) (HCC) R65.10  Pneumonia J18.9  
 COPD (chronic obstructive pulmonary disease) (HCC) J44.9  Nicotine withdrawal F17.203  Dizziness R42  Pancreatitis K85.90  Hypokalemia E87.6  Anemia D64.9  
 Encephalopathy G93.40  Syncope R55  Alcohol withdrawal (HCC) F10.239  GI bleed K92.2  Hyponatremia E87.1  Hypercalcemia E83.52  
 UTI (urinary tract infection) N39.0  Bronchitis J40  Recurrent UTI (urinary tract infection) N39.0  Staghorn renal calculus N20.0 A/P: 
 
Acute alcoholic pancreatitis 
-+etoh level on admission; stopped drinking abruptly -IVF 
-pain meds prn  
-NPO W/ sips of clears 
-lipase elevated  1367 today from 952 -repeat am  
 
Etoh dependence -CIWA 
-thiamine, folic Bipolar  
-cont home meds HTN 
-monitor BP 
-home meds COPD 
-monitor sats (goal > 88-90%); currently on RA (states wears O2 @ home for tachycardia per her pulmonologist)  
-home meds  
-ICS -nebs prn Tobacco abuse 
-nicoderm patch Replace K ; CBC, CMP, lipase am  
DVT prophylaxis 
-lovenox GI px 
-protonix Disposition: home? JAGDEEP Leal, NP-C [address] [facility] Hospitalist Division DLTXP:[phone] Office:  [phone]

## 2018-08-01 NOTE — PROGRESS NOTES
1930: Assumed patient care. Received report from Lia, [place] (offgoing nurse). Report included SBAR, Kardex, and MAR. Patient resting in bed, in no signs of distress. Complains of pain 8/10, this nurse stated that dilaudid is not due until 2000, offered toradol as alternative, patient declined and verbalized understanding of pain medication. Call light and possessions within reach. Bed in lowest setting. 2033: Dilaudid given for pain 2106: Patient sleeping 
2303: Dilaudid given for pain 2345: Patient sleeping 
0220: Dilaudid given for pain 0255: Patient states pain in pancreas is still 8, pain in stomach reduced to \"6 or 7\" 0033: Patient requesting Seroquel, states she takes 600 mg at home, paged Dr. Rivas Helton 6413: Doctor Rivas Helton gave verbal order for 600 mg seroquel repeat back provided Chicken and beef broth given to patient, tolerating well. No nausea or increased stomach pain 0725: Bedside and Verbal shift change report given to SEB Fitzgerald (oncoming nurse) by Angelo Paredes RN (offgoing nurse). Report included the following information SBAR, Kardex and MAR.

## 2018-08-01 NOTE — PROGRESS NOTES
Reason for Admission:   Acute pancreatitis RRAT Score:     16 Do you (patient/family) have any concerns for transition/discharge? no 
           
Plan for utilizing home health:    no  
 
Likelihood of readmission? Low/green Transition of Care Plan:   Spoke with pt, lives with spouse. Disabled. Insured with [employer] and [employer]. Spouse employed. Independent with adls and amb. Has home O2 from [address] [address]. Uses prn. Also has has hhn. pcp dr Dhara Sims, sees his NP, saw 2 wks ago. Also has a psychiatrist she follows with. Designates her spouse and her mother for dcp. One of them will transport home. plan home. Patient has designated _________spouse/mother_______________ to participate in his/her discharge plan and to receive any needed information. Name: spouse: Edith benton/ mother: Violeta Higuera KZICTCHDV/ [phone] Address: 
Phone number:[phone] Care Management Interventions PCP Verified by CM: Yes 
Palliative Care Criteria Met (RRAT>21 & CHF Dx)?: No 
Mode of Transport at Discharge: Other (see comment) Transition of Care Consult (CM Consult): Discharge Planning Discharge Durable Medical Equipment: No 
Physical Therapy Consult: No 
Occupational Therapy Consult: No 
Speech Therapy Consult: No 
Current Support Network: Lives with Spouse Confirm Follow Up Transport: Family Plan discussed with Pt/Family/Caregiver: Yes Discharge Location Discharge Placement: Home

## 2018-08-01 NOTE — PROGRESS NOTES
Patient received in bed awake. Patient alert and oriented X4, complains of pain and discomfort (7/10). Patient given last pain medication (dilaudid 0.5mg IV Q3H) at 0650. Patient resting quietly. Frequent use items within reach. Bed locked in low position. Call bell within reach and patient verbalized understanding of use for assistance and needs. Patient ambulated to bathroom. Nichole Sow NP paged. Pt is NPO with sips of clear, need verification that she would like morning medications administered. 0845:  [facility] and would like Pt to have her medications administered. 1028:  PRN pain medication administered per order. 1302:  Pt asleep. NAD 
 
1352:  PRN pain medication administered per order. Pt agitated, states that she would really like to eat. She cannot decipher whether her pain is from being hungry or the pancreatitis. Patient states that \"Dr. Lane Mckeon told me I would probably have a clear liquid diet today. \" 
 
3581:  [facility] to inform her that Pt would like to eat. 1658:   PRN pain medication administered per order. Pt once again stated that she would like to eat and asked when if ever she was going to see the doctor. This nurse informed Pt that physician has patients all over the hospital that this is not his only unit. Reassured patient that physician would be here, but that I could not tell her the exact time. Patient stated that is fine and she would wait for the physician. 1930:  Bedside and Verbal shift change report given to Nixon Ordonez RN (oncoming nurse) by Meg Chen RN BSN (offgoing nurse). Report included the following information SBAR, Kardex, Intake/Output, MAR and Recent Results.

## 2018-08-02 LAB
ALBUMIN SERPL-MCNC: 2.5 G/DL (ref 3.4–5)
ALBUMIN/GLOB SERPL: 0.8 {RATIO} (ref 0.8–1.7)
ALP SERPL-CCNC: 96 U/L (ref 45–117)
ALT SERPL-CCNC: 69 U/L (ref 13–56)
ANION GAP SERPL CALC-SCNC: 7 MMOL/L (ref 3–18)
AST SERPL-CCNC: 119 U/L (ref 15–37)
BACTERIA SPEC CULT: ABNORMAL
BILIRUB SERPL-MCNC: 0.8 MG/DL (ref 0.2–1)
BUN SERPL-MCNC: 4 MG/DL (ref 7–18)
BUN/CREAT SERPL: 8 (ref 12–20)
CALCIUM SERPL-MCNC: 7.8 MG/DL (ref 8.5–10.1)
CHLORIDE SERPL-SCNC: 101 MMOL/L (ref 100–108)
CO2 SERPL-SCNC: 33 MMOL/L (ref 21–32)
CREAT SERPL-MCNC: 0.5 MG/DL (ref 0.6–1.3)
ERYTHROCYTE [DISTWIDTH] IN BLOOD BY AUTOMATED COUNT: 19.2 % (ref 11.6–14.5)
GLOBULIN SER CALC-MCNC: 3 G/DL (ref 2–4)
GLUCOSE SERPL-MCNC: 107 MG/DL (ref 74–99)
HCT VFR BLD AUTO: 32.6 % (ref 35–45)
HGB BLD-MCNC: 10.4 G/DL (ref 12–16)
LIPASE SERPL-CCNC: 1205 U/L (ref 73–393)
LIPASE SERPL-CCNC: 962 U/L (ref 73–393)
MCH RBC QN AUTO: 29.3 PG (ref 24–34)
MCHC RBC AUTO-ENTMCNC: 31.9 G/DL (ref 31–37)
MCV RBC AUTO: 91.8 FL (ref 74–97)
PLATELET # BLD AUTO: 143 K/UL (ref 135–420)
PMV BLD AUTO: 9.6 FL (ref 9.2–11.8)
POTASSIUM SERPL-SCNC: 3 MMOL/L (ref 3.5–5.5)
PROT SERPL-MCNC: 5.5 G/DL (ref 6.4–8.2)
RBC # BLD AUTO: 3.55 M/UL (ref 4.2–5.3)
SERVICE CMNT-IMP: ABNORMAL
SODIUM SERPL-SCNC: 141 MMOL/L (ref 136–145)
WBC # BLD AUTO: 7.4 K/UL (ref 4.6–13.2)

## 2018-08-02 PROCEDURE — 83690 ASSAY OF LIPASE: CPT | Performed by: HOSPITALIST

## 2018-08-02 PROCEDURE — 74011250636 HC RX REV CODE- 250/636: Performed by: HOSPITALIST

## 2018-08-02 PROCEDURE — 74011250637 HC RX REV CODE- 250/637: Performed by: HOSPITALIST

## 2018-08-02 PROCEDURE — 74011250636 HC RX REV CODE- 250/636: Performed by: NURSE PRACTITIONER

## 2018-08-02 PROCEDURE — 65270000029 HC RM PRIVATE

## 2018-08-02 PROCEDURE — 85027 COMPLETE CBC AUTOMATED: CPT | Performed by: HOSPITALIST

## 2018-08-02 PROCEDURE — 80053 COMPREHEN METABOLIC PANEL: CPT | Performed by: HOSPITALIST

## 2018-08-02 PROCEDURE — 74011000258 HC RX REV CODE- 258: Performed by: HOSPITALIST

## 2018-08-02 PROCEDURE — 77030021352 HC CBL LD SYS DISP COVD -B

## 2018-08-02 PROCEDURE — 36415 COLL VENOUS BLD VENIPUNCTURE: CPT | Performed by: HOSPITALIST

## 2018-08-02 RX ORDER — QUETIAPINE FUMARATE 300 MG/1
300 TABLET, FILM COATED ORAL
Status: DISCONTINUED | OUTPATIENT
Start: 2018-08-02 | End: 2018-08-02 | Stop reason: SDUPTHER

## 2018-08-02 RX ORDER — HYDROMORPHONE HYDROCHLORIDE 1 MG/ML
0.5 INJECTION, SOLUTION INTRAMUSCULAR; INTRAVENOUS; SUBCUTANEOUS
Status: DISCONTINUED | OUTPATIENT
Start: 2018-08-02 | End: 2018-08-02 | Stop reason: CLARIF

## 2018-08-02 RX ORDER — POTASSIUM CHLORIDE 7.45 MG/ML
10 INJECTION INTRAVENOUS
Status: COMPLETED | OUTPATIENT
Start: 2018-08-02 | End: 2018-08-02

## 2018-08-02 RX ORDER — QUETIAPINE FUMARATE 200 MG/1
200 TABLET, FILM COATED ORAL
Status: DISCONTINUED | OUTPATIENT
Start: 2018-08-02 | End: 2018-08-02

## 2018-08-02 RX ORDER — QUETIAPINE FUMARATE 25 MG/1
100 TABLET, FILM COATED ORAL ONCE
Status: COMPLETED | OUTPATIENT
Start: 2018-08-02 | End: 2018-08-02

## 2018-08-02 RX ORDER — QUETIAPINE FUMARATE 300 MG/1
600 TABLET, FILM COATED ORAL
Status: DISCONTINUED | OUTPATIENT
Start: 2018-08-02 | End: 2018-08-02

## 2018-08-02 RX ORDER — HYDROMORPHONE HYDROCHLORIDE 1 MG/ML
0.5 INJECTION, SOLUTION INTRAMUSCULAR; INTRAVENOUS; SUBCUTANEOUS
Status: DISCONTINUED | OUTPATIENT
Start: 2018-08-02 | End: 2018-08-03

## 2018-08-02 RX ADMIN — OXYBUTYNIN CHLORIDE 5 MG: 5 TABLET ORAL at 17:13

## 2018-08-02 RX ADMIN — SUCRALFATE 1 G: 1 SUSPENSION ORAL at 22:27

## 2018-08-02 RX ADMIN — ENOXAPARIN SODIUM 40 MG: 100 INJECTION SUBCUTANEOUS at 20:58

## 2018-08-02 RX ADMIN — CLONIDINE HYDROCHLORIDE 0.1 MG: 0.1 TABLET ORAL at 17:15

## 2018-08-02 RX ADMIN — CLONIDINE HYDROCHLORIDE 0.1 MG: 0.1 TABLET ORAL at 08:49

## 2018-08-02 RX ADMIN — PANCRELIPASE 2 CAPSULE: 10000; 34000; 55000 CAPSULE, DELAYED RELEASE ORAL at 17:13

## 2018-08-02 RX ADMIN — QUETIAPINE FUMARATE 100 MG: 25 TABLET ORAL at 20:58

## 2018-08-02 RX ADMIN — OXYBUTYNIN CHLORIDE 5 MG: 5 TABLET ORAL at 08:51

## 2018-08-02 RX ADMIN — Medication 10 ML: at 15:42

## 2018-08-02 RX ADMIN — PANCRELIPASE 2 CAPSULE: 10000; 34000; 55000 CAPSULE, DELAYED RELEASE ORAL at 08:49

## 2018-08-02 RX ADMIN — QUETIAPINE FUMARATE 600 MG: 300 TABLET ORAL at 02:30

## 2018-08-02 RX ADMIN — GABAPENTIN 300 MG: 300 CAPSULE ORAL at 08:49

## 2018-08-02 RX ADMIN — SUCRALFATE 1 G: 1 SUSPENSION ORAL at 08:52

## 2018-08-02 RX ADMIN — POTASSIUM CHLORIDE 10 MEQ: 10 INJECTION, SOLUTION INTRAVENOUS at 08:52

## 2018-08-02 RX ADMIN — PANTOPRAZOLE SODIUM 40 MG: 40 TABLET, DELAYED RELEASE ORAL at 08:51

## 2018-08-02 RX ADMIN — Medication 10 ML: at 22:27

## 2018-08-02 RX ADMIN — POTASSIUM CHLORIDE 10 MEQ: 10 INJECTION, SOLUTION INTRAVENOUS at 10:25

## 2018-08-02 RX ADMIN — FOLIC ACID 1 MG: 1 TABLET ORAL at 08:53

## 2018-08-02 RX ADMIN — BUSPIRONE HYDROCHLORIDE 15 MG: 5 TABLET ORAL at 08:49

## 2018-08-02 RX ADMIN — Medication 0.5 MG: at 02:20

## 2018-08-02 RX ADMIN — Medication 10 ML: at 06:00

## 2018-08-02 RX ADMIN — Medication 0.5 MG: at 17:34

## 2018-08-02 RX ADMIN — GABAPENTIN 300 MG: 300 CAPSULE ORAL at 22:27

## 2018-08-02 RX ADMIN — POTASSIUM CHLORIDE 10 MEQ: 10 INJECTION, SOLUTION INTRAVENOUS at 11:47

## 2018-08-02 RX ADMIN — BUSPIRONE HYDROCHLORIDE 15 MG: 5 TABLET ORAL at 17:13

## 2018-08-02 RX ADMIN — Medication 100 MG: at 08:51

## 2018-08-02 RX ADMIN — LORATADINE 10 MG: 10 TABLET ORAL at 08:51

## 2018-08-02 RX ADMIN — PANTOPRAZOLE SODIUM 40 MG: 40 TABLET, DELAYED RELEASE ORAL at 17:13

## 2018-08-02 RX ADMIN — SUCRALFATE 1 G: 1 SUSPENSION ORAL at 17:13

## 2018-08-02 RX ADMIN — OXYBUTYNIN CHLORIDE 5 MG: 5 TABLET ORAL at 22:27

## 2018-08-02 RX ADMIN — Medication 0.5 MG: at 21:05

## 2018-08-02 RX ADMIN — PANCRELIPASE 2 CAPSULE: 10000; 34000; 55000 CAPSULE, DELAYED RELEASE ORAL at 22:27

## 2018-08-02 RX ADMIN — GABAPENTIN 300 MG: 300 CAPSULE ORAL at 17:13

## 2018-08-02 RX ADMIN — CEFTRIAXONE 1 G: 1 INJECTION, POWDER, FOR SOLUTION INTRAMUSCULAR; INTRAVENOUS at 14:07

## 2018-08-02 RX ADMIN — Medication 10 ML: at 17:36

## 2018-08-02 RX ADMIN — POTASSIUM CHLORIDE 10 MEQ: 10 INJECTION, SOLUTION INTRAVENOUS at 13:00

## 2018-08-02 RX ADMIN — THERA TABS 1 TABLET: TAB at 08:51

## 2018-08-02 NOTE — PROGRESS NOTES
1945: Assumed pt care. Received pt resting in bed, pt is alert and oriented x 4. No signs of distress. Bed alarm in placed. Bed on lowest position, wheels locked, call bell within reach. 
 
8-3-18 
 
0723: Bedside and Verbal shift change report given to Quinn Toth (oncoming nurse) by Yaz Clark 
 (offgoing nurse). Report included the following information SBAR, Kardex, Intake/Output, MAR and Recent Results.

## 2018-08-02 NOTE — PROGRESS NOTES
Problem: Falls - Risk of 
Goal: *Absence of Falls Document Ivan Martell Fall Risk and appropriate interventions in the flowsheet. Outcome: Progressing Towards Goal 
Fall Risk Interventions: 
Mobility Interventions: Bed/chair exit alarm Medication Interventions: Patient to call before getting OOB Elimination Interventions: Toileting schedule/hourly rounds History of Falls Interventions: Bed/chair exit alarm

## 2018-08-02 NOTE — PROGRESS NOTES
Patient received in bed awake. Patient alert and oriented X4, is very lethargic and speech is slurred. Behavior indicators negative for pain and discomfort. Patient resting quietly. Frequent use items within reach. Bed locked in low position. Call bell within reach and patient verbalized understanding of use for assistance and needs. 1340Handy Hallman NP paged, Pt has elevated HR and MEWS is a 3.   
 
0755:  Spoke to [facility] MEDICINE, NP regarding the above. Received order to place Pt on telemetry. 8870Auromán Escobar NP regarding Pt BP. BP at 0802 91/55 BP at 0805 100/64 
 
0818:  Spoke with [facility], NP at desk regarding the above. She stated, \"That's okay. Pt has a history of Tachycardia. Continue to monitor. \" 
 
5950:  [facility] to discuss Pt Rocephin order. Informed [facility], pharmacist that Pt has 1 IV and does not want any others and that she has 4 runs of Potassium due. [facility] informed this nurse that she will reschedule the Rocephin for after the runs of Potassium. 5027:  Pt ambulated to bathroom. Pt is extremely unsteady on her feet. This nurse will bring bedside commode to Pt bedside. 1140:  [facility] Informed her that Pt is still lethargic and asking for pain medication. [facility] stated not to give Pt pain medication until she is no longer lethargic. 1630:  Patient AOX4 no longer lethargic and would like pain medication. Paged Dr. Jimmy Rojas. 1714:  Paged Dr. Jimmy Rojas again regarding the above. 1724:  Spoke to Dr. Jimmy Rojas and informed him that the Pt is AOx4, not lethargic and would like her pain medication. He stated, \"I'm okay with that. \" 
 
9010:  Spoke to Dr. Jimmy Rojas at nurses station. Informed him that Pt was asking about a urinalysis. He stated, Prabhjot Flores, she did. I will look into that. \"  
 
1945:  Bedside and Verbal shift change report given to Reena Corpus, RN (oncoming nurse) by Ezzie Dakins, RN BSN (offgoing nurse).  Report included the following information SBAR, Kardex, Intake/Output, MAR and Recent Results.

## 2018-08-02 NOTE — PROGRESS NOTES
[facility] Hospitalist Division Inpatient Daily Progress Note Patient: Funmilayo Kowalski MRN: [id number]  CSN: [id number] YOB: 1965  Age: 46 y.o. Sex: female DOA: 7/31/2018 LOS:  LOS: 2 days Chief Complaint:   
 
Interval History:   
Presents to ED 2 days of abdominal pain; N/V. Alcoholic-on/off; started drinking again recently and stopped abruptly 2 days ago. Pt has had acute pancreatitis before. In ED, elevated lipase and US with possible CBD dilation (similar to previous). Admitted for diet advancement, IVF, pain meds. 8/1/2018: as above. Still abd pain. Lipase still elevated as expected; BUN/Hct ok. Repeat lipase in am. Replace K.  
8/2/18: Replace K. Adv diet GI soft. 2L O2 for tachycardia (etoh, COPD, 2/2 hypoxia-pt wears at home per pulmonologist for tachycardia-per pt usually runs in 130s), rocephin started for urine cx w/ klebsiella pneumoniae. Seroquel dose changed from 600mg to 200 mg-speech slurred this am. Will discharge tomorrow-still altered from seroquel dose. Subjective: Abd pain better. The patient is hungry. No N/V. Tachycardiac-130s. Placed on 2L NC. Objective:  
  
Visit Vitals  /83 (BP 1 Location: Right arm, BP Patient Position: At rest)  Pulse (!) 105  Temp 99.5 °F (37.5 °C)  Resp 18  SpO2 94%  Breastfeeding No  
 
 
 
 
Physical Exam: 
General appearance: alert, cooperative, 
Lungs: clear to auscultation bilaterally Heart: tachycardiac, SR, S1, S2 normal 
Abdomen: soft,  Minimal tenderness, non distended. Normoactive bowel sounds. Extremities: extremities normal, atraumatic, no cyanosis or edema Skin: Skin color, texture, turgor normal.  
Neurologic: speech slurred (2/2 600mg  seroquel) PSY: Mood and affect normal, appropriately behaved Intake and Output: 
Current Shift:    
Last three shifts:  07/31 1901 - 08/02 0700 In: 2132.9 [P.O.:960; I.V.:1172.9] Out: 3300 [[id number]] Recent Results (from the past 24 hour(s)) METABOLIC PANEL, COMPREHENSIVE Collection Time: 08/02/18  5:20 AM  
Result Value Ref Range Sodium 141 136 - 145 mmol/L Potassium 3.0 (L) 3.5 - 5.5 mmol/L Chloride 101 100 - 108 mmol/L  
 CO2 33 (H) 21 - 32 mmol/L Anion gap 7 3.0 - 18 mmol/L Glucose 107 (H) 74 - 99 mg/dL BUN 4 (L) 7.0 - 18 MG/DL Creatinine 0.50 (L) 0.6 - 1.3 MG/DL  
 BUN/Creatinine ratio 8 (L) 12 - 20 GFR est AA >60 >60 ml/min/1.73m2 GFR est non-AA >60 >60 ml/min/1.73m2 Calcium 7.8 (L) 8.5 - 10.1 MG/DL Bilirubin, total 0.8 0.2 - 1.0 MG/DL  
 ALT (SGPT) 69 (H) 13 - 56 U/L  
 AST (SGOT) 119 (H) 15 - 37 U/L Alk. phosphatase 96 45 - 117 U/L Protein, total 5.5 (L) 6.4 - 8.2 g/dL Albumin 2.5 (L) 3.4 - 5.0 g/dL Globulin 3.0 2.0 - 4.0 g/dL A-G Ratio 0.8 0.8 - 1.7 LIPASE Collection Time: 08/02/18  5:20 AM  
Result Value Ref Range Lipase 1205 (H) 73 - 393 U/L  
CBC W/O DIFF Collection Time: 08/02/18  5:20 AM  
Result Value Ref Range WBC 7.4 4.6 - 13.2 K/uL  
 RBC 3.55 (L) 4.20 - 5.30 M/uL  
 HGB 10.4 (L) 12.0 - 16.0 g/dL HCT 32.6 (L) 35.0 - 45.0 % MCV 91.8 74.0 - 97.0 FL  
 MCH 29.3 24.0 - 34.0 PG  
 MCHC 31.9 31.0 - 37.0 g/dL  
 RDW 19.2 (H) 11.6 - 14.5 % PLATELET 996 132 - 962 K/uL MPV 9.6 9.2 - 11.8 FL Lab Results Component Value Date/Time Glucose 107 (H) 08/02/2018 05:20 AM  
 Glucose 102 (H) 08/01/2018 05:39 AM  
 Glucose 112 (H) 07/31/2018 03:15 PM  
 Glucose 55 (L) 07/31/2018 02:16 AM  
 Glucose 96 03/15/2018 04:00 AM  
Ultrasound Abdomen Limited   
  
History: Right upper quadrant pain, fever 
  
Comparison: Right upper quadrant ultrasound 9/15/2017 
  
Technique: Multiple gray scale sonographic images of the abdomen were obtained 
with attention to the right upper quadrant.  Grayscale, color flow Doppler 
imaging, and velocity spectral waveform analysis of the portal vein was 
performed (duplex imaging).  
  
Findings: 
  
Liver has increased echotexture diffusely. Hepatomegaly is present with 
estimated sagittal dimension of the right hepatic lobe measuring 21.4 cm. No 
focal hepatic lesions are seen. There is no evidence of intrahepatic biliary 
dilatation. Main portal vein diameter is 9 mm. Color flow Doppler and velocity 
spectral waveform analysis of the portal vein shows normal hepatopedal blood 
flow without evidence of portal vein thrombosis.   
  
The common bile duct measures approximately 14 mm in diameter. Previously, bile 
duct measures approximately 9 mm in diameter. The gallbladder is normal in size. No cholelithiasis is seen. Trace pericholecystic free fluid is seen which also 
appears to extend further along the liver. A sonographic Traylor's sign is not 
present but is not able to be accurately evaluated as patient recently was 
administered pain medication. The head of the pancreas demonstrates increased echogenicity. Pancreatic duct is 
slightly dilated estimated 2-3 mm diameter. Survey images of the right kidney demonstrate no evidence of hydronephrosis. Kidney does have mildly increased echotexture suggesting medical renal disease. The right kidney measures approximately 10.4 cm in length. Survey images of the 
IVC are unremarkable. 
  
IMPRESSION IMPRESSION: 
  
1. No sonographic findings of cholelithiasis or acute cholecystitis. 
  
2. Dilatation of the common bile duct, increased since prior study, without 
visualized obstruction. This finding is nonspecific. Potentially follow-up MRCP 
or ERCP may be helpful. 
  
3. Hepatomegaly. Increased hepatic echotexture, nonspecific but commonly seen 
with hepatic steatosis. Similar echogenicity can be seen with hepatocellular 
dysfunction such as hepatitis or cirrhosis in the appropriate clinical setting. Clinical correlation is recommended. 
  
4. Trace pericholecystic/perihepatic free fluid.   
EXAM: CTA Chest 
  
INDICATION: History of chest pain, clinical concern for PE 
  
COMPARISON: None. 
  
TECHNIQUE: Axial CT imaging from the thoracic inlet through the diaphragm with 
intravenous contrast. Coronal and sagittal MIP reformats were generated. 
  
One or more dose reduction techniques were used on this CT: automated exposure 
control, adjustment of the mAs and/or kVp according to patient's size, and 
iterative reconstruction techniques. The specific techniques utilized on this CT 
exam have been documented in the patient's electronic medical record. 
_______________ 
  
FINDINGS: 
  
EXAM QUALITY: Overall exam quality is adequate 
  
PULMONARY ARTERIES: No evidence of pulmonary embolism. 
  
MEDIASTINUM: Normal heart size. No evidence of right heart strain. Aorta is 
unremarkable. No pericardial effusion. 
  
LYMPH NODES: No enlarged nodes. 
  
AIRWAY: Normal. 
  
LUNGS: No suspicious nodule or mass. A few small granuloma noted. No abnormal 
opacities. 
  
PLEURA: Normal. Specifically, no pneumothorax or pleural effusion. 
  
UPPER ABDOMEN: Marked hepatic hypoattenuation. Mid/distal esophageal wall 
indistinctness and thickening. . 
  
OTHER: No acute or aggressive osseous abnormalities identified. 
  
_______________ 
  
IMPRESSION IMPRESSION: 
  
No evidence of PE. No acute pulmonary process identified. 
  
Mid/distal esophagus is indistinct, and appears thickened circumferentially. Findings nonspecific but can be seen in the setting of esophagitis. If further 
evaluation is needed, endoscopy or esophagram could be considered. 
  
Marked hepatic hypoattenuation suggesting steatosis. 
  
Preliminary report was given by the on-call radiology resident. Assessment/Plan:  
 
Patient Active Problem List  
Diagnosis Code  Bipolar disorder (manic depression) ([facility] [address] F31.9  Tachycardia R00.0  Alcohol dependence ([facility] [address] F10.20  Esophageal stricture K22.2  Dilated pancreatic duct K86.89  
 Common bile duct dilation K83.8  Elevated LFTs R79.89  Bipolar depression ([address] F31.30  
 HTN (hypertension) I10  
 Acute pancreatitis K85.90  Pancreatitis, alcoholic, acute H63.48  Tylenol overdose T39.1X1A  
 SIRS (systemic inflammatory response syndrome) ([facility]) R65.10  Pneumonia J18.9  
 COPD (chronic obstructive pulmonary disease) ([facility]) J44.9  Nicotine withdrawal F17.203  Dizziness R42  Pancreatitis K85.90  Hypokalemia E87.6  Anemia D64.9  
 Encephalopathy G93.40  Syncope R55  Alcohol withdrawal ([facility]) F10.239  GI bleed K92.2  Hyponatremia E87.1  Hypercalcemia E83.52  
 UTI (urinary tract infection) N39.0  Bronchitis J40  Recurrent UTI (urinary tract infection) N39.0  Staghorn renal calculus N20.0 A/P: 
 
Acute alcoholic pancreatitis 
-+etoh level on admission; stopped drinking abruptly -IVF-can d/c when PO adequate  
-pain meds prn  
-diet adv to GI soft-pt is hungry  
-lipase trending down Etoh dependence -CIWA 
-thiamine, folic UTI 
-urine cx w/ klebsiella pneumoniae  
-start rocephin 
-pharmacy to dose HTN 
-monitor BP 
-home meds COPD 
-monitor sats (goal > 88-90%); currently on 2L for elevated HR (states wears O2 @ home for tachycardia per her pulmonologist)  
-home meds  
-ICS -nebs prn Bipolar  
-cont home meds 
-will d/c seroquel 2/2 lethargy Tachycardia (per pt history of) -etoh contributor as well   
-will start 2L O2 (hypoxia driven?) -no leukocytosis, afebrile  
-monitior Tobacco abuse 
-nicoderm patch Replace K ; CMP am  
DVT prophylaxis 
-lovenox GI px 
-protonix Disposition: home Will discharge tomorrow -patient is still lethargic from seroquel dose lastnight Adv diet to GI soft CMP, lipase am  
 
Shelley Green, AGPC, NP-C [id number] [facility] Hospital[facility] [phone] Office:  [phone]

## 2018-08-02 NOTE — PROGRESS NOTES
Problem: Falls - Risk of 
Goal: *Absence of Falls Document Quin Bryan Fall Risk and appropriate interventions in the flowsheet. Outcome: Progressing Towards Goal 
Fall Risk Interventions: 
Mobility Interventions: Bed/chair exit alarm, Patient to call before getting OOB, PT Consult for mobility concerns Mentation Interventions: Adequate sleep, hydration, pain control, Bed/chair exit alarm, Door open when patient unattended, Update white board, Toileting rounds, More frequent rounding, Reorient patient Medication Interventions: Patient to call before getting OOB, Bed/chair exit alarm, Teach patient to arise slowly Elimination Interventions: Bed/chair exit alarm, Call light in reach, Patient to call for help with toileting needs, Toileting schedule/hourly rounds History of Falls Interventions: Bed/chair exit alarm, Door open when patient unattended

## 2018-08-03 VITALS
RESPIRATION RATE: 16 BRPM | OXYGEN SATURATION: 96 % | DIASTOLIC BLOOD PRESSURE: 70 MMHG | HEART RATE: 93 BPM | TEMPERATURE: 97.9 F | SYSTOLIC BLOOD PRESSURE: 109 MMHG

## 2018-08-03 LAB
ALBUMIN SERPL-MCNC: 2.3 G/DL (ref 3.4–5)
ALBUMIN/GLOB SERPL: 0.9 {RATIO} (ref 0.8–1.7)
ALP SERPL-CCNC: 97 U/L (ref 45–117)
ALT SERPL-CCNC: 61 U/L (ref 13–56)
ANION GAP SERPL CALC-SCNC: 5 MMOL/L (ref 3–18)
AST SERPL-CCNC: 76 U/L (ref 15–37)
BILIRUB SERPL-MCNC: 0.6 MG/DL (ref 0.2–1)
BUN SERPL-MCNC: 5 MG/DL (ref 7–18)
BUN/CREAT SERPL: 11 (ref 12–20)
CALCIUM SERPL-MCNC: 8.4 MG/DL (ref 8.5–10.1)
CHLORIDE SERPL-SCNC: 101 MMOL/L (ref 100–108)
CO2 SERPL-SCNC: 35 MMOL/L (ref 21–32)
CREAT SERPL-MCNC: 0.44 MG/DL (ref 0.6–1.3)
GLOBULIN SER CALC-MCNC: 2.7 G/DL (ref 2–4)
GLUCOSE SERPL-MCNC: 105 MG/DL (ref 74–99)
LIPASE SERPL-CCNC: 519 U/L (ref 73–393)
POTASSIUM SERPL-SCNC: 3.3 MMOL/L (ref 3.5–5.5)
PROT SERPL-MCNC: 5 G/DL (ref 6.4–8.2)
SODIUM SERPL-SCNC: 141 MMOL/L (ref 136–145)

## 2018-08-03 PROCEDURE — 36415 COLL VENOUS BLD VENIPUNCTURE: CPT | Performed by: NURSE PRACTITIONER

## 2018-08-03 PROCEDURE — 74011250637 HC RX REV CODE- 250/637: Performed by: HOSPITALIST

## 2018-08-03 PROCEDURE — 74011000258 HC RX REV CODE- 258: Performed by: HOSPITALIST

## 2018-08-03 PROCEDURE — 74011250636 HC RX REV CODE- 250/636: Performed by: HOSPITALIST

## 2018-08-03 PROCEDURE — 83690 ASSAY OF LIPASE: CPT | Performed by: NURSE PRACTITIONER

## 2018-08-03 PROCEDURE — 74011250636 HC RX REV CODE- 250/636: Performed by: NURSE PRACTITIONER

## 2018-08-03 PROCEDURE — 80053 COMPREHEN METABOLIC PANEL: CPT | Performed by: NURSE PRACTITIONER

## 2018-08-03 RX ORDER — DIAPER,BRIEF,INFANT-TODD,DISP
EACH MISCELLANEOUS 2 TIMES DAILY
Qty: 30 G | Refills: 0 | Status: SHIPPED | OUTPATIENT
Start: 2018-08-03 | End: 2020-08-21

## 2018-08-03 RX ORDER — HYDROMORPHONE HYDROCHLORIDE 2 MG/1
0.5 TABLET ORAL
Qty: 10 TAB | Refills: 0 | Status: SHIPPED | OUTPATIENT
Start: 2018-08-03 | End: 2018-08-13 | Stop reason: SDUPTHER

## 2018-08-03 RX ORDER — POTASSIUM CHLORIDE 7.45 MG/ML
10 INJECTION INTRAVENOUS
Status: COMPLETED | OUTPATIENT
Start: 2018-08-03 | End: 2018-08-03

## 2018-08-03 RX ORDER — CIPROFLOXACIN 250 MG/1
500 TABLET, FILM COATED ORAL EVERY 12 HOURS
Qty: 40 TAB | Refills: 0 | Status: ON HOLD | OUTPATIENT
Start: 2018-08-03 | End: 2018-08-10

## 2018-08-03 RX ORDER — HYDROCODONE BITARTRATE AND ACETAMINOPHEN 10; 325 MG/1; MG/1
1 TABLET ORAL
Qty: 10 TAB | Refills: 0 | Status: SHIPPED | OUTPATIENT
Start: 2018-08-03 | End: 2018-08-03

## 2018-08-03 RX ORDER — HYDROMORPHONE HYDROCHLORIDE 1 MG/ML
0.5 INJECTION, SOLUTION INTRAMUSCULAR; INTRAVENOUS; SUBCUTANEOUS
Status: DISCONTINUED | OUTPATIENT
Start: 2018-08-03 | End: 2018-08-03 | Stop reason: HOSPADM

## 2018-08-03 RX ORDER — HYDROCODONE BITARTRATE AND ACETAMINOPHEN 10; 325 MG/1; MG/1
1 TABLET ORAL
Qty: 21 TAB | Refills: 0 | Status: SHIPPED | OUTPATIENT
Start: 2018-08-03 | End: 2018-08-03

## 2018-08-03 RX ADMIN — CEFTRIAXONE 1 G: 1 INJECTION, POWDER, FOR SOLUTION INTRAMUSCULAR; INTRAVENOUS at 12:31

## 2018-08-03 RX ADMIN — PANTOPRAZOLE SODIUM 40 MG: 40 TABLET, DELAYED RELEASE ORAL at 08:35

## 2018-08-03 RX ADMIN — PANCRELIPASE 2 CAPSULE: 10000; 34000; 55000 CAPSULE, DELAYED RELEASE ORAL at 08:35

## 2018-08-03 RX ADMIN — HYDROMORPHONE HYDROCHLORIDE 0.5 MG: 1 INJECTION, SOLUTION INTRAMUSCULAR; INTRAVENOUS; SUBCUTANEOUS at 12:31

## 2018-08-03 RX ADMIN — Medication 100 MG: at 08:38

## 2018-08-03 RX ADMIN — GABAPENTIN 300 MG: 300 CAPSULE ORAL at 08:35

## 2018-08-03 RX ADMIN — Medication 0.5 MG: at 00:25

## 2018-08-03 RX ADMIN — CLONIDINE HYDROCHLORIDE 0.1 MG: 0.1 TABLET ORAL at 08:36

## 2018-08-03 RX ADMIN — THERA TABS 1 TABLET: TAB at 08:34

## 2018-08-03 RX ADMIN — LORATADINE 10 MG: 10 TABLET ORAL at 08:35

## 2018-08-03 RX ADMIN — POTASSIUM CHLORIDE 10 MEQ: 10 INJECTION, SOLUTION INTRAVENOUS at 09:54

## 2018-08-03 RX ADMIN — Medication 10 ML: at 05:53

## 2018-08-03 RX ADMIN — OXYBUTYNIN CHLORIDE 5 MG: 5 TABLET ORAL at 08:35

## 2018-08-03 RX ADMIN — HYDROMORPHONE HYDROCHLORIDE 0.5 MG: 1 INJECTION, SOLUTION INTRAMUSCULAR; INTRAVENOUS; SUBCUTANEOUS at 09:36

## 2018-08-03 RX ADMIN — Medication 0.5 MG: at 05:47

## 2018-08-03 RX ADMIN — SUCRALFATE 1 G: 1 SUSPENSION ORAL at 12:31

## 2018-08-03 RX ADMIN — FOLIC ACID 1 MG: 1 TABLET ORAL at 08:35

## 2018-08-03 RX ADMIN — BUSPIRONE HYDROCHLORIDE 15 MG: 5 TABLET ORAL at 08:34

## 2018-08-03 RX ADMIN — POTASSIUM CHLORIDE 10 MEQ: 10 INJECTION, SOLUTION INTRAVENOUS at 08:34

## 2018-08-03 RX ADMIN — SUCRALFATE 1 G: 1 SUSPENSION ORAL at 08:35

## 2018-08-03 NOTE — DISCHARGE SUMMARY
2 [facility]  Hospitalist Division    Discharge Summary    Patient: Remi Valdez MRN: [id number]  CSN: [id number]    YOB: 1965  Age: 46 y.o.   Sex: female    DOA: 7/31/2018 LOS:  LOS: 3 days   Discharge Date:      Admission Diagnoses: Acute pancreatitis    Discharge Diagnoses:    Problem List as of 8/3/2018  Date Reviewed: 4/24/2018          Codes Class Noted - Resolved    * (Principal)Acute pancreatitis ICD-10-CM: K85.90  ICD-9-CM: 577.0  2/19/2016 - Present        Alcohol dependence ([facility] [address] (Chronic) ICD-10-CM: F10.20  ICD-9-CM: 303.90  1/28/2016 - Present        HTN (hypertension) (Chronic) ICD-10-CM: I10  ICD-9-CM: 401.9  1/28/2016 - Present        COPD (chronic obstructive pulmonary disease) [facility] ICD-10-CM: J44.9  ICD-9-CM: 496  2/25/2016 - Present        Recurrent UTI (urinary tract infection) ICD-10-CM: N39.0  ICD-9-CM: 599.0  4/24/2018 - Present        Staghorn renal calculus ICD-10-CM: N20.0  ICD-9-CM: 592.0  4/24/2018 - Present        UTI (urinary tract infection) ICD-10-CM: N39.0  ICD-9-CM: 599.0  3/11/2018 - Present        Bronchitis ICD-10-CM: J40  ICD-9-CM: 490  3/11/2018 - Present        Syncope ICD-10-CM: R55  ICD-9-CM: 780.2  3/8/2018 - Present        Alcohol withdrawal ([facility] [address] ICD-10-CM: M48.779  ICD-9-CM: 291.81  3/8/2018 - Present        GI bleed ICD-10-CM: K92.2  ICD-9-CM: 578.9  3/8/2018 - Present        Hyponatremia ICD-10-CM: E87.1  ICD-9-CM: 276.1  3/8/2018 - Present        Hypercalcemia ICD-10-CM: E83.52  ICD-9-CM: 275.42  3/8/2018 - Present        Dizziness ICD-10-CM: R42  ICD-9-CM: 780.4  9/14/2017 - Present        Pancreatitis ICD-10-CM: K85.90  ICD-9-CM: 577.0  9/14/2017 - Present        Hypokalemia ICD-10-CM: E87.6  ICD-9-CM: 276.8  9/14/2017 - Present        Anemia ICD-10-CM: D64.9  ICD-9-CM: 285.9  9/14/2017 - Present        Encephalopathy ICD-10-CM: G93.40  ICD-9-CM: 348.30  9/14/2017 - Present        Nicotine withdrawal ICD-10-CM: F32.422  ICD-9-CM: 292.0, 305.1  2/27/2016 - Present        Pneumonia ICD-10-CM: J18.9  ICD-9-CM: 457  2/25/2016 - Present        Tylenol overdose ICD-10-CM: T39.1X1A  ICD-9-CM: 965.4, E980.0  2/20/2016 - Present        SIRS (systemic inflammatory response syndrome) ([facility]) ICD-10-CM: R65.10  ICD-9-CM: 995.90  2/20/2016 - Present        Pancreatitis, alcoholic, acute [id number]: K85.20  ICD-9-CM: 577.0  2/19/2016 - Present        Tachycardia ICD-10-CM: R00.0  ICD-9-CM: 785.0  1/28/2016 - Present        Esophageal stricture (Chronic) ICD-10-CM: K22.2  ICD-9-CM: 530.3  1/28/2016 - Present        Dilated pancreatic duct ICD-10-CM: K86.89  ICD-9-CM: 577.8  1/28/2016 - Present        Common bile duct dilation ICD-10-CM: K83.8  ICD-9-CM: 576.8  1/28/2016 - Present        Elevated LFTs ICD-10-CM: R79.89  ICD-9-CM: 790.6  1/28/2016 - Present        [facility] (Chronic) ICD-10-CM: F31.30  ICD-9-CM: 296.50  1/28/2016 - Present        Bipolar disorder (manic depression) ([facility]) (Chronic) ICD-10-CM: F31.9  ICD-9-CM: 296.80  3/8/2013 - Present        RESOLVED: Alcohol dependence with withdrawal ([facility] (Chronic) ICD-10-CM: X02.040  ICD-9-CM: 303.90, 291.81  3/8/2013 - 1/28/2016              Discharge Condition: Good    Discharge To: Home    Consults: None    Hospital Course:     Maye Marino is a 47 yo female who presents with abdominal pain that has persisted for the past 2 days; the pain is associated with nausea/vomiting. She is an on and off again alcoholic who restarted drinking recently and stopped abruptly 2 days prior. The patient states she has done this countless numbers of time previously. She has had acute pancreatitis before. The patient admits she wants to stop drinking all together for her family. In ED, lipase elevated and US with possible CBD dilation; however, this was apparent on US in the past as well. She was admitted for diet advancement, IVFs, and pain medications.  UTI with urinary culture significant for klebsiella pneumoniae-treated with rocephin. The patient is claiming to feel better today. Lipase is down significantly from admission. She is able to tolerate a GI soft diet with no nausea and vomiting associated. The patient will be discharged home today and has been educated on the importance of refraining from alcohol all together. The patient verbalizes understanding and states that she has a good family support system. Inpatient treatment:     Acute alcoholic pancreatitis  -+etoh level on admission; stopped drinking abruptly   -pain meds prn   -diet adv to GI soft-pt is hungry ; no n/v   -lipase improved      Etoh dependence  -CIWA  -thiamine, folic      UTI  -urine cx w/ klebsiella pneumoniae   -start rocephin  -pharmacy to dose      HTN  -monitor BP  -home meds     COPD  -monitor sats (goal > 88-90%); currently on 2L for elevated HR (states wears O2 @ home for tachycardia per her pulmonologist)   -home meds   -ICS   -nebs prn      Bipolar   -cont home meds     Tachycardia (per pt history of) -improved   -etoh contributor as well    -will start 2L O2 (hypoxia driven?)   -no leukocytosis, afebrile   -monitior      Tobacco abuse  -nicoderm patch     Replace K ; CMP am   DVT prophylaxis  -lovenox   GI px  -protonix      Physical Exam:  General appearance: alert, cooperative  Lungs: clear to auscultation bilaterally  Heart: tachycardiac, SR, S1, S2 normal  Abdomen: soft,  Minimal tenderness, non distended. Normoactive bowel sounds.    Extremities: extremities normal, atraumatic, no cyanosis or edema  Skin: Skin color, texture, turgor normal.   Neurologic: no abnormalities   PSY: Mood and affect normal, appropriately     Significant Diagnostic Studies:     Ultrasound Abdomen Limited        History: Right upper quadrant pain, fever      Comparison: Right upper quadrant ultrasound 9/15/2017      Technique: Multiple gray scale sonographic images of the abdomen were obtained  with attention to the right upper quadrant. Grayscale, color flow Doppler  imaging, and velocity spectral waveform analysis of the portal vein was  performed (duplex imaging).     Findings:      Liver has increased echotexture diffusely. Hepatomegaly is present with  estimated sagittal dimension of the right hepatic lobe measuring 21.4 cm.  No  focal hepatic lesions are seen. Jodell Nancy is no evidence of intrahepatic biliary  dilatation. Main portal vein diameter is 9 mm. Color flow Doppler and velocity  spectral waveform analysis of the portal vein shows normal hepatopedal blood  flow without evidence of portal vein thrombosis.        The common bile duct measures approximately 14 mm in diameter. Previously, bile  duct measures approximately 9 mm in diameter. The gallbladder is normal in size. No cholelithiasis is seen.  Trace pericholecystic free fluid is seen which also  appears to extend further along the liver.  A sonographic Traylor's sign is not  present but is not able to be accurately evaluated as patient recently was  administered pain medication.    The head of the pancreas demonstrates increased echogenicity. Pancreatic duct is  slightly dilated estimated 2-3 mm diameter.    Survey images of the right kidney demonstrate no evidence of hydronephrosis. Kidney does have mildly increased echotexture suggesting medical renal disease. The right kidney measures approximately 10.4 cm in length.  Survey images of the  IVC are unremarkable.      IMPRESSION  IMPRESSION:      1.  No sonographic findings of cholelithiasis or acute cholecystitis.      2. Dilatation of the common bile duct, increased since prior study, without  visualized obstruction. This finding is nonspecific. Potentially follow-up MRCP  or ERCP may be helpful.      3. Hepatomegaly. Increased hepatic echotexture, nonspecific but commonly seen  with hepatic steatosis.  Similar echogenicity can be seen with hepatocellular  dysfunction such as hepatitis or cirrhosis in the appropriate clinical setting. Clinical correlation is recommended.      4. Trace pericholecystic/perihepatic free fluid. EXAM: CTA Chest      INDICATION: History of chest pain, clinical concern for PE      COMPARISON: None.      TECHNIQUE: Axial CT imaging from the thoracic inlet through the diaphragm with  intravenous contrast. Coronal and sagittal MIP reformats were generated.      One or more dose reduction techniques were used on this CT: automated exposure  control, adjustment of the mAs and/or kVp according to patient's size, and  iterative reconstruction techniques. The specific techniques utilized on this CT  exam have been documented in the patient's electronic medical record.  _______________  Earnest Balderas  FINDINGS:      EXAM QUALITY: Overall exam quality is adequate      PULMONARY ARTERIES: No evidence of pulmonary embolism.      MEDIASTINUM: Normal heart size. No evidence of right heart strain. Aorta is  unremarkable. No pericardial effusion.      LYMPH NODES: No enlarged nodes.      AIRWAY: Normal.      LUNGS: No suspicious nodule or mass. A few small granuloma noted. No abnormal  opacities.     PLEURA: Normal. Specifically, no pneumothorax or pleural effusion.      UPPER ABDOMEN: Marked hepatic hypoattenuation. Mid/distal esophageal wall  indistinctness and thickening. Cheral Kael  OTHER: No acute or aggressive osseous abnormalities identified.      _______________  Stargeraldine Balderas  IMPRESSION  IMPRESSION:      No evidence of PE. No acute pulmonary process identified.      Mid/distal esophagus is indistinct, and appears thickened circumferentially. Findings nonspecific but can be seen in the setting of esophagitis. If further  evaluation is needed, endoscopy or esophagram could be considered.      Marked hepatic hypoattenuation suggesting steatosis.         Discharge Medications:     Current Discharge Medication List      START taking these medications    Details   ciprofloxacin HCl (CIPRO) 250 mg tablet Take 2 Tabs by mouth every twelve (12) hours for 10 days. Qty: 40 Tab, Refills: 0      Hydromorphone (Dilaudid) 2 mg tablet  Take 0.25 tabs by mouth every six (6) hours as needed for pain. -total 10           Hydrocortisone 0.5% topical cream apply to affected area 2 times daily      CONTINUE these medications which have NOT CHANGED    Details   gabapentin (NEURONTIN) 300 mg capsule Take 1 Cap by mouth three (3) times daily. Qty: 30 Cap, Refills: 0      ondansetron (ZOFRAN ODT) 8 mg disintegrating tablet Take 1 Tab by mouth every eight (8) hours as needed for Nausea. Qty: 15 Tab, Refills: 0      folic acid (FOLVITE) 1 mg tablet Take 1 Tab by mouth daily. Qty: 30 Tab, Refills: 0      pantoprazole (PROTONIX) 40 mg tablet Take 1 Tab by mouth Before breakfast and dinner. Qty: 60 Tab, Refills: 0      sucralfate (CARAFATE) 100 mg/mL suspension Take 10 mL by mouth Before breakfast, lunch, dinner and at bedtime. Qty: 1200 mL, Refills: 0      Thiamine Mononitrate (B-1) 100 mg tablet Take 1 Tab by mouth daily. Qty: 30 Tab, Refills: 1      cloNIDine HCl (CATAPRES) 0.2 mg tablet Take 0.5 Tabs by mouth two (2) times a day. Qty: 30 Tab, Refills: 0      diazePAM (VALIUM) 5 mg tablet Take 1 Tab by mouth every twelve (12) hours as needed for Anxiety. Max Daily Amount: 10 mg. Indications: Alcohol Withdrawal Syndrome  Qty: 10 Tab, Refills: 0    Associated Diagnoses: Alcohol abuse      PREVIDENT 5000 PLUS 1.1 % crea       tamsulosin (FLOMAX) 0.4 mg capsule Take 1 Cap by mouth daily (after dinner). Start one week prior to stone surgery. Qty: 10 Cap, Refills: 0      calcium-cholecalciferol, D3, (CALTRATE 600+D) tablet       topiramate (TOPAMAX) 100 mg tablet       QUEtiapine (SEROQUEL) 300 mg tablet       CREON 24,000-76,000 -120,000 unit capsule       cetirizine (ZYRTEC) 10 mg tablet       SUMAtriptan (IMITREX) 100 mg tablet TAKE 1 TO 2 TABLETS BY MOUTH DAILY AS NEEDED FOR MIGRAINE .  DO NOT EXCEED 200 MG IN 24 HOUR PERIOD  Refills: 0 oxybutynin (DITROPAN) 5 mg tablet Take 5 mg by mouth three (3) times daily. busPIRone (BUSPAR) 15 mg tablet Take 1 Tab by mouth two (2) times a day. Qty: 60 Tab, Refills: 0      albuterol (PROVENTIL VENTOLIN) 2.5 mg /3 mL (0.083 %) nebulizer solution 1.5 mL by Nebulization route every four (4) hours as needed for Wheezing or Shortness of Breath. Qty: 24 Each, Refills: 0      Nebulizer & Compressor machine 1 Each by Does Not Apply route every six (6) hours as needed. Qty: 1 Each, Refills: 0      ipratropium-albuterol (COMBIVENT)  mcg/actuation inhaler Take 2 Puffs by inhalation every six (6) hours. STOP taking these medications       traMADol (ULTRAM) 50 mg tablet Comments:   Reason for Stopping: prescribed Norco               Activity: Activity as tolerated; the patient was instructed to refrain from alcohol outpatient. The patient verbalizes understanding. The patient also states that she has a strong support system through family outpatient. The patient understands that if she continues to drink alcohol she will be readmitted to the hospital.     Diet: Low fat, Low cholesterol    Wound Care: None needed    Follow-up:     The patient should follow-up with PCP in 1 week in regards to recent hospitalization. Patient to arrange.      Discharge time > 35 mins   Eamon Larson NP  8/3/2018, 10:00 AM

## 2018-08-03 NOTE — PROGRESS NOTES
Assumed care of patient from [place], RN (offgoing nurse). Patient asleep in bed, NAD. Bed locked and in lowest position with call bell and frequently used items in reach, bed alarm on. 
 
2310- Patient assisted to [facility]. 1020- Per Awilda Uriostegui NP, patient cant be discharged/orders put in until Dr. Brianna Zapata comes to evaluate patient. 1130- Patient discharged. In room to go over discharge instructions, patient states they cant take Norco because it has acetaminophen in it which is \"bad for my liver. \" Paged [place] Alpers, NP. Patient also states they cant leave until 1700 because their family will be in work all day. Arizona Alpers returned page and informed of above. Told she will look into chart and put orders in for medication. 1149- Medications updated and discharge instructions reviewed with patient. Patient unable to be discharged until around 1700 because that's when her ride (pt mother) will get off work, who has her house keys. Told her we can get her a cab home, but her mom has her only set of house keys. 1400-Patient discharge home accompanied by CNA via wheelchair to front entrance to car. No distress noted. Patient has discharge instructions along with belongings. Voiced understanding of discharge instructions.

## 2018-08-03 NOTE — DISCHARGE INSTRUCTIONS
DISCHARGE SUMMARY from Nurse    PATIENT INSTRUCTIONS:    After general anesthesia or intravenous sedation, for 24 hours or while taking prescription Narcotics:  · Limit your activities  · Do not drive and operate hazardous machinery  · Do not make important personal or business decisions  · Do  not drink alcoholic beverages  · If you have not urinated within 8 hours after discharge, please contact your surgeon on call. Report the following to your surgeon:  · Excessive pain, swelling, redness or odor of or around the surgical area  · Temperature over 100.5  · Nausea and vomiting lasting longer than 4 hours or if unable to take medications  · Any signs of decreased circulation or nerve impairment to extremity: change in color, persistent  numbness, tingling, coldness or increase pain  · Any questions    What to do at Home:  Recommended activity: Activity as tolerated    If you experience any of the following symptoms listed under Signs and Symptoms of a Stroke or Warning Signs of a Heart Attack, please follow up with your PCP and/or call 911. *  Please give a list of your current medications to your Primary Care Provider. *  Please update this list whenever your medications are discontinued, doses are      changed, or new medications (including over-the-counter products) are added. *  Please carry medication information at all times in case of emergency situations. These are general instructions for a healthy lifestyle:    No smoking/ No tobacco products/ Avoid exposure to second hand smoke  Surgeon General's Warning:  Quitting smoking now greatly reduces serious risk to your health.     Obesity, smoking, and sedentary lifestyle greatly increases your risk for illness    A healthy diet, regular physical exercise & weight monitoring are important for maintaining a healthy lifestyle    You may be retaining fluid if you have a history of heart failure or if you experience any of the following symptoms: Weight gain of 3 pounds or more overnight or 5 pounds in a week, increased swelling in our hands or feet or shortness of breath while lying flat in bed. Please call your doctor as soon as you notice any of these symptoms; do not wait until your next office visit. Recognize signs and symptoms of STROKE:    F-face looks uneven    A-arms unable to move or move unevenly    S-speech slurred or non-existent    T-time-call 911 as soon as signs and symptoms begin-DO NOT go       Back to bed or wait to see if you get better-TIME IS BRAIN. Warning Signs of HEART ATTACK     Call 911 if you have these symptoms:   Chest discomfort. Most heart attacks involve discomfort in the center of the chest that lasts more than a few minutes, or that goes away and comes back. It can feel like uncomfortable pressure, squeezing, fullness, or pain.  Discomfort in other areas of the upper body. Symptoms can include pain or discomfort in one or both arms, the back, neck, jaw, or stomach.  Shortness of breath with or without chest discomfort.  Other signs may include breaking out in a cold sweat, nausea, or lightheadedness. Don't wait more than five minutes to call 911 - MINUTES MATTER! Fast action can save your life. Calling 911 is almost always the fastest way to get lifesaving treatment. Emergency Medical Services staff can begin treatment when they arrive -- up to an hour sooner than if someone gets to the hospital by car. The discharge information has been reviewed with the patient. The patient verbalized understanding. Discharge medications reviewed with the patient and appropriate educational materials and side effects teaching were provided. ___________________________________________________________________________________________________________________________________     Pancreatitis: Care Instructions  Your Care Instructions    The pancreas is an organ behind the stomach.  It makes hormones and enzymes to help your body digest food. But if these enzymes attack the pancreas, it can get inflamed. This is called pancreatitis. Most cases are caused by gallstones or by heavy alcohol use. If you take care of yourself at home, it will help you get better. It will also help you avoid more problems with your pancreas. Follow-up care is a key part of your treatment and safety. Be sure to make and go to all appointments, and call your doctor if you are having problems. It's also a good idea to know your test results and keep a list of the medicines you take. How can you care for yourself at home? · Drink clear liquids and eat bland foods until you feel better. [place] foods include rice, dry toast, and crackers. They also include bananas and applesauce. · Eat a low-fat diet until your doctor says your pancreas is healed. · Do not drink alcohol. Tell your doctor if you need help to quit. Counseling, support groups, and sometimes medicines can help you stay sober. · Be safe with medicines. Read and follow all instructions on the label. ¨ If the doctor gave you a prescription medicine for pain, take it as prescribed. ¨ If you are not taking a prescription pain medicine, ask your doctor if you can take an over-the-counter medicine. · If your doctor prescribed antibiotics, take them as directed. Do not stop taking them just because you feel better. You need to take the full course of antibiotics. · Get extra rest until you feel better. To prevent future problems with your pancreas  · Do not drink alcohol. · Tell your doctors and pharmacist that you've had pancreatitis. They can help you avoid medicines that may cause this problem again. When should you call for help? Call 911 anytime you think you may need emergency care.  For example, call if:    · You vomit blood or what looks like coffee grounds.     · Your stools are maroon or very bloody.    Call your doctor now or seek immediate medical care if:    · You have new or worse belly pain.     · Your stools are black and look like tar, or they have streaks of blood.     · You are vomiting.    Watch closely for changes in your health, and be sure to contact your doctor if:    · You do not get better as expected. Where can you learn more? Go to http://shannon-heriberto.info/. Enter G842 in the search box to learn more about \"Pancreatitis: Care Instructions. \"  Current as of: May 12, 2017  Content Version: 11.7  © 7453-5899 [employer]. Care instructions adapted under license by [employer] (which disclaims liability or warranty for this information). If you have questions about a medical condition or this instruction, always ask your healthcare professional. [address] any warranty or liability for your use of this information.     Patient armband removed and shredded

## 2018-08-03 NOTE — PROGRESS NOTES
Plan home. Care Management Interventions PCP Verified by CM: Yes 
Palliative Care Criteria Met (RRAT>21 & CHF Dx)?: No 
Mode of Transport at Discharge: Other (see comment) Transition of Care Consult (CM Consult): Discharge Planning Discharge Durable Medical Equipment: No 
Physical Therapy Consult: No 
Occupational Therapy Consult: No 
Speech Therapy Consult: No 
Current Support Network: Lives with Spouse Confirm Follow Up Transport: Family Plan discussed with Pt/Family/Caregiver: Yes Discharge Location Discharge Placement: Home

## 2018-08-03 NOTE — PROGRESS NOTES
Problem: Falls - Risk of 
Goal: *Absence of Falls Document Matt Vasquez Fall Risk and appropriate interventions in the flowsheet. Outcome: Progressing Towards Goal 
Fall Risk Interventions: 
Mobility Interventions: Bed/chair exit alarm, Communicate number of staff needed for ambulation/transfer, Patient to call before getting OOB Mentation Interventions: Adequate sleep, hydration, pain control, Bed/chair exit alarm, Door open when patient unattended Medication Interventions: Patient to call before getting OOB, Evaluate medications/consider consulting pharmacy, Bed/chair exit alarm Elimination Interventions: Call light in reach, Patient to call for help with toileting needs History of Falls Interventions: Bed/chair exit alarm, Door open when patient unattended

## 2018-08-30 ENCOUNTER — HOSPITAL ENCOUNTER (OUTPATIENT)
Dept: LAB | Age: 53
Discharge: HOME | End: 2018-08-30
Payer: MEDICARE

## 2018-08-30 PROCEDURE — 88175 CYTOPATH C/V AUTO FLUID REDO: CPT | Performed by: PHYSICIAN ASSISTANT

## 2018-08-30 PROCEDURE — 87624 HPV HI-RISK TYP POOLED RSLT: CPT | Performed by: PHYSICIAN ASSISTANT

## 2019-01-15 ENCOUNTER — APPOINTMENT (OUTPATIENT)
Dept: CT IMAGING | Age: 54
DRG: 280 | End: 2019-01-15
Attending: EMERGENCY MEDICINE
Payer: MEDICARE

## 2019-01-15 ENCOUNTER — HOSPITAL ENCOUNTER (INPATIENT)
Age: 54
LOS: 3 days | Discharge: HOME | DRG: 280 | End: 2019-01-18
Attending: EMERGENCY MEDICINE | Admitting: HOSPITALIST
Payer: MEDICARE

## 2019-01-15 ENCOUNTER — APPOINTMENT (OUTPATIENT)
Dept: GENERAL RADIOLOGY | Age: 54
DRG: 280 | End: 2019-01-15
Attending: EMERGENCY MEDICINE
Payer: MEDICARE

## 2019-01-15 DIAGNOSIS — E87.29 ALCOHOLIC KETOACIDOSIS: Primary | ICD-10-CM

## 2019-01-15 DIAGNOSIS — R00.0 TACHYCARDIA: ICD-10-CM

## 2019-01-15 DIAGNOSIS — F10.10 ALCOHOL ABUSE: ICD-10-CM

## 2019-01-15 DIAGNOSIS — M62.82 NON-TRAUMATIC RHABDOMYOLYSIS: ICD-10-CM

## 2019-01-15 DIAGNOSIS — N39.0 URINARY TRACT INFECTION WITHOUT HEMATURIA, SITE UNSPECIFIED: ICD-10-CM

## 2019-01-15 DIAGNOSIS — E86.0 DEHYDRATION: ICD-10-CM

## 2019-01-15 PROBLEM — E87.20 METABOLIC ACIDOSIS: Status: ACTIVE | Noted: 2019-01-15

## 2019-01-15 PROBLEM — I21.4 NSTEMI (NON-ST ELEVATED MYOCARDIAL INFARCTION) (HCC): Status: ACTIVE | Noted: 2019-01-15

## 2019-01-15 PROBLEM — R10.9 ABDOMINAL PAIN: Status: ACTIVE | Noted: 2019-01-15

## 2019-01-15 PROBLEM — K92.2 UPPER GI BLEED: Status: ACTIVE | Noted: 2019-01-15

## 2019-01-15 LAB
ALBUMIN SERPL-MCNC: 2.9 G/DL (ref 3.4–5)
ALBUMIN SERPL-MCNC: 3 G/DL (ref 3.4–5)
ALBUMIN SERPL-MCNC: 3.3 G/DL (ref 3.4–5)
ALBUMIN/GLOB SERPL: 0.7 {RATIO} (ref 0.8–1.7)
ALBUMIN/GLOB SERPL: 0.8 {RATIO} (ref 0.8–1.7)
ALBUMIN/GLOB SERPL: 0.9 {RATIO} (ref 0.8–1.7)
ALP SERPL-CCNC: 93 U/L (ref 45–117)
ALP SERPL-CCNC: 94 U/L (ref 45–117)
ALP SERPL-CCNC: 98 U/L (ref 45–117)
ALT SERPL-CCNC: 114 U/L (ref 13–56)
ALT SERPL-CCNC: 121 U/L (ref 13–56)
ALT SERPL-CCNC: 121 U/L (ref 13–56)
ANION GAP SERPL CALC-SCNC: 17 MMOL/L (ref 3–18)
ANION GAP SERPL CALC-SCNC: 18 MMOL/L (ref 3–18)
ANION GAP SERPL CALC-SCNC: 23 MMOL/L (ref 3–18)
APPEARANCE UR: CLEAR
APTT PPP: 25.1 SEC (ref 23–36.4)
ARTERIAL PATENCY WRIST A: YES
AST SERPL-CCNC: 326 U/L (ref 15–37)
AST SERPL-CCNC: 345 U/L (ref 15–37)
AST SERPL-CCNC: 383 U/L (ref 15–37)
BACTERIA URNS QL MICRO: ABNORMAL /HPF
BASE DEFICIT BLD-SCNC: 13 MMOL/L
BASOPHILS # BLD: 0 K/UL (ref 0–0.1)
BASOPHILS NFR BLD: 0 % (ref 0–2)
BDY SITE: ABNORMAL
BILIRUB SERPL-MCNC: 0.5 MG/DL (ref 0.2–1)
BILIRUB SERPL-MCNC: 0.5 MG/DL (ref 0.2–1)
BILIRUB SERPL-MCNC: 0.7 MG/DL (ref 0.2–1)
BILIRUB UR QL: NEGATIVE
BNP SERPL-MCNC: 1449 PG/ML (ref 0–900)
BODY TEMPERATURE: 98.9
BUN SERPL-MCNC: 10 MG/DL (ref 7–18)
BUN SERPL-MCNC: 9 MG/DL (ref 7–18)
BUN SERPL-MCNC: 9 MG/DL (ref 7–18)
BUN/CREAT SERPL: 12 (ref 12–20)
BUN/CREAT SERPL: 12 (ref 12–20)
BUN/CREAT SERPL: 14 (ref 12–20)
CALCIUM SERPL-MCNC: 7.2 MG/DL (ref 8.5–10.1)
CALCIUM SERPL-MCNC: 7.3 MG/DL (ref 8.5–10.1)
CALCIUM SERPL-MCNC: 7.8 MG/DL (ref 8.5–10.1)
CHLORIDE SERPL-SCNC: 102 MMOL/L (ref 100–108)
CHLORIDE SERPL-SCNC: 92 MMOL/L (ref 100–108)
CHLORIDE SERPL-SCNC: 96 MMOL/L (ref 100–108)
CK MB CFR SERPL CALC: 1.6 % (ref 0–4)
CK MB SERPL-MCNC: 60 NG/ML (ref 5–25)
CK SERPL-CCNC: 3644 U/L (ref 26–192)
CO2 SERPL-SCNC: 13 MMOL/L (ref 21–32)
CO2 SERPL-SCNC: 16 MMOL/L (ref 21–32)
CO2 SERPL-SCNC: 18 MMOL/L (ref 21–32)
COLOR UR: YELLOW
CREAT SERPL-MCNC: 0.72 MG/DL (ref 0.6–1.3)
CREAT SERPL-MCNC: 0.73 MG/DL (ref 0.6–1.3)
CREAT SERPL-MCNC: 0.77 MG/DL (ref 0.6–1.3)
DIFFERENTIAL METHOD BLD: ABNORMAL
EOSINOPHIL # BLD: 0 K/UL (ref 0–0.4)
EOSINOPHIL NFR BLD: 0 % (ref 0–5)
EPITH CASTS URNS QL MICRO: ABNORMAL /LPF (ref 0–5)
ERYTHROCYTE [DISTWIDTH] IN BLOOD BY AUTOMATED COUNT: 17.7 % (ref 11.6–14.5)
ETHANOL SERPL-MCNC: <3 MG/DL (ref 0–3)
GAS FLOW.O2 O2 DELIVERY SYS: ABNORMAL L/MIN
GLOBULIN SER CALC-MCNC: 3.7 G/DL (ref 2–4)
GLOBULIN SER CALC-MCNC: 3.8 G/DL (ref 2–4)
GLOBULIN SER CALC-MCNC: 3.9 G/DL (ref 2–4)
GLUCOSE SERPL-MCNC: 118 MG/DL (ref 74–99)
GLUCOSE SERPL-MCNC: 75 MG/DL (ref 74–99)
GLUCOSE SERPL-MCNC: 94 MG/DL (ref 74–99)
GLUCOSE UR STRIP.AUTO-MCNC: NEGATIVE MG/DL
HCO3 BLD-SCNC: 14.4 MMOL/L (ref 22–26)
HCT VFR BLD AUTO: 43.8 % (ref 35–45)
HGB BLD-MCNC: 14.5 G/DL (ref 12–16)
HGB UR QL STRIP: ABNORMAL
INR PPP: 0.9 (ref 0.8–1.2)
KETONES UR QL STRIP.AUTO: >160 MG/DL
LACTATE BLD-SCNC: 1.04 MMOL/L (ref 0.4–2)
LEUKOCYTE ESTERASE UR QL STRIP.AUTO: ABNORMAL
LIPASE SERPL-CCNC: 126 U/L (ref 73–393)
LYMPHOCYTES # BLD: 3.2 K/UL (ref 0.9–3.6)
LYMPHOCYTES NFR BLD: 26 % (ref 21–52)
MCH RBC QN AUTO: 31.5 PG (ref 24–34)
MCHC RBC AUTO-ENTMCNC: 33.1 G/DL (ref 31–37)
MCV RBC AUTO: 95 FL (ref 74–97)
MONOCYTES # BLD: 1 K/UL (ref 0.05–1.2)
MONOCYTES NFR BLD: 8 % (ref 3–10)
NEUTS SEG # BLD: 8.4 K/UL (ref 1.8–8)
NEUTS SEG NFR BLD: 66 % (ref 40–73)
NITRITE UR QL STRIP.AUTO: NEGATIVE
O2/TOTAL GAS SETTING VFR VENT: 21 %
PCO2 BLD: 34.9 MMHG (ref 35–45)
PH BLD: 7.22 [PH] (ref 7.35–7.45)
PH UR STRIP: 5.5 [PH] (ref 5–8)
PLATELET # BLD AUTO: 227 K/UL (ref 135–420)
PMV BLD AUTO: 9.8 FL (ref 9.2–11.8)
PO2 BLD: 73 MMHG (ref 80–100)
POTASSIUM SERPL-SCNC: 3.7 MMOL/L (ref 3.5–5.5)
POTASSIUM SERPL-SCNC: 3.8 MMOL/L (ref 3.5–5.5)
POTASSIUM SERPL-SCNC: 4 MMOL/L (ref 3.5–5.5)
PROT SERPL-MCNC: 6.7 G/DL (ref 6.4–8.2)
PROT SERPL-MCNC: 6.8 G/DL (ref 6.4–8.2)
PROT SERPL-MCNC: 7.1 G/DL (ref 6.4–8.2)
PROT UR STRIP-MCNC: ABNORMAL MG/DL
PROTHROMBIN TIME: 11.9 SEC (ref 11.5–15.2)
RBC # BLD AUTO: 4.61 M/UL (ref 4.2–5.3)
RBC #/AREA URNS HPF: ABNORMAL /HPF (ref 0–5)
SAO2 % BLD: 91 % (ref 92–97)
SERVICE CMNT-IMP: ABNORMAL
SODIUM SERPL-SCNC: 128 MMOL/L (ref 136–145)
SODIUM SERPL-SCNC: 132 MMOL/L (ref 136–145)
SODIUM SERPL-SCNC: 135 MMOL/L (ref 136–145)
SP GR UR REFRACTOMETRY: 1.01 (ref 1–1.03)
SPECIMEN TYPE: ABNORMAL
TOTAL RESP. RATE, ITRR: 21
TROPONIN I SERPL-MCNC: 0.22 NG/ML (ref 0–0.04)
TROPONIN I SERPL-MCNC: 0.23 NG/ML (ref 0–0.04)
TROPONIN I SERPL-MCNC: 0.26 NG/ML (ref 0–0.04)
UROBILINOGEN UR QL STRIP.AUTO: 0.2 EU/DL (ref 0.2–1)
WBC # BLD AUTO: 12.6 K/UL (ref 4.6–13.2)
WBC URNS QL MICRO: ABNORMAL /HPF (ref 0–4)

## 2019-01-15 PROCEDURE — 82803 BLOOD GASES ANY COMBINATION: CPT

## 2019-01-15 PROCEDURE — 80307 DRUG TEST PRSMV CHEM ANLYZR: CPT

## 2019-01-15 PROCEDURE — 74011250636 HC RX REV CODE- 250/636: Performed by: HOSPITALIST

## 2019-01-15 PROCEDURE — 81001 URINALYSIS AUTO W/SCOPE: CPT

## 2019-01-15 PROCEDURE — 77030037878 HC DRSG MEPILEX >48IN BORD MOLN -B

## 2019-01-15 PROCEDURE — 96366 THER/PROPH/DIAG IV INF ADDON: CPT

## 2019-01-15 PROCEDURE — 87186 SC STD MICRODIL/AGAR DIL: CPT

## 2019-01-15 PROCEDURE — 74011000258 HC RX REV CODE- 258: Performed by: EMERGENCY MEDICINE

## 2019-01-15 PROCEDURE — 74011000250 HC RX REV CODE- 250: Performed by: EMERGENCY MEDICINE

## 2019-01-15 PROCEDURE — 87077 CULTURE AEROBIC IDENTIFY: CPT

## 2019-01-15 PROCEDURE — 74011636320 HC RX REV CODE- 636/320: Performed by: EMERGENCY MEDICINE

## 2019-01-15 PROCEDURE — 74177 CT ABD & PELVIS W/CONTRAST: CPT

## 2019-01-15 PROCEDURE — 99285 EMERGENCY DEPT VISIT HI MDM: CPT

## 2019-01-15 PROCEDURE — 83690 ASSAY OF LIPASE: CPT

## 2019-01-15 PROCEDURE — 36600 WITHDRAWAL OF ARTERIAL BLOOD: CPT

## 2019-01-15 PROCEDURE — 93005 ELECTROCARDIOGRAM TRACING: CPT

## 2019-01-15 PROCEDURE — 74011250636 HC RX REV CODE- 250/636: Performed by: EMERGENCY MEDICINE

## 2019-01-15 PROCEDURE — 87086 URINE CULTURE/COLONY COUNT: CPT

## 2019-01-15 PROCEDURE — 80053 COMPREHEN METABOLIC PANEL: CPT

## 2019-01-15 PROCEDURE — 82550 ASSAY OF CK (CPK): CPT

## 2019-01-15 PROCEDURE — 96365 THER/PROPH/DIAG IV INF INIT: CPT

## 2019-01-15 PROCEDURE — C9113 INJ PANTOPRAZOLE SODIUM, VIA: HCPCS | Performed by: EMERGENCY MEDICINE

## 2019-01-15 PROCEDURE — 36415 COLL VENOUS BLD VENIPUNCTURE: CPT

## 2019-01-15 PROCEDURE — 87040 BLOOD CULTURE FOR BACTERIA: CPT

## 2019-01-15 PROCEDURE — 83880 ASSAY OF NATRIURETIC PEPTIDE: CPT

## 2019-01-15 PROCEDURE — 85730 THROMBOPLASTIN TIME PARTIAL: CPT

## 2019-01-15 PROCEDURE — C9113 INJ PANTOPRAZOLE SODIUM, VIA: HCPCS | Performed by: HOSPITALIST

## 2019-01-15 PROCEDURE — 96375 TX/PRO/DX INJ NEW DRUG ADDON: CPT

## 2019-01-15 PROCEDURE — 71045 X-RAY EXAM CHEST 1 VIEW: CPT

## 2019-01-15 PROCEDURE — 83605 ASSAY OF LACTIC ACID: CPT

## 2019-01-15 PROCEDURE — 85610 PROTHROMBIN TIME: CPT

## 2019-01-15 PROCEDURE — 65660000001 HC RM ICU INTERMED STEPDOWN

## 2019-01-15 PROCEDURE — 74011000250 HC RX REV CODE- 250: Performed by: HOSPITALIST

## 2019-01-15 PROCEDURE — 74011250637 HC RX REV CODE- 250/637: Performed by: HOSPITALIST

## 2019-01-15 PROCEDURE — 85025 COMPLETE CBC W/AUTO DIFF WBC: CPT

## 2019-01-15 PROCEDURE — 77030037870 HC GLD SHT PREVALON SAGE -B

## 2019-01-15 RX ORDER — HEPARIN SODIUM 5000 [USP'U]/ML
5000 INJECTION, SOLUTION INTRAVENOUS; SUBCUTANEOUS EVERY 8 HOURS
Status: DISCONTINUED | OUTPATIENT
Start: 2019-01-15 | End: 2019-01-15

## 2019-01-15 RX ORDER — LORAZEPAM 1 MG/1
1 TABLET ORAL
Status: DISCONTINUED | OUTPATIENT
Start: 2019-01-15 | End: 2019-01-18 | Stop reason: HOSPADM

## 2019-01-15 RX ORDER — CEFTRIAXONE 1 G/1
1 INJECTION, POWDER, FOR SOLUTION INTRAMUSCULAR; INTRAVENOUS
Status: DISCONTINUED | OUTPATIENT
Start: 2019-01-15 | End: 2019-01-15

## 2019-01-15 RX ORDER — ONDANSETRON 2 MG/ML
4 INJECTION INTRAMUSCULAR; INTRAVENOUS
Status: COMPLETED | OUTPATIENT
Start: 2019-01-15 | End: 2019-01-15

## 2019-01-15 RX ORDER — LORAZEPAM 1 MG/1
2 TABLET ORAL EVERY 6 HOURS
Status: DISCONTINUED | OUTPATIENT
Start: 2019-01-15 | End: 2019-01-16

## 2019-01-15 RX ORDER — LORAZEPAM 2 MG/ML
1 INJECTION INTRAMUSCULAR
Status: DISCONTINUED | OUTPATIENT
Start: 2019-01-15 | End: 2019-01-15 | Stop reason: SDUPTHER

## 2019-01-15 RX ORDER — SODIUM CHLORIDE 0.9 % (FLUSH) 0.9 %
5-40 SYRINGE (ML) INJECTION AS NEEDED
Status: DISCONTINUED | OUTPATIENT
Start: 2019-01-15 | End: 2019-01-18 | Stop reason: HOSPADM

## 2019-01-15 RX ORDER — LORAZEPAM 2 MG/ML
3 INJECTION INTRAMUSCULAR
Status: DISCONTINUED | OUTPATIENT
Start: 2019-01-15 | End: 2019-01-18 | Stop reason: HOSPADM

## 2019-01-15 RX ORDER — CLONIDINE HYDROCHLORIDE 0.1 MG/1
0.2 TABLET ORAL 2 TIMES DAILY
Status: DISCONTINUED | OUTPATIENT
Start: 2019-01-15 | End: 2019-01-15

## 2019-01-15 RX ORDER — SODIUM CHLORIDE 0.9 % (FLUSH) 0.9 %
5-40 SYRINGE (ML) INJECTION EVERY 8 HOURS
Status: DISCONTINUED | OUTPATIENT
Start: 2019-01-15 | End: 2019-01-18 | Stop reason: HOSPADM

## 2019-01-15 RX ORDER — CLONIDINE HYDROCHLORIDE 0.1 MG/1
0.1 TABLET ORAL 2 TIMES DAILY
Status: DISCONTINUED | OUTPATIENT
Start: 2019-01-15 | End: 2019-01-18 | Stop reason: HOSPADM

## 2019-01-15 RX ORDER — ONDANSETRON 2 MG/ML
4 INJECTION INTRAMUSCULAR; INTRAVENOUS
Status: DISCONTINUED | OUTPATIENT
Start: 2019-01-15 | End: 2019-01-18 | Stop reason: HOSPADM

## 2019-01-15 RX ORDER — HYDROMORPHONE HYDROCHLORIDE 2 MG/1
2 TABLET ORAL
Status: DISCONTINUED | OUTPATIENT
Start: 2019-01-15 | End: 2019-01-16

## 2019-01-15 RX ORDER — SODIUM CHLORIDE 9 MG/ML
75 INJECTION, SOLUTION INTRAVENOUS CONTINUOUS
Status: DISCONTINUED | OUTPATIENT
Start: 2019-01-15 | End: 2019-01-18

## 2019-01-15 RX ORDER — ASPIRIN 325 MG/1
100 TABLET, FILM COATED ORAL DAILY
Status: DISCONTINUED | OUTPATIENT
Start: 2019-01-16 | End: 2019-01-18 | Stop reason: HOSPADM

## 2019-01-15 RX ORDER — LORAZEPAM 1 MG/1
1 TABLET ORAL
Status: DISCONTINUED | OUTPATIENT
Start: 2019-01-15 | End: 2019-01-15 | Stop reason: SDUPTHER

## 2019-01-15 RX ORDER — CLONIDINE HYDROCHLORIDE 0.1 MG/1
0.1 TABLET ORAL 2 TIMES DAILY
Status: DISCONTINUED | OUTPATIENT
Start: 2019-01-15 | End: 2019-01-15

## 2019-01-15 RX ORDER — TOPIRAMATE 100 MG/1
100 TABLET, FILM COATED ORAL DAILY
Status: DISCONTINUED | OUTPATIENT
Start: 2019-01-16 | End: 2019-01-18 | Stop reason: HOSPADM

## 2019-01-15 RX ORDER — MORPHINE SULFATE 4 MG/ML
4 INJECTION INTRAVENOUS
Status: COMPLETED | OUTPATIENT
Start: 2019-01-15 | End: 2019-01-15

## 2019-01-15 RX ORDER — FOLIC ACID 1 MG/1
1 TABLET ORAL DAILY
Status: DISCONTINUED | OUTPATIENT
Start: 2019-01-16 | End: 2019-01-18 | Stop reason: HOSPADM

## 2019-01-15 RX ORDER — GABAPENTIN 300 MG/1
600 CAPSULE ORAL 2 TIMES DAILY
Status: DISCONTINUED | OUTPATIENT
Start: 2019-01-15 | End: 2019-01-18 | Stop reason: HOSPADM

## 2019-01-15 RX ORDER — ACETAMINOPHEN 325 MG/1
650 TABLET ORAL
Status: DISCONTINUED | OUTPATIENT
Start: 2019-01-15 | End: 2019-01-18 | Stop reason: HOSPADM

## 2019-01-15 RX ORDER — LORAZEPAM 1 MG/1
2 TABLET ORAL
Status: DISCONTINUED | OUTPATIENT
Start: 2019-01-15 | End: 2019-01-18 | Stop reason: HOSPADM

## 2019-01-15 RX ORDER — SODIUM CHLORIDE 0.9 % (FLUSH) 0.9 %
5-10 SYRINGE (ML) INJECTION AS NEEDED
Status: DISCONTINUED | OUTPATIENT
Start: 2019-01-15 | End: 2019-01-18 | Stop reason: HOSPADM

## 2019-01-15 RX ORDER — PANTOPRAZOLE SODIUM 40 MG/10ML
80 INJECTION, POWDER, LYOPHILIZED, FOR SOLUTION INTRAVENOUS
Status: COMPLETED | OUTPATIENT
Start: 2019-01-15 | End: 2019-01-15

## 2019-01-15 RX ORDER — OXYCODONE HYDROCHLORIDE 5 MG/1
5 TABLET ORAL
Status: DISCONTINUED | OUTPATIENT
Start: 2019-01-15 | End: 2019-01-15

## 2019-01-15 RX ORDER — QUETIAPINE FUMARATE 100 MG/1
300 TABLET, FILM COATED ORAL
Status: DISCONTINUED | OUTPATIENT
Start: 2019-01-15 | End: 2019-01-18 | Stop reason: HOSPADM

## 2019-01-15 RX ORDER — BUSPIRONE HYDROCHLORIDE 5 MG/1
15 TABLET ORAL 2 TIMES DAILY
Status: DISCONTINUED | OUTPATIENT
Start: 2019-01-15 | End: 2019-01-18 | Stop reason: HOSPADM

## 2019-01-15 RX ORDER — LORAZEPAM 2 MG/ML
2 INJECTION INTRAMUSCULAR
Status: DISCONTINUED | OUTPATIENT
Start: 2019-01-15 | End: 2019-01-18 | Stop reason: HOSPADM

## 2019-01-15 RX ORDER — THERA TABS 400 MCG
1 TAB ORAL DAILY
Status: DISCONTINUED | OUTPATIENT
Start: 2019-01-16 | End: 2019-01-18 | Stop reason: HOSPADM

## 2019-01-15 RX ORDER — DEXTROSE, SODIUM CHLORIDE, AND POTASSIUM CHLORIDE 5; .9; .15 G/100ML; G/100ML; G/100ML
150 INJECTION INTRAVENOUS CONTINUOUS
Status: DISCONTINUED | OUTPATIENT
Start: 2019-01-15 | End: 2019-01-15

## 2019-01-15 RX ORDER — LORAZEPAM 2 MG/ML
1 INJECTION INTRAMUSCULAR
Status: DISCONTINUED | OUTPATIENT
Start: 2019-01-15 | End: 2019-01-18 | Stop reason: HOSPADM

## 2019-01-15 RX ADMIN — LORAZEPAM 1 MG: 2 INJECTION, SOLUTION INTRAMUSCULAR; INTRAVENOUS at 13:36

## 2019-01-15 RX ADMIN — FOLIC ACID: 5 INJECTION, SOLUTION INTRAMUSCULAR; INTRAVENOUS; SUBCUTANEOUS at 12:26

## 2019-01-15 RX ADMIN — GABAPENTIN 600 MG: 300 CAPSULE ORAL at 21:08

## 2019-01-15 RX ADMIN — PANTOPRAZOLE SODIUM 80 MG: 40 INJECTION, POWDER, FOR SOLUTION INTRAVENOUS at 16:52

## 2019-01-15 RX ADMIN — SODIUM CHLORIDE 701 ML: 9 INJECTION, SOLUTION INTRAVENOUS at 13:40

## 2019-01-15 RX ADMIN — SODIUM CHLORIDE 1000 ML: 900 INJECTION, SOLUTION INTRAVENOUS at 13:40

## 2019-01-15 RX ADMIN — IOPAMIDOL 99 ML: 612 INJECTION, SOLUTION INTRAVENOUS at 15:17

## 2019-01-15 RX ADMIN — SODIUM CHLORIDE 40 MG: 9 INJECTION, SOLUTION INTRAMUSCULAR; INTRAVENOUS; SUBCUTANEOUS at 21:08

## 2019-01-15 RX ADMIN — MORPHINE SULFATE 4 MG: 4 INJECTION INTRAVENOUS at 13:37

## 2019-01-15 RX ADMIN — CEFTRIAXONE 1 G: 1 INJECTION, POWDER, FOR SOLUTION INTRAMUSCULAR; INTRAVENOUS at 16:40

## 2019-01-15 RX ADMIN — SODIUM CHLORIDE 150 ML/HR: 900 INJECTION, SOLUTION INTRAVENOUS at 21:42

## 2019-01-15 RX ADMIN — HYDROMORPHONE HYDROCHLORIDE 2 MG: 2 TABLET ORAL at 22:54

## 2019-01-15 RX ADMIN — Medication 10 ML: at 21:15

## 2019-01-15 RX ADMIN — LORAZEPAM 2 MG: 1 TABLET ORAL at 21:07

## 2019-01-15 RX ADMIN — CLONIDINE HYDROCHLORIDE 0.1 MG: 0.1 TABLET ORAL at 21:07

## 2019-01-15 RX ADMIN — BUSPIRONE HYDROCHLORIDE 15 MG: 5 TABLET ORAL at 21:08

## 2019-01-15 RX ADMIN — ONDANSETRON 4 MG: 2 INJECTION INTRAMUSCULAR; INTRAVENOUS at 12:05

## 2019-01-15 RX ADMIN — SODIUM CHLORIDE 1000 ML: 900 INJECTION, SOLUTION INTRAVENOUS at 12:08

## 2019-01-15 RX ADMIN — Medication 20 ML: at 21:14

## 2019-01-15 NOTE — ED PROVIDER NOTES
EMERGENCY DEPARTMENT HISTORY AND PHYSICAL EXAM 
 
11:44 AM 
 
 
Date: 1/15/2019 Patient Name: Grover Prasad History of Presenting Illness Chief Complaint Patient presents with  Chest Pain  Vomiting  Diarrhea History Provided By: Patient Chief Complaint: Abd Pain Duration: 2 Days Timing:  Constant Location: Upper Abdomen Severity: Moderate Modifying Factors: Pain exacerbated upon palpation Associated Symptoms: black stool, coffee ground emesis, diarrhea Additional History (Context): Grover Prasad is a 46 y.o. female with PMHx of pancreatitis who presents with constant, moderate abd pain onset of approximately 2 days. Pain is worsened with palpation. Pt has also been experiencing tarry stool, \"coffee-ground emesis\", and diarrhea. Has previously had pancreatitis. Denies any recent drinking, blood transfusions, hx ulcers, or radiation. No hypoxia on vital signs. No other concerns at this time. PCP: Castillo Rouse MD 
 
Current Facility-Administered Medications Medication Dose Route Frequency Provider Last Rate Last Dose  sodium chloride (NS) flush 5-10 mL  5-10 mL IntraVENous PRN Susan Moreno MD      
 sodium chloride 0.9 % bolus infusion 1,000 mL  1,000 mL IntraVENous ONCE Susan Moreno MD      
 Followed by  
[address] [place] [address] sodium chloride 0.9 % bolus infusion 701 mL  701 mL IntraVENous ONCE Susan Moreno MD      
 LORazepam (ATIVAN) tablet 1 mg  1 mg Oral Q1H PRN Susan Moreno MD      
 Or  
 LORazepam (ATIVAN) injection 1 mg  1 mg IntraVENous Q1H PRN Susan Moreno MD   1 mg at 01/15/19 1336  
 iopamidol (ISOVUE 300) 61 % contrast injection 100 mL  100 mL IntraVENous RAD ONCE Susan Moreno MD      
 dextrose 5% - 0.9% NaCl with KCl 20 mEq/L infusion  150 mL/hr IntraVENous CONTINUOUS Susan Moreno MD      
 
Current Outpatient Medications Medication Sig Dispense Refill  Potassium Citrate (UROCIT-K) TbER tablet Take 1 Tab by mouth three (3) times daily (with meals). 90 Tab 11  
 ciprofloxacin HCl (CIPRO) 500 mg tablet Take 1 Tab by mouth daily. 1 Tab 0  
 DILAUDID 2 mg tablet Take 1 Tab by mouth every six (6) hours as needed for Pain. Max Daily Amount: 8 mg. Indications: Pain 12 Tab 0  
 gabapentin (NEURONTIN) 600 mg tablet Take 600 mg by mouth two (2) times a day.  VITAMIN C 500 mg tablet  traMADol (ULTRAM) 50 mg tablet Take 50 mg by mouth every eight (8) hours as needed.  hydrocortisone (CORTAID) 0.5 % topical cream Apply  to affected area two (2) times a day. use thin layer 30 g 0  
 ondansetron (ZOFRAN ODT) 8 mg disintegrating tablet Take 1 Tab by mouth every eight (8) hours as needed for Nausea. 15 Tab 0  
 folic acid (FOLVITE) 1 mg tablet Take 1 Tab by mouth daily. 30 Tab 0  
 pantoprazole (PROTONIX) 40 mg tablet Take 1 Tab by mouth Before breakfast and dinner. 60 Tab 0  
 sucralfate (CARAFATE) 100 mg/mL suspension Take 10 mL by mouth Before breakfast, lunch, dinner and at bedtime. 1200 mL 0  
 cloNIDine HCl (CATAPRES) 0.2 mg tablet Take 0.5 Tabs by mouth two (2) times a day. 30 Tab 0  
 diazePAM (VALIUM) 5 mg tablet Take 1 Tab by mouth every twelve (12) hours as needed for Anxiety. Max Daily Amount: 10 mg. Indications: Alcohol Withdrawal Syndrome 10 Tab 0  
 PREVIDENT 5000 PLUS 1.1 % crea  calcium-cholecalciferol, D3, (CALTRATE 600+D) tablet  topiramate (TOPAMAX) 100 mg tablet  QUEtiapine (SEROQUEL) 300 mg tablet  CREON 24,000-76,000 -120,000 unit capsule  cetirizine (ZYRTEC) 10 mg tablet  SUMAtriptan (IMITREX) 100 mg tablet TAKE 1 TO 2 TABLETS BY MOUTH DAILY AS NEEDED FOR MIGRAINE . DO NOT EXCEED 200 MG IN 24 HOUR PERIOD  0  
 busPIRone (BUSPAR) 15 mg tablet Take 1 Tab by mouth two (2) times a day.  60 Tab 0  
 albuterol (PROVENTIL VENTOLIN) 2.5 mg /3 mL (0.083 %) nebulizer solution 1.5 mL by Nebulization route every four (4) hours as needed for Wheezing or Shortness of Breath. 24 Each 0  
 ipratropium-albuterol (COMBIVENT)  mcg/actuation inhaler Take 2 Puffs by inhalation every six (6) hours. Past History Past Medical History: 
Past Medical History:  
Diagnosis Date  Alcohol abuse  Anxiety  Arrhythmia Ele Francy  Asthma   
 controlled  Bipolar disorder ([facility] [address]  Common migraine  COPD (chronic obstructive pulmonary disease) ([facility]) Home O2  PRN ,  pt use also for Tachycardia  Depression  Esophageal reflux  Gout  Hypertension  Inverted nipple Left breast always have.  Kidney stone on left side  Microhematuria  Pancreatitis  Recurrent UTI  Staghorn renal calculus  Urine leukocytes Past Surgical History: 
Past Surgical History:  
Procedure Laterality Date  HX COLONOSCOPY    
 HX CYST REMOVAL Left   
 x 3 surgeries  HX ENDOSCOPY    
 HX GYN    
 tubal ligation  HX LUMBAR LAMINECTOMY  HX UROLOGICAL  08/10/2018 Cysto, bilat RPG, left ureteral pyeloscopy, HLL, left JJ stent placement, Dr. Sophie Cortes Family History: 
Family History Family history unknown: Yes  
 
 
Social History: 
Social History Tobacco Use  Smoking status: Current Every Day Smoker Packs/day: 1.00 Years: 33.00 Pack years: 33.00 Types: Cigarettes  Smokeless tobacco: Never Used Substance Use Topics  Alcohol use: No  
  Comment: pt  is a RECOVERING  ALCOHOLIC  Drug use: No  
 
 
Allergies: Allergies Allergen Reactions  Lithium Anaphylaxis  Morphine Hives  Amitriptyline Other (comments) Left leg went numb  Elavil Other (comments) Left leg went numb Review of Systems Review of Systems Constitutional: Negative for activity change, fatigue and fever. HENT: Negative for congestion and rhinorrhea. Eyes: Negative for visual disturbance. Respiratory: Negative for shortness of breath. Cardiovascular: Negative for chest pain and palpitations. Gastrointestinal: Positive for abdominal pain, diarrhea and vomiting. Positive for tarry stool Genitourinary: Negative for dysuria and hematuria. Musculoskeletal: Negative for back pain. Skin: Negative for rash. Neurological: Negative for dizziness, weakness and light-headedness. Psychiatric/Behavioral: Negative for agitation. All other systems reviewed and are negative. Physical Exam  
 
Visit Vitals BP (!) 149/125 Pulse (!) 130 Temp 97.9 °F (36.6 °C) Resp 19 Ht 5' 2\" (1.575 m) Wt 56.7 kg (125 lb) LMP 04/01/2013 SpO2 100% BMI 22.86 kg/m² Physical Exam  
Constitutional: She appears well-developed and well-nourished. No distress. HENT:  
Head: Normocephalic and atraumatic. Right Ear: External ear normal.  
Left Ear: External ear normal.  
Nose: Nose normal.  
Mouth/Throat: Oropharynx is clear and moist and mucous membranes are normal.  
Eyes: Conjunctivae and EOM are normal. Pupils are equal, round, and reactive to light. No scleral icterus. No pale conjunctiva Neck: Normal range of motion. Neck supple. No JVD present. No tracheal deviation present. No thyromegaly present. Cardiovascular: Regular rhythm. Tachycardia present. Exam reveals no friction rub. No murmur heard. Pulmonary/Chest: Effort normal and breath sounds normal. No stridor. She exhibits no tenderness. Abdominal: Soft. Bowel sounds are normal. She exhibits no distension. There is tenderness (Diffuse) in the right upper quadrant and left upper quadrant. There is no rebound and no guarding. Genitourinary:  
Genitourinary Comments: Dark stool. No active bleeding. No Hematochezia. Hemeoccult positive. Chaperoned by female nurse Karl. Musculoskeletal: Normal range of motion. She exhibits no edema or tenderness. Lymphadenopathy:  
  She has no cervical adenopathy. Neurological: She is alert. No cranial nerve deficit. Coordination normal.  
Skin: Skin is warm and dry. Psychiatric: She has a normal mood and affect. Her behavior is normal. Judgment and thought content normal.  
Nursing note and vitals reviewed. Diagnostic Study Results Labs - Recent Results (from the past 12 hour(s)) EKG, 12 LEAD, INITIAL Collection Time: 01/15/19 11:03 AM  
Result Value Ref Range Ventricular Rate 117 BPM  
 Atrial Rate 117 BPM  
 P-R Interval 118 ms QRS Duration 86 ms  
 Q-T Interval 332 ms QTC Calculation (Bezet) 463 ms Calculated P Axis 31 degrees Calculated R Axis 67 degrees Calculated T Axis 20 degrees Diagnosis Poor data quality, interpretation may be adversely affected Sinus tachycardia Possible Left atrial enlargement Anterior infarct , age undetermined Abnormal ECG When compared with ECG of 31-JUL-2018 14:40, No significant change was found CBC WITH AUTOMATED DIFF Collection Time: 01/15/19 11:46 AM  
Result Value Ref Range WBC 12.6 4.6 - 13.2 K/uL  
 RBC 4.61 4.20 - 5.30 M/uL  
 HGB 14.5 12.0 - 16.0 g/dL HCT 43.8 35.0 - 45.0 % MCV 95.0 74.0 - 97.0 FL  
 MCH 31.5 24.0 - 34.0 PG  
 MCHC 33.1 31.0 - 37.0 g/dL  
 RDW 17.7 (H) 11.6 - 14.5 % PLATELET 982 226 - 241 K/uL MPV 9.8 9.2 - 11.8 FL  
 NEUTROPHILS 66 40 - 73 % LYMPHOCYTES 26 21 - 52 % MONOCYTES 8 3 - 10 % EOSINOPHILS 0 0 - 5 % BASOPHILS 0 0 - 2 %  
 ABS. NEUTROPHILS 8.4 (H) 1.8 - 8.0 K/UL  
 ABS. LYMPHOCYTES 3.2 0.9 - 3.6 K/UL  
 ABS. MONOCYTES 1.0 0.05 - 1.2 K/UL  
 ABS. EOSINOPHILS 0.0 0.0 - 0.4 K/UL  
 ABS. BASOPHILS 0.0 0.0 - 0.1 K/UL  
 DF AUTOMATED METABOLIC PANEL, COMPREHENSIVE Collection Time: 01/15/19 11:46 AM  
Result Value Ref Range Sodium 128 (L) 136 - 145 mmol/L Potassium 4.0 3.5 - 5.5 mmol/L Chloride 92 (L) 100 - 108 mmol/L  
 CO2 13 (L) 21 - 32 mmol/L  Anion gap 23 (H) 3.0 - 18 mmol/L  
 Glucose 118 (H) 74 - 99 mg/dL BUN 10 7.0 - 18 MG/DL Creatinine 0.72 0.6 - 1.3 MG/DL  
 BUN/Creatinine ratio 14 12 - 20 GFR est AA >60 >60 ml/min/1.73m2 GFR est non-AA >60 >60 ml/min/1.73m2 Calcium 7.8 (L) 8.5 - 10.1 MG/DL Bilirubin, total 0.7 0.2 - 1.0 MG/DL  
 ALT (SGPT) 121 (H) 13 - 56 U/L  
 AST (SGOT) 383 (H) 15 - 37 U/L Alk. phosphatase 94 45 - 117 U/L Protein, total 6.8 6.4 - 8.2 g/dL Albumin 2.9 (L) 3.4 - 5.0 g/dL Globulin 3.9 2.0 - 4.0 g/dL A-G Ratio 0.7 (L) 0.8 - 1.7 LIPASE Collection Time: 01/15/19 11:46 AM  
Result Value Ref Range Lipase 126 73 - 393 U/L  
PTT Collection Time: 01/15/19 11:46 AM  
Result Value Ref Range aPTT 25.1 23.0 - 36.4 SEC PROTHROMBIN TIME + INR Collection Time: 01/15/19 11:46 AM  
Result Value Ref Range Prothrombin time 11.9 11.5 - 15.2 sec INR 0.9 0.8 - 1.2 POC LACTIC ACID Collection Time: 01/15/19  1:12 PM  
Result Value Ref Range Lactic Acid (POC) 1.04 0.40 - 2.00 mmol/L Radiologic Studies -  
XR CHEST PORT Final Result IMPRESSION:  
  
No acute pulmonary process identified. CT ABD PELV W CONT    (Results Pending) Preliminary read by Conor Lei MD 
CXR:  
No free air. No acute process. Medical Decision Making I am the first provider for this patient. I reviewed the vital signs, available nursing notes, past medical history, past surgical history, family history and social history. Vital Signs-Reviewed the patient's vital signs. Pulse Oximetry Analysis -  100% on room air Stable. Cardiac Monitor: 
Rate: 112 bpm 
Rhythm:  Sinus Tachycardia EKG: Interpreted by the EP. Time Interpreted: 11:03 Rate: 114 bpm 
 Rhythm: Sinus Tachycardia Interpretation: Significant T-wave inversions in inferior and anterior wall Comparison: with July 2018 EKG. T-wave and q-wave inversions in inferior wall are new. Records Reviewed: Nursing Notes and Old Medical Records (Time of Review: 11:44 AM) ED Course: Progress Notes, Reevaluation, and Consults: 
12:43 PM: CBC w/o abnormalities. Not anemic. Awaiting electrolyte, lipase, coagulation. Tachycardic at 114 bpm. BP now 140s. Will start aggressive hydration. Unclear etiology. Consider withdrawal. Last drink was 2 weeks. Not hallucinating. Not tremulous. 1:52 PM: Anion gap 23. Bicarb 13. Sodium 128. Labs consistent with alcoholic ketoacidosis. Liver enzymes elevated. Lipase nml. No sign of pancreatitis. Admit pt for current sx. 
 
2:27 PM: Spoke with Dr. Shagufta Ellis, Hospitalist. Discussed pt's case and labs. 2:41 PM: Pt diagnosed with UTI. Sepsis protocol already started. Pt also elevated troponin, CPK, and Rhabdo. I will continue with hydration. No aspirin given as pt is rectal bleeding right now. Provider Notes (Medical Decision Making): MDM: Pt presents with abd pain, vomiting, diarrhea. Concerned with upper or lower GI bleed. Hx hydration, look for anemia, coagulation problems. Check platelet. Look for pancreatitis. Need CT abd for concern over pancreatitis vs GI bleed. Will perform rectal exam as well. Procedures: External Jugular IV Date/Time: 1/15/2019 12:17 PM 
Performed by: Kavita Tejeda MD 
Authorized by: Kavita Tejeda MD  
 
Consent:  
  Consent obtained:  Verbal 
  Consent given by:  Patient Pre-procedure details:  
  Skin preparation:  ChloraPrep Post-procedure details:  
  Patient tolerance of procedure: Tolerated well, no immediate complications Comments:  
   No IV access. Left EJ sterile with 1 atttempt. Critical Care Time: Critical Care Time: The services I provided to this patient were to treat and/or prevent clinically significant deterioration that could result in the failure of one or more body systems and/or organ systems due to alcoholic ketoacidosis/dehydration/vascular compromise/. Services included the following: 
-reviewing nursing notes and old charts 
-vital sign assessments 
-direct patient care 
-medication orders and management 
-interpreting and reviewing diagnostic studies/labs 
-re-evaluations 
-documentation time Aggregate critical care time was  75 minutes, which includes only time during which I was engaged in work directly related to the patient's care as described above, whether I was at bedside or elsewhere in the Emergency Department. It did not include time spent performing other reported procedures or the services of residents, students, nurses, or advance practice providers. Patient's old the information was discussed with the admitting hospitalist including elevated troponin Y aspirin was not given. His information has been discussed and documented in detail. He had knowledge that he agrees with the plan. I am endorsing in allowing the hospitalist to care for this patient further. Patient has been stable in the emergency department and is admitted in CT scan is reviewed as well. Chicho Phan MD 
 
1:53 PM 
 
 
For Hospitalized Patients: 
 
1. Hospitalization Decision Time: The decision to hospitalize the patient was made by Dr. Priyank Platt  at  on 1/15/2019 Diagnosis Clinical Impression: 1. Alcoholic ketoacidosis 2. Tachycardia 3. Dehydration 4. Alcohol abuse Disposition: Admit. Follow-up Information None Medication List  
  
ASK your doctor about these medications   
albuterol 2.5 mg /3 mL (0.083 %) nebulizer solution Commonly known as:  PROVENTIL VENTOLIN 
1.5 mL by Nebulization route every four (4) hours as needed for Wheezing or Shortness of Breath. busPIRone 15 mg tablet Commonly known as:  BUSPAR Take 1 Tab by mouth two (2) times a day. calcium-cholecalciferol (D3) tablet Commonly known as:  CALTRATE 600+D 
  
cetirizine 10 mg tablet Commonly known as:  ZYRTEC 
  
ciprofloxacin HCl 500 mg tablet Commonly known as:  CIPRO Take 1 Tab by mouth daily. cloNIDine HCl 0.2 mg tablet Commonly known as:  CATAPRES Take 0.5 Tabs by mouth two (2) times a day. CREON 24,000-76,000 -120,000 unit capsule Generic drug:  lipase-protease-amylase 
  
diazePAM 5 mg tablet Commonly known as:  VALIUM Take 1 Tab by mouth every twelve (12) hours as needed for Anxiety. Max Daily Amount: 10 mg. Indications: Alcohol Withdrawal Syndrome DILAUDID 2 mg tablet Generic drug:  HYDROmorphone Take 1 Tab by mouth every six (6) hours as needed for Pain. Max Daily Amount: 8 mg. Indications: Pain 
  
folic acid 1 mg tablet Commonly known as:  [employer] Take 1 Tab by mouth daily. gabapentin 600 mg tablet Commonly known as:  NEURONTIN 
  
hydrocortisone 0.5 % topical cream 
Commonly known as:  CORTAID Apply  to affected area two (2) times a day. use thin layer 
  
ipratropium-albuterol  mcg/actuation inhaler Commonly known as:  COMBIVENT 
  
ondansetron 8 mg disintegrating tablet Commonly known as:  ZOFRAN ODT Take 1 Tab by mouth every eight (8) hours as needed for Nausea. pantoprazole 40 mg tablet Commonly known as:  PROTONIX Take 1 Tab by mouth Before breakfast and dinner. potassium citrate Tber tablet Commonly known as:  [employer] Take 1 Tab by mouth three (3) times daily (with meals). PREVIDENT 5000 PLUS 1.1 % Crea Generic drug:  fluoride (sodium) QUEtiapine 300 mg tablet Commonly known as:  SEROquel 
  
sucralfate 100 mg/mL suspension Commonly known as:  Evy Kelch Take 10 mL by mouth Before breakfast, lunch, dinner and at bedtime. SUMAtriptan 100 mg tablet Commonly known as:  IMITREX 
  
topiramate 100 mg tablet Commonly known as:  TOPAMAX 
  
traMADol 50 mg tablet Commonly known as:  ULTRAM 
  
VITAMIN C 500 mg tablet Generic drug:  ascorbic acid (vitamin C) 
  
  
 
_______________________________ Scribe Attestation Inga Pérez acting as a scribe for and in the presence of Estee Catherine MD     
January 15, 2019 at 11:44 AM 
    
Provider Attestation:     
I personally performed the services described in the documentation, reviewed the documentation, as recorded by the scribe in my presence, and it accurately and completely records my words and actions. January 15, 2019 at 11:44 AM - Estee Catherine MD   
 
 
_______________________________

## 2019-01-15 NOTE — ED TRIAGE NOTES
Patient states she has had chest pain, onset today, l side under the breast that radiates to the l arm, states she has had no appetite for 2 days, has dark diarrhea and \"coffee ground\" emesis Arrived with EMS, ambulatory to the [facility]

## 2019-01-15 NOTE — H&P
History and Physical 
 
Patient: Carlos Rivera               Sex: female          DOA: 1/15/2019 YOB: 1965      Age:  46 y.o.        LOS:  LOS: 0 days CHIEF COMPLAINT: Chest Pain; Vomiting; and Diarrhea HPI:  
 
Carlos Rivera is a 46 y.o. female with history as below who presents due to abdominal pain. History limited as pt appears to be intoxicated. Pt has h/o chronic alcohol abuse and chronic pancreatitis. She said her last drink was last night. She states she drinks \"a lot\" everyday, stating it is at least a big bottle of vodka per day. She said this morning she developed severe abdominal pain. She also describes some chest pain this morning which prompted her to present to the ED. She also reports 2 episodes of coffee ground emesis. She said she has not slept for 2 days. She felt she was having a flare of her acute pancreatitis. She has previously been admitted for the same, most recently 7/2018. In the ED workup was notable for hyponatremia, metabolic acidosis, elevated CK, mildly elevated troponin, elevated LFT with hepatocellular pattern c/w alcohol abuse (AST>ALT). Pt was started on IVF and pain medications and admitted for further management. Of note, she does report h/o alcohol withdrawal syndrome in the past but she denies h/o withdrawal seizures. She currently c/o abdominal pain. Denies chest pain. Past Medical History:  
Diagnosis Date  Alcohol abuse  Anxiety  Arrhythmia [place]  Asthma   
 controlled  Bipolar disorder ([facility]  Common migraine  COPD (chronic obstructive pulmonary disease) (HCC) Home O2  PRN ,  pt use also for Tachycardia  Depression  Esophageal reflux  Gout  Hypertension  Inverted nipple Left breast always have.  Kidney stone on left side  Microhematuria  Pancreatitis  Recurrent UTI  Staghorn renal calculus  Urine leukocytes Social History: Social History Socioeconomic History  Marital status:  Spouse name: Not on file  Number of children: Not on file  Years of education: Not on file  Highest education level: Not on file Tobacco Use  Smoking status: Current Every Day Smoker Packs/day: 1.00 Years: 33.00 Pack years: 33.00 Types: Cigarettes  Smokeless tobacco: Never Used Substance and Sexual Activity  Alcohol use: No  
  Comment: pt  is a RECOVERING  ALCOHOLIC  Drug use: No  
 
 
Family History: 
Family History Family history unknown: Yes Review of Systems Review of Systems Constitutional: Negative. HENT: Negative. Eyes: Negative. Respiratory: Negative. Cardiovascular: Negative. Gastrointestinal: Positive for abdominal pain, blood in stool, nausea and vomiting. Genitourinary: Negative. Musculoskeletal: Negative. Skin: Negative. Neurological: Negative. Psychiatric/Behavioral: Negative. Physical Exam:  
  
Visit Vitals BP (!) 143/96 Pulse (!) 115 Temp 99.2 °F (37.3 °C) Resp 14 Ht 5' 2\" (1.575 m) Wt 56.7 kg (125 lb) SpO2 100% BMI 22.86 kg/m² Physical Exam: 
Physical Exam  
Constitutional: She is oriented to person, place, and time. Appears older than stated age HENT:  
Head: Normocephalic and atraumatic. Eyes: Conjunctivae and EOM are normal. Pupils are equal, round, and reactive to light. Neck: Normal range of motion. Cardiovascular: Normal rate, regular rhythm and normal heart sounds. Pulmonary/Chest: Effort normal and breath sounds normal.  
Abdominal: Soft. Bowel sounds are normal.  
Musculoskeletal: Normal range of motion. Neurological: She is alert and oriented to person, place, and time. Skin: Skin is warm and dry. Nursing note and vitals reviewed. Laboratory Studies: 
CMP:  
Lab Results Component Value Date/Time   (L) 01/15/2019 02:20 PM  
 K 3.7 01/15/2019 02:20 PM  
 CL 96 (L) 01/15/2019 02:20 PM  
 CO2 18 (L) 01/15/2019 02:20 PM  
 AGAP 18 01/15/2019 02:20 PM  
 GLU 94 01/15/2019 02:20 PM  
 BUN 9 01/15/2019 02:20 PM  
 CREA 0.77 01/15/2019 02:20 PM  
 GFRAA >60 01/15/2019 02:20 PM  
 GFRNA >60 01/15/2019 02:20 PM  
 CA 7.2 (L) 01/15/2019 02:20 PM  
 ALB 3.0 (L) 01/15/2019 02:20 PM  
 TP 6.7 01/15/2019 02:20 PM  
 GLOB 3.7 01/15/2019 02:20 PM  
 AGRAT 0.8 01/15/2019 02:20 PM  
 SGOT 326 (H) 01/15/2019 02:20 PM  
  (H) 01/15/2019 02:20 PM  
 
CBC:  
Lab Results Component Value Date/Time WBC 12.6 01/15/2019 11:46 AM  
 HGB 14.5 01/15/2019 11:46 AM  
 HCT 43.8 01/15/2019 11:46 AM  
  01/15/2019 11:46 AM  
 
All Cardiac Markers in the last 24 hours:  
Lab Results Component Value Date/Time CPK 3,644 (H) 01/15/2019 01:37 PM  
 CKMB 60.0 (H) 01/15/2019 01:37 PM  
 CKND1 1.6 01/15/2019 01:37 PM  
 TROIQ 0.23 (H) 01/15/2019 02:20 PM  
 TROIQ 0.22 (H) 01/15/2019 01:37 PM  
 
Liver Panel:  
Lab Results Component Value Date/Time ALB 3.0 (L) 01/15/2019 02:20 PM  
 TP 6.7 01/15/2019 02:20 PM  
 GLOB 3.7 01/15/2019 02:20 PM  
 AGRAT 0.8 01/15/2019 02:20 PM  
 SGOT 326 (H) 01/15/2019 02:20 PM  
  (H) 01/15/2019 02:20 PM  
 AP 93 01/15/2019 02:20 PM  
 
 
Imaging: 
CT ABD PELV W CONT Final Result Addendum 1 of 1 Addendum:   
  
One or more dose reduction techniques were used on this CT: automated   
exposure  
control, adjustment of the mAs and/or kVp according to patient size, and  
iterative reconstruction techniques. The specific techniques used on this CT  
exam have been documented in the patient's electronic medical record. Digital  
Imaging and Communications in Medicine (DICOM) format image data are   
available  
to nonaffiliated external healthcare facilities or entities on a secure,   
media  
free, reciprocally searchable basis with patient authorization for at   
least a  
12-month period after this study.   
  
Final  
  
 XR CHEST PORT Final Result IMPRESSION:  
  
No acute pulmonary process identified. Assessment/Plan Principal Problem: 
  Acute pancreatitis (2/19/2016) Active Problems: 
  Alcohol dependence ([facility] (1/28/2016) Elevated LFTs (1/28/2016) [facility] (1/28/2016) Abdominal pain (1/15/2019) Metabolic acidosis (7/53/3968) NSTEMI (non-ST elevated myocardial infarction) ([facility] (1/15/2019) Rhabdomyolysis (1/15/2019) Upper GI bleed (1/15/2019) PLAN: 
· Acute pancreatitis - IVF hydration, PO dilaudid for pain · CIWA protocol - fixed dose and PRN ativan · NSTEMI - ECG with inverted T waves V2-V4, trend troponin. Suspect type 2 MI secondary to demand ischemia, less likely ACS. Heparin contraindicated d/t reported GI bleed (hematemesis, +stool guiaic) · Metabolic acidosis - Trend bicarb, improving with IVF · Bipolar disorder - Cont seroquel, topamax, buspar · Upper GI bleed / hematemesis - melena with +stool guiaic, will start IV protonix BID. Monitor H&H. Suspect 2/2 alcoholic gastritis. · Cont acceptable home medications for chronic conditions · DVT protocol: SCDs only given reported GIB I have personally reviewed all pertinent labs and films that have officially resulted over the last 24 hours. I have personally checked for all pending labs that are awaiting final results.  
 
 
Yanick Kendrick MD

## 2019-01-16 LAB
ANION GAP SERPL CALC-SCNC: 8 MMOL/L (ref 3–18)
ARTERIAL PATENCY WRIST A: YES
ATRIAL RATE: 117 BPM
BASE DEFICIT BLD-SCNC: 2 MMOL/L
BASOPHILS # BLD: 0 K/UL (ref 0–0.1)
BASOPHILS NFR BLD: 0 % (ref 0–2)
BDY SITE: ABNORMAL
BODY TEMPERATURE: 99.5
BUN SERPL-MCNC: 6 MG/DL (ref 7–18)
BUN/CREAT SERPL: 9 (ref 12–20)
CA-I SERPL-SCNC: 0.95 MMOL/L (ref 1.12–1.32)
CALCIUM SERPL-MCNC: 6.7 MG/DL (ref 8.5–10.1)
CALCULATED P AXIS, ECG09: 31 DEGREES
CALCULATED R AXIS, ECG10: 67 DEGREES
CALCULATED T AXIS, ECG11: 20 DEGREES
CHLORIDE SERPL-SCNC: 102 MMOL/L (ref 100–108)
CO2 SERPL-SCNC: 27 MMOL/L (ref 21–32)
CREAT SERPL-MCNC: 0.67 MG/DL (ref 0.6–1.3)
DIAGNOSIS, 93000: NORMAL
DIFFERENTIAL METHOD BLD: ABNORMAL
EOSINOPHIL # BLD: 0 K/UL (ref 0–0.4)
EOSINOPHIL NFR BLD: 0 % (ref 0–5)
ERYTHROCYTE [DISTWIDTH] IN BLOOD BY AUTOMATED COUNT: 17.5 % (ref 11.6–14.5)
GAS FLOW.O2 O2 DELIVERY SYS: ABNORMAL L/MIN
GLUCOSE SERPL-MCNC: 128 MG/DL (ref 74–99)
HCO3 BLD-SCNC: 23.9 MMOL/L (ref 22–26)
HCT VFR BLD AUTO: 33.1 % (ref 35–45)
HGB BLD-MCNC: 11.1 G/DL (ref 12–16)
LYMPHOCYTES # BLD: 2.8 K/UL (ref 0.9–3.6)
LYMPHOCYTES NFR BLD: 35 % (ref 21–52)
MAGNESIUM SERPL-MCNC: 1.6 MG/DL (ref 1.6–2.6)
MAGNESIUM SERPL-MCNC: 2.4 MG/DL (ref 1.6–2.6)
MCH RBC QN AUTO: 31 PG (ref 24–34)
MCHC RBC AUTO-ENTMCNC: 33.5 G/DL (ref 31–37)
MCV RBC AUTO: 92.5 FL (ref 74–97)
MONOCYTES # BLD: 0.4 K/UL (ref 0.05–1.2)
MONOCYTES NFR BLD: 6 % (ref 3–10)
NEUTS SEG # BLD: 4.7 K/UL (ref 1.8–8)
NEUTS SEG NFR BLD: 59 % (ref 40–73)
O2/TOTAL GAS SETTING VFR VENT: 21 %
P-R INTERVAL, ECG05: 118 MS
PCO2 BLD: 43.2 MMHG (ref 35–45)
PH BLD: 7.35 [PH] (ref 7.35–7.45)
PHOSPHATE SERPL-MCNC: 0.6 MG/DL (ref 2.5–4.9)
PLATELET # BLD AUTO: 172 K/UL (ref 135–420)
PMV BLD AUTO: 9.6 FL (ref 9.2–11.8)
PO2 BLD: 64 MMHG (ref 80–100)
POTASSIUM SERPL-SCNC: 3 MMOL/L (ref 3.5–5.5)
Q-T INTERVAL, ECG07: 332 MS
QRS DURATION, ECG06: 86 MS
QTC CALCULATION (BEZET), ECG08: 463 MS
RBC # BLD AUTO: 3.58 M/UL (ref 4.2–5.3)
SAO2 % BLD: 90 % (ref 92–97)
SERVICE CMNT-IMP: ABNORMAL
SODIUM SERPL-SCNC: 137 MMOL/L (ref 136–145)
SPECIMEN TYPE: ABNORMAL
TOTAL RESP. RATE, ITRR: 18
TROPONIN I SERPL-MCNC: 0.17 NG/ML (ref 0–0.04)
TROPONIN I SERPL-MCNC: 0.22 NG/ML (ref 0–0.04)
VENTRICULAR RATE, ECG03: 117 BPM
WBC # BLD AUTO: 8 K/UL (ref 4.6–13.2)

## 2019-01-16 PROCEDURE — 74011250636 HC RX REV CODE- 250/636: Performed by: HOSPITALIST

## 2019-01-16 PROCEDURE — 74011250637 HC RX REV CODE- 250/637: Performed by: HOSPITALIST

## 2019-01-16 PROCEDURE — 74011000250 HC RX REV CODE- 250: Performed by: FAMILY MEDICINE

## 2019-01-16 PROCEDURE — 77030037878 HC DRSG MEPILEX >48IN BORD MOLN -B

## 2019-01-16 PROCEDURE — 74011250636 HC RX REV CODE- 250/636: Performed by: FAMILY MEDICINE

## 2019-01-16 PROCEDURE — 82330 ASSAY OF CALCIUM: CPT

## 2019-01-16 PROCEDURE — 84484 ASSAY OF TROPONIN QUANT: CPT

## 2019-01-16 PROCEDURE — 83735 ASSAY OF MAGNESIUM: CPT

## 2019-01-16 PROCEDURE — 65660000000 HC RM CCU STEPDOWN

## 2019-01-16 PROCEDURE — 82803 BLOOD GASES ANY COMBINATION: CPT

## 2019-01-16 PROCEDURE — 84100 ASSAY OF PHOSPHORUS: CPT

## 2019-01-16 PROCEDURE — 77030038269 HC DRN EXT URIN PURWCK BARD -A

## 2019-01-16 PROCEDURE — 74011000258 HC RX REV CODE- 258: Performed by: HOSPITALIST

## 2019-01-16 PROCEDURE — 77030021352 HC CBL LD SYS DISP COVD -B

## 2019-01-16 PROCEDURE — 74011250636 HC RX REV CODE- 250/636

## 2019-01-16 PROCEDURE — 36415 COLL VENOUS BLD VENIPUNCTURE: CPT

## 2019-01-16 PROCEDURE — 36600 WITHDRAWAL OF ARTERIAL BLOOD: CPT

## 2019-01-16 PROCEDURE — C9113 INJ PANTOPRAZOLE SODIUM, VIA: HCPCS | Performed by: HOSPITALIST

## 2019-01-16 PROCEDURE — 85025 COMPLETE CBC W/AUTO DIFF WBC: CPT

## 2019-01-16 PROCEDURE — 80048 BASIC METABOLIC PNL TOTAL CA: CPT

## 2019-01-16 PROCEDURE — 77030018846 HC SOL IRR STRL H20 ICUM -A

## 2019-01-16 RX ORDER — POTASSIUM CHLORIDE 7.45 MG/ML
10 INJECTION INTRAVENOUS
Status: DISCONTINUED | OUTPATIENT
Start: 2019-01-16 | End: 2019-01-16

## 2019-01-16 RX ORDER — HYDROMORPHONE HYDROCHLORIDE 2 MG/1
1 TABLET ORAL
Status: DISCONTINUED | OUTPATIENT
Start: 2019-01-16 | End: 2019-01-16

## 2019-01-16 RX ORDER — POTASSIUM CHLORIDE 7.45 MG/ML
INJECTION INTRAVENOUS
Status: DISPENSED
Start: 2019-01-16 | End: 2019-01-16

## 2019-01-16 RX ORDER — MAGNESIUM SULFATE HEPTAHYDRATE 40 MG/ML
2 INJECTION, SOLUTION INTRAVENOUS ONCE
Status: COMPLETED | OUTPATIENT
Start: 2019-01-16 | End: 2019-01-16

## 2019-01-16 RX ORDER — POTASSIUM CHLORIDE 7.45 MG/ML
INJECTION INTRAVENOUS
Status: COMPLETED
Start: 2019-01-16 | End: 2019-01-16

## 2019-01-16 RX ORDER — SODIUM BICARBONATE 84 MG/ML
INJECTION, SOLUTION INTRAVENOUS
Status: DISPENSED
Start: 2019-01-16 | End: 2019-01-16

## 2019-01-16 RX ORDER — POTASSIUM CHLORIDE 7.45 MG/ML
10 INJECTION INTRAVENOUS
Status: COMPLETED | OUTPATIENT
Start: 2019-01-16 | End: 2019-01-16

## 2019-01-16 RX ORDER — SODIUM BICARBONATE 1 MEQ/ML
SYRINGE (ML) INTRAVENOUS
Status: DISPENSED
Start: 2019-01-16 | End: 2019-01-16

## 2019-01-16 RX ORDER — DIAZEPAM 5 MG/1
5 TABLET ORAL EVERY 6 HOURS
Status: DISCONTINUED | OUTPATIENT
Start: 2019-01-16 | End: 2019-01-18 | Stop reason: HOSPADM

## 2019-01-16 RX ORDER — HYDROMORPHONE HYDROCHLORIDE 2 MG/1
1 TABLET ORAL
Status: DISCONTINUED | OUTPATIENT
Start: 2019-01-16 | End: 2019-01-18 | Stop reason: HOSPADM

## 2019-01-16 RX ADMIN — QUETIAPINE FUMARATE 300 MG: 100 TABLET ORAL at 21:36

## 2019-01-16 RX ADMIN — MAGNESIUM SULFATE HEPTAHYDRATE 2 G: 40 INJECTION, SOLUTION INTRAVENOUS at 04:44

## 2019-01-16 RX ADMIN — LORAZEPAM 2 MG: 1 TABLET ORAL at 02:30

## 2019-01-16 RX ADMIN — POTASSIUM CHLORIDE 10 MEQ: 10 INJECTION, SOLUTION INTRAVENOUS at 11:03

## 2019-01-16 RX ADMIN — CALCIUM GLUCONATE 4 G: 94 INJECTION, SOLUTION INTRAVENOUS at 15:03

## 2019-01-16 RX ADMIN — Medication 10 ML: at 21:37

## 2019-01-16 RX ADMIN — POTASSIUM CHLORIDE 10 MEQ: 10 INJECTION, SOLUTION INTRAVENOUS at 14:29

## 2019-01-16 RX ADMIN — DIAZEPAM 5 MG: 5 TABLET ORAL at 19:09

## 2019-01-16 RX ADMIN — POTASSIUM CHLORIDE 10 MEQ: 10 INJECTION, SOLUTION INTRAVENOUS at 01:18

## 2019-01-16 RX ADMIN — THERA TABS 1 TABLET: TAB at 08:27

## 2019-01-16 RX ADMIN — SODIUM CHLORIDE: 900 INJECTION, SOLUTION INTRAVENOUS at 05:50

## 2019-01-16 RX ADMIN — Medication 10 ML: at 21:38

## 2019-01-16 RX ADMIN — SODIUM CHLORIDE 40 MG: 9 INJECTION, SOLUTION INTRAMUSCULAR; INTRAVENOUS; SUBCUTANEOUS at 20:10

## 2019-01-16 RX ADMIN — Medication 10 ML: at 05:51

## 2019-01-16 RX ADMIN — FOLIC ACID 1 MG: 1 TABLET ORAL at 08:27

## 2019-01-16 RX ADMIN — DIAZEPAM 5 MG: 5 TABLET ORAL at 13:33

## 2019-01-16 RX ADMIN — Medication 100 MG: at 08:27

## 2019-01-16 RX ADMIN — GABAPENTIN 600 MG: 300 CAPSULE ORAL at 08:27

## 2019-01-16 RX ADMIN — POTASSIUM CHLORIDE 10 MEQ: 10 INJECTION, SOLUTION INTRAVENOUS at 09:40

## 2019-01-16 RX ADMIN — POTASSIUM CHLORIDE 10 MEQ: 10 INJECTION, SOLUTION INTRAVENOUS at 02:45

## 2019-01-16 RX ADMIN — GABAPENTIN 600 MG: 300 CAPSULE ORAL at 19:09

## 2019-01-16 RX ADMIN — SODIUM CHLORIDE 150 ML/HR: 900 INJECTION, SOLUTION INTRAVENOUS at 09:43

## 2019-01-16 RX ADMIN — Medication 10 ML: at 05:50

## 2019-01-16 RX ADMIN — SODIUM CHLORIDE 40 MG: 9 INJECTION, SOLUTION INTRAMUSCULAR; INTRAVENOUS; SUBCUTANEOUS at 08:26

## 2019-01-16 RX ADMIN — CLONIDINE HYDROCHLORIDE 0.1 MG: 0.1 TABLET ORAL at 19:02

## 2019-01-16 RX ADMIN — SODIUM CHLORIDE 150 ML/HR: 900 INJECTION, SOLUTION INTRAVENOUS at 20:12

## 2019-01-16 RX ADMIN — PANCRELIPASE LIPASE, PANCRELIPASE PROTEASE, PANCRELIPASE AMYLASE 2 CAPSULE: 10000; 32000; 42000 CAPSULE, DELAYED RELEASE ORAL at 08:27

## 2019-01-16 RX ADMIN — CLONIDINE HYDROCHLORIDE 0.1 MG: 0.1 TABLET ORAL at 08:27

## 2019-01-16 RX ADMIN — PANCRELIPASE LIPASE, PANCRELIPASE PROTEASE, PANCRELIPASE AMYLASE 2 CAPSULE: 10000; 32000; 42000 CAPSULE, DELAYED RELEASE ORAL at 12:02

## 2019-01-16 RX ADMIN — LORAZEPAM 2 MG: 1 TABLET ORAL at 08:27

## 2019-01-16 RX ADMIN — Medication 10 ML: at 13:35

## 2019-01-16 RX ADMIN — PANCRELIPASE LIPASE, PANCRELIPASE PROTEASE, PANCRELIPASE AMYLASE 2 CAPSULE: 10000; 32000; 42000 CAPSULE, DELAYED RELEASE ORAL at 19:02

## 2019-01-16 RX ADMIN — BUSPIRONE HYDROCHLORIDE 15 MG: 5 TABLET ORAL at 19:02

## 2019-01-16 RX ADMIN — TOPIRAMATE 100 MG: 100 TABLET, FILM COATED ORAL at 08:27

## 2019-01-16 RX ADMIN — POTASSIUM CHLORIDE 10 MEQ: 10 INJECTION, SOLUTION INTRAVENOUS at 15:39

## 2019-01-16 RX ADMIN — BUSPIRONE HYDROCHLORIDE 15 MG: 5 TABLET ORAL at 08:27

## 2019-01-16 RX ADMIN — DEXTROSE MONOHYDRATE: 5 INJECTION, SOLUTION INTRAVENOUS at 00:41

## 2019-01-16 RX ADMIN — POTASSIUM CHLORIDE 10 MEQ: 10 INJECTION, SOLUTION INTRAVENOUS at 13:29

## 2019-01-16 NOTE — ROUTINE PROCESS
1650 Received verbal bedside report from [employer]. Report consist of situation, background, assessment, and recommendation. Pt resting in bed with no complaints of pain at this time. 1750 Performed incontinence care. Gown, linen, and pt cleaned and assisted back to bed. Will continue to monitor. Bedside and Verbal shift change report given to Asuncion GRIGSBY  (oncoming nurse) by Maame Mcdonough (offgoing nurse). Report included the following information SBAR, Kardex, Intake/Output, MAR, Recent Results and Cardiac Rhythm ST.

## 2019-01-16 NOTE — PROGRESS NOTES
[facility] Inpatient Daily Progress Note Daily progress Note Patient: Randy Basilio MRN: [id number]  [facility]: [id number] YOB: 1965  Age: 46 y.o. Sex: female DOA: 1/15/2019 LOS:  LOS: 1 day Chief Complaint:  Acute alcoholic pancreatitis Interval History: 45 y/o  female with hx of chronic pancreatitis, ETOH abuse, COPD, asthma presented to the ED on 1/15 with abdominal pain. Per EMR, history limited as pt appears to be intoxicated. Her last drink was the night prior, pt states she drinks \"a lot\" everyday, stating it is at least a big bottle of vodka per day. The following morning she developed severe abdominal pain. She also described some chest pain that morning and two episodes of coffee ground emesis. She said she has not slept for two days. She felt she was having a flare of acute pancreatitis, she was previously admitted in July 2018. In the ED, workup notable for hyponatremia, metabolic acidosis, elevated CK, mildly elevated troponin, elevated LFT (AST > ALT). She was started on IVF's and admitted for further care. Of note, she does report hx alcohol withdrawal syndrome in the past but she denies hx of withdrawal seizures. Per notes, +guiaic in ED. CBC has been stable. Plan to transfer to [facility]. Assessment/Plan:  
 
Patient Active Problem List  
Diagnosis Code  Bipolar disorder (manic depression) ([facility] F31.9  Tachycardia R00.0  Alcohol dependence ([facility] F10.20  Esophageal stricture K22.2  Dilated pancreatic duct K86.89  
 Common bile duct dilation K83.8  Elevated LFTs R94.5  Bipolar depression ([facility] F31.30  
 HTN (hypertension) I10  
 Acute pancreatitis K85.90  Pancreatitis, alcoholic, acute X21.43  Tylenol overdose T39.1X1A  
 SIRS (systemic inflammatory response syndrome) (HCC) R65.10  Pneumonia J18.9  COPD (chronic obstructive pulmonary disease) (HCC) J44.9  Nicotine withdrawal F17.203  Dizziness R42  Pancreatitis K85.90  Hypokalemia E87.6  Anemia D64.9  
 Encephalopathy G93.40  Syncope R55  Alcohol withdrawal (HCC) F10.239  GI bleed K92.2  Hyponatremia E87.1  Hypercalcemia E83.52  
 UTI (urinary tract infection) N39.0  Bronchitis J40  Recurrent UTI (urinary tract infection) N39.0  Staghorn renal calculus N20.0  Abdominal pain R10.9  Metabolic acidosis Z08.9  NSTEMI (non-ST elevated myocardial infarction) ([facility] I21.4  Rhabdomyolysis M62.82  Upper GI bleed K92.2 A/P: 
Acute pancreatitis 
- suspect due to ETOH abuse - Lipase 3644 on admission 
- hx of chronic pancreatitis on Zenpep 
- IVF's, start on clear liquid diet Elevated LFT's 
- suspect due to ETOH abuse - CT a/p suggesting hepatic steatosis 
- monitor LFT's 
 
Metabolic Acidosis -  Improved 
- continue to monitor NSTEMI 
- troponin 0.22 - -> 0.23 - -> 0.26 - -> 0.22 - -> 0.17 
- EKG with ST, anterior infarct, age undetermined vs lead placement 
- last Echo 3/2018 with EF 08%, Grade 1 diastolic dysfunction, mild to moderate tricuspid regurgitation ETOH abuse 
- CIWA protocol, scheduled Valium QID Hematemesis / + Guaiac  
- hx of ETOH Abuse 
- report hematemesis, + guaiac in ED 
- trend CBC Hx of Bipolar Disorder 
- continue home meds DVT Prophylaxis - SCDs BLE Disposition 
- transfer to tele 1/16 FLORENCIO Arvizu [address] [place], [place] [address]ty Group Hospitalist Division Pager:  [phone] Office:  [phone] Subjective: Interval notes reviewed. Discussed with primary RN. Objective:  
  
Visit Vitals /63 Pulse (!) 115 Temp 99.5 °F (37.5 °C) Resp 19 Ht 5' 2\" (1.575 m) Wt 55.4 kg (122 lb 2.2 oz) SpO2 96% Breastfeeding? No  
BMI 22.34 kg/m² Physical Exam: 
General appearance: alert, cooperative, no distress Head: Normocephalic, without obvious abnormality, atraumatic Lungs: clear to auscultation bilaterally Heart: regular rate and rhythm, S1, S2 normal, no murmur, click, rub or gallop Abdomen: soft, non tender, non distended. Normoactive bowel sounds. Extremities: extremities normal, atraumatic, no cyanosis or edema Skin: Skin color, texture, turgor normal. No rashes or lesions Neurologic: drowsy, alert to self and time- requires frequent direction PSY: drowsy Intake and Output: 
Current Shift:  No intake/output data recorded. Last three shifts:  01/14 1901 - 01/16 0700 In: 5401.7 [I.V.:5401.7] [facility] 1525 [facility] Recent Results (from the past 24 hour(s)) EKG, 12 LEAD, INITIAL Collection Time: 01/15/19 11:03 AM  
Result Value Ref Range Ventricular Rate 117 BPM  
 Atrial Rate 117 BPM  
 P-R Interval 118 ms QRS Duration 86 ms  
 Q-T Interval 332 ms QTC Calculation (Bezet) 463 ms Calculated P Axis 31 degrees Calculated R Axis 67 degrees Calculated T Axis 20 degrees Diagnosis Sinus tachycardia Anterior infarct , age undetermined vs lead placement Abnormal ECG When compared with ECG of 31-JUL-2018 14:40, No significant change was found Confirmed by Shira Mcdermott ([id number]) on 1/16/2019 8:35:40 AM 
  
CBC WITH AUTOMATED DIFF Collection Time: 01/15/19 11:46 AM  
Result Value Ref Range WBC 12.6 4.6 - 13.2 K/uL  
 RBC 4.61 4.20 - 5.30 M/uL  
 HGB 14.5 12.0 - 16.0 g/dL HCT 43.8 35.0 - 45.0 % MCV 95.0 74.0 - 97.0 FL  
 MCH 31.5 24.0 - 34.0 PG  
 MCHC 33.1 31.0 - 37.0 g/dL  
 RDW 17.7 (H) 11.6 - 14.5 % PLATELET 498 430 - 526 K/uL MPV 9.8 9.2 - 11.8 FL  
 NEUTROPHILS 66 40 - 73 % LYMPHOCYTES 26 21 - 52 % MONOCYTES 8 3 - 10 % EOSINOPHILS 0 0 - 5 % BASOPHILS 0 0 - 2 %  
 ABS. NEUTROPHILS 8.4 (H) 1.8 - 8.0 K/UL  
 ABS. LYMPHOCYTES 3.2 0.9 - 3.6 K/UL  
 ABS. MONOCYTES 1.0 0.05 - 1.2 K/UL  
 ABS. EOSINOPHILS 0.0 0.0 - 0.4 K/UL ABS. BASOPHILS 0.0 0.0 - 0.1 K/UL  
 DF AUTOMATED METABOLIC PANEL, COMPREHENSIVE Collection Time: 01/15/19 11:46 AM  
Result Value Ref Range Sodium 128 (L) 136 - 145 mmol/L Potassium 4.0 3.5 - 5.5 mmol/L Chloride 92 (L) 100 - 108 mmol/L  
 CO2 13 (L) 21 - 32 mmol/L Anion gap 23 (H) 3.0 - 18 mmol/L Glucose 118 (H) 74 - 99 mg/dL BUN 10 7.0 - 18 MG/DL Creatinine 0.72 0.6 - 1.3 MG/DL  
 BUN/Creatinine ratio 14 12 - 20 GFR est AA >60 >60 ml/min/1.73m2 GFR est non-AA >60 >60 ml/min/1.73m2 Calcium 7.8 (L) 8.5 - 10.1 MG/DL Bilirubin, total 0.7 0.2 - 1.0 MG/DL  
 ALT (SGPT) 121 (H) 13 - 56 U/L  
 AST (SGOT) 383 (H) 15 - 37 U/L Alk. phosphatase 94 45 - 117 U/L Protein, total 6.8 6.4 - 8.2 g/dL Albumin 2.9 (L) 3.4 - 5.0 g/dL Globulin 3.9 2.0 - 4.0 g/dL A-G Ratio 0.7 (L) 0.8 - 1.7 LIPASE Collection Time: 01/15/19 11:46 AM  
Result Value Ref Range Lipase 126 73 - 393 U/L  
PTT Collection Time: 01/15/19 11:46 AM  
Result Value Ref Range aPTT 25.1 23.0 - 36.4 SEC PROTHROMBIN TIME + INR Collection Time: 01/15/19 11:46 AM  
Result Value Ref Range Prothrombin time 11.9 11.5 - 15.2 sec INR 0.9 0.8 - 1.2 POC LACTIC ACID Collection Time: 01/15/19  1:12 PM  
Result Value Ref Range Lactic Acid (POC) 1.04 0.40 - 2.00 mmol/L  
CULTURE, BLOOD Collection Time: 01/15/19  1:25 PM  
Result Value Ref Range Special Requests: PERIPHERAL Culture result: NO GROWTH AFTER 18 HOURS    
CARDIAC PANEL,(CK, CKMB & TROPONIN) Collection Time: 01/15/19  1:37 PM  
Result Value Ref Range CK 3,644 (H) 26 - 192 U/L  
 CK - MB 60.0 (H) <3.6 ng/ml CK-MB Index 1.6 0.0 - 4.0 % Troponin-I, QT 0.22 (H) 0.0 - 0.045 NG/ML  
NT-PRO BNP Collection Time: 01/15/19  1:37 PM  
Result Value Ref Range NT pro-BNP 1,449 (H) 0 - 900 PG/ML  
CULTURE, BLOOD Collection Time: 01/15/19  1:37 PM  
Result Value Ref Range  Special Requests: PERIPHERAL    
 Culture result: NO GROWTH AFTER 18 HOURS    
ETHYL ALCOHOL Collection Time: 01/15/19  1:37 PM  
Result Value Ref Range ALCOHOL(ETHYL),SERUM <3 0 - 3 MG/DL URINALYSIS W/ RFLX MICROSCOPIC Collection Time: 01/15/19  2:15 PM  
Result Value Ref Range Color YELLOW Appearance CLEAR Specific gravity 1.012 1.005 - 1.030    
 pH (UA) 5.5 5.0 - 8.0 Protein TRACE (A) NEG mg/dL Glucose NEGATIVE  NEG mg/dL Ketone >160 (A) NEG mg/dL Bilirubin NEGATIVE  NEG Blood MODERATE (A) NEG Urobilinogen 0.2 0.2 - 1.0 EU/dL Nitrites NEGATIVE  NEG Leukocyte Esterase MODERATE (A) NEG URINE MICROSCOPIC ONLY Collection Time: 01/15/19  2:15 PM  
Result Value Ref Range WBC 8 to 10 0 - 4 /hpf  
 RBC 2 to 3 0 - 5 /hpf Epithelial cells FEW 0 - 5 /lpf Bacteria FEW (A) NEG /hpf  
TROPONIN I Collection Time: 01/15/19  2:20 PM  
Result Value Ref Range Troponin-I, QT 0.23 (H) 0.0 - 8.016 NG/ML  
METABOLIC PANEL, COMPREHENSIVE Collection Time: 01/15/19  2:20 PM  
Result Value Ref Range Sodium 132 (L) 136 - 145 mmol/L Potassium 3.7 3.5 - 5.5 mmol/L Chloride 96 (L) 100 - 108 mmol/L  
 CO2 18 (L) 21 - 32 mmol/L Anion gap 18 3.0 - 18 mmol/L Glucose 94 74 - 99 mg/dL BUN 9 7.0 - 18 MG/DL Creatinine 0.77 0.6 - 1.3 MG/DL  
 BUN/Creatinine ratio 12 12 - 20 GFR est AA >60 >60 ml/min/1.73m2 GFR est non-AA >60 >60 ml/min/1.73m2 Calcium 7.2 (L) 8.5 - 10.1 MG/DL Bilirubin, total 0.5 0.2 - 1.0 MG/DL  
 ALT (SGPT) 114 (H) 13 - 56 U/L  
 AST (SGOT) 326 (H) 15 - 37 U/L Alk. phosphatase 93 45 - 117 U/L Protein, total 6.7 6.4 - 8.2 g/dL Albumin 3.0 (L) 3.4 - 5.0 g/dL Globulin 3.7 2.0 - 4.0 g/dL A-G Ratio 0.8 0.8 - 1.7 METABOLIC PANEL, COMPREHENSIVE Collection Time: 01/15/19  8:00 PM  
Result Value Ref Range Sodium 135 (L) 136 - 145 mmol/L Potassium 3.8 3.5 - 5.5 mmol/L  Chloride 102 100 - 108 mmol/L  
 CO2 16 (L) 21 - 32 mmol/L  
 Anion gap 17 3.0 - 18 mmol/L Glucose 75 74 - 99 mg/dL BUN 9 7.0 - 18 MG/DL Creatinine 0.73 0.6 - 1.3 MG/DL  
 BUN/Creatinine ratio 12 12 - 20 GFR est AA >60 >60 ml/min/1.73m2 GFR est non-AA >60 >60 ml/min/1.73m2 Calcium 7.3 (L) 8.5 - 10.1 MG/DL Bilirubin, total 0.5 0.2 - 1.0 MG/DL  
 ALT (SGPT) 121 (H) 13 - 56 U/L  
 AST (SGOT) 345 (H) 15 - 37 U/L Alk. phosphatase 98 45 - 117 U/L Protein, total 7.1 6.4 - 8.2 g/dL Albumin 3.3 (L) 3.4 - 5.0 g/dL Globulin 3.8 2.0 - 4.0 g/dL A-G Ratio 0.9 0.8 - 1.7    
TROPONIN I Collection Time: 01/15/19  8:00 PM  
Result Value Ref Range Troponin-I, QT 0.26 (H) 0.0 - 0.045 NG/ML  
POC G3 Collection Time: 01/15/19 10:23 PM  
Result Value Ref Range Device: ROOM AIR    
 FIO2 (POC) 21 % pH (POC) 7.225 (LL) 7.35 - 7.45    
 pCO2 (POC) 34.9 (L) 35.0 - 45.0 MMHG  
 pO2 (POC) 73 (L) 80 - 100 MMHG  
 HCO3 (POC) 14.4 (L) 22 - 26 MMOL/L  
 sO2 (POC) 91 (L) 92 - 97 % Base deficit (POC) 13 mmol/L Allens test (POC) YES Total resp. rate 21 Site RIGHT RADIAL Patient temp. 98.9 Specimen type (POC) ARTERIAL Performed by Lucina Vogel TROPONIN I Collection Time: 01/16/19 12:30 AM  
Result Value Ref Range Troponin-I, QT 0.22 (H) 0.0 - 0.045 NG/ML  
MAGNESIUM Collection Time: 01/16/19 12:30 AM  
Result Value Ref Range Magnesium 1.6 1.6 - 2.6 mg/dL PHOSPHORUS Collection Time: 01/16/19 12:30 AM  
Result Value Ref Range Phosphorus 0.6 (L) 2.5 - 4.9 MG/DL  
POC G3 Collection Time: 01/16/19  6:05 AM  
Result Value Ref Range Device: ROOM AIR    
 FIO2 (POC) 21 % pH (POC) 7.354 7.35 - 7.45    
 pCO2 (POC) 43.2 35.0 - 45.0 MMHG  
 pO2 (POC) 64 (L) 80 - 100 MMHG  
 HCO3 (POC) 23.9 22 - 26 MMOL/L  
 sO2 (POC) 90 (L) 92 - 97 % Base deficit (POC) 2 mmol/L Allens test (POC) YES Total resp. rate 18 Site RIGHT RADIAL Patient temp. 99.5  Specimen type (POC) ARTERIAL    
 Performed by [facility] CBC WITH AUTOMATED DIFF Collection Time: 01/16/19  6:13 AM  
Result Value Ref Range WBC 8.0 4.6 - 13.2 K/uL  
 RBC 3.58 (L) 4.20 - 5.30 M/uL  
 HGB 11.1 (L) 12.0 - 16.0 g/dL HCT 33.1 (L) 35.0 - 45.0 % MCV 92.5 74.0 - 97.0 FL  
 MCH 31.0 24.0 - 34.0 PG  
 MCHC 33.5 31.0 - 37.0 g/dL  
 RDW 17.5 (H) 11.6 - 14.5 % PLATELET 082 444 - 347 K/uL MPV 9.6 9.2 - 11.8 FL  
 NEUTROPHILS 59 40 - 73 % LYMPHOCYTES 35 21 - 52 % MONOCYTES 6 3 - 10 % EOSINOPHILS 0 0 - 5 % BASOPHILS 0 0 - 2 %  
 ABS. NEUTROPHILS 4.7 1.8 - 8.0 K/UL  
 ABS. LYMPHOCYTES 2.8 0.9 - 3.6 K/UL  
 ABS. MONOCYTES 0.4 0.05 - 1.2 K/UL  
 ABS. EOSINOPHILS 0.0 0.0 - 0.4 K/UL  
 ABS. BASOPHILS 0.0 0.0 - 0.1 K/UL  
 DF AUTOMATED METABOLIC PANEL, BASIC Collection Time: 01/16/19  6:13 AM  
Result Value Ref Range Sodium 137 136 - 145 mmol/L Potassium 3.0 (L) 3.5 - 5.5 mmol/L Chloride 102 100 - 108 mmol/L  
 CO2 27 21 - 32 mmol/L Anion gap 8 3.0 - 18 mmol/L Glucose 128 (H) 74 - 99 mg/dL BUN 6 (L) 7.0 - 18 MG/DL Creatinine 0.67 0.6 - 1.3 MG/DL  
 BUN/Creatinine ratio 9 (L) 12 - 20 GFR est AA >60 >60 ml/min/1.73m2 GFR est non-AA >60 >60 ml/min/1.73m2 Calcium 6.7 (L) 8.5 - 10.1 MG/DL MAGNESIUM Collection Time: 01/16/19  6:13 AM  
Result Value Ref Range Magnesium 2.4 1.6 - 2.6 mg/dL TROPONIN I Collection Time: 01/16/19  6:13 AM  
Result Value Ref Range Troponin-I, QT 0.17 (H) 0.0 - 0.045 NG/ML  
CALCIUM, IONIZED Collection Time: 01/16/19  6:13 AM  
Result Value Ref Range Ionized Calcium 0.95 (L) 1.12 - 1.32 MMOL/L Lab Results Component Value Date/Time Glucose 128 (H) 01/16/2019 06:13 AM  
 Glucose 75 01/15/2019 08:00 PM  
 Glucose 94 01/15/2019 02:20 PM  
 Glucose 118 (H) 01/15/2019 11:46 AM  
 Glucose 105 (H) 08/03/2018 05:00 AM  
  
 
Imaging: 
Ct Abd Pelv W Cont Addendum Date: 1/15/2019 Addendum: One or more dose reduction techniques were used on this CT: automated exposure control, adjustment of the mAs and/or kVp according to patient size, and iterative reconstruction techniques. The specific techniques used on this CT exam have been documented in the patient's electronic medical record. Digital Imaging and Communications in Medicine (DICOM) format image data are available to nonaffiliated external healthcare facilities or entities on a secure, media free, reciprocally searchable basis with patient authorization for at least a 12-month period after this study. Result Date: 1/15/2019 EXAM: CT of the abdomen and pelvis INDICATION: History of abdominal pain. COMPARISON: None. TECHNIQUE: Axial CT imaging of the abdomen and pelvis was performed with intravenous contrast. Multiplanar reformats were generated. One or more dose reduction techniques were used on this CT: automated exposure control, adjustment of the mAs and/or kVp according to patient's size, and iterative reconstruction techniques. The specific techniques utilized on this CT exam have been documented in the patient's electronic medical record. _______________ FINDINGS: LOWER CHEST: Unremarkable. LIVER, BILIARY: Liver is markedly hypoattenuating without focal abnormality identified. . No biliary dilation. Gallbladder is unremarkable. PANCREAS: Normal. SPLEEN: Normal. ADRENALS: Normal. KIDNEYS/URETERS/BLADDER: 2 calculi in the lower pole of the left kidney largest measuring 8 mm. Is unremarkable. Nasreen Yandy PELVIC ORGANS: Unremarkable. VASCULATURE: Unremarkable LYMPH NODES: No enlarged lymph nodes. GASTROINTESTINAL TRACT: No bowel dilation or wall thickening. Normal appendix. BONES: No acute or aggressive osseous abnormalities identified. OTHER: Distal esophageal wall thickening circumferentially. Small hiatal hernia. . _______________ IMPRESSION: No acute intra-abdominal pathology identified.  Marked hypoattenuation of the liver again noted, suggesting hepatic steatosis or other parenchymal pathology. Nonobstructing left renal calculi. Distal esophageal wall thickening again noted, nonspecific, but most likely suggesting esophagitis. This appears improved since prior examination. Small hernia. Xr Chest [facility] Result Date: 1/15/2019 EXAM: One-view chest CLINICAL HISTORY: epigastric pain , COMPARISON: None FINDINGS: Frontal view of the chest demonstrate clear lungs. Cardiac silhouette is normal in size and contour. No acute bony or soft tissue abnormality. IMPRESSION: No acute pulmonary process identified. Medication List Reviewed: 
Current Facility-Administered Medications Medication Dose Route Frequency  sodium bicarbonate (8.4%) 1 mEq/mL (8.4 %) injection  sodium bicarbonate 8.4 % (1 mEq/mL) injection  potassium chloride in water 10 mEq/100 mL IVPB premix pgbk  potassium phosphate 20 mmol in 0.9% sodium chloride 250 mL infusion   IntraVENous ONCE  potassium chloride 10 mEq in 100 ml IVPB  10 mEq IntraVENous Q1H  
 sodium chloride (NS) flush 5-10 mL  5-10 mL IntraVENous PRN  
 LORazepam (ATIVAN) tablet 1 mg  1 mg Oral Q1H PRN  
 sodium chloride (NS) flush 5-40 mL  5-40 mL IntraVENous Q8H  
 sodium chloride (NS) flush 5-40 mL  5-40 mL IntraVENous PRN  
 acetaminophen (TYLENOL) tablet 650 mg  650 mg Oral Q6H PRN  
 0.9% sodium chloride infusion  150 mL/hr IntraVENous CONTINUOUS  
 ELECTROLYTE REPLACEMENT PROTOCOL  1 Each Other PRN  
 ELECTROLYTE REPLACEMENT PROTOCOL  1 Each Other PRN  pantoprazole (PROTONIX) 40 mg in sodium chloride 0.9% 10 mL injection  40 mg IntraVENous Q12H  
 sodium chloride (NS) flush 5-40 mL  5-40 mL IntraVENous Q8H  
 sodium chloride (NS) flush 5-40 mL  5-40 mL IntraVENous PRN  
 LORazepam (ATIVAN) injection 1 mg  1 mg IntraVENous Q1H PRN  
 LORazepam (ATIVAN) tablet 2 mg  2 mg Oral Q1H PRN  Or  
  LORazepam (ATIVAN) injection 2 mg  2 mg IntraVENous Q1H PRN  
 LORazepam (ATIVAN) injection 3 mg  3 mg IntraVENous [id number] PRN  
 folic acid (FOLVITE) tablet 1 mg  1 mg Oral DAILY  thiamine mononitrate (B-1) tablet 100 mg  100 mg Oral DAILY  therapeutic multivitamin (THERAGRAN) tablet 1 Tab  1 Tab Oral DAILY  ondansetron (ZOFRAN) injection 4 mg  4 mg IntraVENous Q6H PRN  
 LORazepam (ATIVAN) tablet 2 mg  2 mg Oral Q6H  
 HYDROmorphone (DILAUDID) tablet 2 mg  2 mg Oral Q6H PRN  
 busPIRone (BUSPAR) tablet 15 mg  15 mg Oral BID  lipase-protease-amylase (ZENPEP 10,000) capsule 2 Cap  2 Cap Oral TID WITH MEALS  gabapentin (NEURONTIN) capsule 600 mg  600 mg Oral BID  topiramate (TOPAMAX) tablet 100 mg  100 mg Oral DAILY  cloNIDine HCl (CATAPRES) tablet 0.1 mg  0.1 mg Oral BID  QUEtiapine (SEROquel) tablet 300 mg  300 mg Oral QHS  Please clarify patient's dose and frequency of Topiramate and Quetiapine  1 Each Other DAILY  sodium bicarbonate (8.4%) 150 mEq in dextrose 5% 1,000 mL infusion   IntraVENous CONTINUOUS

## 2019-01-16 NOTE — ACP (ADVANCE CARE PLANNING)
Patient has designated __mom and spouse______________________ to participate in his/her discharge plan and to receive any needed information.    Name: Jasmeet Olson  Phone Number: [phone]  Name: Toby Murry  Phone Sarah Galarza

## 2019-01-16 NOTE — PROGRESS NOTES
conducted an initial consultation and Spiritual Assessment for Adelina Molina, who is a 46 y.o.,female. Patients Primary Language is: [place]. According to the patients EMR Buddhist Affiliation is: Annabelle Kemp. The reason the Patient came to the hospital is:  
Patient Active Problem List  
 Diagnosis Date Noted  Bipolar disorder (manic depression) ([facility] 03/08/2013 Priority: 1 - One  Abdominal pain 01/15/2019  Metabolic acidosis 75/43/9330  
 NSTEMI (non-ST elevated [facility] infarction) [facility] 01/15/2019  Rhabdomyolysis 01/15/2019  Upper GI bleed 01/15/2019  Recurrent UTI (urinary tract infection) 04/24/2018  Staghorn renal calculus 04/24/2018  UTI (urinary tract infection) 03/11/2018  Bronchitis 03/11/2018  Syncope 03/08/2018  Alcohol withdrawal ([facility] 03/08/2018  GI bleed 03/08/2018  Hyponatremia 03/08/2018  Hypercalcemia 03/08/2018  Dizziness 09/14/2017  Pancreatitis 09/14/2017  Hypokalemia 09/14/2017  Anemia 09/14/2017  Encephalopathy 09/14/2017  Nicotine withdrawal 02/27/2016  Pneumonia 02/25/2016  COPD (chronic obstructive pulmonary disease) ([facility] 02/25/2016  Tylenol overdose 02/20/2016  SIRS (systemic inflammatory response syndrome) (HCC) 02/20/2016  Acute pancreatitis 02/19/2016  Pancreatitis, alcoholic, acute 68/11/5228  Tachycardia 01/28/2016  Alcohol dependence ([facility] 01/28/2016  Esophageal stricture 01/28/2016  Dilated pancreatic duct 01/28/2016  Common bile duct dilation 01/28/2016  Elevated LFTs 01/28/2016  Bipolar depression ([facility] 01/28/2016  
 HTN (hypertension) 01/28/2016 The  provided the following Interventions: 
 attempted to Initiate a relationship of care and support with patient in room 2606 this morning. Found patient resting peacefully due to her current condition. No family seen at time of this visit. Provided information about Spiritual Care Services. Offered prayer and assurance of continued prayers on patients behalf. Assessment: 
Patient does not have any Amish/cultural needs that will affect patients preferences in health care. There are no further spiritual or Amish issues which require Spiritual Care Services interventions at this time. Plan: 
Chaplains will continue to follow and will provide pastoral care on an as needed/requested basis Meera Leroy 3 Board Certified [employer] Spiritual Care  
[phone]

## 2019-01-16 NOTE — ROUTINE PROCESS
1930 Bedside shift change report given to renetta rn (oncoming nurse) by Gissel Golden rn (offgoing nurse). Report included the following information SBAR, Kardex and Recent Results. Hob elevated 30 degrees, side rails up x 3, call light within reach. 2000 assessment completed. ORAL AND  BEDPAN CARD completed. patient mom called carmen and notified of patient location and uniot phone number, mom given pin number to obtain information. Patient states mom primary  Person to give concent incase she can not.  able to obtain inoformation, 
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 4 nicotine patches locked up in pharmacy . 2155 dr beltre notified trop 0.26, and co2 16, and alt 121,  ast 345, orders received for stat abg, resp paged and notified. 2235 dr Natasha Marshall aware abg , orders received, pharmacy [facility] pharmacist notiifed need for bicarb drip states he will make. 2330 no drip waiting.  
[address] at [facility] pharmacist called concerning drip , states he will follow up and call  Back line to see wear drip is. 
0020 called tiffany pharmacist at [facility] concerning drip, states they have left and nurse to mix drip. 0030 drip mixed and verified per lloyd Livingston rn . 
3542 patient resting  With eyes closed resp unlabored. 0200 resting in bed easily awaken to void on bedpan. 0330 dr Natasha Marshall notified mag level 1.6 and phos level 0.6 and  potassium 3.8 and replaced with 20 meq kcl. Will call pharm about kphos dose. 0400 reassessment completed. Oral and simón care completed. 0500 lab called concerning am draws. 0600 lab called again concerning am lab draw. 7324 patient restless, trying to take gown off and stating I have to pea, before bedpan placed voided in bed. Cleansed new bed pad. 1624 patient called for bedpan , voided, simón care completed. Reassured she is doing okay  And try to rest.  
0730 Bedside shift change report given to helen feliz (oncoming nurse) by renetta feliz (offgoing nurse). Report included the following information SBAR, Kardex, Recent Results and Cardiac Rhythm nsr. Side rails up x 3, call light with in reach. Hob elevated 30 degrees.

## 2019-01-16 NOTE — ROUTINE PROCESS
1715 Bedside and Verbal shift change report given to 5 [facility]  (oncoming nurse) by Kasey GRIGSBY (offgoing nurse). Report included the following information SBAR, Kardex and MAR.

## 2019-01-16 NOTE — MANAGEMENT PLAN
Discharge/Transition Planning Interviewed patient. Verified demographics listed on face sheet with patient; all information correct. Pt with Medicare and [facility] [employer] for insurance. Patient stated their PCP is Dr Dominique Opitz, but usually sees his NP. Patient lives with spouse in single family home. Patient's NOK is spouse . Patient independent with ADLs prior to admission. Does Not drive. Active alcoholic and has been detox several times, but goes back to drinking heavily. DME prior to admission: o2 as needed at 2 liters, nebulizer. Discharge plan is Home Patient has designated __mom and spouse______________________ to participate in his/her discharge plan and to receive any needed information. Name: Valentina Nielson Phone Number: [phone] Name: Nazario Mcfarland Phone number:[phone] Care Management Interventions PCP Verified by CM: Yes(Dr Dominique Opitz) Mode of Transport at Discharge: Self(family) Transition of Care Consult (CM Consult): Discharge Planning Current Support Network: Lives with Spouse, Own Home Confirm Follow Up Transport: Family Plan discussed with Pt/Family/Caregiver: Yes Discharge Location Discharge Placement: Home Reason for Admission:   Alcoholic Pancreatitis RRAT Score:   20 Do you (patient/family) have any concerns for transition/discharge? no 
           
Plan for utilizing home health:     Qualifies for [facility] from COPD Likelihood of readmission? Listed yellow/moderate, but would rate high based on comorbidity and continued heavy drinking Transition of Care Plan:      Home Elliott Espinosa RN BSN Outcomes Manager Pager # [phone]

## 2019-01-16 NOTE — PROGRESS NOTES
[address] Patient arrived to room 2606. Dr. Corinne Burger in room to see patient. Patient drowsy but oriented x 4 with slurred speech. 1900 RUE IV removed for infiltrated site. Bedside and Verbal shift change report given to Juvenal Pena RN (oncoming nurse) by Cindy Miguel RN 
 (offgoing nurse). Report included the following information SBAR, Kardex, ED Summary and Cardiac Rhythm ST.

## 2019-01-16 NOTE — DIABETES MGMT
NUTRITIONAL ASSESSMENT GLYCEMIC CONTROL/ PLAN OF CARE Kole Aguilar           46 y.o.           1/15/2019 1. Alcoholic ketoacidosis 2. Tachycardia 3. Dehydration 4. Alcohol abuse 5. Urinary tract infection without hematuria, site unspecified 6. Non-traumatic rhabdomyolysis INTERVENTIONS/PLAN:  
1. Monitor po intake, labs and weights. 2.  Consider adding Ensure when diet > clear liquids. ASSESSMENT:  
Pt is 111% ideal wt; BMI (calculated): 22.3 kg/m2 (normal weight classification). Pt appears well nourished from available data but is at nutrition risk due to long term history of excessive etoh intake. Nutrition Diagnoses: Altered GI function due to chronic pancreatitis/ etoh abuse as evidenced by pt with hx of pancreatitis, abd pain at admission; use of Creon PTA. Inadequate oral food and beverage intake due to GI issues as evidenced by clear liquid diet orders. SUBJECTIVE/OBJECTIVE: Information obtained from: chart review, pt Pt presented to ED with hyponatremia, metabolic acidosis, elevated CK, mildly elevated troponin, elevated LFT with hepatocellular pattern c/w alcohol abuse. PMHx includes chronic pancreatitis. 1/16/19:  Pt in room with mother at bedside. Pt reports she is hungry and her abdomen is better. She states her weight was stable PTA. No known food allergies and pt denies problems with chewing or swallowing. Did not obtain additional diet history at this time - pt was tearful. Chart review indicates pt received po supplements at prior [facility] admissions. Diet: clear liquids No data found. Medications: [x]                Reviewed Pertinent:  MVI., thiamine, folvite, Zenpep IVF:  NS at 150 ml/hr Most Recent POC Glucose:  
Recent Labs  
  01/16/19 
0613 01/15/19 
2000 01/15/19 
1420 01/15/19 
1146 * 75 94 118* Labs:  
Lab Results Component Value Date/Time  Hemoglobin A1c <3.5 (L) 02/24/2016 05:00 AM  
 
 Lab Results Component Value Date/Time Sodium 137 01/16/2019 06:13 AM  
 Potassium 3.0 (L) 01/16/2019 06:13 AM  
 Chloride 102 01/16/2019 06:13 AM  
 CO2 27 01/16/2019 06:13 AM  
 Anion gap 8 01/16/2019 06:13 AM  
 Glucose 128 (H) 01/16/2019 06:13 AM  
 BUN 6 (L) 01/16/2019 06:13 AM  
 Creatinine 0.67 01/16/2019 06:13 AM  
 Calcium 6.7 (L) 01/16/2019 06:13 AM  
 Magnesium 2.4 01/16/2019 06:13 AM  
 Phosphorus 0.6 (L) 01/16/2019 12:30 AM  
 Albumin 3.3 (L) 01/15/2019 08:00 PM  
1/15/19 lipase - 126; ALT -121; AST - 345 Bre Barron last3 Anthropometrics: IBW : 49.9 kg (110 lb), % IBW (Calculated): 111.03 %, BMI (calculated): 22.3 Wt Readings from Last 1 Encounters:  
01/15/19 55.4 kg (122 lb 2.2 oz) Ht Readings from Last 1 Encounters:  
01/15/19 5' 2\" (1.575 m) Last 3 Recorded Weights in this Encounter 01/15/19 1055 01/15/19 2000 Weight: 56.7 kg (125 lb) 55.4 kg (122 lb 2.2 oz) Estimated Nutrition Needs:  0721 Kcals/day, Protein (g): 83 g Fluid (ml): 1700 ml Based on:   [x]          Actual BW    []          ABW   []            Adjusted BW   
    
Nutrition Interventions: 
None at this time - clear liquid diet orders Goal: Pt will consume > 75% meals by 1/21/19. Weight maintenance (+/- 1-2 kg) by 1/26/19. Nutrition Monitoring and Evaluation   
 
[x]     Monitor po intake on meal rounds 
[x]     Continue inpatient monitoring and intervention 
[]     Other: 
 
 
Nutrition Risk:  []   High     [x]  Moderate    []  Minimal/Uncompromised Yudith Salas RD, CDE Office:  [phone] Long [place] Pager:  [phone]

## 2019-01-16 NOTE — ROUTINE PROCESS
1210 rcved report from ICU nurse, will assess pt upon arival to unit 1309 rcved pt via bed from ICU, pt sleeping quietly, no acute distress noted, normal saline infusing at 150 ml/hr, will monitor

## 2019-01-16 NOTE — PROGRESS NOTES
Problem: Falls - Risk of 
Goal: *Absence of Falls Document Orjaney Campbell Fall Risk and appropriate interventions in the flowsheet. Outcome: Progressing Towards Goal 
Fall Risk Interventions: 
  
 
  
 
Medication Interventions: Bed/chair exit alarm, Evaluate medications/consider consulting pharmacy, Patient to call before getting OOB, Utilize gait belt for transfers/ambulation Problem: Pressure Injury - Risk of 
Goal: *Prevention of pressure injury Document Rafa Scale and appropriate interventions in the flowsheet. Outcome: Progressing Towards Goal 
Pressure Injury Interventions: 
Sensory Interventions: Assess changes in LOC, Assess need for specialty bed, Minimize linen layers, Keep linens dry and wrinkle-free, Float heels, Use 30-degree side-lying position, Turn and reposition approx. every two hours (pillows and wedges if needed), Pressure redistribution bed/mattress (bed type) Moisture Interventions: Absorbent underpads, Apply protective barrier, creams and emollients, Assess need for specialty bed, Check for incontinence Q2 hours and as needed, Maintain skin hydration (lotion/cream), Minimize layers, Moisture barrier Activity Interventions: Pressure redistribution bed/mattress(bed type) Mobility Interventions: HOB 30 degrees or less, Float heels, Pressure redistribution bed/mattress (bed type), Turn and reposition approx. every two hours(pillow and wedges), Assess need for specialty bed Nutrition Interventions: Discuss nutritional consult with provider(npo abd pain) Friction and Shear Interventions: Apply protective barrier, creams and emollients, HOB 30 degrees or less, Foam dressings/transparent film/skin sealants, Transferring/repositioning devices, Minimize layers Problem: Alcohol Withdrawal 
Goal: *STG: Verbalizes abstinence as an achievable goal 
Outcome: Not Progressing Towards Goal 
Need to encourage and continue to educate patient

## 2019-01-17 LAB
ALBUMIN SERPL-MCNC: 2.1 G/DL (ref 3.4–5)
ALBUMIN/GLOB SERPL: 0.8 {RATIO} (ref 0.8–1.7)
ALP SERPL-CCNC: 68 U/L (ref 45–117)
ALT SERPL-CCNC: 131 U/L (ref 13–56)
ANION GAP SERPL CALC-SCNC: 4 MMOL/L (ref 3–18)
ANION GAP SERPL CALC-SCNC: 7 MMOL/L (ref 3–18)
AST SERPL-CCNC: 318 U/L (ref 15–37)
BACTERIA SPEC CULT: ABNORMAL
BASOPHILS # BLD: 0 K/UL (ref 0–0.1)
BASOPHILS NFR BLD: 0 % (ref 0–2)
BILIRUB SERPL-MCNC: 0.4 MG/DL (ref 0.2–1)
BUN SERPL-MCNC: 5 MG/DL (ref 7–18)
BUN SERPL-MCNC: 5 MG/DL (ref 7–18)
BUN/CREAT SERPL: 10 (ref 12–20)
BUN/CREAT SERPL: 11 (ref 12–20)
CALCIUM SERPL-MCNC: 7.4 MG/DL (ref 8.5–10.1)
CALCIUM SERPL-MCNC: 7.5 MG/DL (ref 8.5–10.1)
CHLORIDE SERPL-SCNC: 110 MMOL/L (ref 100–108)
CHLORIDE SERPL-SCNC: 111 MMOL/L (ref 100–108)
CO2 SERPL-SCNC: 25 MMOL/L (ref 21–32)
CO2 SERPL-SCNC: 30 MMOL/L (ref 21–32)
CREAT SERPL-MCNC: 0.45 MG/DL (ref 0.6–1.3)
CREAT SERPL-MCNC: 0.51 MG/DL (ref 0.6–1.3)
DIFFERENTIAL METHOD BLD: ABNORMAL
EOSINOPHIL # BLD: 0.2 K/UL (ref 0–0.4)
EOSINOPHIL NFR BLD: 4 % (ref 0–5)
ERYTHROCYTE [DISTWIDTH] IN BLOOD BY AUTOMATED COUNT: 18.2 % (ref 11.6–14.5)
GLOBULIN SER CALC-MCNC: 2.7 G/DL (ref 2–4)
GLUCOSE SERPL-MCNC: 114 MG/DL (ref 74–99)
GLUCOSE SERPL-MCNC: 86 MG/DL (ref 74–99)
HCT VFR BLD AUTO: 33.8 % (ref 35–45)
HGB BLD-MCNC: 11.2 G/DL (ref 12–16)
LYMPHOCYTES # BLD: 2.1 K/UL (ref 0.9–3.6)
LYMPHOCYTES NFR BLD: 56 % (ref 21–52)
MAGNESIUM SERPL-MCNC: 1.9 MG/DL (ref 1.6–2.6)
MCH RBC QN AUTO: 31.3 PG (ref 24–34)
MCHC RBC AUTO-ENTMCNC: 33.1 G/DL (ref 31–37)
MCV RBC AUTO: 94.4 FL (ref 74–97)
MONOCYTES # BLD: 0.1 K/UL (ref 0.05–1.2)
MONOCYTES NFR BLD: 4 % (ref 3–10)
NEUTS SEG # BLD: 1.3 K/UL (ref 1.8–8)
NEUTS SEG NFR BLD: 36 % (ref 40–73)
PHOSPHATE SERPL-MCNC: 0.8 MG/DL (ref 2.5–4.9)
PLATELET # BLD AUTO: 130 K/UL (ref 135–420)
PMV BLD AUTO: 10.1 FL (ref 9.2–11.8)
POTASSIUM SERPL-SCNC: 2.9 MMOL/L (ref 3.5–5.5)
POTASSIUM SERPL-SCNC: 3.7 MMOL/L (ref 3.5–5.5)
PROT SERPL-MCNC: 4.8 G/DL (ref 6.4–8.2)
RBC # BLD AUTO: 3.58 M/UL (ref 4.2–5.3)
SERVICE CMNT-IMP: ABNORMAL
SODIUM SERPL-SCNC: 143 MMOL/L (ref 136–145)
SODIUM SERPL-SCNC: 144 MMOL/L (ref 136–145)
WBC # BLD AUTO: 3.6 K/UL (ref 4.6–13.2)

## 2019-01-17 PROCEDURE — 74011250637 HC RX REV CODE- 250/637: Performed by: HOSPITALIST

## 2019-01-17 PROCEDURE — 80053 COMPREHEN METABOLIC PANEL: CPT

## 2019-01-17 PROCEDURE — 84100 ASSAY OF PHOSPHORUS: CPT

## 2019-01-17 PROCEDURE — 77030029684 HC NEB SM VOL KT MONA -A

## 2019-01-17 PROCEDURE — 85025 COMPLETE CBC W/AUTO DIFF WBC: CPT

## 2019-01-17 PROCEDURE — 74011250636 HC RX REV CODE- 250/636: Performed by: HOSPITALIST

## 2019-01-17 PROCEDURE — 74011250636 HC RX REV CODE- 250/636: Performed by: NURSE PRACTITIONER

## 2019-01-17 PROCEDURE — 36415 COLL VENOUS BLD VENIPUNCTURE: CPT

## 2019-01-17 PROCEDURE — 83735 ASSAY OF MAGNESIUM: CPT

## 2019-01-17 PROCEDURE — C9113 INJ PANTOPRAZOLE SODIUM, VIA: HCPCS | Performed by: HOSPITALIST

## 2019-01-17 PROCEDURE — 74011250637 HC RX REV CODE- 250/637: Performed by: NURSE PRACTITIONER

## 2019-01-17 PROCEDURE — 74011000250 HC RX REV CODE- 250: Performed by: FAMILY MEDICINE

## 2019-01-17 PROCEDURE — 77030038269 HC DRN EXT URIN PURWCK BARD -A

## 2019-01-17 PROCEDURE — 65660000000 HC RM CCU STEPDOWN

## 2019-01-17 PROCEDURE — 74011000250 HC RX REV CODE- 250: Performed by: NURSE PRACTITIONER

## 2019-01-17 PROCEDURE — 74011250636 HC RX REV CODE- 250/636: Performed by: FAMILY MEDICINE

## 2019-01-17 PROCEDURE — 74011250637 HC RX REV CODE- 250/637: Performed by: FAMILY MEDICINE

## 2019-01-17 RX ORDER — MAGNESIUM SULFATE 1 G/100ML
1 INJECTION INTRAVENOUS ONCE
Status: DISCONTINUED | OUTPATIENT
Start: 2019-01-17 | End: 2019-01-17 | Stop reason: SDUPTHER

## 2019-01-17 RX ORDER — MAGNESIUM SULFATE 1 G/100ML
1 INJECTION INTRAVENOUS ONCE
Status: COMPLETED | OUTPATIENT
Start: 2019-01-17 | End: 2019-01-17

## 2019-01-17 RX ORDER — POTASSIUM CHLORIDE 20 MEQ/1
40 TABLET, EXTENDED RELEASE ORAL
Status: DISCONTINUED | OUTPATIENT
Start: 2019-01-17 | End: 2019-01-17

## 2019-01-17 RX ORDER — IPRATROPIUM BROMIDE AND ALBUTEROL SULFATE 2.5; .5 MG/3ML; MG/3ML
3 SOLUTION RESPIRATORY (INHALATION)
Status: DISCONTINUED | OUTPATIENT
Start: 2019-01-17 | End: 2019-01-18 | Stop reason: HOSPADM

## 2019-01-17 RX ORDER — BENZONATATE 100 MG/1
100 CAPSULE ORAL 3 TIMES DAILY
Status: DISCONTINUED | OUTPATIENT
Start: 2019-01-17 | End: 2019-01-18 | Stop reason: HOSPADM

## 2019-01-17 RX ORDER — POTASSIUM CHLORIDE 20 MEQ/1
40 TABLET, EXTENDED RELEASE ORAL EVERY 4 HOURS
Status: DISCONTINUED | OUTPATIENT
Start: 2019-01-17 | End: 2019-01-17

## 2019-01-17 RX ADMIN — DIAZEPAM 5 MG: 5 TABLET ORAL at 09:30

## 2019-01-17 RX ADMIN — HYDROMORPHONE HYDROCHLORIDE 1 MG: 2 TABLET ORAL at 16:39

## 2019-01-17 RX ADMIN — CLONIDINE HYDROCHLORIDE 0.1 MG: 0.1 TABLET ORAL at 17:29

## 2019-01-17 RX ADMIN — TOPIRAMATE 100 MG: 100 TABLET, FILM COATED ORAL at 09:29

## 2019-01-17 RX ADMIN — PANCRELIPASE LIPASE, PANCRELIPASE PROTEASE, PANCRELIPASE AMYLASE 2 CAPSULE: 10000; 32000; 42000 CAPSULE, DELAYED RELEASE ORAL at 17:31

## 2019-01-17 RX ADMIN — BUSPIRONE HYDROCHLORIDE 15 MG: 5 TABLET ORAL at 17:28

## 2019-01-17 RX ADMIN — SODIUM CHLORIDE 150 ML/HR: 900 INJECTION, SOLUTION INTRAVENOUS at 03:34

## 2019-01-17 RX ADMIN — Medication 10 ML: at 21:29

## 2019-01-17 RX ADMIN — Medication 10 ML: at 14:28

## 2019-01-17 RX ADMIN — Medication 10 ML: at 06:00

## 2019-01-17 RX ADMIN — CLONIDINE HYDROCHLORIDE 0.1 MG: 0.1 TABLET ORAL at 09:30

## 2019-01-17 RX ADMIN — IPRATROPIUM BROMIDE AND ALBUTEROL SULFATE 3 ML: .5; 3 SOLUTION RESPIRATORY (INHALATION) at 21:28

## 2019-01-17 RX ADMIN — THERA TABS 1 TABLET: TAB at 09:30

## 2019-01-17 RX ADMIN — DIAZEPAM 5 MG: 5 TABLET ORAL at 14:27

## 2019-01-17 RX ADMIN — GABAPENTIN 600 MG: 300 CAPSULE ORAL at 09:30

## 2019-01-17 RX ADMIN — GABAPENTIN 600 MG: 300 CAPSULE ORAL at 17:29

## 2019-01-17 RX ADMIN — QUETIAPINE FUMARATE 300 MG: 100 TABLET ORAL at 21:28

## 2019-01-17 RX ADMIN — Medication 100 MG: at 09:29

## 2019-01-17 RX ADMIN — SODIUM CHLORIDE 40 MG: 9 INJECTION, SOLUTION INTRAMUSCULAR; INTRAVENOUS; SUBCUTANEOUS at 09:24

## 2019-01-17 RX ADMIN — POTASSIUM CHLORIDE 40 MEQ: 20 TABLET, EXTENDED RELEASE ORAL at 09:29

## 2019-01-17 RX ADMIN — PANCRELIPASE LIPASE, PANCRELIPASE PROTEASE, PANCRELIPASE AMYLASE 2 CAPSULE: 10000; 32000; 42000 CAPSULE, DELAYED RELEASE ORAL at 09:29

## 2019-01-17 RX ADMIN — BUSPIRONE HYDROCHLORIDE 15 MG: 5 TABLET ORAL at 09:28

## 2019-01-17 RX ADMIN — PANCRELIPASE LIPASE, PANCRELIPASE PROTEASE, PANCRELIPASE AMYLASE 2 CAPSULE: 10000; 32000; 42000 CAPSULE, DELAYED RELEASE ORAL at 14:26

## 2019-01-17 RX ADMIN — Medication 10 ML: at 14:27

## 2019-01-17 RX ADMIN — Medication 10 ML: at 21:28

## 2019-01-17 RX ADMIN — MAGNESIUM SULFATE HEPTAHYDRATE 1 G: 1 INJECTION, SOLUTION INTRAVENOUS at 09:22

## 2019-01-17 RX ADMIN — BENZONATATE 100 MG: 100 CAPSULE ORAL at 21:28

## 2019-01-17 RX ADMIN — HYDROMORPHONE HYDROCHLORIDE 1 MG: 2 TABLET ORAL at 09:41

## 2019-01-17 RX ADMIN — SODIUM CHLORIDE: 900 INJECTION, SOLUTION INTRAVENOUS at 12:11

## 2019-01-17 RX ADMIN — SODIUM CHLORIDE 40 MG: 9 INJECTION, SOLUTION INTRAMUSCULAR; INTRAVENOUS; SUBCUTANEOUS at 21:28

## 2019-01-17 RX ADMIN — DIAZEPAM 5 MG: 5 TABLET ORAL at 20:02

## 2019-01-17 RX ADMIN — FOLIC ACID 1 MG: 1 TABLET ORAL at 09:30

## 2019-01-17 NOTE — PROGRESS NOTES
[facility] Hospitalist Division Inpatient Daily Progress Note Daily progress Note Patient: Adelina Molina MRN: [id number]  CSN: [id number] YOB: 1965  Age: 46 y.o. Sex: female DOA: 1/15/2019 LOS:  LOS: 2 days Chief Complaint:  Acute alcoholic pancreatitis Interval History: 47 y/o  female with hx of chronic pancreatitis, ETOH abuse, COPD, asthma presented to the ED on 1/15 with abdominal pain. Per EMR, history limited as pt appears to be intoxicated. Her last drink was the night prior, pt states she drinks \"a lot\" everyday, stating it is at least a big bottle of vodka per day. The following morning she developed severe abdominal pain. She also described some chest pain that morning and two episodes of coffee ground emesis. She said she has not slept for two days. She felt she was having a flare of acute pancreatitis, she was previously admitted in July 2018. In the ED, workup notable for hyponatremia, metabolic acidosis, elevated CK, mildly elevated troponin, elevated LFT (AST > ALT). She was started on IVF's and admitted for further care. Of note, she does report hx alcohol withdrawal syndrome in the past but she denies hx of withdrawal seizures. Per notes, +guiaic in ED. CBC has been stable. Plan to transfer to tele. Pt more awake and alert today 1/17. Advanced diet, decreased IVF's. Electrolyte replacement. Assessment/Plan:  
 
Patient Active Problem List  
Diagnosis Code  Bipolar disorder (manic depression) ([facility] F31.9  Tachycardia R00.0  Alcohol dependence ([facility] F10.20  Esophageal stricture K22.2  Dilated pancreatic duct K86.89  
 Common bile duct dilation K83.8  Elevated LFTs R94.5  Bipolar depression ([facility] F31.30  
 HTN (hypertension) I10  
 Acute pancreatitis K85.90  Pancreatitis, alcoholic, acute V07.69  Tylenol overdose T39.1X1A  
  SIRS (systemic inflammatory response syndrome) ([facility]) R65.10  Pneumonia J18.9  
 COPD (chronic obstructive pulmonary disease) ([facility]) J44.9  Nicotine withdrawal F17.203  Dizziness R42  Pancreatitis K85.90  Hypokalemia E87.6  Anemia D64.9  
 Encephalopathy G93.40  Syncope R55  Alcohol withdrawal (H[facility]) F10.239  GI bleed K92.2  Hyponatremia E87.1  Hypercalcemia E83.52  
 UTI (urinary tract infection) N39.0  Bronchitis J40  Recurrent UTI (urinary tract infection) N39.0  Staghorn renal calculus N20.0  Abdominal pain R10.9  Metabolic acidosis R94.8  NSTEMI (non-ST elevated myocardial infarction) ([facility] I21.4  Rhabdomyolysis M62.82  Upper GI bleed K92.2 A/P: 
Acute pancreatitis 
- suspect due to ETOH abuse - Lipase 3644 on admission 
- hx of chronic pancreatitis on Zenpep 
- decrease IVF' to 75 ml/hr, advance diet as tolerated Elevated LFT's 
- suspect due to ETOH abuse - CT a/p suggesting hepatic steatosis 
- monitor LFT's 
 
Metabolic Acidosis -  Improved 
- continue to monitor NSTEMI 
- troponin 0.22 - -> 0.23 - -> 0.26 - -> 0.22 - -> 0.17 
- EKG with ST, anterior infarct, age undetermined vs lead placement 
- last Echo 3/2018 with EF 56%, Grade 1 diastolic dysfunction, mild to moderate tricuspid regurgitation 
- no c/o chest pain ETOH abuse 
- CIWA protocol, scheduled Valium QID Hematemesis / + Guaiac  
- hx of ETOH Abuse 
- report hematemesis, + guaiac in ED 
- trend CBC Hx of Bipolar Disorder 
- continue home meds DVT Prophylaxis - SCDs BLE Disposition 
- d/c 1-2 days? 
- would recommend f/u with GI Dr. Chito Sheppard at discharge, [address] JACQUELINE Courtney-NATHALIE [facility] Hospitalist Division Pager:  [phone] Office:  [phone] Subjective: Interval notes reviewed. C/o upper abdominal pain, no N/V. Tolerating clear liquids. Objective:  
  
Visit Vitals /87 (BP 1 Location: Right arm, BP Patient Position: At rest) Pulse 93 Temp 97.7 °F (36.5 °C) Resp 18 Ht 5' 2\" (1.575 m) Wt 57.2 kg (126 lb 1.6 oz) SpO2 96% Breastfeeding? No  
BMI 23.06 kg/m² Physical Exam: 
General appearance: alert, cooperative, no distress Head: Normocephalic, without obvious abnormality, atraumatic Lungs: clear to auscultation bilaterally Heart: regular rate and rhythm, S1, S2 normal, no murmur, click, rub or gallop Abdomen: soft, TTP epigastric and LUQ, non distended. Normoactive bowel sounds. Extremities: extremities normal, atraumatic, no cyanosis or edema Skin: Skin color, texture, turgor normal. No rashes or lesions Neurologic: Grossly normal 
PSY: calm and cooperative Intake and Output: 
Current Shift:  No intake/output data recorded. Last three shifts:  01/15 1901 - 01/17 0700 In: 5804.2 [P.O.:610; I.V.:5194.2] Out: 2450 [Urine:2450] Recent Results (from the past 24 hour(s)) CBC WITH AUTOMATED DIFF Collection Time: 01/17/19  5:29 AM  
Result Value Ref Range WBC 3.6 (L) 4.6 - 13.2 K/uL  
 RBC 3.58 (L) 4.20 - 5.30 M/uL  
 HGB 11.2 (L) 12.0 - 16.0 g/dL HCT 33.8 (L) 35.0 - 45.0 % MCV 94.4 74.0 - 97.0 FL  
 MCH 31.3 24.0 - 34.0 PG  
 MCHC 33.1 31.0 - 37.0 g/dL  
 RDW 18.2 (H) 11.6 - 14.5 % PLATELET 279 (L) 569 - 420 K/uL MPV 10.1 9.2 - 11.8 FL  
 NEUTROPHILS 36 (L) 40 - 73 % LYMPHOCYTES 56 (H) 21 - 52 % MONOCYTES 4 3 - 10 % EOSINOPHILS 4 0 - 5 % BASOPHILS 0 0 - 2 %  
 ABS. NEUTROPHILS 1.3 (L) 1.8 - 8.0 K/UL  
 ABS. LYMPHOCYTES 2.1 0.9 - 3.6 K/UL  
 ABS. MONOCYTES 0.1 0.05 - 1.2 K/UL  
 ABS. EOSINOPHILS 0.2 0.0 - 0.4 K/UL  
 ABS. BASOPHILS 0.0 0.0 - 0.1 K/UL  
 DF AUTOMATED MAGNESIUM Collection Time: 01/17/19  5:29 AM  
Result Value Ref Range Magnesium 1.9 1.6 - 2.6 mg/dL METABOLIC PANEL, COMPREHENSIVE Collection Time: 01/17/19  5:29 AM  
Result Value Ref Range  Sodium 144 136 - 145 mmol/L  
 Potassium 2.9 (LL) 3.5 - 5.5 mmol/L Chloride 110 (H) 100 - 108 mmol/L  
 CO2 30 21 - 32 mmol/L Anion gap 4 3.0 - 18 mmol/L Glucose 86 74 - 99 mg/dL BUN 5 (L) 7.0 - 18 MG/DL Creatinine 0.45 (L) 0.6 - 1.3 MG/DL  
 BUN/Creatinine ratio 11 (L) 12 - 20 GFR est AA >60 >60 ml/min/1.73m2 GFR est non-AA >60 >60 ml/min/1.73m2 Calcium 7.4 (L) 8.5 - 10.1 MG/DL Bilirubin, total 0.4 0.2 - 1.0 MG/DL  
 ALT (SGPT) 131 (H) 13 - 56 U/L  
 AST (SGOT) 318 (H) 15 - 37 U/L Alk. phosphatase 68 45 - 117 U/L Protein, total 4.8 (L) 6.4 - 8.2 g/dL Albumin 2.1 (L) 3.4 - 5.0 g/dL Globulin 2.7 2.0 - 4.0 g/dL A-G Ratio 0.8 0.8 - 1.7 Lab Results Component Value Date/Time Glucose 86 01/17/2019 05:29 AM  
 Glucose 128 (H) 01/16/2019 06:13 AM  
 Glucose 75 01/15/2019 08:00 PM  
 Glucose 94 01/15/2019 02:20 PM  
 Glucose 118 (H) 01/15/2019 11:46 AM  
  
 
Imaging: No results found. Medication List Reviewed: 
Current Facility-Administered Medications Medication Dose Route Frequency  ELECTROLYTE REPLACEMENT PROTOCOL  1 Each Other PRN  
 diazePAM (VALIUM) tablet 5 mg  5 mg Oral Q6H  
 HYDROmorphone (DILAUDID) tablet 1 mg  1 mg Oral Q6H PRN  
 sodium chloride (NS) flush 5-10 mL  5-10 mL IntraVENous PRN  
 LORazepam (ATIVAN) tablet 1 mg  1 mg Oral Q1H PRN  
 sodium chloride (NS) flush 5-40 mL  5-40 mL IntraVENous Q8H  
 sodium chloride (NS) flush 5-40 mL  5-40 mL IntraVENous PRN  
 acetaminophen (TYLENOL) tablet 650 mg  650 mg Oral Q6H PRN  
 0.9% sodium chloride infusion  150 mL/hr IntraVENous CONTINUOUS  
 ELECTROLYTE REPLACEMENT PROTOCOL  1 Each Other PRN  
 ELECTROLYTE REPLACEMENT PROTOCOL  1 Each Other PRN  pantoprazole (PROTONIX) 40 mg in sodium chloride 0.9% 10 mL injection  40 mg IntraVENous Q12H  
 sodium chloride (NS) flush 5-40 mL  5-40 mL IntraVENous Q8H  
 sodium chloride (NS) flush 5-40 mL  5-40 mL IntraVENous PRN  
  LORazepam (ATIVAN) injection 1 mg  1 mg IntraVENous Q1H PRN  
 LORazepam (ATIVAN) tablet 2 mg  2 mg Oral Q1H PRN Or  
 LORazepam (ATIVAN) injection 2 mg  2 mg IntraVENous Q1H PRN  
 LORazepam (ATIVAN) injection 3 mg  3 mg IntraVENous [id number] PRN  
 folic acid (FOLVITE) tablet 1 mg  1 mg Oral DAILY  thiamine mononitrate (B-1) tablet 100 mg  100 mg Oral DAILY  therapeutic multivitamin (THERAGRAN) tablet 1 Tab  1 Tab Oral DAILY  ondansetron (ZOFRAN) injection 4 mg  4 mg IntraVENous Q6H PRN  
 busPIRone (BUSPAR) tablet 15 mg  15 mg Oral BID  lipase-protease-amylase (ZENPEP 10,000) capsule 2 Cap  2 Cap Oral TID WITH MEALS  gabapentin (NEURONTIN) capsule 600 mg  600 mg Oral BID  topiramate (TOPAMAX) tablet 100 mg  100 mg Oral DAILY  cloNIDine HCl (CATAPRES) tablet 0.1 mg  0.1 mg Oral BID  QUEtiapine (SEROquel) tablet 300 mg  300 mg Oral QHS  Please clarify patient's dose and frequency of Topiramate and Quetiapine  1 Each Other DAILY

## 2019-01-17 NOTE — ROUTINE PROCESS
0740-report received, patient resting quietly in bed, no distress noted, voiced no complaints at this time. 0920-am due medications given. Assessment completed, no distress noted. 1230-no change in condition, no distress noted. 1400-resting quietly in bed. 
1600-no change in condition, no distress noted. Voiced no complaints at this time. 1800-pm due medications given. 1930-Bedside and Verbal shift change report given to Asuncion (oncoming nurse) by Ortiz Duran (offgoing nurse). Report included the following information SBAR, MAR and Recent Results.

## 2019-01-17 NOTE — PROGRESS NOTES
Problem: Falls - Risk of 
Goal: *Absence of Falls Document Cy [place] Fall Risk and appropriate interventions in the flowsheet. Outcome: Progressing Towards Goal 
Fall Risk Interventions: 
Mobility Interventions: Patient to call before getting OOB, PT Consult for mobility concerns Mentation Interventions: Adequate sleep, hydration, pain control, Door open when patient unattended, Reorient patient Medication Interventions: Patient to call before getting OOB, Teach patient to arise slowly Elimination Interventions: Call light in reach, Patient to call for help with toileting needs, Toileting schedule/hourly rounds, Bed/chair exit alarm Problem: Pressure Injury - Risk of 
Goal: *Prevention of pressure injury Document Rafa Scale and appropriate interventions in the flowsheet. Outcome: Progressing Towards Goal 
Pressure Injury Interventions: 
Sensory Interventions: Assess changes in LOC, Check visual cues for pain, Keep linens dry and wrinkle-free, Maintain/enhance activity level, Minimize linen layers Moisture Interventions: Absorbent underpads, Internal/External urinary devices, Limit adult briefs Activity Interventions: Increase time out of bed, Pressure redistribution bed/mattress(bed type) Mobility Interventions: HOB 30 degrees or less, Pressure redistribution bed/mattress (bed type) Nutrition Interventions: Document food/fluid/supplement intake Friction and Shear Interventions: HOB 30 degrees or less, Lift sheet, Minimize layers

## 2019-01-17 NOTE — PROGRESS NOTES
1950  Bedside shift change report given to Asuncion RN (oncoming nurse) by Gino Long RN (offgoing nurse). Report included the following information SBAR, kardex, MAR and Cardiac Rhythm Sinus Tach. 2000  Shift assessment, denies pain, resting in bed. 2137  Incontinence care provided, ambulate to the bathroom. External cath applied for urinary urgency. 0020  Pt asleep, no distress noted. 0430  In bed sleeping, no distress noted. Slept through the night. Bedside shift change report given to [facility] (oncoming nurse) by Varsha Zamora (offgoing nurse). Report included the following information SBAR, Kardex, Intake/Output, MAR and Cardiac Rhythm Sinus Tach

## 2019-01-17 NOTE — PROGRESS NOTES
Problem: Falls - Risk of 
Goal: *Absence of Falls Document Veronica Cunha Fall Risk and appropriate interventions in the flowsheet. Outcome: Progressing Towards Goal 
Fall Risk Interventions: 
Mobility Interventions: Patient to call before getting OOB, PT Consult for mobility concerns Mentation Interventions: Adequate sleep, hydration, pain control, Door open when patient unattended, Reorient patient Medication Interventions: Patient to call before getting OOB, Teach patient to arise slowly Elimination Interventions: Call light in reach, Patient to call for help with toileting needs, Toileting schedule/hourly rounds, Bed/chair exit alarm Problem: Pressure Injury - Risk of 
Goal: *Prevention of pressure injury Document Rafa Scale and appropriate interventions in the flowsheet. Outcome: Progressing Towards Goal 
Pressure Injury Interventions: 
Sensory Interventions: Assess changes in LOC, Check visual cues for pain, Keep linens dry and wrinkle-free, Maintain/enhance activity level, Minimize linen layers Moisture Interventions: Absorbent underpads, Internal/External urinary devices, Limit adult briefs Activity Interventions: Increase time out of bed, Pressure redistribution bed/mattress(bed type) Mobility Interventions: HOB 30 degrees or less, Pressure redistribution bed/mattress (bed type) Nutrition Interventions: Document food/fluid/supplement intake Friction and Shear Interventions: HOB 30 degrees or less, Lift sheet, Minimize layers

## 2019-01-18 ENCOUNTER — HOME HEALTH ADMISSION (OUTPATIENT)
Dept: HOME HEALTH SERVICES | Facility: HOME HEALTH | Age: 54
End: 2019-01-18

## 2019-01-18 VITALS
SYSTOLIC BLOOD PRESSURE: 112 MMHG | WEIGHT: 134.26 LBS | OXYGEN SATURATION: 100 % | BODY MASS INDEX: 24.71 KG/M2 | RESPIRATION RATE: 18 BRPM | HEIGHT: 62 IN | HEART RATE: 98 BPM | TEMPERATURE: 98.1 F | DIASTOLIC BLOOD PRESSURE: 68 MMHG

## 2019-01-18 LAB
ALBUMIN SERPL-MCNC: 2.1 G/DL (ref 3.4–5)
ALBUMIN/GLOB SERPL: 0.8 {RATIO} (ref 0.8–1.7)
ALP SERPL-CCNC: 74 U/L (ref 45–117)
ALT SERPL-CCNC: 179 U/L (ref 13–56)
ANION GAP SERPL CALC-SCNC: 6 MMOL/L (ref 3–18)
AST SERPL-CCNC: 287 U/L (ref 15–37)
BASOPHILS # BLD: 0 K/UL (ref 0–0.1)
BASOPHILS NFR BLD: 0 % (ref 0–2)
BILIRUB SERPL-MCNC: 0.3 MG/DL (ref 0.2–1)
BUN SERPL-MCNC: 4 MG/DL (ref 7–18)
BUN/CREAT SERPL: 11 (ref 12–20)
CALCIUM SERPL-MCNC: 7.5 MG/DL (ref 8.5–10.1)
CHLORIDE SERPL-SCNC: 113 MMOL/L (ref 100–108)
CO2 SERPL-SCNC: 25 MMOL/L (ref 21–32)
CREAT SERPL-MCNC: 0.37 MG/DL (ref 0.6–1.3)
DIFFERENTIAL METHOD BLD: ABNORMAL
EOSINOPHIL # BLD: 0.3 K/UL (ref 0–0.4)
EOSINOPHIL NFR BLD: 5 % (ref 0–5)
ERYTHROCYTE [DISTWIDTH] IN BLOOD BY AUTOMATED COUNT: 19.7 % (ref 11.6–14.5)
GLOBULIN SER CALC-MCNC: 2.5 G/DL (ref 2–4)
GLUCOSE SERPL-MCNC: 106 MG/DL (ref 74–99)
HCT VFR BLD AUTO: 30 % (ref 35–45)
HGB BLD-MCNC: 9.8 G/DL (ref 12–16)
LYMPHOCYTES # BLD: 2.6 K/UL (ref 0.9–3.6)
LYMPHOCYTES NFR BLD: 50 % (ref 21–52)
MAGNESIUM SERPL-MCNC: 1.9 MG/DL (ref 1.6–2.6)
MCH RBC QN AUTO: 31.4 PG (ref 24–34)
MCHC RBC AUTO-ENTMCNC: 32.7 G/DL (ref 31–37)
MCV RBC AUTO: 96.2 FL (ref 74–97)
MONOCYTES # BLD: 0.2 K/UL (ref 0.05–1.2)
MONOCYTES NFR BLD: 4 % (ref 3–10)
NEUTS SEG # BLD: 2.1 K/UL (ref 1.8–8)
NEUTS SEG NFR BLD: 41 % (ref 40–73)
PHOSPHATE SERPL-MCNC: 2.5 MG/DL (ref 2.5–4.9)
PLATELET # BLD AUTO: 156 K/UL (ref 135–420)
PMV BLD AUTO: 10 FL (ref 9.2–11.8)
POTASSIUM SERPL-SCNC: 3.5 MMOL/L (ref 3.5–5.5)
PROT SERPL-MCNC: 4.6 G/DL (ref 6.4–8.2)
RBC # BLD AUTO: 3.12 M/UL (ref 4.2–5.3)
SODIUM SERPL-SCNC: 144 MMOL/L (ref 136–145)
WBC # BLD AUTO: 5.1 K/UL (ref 4.6–13.2)

## 2019-01-18 PROCEDURE — 74011250637 HC RX REV CODE- 250/637: Performed by: HOSPITALIST

## 2019-01-18 PROCEDURE — 74011250636 HC RX REV CODE- 250/636: Performed by: NURSE PRACTITIONER

## 2019-01-18 PROCEDURE — 74011250636 HC RX REV CODE- 250/636: Performed by: HOSPITALIST

## 2019-01-18 PROCEDURE — 74011250637 HC RX REV CODE- 250/637: Performed by: FAMILY MEDICINE

## 2019-01-18 PROCEDURE — 77030011256 HC DRSG MEPILEX <16IN NO BORD MOLN -A

## 2019-01-18 PROCEDURE — C9113 INJ PANTOPRAZOLE SODIUM, VIA: HCPCS | Performed by: HOSPITALIST

## 2019-01-18 PROCEDURE — 97162 PT EVAL MOD COMPLEX 30 MIN: CPT

## 2019-01-18 PROCEDURE — 97116 GAIT TRAINING THERAPY: CPT

## 2019-01-18 PROCEDURE — 84100 ASSAY OF PHOSPHORUS: CPT

## 2019-01-18 PROCEDURE — 36415 COLL VENOUS BLD VENIPUNCTURE: CPT

## 2019-01-18 PROCEDURE — 83735 ASSAY OF MAGNESIUM: CPT

## 2019-01-18 PROCEDURE — 80053 COMPREHEN METABOLIC PANEL: CPT

## 2019-01-18 PROCEDURE — 85025 COMPLETE CBC W/AUTO DIFF WBC: CPT

## 2019-01-18 RX ORDER — PANTOPRAZOLE SODIUM 40 MG/1
40 TABLET, DELAYED RELEASE ORAL
Status: DISCONTINUED | OUTPATIENT
Start: 2019-01-18 | End: 2019-01-18 | Stop reason: HOSPADM

## 2019-01-18 RX ADMIN — Medication 10 ML: at 05:20

## 2019-01-18 RX ADMIN — Medication 100 MG: at 10:09

## 2019-01-18 RX ADMIN — PANCRELIPASE LIPASE, PANCRELIPASE PROTEASE, PANCRELIPASE AMYLASE 2 CAPSULE: 10000; 32000; 42000 CAPSULE, DELAYED RELEASE ORAL at 10:09

## 2019-01-18 RX ADMIN — DIAZEPAM 5 MG: 5 TABLET ORAL at 10:10

## 2019-01-18 RX ADMIN — HYDROMORPHONE HYDROCHLORIDE 1 MG: 2 TABLET ORAL at 07:20

## 2019-01-18 RX ADMIN — FOLIC ACID 1 MG: 1 TABLET ORAL at 10:10

## 2019-01-18 RX ADMIN — THERA TABS 1 TABLET: TAB at 10:09

## 2019-01-18 RX ADMIN — DIAZEPAM 5 MG: 5 TABLET ORAL at 01:12

## 2019-01-18 RX ADMIN — TOPIRAMATE 100 MG: 100 TABLET, FILM COATED ORAL at 10:09

## 2019-01-18 RX ADMIN — SODIUM CHLORIDE 40 MG: 9 INJECTION, SOLUTION INTRAMUSCULAR; INTRAVENOUS; SUBCUTANEOUS at 10:10

## 2019-01-18 RX ADMIN — GABAPENTIN 600 MG: 300 CAPSULE ORAL at 10:09

## 2019-01-18 RX ADMIN — SODIUM CHLORIDE 75 ML/HR: 900 INJECTION, SOLUTION INTRAVENOUS at 01:09

## 2019-01-18 RX ADMIN — BENZONATATE 100 MG: 100 CAPSULE ORAL at 10:09

## 2019-01-18 RX ADMIN — CLONIDINE HYDROCHLORIDE 0.1 MG: 0.1 TABLET ORAL at 10:10

## 2019-01-18 NOTE — PROGRESS NOTES
Problem: Falls - Risk of 
Goal: *Absence of Falls Document Angela Cade Fall Risk and appropriate interventions in the flowsheet. Outcome: Progressing Towards Goal 
Fall Risk Interventions: 
Mobility Interventions: Patient to call before getting OOB Mentation Interventions: Adequate sleep, hydration, pain control, Door open when patient unattended, More frequent rounding, Toileting rounds Medication Interventions: Patient to call before getting OOB, Teach patient to arise slowly Elimination Interventions: Call light in reach, Toileting schedule/hourly rounds, Patient to call for help with toileting needs Problem: Pressure Injury - Risk of 
Goal: *Prevention of pressure injury Document Rafa Scale and appropriate interventions in the flowsheet. Outcome: Progressing Towards Goal 
Pressure Injury Interventions: 
Sensory Interventions: Assess changes in LOC, Keep linens dry and wrinkle-free, Maintain/enhance activity level, Minimize linen layers Moisture Interventions: Absorbent underpads, Internal/External urinary devices, Minimize layers, Maintain skin hydration (lotion/cream) Activity Interventions: Increase time out of bed, Pressure redistribution bed/mattress(bed type) Mobility Interventions: HOB 30 degrees or less, Pressure redistribution bed/mattress (bed type) Nutrition Interventions: Document food/fluid/supplement intake Friction and Shear Interventions: HOB 30 degrees or less, Minimize layers

## 2019-01-18 NOTE — PROGRESS NOTES
Problem: Falls - Risk of 
Goal: *Absence of Falls Document Hamp [place] Fall Risk and appropriate interventions in the flowsheet. Outcome: Progressing Towards Goal 
Fall Risk Interventions: 
Mobility Interventions: Patient to call before getting OOB Mentation Interventions: Adequate sleep, hydration, pain control, Door open when patient unattended, More frequent rounding, Toileting rounds Medication Interventions: Patient to call before getting OOB, Teach patient to arise slowly Elimination Interventions: Call light in reach, Toileting schedule/hourly rounds, Patient to call for help with toileting needs Problem: Pressure Injury - Risk of 
Goal: *Prevention of pressure injury Document Rafa Scale and appropriate interventions in the flowsheet. Outcome: Progressing Towards Goal 
Pressure Injury Interventions: 
Sensory Interventions: Assess changes in LOC, Keep linens dry and wrinkle-free, Maintain/enhance activity level, Minimize linen layers Moisture Interventions: Absorbent underpads, Internal/External urinary devices, Minimize layers, Maintain skin hydration (lotion/cream) Activity Interventions: Increase time out of bed, Pressure redistribution bed/mattress(bed type) Mobility Interventions: HOB 30 degrees or less, Pressure redistribution bed/mattress (bed type) Nutrition Interventions: Document food/fluid/supplement intake Friction and Shear Interventions: HOB 30 degrees or less, Minimize layers

## 2019-01-18 NOTE — ROUTINE PROCESS
0706-report received, call bell within reach, no distress noted, voiced no complaints at this time. 0855-am due medications given. 1230-no change in condition, no distress noted, voiced no complaints. 1445-discharge instructions given, verbalized understanding. 1500-discharged home via wheel chair with patient's mother.

## 2019-01-18 NOTE — PROGRESS NOTES
Witnessed pt kneeling on floor and resting head on the bed. Pt complaining of feeling dizzy. Pt denies falling. Denies any injuries. Pt able to get self up on her feet without assist and back in bed. Blood pressure taken and was 115/76 . Charge nurse Christophe Frausto RN and primary nurse [facility] notified.

## 2019-01-18 NOTE — PROGRESS NOTES
Bedside shift change report given to Patithiago RN (oncoming nurse) by Wade Flores RN (offgoing nurse). Report included the following information SBAR, kardex, MAR and Cardiac Rhythm Sinus Tach. 
  
2000  Shift assessment, denies pain, resting in bed. 2015  Pt states that she gets Dun-Nebs at home, also noted with a strong non- productive cough. Notified MD, telephone order received for q4 PRN Duo-Nebs and Tessalon TID for 3 days. 0010  Sleeping, no distress noted. 0145  Call received from [facility] (supervisor) in regards to pt MEWS score. Explained that pt has been tachy since admission. Will continue to monitor. 0520  In bed resting, denies pain. Bedside shift change report given to [facility] (oncoming nurse) by Emily Lambert (offgoing nurse).  Report included the following information SBAR, Kardex, Intake/Output, MAR and Cardiac Rhythm NSR/Sinus Tach.

## 2019-01-18 NOTE — PROGRESS NOTES
Problem: Mobility Impaired (Adult and Pediatric) Goal: *Acute Goals and Plan of Care (Insert Text) Physical Therapy Goals Initiated 1/18/2019 and to be accomplished within 7 days. 1.  Patient will complete all bed mobility with modified independence in order to prepare for EOB/OOB activity. 2.  Patient will perform sit <> stand with modified independence in order to prepare for OOB/gait activity. 3.  Patient will perform bed to chair transfers with modified independence in order to promote mobility and encourage seated activity to progress towards their prior level of function. 4.  Patient will ambulate 300 feet with modified independence using LRAD in order to prepare for safe negotiation of their environment. 5.  Patient will ascend/descend 14 steps with S handrail use with modified independence in order to prepare for safe exit/entry into their home environment. Outcome: Progressing Towards Goal 
PHYSICAL THERAPY: Initial Assessment INPATIENT: Medicare: Hospital Day: 4 Patient: John Wright (23 y.o. female)    Date: 1/18/2019 Primary Diagnosis: Abdominal pain  
,    
Precautions:    
 
 
PLOF: Independent PTA ASSESSMENT : 
Patient supine in bed, agreeable to participation with PT. Reports that she lives with her significant other in a 2 story home with 14 steps to the 2nd floor. Supervision for supine > sit, sit > stand. Demo's good seated and fair standing balance. B LE strength 4-/5. Close supervision for ambulation x 150 ft without AD. Patient appears to ambulates with increased supination/pronation of B feet. Ambulates with B UEs near high guard position. Gait steady overall. Patient returned to room and left supine in bed with all needs within reach. Patient has c/o of dizziness with mobility which is exacerbated by head movements; reports improvement in dizziness in rest. Patient educated on activity pacing at home. No c/o pain with activity. Recommend d/c home with home health. Patient presents with deficits in: 
Bed Mobility, Transfers, Strength, Gait and Stairs Patient will benefit from skilled intervention to address the above impairments. Patients rehabilitation potential is considered to be Good Factors which may influence rehabilitation potential include:  
[x]         None noted 
[]         Mental ability/status []         Medical condition 
[]         Home/family situation and support systems 
[]         Safety awareness 
[]         Pain tolerance/management 
[]         Other: PLAN : 
Recommendations and Planned Interventions: 
[x]           Bed Mobility Training             [x]    Neuromuscular Re-Education 
[x]           Transfer Training                   []    Orthotic/Prosthetic Training 
[x]           Gait Training                          []    Modalities [x]           Therapeutic Exercises          []    Edema Management/Control 
[x]           Therapeutic Activities            [x]    Patient and Family Training/Education 
[]           Other (comment): EDUCATION:  
Education:  Patient was educated on the following topics: Purpose of PT, PT POC, safety with mobility, activity pacing at home. Verbalizes understanding. Barriers to Learning/Limitations: None Compensate with: visual, verbal, tactile, kinesthetic cues/model Recommendations for the next treatment session: Gait/Stairs Frequency/Duration: Patient will be followed by physical therapy 3 - 5  times a week to address goals. Discharge Recommendations: Home Health Further Equipment Recommendations for Discharge: N/A Factors which may impact discharge planning: N/A  
 
SUBJECTIVE:  
Patient stated I just don't feel like myself.  OBJECTIVE DATA SUMMARY:  
 
Past Medical History:  
Diagnosis Date  Alcohol abuse  Anxiety  Arrhythmia Wynell Fritter  Asthma   
 controlled  Bipolar disorder ([facility] [address]  Common migraine  COPD (chronic obstructive pulmonary disease) (formerly [facility]) Home O2  PRN ,  pt use also for Tachycardia  Depression  Esophageal reflux  Gout  Hypertension  Inverted nipple Left breast always have.  Kidney stone on left side  Microhematuria  Pancreatitis  Recurrent UTI  Staghorn renal calculus  Urine leukocytes Past Surgical History:  
Procedure Laterality Date  HX COLONOSCOPY    
 HX CYST REMOVAL Left   
 x 3 surgeries  HX ENDOSCOPY    
 HX GYN    
 tubal ligation  HX LUMBAR LAMINECTOMY  HX UROLOGICAL  08/10/2018 Cysto, bilat RPG, left ureteral pyeloscopy, HLL, left JJ stent placement, Dr. Tenzin Hilton Complexity: History: MEDIUM  Complexity : 1-2 comorbidities / personal factors will impact the outcome/ POC Exam:MEDIUM Complexity : 3 Standardized tests and measures addressing body structure, function, activity limitation and / or participation in recreation  Presentation: MEDIUM Complexity : Evolving with changing characteristics  Clinical Decision Making:Medium Complexity Supervision - Close supervision for mobility. Overall Complexity:MEDIUM Prior Level of Function/Home Situation:  
Home Situation Home Environment: Private residence # Steps to Enter: 4 One/Two Story Residence: Two story # of Interior Steps: 15 Height of Each Step (in): 6 inches Interior Rails: None Lift Chair Available: No 
Living Alone: No 
Support Systems: Spouse/Significant Other/Partner Patient Expects to be Discharged to[de-identified] Private residence Current DME Used/Available at Home: NoneCritical Behavior: 
Neurologic State: Alert Orientation Level: Oriented X4 Cognition: Follows commands Psychosocial 
Patient Behaviors: Calm; Cooperative Purposeful Interaction: Yes  
Manual Muscle Testing (LE) 
       R     L Hip Flexion:   4-/5  4-/5 Knee EXT:   4-/5  4-/5 Knee FLEX:   4-/5  4-/5 Ankle DF:   4-/5  4-/5 _________________________________________________ Tone : normalSensation: NT 
Range Of Motion: [facility] Functional Mobility: 
 
 
Functional Status Indep (I) Mod I Super-vision Min A Mod A Max A Total A Assist x2 Verbal cues Additional time Not tested Comments Rolling []  []  [] []    []    []  []  [] [] [] [x] Supine to sit []  []  [x] []  []  []  []  [] [] [] [] Sit to supine []  []  [x] []  []  []  []  [] [] [] [] Sit to stand []  []  [x] []  []  []  []  [] [] [] []   
Stand to sit []  []  [x] []  []  []  []  [] [] [] [] Bed to chair transfers []  []  [] []  []  []  []  [] [] [] [x] Balance Good Pervis Slice Poor Unable Not tested Comments Sitting static [x]  []  []  []  [] Sitting dynamic [x]  []  []  []  []   
Standing static []  [x]  []  []  []   
Standing dynamic []  [x]  []  []  [] Mobility/Gait:  
Level of Assistance: Close Supervision Assistive Device: None Distance Ambulated: 150 feet Left Lower Extremity: FWB Right Lower Extremity: FWB Base of Support: center of gravity altered Speed/Padma: pace decreased (<100 feet/min) Step Length: left shortened and right shortened Swing Pattern: [facility] PEMBRO Stance: [facility] Gait Abnormalities: altered arm swing and increased supination/pronation of feet, arms in high guard position Pain: 
None Vital Signs Temp: 98.1 °F (36.7 °C) Pulse (Heart Rate): 98    
BP: 112/68 Resp Rate: 18    
O2 Sat (%): 100 % Activity Tolerance:  
Fair Please refer to the flowsheet for vital signs taken during this treatment. After treatment:  
[]         Patient left in no apparent distress sitting up in chair 
[x]         Patient left in no apparent distress in bed 
[x]         Call bell left within reach [x]         Nursing notified 
[]         Caregiver present 
[]         Bed alarm activated COMMUNICATION/EDUCATION:  
[x]         Fall prevention education was provided and the patient/caregiver indicated understanding. [x]         Patient/family have participated as able in goal setting and plan of care. [x]         Patient/family agree to work toward stated goals and plan of care. []         Patient understands intent and goals of therapy, but is neutral about his/her participation. []         Patient is unable to participate in goal setting and plan of care. Thank you for this referral. 
Leander Hurst Time Calculation: 27 mins

## 2019-01-18 NOTE — DISCHARGE INSTRUCTIONS
DISCHARGE SUMMARY from Nurse    PATIENT INSTRUCTIONS:    After general anesthesia or intravenous sedation, for 24 hours or while taking prescription Narcotics:  · Limit your activities  · Do not drive and operate hazardous machinery  · Do not make important personal or business decisions  · Do  not drink alcoholic beverages  · If you have not urinated within 8 hours after discharge, please contact your surgeon on call. Report the following to your surgeon:  · Excessive pain, swelling, redness or odor of or around the surgical area  · Temperature over 100.5  · Nausea and vomiting lasting longer than 4 hours or if unable to take medications  · Any signs of decreased circulation or nerve impairment to extremity: change in color, persistent  numbness, tingling, coldness or increase pain  · Any questions    What to do at Home:  Recommended activity: Activity as tolerated    If you experience any of the following symptoms abdominal pain, please follow up with primary care physician. *  Please give a list of your current medications to your Primary Care Provider. *  Please update this list whenever your medications are discontinued, doses are      changed, or new medications (including over-the-counter products) are added. *  Please carry medication information at all times in case of emergency situations. These are general instructions for a healthy lifestyle:    No smoking/ No tobacco products/ Avoid exposure to second hand smoke  Surgeon General's Warning:  Quitting smoking now greatly reduces serious risk to your health.     Obesity, smoking, and sedentary lifestyle greatly increases your risk for illness    A healthy diet, regular physical exercise & weight monitoring are important for maintaining a healthy lifestyle    You may be retaining fluid if you have a history of heart failure or if you experience any of the following symptoms:  Weight gain of 3 pounds or more overnight or 5 pounds in a week, increased swelling in our hands or feet or shortness of breath while lying flat in bed. Please call your doctor as soon as you notice any of these symptoms; do not wait until your next office visit. Recognize signs and symptoms of STROKE:    F-face looks uneven    A-arms unable to move or move unevenly    S-speech slurred or non-existent    T-time-call 911 as soon as signs and symptoms begin-DO NOT go       Back to bed or wait to see if you get better-TIME IS BRAIN. Warning Signs of HEART ATTACK     Call 911 if you have these symptoms:   Chest discomfort. Most heart attacks involve discomfort in the center of the chest that lasts more than a few minutes, or that goes away and comes back. It can feel like uncomfortable pressure, squeezing, fullness, or pain.  Discomfort in other areas of the upper body. Symptoms can include pain or discomfort in one or both arms, the back, neck, jaw, or stomach.  Shortness of breath with or without chest discomfort.  Other signs may include breaking out in a cold sweat, nausea, or lightheadedness. Don't wait more than five minutes to call 911 - MINUTES MATTER! Fast action can save your life. Calling 911 is almost always the fastest way to get lifesaving treatment. Emergency Medical Services staff can begin treatment when they arrive -- up to an hour sooner than if someone gets to the hospital by car. The discharge information has been reviewed with the patient. The patient verbalized understanding. Discharge medications reviewed with the patient and appropriate educational materials and side effects teaching were provided. Patient armband removed and shredded.

## 2019-01-18 NOTE — DISCHARGE SUMMARY
[facility]    Discharge Summary    Patient: Jasvir Bucio MRN: [id number]  CSN: [id number]    YOB: 1965  Age: 46 y.o.   Sex: female    DOA: 1/15/2019 LOS:  LOS: 3 days   Discharge Date:1/18/2019      Admission Diagnoses: Abdominal pain    Discharge Diagnoses:    Problem List as of 1/18/2019 Date Reviewed: 1/18/2019          Codes Class Noted - Resolved    Bipolar disorder (manic depression) ([facility] (Chronic) ICD-10-CM: F31.9  ICD-9-CM: 296.80  3/8/2013 - Present        RESOLVED: Alcohol dependence with withdrawal ([facility] (Chronic) ICD-10-CM: Q80.320  ICD-9-CM: 303.90, 291.81  3/8/2013 - 1/28/2016        Abdominal pain ICD-10-CM: R10.9  ICD-9-CM: 789.00  1/15/2019 - Present        Metabolic acidosis CPD-93-CM: E87.2  ICD-9-CM: 276.2  1/15/2019 - Present        NSTEMI (non-ST elevated myocardial infarction) ([facility] ICD-10-CM: I21.4  ICD-9-CM: 410.70  1/15/2019 - Present        Rhabdomyolysis ICD-10-CM: M62.82  ICD-9-CM: 728.88  1/15/2019 - Present        Upper GI bleed ICD-10-CM: K92.2  ICD-9-CM: 578.9  1/15/2019 - Present        Recurrent UTI (urinary tract infection) ICD-10-CM: N39.0  ICD-9-CM: 599.0  4/24/2018 - Present        Staghorn renal calculus ICD-10-CM: N20.0  ICD-9-CM: 592.0  4/24/2018 - Present        UTI (urinary tract infection) ICD-10-CM: N39.0  ICD-9-CM: 599.0  3/11/2018 - Present        Bronchitis ICD-10-CM: J40  ICD-9-CM: 490  3/11/2018 - Present        Syncope ICD-10-CM: R55  ICD-9-CM: 780.2  3/8/2018 - Present        Alcohol withdrawal ([address] ICD-10-CM: X83.912  ICD-9-CM: 291.81  3/8/2018 - Present        GI bleed ICD-10-CM: K92.2  ICD-9-CM: 578.9  3/8/2018 - Present        Hyponatremia ICD-10-CM: E87.1  ICD-9-CM: 276.1  3/8/2018 - Present        Hypercalcemia ICD-10-CM: E83.52  ICD-9-CM: 275.42  3/8/2018 - Present        Dizziness ICD-10-CM: R42  ICD-9-CM: 780.4  9/14/2017 - Present        Pancreatitis ICD-10-CM: K85.90  ICD-9-CM: 577.0  9/14/2017 - Present        Hypokalemia ICD-10-CM: E87.6  ICD-9-CM: 276.8  9/14/2017 - Present        Anemia ICD-10-CM: D64.9  ICD-9-CM: 285.9  9/14/2017 - Present        Encephalopathy ICD-10-CM: G93.40  ICD-9-CM: 348.30  9/14/2017 - Present        Nicotine withdrawal ICD-10-CM: F17.203  ICD-9-CM: 292.0, 305.1  2/27/2016 - Present        Pneumonia ICD-10-CM: J18.9  ICD-9-CM: 567  2/25/2016 - Present        COPD (chronic obstructive pulmonary disease) ([facility] ICD-10-CM: J44.9  ICD-9-CM: 496  2/25/2016 - Present        Tylenol overdose ICD-10-CM: T39.1X1A  ICD-9-CM: 965.4, E980.0  2/20/2016 - Present        SIRS (systemic inflammatory response syndrome) ([facility] ICD-10-CM: R65.10  ICD-9-CM: 995.90  2/20/2016 - Present        * (Principal) Acute pancreatitis ICD-10-CM: K85.90  ICD-9-CM: 441.3  2/19/2016 - Present        Pancreatitis, alcoholic, acute XIL-46-EW: K85.20  ICD-9-CM: 577.0  2/19/2016 - Present        Tachycardia ICD-10-CM: R00.0  ICD-9-CM: 785.0  1/28/2016 - Present        Alcohol dependence ([facility] (Chronic) ICD-10-CM: F10.20  ICD-9-CM: 303.90  1/28/2016 - Present        Esophageal stricture (Chronic) ICD-10-CM: K22.2  ICD-9-CM: 530.3  1/28/2016 - Present        Dilated pancreatic duct ICD-10-CM: K86.89  ICD-9-CM: 577.8  1/28/2016 - Present        Common bile duct dilation ICD-10-CM: K83.8  ICD-9-CM: 576.8  1/28/2016 - Present        Elevated LFTs ICD-10-CM: R94.5  ICD-9-CM: 790.6  1/28/2016 - Present        Bipolar depression ([address] (Chronic) ICD-10-CM: F31.30  ICD-9-CM: 296.50  1/28/2016 - Present        HTN (hypertension) (Chronic) ICD-10-CM: I10  ICD-9-CM: 401.9  1/28/2016 - Present              Discharge Condition: Stable    Discharge To: Home    Consults: [facility] Course: 45 y/o  female with hx of chronic pancreatitis, ETOH abuse, COPD, asthma presented to the ED on 1/15 with abdominal pain. Per EMR, history limited as pt appears to be intoxicated.   Her last drink was the night prior, pt states she drinks [place] lot\" everyday, stating it is at least a big bottle of vodka per day. The following morning she developed severe abdominal pain. She also described some chest pain that morning and two episodes of coffee ground emesis. She said she has not slept for two days. She felt she was having a flare of acute pancreatitis, she was previously admitted in July 2018. In the ED, workup notable for hyponatremia, metabolic acidosis, elevated CK, mildly elevated troponin, elevated LFT (AST > ALT). She was started on IVF's and admitted for further care. Of note, she does report hx alcohol withdrawal syndrome in the past but she denies hx of withdrawal seizures. Per notes, +tanner in ED. CBC has been stable. Plan to transfer to tele. Pt more awake and alert today 1/17. Tolerated diet advancement without N/V or worsening abdominal pain. Decreased IVF's. Electrolyte replacement. Labs and vital signs stable. No active bleeding, CBC stable. PT eval- recommends home health. She is appropriate for d/c home, to follow up with PCP within one week and with GI Dr. Cliff Ritchie within 1-2 weeks. Recommend complete ETOH Cesssation.       Physical Exam:  General appearance: alert, cooperative, no distress  Head: Normocephalic, without obvious abnormality, atraumatic  Lungs: clear to auscultation bilaterally  Heart: regular rate and rhythm, S1, S2 normal, no murmur, click, rub or gallop  Abdomen: soft, TTP epigastric and LUQ, non distended. Normoactive bowel sounds.    Extremities: extremities normal, atraumatic, no cyanosis or edema  Skin: Skin color, texture, turgor normal. No rashes or lesions  Neurologic: Grossly normal  PSY: calm and cooperative        Significant Diagnostic Studies: labs:   Recent Results (from the past 24 hour(s))   METABOLIC PANEL, BASIC    Collection Time: 01/17/19 10:00 PM   Result Value Ref Range    Sodium 143 136 - 145 mmol/L    Potassium 3.7 3.5 - 5.5 mmol/L    Chloride 111 (H) 100 - 108 mmol/L    CO2 25 21 - 32 mmol/L    Anion gap 7 3.0 - 18 mmol/L    Glucose 114 (H) 74 - 99 mg/dL    BUN 5 (L) 7.0 - 18 MG/DL    Creatinine 0.51 (L) 0.6 - 1.3 MG/DL    BUN/Creatinine ratio 10 (L) 12 - 20      GFR est AA >60 >60 ml/min/1.73m2    GFR est non-AA >60 >60 ml/min/1.73m2    Calcium 7.5 (L) 8.5 - 10.1 MG/DL   CBC WITH AUTOMATED DIFF    Collection Time: 01/18/19  5:25 AM   Result Value Ref Range    WBC 5.1 4.6 - 13.2 K/uL    RBC 3.12 (L) 4.20 - 5.30 M/uL    HGB 9.8 (L) 12.0 - 16.0 g/dL    HCT 30.0 (L) 35.0 - 45.0 %    MCV 96.2 74.0 - 97.0 FL    MCH 31.4 24.0 - 34.0 PG    MCHC 32.7 31.0 - 37.0 g/dL    RDW 19.7 (H) 11.6 - 14.5 %    PLATELET 378 128 - 199 K/uL    MPV 10.0 9.2 - 11.8 FL    NEUTROPHILS 41 40 - 73 %    LYMPHOCYTES 50 21 - 52 %    MONOCYTES 4 3 - 10 %    EOSINOPHILS 5 0 - 5 %    BASOPHILS 0 0 - 2 %    ABS. NEUTROPHILS 2.1 1.8 - 8.0 K/UL    ABS. LYMPHOCYTES 2.6 0.9 - 3.6 K/UL    ABS. MONOCYTES 0.2 0.05 - 1.2 K/UL    ABS. EOSINOPHILS 0.3 0.0 - 0.4 K/UL    ABS. BASOPHILS 0.0 0.0 - 0.1 K/UL    DF AUTOMATED     MAGNESIUM    Collection Time: 01/18/19  5:25 AM   Result Value Ref Range    Magnesium 1.9 1.6 - 2.6 mg/dL   METABOLIC PANEL, COMPREHENSIVE    Collection Time: 01/18/19  5:25 AM   Result Value Ref Range    Sodium 144 136 - 145 mmol/L    Potassium 3.5 3.5 - 5.5 mmol/L    Chloride 113 (H) 100 - 108 mmol/L    CO2 25 21 - 32 mmol/L    Anion gap 6 3.0 - 18 mmol/L    Glucose 106 (H) 74 - 99 mg/dL    BUN 4 (L) 7.0 - 18 MG/DL    Creatinine 0.37 (L) 0.6 - 1.3 MG/DL    BUN/Creatinine ratio 11 (L) 12 - 20      GFR est AA >60 >60 ml/min/1.73m2    GFR est non-AA >60 >60 ml/min/1.73m2    Calcium 7.5 (L) 8.5 - 10.1 MG/DL    Bilirubin, total 0.3 0.2 - 1.0 MG/DL    ALT (SGPT) 179 (H) 13 - 56 U/L    AST (SGOT) 287 (H) 15 - 37 U/L    Alk.  phosphatase 74 45 - 117 U/L    Protein, total 4.6 (L) 6.4 - 8.2 g/dL    Albumin 2.1 (L) 3.4 - 5.0 g/dL    Globulin 2.5 2.0 - 4.0 g/dL    A-G Ratio 0.8 0.8 - 1.7     PHOSPHORUS    Collection Time: 01/18/19  5:25 AM   Result Value Ref Range    Phosphorus 2.5 2.5 - 4.9 MG/DL         Discharge Medications:     Current Discharge Medication List      CONTINUE these medications which have NOT CHANGED    Details   Potassium Citrate (UROCIT-K) TbER tablet Take 1 Tab by mouth three (3) times daily (with meals). Qty: 90 Tab, Refills: 11      DILAUDID 2 mg tablet Take 1 Tab by mouth every six (6) hours as needed for Pain. Max Daily Amount: 8 mg. Indications: Pain  Qty: 12 Tab, Refills: 0    Associated Diagnoses: Kidney stones      gabapentin (NEURONTIN) 600 mg tablet Take 600 mg by mouth two (2) times a day. VITAMIN C 500 mg tablet       hydrocortisone (CORTAID) 0.5 % topical cream Apply  to affected area two (2) times a day. use thin layer  Qty: 30 g, Refills: 0      ondansetron (ZOFRAN ODT) 8 mg disintegrating tablet Take 1 Tab by mouth every eight (8) hours as needed for Nausea. Qty: 15 Tab, Refills: 0      folic acid (FOLVITE) 1 mg tablet Take 1 Tab by mouth daily. Qty: 30 Tab, Refills: 0      pantoprazole (PROTONIX) 40 mg tablet Take 1 Tab by mouth Before breakfast and dinner. Qty: 60 Tab, Refills: 0      sucralfate (CARAFATE) 100 mg/mL suspension Take 10 mL by mouth Before breakfast, lunch, dinner and at bedtime. Qty: 1200 mL, Refills: 0      cloNIDine HCl (CATAPRES) 0.2 mg tablet Take 0.5 Tabs by mouth two (2) times a day. Qty: 30 Tab, Refills: 0      diazePAM (VALIUM) 5 mg tablet Take 1 Tab by mouth every twelve (12) hours as needed for Anxiety. Max Daily Amount: 10 mg. Indications: Alcohol Withdrawal Syndrome  Qty: 10 Tab, Refills: 0    Associated Diagnoses: Alcohol abuse      PREVIDENT 5000 PLUS 1.1 % crea       calcium-cholecalciferol, D3, (CALTRATE 600+D) tablet       topiramate (TOPAMAX) 100 mg tablet 100 mg two (2) times a day.       QUEtiapine (SEROQUEL) 300 mg tablet       CREON 24,000-76,000 -120,000 unit capsule       cetirizine (ZYRTEC) 10 mg tablet       SUMAtriptan (IMITREX) 100 mg tablet TAKE 1 TO 2 TABLETS BY MOUTH DAILY AS NEEDED FOR MIGRAINE . DO NOT EXCEED 200 MG IN 24 HOUR PERIOD  Refills: 0      busPIRone (BUSPAR) 15 mg tablet Take 1 Tab by mouth two (2) times a day. Qty: 60 Tab, Refills: 0      albuterol (PROVENTIL VENTOLIN) 2.5 mg /3 mL (0.083 %) nebulizer solution 1.5 mL by Nebulization route every four (4) hours as needed for Wheezing or Shortness of Breath. Qty: 24 Each, Refills: 0      ipratropium-albuterol (COMBIVENT)  mcg/actuation inhaler Take 2 Puffs by inhalation every six (6) hours. STOP taking these medications       ciprofloxacin HCl (CIPRO) 500 mg tablet Comments:   Reason for Stopping:         traMADol (ULTRAM) 50 mg tablet Comments:   Reason for Stopping:               Activity: Activity as tolerated and No driving while on analgesics    Diet: Regular Diet, low fat    Wound Care: None needed    Follow-up: Follow up with PCP within one week. Follow up with GI Dr. Quintin Stockton within 1-2 weeks. Recommend complete ETOH cessation.     Discharge time: > 35 mins  Yvonne Cortez NP  1/18/2019, 9:05 AM

## 2019-01-21 ENCOUNTER — HOME CARE VISIT (OUTPATIENT)
Dept: HOME HEALTH SERVICES | Facility: HOME HEALTH | Age: 54
End: 2019-01-21

## 2019-01-21 LAB
BACTERIA SPEC CULT: NORMAL
BACTERIA SPEC CULT: NORMAL
SERVICE CMNT-IMP: NORMAL
SERVICE CMNT-IMP: NORMAL

## 2019-01-23 ENCOUNTER — HOME CARE VISIT (OUTPATIENT)
Dept: HOME HEALTH SERVICES | Facility: HOME HEALTH | Age: 54
End: 2019-01-23

## 2019-01-24 ENCOUNTER — HOME CARE VISIT (OUTPATIENT)
Dept: HOME HEALTH SERVICES | Facility: HOME HEALTH | Age: 54
End: 2019-01-24

## 2019-03-02 ENCOUNTER — APPOINTMENT (OUTPATIENT)
Dept: GENERAL RADIOLOGY | Age: 54
End: 2019-03-02
Attending: EMERGENCY MEDICINE
Payer: MEDICARE

## 2019-03-02 ENCOUNTER — HOSPITAL ENCOUNTER (EMERGENCY)
Age: 54
Discharge: HOME | End: 2019-03-02
Attending: EMERGENCY MEDICINE
Payer: MEDICARE

## 2019-03-02 VITALS
OXYGEN SATURATION: 100 % | BODY MASS INDEX: 22.08 KG/M2 | DIASTOLIC BLOOD PRESSURE: 89 MMHG | TEMPERATURE: 98.9 F | SYSTOLIC BLOOD PRESSURE: 121 MMHG | WEIGHT: 120 LBS | HEART RATE: 124 BPM | RESPIRATION RATE: 22 BRPM | HEIGHT: 62 IN

## 2019-03-02 DIAGNOSIS — R10.84 ABDOMINAL PAIN, GENERALIZED: ICD-10-CM

## 2019-03-02 DIAGNOSIS — R07.9 CHEST PAIN, UNSPECIFIED TYPE: ICD-10-CM

## 2019-03-02 DIAGNOSIS — R52 COMPLAINTS OF TOTAL BODY PAIN: Primary | ICD-10-CM

## 2019-03-02 LAB
ALBUMIN SERPL-MCNC: 3.7 G/DL (ref 3.4–5)
ALBUMIN/GLOB SERPL: 0.9 {RATIO} (ref 0.8–1.7)
ALP SERPL-CCNC: 104 U/L (ref 45–117)
ALT SERPL-CCNC: 176 U/L (ref 13–56)
ANION GAP SERPL CALC-SCNC: 10 MMOL/L (ref 3–18)
APPEARANCE UR: CLEAR
AST SERPL-CCNC: 352 U/L (ref 15–37)
ATRIAL RATE: 108 BPM
BACTERIA URNS QL MICRO: ABNORMAL /HPF
BASOPHILS # BLD: 0 K/UL (ref 0–0.1)
BASOPHILS NFR BLD: 1 % (ref 0–2)
BILIRUB SERPL-MCNC: 0.4 MG/DL (ref 0.2–1)
BILIRUB UR QL: NEGATIVE
BUN SERPL-MCNC: 15 MG/DL (ref 7–18)
BUN/CREAT SERPL: 14 (ref 12–20)
CALCIUM SERPL-MCNC: 9.8 MG/DL (ref 8.5–10.1)
CALCULATED P AXIS, ECG09: -15 DEGREES
CALCULATED R AXIS, ECG10: 72 DEGREES
CALCULATED T AXIS, ECG11: 66 DEGREES
CAOX CRY URNS QL MICRO: ABNORMAL
CHLORIDE SERPL-SCNC: 97 MMOL/L (ref 100–108)
CK MB CFR SERPL CALC: 2.6 % (ref 0–4)
CK MB SERPL-MCNC: 3.9 NG/ML (ref 5–25)
CK SERPL-CCNC: 151 U/L (ref 26–192)
CO2 SERPL-SCNC: 27 MMOL/L (ref 21–32)
COLOR UR: ABNORMAL
CREAT SERPL-MCNC: 1.08 MG/DL (ref 0.6–1.3)
DIAGNOSIS, 93000: NORMAL
DIFFERENTIAL METHOD BLD: ABNORMAL
EOSINOPHIL # BLD: 0 K/UL (ref 0–0.4)
EOSINOPHIL NFR BLD: 0 % (ref 0–5)
EPITH CASTS URNS QL MICRO: ABNORMAL /LPF (ref 0–5)
ERYTHROCYTE [DISTWIDTH] IN BLOOD BY AUTOMATED COUNT: 16.3 % (ref 11.6–14.5)
FLUAV AG NPH QL IA: NEGATIVE
FLUBV AG NOSE QL IA: NEGATIVE
GLOBULIN SER CALC-MCNC: 4.3 G/DL (ref 2–4)
GLUCOSE SERPL-MCNC: 99 MG/DL (ref 74–99)
GLUCOSE UR STRIP.AUTO-MCNC: NEGATIVE MG/DL
HCG UR QL: NEGATIVE
HCT VFR BLD AUTO: 41.8 % (ref 35–45)
HGB BLD-MCNC: 14.1 G/DL (ref 12–16)
HGB UR QL STRIP: NEGATIVE
HYALINE CASTS URNS QL MICRO: ABNORMAL /LPF (ref 0–2)
KETONES UR QL STRIP.AUTO: 40 MG/DL
LACTATE BLD-SCNC: 1.09 MMOL/L (ref 0.4–2)
LEUKOCYTE ESTERASE UR QL STRIP.AUTO: NEGATIVE
LIPASE SERPL-CCNC: 108 U/L (ref 73–393)
LYMPHOCYTES # BLD: 1.8 K/UL (ref 0.9–3.6)
LYMPHOCYTES NFR BLD: 42 % (ref 21–52)
MCH RBC QN AUTO: 31.8 PG (ref 24–34)
MCHC RBC AUTO-ENTMCNC: 33.7 G/DL (ref 31–37)
MCV RBC AUTO: 94.1 FL (ref 74–97)
MONOCYTES # BLD: 0.3 K/UL (ref 0.05–1.2)
MONOCYTES NFR BLD: 6 % (ref 3–10)
NEUTS SEG # BLD: 2.2 K/UL (ref 1.8–8)
NEUTS SEG NFR BLD: 51 % (ref 40–73)
NITRITE UR QL STRIP.AUTO: NEGATIVE
P-R INTERVAL, ECG05: 112 MS
PH UR STRIP: 6 [PH] (ref 5–8)
PLATELET # BLD AUTO: 126 K/UL (ref 135–420)
PMV BLD AUTO: 10.2 FL (ref 9.2–11.8)
POTASSIUM SERPL-SCNC: 3.8 MMOL/L (ref 3.5–5.5)
PROT SERPL-MCNC: 8 G/DL (ref 6.4–8.2)
PROT UR STRIP-MCNC: 30 MG/DL
Q-T INTERVAL, ECG07: 322 MS
QRS DURATION, ECG06: 72 MS
QTC CALCULATION (BEZET), ECG08: 431 MS
RBC # BLD AUTO: 4.44 M/UL (ref 4.2–5.3)
RBC #/AREA URNS HPF: ABNORMAL /HPF (ref 0–5)
SODIUM SERPL-SCNC: 134 MMOL/L (ref 136–145)
SP GR UR REFRACTOMETRY: 1.02 (ref 1–1.03)
TROPONIN I SERPL-MCNC: <0.02 NG/ML (ref 0–0.04)
UROBILINOGEN UR QL STRIP.AUTO: 1 EU/DL (ref 0.2–1)
VENTRICULAR RATE, ECG03: 108 BPM
WBC # BLD AUTO: 4.2 K/UL (ref 4.6–13.2)
WBC URNS QL MICRO: ABNORMAL /HPF (ref 0–4)

## 2019-03-02 PROCEDURE — 96360 HYDRATION IV INFUSION INIT: CPT

## 2019-03-02 PROCEDURE — 93005 ELECTROCARDIOGRAM TRACING: CPT

## 2019-03-02 PROCEDURE — 82550 ASSAY OF CK (CPK): CPT

## 2019-03-02 PROCEDURE — 74011000250 HC RX REV CODE- 250: Performed by: EMERGENCY MEDICINE

## 2019-03-02 PROCEDURE — 80053 COMPREHEN METABOLIC PANEL: CPT

## 2019-03-02 PROCEDURE — 85025 COMPLETE CBC W/AUTO DIFF WBC: CPT

## 2019-03-02 PROCEDURE — 83605 ASSAY OF LACTIC ACID: CPT

## 2019-03-02 PROCEDURE — 81025 URINE PREGNANCY TEST: CPT

## 2019-03-02 PROCEDURE — 94640 AIRWAY INHALATION TREATMENT: CPT

## 2019-03-02 PROCEDURE — 83690 ASSAY OF LIPASE: CPT

## 2019-03-02 PROCEDURE — 87804 INFLUENZA ASSAY W/OPTIC: CPT

## 2019-03-02 PROCEDURE — 81001 URINALYSIS AUTO W/SCOPE: CPT

## 2019-03-02 PROCEDURE — 74011250636 HC RX REV CODE- 250/636: Performed by: EMERGENCY MEDICINE

## 2019-03-02 PROCEDURE — 96365 THER/PROPH/DIAG IV INF INIT: CPT

## 2019-03-02 PROCEDURE — 71045 X-RAY EXAM CHEST 1 VIEW: CPT

## 2019-03-02 PROCEDURE — 99285 EMERGENCY DEPT VISIT HI MDM: CPT

## 2019-03-02 RX ORDER — FAMOTIDINE 20 MG/1
20 TABLET, FILM COATED ORAL 2 TIMES DAILY
Qty: 20 TAB | Refills: 0 | Status: ON HOLD | OUTPATIENT
Start: 2019-03-02 | End: 2019-03-12

## 2019-03-02 RX ORDER — ACETAMINOPHEN 325 MG/1
650 TABLET ORAL
Qty: 20 TAB | Refills: 0 | Status: SHIPPED | OUTPATIENT
Start: 2019-03-02 | End: 2019-05-07

## 2019-03-02 RX ORDER — IBUPROFEN 600 MG/1
600 TABLET ORAL
Qty: 20 TAB | Refills: 0 | Status: SHIPPED | OUTPATIENT
Start: 2019-03-02 | End: 2019-05-05

## 2019-03-02 RX ORDER — GUAIFENESIN 600 MG/1
600 TABLET, EXTENDED RELEASE ORAL 2 TIMES DAILY
Qty: 15 TAB | Refills: 0 | Status: SHIPPED | OUTPATIENT
Start: 2019-03-02 | End: 2019-05-07

## 2019-03-02 RX ORDER — IPRATROPIUM BROMIDE AND ALBUTEROL SULFATE 2.5; .5 MG/3ML; MG/3ML
3 SOLUTION RESPIRATORY (INHALATION)
Status: DISCONTINUED | OUTPATIENT
Start: 2019-03-02 | End: 2019-03-02 | Stop reason: HOSPADM

## 2019-03-02 RX ORDER — AZITHROMYCIN 250 MG/1
TABLET, FILM COATED ORAL
Qty: 6 TAB | Refills: 0 | Status: ON HOLD | OUTPATIENT
Start: 2019-03-02 | End: 2019-03-12

## 2019-03-02 RX ADMIN — IPRATROPIUM BROMIDE AND ALBUTEROL SULFATE 3 ML: .5; 3 SOLUTION RESPIRATORY (INHALATION) at 12:27

## 2019-03-02 RX ADMIN — SODIUM CHLORIDE 1000 ML: 900 INJECTION, SOLUTION INTRAVENOUS at 11:30

## 2019-03-02 RX ADMIN — IPRATROPIUM BROMIDE AND ALBUTEROL SULFATE 3 ML: .5; 3 SOLUTION RESPIRATORY (INHALATION) at 11:45

## 2019-03-02 NOTE — ED PROVIDER NOTES
EMERGENCY DEPARTMENT HISTORY AND PHYSICAL EXAM 
 
10:12 AM 
 
 
Date: 3/2/2019 Patient Name: Xavi Mobley History of Presenting Illness Chief Complaint Patient presents with  Abdominal Pain History Provided By: Patient Additional History (Context): Xavi Mobley is a 46 y.o. female with PMHx of HTN, COPD, Pancreatitis, and Staghorn renal calculus who presents with medial chest pain onset 3 days ago. Also reports LUQ and RUQ abdominal pain. Notes these symptoms have become constant for the past 2 days. Notes hx of Pancreatitis. Notes other symptoms, such as generalized myalgias, sore throat, a cough, decreased appetite, and dental pain (which she is managing with Orajel). Denies other symptoms, such as vomiting, and diarrhea. Notes concern for Rhabdomyolysis. Denies tobacco use. Notes last EtOH use onset 2-3 weeks ago. No other concerns were expressd at this time. PCP: Jasmine Kelley MD 
 
Chief Complaint: Chest pain Duration:  3 days Timing:  Constant and Progressive Location: Medial chest 
Quality: Burning and Sharp Severity: 1 out of 10 Modifying Factors: No modifying factors were reported. Associated Symptoms: Patient also reports RUQ and LUQ abdominal pain. Notes other symptoms, such as generalized myalgias, sore throat, a cough, decreased appetite, and dental pain (which she is managing with Orajel). Denies other symptoms, such as vomiting, and diarrhea. Current Facility-Administered Medications Medication Dose Route Frequency Provider Last Rate Last Dose  albuterol-ipratropium (DUO-NEB) 2.5 MG-0.5 MG/3 ML  3 mL Nebulization Q30MIN Faina Gaston MD   3 mL at 03/02/19 1227 Current Outpatient Medications Medication Sig Dispense Refill  ibuprofen (MOTRIN) 600 mg tablet Take 1 Tab by mouth every six (6) hours as needed for Pain. 20 Tab 0  
 acetaminophen (TYLENOL) 325 mg tablet Take 2 Tabs by mouth every four (4) hours as needed for Pain.  20 Tab 0  
  famotidine (PEPCID) 20 mg tablet Take 1 Tab by mouth two (2) times a day for 10 days. 20 Tab 0  
 Potassium Citrate (UROCIT-K) TbER tablet Take 1 Tab by mouth three (3) times daily (with meals). 90 Tab 11  
 DILAUDID 2 mg tablet Take 1 Tab by mouth every six (6) hours as needed for Pain. Max Daily Amount: 8 mg. Indications: Pain 12 Tab 0  
 gabapentin (NEURONTIN) 600 mg tablet Take 600 mg by mouth two (2) times a day.  VITAMIN C 500 mg tablet  hydrocortisone (CORTAID) 0.5 % topical cream Apply  to affected area two (2) times a day. use thin layer 30 g 0  
 ondansetron (ZOFRAN ODT) 8 mg disintegrating tablet Take 1 Tab by mouth every eight (8) hours as needed for Nausea. 15 Tab 0  
 folic acid (FOLVITE) 1 mg tablet Take 1 Tab by mouth daily. 30 Tab 0  
 pantoprazole (PROTONIX) 40 mg tablet Take 1 Tab by mouth Before breakfast and dinner. 60 Tab 0  
 sucralfate (CARAFATE) 100 mg/mL suspension Take 10 mL by mouth Before breakfast, lunch, dinner and at bedtime. 1200 mL 0  
 cloNIDine HCl (CATAPRES) 0.2 mg tablet Take 0.5 Tabs by mouth two (2) times a day. 30 Tab 0  
 diazePAM (VALIUM) 5 mg tablet Take 1 Tab by mouth every twelve (12) hours as needed for Anxiety. Max Daily Amount: 10 mg. Indications: Alcohol Withdrawal Syndrome 10 Tab 0  
 PREVIDENT 5000 PLUS 1.1 % crea  calcium-cholecalciferol, D3, (CALTRATE 600+D) tablet  topiramate (TOPAMAX) 100 mg tablet 100 mg two (2) times a day.  QUEtiapine (SEROQUEL) 300 mg tablet  CREON 24,000-76,000 -120,000 unit capsule  cetirizine (ZYRTEC) 10 mg tablet  SUMAtriptan (IMITREX) 100 mg tablet TAKE 1 TO 2 TABLETS BY MOUTH DAILY AS NEEDED FOR MIGRAINE . DO NOT EXCEED 200 MG IN 24 HOUR PERIOD  0  
 busPIRone (BUSPAR) 15 mg tablet Take 1 Tab by mouth two (2) times a day.  60 Tab 0  
 albuterol (PROVENTIL VENTOLIN) 2.5 mg /3 mL (0.083 %) nebulizer solution 1.5 mL by Nebulization route every four (4) hours as needed for Wheezing or Shortness of Breath. 24 Each 0  
 ipratropium-albuterol (COMBIVENT)  mcg/actuation inhaler Take 2 Puffs by inhalation every six (6) hours. Past History Past Medical History: 
Past Medical History:  
Diagnosis Date  Alcohol abuse  Anxiety  Arrhythmia Alma Sayres  Asthma   
 controlled  Bipolar disorder ([facility] [address]  Common migraine  COPD (chronic obstructive pulmonary disease) (HCC) Home O2  PRN ,  pt use also for Tachycardia  Depression  Esophageal reflux  Gout  Hypertension  Inverted nipple Left breast always have.  Kidney stone on left side  Microhematuria  Pancreatitis  Recurrent UTI  Staghorn renal calculus  Urine leukocytes Past Surgical History: 
Past Surgical History:  
Procedure Laterality Date  HX COLONOSCOPY    
 HX CYST REMOVAL Left   
 x 3 surgeries  HX ENDOSCOPY    
 HX GYN    
 tubal ligation  HX LUMBAR LAMINECTOMY  HX UROLOGICAL  08/10/2018 Cysto, bilat RPG, left ureteral pyeloscopy, HLL, left JJ stent placement, Dr. Haresh Neal Family History: 
Family History Family history unknown: Yes  
 
 
Social History: 
Social History Tobacco Use  Smoking status: Current Every Day Smoker Packs/day: 1.00 Years: 33.00 Pack years: 33.00 Types: Cigarettes  Smokeless tobacco: Never Used Substance Use Topics  Alcohol use: No  
  Comment: pt  is a RECOVERING  ALCOHOLIC  Drug use: No  
 
 
Allergies: Allergies Allergen Reactions  Lithium Anaphylaxis  Morphine Hives  Amitriptyline Other (comments) Left leg went numb  Elavil Other (comments) Left leg went numb Review of Systems Review of Systems Constitutional: Positive for appetite change (decreased). Negative for activity change, fatigue and fever. HENT: Positive for dental problem and sore throat. Negative for congestion and rhinorrhea. Eyes: Negative for visual disturbance. Respiratory: Positive for cough. Negative for shortness of breath. Cardiovascular: Positive for chest pain. Negative for palpitations. Gastrointestinal: Positive for abdominal pain. Negative for diarrhea, nausea and vomiting. Genitourinary: Negative for dysuria and hematuria. Musculoskeletal: Positive for myalgias (generalized). Negative for back pain. Skin: Negative for rash. Neurological: Negative for dizziness, weakness and light-headedness. Psychiatric/Behavioral: Negative for agitation. All other systems reviewed and are negative. Physical Exam  
 
Visit Vitals BP (!) 120/92 Pulse (!) 101 Temp 98.9 °F (37.2 °C) Resp 15 Ht 5' 2\" (1.575 m) Wt 54.4 kg (120 lb) LMP 04/01/2013 SpO2 98% BMI 21.95 kg/m² Physical Exam  
Constitutional: She appears well-developed and well-nourished. No distress. HENT:  
Head: Normocephalic and atraumatic. Right Ear: External ear normal.  
Left Ear: External ear normal.  
Nose: Nose normal.  
Mouth/Throat: Oropharynx is clear and moist. No oropharyngeal exudate or posterior oropharyngeal erythema. No deviation of the uvula Eyes: Conjunctivae and EOM are normal. Pupils are equal, round, and reactive to light. No scleral icterus. Neck: Normal range of motion. Neck supple. No JVD present. No tracheal deviation present. No thyromegaly present. Cardiovascular: Normal rate and regular rhythm. Exam reveals no friction rub. No murmur heard. Pulmonary/Chest: Effort normal and breath sounds normal. No stridor. She exhibits tenderness. Abdominal: Soft. Bowel sounds are normal. She exhibits no distension. There is tenderness. There is no rebound and no guarding. Abdominal tenderness Musculoskeletal: Normal range of motion. She exhibits tenderness. She exhibits no edema. Whole body is tender to palpation Lymphadenopathy:  
  She has no cervical adenopathy. Neurological: She is alert. No cranial nerve deficit. Coordination normal.  
Skin: Skin is warm and dry. Psychiatric: She has a normal mood and affect. Her behavior is normal. Judgment and thought content normal.  
Nursing note and vitals reviewed. Diagnostic Study Results Labs - Recent Results (from the past 12 hour(s)) EKG, 12 LEAD, INITIAL Collection Time: 03/02/19  9:38 AM  
Result Value Ref Range Ventricular Rate 108 BPM  
 Atrial Rate 108 BPM  
 P-R Interval 112 ms QRS Duration 72 ms Q-T Interval 322 ms QTC Calculation (Bezet) 431 ms Calculated P Axis -15 degrees Calculated R Axis 72 degrees Calculated T Axis 66 degrees Diagnosis Sinus tachycardia Otherwise normal ECG When compared with ECG of 15-DEMETRIUS-2019 11:03, 
Criteria for Anterior infarct are no longer present HCG URINE, QL Collection Time: 03/02/19  9:49 AM  
Result Value Ref Range HCG urine, QL NEGATIVE  NEG    
URINALYSIS W/ RFLX MICROSCOPIC Collection Time: 03/02/19  9:49 AM  
Result Value Ref Range Color DARK YELLOW Appearance CLEAR Specific gravity 1.023 1.005 - 1.030    
 pH (UA) 6.0 5.0 - 8.0 Protein 30 (A) NEG mg/dL Glucose NEGATIVE  NEG mg/dL Ketone 40 (A) NEG mg/dL Bilirubin NEGATIVE  NEG Blood NEGATIVE  NEG Urobilinogen 1.0 0.2 - 1.0 EU/dL Nitrites NEGATIVE  NEG Leukocyte Esterase NEGATIVE  NEG    
URINE MICROSCOPIC ONLY Collection Time: 03/02/19  9:49 AM  
Result Value Ref Range WBC 0 to 2 0 - 4 /hpf  
 RBC 0 to 2 0 - 5 /hpf Epithelial cells 1+ 0 - 5 /lpf Bacteria 1+ (A) NEG /hpf  
 CA Oxalate crystals FEW (A) NEG Hyaline cast 3 to 5 0 - 2 /lpf  
CBC WITH AUTOMATED DIFF Collection Time: 03/02/19 10:10 AM  
Result Value Ref Range WBC 4.2 (L) 4.6 - 13.2 K/uL  
 RBC 4.44 4.20 - 5.30 M/uL  
 HGB 14.1 12.0 - 16.0 g/dL HCT 41.8 35.0 - 45.0 % MCV 94.1 74.0 - 97.0 FL  
 MCH 31.8 24.0 - 34.0 PG  
 MCHC 33.7 31.0 - 37.0 g/dL  
 RDW 16.3 (H) 11.6 - 14.5 % PLATELET 801 (L) 997 - 420 K/uL MPV 10.2 9.2 - 11.8 FL  
 NEUTROPHILS 51 40 - 73 % LYMPHOCYTES 42 21 - 52 % MONOCYTES 6 3 - 10 % EOSINOPHILS 0 0 - 5 % BASOPHILS 1 0 - 2 %  
 ABS. NEUTROPHILS 2.2 1.8 - 8.0 K/UL  
 ABS. LYMPHOCYTES 1.8 0.9 - 3.6 K/UL  
 ABS. MONOCYTES 0.3 0.05 - 1.2 K/UL  
 ABS. EOSINOPHILS 0.0 0.0 - 0.4 K/UL  
 ABS. BASOPHILS 0.0 0.0 - 0.1 K/UL  
 DF AUTOMATED    
LIPASE Collection Time: 03/02/19 10:10 AM  
Result Value Ref Range Lipase 108 73 - 039 U/L  
METABOLIC PANEL, COMPREHENSIVE Collection Time: 03/02/19 10:10 AM  
Result Value Ref Range Sodium 134 (L) 136 - 145 mmol/L Potassium 3.8 3.5 - 5.5 mmol/L Chloride 97 (L) 100 - 108 mmol/L  
 CO2 27 21 - 32 mmol/L Anion gap 10 3.0 - 18 mmol/L Glucose 99 74 - 99 mg/dL BUN 15 7.0 - 18 MG/DL Creatinine 1.08 0.6 - 1.3 MG/DL  
 BUN/Creatinine ratio 14 12 - 20 GFR est AA >60 >60 ml/min/1.73m2 GFR est non-AA 53 (L) >60 ml/min/1.73m2 Calcium 9.8 8.5 - 10.1 MG/DL Bilirubin, total 0.4 0.2 - 1.0 MG/DL  
 ALT (SGPT) 176 (H) 13 - 56 U/L  
 AST (SGOT) 352 (H) 15 - 37 U/L Alk. phosphatase 104 45 - 117 U/L Protein, total 8.0 6.4 - 8.2 g/dL Albumin 3.7 3.4 - 5.0 g/dL Globulin 4.3 (H) 2.0 - 4.0 g/dL A-G Ratio 0.9 0.8 - 1.7 CARDIAC PANEL,(CK, CKMB & TROPONIN) Collection Time: 03/02/19 10:10 AM  
Result Value Ref Range  26 - 192 U/L  
 CK - MB 3.9 (H) <3.6 ng/ml CK-MB Index 2.6 0.0 - 4.0 % Troponin-I, QT <0.02 0.0 - 0.045 NG/ML  
POC LACTIC ACID Collection Time: 03/02/19 10:19 AM  
Result Value Ref Range Lactic Acid (POC) 1.09 0.40 - 2.00 mmol/L INFLUENZA A & B AG (RAPID TEST) Collection Time: 03/02/19 11:35 AM  
Result Value Ref Range Influenza A Antigen NEGATIVE  NEG  Influenza B Antigen NEGATIVE  NEG    
 
 
 Radiologic Studies -  
XR CHEST SNGL V Final Result IMPRESSION:  
  
No active cardiopulmonary disease. Medical Decision Making It should be noted that Brien Goodell, MD will be the provider of record for this patient. I reviewed the vital signs, available nursing notes, past medical history, past surgical history, family history and social history. Vital Signs-Reviewed the patient's vital signs. Pulse Oximetry Analysis -  98% on room air (Interpretation)Non-hypoxic Cardiac Monitor: 
Rate: 101bpm 
Rhythm:  Sinus Tachycardia EKG: Interpreted by the EP. Time Interpreted: 9:38AM 
 Rate: 108bpm 
 Rhythm: Sinus Tachycardia Interpretation: Normal axis, Normal intervals; No other signs of abnormality Comparison: When compared with EKG of 1/15/19, no significant change was found. Records Reviewed: Nursing Notes, Old Medical Records, Previous electrocardiograms and Triage notes (Time of Review: 10:12 AM) 
 
ED Course: Progress Notes, Reevaluation, and Consults: 
12:33 Patient's CBC, BMP, cardiac markers, liver function, and lipase are normal. There are no signs of any acute injury or abnormality that needs correction. Will send the patient home with close follow-up. 12:36 PM 
Patient states she would like something for a cough. I will give her antibiotics and Mucinex. Well appearing, no distress No cp  Or sob or abd pain No complaints prior to DC Agrees with plan and gait normal 
 
12:37 PM I have assessed the patient who will be discharged in stable condition. Patient is to return to emergency department if any new or worsening condition. I have given the patient instructions for discharge and follow-up. Patient understands and verbalizes agreement with plan, diagnosis, discharge, and follow-up. Provider Notes (Medical Decision Making): Patient presents to emergency department with abdominal pain History since triage vitals are reviewed tachycardia is appreciated Patient history of esophagitis pancreatitis rhabdomyolysis and body pain. Also mild pain and cough. Patient's medical problems include bipolar hypertension R: Abuse renal calculi asthma COPD UTI and migraines MDM: Patient presents with whole body ache constant abdominal pain and chest pain for 2 days also having cough congestion. Patient states similar episodes in the past has not had any last 2-3 weeks. I will check patient for any pneumonia cardiac disease although EKG initially is normal 
I will check for a rhabdomyolysis urinary tract infection intra-abdominal pathology including pancreatitis or hepatitis. Hydrate, 
 control patient's pain I will also sent for influenza in the emergency department. Diagnosis Clinical Impression: 1. Complaints of total body pain 2. Abdominal pain, generalized 3. Chest pain, unspecified type Disposition: Discharged Follow-up Information Follow up With Specialties Details Why Contact Info Prudence Mckeon MD Family Practice Call in 1 day Follow Up From Emergency Department [address] [place] [address] [id number] [phone] 
[phone] [facility] EMERGENCY DEPT Emergency Medicine  If symptoms worsen [address] 
[phone] Medication List  
  
START taking these medications   
acetaminophen 325 mg tablet Commonly known as:  TYLENOL Take 2 Tabs by mouth every four (4) hours as needed for Pain. 
  
famotidine 20 mg tablet Commonly known as:  PEPCID Take 1 Tab by mouth two (2) times a day for 10 days. ibuprofen 600 mg tablet Commonly known as:  MOTRIN Take 1 Tab by mouth every six (6) hours as needed for Pain. ASK your doctor about these medications   
albuterol 2.5 mg /3 mL (0.083 %) nebulizer solution Commonly known as:  PROVENTIL VENTOLIN 
1.5 mL by Nebulization route every four (4) hours as needed for Wheezing or Shortness of Breath. busPIRone 15 mg tablet Commonly known as:  BUSPAR Take 1 Tab by mouth two (2) times a day. calcium-cholecalciferol (D3) tablet Commonly known as:  CALTRATE 600+D 
  
cetirizine 10 mg tablet Commonly known as:  ZYRTEC 
  
cloNIDine HCl 0.2 mg tablet Commonly known as:  CATAPRES Take 0.5 Tabs by mouth two (2) times a day. CREON 24,000-76,000 -120,000 unit capsule Generic drug:  lipase-protease-amylase 
  
diazePAM 5 mg tablet Commonly known as:  VALIUM Take 1 Tab by mouth every twelve (12) hours as needed for Anxiety. Max Daily Amount: 10 mg. Indications: Alcohol Withdrawal Syndrome DILAUDID 2 mg tablet Generic drug:  HYDROmorphone Take 1 Tab by mouth every six (6) hours as needed for Pain. Max Daily Amount: 8 mg. Indications: Pain 
  
folic acid 1 mg tablet Commonly known as:  [employer] Take 1 Tab by mouth daily. gabapentin 600 mg tablet Commonly known as:  NEURONTIN 
  
hydrocortisone 0.5 % topical cream 
Commonly known as:  CORTAID Apply  to affected area two (2) times a day. use thin layer 
  
ipratropium-albuterol  mcg/actuation inhaler Commonly known as:  COMBIVENT 
  
ondansetron 8 mg disintegrating tablet Commonly known as:  ZOFRAN ODT Take 1 Tab by mouth every eight (8) hours as needed for Nausea. pantoprazole 40 mg tablet Commonly known as:  PROTONIX Take 1 Tab by mouth Before breakfast and dinner. potassium citrate 15 mEq Tber tablet Commonly known as:  [employer] Take 1 Tab by mouth three (3) times daily (with meals). PREVIDENT 5000 PLUS 1.1 % Crea Generic drug:  fluoride (sodium) QUEtiapine 300 mg tablet Commonly known as:  SEROquel 
  
sucralfate 100 mg/mL suspension Commonly known as:  [employer] Holts Take 10 mL by mouth Before breakfast, lunch, dinner and at bedtime. SUMAtriptan 100 mg tablet Commonly known as:  IMITREX 
  
topiramate 100 mg tablet Commonly known as:  TOPAMAX 
  
VITAMIN C 500 mg tablet Generic drug:  ascorbic acid (vitamin C) Where to Get Your Medications Information about where to get these medications is not yet available Ask your nurse or doctor about these medications · acetaminophen 325 mg tablet · famotidine 20 mg tablet · ibuprofen 600 mg tablet 
  
 
_______________________________ Scribe Attestation Ross Peterske acting as a scribe for and in the presence of Josias Andujar MD     
March 02, 2019 at 10:12 AM 
    
Provider Attestation:     
I personally performed the services described in the documentation, reviewed the documentation, as recorded by the scribe in my presence, and it accurately and completely records my words and actions. March 02, 2019 at 10:12 AM - Josias Andujar MD   
 
 
_______________________________

## 2019-03-02 NOTE — ED NOTES
Assume care of patient, patient states that she has pancreatitis, rhabdo with nausea ad vomiting, patient states that her last drink was 2 weeks ago

## 2019-03-02 NOTE — DISCHARGE INSTRUCTIONS
Patient Education        Abdominal Pain: Care Instructions  Your Care Instructions    Abdominal pain has many possible causes. Some aren't serious and get better on their own in a few days. Others need more testing and treatment. If your pain continues or gets worse, you need to be rechecked and may need more tests to find out what is wrong. You may need surgery to correct the problem. Don't ignore new symptoms, such as fever, nausea and vomiting, urination problems, pain that gets worse, and dizziness. These may be signs of a more serious problem. Your doctor may have recommended a follow-up visit in the next 8 to 12 hours. If you are not getting better, you may need more tests or treatment. The doctor has checked you carefully, but problems can develop later. If you notice any problems or new symptoms, get medical treatment right away. Follow-up care is a key part of your treatment and safety. Be sure to make and go to all appointments, and call your doctor if you are having problems. It's also a good idea to know your test results and keep a list of the medicines you take. How can you care for yourself at home? · Rest until you feel better. · To prevent dehydration, drink plenty of fluids, enough so that your urine is light yellow or clear like water. Choose water and other caffeine-free clear liquids until you feel better. If you have kidney, heart, or liver disease and have to limit fluids, talk with your doctor before you increase the amount of fluids you drink. · If your stomach is upset, eat mild foods, such as rice, dry toast or crackers, bananas, and applesauce. Try eating several small meals instead of two or three large ones. · Wait until 48 hours after all symptoms have gone away before you have spicy foods, alcohol, and drinks that contain caffeine. · Do not eat foods that are high in fat. · Avoid anti-inflammatory medicines such as aspirin, ibuprofen (Advil, Motrin), and naproxen (Aleve). These can cause stomach upset. Talk to your doctor if you take daily aspirin for another health problem. When should you call for help? Call 911 anytime you think you may need emergency care. For example, call if:    · You passed out (lost consciousness).     · You pass maroon or very bloody stools.     · You vomit blood or what looks like coffee grounds.     · You have new, severe belly pain.    Call your doctor now or seek immediate medical care if:    · Your pain gets worse, especially if it becomes focused in one area of your belly.     · You have a new or higher fever.     · Your stools are black and look like tar, or they have streaks of blood.     · You have unexpected vaginal bleeding.     · You have symptoms of a urinary tract infection. These may include:  ? Pain when you urinate. ? Urinating more often than usual.  ? Blood in your urine.     · You are dizzy or lightheaded, or you feel like you may faint.    Watch closely for changes in your health, and be sure to contact your doctor if:    · You are not getting better after 1 day (24 hours). Where can you learn more? Go to http://shannonblogTVheriberto.info/. Enter L547 in the search box to learn more about \"Abdominal Pain: Care Instructions. \"  Current as of: September 23, 2018  Content Version: 11.9  © 7254-6232 [employer]. Care instructions adapted under license by [employer] (which disclaims liability or warranty for this information). If you have questions about a medical condition or this instruction, always ask your healthcare professional. [place] [address] any warranty or liability for your use of this information. Patient Education        Chest Pain: Care Instructions  Your Care Instructions    There are many things that can cause chest pain. Some are not serious and will get better on their own in a few days. But some kinds of chest pain need more testing and treatment.  Your doctor may have recommended a follow-up visit in the next 8 to 12 hours. If you are not getting better, you may need more tests or treatment. Even though your doctor has released you, you still need to watch for any problems. The doctor carefully checked you, but sometimes problems can develop later. If you have new symptoms or if your symptoms do not get better, get medical care right away. If you have worse or different chest pain or pressure that lasts more than 5 minutes or you passed out (lost consciousness), call 911 or seek other emergency help right away. A medical visit is only one step in your treatment. Even if you feel better, you still need to do what your doctor recommends, such as going to all suggested follow-up appointments and taking medicines exactly as directed. This will help you recover and help prevent future problems. How can you care for yourself at home? · Rest until you feel better. · Take your medicine exactly as prescribed. Call your doctor if you think you are having a problem with your medicine. · Do not drive after taking a prescription pain medicine. When should you call for help? Call 911 if:    · You passed out (lost consciousness).     · You have severe difficulty breathing.     · You have symptoms of a heart attack. These may include:  ? Chest pain or pressure, or a strange feeling in your chest.  ? Sweating. ? Shortness of breath. ? Nausea or vomiting. ? Pain, pressure, or a strange feeling in your back, neck, jaw, or upper belly or in one or both shoulders or arms. ? Lightheadedness or sudden weakness. ? A fast or irregular heartbeat. After you call 911, the  may tell you to chew 1 adult-strength or 2 to 4 low-dose aspirin. Wait for an ambulance.  Do not try to drive yourself.    Call your doctor today if:    · You have any trouble breathing.     · Your chest pain gets worse.     · You are dizzy or lightheaded, or you feel like you may faint.     · You are not getting better as expected.     · You are having new or different chest pain. Where can you learn more? Go to http://shannon-heriberto.info/. Enter A120 in the search box to learn more about \"Chest Pain: Care Instructions. \"  Current as of: September 23, 2018  Content Version: 11.9  © 2589-8612 [employer]. Care instructions adapted under license by [employer] (which disclaims liability or warranty for this information). If you have questions about a medical condition or this instruction, always ask your healthcare professional. [address] any warranty or liability for your use of this information.

## 2019-03-11 ENCOUNTER — ANESTHESIA EVENT (OUTPATIENT)
Dept: ENDOSCOPY | Age: 54
End: 2019-03-11
Payer: MEDICARE

## 2019-03-12 ENCOUNTER — HOSPITAL ENCOUNTER (OUTPATIENT)
Age: 54
Setting detail: OUTPATIENT SURGERY
Discharge: HOME | End: 2019-03-12
Attending: INTERNAL MEDICINE | Admitting: INTERNAL MEDICINE
Payer: MEDICARE

## 2019-03-12 ENCOUNTER — ANESTHESIA (OUTPATIENT)
Dept: ENDOSCOPY | Age: 54
End: 2019-03-12
Payer: MEDICARE

## 2019-03-12 VITALS
RESPIRATION RATE: 18 BRPM | HEIGHT: 62 IN | SYSTOLIC BLOOD PRESSURE: 108 MMHG | TEMPERATURE: 97.1 F | BODY MASS INDEX: 21.55 KG/M2 | DIASTOLIC BLOOD PRESSURE: 78 MMHG | OXYGEN SATURATION: 98 % | HEART RATE: 88 BPM | WEIGHT: 117.1 LBS

## 2019-03-12 LAB — HCG UR QL: NEGATIVE

## 2019-03-12 PROCEDURE — 88312 SPECIAL STAINS GROUP 1: CPT

## 2019-03-12 PROCEDURE — 77030009426 HC FCPS BIOP ENDOSC BSC -B: Performed by: INTERNAL MEDICINE

## 2019-03-12 PROCEDURE — 88305 TISSUE EXAM BY PATHOLOGIST: CPT

## 2019-03-12 PROCEDURE — 74011250636 HC RX REV CODE- 250/636

## 2019-03-12 PROCEDURE — 81025 URINE PREGNANCY TEST: CPT

## 2019-03-12 PROCEDURE — 74011250636 HC RX REV CODE- 250/636: Performed by: NURSE ANESTHETIST, CERTIFIED REGISTERED

## 2019-03-12 PROCEDURE — 76040000019: Performed by: INTERNAL MEDICINE

## 2019-03-12 PROCEDURE — 76060000031 HC ANESTHESIA FIRST 0.5 HR: Performed by: INTERNAL MEDICINE

## 2019-03-12 RX ORDER — AMOXICILLIN 250 MG/1
CAPSULE ORAL 3 TIMES DAILY
COMMUNITY
End: 2019-05-05

## 2019-03-12 RX ORDER — PROPOFOL 10 MG/ML
INJECTION, EMULSION INTRAVENOUS AS NEEDED
Status: DISCONTINUED | OUTPATIENT
Start: 2019-03-12 | End: 2019-03-12 | Stop reason: HOSPADM

## 2019-03-12 RX ORDER — SODIUM CHLORIDE, SODIUM LACTATE, POTASSIUM CHLORIDE, CALCIUM CHLORIDE 600; 310; 30; 20 MG/100ML; MG/100ML; MG/100ML; MG/100ML
50 INJECTION, SOLUTION INTRAVENOUS CONTINUOUS
Status: DISCONTINUED | OUTPATIENT
Start: 2019-03-12 | End: 2019-03-12 | Stop reason: HOSPADM

## 2019-03-12 RX ORDER — LAMOTRIGINE 200 MG/1
TABLET ORAL DAILY
COMMUNITY
End: 2020-08-21

## 2019-03-12 RX ORDER — LIDOCAINE HYDROCHLORIDE 20 MG/ML
INJECTION, SOLUTION EPIDURAL; INFILTRATION; INTRACAUDAL; PERINEURAL AS NEEDED
Status: DISCONTINUED | OUTPATIENT
Start: 2019-03-12 | End: 2019-03-12 | Stop reason: HOSPADM

## 2019-03-12 RX ORDER — LIDOCAINE HYDROCHLORIDE 10 MG/ML
0.1 INJECTION, SOLUTION EPIDURAL; INFILTRATION; INTRACAUDAL; PERINEURAL AS NEEDED
Status: DISCONTINUED | OUTPATIENT
Start: 2019-03-12 | End: 2019-03-12 | Stop reason: HOSPADM

## 2019-03-12 RX ADMIN — PROPOFOL 50 MG: 10 INJECTION, EMULSION INTRAVENOUS at 09:10

## 2019-03-12 RX ADMIN — PROPOFOL 20 MG: 10 INJECTION, EMULSION INTRAVENOUS at 09:17

## 2019-03-12 RX ADMIN — PROPOFOL 30 MG: 10 INJECTION, EMULSION INTRAVENOUS at 09:12

## 2019-03-12 RX ADMIN — PROPOFOL 30 MG: 10 INJECTION, EMULSION INTRAVENOUS at 09:16

## 2019-03-12 RX ADMIN — PROPOFOL 30 MG: 10 INJECTION, EMULSION INTRAVENOUS at 09:14

## 2019-03-12 RX ADMIN — SODIUM CHLORIDE, SODIUM LACTATE, POTASSIUM CHLORIDE, AND CALCIUM CHLORIDE: 600; 310; 30; 20 INJECTION, SOLUTION INTRAVENOUS at 09:04

## 2019-03-12 RX ADMIN — LIDOCAINE HYDROCHLORIDE 40 MG: 20 INJECTION, SOLUTION EPIDURAL; INFILTRATION; INTRACAUDAL; PERINEURAL at 09:10

## 2019-03-12 NOTE — PERIOP NOTES
Phase 2 Recovery Summary  Patient arrived to Phase 2 at 5098  Report received from SEB Schwarz.   Dr. Dale Levo in to discuss findings with patient at 8489. Patient denies any pain at this time. Patient assisted to chair. Patient has motherat bedside. Vitals:    03/12/19 0828 03/12/19 0858   BP: 102/69    Pulse: (!) 104    Resp:  18   Temp: 97.1 °F (36.2 °C)    SpO2: 97%    Weight: 53.1 kg (117 lb 1.6 oz)    Height: 5' 2\" (1.575 m)        oriented to time, place, person and situation    Lines and Drains  Peripheral Intravenous Line:   Peripheral IV 03/12/19 Right Hand (Active)   Site Assessment Clean, dry, & intact 3/12/2019  8:59 AM   Phlebitis Assessment 0 3/12/2019  8:59 AM   Infiltration Assessment 0 3/12/2019  8:59 AM   Dressing Status Clean, dry, & intact 3/12/2019  8:59 AM   Dressing Type Transparent;Tape 3/12/2019  8:59 AM   Hub Color/Line Status Pink; Infusing 3/12/2019  8:59 AM       Wound        Discharge discussed with patient. No questions or concerns at this time. Patient discharged to home with Mother at 3688.     Lee Brandt RN

## 2019-03-12 NOTE — PERIOP NOTES
Pre-Op Summary    Pt arrived via car with family/friend and is oriented to time, place, person and situation. Patient with steady gait with none assistive devices. Visit Vitals  /69 (BP 1 Location: Right arm, BP Patient Position: At rest)   Pulse (!) 104   Temp 97.1 °F (36.2 °C)   Resp 18   Ht 5' 2\" (1.575 m)   Wt 53.1 kg (117 lb 1.6 oz)   LMP 04/01/2013   SpO2 97%   BMI 21.42 kg/m²       Peripheral IV located on Right hand . Patients belongings are located with mom . Patient's point of contact is mom - carmen . They will be in the waiting room. They are able to receive medication information. They will be their ride home.

## 2019-03-12 NOTE — H&P
History and Physical    Xavi Mobley        1965  [id number]        [id number]     Pre-Procedure Diagnosis:  dysphagia  r13.10    Chief Complaint:  No chief complaint on file. HPI: Patient with chronic intermittent dysphagia to solids. She has no difficulty to liquids. History of ring in the past.  She has no vomitign. No weight loss. No fevers or chills. No other complaints. Past Medical History:   Diagnosis Date    Alcohol abuse     Anxiety     Arrhythmia     Tacchycardai    Asthma     controlled    [facility]ipolar disorder ([facility]     Common migraine     COPD (chronic obstructive pulmonary disease) (HCC)     Home O2  PRN ,  pt use also for Tachycardia    Depression     Esophageal reflux     Gout     Heart attack ([facility] [facility] 01/2019    Hypertension     Inverted nipple     Left breast always have.  Kidney stone on left side     Microhematuria     Pancreatitis     Recurrent UTI     Staghorn renal calculus     Urine leukocytes      Past Surgical History:   Procedure Laterality Date    HX COLONOSCOPY      HX CYST REMOVAL Left     x 3 surgeries    HX ENDOSCOPY      HX GYN      tubal ligation    HX LUMBAR LAMINECTOMY      HX UROLOGICAL  08/10/2018    Cysto, bilat RPG, left ureteral pyeloscopy, HLL, left JJ stent placement, Dr. Burton [facility]     Family History   Family history unknown: Yes     Social History     Socioeconomic History    Marital status:      Spouse name: Not on file    Number of children: Not on file    Years of education: Not on file    Highest education level: Not on file   Tobacco Use    Smoking status: Current Every Day Smoker     Packs/day: 1.50     Years: 33.00     Pack years: 49.50     Types: Cigarettes    Smokeless tobacco: Never Used   Substance and Sexual Activity    Alcohol use: No     Comment: pt  is a RECOVERING  ALCOHOLIC    Drug use: No     Comment: 11years old- [facility]       Allergies:     Allergies   Allergen Reactions    Lithium Anaphylaxis  Morphine Hives     Can now take this medication    Amitriptyline Other (comments)     Left leg went numb    Elavil Other (comments)     Left leg went numb     Medications:   Current Facility-Administered Medications   Medication Dose Route Frequency    lidocaine (PF) (XYLOCAINE) 10 mg/mL (1 %) injection 0.1 mL  0.1 mL SubCUTAneous PRN    lactated Ringers infusion  50 mL/hr IntraVENous CONTINUOUS     Vital Signs   Visit Vitals  /69 (BP 1 Location: Right arm, BP Patient Position: At rest)   Pulse (!) 104   Temp 97.1 °F (36.2 °C)   Resp 18   Ht 5' 2\" (1.575 m)   Wt 53.1 kg (117 lb 1.6 oz)   LMP 04/01/2013   SpO2 97%   BMI 21.42 kg/m²       Review of Systems  A comprehensive review of systems was negative except for that written in the History of Present Illness. Physical Exam:  General:  Alert, cooperative, no distress, appears stated age. Eyes:  Conjunctivae/corneas clear. PERRL, EOMs intact. Fundi benign           Mouth/Throat: Lips, mucosa, and tongue normal. Teeth and gums normal.   Neck: Supple, symmetrical, trachea midline, no adenopathy, thyroid: no enlargement/tenderness/nodules, no carotid bruit and no JVD. Lungs:   Clear to auscultation bilaterally. Heart:  Regular rate and rhythm, S1, S2 normal, no murmur, click, rub or gallop. Abdomen:   Soft, non-tender. Bowel sounds normal. No masses,  No organomegaly. Extremities: Extremities normal, atraumatic, no cyanosis or edema. Skin: Skin color, texture, turgor normal. No rashes or lesions             Laboratory Data:  Recent Results (from the past 24 hour(s))   HCG URINE, QL. - POC    Collection Time: 03/12/19  8:34 AM   Result Value Ref Range    Pregnancy test,urine (POC) NEGATIVE  NEG         Hospital Problems  Date Reviewed: 1/18/2019    None          Impression and Plan:  Dysphagia. I recommend EGD.   See prior H&P      Al Wesley MD  3/12/2019  9:01 AM

## 2019-03-12 NOTE — ROUTINE PROCESS
Patient taken to recovery by this RN and CRNA, bedside report given to [facility] RN. Patient stable and on monitor.

## 2019-03-12 NOTE — ANESTHESIA PREPROCEDURE EVALUATION
Anesthetic History   No history of anesthetic complications            Review of Systems / Medical History  Patient summary reviewed and pertinent labs reviewed    Pulmonary    COPD: mild      Smoker  Asthma : well controlled       Neuro/Psych         Psychiatric history (Depression)     Cardiovascular    Hypertension: well controlled          Past MI (1/19. Denies Angina)    Exercise tolerance: >4 METS     GI/Hepatic/Renal               Comments: H/O pancreatitis.   S/P esophageal Dilatation in past Endo/Other  Within defined limits           Other Findings            Physical Exam    Airway  Mallampati: II  TM Distance: 4 - 6 cm  Neck ROM: normal range of motion   Mouth opening: Normal     Cardiovascular  Regular rate and rhythm,  S1 and S2 normal,  no murmur, click, rub, or gallop             Dental    Dentition: Caps/crowns and Poor dentition     Pulmonary  Breath sounds clear to auscultation               Abdominal         Other Findings            Anesthetic Plan    ASA: 3  Anesthesia type: MAC          Induction: Intravenous  Anesthetic plan and risks discussed with: Patient

## 2019-03-12 NOTE — DISCHARGE INSTRUCTIONS
Patient Discharge Instructions    Carlos Rivera / [id number] : 1965    Admitted 3/12/2019 Discharged: 3/12/2019         Procedure Impression:  1. Gastritis. Biopsies taken for H. Pylori. 2. Small 2cm hiatal hernia. 3. Small esophageal ring. Dilation performed with a 51 Fr Savary over a guidewire. 4. Esophagitis. Biopsies taken. 5. Otherwise normal EGD. Nasreen Kebede Recommendation:  1. Resume regular diet. 2. Will contact with biopsy results in 2 weeks   3. Continue pantoprazole. 4. Avoid NSAIDS  5. Recommend Alcohol and Smoking Cessation    Recommended Diet: Regular Diet    Recommended Activity:    1. Do not drink alcohol, drive or operate machinery for 12 hours   2. Call if any fever, abdominal pain or bleeding noted. Signed By: Emily Gallegos MD     2019           DISCHARGE SUMMARY from Nurse    PATIENT INSTRUCTIONS:    After general anesthesia or intravenous sedation, for 24 hours or while taking prescription Narcotics:  · Limit your activities  · Do not drive and operate hazardous machinery  · Do not make important personal or business decisions  · Do  not drink alcoholic beverages  · If you have not urinated within 8 hours after discharge, please contact your surgeon on call.     Report the following to your surgeon:  · Excessive pain, swelling, redness or odor of or around the surgical area  · Temperature over 100.5  · Nausea and vomiting lasting longer than 4 hours or if unable to take medications  · Any signs of decreased circulation or nerve impairment to extremity: change in color, persistent  numbness, tingling, coldness or increase pain  · Any questions    What to do at Home:  Recommended activity: Activity as tolerated and no driving for today,    These are general instructions for a healthy lifestyle:    No smoking/ No tobacco products/ Avoid exposure to second hand smoke  Surgeon General's Warning:  Quitting smoking now greatly reduces serious risk to your health. Obesity, smoking, and sedentary lifestyle greatly increases your risk for illness    A healthy diet, regular physical exercise & weight monitoring are important for maintaining a healthy lifestyle    You may be retaining fluid if you have a history of heart failure or if you experience any of the following symptoms:  Weight gain of 3 pounds or more overnight or 5 pounds in a week, increased swelling in our hands or feet or shortness of breath while lying flat in bed. Please call your doctor as soon as you notice any of these symptoms; do not wait until your next office visit. Recognize signs and symptoms of STROKE:    F-face looks uneven    A-arms unable to move or move unevenly    S-speech slurred or non-existent    T-time-call 911 as soon as signs and symptoms begin-DO NOT go       Back to bed or wait to see if you get better-TIME IS BRAIN. Warning Signs of HEART ATTACK     Call 911 if you have these symptoms:   Chest discomfort. Most heart attacks involve discomfort in the center of the chest that lasts more than a few minutes, or that goes away and comes back. It can feel like uncomfortable pressure, squeezing, fullness, or pain.  Discomfort in other areas of the upper body. Symptoms can include pain or discomfort in one or both arms, the back, neck, jaw, or stomach.  Shortness of breath with or without chest discomfort.  Other signs may include breaking out in a cold sweat, nausea, or lightheadedness. Don't wait more than five minutes to call 911 - MINUTES MATTER! Fast action can save your life. Calling 911 is almost always the fastest way to get lifesaving treatment. Emergency Medical Services staff can begin treatment when they arrive -- up to an hour sooner than if someone gets to the hospital by car. The discharge information has been reviewed with the patient. The patient verbalized understanding.   Discharge medications reviewed with the patient and appropriate educational materials and side effects teaching were provided. ___________________________________________________________________________________________________________________________________  Patient armband removed and given to patient to take home.   Patient was informed of the privacy risks if armband lost or stolen

## 2019-03-12 NOTE — ANESTHESIA POSTPROCEDURE EVALUATION
Procedure(s):  ESOPHAGOGASTRODUODENOSCOPY (EGD).     Anesthesia Post Evaluation      Multimodal analgesia: multimodal analgesia used between 6 hours prior to anesthesia start to PACU discharge  Patient location during evaluation: bedside  Patient participation: complete - patient participated  Level of consciousness: awake  Pain score: 0  Pain management: adequate  Airway patency: patent  Anesthetic complications: no  Cardiovascular status: stable  Respiratory status: acceptable  Hydration status: acceptable  Post anesthesia nausea and vomiting:  controlled      Visit Vitals  /78 (BP 1 Location: Right arm, BP Patient Position: At rest)   Pulse 88   Temp 36.2 °C (97.1 °F)   Resp 18   Ht 5' 2\" (1.575 m)   Wt 53.1 kg (117 lb 1.6 oz)   SpO2 98%   BMI 21.42 kg/m²

## 2019-03-12 NOTE — PROCEDURES
Endoscopy Procedure Note    Patient: Kole Aguilar MRN: [id number]  SSN: [id number]    YOB: 1965  Age: 46 y.o. Sex: female      Date/Time:  3/12/2019 9:22 AM    Esophagogastroduodenoscopy (EGD) Procedure Note    Procedure: Esophagogastroduodenoscopy with biopsy, esophageal dilation    IMPRESSION:   1. Gastritis. Biopsies taken for H. Pylori. 2. Small 2cm hiatal hernia. 3. Small esophageal ring. Dilation performed with a 51 Fr Savary over a guidewire. 4. Esophagitis. Biopsies taken. 5. Otherwise normal EGD. RECOMMENDATIONS:  1. Resume regular diet. 2. Continue PPI. 3. Recommend alcohol and Smoking cessation. 4. Avoid NSAIDs. 5. Will contact with biopsy results. Indication: Dysphagia/odynophagia  :  Mary Bravo MD  Assistants: Endoscopy Technician-1: Josephine Acevedo  Endoscopy RN-1: Ger Rahman RN    Referring Provider:   Zaida Ivy MD  History: The history and physical exam were reviewed and updated. Endoscope: Olympus GIF-190 diagnostic endoscope  Extent of Exam: second portion of the duodenum  ASA: ASA 3 - Patient with moderate systemic disease with functional limitations  Anethesia/Sedation:  MAC anesthesia    Description of the procedure: The procedure was discussed with the patient including risks, benefits, alternatives including risks of iv sedation, bleeding, perforation and aspiration. A safety timeout was performed. The patient was placed in the left lateral decubitus position. A bite block was placed. The patient was given incremental doses of intravenous sedation until moderate sedation was achieved. The patients vital signs were monitored at all times including heart rate/rhythm, blood pressure and oxygen saturation. The endoscope was then passed under direct visualization to the second portion of the duodenum. The endoscope was then slowly withdrawn while visualizing the mucosa.   In the stomach a retroflexion was performed and gastric fundus and cardia visualized. The endoscope was then slowly withdrawn. The patient was then transferred to recovery in stable condition. Findings:    Esophagus: There was a small 2cm hiatal hernia. There was a small non-obstructive ring in the distal esophagus. Esophageal dilation performed with a 51 Fr Savary over a guidewire. The esophageal mucosa was edematous, ulcerated. Biopsies taken. Stomach: The gastric mucosa was edematous and erythematous. Biopsies taken for H. Pylori. Duodenum: The duodenum mucosa was normal with no ulceration, mass, stricture and no evidence of villous atrophy. Therapies:  Esophageal dilation    Specimens:   ID Type Source Tests Collected by Time Destination   1 : bx gastric r/o hpylori Preservative Gastric  Do Benitez MD 3/12/2019 0914 Pathology   2 : bx esophagus r/o esophagitis Preservative Esophagus  Do Benitez MD 3/12/2019 9529 Pathology               Complications:   None; patient tolerated the procedure well.     EBL:Minimal    Discharge disposition:  Home in the company of  when able to ambulate    Sabine Marx MD  March 12, 2019  9:22 AM

## 2019-05-02 ENCOUNTER — HOSPITAL ENCOUNTER (EMERGENCY)
Age: 54
Discharge: HOME | End: 2019-05-02
Attending: EMERGENCY MEDICINE
Payer: MEDICARE

## 2019-05-02 VITALS
HEART RATE: 98 BPM | OXYGEN SATURATION: 97 % | RESPIRATION RATE: 14 BRPM | SYSTOLIC BLOOD PRESSURE: 140 MMHG | TEMPERATURE: 98.7 F | DIASTOLIC BLOOD PRESSURE: 81 MMHG | WEIGHT: 121 LBS | BODY MASS INDEX: 22.13 KG/M2

## 2019-05-02 DIAGNOSIS — F10.930 ALCOHOL WITHDRAWAL SYNDROME WITHOUT COMPLICATION (HCC): ICD-10-CM

## 2019-05-02 DIAGNOSIS — K86.0 ALCOHOL-INDUCED CHRONIC PANCREATITIS (HCC): Primary | ICD-10-CM

## 2019-05-02 LAB
ALBUMIN SERPL-MCNC: 4.2 G/DL (ref 3.4–5)
ALBUMIN/GLOB SERPL: 1.1 {RATIO} (ref 0.8–1.7)
ALP SERPL-CCNC: 104 U/L (ref 45–117)
ALT SERPL-CCNC: 85 U/L (ref 13–56)
ANION GAP SERPL CALC-SCNC: 15 MMOL/L (ref 3–18)
APPEARANCE UR: CLEAR
AST SERPL-CCNC: 152 U/L (ref 15–37)
ATRIAL RATE: 93 BPM
BASOPHILS # BLD: 0 K/UL (ref 0–0.1)
BASOPHILS NFR BLD: 1 % (ref 0–2)
BILIRUB SERPL-MCNC: 0.7 MG/DL (ref 0.2–1)
BILIRUB UR QL: NEGATIVE
BUN SERPL-MCNC: 8 MG/DL (ref 7–18)
BUN/CREAT SERPL: 11 (ref 12–20)
CALCIUM SERPL-MCNC: 9 MG/DL (ref 8.5–10.1)
CALCULATED P AXIS, ECG09: 14 DEGREES
CALCULATED R AXIS, ECG10: 82 DEGREES
CALCULATED T AXIS, ECG11: 75 DEGREES
CHLORIDE SERPL-SCNC: 99 MMOL/L (ref 100–108)
CK MB CFR SERPL CALC: NORMAL % (ref 0–4)
CK MB SERPL-MCNC: <1 NG/ML (ref 5–25)
CK SERPL-CCNC: 125 U/L (ref 26–192)
CO2 SERPL-SCNC: 22 MMOL/L (ref 21–32)
COLOR UR: YELLOW
CREAT SERPL-MCNC: 0.72 MG/DL (ref 0.6–1.3)
DIAGNOSIS, 93000: NORMAL
DIFFERENTIAL METHOD BLD: ABNORMAL
EOSINOPHIL # BLD: 0 K/UL (ref 0–0.4)
EOSINOPHIL NFR BLD: 0 % (ref 0–5)
ERYTHROCYTE [DISTWIDTH] IN BLOOD BY AUTOMATED COUNT: 14.7 % (ref 11.6–14.5)
GLOBULIN SER CALC-MCNC: 3.9 G/DL (ref 2–4)
GLUCOSE SERPL-MCNC: 68 MG/DL (ref 74–99)
GLUCOSE UR STRIP.AUTO-MCNC: NEGATIVE MG/DL
HCT VFR BLD AUTO: 42.9 % (ref 35–45)
HGB BLD-MCNC: 14.4 G/DL (ref 12–16)
HGB UR QL STRIP: NEGATIVE
KETONES UR QL STRIP.AUTO: ABNORMAL MG/DL
LEUKOCYTE ESTERASE UR QL STRIP.AUTO: NEGATIVE
LIPASE SERPL-CCNC: 70 U/L (ref 73–393)
LYMPHOCYTES # BLD: 2.9 K/UL (ref 0.9–3.6)
LYMPHOCYTES NFR BLD: 50 % (ref 21–52)
MCH RBC QN AUTO: 32.1 PG (ref 24–34)
MCHC RBC AUTO-ENTMCNC: 33.6 G/DL (ref 31–37)
MCV RBC AUTO: 95.5 FL (ref 74–97)
MONOCYTES # BLD: 0.5 K/UL (ref 0.05–1.2)
MONOCYTES NFR BLD: 8 % (ref 3–10)
NEUTS SEG # BLD: 2.3 K/UL (ref 1.8–8)
NEUTS SEG NFR BLD: 41 % (ref 40–73)
NITRITE UR QL STRIP.AUTO: NEGATIVE
P-R INTERVAL, ECG05: 120 MS
PH UR STRIP: 8 [PH] (ref 5–8)
PLATELET # BLD AUTO: 189 K/UL (ref 135–420)
PMV BLD AUTO: 9.8 FL (ref 9.2–11.8)
POTASSIUM SERPL-SCNC: 4.5 MMOL/L (ref 3.5–5.5)
PROT SERPL-MCNC: 8.1 G/DL (ref 6.4–8.2)
PROT UR STRIP-MCNC: NEGATIVE MG/DL
Q-T INTERVAL, ECG07: 352 MS
QRS DURATION, ECG06: 76 MS
QTC CALCULATION (BEZET), ECG08: 437 MS
RBC # BLD AUTO: 4.49 M/UL (ref 4.2–5.3)
SODIUM SERPL-SCNC: 136 MMOL/L (ref 136–145)
SP GR UR REFRACTOMETRY: 1 (ref 1–1.03)
TROPONIN I SERPL-MCNC: <0.02 NG/ML (ref 0–0.04)
UROBILINOGEN UR QL STRIP.AUTO: 1 EU/DL (ref 0.2–1)
VENTRICULAR RATE, ECG03: 93 BPM
WBC # BLD AUTO: 5.7 K/UL (ref 4.6–13.2)

## 2019-05-02 PROCEDURE — 96375 TX/PRO/DX INJ NEW DRUG ADDON: CPT

## 2019-05-02 PROCEDURE — 96374 THER/PROPH/DIAG INJ IV PUSH: CPT

## 2019-05-02 PROCEDURE — 74011250636 HC RX REV CODE- 250/636

## 2019-05-02 PROCEDURE — 83690 ASSAY OF LIPASE: CPT

## 2019-05-02 PROCEDURE — 80053 COMPREHEN METABOLIC PANEL: CPT

## 2019-05-02 PROCEDURE — 81003 URINALYSIS AUTO W/O SCOPE: CPT

## 2019-05-02 PROCEDURE — 85025 COMPLETE CBC W/AUTO DIFF WBC: CPT

## 2019-05-02 PROCEDURE — 96361 HYDRATE IV INFUSION ADD-ON: CPT

## 2019-05-02 PROCEDURE — 74011250636 HC RX REV CODE- 250/636: Performed by: EMERGENCY MEDICINE

## 2019-05-02 PROCEDURE — 93005 ELECTROCARDIOGRAM TRACING: CPT

## 2019-05-02 PROCEDURE — 74011250637 HC RX REV CODE- 250/637: Performed by: EMERGENCY MEDICINE

## 2019-05-02 PROCEDURE — 82550 ASSAY OF CK (CPK): CPT

## 2019-05-02 PROCEDURE — 99285 EMERGENCY DEPT VISIT HI MDM: CPT

## 2019-05-02 RX ORDER — HYDROMORPHONE HYDROCHLORIDE 1 MG/ML
1 INJECTION, SOLUTION INTRAMUSCULAR; INTRAVENOUS; SUBCUTANEOUS ONCE
Status: COMPLETED | OUTPATIENT
Start: 2019-05-02 | End: 2019-05-02

## 2019-05-02 RX ORDER — HYDROMORPHONE HYDROCHLORIDE 1 MG/ML
INJECTION, SOLUTION INTRAMUSCULAR; INTRAVENOUS; SUBCUTANEOUS
Status: DISPENSED
Start: 2019-05-02 | End: 2019-05-02

## 2019-05-02 RX ORDER — ONDANSETRON 2 MG/ML
INJECTION INTRAMUSCULAR; INTRAVENOUS
Status: DISPENSED
Start: 2019-05-02 | End: 2019-05-02

## 2019-05-02 RX ORDER — HYDROCODONE BITARTRATE AND ACETAMINOPHEN 5; 325 MG/1; MG/1
1 TABLET ORAL
Qty: 6 TAB | Refills: 0 | Status: SHIPPED | OUTPATIENT
Start: 2019-05-02 | End: 2019-05-05

## 2019-05-02 RX ORDER — CHLORDIAZEPOXIDE HYDROCHLORIDE 25 MG/1
25 CAPSULE, GELATIN COATED ORAL
Qty: 10 CAP | Refills: 0 | Status: SHIPPED | OUTPATIENT
Start: 2019-05-02 | End: 2019-05-07

## 2019-05-02 RX ORDER — CHLORDIAZEPOXIDE HYDROCHLORIDE 25 MG/1
CAPSULE, GELATIN COATED ORAL
Status: DISPENSED
Start: 2019-05-02 | End: 2019-05-02

## 2019-05-02 RX ORDER — CHLORDIAZEPOXIDE HYDROCHLORIDE 25 MG/1
25 CAPSULE, GELATIN COATED ORAL ONCE
Status: COMPLETED | OUTPATIENT
Start: 2019-05-02 | End: 2019-05-02

## 2019-05-02 RX ORDER — ONDANSETRON 2 MG/ML
4 INJECTION INTRAMUSCULAR; INTRAVENOUS
Status: COMPLETED | OUTPATIENT
Start: 2019-05-02 | End: 2019-05-02

## 2019-05-02 RX ADMIN — CHLORDIAZEPOXIDE HYDROCHLORIDE 25 MG: 25 CAPSULE ORAL at 02:10

## 2019-05-02 RX ADMIN — HYDROMORPHONE HYDROCHLORIDE 1 MG: 1 INJECTION, SOLUTION INTRAMUSCULAR; INTRAVENOUS; SUBCUTANEOUS at 02:10

## 2019-05-02 RX ADMIN — ONDANSETRON 4 MG: 2 INJECTION INTRAMUSCULAR; INTRAVENOUS at 02:10

## 2019-05-02 RX ADMIN — SODIUM CHLORIDE 1000 ML: 900 INJECTION, SOLUTION INTRAVENOUS at 02:09

## 2019-05-02 NOTE — ED PROVIDER NOTES
EMERGENCY DEPARTMENT HISTORY AND PHYSICAL EXAM 
 
1:49 AM 
 
 
Date: 5/2/2019 Patient Name: Natasha Barnhart History of Presenting Illness Chief Complaint Patient presents with  Chest Pain  Alcohol Problem History Provided By: Patient Chief Complaint: Abdominal pain Duration:  Days Timing:  Intermittent Location: Left upper Quality: Miriam Peacock Severity: Moderate Modifying Factors: none Associated Symptoms: nausea, Additional History (Context): Natasha Barnhart is a 53 y.o. female with Pancreatitis, MI who presents with chest and abdominal pain. Patient reports this started a few days ago when she felt like she was having a pancreatitis flare. Of note she is also been trying to detox herself from alcohol for the last few days. She is reporting for about 12 hours some left upper chest burning pain. She describes her epigastric pain as sharp and stabbing. Took 2 Percocet on Sunday without any improvement. Has been using some chicken broth without any improvement. Admits to some nausea but no vomiting. No prior history of alcohol withdrawal seizures. Does admit to alcohol today. No other complaints. PCP: Rubi Mendes MD 
 
Current Outpatient Medications Medication Sig Dispense Refill  
 HYDROcodone-acetaminophen (NORCO) 5-325 mg per tablet Take 1 Tab by mouth every six (6) hours as needed for Pain for up to 3 days. Max Daily Amount: 4 Tabs. 6 Tab 0  chlordiazePOXIDE (LIBRIUM) 25 mg capsule Take 1 Cap by mouth three (3) times daily as needed for Anxiety. Max Daily Amount: 75 mg. 10 Cap 0  
 amoxicillin (AMOXIL) 250 mg capsule Take  by mouth three (3) times daily.  lamoTRIgine (LAMICTAL) 200 mg tablet Take  by mouth daily.  ibuprofen (MOTRIN) 600 mg tablet Take 1 Tab by mouth every six (6) hours as needed for Pain. 20 Tab 0  
 acetaminophen (TYLENOL) 325 mg tablet Take 2 Tabs by mouth every four (4) hours as needed for Pain.  20 Tab 0  
  guaiFENesin ER (MUCINEX) 600 mg ER tablet Take 1 Tab by mouth two (2) times a day. 15 Tab 0  
 Potassium Citrate (UROCIT-K) TbER tablet Take 1 Tab by mouth three (3) times daily (with meals). 90 Tab 11  
 gabapentin (NEURONTIN) 600 mg tablet Take 600 mg by mouth two (2) times a day.  VITAMIN C 500 mg tablet  hydrocortisone (CORTAID) 0.5 % topical cream Apply  to affected area two (2) times a day. use thin layer 30 g 0  
 ondansetron (ZOFRAN ODT) 8 mg disintegrating tablet Take 1 Tab by mouth every eight (8) hours as needed for Nausea. 15 Tab 0  
 folic acid (FOLVITE) 1 mg tablet Take 1 Tab by mouth daily. 30 Tab 0  
 pantoprazole (PROTONIX) 40 mg tablet Take 1 Tab by mouth Before breakfast and dinner. 60 Tab 0  
 sucralfate (CARAFATE) 100 mg/mL suspension Take 10 mL by mouth Before breakfast, lunch, dinner and at bedtime. 1200 mL 0  
 cloNIDine HCl (CATAPRES) 0.2 mg tablet Take 0.5 Tabs by mouth two (2) times a day. 30 Tab 0  
 PREVIDENT 5000 PLUS 1.1 % crea  calcium-cholecalciferol, D3, (CALTRATE 600+D) tablet  topiramate (TOPAMAX) 100 mg tablet 100 mg two (2) times a day.  QUEtiapine (SEROQUEL) 300 mg tablet  CREON 24,000-76,000 -120,000 unit capsule  cetirizine (ZYRTEC) 10 mg tablet  SUMAtriptan (IMITREX) 100 mg tablet TAKE 1 TO 2 TABLETS BY MOUTH DAILY AS NEEDED FOR MIGRAINE . DO NOT EXCEED 200 MG IN 24 HOUR PERIOD  0  
 busPIRone (BUSPAR) 15 mg tablet Take 1 Tab by mouth two (2) times a day. 60 Tab 0  
 albuterol (PROVENTIL VENTOLIN) 2.5 mg /3 mL (0.083 %) nebulizer solution 1.5 mL by Nebulization route every four (4) hours as needed for Wheezing or Shortness of Breath. 24 Each 0  
 ipratropium-albuterol (COMBIVENT)  mcg/actuation inhaler Take 2 Puffs by inhalation every six (6) hours. Past History Past Medical History: 
Past Medical History:  
Diagnosis Date  Alcohol abuse  Anxiety  Arrhythmia Junnie Patella  Asthma controlled  Bipolar disorder ([facility]  Common migraine  COPD (chronic obstructive pulmonary disease) (HCC) Home O2  PRN ,  pt use also for Tachycardia  Depression  Esophageal reflux  Gout  Heart attack ([facility] [address] 01/2019  Hypertension  Inverted nipple Left breast always have.  Kidney stone on left side  Microhematuria  Pancreatitis  Recurrent UTI  Staghorn renal calculus  Urine leukocytes Past Surgical History: 
Past Surgical History:  
Procedure Laterality Date  HX COLONOSCOPY    
 HX CYST REMOVAL Left   
 x 3 surgeries  HX ENDOSCOPY    
 HX GYN    
 tubal ligation  HX LUMBAR LAMINECTOMY  HX UROLOGICAL  08/10/2018 Cysto, bilat RPG, left ureteral pyeloscopy, HLL, left JJ stent placement, Dr. Brooke Velazquez Family History: 
Family History Family history unknown: Yes  
 
 
Social History: 
Social History Tobacco Use  Smoking status: Current Every Day Smoker Packs/day: 1.50 Years: 33.00 Pack years: 49.50 Types: Cigarettes  Smokeless tobacco: Never Used Substance Use Topics  Alcohol use: Yes Comment: vodka  Drug use: No  
  Comment: 11years old- Colby Valenzuela Allergies: Allergies Allergen Reactions  Lithium Anaphylaxis  Morphine Hives Can now take this medication  Amitriptyline Other (comments) Left leg went numb  Elavil Other (comments) Left leg went numb Review of Systems Review of Systems Constitutional: Negative for fever. Respiratory: Positive for chest tightness. Negative for shortness of breath. Cardiovascular: Positive for chest pain. Gastrointestinal: Positive for abdominal pain and nausea. Negative for diarrhea. All other systems reviewed and are negative. Physical Exam  
 
Visit Vitals /81 Pulse 98 Temp 98.7 °F (37.1 °C) Resp 14 Wt 54.9 kg (121 lb) LMP 04/01/2013 SpO2 97% BMI 22.13 kg/m² Physical Exam  
Constitutional: She is oriented to person, place, and time. She appears well-developed and well-nourished. HENT:  
Head: Normocephalic and atraumatic. Mild tongue fasciculations Neck: Neck supple. No JVD present. Cardiovascular: Regular rhythm. Tachycardia present. Pulmonary/Chest: Effort normal and breath sounds normal. No respiratory distress. Abdominal: Soft. She exhibits no distension. There is tenderness (Mild epigastric and left upper abdominal). There is no rebound and no guarding. Musculoskeletal: She exhibits no edema. No joint tenderness Neurological: She is alert and oriented to person, place, and time. Skin: Skin is warm and dry. No erythema. Psychiatric: Judgment normal.  
 
 
 
Diagnostic Study Results Labs - Recent Results (from the past 12 hour(s)) EKG, 12 LEAD, INITIAL Collection Time: 05/02/19  1:29 AM  
Result Value Ref Range Ventricular Rate 93 BPM  
 Atrial Rate 93 BPM  
 P-R Interval 120 ms QRS Duration 76 ms  
 Q-T Interval 352 ms QTC Calculation (Bezet) 437 ms Calculated P Axis 14 degrees Calculated R Axis 82 degrees Calculated T Axis 75 degrees Diagnosis Normal sinus rhythm Normal ECG When compared with ECG of 02-MAR-2019 09:38, No significant change was found CBC WITH AUTOMATED DIFF Collection Time: 05/02/19  1:40 AM  
Result Value Ref Range WBC 5.7 4.6 - 13.2 K/uL  
 RBC 4.49 4.20 - 5.30 M/uL  
 HGB 14.4 12.0 - 16.0 g/dL HCT 42.9 35.0 - 45.0 % MCV 95.5 74.0 - 97.0 FL  
 MCH 32.1 24.0 - 34.0 PG  
 MCHC 33.6 31.0 - 37.0 g/dL  
 RDW 14.7 (H) 11.6 - 14.5 % PLATELET 343 069 - 561 K/uL MPV 9.8 9.2 - 11.8 FL  
 NEUTROPHILS 41 40 - 73 % LYMPHOCYTES 50 21 - 52 % MONOCYTES 8 3 - 10 % EOSINOPHILS 0 0 - 5 % BASOPHILS 1 0 - 2 %  
 ABS. NEUTROPHILS 2.3 1.8 - 8.0 K/UL  
 ABS. LYMPHOCYTES 2.9 0.9 - 3.6 K/UL  
 ABS. MONOCYTES 0.5 0.05 - 1.2 K/UL  
 ABS. EOSINOPHILS 0.0 0.0 - 0.4 K/UL ABS. BASOPHILS 0.0 0.0 - 0.1 K/UL  
 DF AUTOMATED METABOLIC PANEL, COMPREHENSIVE Collection Time: 05/02/19  1:40 AM  
Result Value Ref Range Sodium 136 136 - 145 mmol/L Potassium 4.5 3.5 - 5.5 mmol/L Chloride 99 (L) 100 - 108 mmol/L  
 CO2 22 21 - 32 mmol/L Anion gap 15 3.0 - 18 mmol/L Glucose 68 (L) 74 - 99 mg/dL BUN 8 7.0 - 18 MG/DL Creatinine 0.72 0.6 - 1.3 MG/DL  
 BUN/Creatinine ratio 11 (L) 12 - 20 GFR est AA >60 >60 ml/min/1.73m2 GFR est non-AA >60 >60 ml/min/1.73m2 Calcium 9.0 8.5 - 10.1 MG/DL Bilirubin, total 0.7 0.2 - 1.0 MG/DL  
 ALT (SGPT) 85 (H) 13 - 56 U/L  
 AST (SGOT) 152 (H) 15 - 37 U/L Alk. phosphatase 104 45 - 117 U/L Protein, total 8.1 6.4 - 8.2 g/dL Albumin 4.2 3.4 - 5.0 g/dL Globulin 3.9 2.0 - 4.0 g/dL A-G Ratio 1.1 0.8 - 1.7 LIPASE Collection Time: 05/02/19  1:40 AM  
Result Value Ref Range Lipase 70 (L) 73 - 393 U/L  
CARDIAC PANEL,(CK, CKMB & TROPONIN) Collection Time: 05/02/19  1:40 AM  
Result Value Ref Range  26 - 192 U/L  
 CK - MB <1.0 <3.6 ng/ml CK-MB Index  0.0 - 4.0 % CALCULATION NOT PERFORMED WHEN RESULT IS BELOW LINEAR LIMIT Troponin-I, QT <0.02 0.0 - 0.045 NG/ML  
URINALYSIS W/ RFLX MICROSCOPIC Collection Time: 05/02/19  1:55 AM  
Result Value Ref Range Color YELLOW Appearance CLEAR Specific gravity 1.005 1.005 - 1.030    
 pH (UA) 8.0 5.0 - 8.0 Protein NEGATIVE  NEG mg/dL Glucose NEGATIVE  NEG mg/dL Ketone TRACE (A) NEG mg/dL Bilirubin NEGATIVE  NEG Blood NEGATIVE  NEG Urobilinogen 1.0 0.2 - 1.0 EU/dL Nitrites NEGATIVE  NEG Leukocyte Esterase NEGATIVE  NEG Radiologic Studies - No orders to display Medical Decision Making I am the first provider for this patient. I reviewed the vital signs, available nursing notes, past medical history, past surgical history, family history and social history. Vital Signs-Reviewed the patient's vital signs. Pulse Oximetry Analysis -  99 on room air (Interpretation)nl Cardiac Monitor: 
Rate: 106 Rhythm:  Sinus tach EKG: Interpreted by the EP. Time Interpreted: 80 Rate: 93 
 Rhythm: Normal Sinus Rhythm Interpretation: Normal axis, normal intervals, no ST changes Comparison: 3/2/19, no significant change Records Reviewed: Nursing Notes, Old Medical Records and Previous electrocardiograms (Time of Review: 1:49 AM) ED Course: Progress Notes, Reevaluation, and Consults: 
 
Provider Notes (Medical Decision Making): 58-year-old female presenting with multiple complaints. #1 is epigastric pain and some chest pain, concern for pancreatitis flare. Will check basic labs. Abdomen is soft nonsurgical, doubt need for imaging. Will also treat for alcohol withdrawal patient with some mild tremors and mild tachycardia. Patient is also stating she is concerned she may completed antibiotics for UTI last week due to prior episodes of some vomiting when she felt like she had a GI bug, so we will repeat her UA today. 3:08 AM 
Discussed results with patient, her labs are unremarkable. She will be stable for DC home. Advised clear liquid diet for the next few days. Will give a short course for Librium for her withdrawal symptoms. Diagnosis Clinical Impression: 1. Alcohol-induced chronic pancreatitis ([address] 2. Alcohol withdrawal syndrome without complication ([address] Disposition: discharged Follow-up Information Follow up With Specialties Details Why Contact Info Oscar Tan MD Family Practice Schedule an appointment as soon as possible for a visit in 3 days  [address] [id number] 
[phone] [facility] EMERGENCY DEPT Emergency Medicine  As needed, If symptoms worsen [address] 
[phone] Patient's Medications Start Taking CHLORDIAZEPOXIDE (LIBRIUM) 25 MG CAPSULE    Take 1 Cap by mouth three (3) times daily as needed for Anxiety. Max Daily Amount: 75 mg. HYDROCODONE-ACETAMINOPHEN (NORCO) 5-325 MG PER TABLET    Take 1 Tab by mouth every six (6) hours as needed for Pain for up to 3 days. Max Daily Amount: 4 Tabs. Continue Taking ACETAMINOPHEN (TYLENOL) 325 MG TABLET    Take 2 Tabs by mouth every four (4) hours as needed for Pain. ALBUTEROL (PROVENTIL VENTOLIN) 2.5 MG /3 ML (0.083 %) NEBULIZER SOLUTION    1.5 mL by Nebulization route every four (4) hours as needed for Wheezing or Shortness of Breath. AMOXICILLIN (AMOXIL) 250 MG CAPSULE    Take  by mouth three (3) times daily. BUSPIRONE (BUSPAR) 15 MG TABLET    Take 1 Tab by mouth two (2) times a day. CALCIUM-CHOLECALCIFEROL, D3, (CALTRATE 600+D) TABLET      
 CETIRIZINE (ZYRTEC) 10 MG TABLET      
 CLONIDINE HCL (CATAPRES) 0.2 MG TABLET    Take 0.5 Tabs by mouth two (2) times a day. CREON 24,000-76,000 -120,000 UNIT CAPSULE      
 FOLIC ACID (FOLVITE) 1 MG TABLET    Take 1 Tab by mouth daily. GABAPENTIN (NEURONTIN) 600 MG TABLET    Take 600 mg by mouth two (2) times a day. GUAIFENESIN ER (MUCINEX) 600 MG ER TABLET    Take 1 Tab by mouth two (2) times a day. HYDROCORTISONE (CORTAID) 0.5 % TOPICAL CREAM    Apply  to affected area two (2) times a day. use thin layer IBUPROFEN (MOTRIN) 600 MG TABLET    Take 1 Tab by mouth every six (6) hours as needed for Pain. IPRATROPIUM-ALBUTEROL (COMBIVENT)  MCG/ACTUATION INHALER    Take 2 Puffs by inhalation every six (6) hours. LAMOTRIGINE (LAMICTAL) 200 MG TABLET    Take  by mouth daily. ONDANSETRON (ZOFRAN ODT) 8 MG DISINTEGRATING TABLET    Take 1 Tab by mouth every eight (8) hours as needed for Nausea. PANTOPRAZOLE (PROTONIX) 40 MG TABLET    Take 1 Tab by mouth Before breakfast and dinner. POTASSIUM CITRATE (UROCIT-K) TBER TABLET    Take 1 Tab by mouth three (3) times daily (with meals). PREVIDENT 5000 PLUS 1.1 % CREA QUETIAPINE (SEROQUEL) 300 MG TABLET      
 SUCRALFATE (CARAFATE) 100 MG/ML SUSPENSION    Take 10 mL by mouth Before breakfast, lunch, dinner and at bedtime. SUMATRIPTAN (IMITREX) 100 MG TABLET    TAKE 1 TO 2 TABLETS BY MOUTH DAILY AS NEEDED FOR MIGRAINE . DO NOT EXCEED 200 MG IN 24 HOUR PERIOD  
 TOPIRAMATE (TOPAMAX) 100 MG TABLET    100 mg two (2) times a day. VITAMIN C 500 MG TABLET These Medications have changed No medications on file Stop Taking No medications on file  
 
_______________________________

## 2019-05-02 NOTE — DISCHARGE INSTRUCTIONS
Learning About Alcohol Withdrawal  What is alcohol withdrawal?    If you drink alcohol regularly (more than a few drinks on most days) and then suddenly stop or cut down, you may go through some physical and emotional problems while the alcohol clears out of your system. This is called withdrawal. Clearing the alcohol from your body is called detoxification, or detox. What are the symptoms? Symptoms of alcohol withdrawal may start as soon as 4 to 12 hours after you stop drinking. Or they may not start until several days after the last drink. Mild symptoms include:  · Nausea. · Sweating. · Shakiness. · Diarrhea. · Intense worry. · Disturbed sleep. · Headache. More severe symptoms include:  · Vomiting or belly pain. · Being confused, upset, and irritable. · Changed sensations. You might feel things on your body that aren't really there. Or you may see or hear things that aren't there. · Trembling. · Being short of breath or having pain in your chest.  · Having seizures. Symptoms may peak within a few days. Mild symptoms can last for a few weeks. If your symptoms are severe, you'll need to see a doctor. What is the treatment for alcohol withdrawal?  Most people may be able to cut down or stop drinking with only mild withdrawal. They can stay safe by simply resting, drinking lots of fluids, and eating healthy foods. But people who drink large amounts of alcohol or are at risk for severe withdrawal symptoms should not try to detox at home unless they work closely with a doctor to manage it. A person can die of severe alcohol withdrawal.  Before you stop drinking, talk to your doctor about how you plan to stop. Be completely honest about how much you've been drinking. Your doctor will figure out if you need to detox in a medical center. You may get medicine to treat the symptoms whether you are at home or in a medical center. Medicine that treats seizures can also help.  Your doctor will explain what types of medicine might help you. You may start with a high dose and then take smaller amounts over several days. There's also medicine that can help you avoid alcohol while you recover. How can you manage your withdrawal and recovery? Here are a few tips that can help you to not start drinking again. · Make sure there's no alcohol in the house. This includes drinks as well as liquid medicines, rubbing alcohol, and certain flavorings like vanilla extract. · Try not to hang out with people you used to drink with. · Don't go it alone. Spend time with people who support the changes you are making in your life. This includes asking for advice and help from people who have stopped drinking. You might also try mutual support groups such as Alcoholics Anonymous. · Drink lots of fluids. · Eat snacks such as fruit, cheese and crackers, and pretzels. High-carbohydrate foods may help reduce the craving for alcohol. What happens after withdrawal?  It can be hard to stop drinking. But after you clear the alcohol from your system, you can start the next, healthier part of your life. After detox, you will focus on staying alcohol-free. You can learn skills that you can use to stay abstinent (or sober) as you recover. Finding new ways to deal with life's challenges, without drinking, takes time and effort. Recovery is a long-term process. It's not something you can achieve in a few weeks. Most people get some type of therapy, such as group counseling. You also may need medicine to help you stay sober. Treatment doesn't focus on alcohol use alone. It may address other parts of your life, like your relationships, work, medical problems, and home life. Treatment, support, patience, and commitment will help you make the changes you need to live a carias life without alcohol. You may find, over time, that the process gets easier, life becomes more joyous, and your connections to others becomes more rewarding.   Where can you find help? Behavioral Health Treatment Services . This service from the [facility] Substance Abuse and [facility]  can help you find local alcohol treatment services. Search online at [place]. samhsa.gov or call 6-028-166-QVDH ([phone]), or [employer] 7-394.430.2900. Where can you learn more? Go to http://shannon-heriberto.info/. Enter A011 in the search box to learn more about \"Learning About Alcohol Withdrawal.\"  Current as of: May 7, 2018  Content Version: 11.9  © 6253-8392 [employer]. Care instructions adapted under license by [employer] (which disclaims liability or warranty for this information). If you have questions about a medical condition or this instruction, always ask your healthcare professional. [address] any warranty or liability for your use of this information. Diet for Chronic Pancreatitis: Care Instructions  Your Care Instructions    The pancreas is an organ behind the stomach that makes hormones and enzymes to help your body digest food. Sometimes the enzymes attack another part of the pancreas, which can cause pain and swelling. This is called pancreatitis. Chronic pancreatitis may cause you to be in pain much of the time. You may be able to help the pain by avoiding alcohol and eating a low-fat diet. Your doctor and dietitian can help you make an eating plan that does not irritate your digestive system. Always talk with your doctor or dietitian before you make changes in your diet. Follow-up care is a key part of your treatment and safety. Be sure to make and go to all appointments, and call your doctor if you are having problems. It's also a good idea to know your test results and keep a list of the medicines you take. How can you care for yourself at home? · Do not drink alcohol. It may make your pain worse and cause other problems. Tell your doctor if you need help to quit. Counseling, support groups, and sometimes medicines can help you stay sober. · Ask your doctor if you need to take pancreatic enzyme pills to help your body digest fat and protein. · Drink plenty of fluids, enough so that your urine is light yellow or clear like water. If you have kidney, heart, or liver disease and have to limit fluids, talk with your doctor before you increase the amount of fluids you drink. Eat a low-fat diet  · Eat many small meals and snacks each day instead of three large meals. · Choose lean meats. ? Eat no more than 5 to 6½ ounces of meat a day. ? Cut off all fat you can see. ? Eat chicken and turkey without the skin. ? Many types of fish, such as salmon, lake trout, tuna, and herring, provide healthy omega-3 fat. But avoid fish canned in oil, such as sardines in olive oil. ? Bake, broil, or grill meats, poultry, or fish instead of frying them in butter or fat. · Drink or eat nonfat or low-fat milk, yogurt, cheese, or other milk products each day. ? Read the labels on cheeses, and choose those with less than 5 grams of fat an ounce. ? Try fat-free sour cream, cream cheese, or yogurt. ? Avoid cream soups and cream sauces on pasta. ? Eat low-fat ice cream, frozen yogurt, or sorbet. Avoid regular ice cream.  · Eat whole-grain cereals, breads, crackers, rice, or pasta. Avoid high-fat foods such as croissants, scones, biscuits, waffles, doughnuts, muffins, granola, and high-fat breads. · Flavor your foods with herbs and spices (such as basil, tarragon, or mint), fat-free sauces, or lemon juice instead of butter. You can also use butter substitutes, fat-free mayonnaise, or fat-free dressing. · Try applesauce, prune puree, or mashed bananas to replace some or all of the fat when you bake. · Limit fats and oils, such as butter, margarine, mayonnaise, and salad dressing, to no more than 1 tablespoon a meal.  · Avoid high-fat foods, such as:  ?  Chocolate, whole milk, ice cream, processed cheese, and egg yolks. ? Fried or buttered foods. ? Sausage, salami, and mi. ? Cinnamon rolls, cakes, pies, cookies, and other pastries. ? Prepared snack foods, such as potato chips, nut and granola bars, and mixed nuts. ? Coconut and avocado. · Learn how to read food labels for serving sizes and ingredients. Fast-food and convenience-food meals often have lots of fat. Where can you learn more? Go to http://shannon-heriberto.info/. Enter Z831 in the search box to learn more about \"Diet for Chronic Pancreatitis: Care Instructions. \"  Current as of: March 28, 2018  Content Version: 11.9  © 7986-8663 [employer]. Care instructions adapted under license by [employer] (which disclaims liability or warranty for this information). If you have questions about a medical condition or this instruction, always ask your healthcare professional. [place] [address] any warranty or liability for your use of this information. Patient Education        Learning About Alcohol Withdrawal  What is alcohol withdrawal?    If you drink alcohol regularly (more than a few drinks on most days) and then suddenly stop or cut down, you may go through some physical and emotional problems while the alcohol clears out of your system. This is called withdrawal. Clearing the alcohol from your body is called detoxification, or detox. What are the symptoms? Symptoms of alcohol withdrawal may start as soon as 4 to 12 hours after you stop drinking. Or they may not start until several days after the last drink. Mild symptoms include:  · Nausea. · Sweating. · Shakiness. · Diarrhea. · Intense worry. · Disturbed sleep. · Headache. More severe symptoms include:  · Vomiting or belly pain. · Being confused, upset, and irritable. · Changed sensations. You might feel things on your body that aren't really there.  Or you may see or hear things that aren't there.  · Trembling. · Being short of breath or having pain in your chest.  · Having seizures. Symptoms may peak within a few days. Mild symptoms can last for a few weeks. If your symptoms are severe, you'll need to see a doctor. What is the treatment for alcohol withdrawal?  Most people may be able to cut down or stop drinking with only mild withdrawal. They can stay safe by simply resting, drinking lots of fluids, and eating healthy foods. But people who drink large amounts of alcohol or are at risk for severe withdrawal symptoms should not try to detox at home unless they work closely with a doctor to manage it. A person can die of severe alcohol withdrawal.  Before you stop drinking, talk to your doctor about how you plan to stop. Be completely honest about how much you've been drinking. Your doctor will figure out if you need to detox in a medical center. You may get medicine to treat the symptoms whether you are at home or in a medical center. Medicine that treats seizures can also help. Your doctor will explain what types of medicine might help you. You may start with a high dose and then take smaller amounts over several days. There's also medicine that can help you avoid alcohol while you recover. How can you manage your withdrawal and recovery? Here are a few tips that can help you to not start drinking again. · Make sure there's no alcohol in the house. This includes drinks as well as liquid medicines, rubbing alcohol, and certain flavorings like vanilla extract. · Try not to hang out with people you used to drink with. · Don't go it alone. Spend time with people who support the changes you are making in your life. This includes asking for advice and help from people who have stopped drinking. You might also try mutual support groups such as Alcoholics Anonymous. · Drink lots of fluids. · Eat snacks such as fruit, cheese and crackers, and pretzels.  High-carbohydrate foods may help reduce the craving for alcohol. What happens after withdrawal?  It can be hard to stop drinking. But after you clear the alcohol from your system, you can start the next, healthier part of your life. After detox, you will focus on staying alcohol-free. You can learn skills that you can use to stay abstinent (or sober) as you recover. Finding new ways to deal with life's challenges, without drinking, takes time and effort. Recovery is a long-term process. It's not something you can achieve in a few weeks. Most people get some type of therapy, such as group counseling. You also may need medicine to help you stay sober. Treatment doesn't focus on alcohol use alone. It may address other parts of your life, like your relationships, work, medical problems, and home life. Treatment, support, patience, and commitment will help you make the changes you need to live a carias life without alcohol. You may find, over time, that the process gets easier, life becomes more joyous, and your connections to others becomes more rewarding. Where can you find help? Behavioral Health Treatment Services . This service from the [facility] Substance Abuse and [employer]  can help you find local alcohol treatment services. Search online at [place]. samhsa.gov or call 0-260-206-PGCN ([phone]), or [employer] [phone]. Where can you learn more? Go to http://shannon-heriberto.info/. Enter A011 in the search box to learn more about \"Learning About Alcohol Withdrawal.\"  Current as of: May 7, 2018  Content Version: 11.9  © 5921-8063 [employer]. Care instructions adapted under license by [employer] (which disclaims liability or warranty for this information). If you have questions about a medical condition or this instruction, always ask your healthcare professional. [address] any warranty or liability for your use of this information.

## 2019-05-02 NOTE — ED NOTES
Leslie RN has reviewed discharge instructions with the patient. The patient verbalized understanding. 2 prescriptions given. Pt ambulated out of the ED.

## 2019-05-05 ENCOUNTER — APPOINTMENT (OUTPATIENT)
Dept: GENERAL RADIOLOGY | Age: 54
End: 2019-05-05
Attending: NURSE PRACTITIONER
Payer: MEDICARE

## 2019-05-05 ENCOUNTER — HOSPITAL ENCOUNTER (EMERGENCY)
Age: 54
Discharge: HOME | End: 2019-05-05
Attending: EMERGENCY MEDICINE
Payer: MEDICARE

## 2019-05-05 VITALS
SYSTOLIC BLOOD PRESSURE: 144 MMHG | DIASTOLIC BLOOD PRESSURE: 80 MMHG | HEIGHT: 62 IN | HEART RATE: 95 BPM | TEMPERATURE: 98.6 F | RESPIRATION RATE: 10 BRPM | BODY MASS INDEX: 22.08 KG/M2 | OXYGEN SATURATION: 97 % | WEIGHT: 120 LBS

## 2019-05-05 DIAGNOSIS — K85.20 ALCOHOL-INDUCED ACUTE PANCREATITIS, UNSPECIFIED COMPLICATION STATUS: Primary | ICD-10-CM

## 2019-05-05 DIAGNOSIS — F10.10 ALCOHOL ABUSE: ICD-10-CM

## 2019-05-05 LAB
ALBUMIN SERPL-MCNC: 3.5 G/DL (ref 3.4–5)
ALBUMIN/GLOB SERPL: 0.9 {RATIO} (ref 0.8–1.7)
ALP SERPL-CCNC: 86 U/L (ref 45–117)
ALT SERPL-CCNC: 113 U/L (ref 13–56)
ANION GAP SERPL CALC-SCNC: 8 MMOL/L (ref 3–18)
AST SERPL-CCNC: 137 U/L (ref 15–37)
BASOPHILS # BLD: 0.1 K/UL (ref 0–0.1)
BASOPHILS NFR BLD: 1 % (ref 0–2)
BILIRUB SERPL-MCNC: 0.3 MG/DL (ref 0.2–1)
BUN SERPL-MCNC: 7 MG/DL (ref 7–18)
BUN/CREAT SERPL: 10 (ref 12–20)
CALCIUM SERPL-MCNC: 8.8 MG/DL (ref 8.5–10.1)
CHLORIDE SERPL-SCNC: 112 MMOL/L (ref 100–108)
CO2 SERPL-SCNC: 19 MMOL/L (ref 21–32)
CREAT SERPL-MCNC: 0.71 MG/DL (ref 0.6–1.3)
DIFFERENTIAL METHOD BLD: ABNORMAL
EOSINOPHIL # BLD: 0.1 K/UL (ref 0–0.4)
EOSINOPHIL NFR BLD: 1 % (ref 0–5)
ERYTHROCYTE [DISTWIDTH] IN BLOOD BY AUTOMATED COUNT: 15.5 % (ref 11.6–14.5)
ETHANOL SERPL-MCNC: 122 MG/DL (ref 0–3)
GLOBULIN SER CALC-MCNC: 4 G/DL (ref 2–4)
GLUCOSE SERPL-MCNC: 76 MG/DL (ref 74–99)
HCT VFR BLD AUTO: 39.8 % (ref 35–45)
HGB BLD-MCNC: 13.1 G/DL (ref 12–16)
LIPASE SERPL-CCNC: 1756 U/L (ref 73–393)
LYMPHOCYTES # BLD: 4.1 K/UL (ref 0.9–3.6)
LYMPHOCYTES NFR BLD: 58 % (ref 21–52)
MCH RBC QN AUTO: 31.6 PG (ref 24–34)
MCHC RBC AUTO-ENTMCNC: 32.9 G/DL (ref 31–37)
MCV RBC AUTO: 96.1 FL (ref 74–97)
MONOCYTES # BLD: 0.3 K/UL (ref 0.05–1.2)
MONOCYTES NFR BLD: 4 % (ref 3–10)
NEUTS SEG # BLD: 2.5 K/UL (ref 1.8–8)
NEUTS SEG NFR BLD: 36 % (ref 40–73)
PLATELET # BLD AUTO: 212 K/UL (ref 135–420)
PMV BLD AUTO: 9.9 FL (ref 9.2–11.8)
POTASSIUM SERPL-SCNC: 5.4 MMOL/L (ref 3.5–5.5)
PROT SERPL-MCNC: 7.5 G/DL (ref 6.4–8.2)
RBC # BLD AUTO: 4.14 M/UL (ref 4.2–5.3)
SODIUM SERPL-SCNC: 139 MMOL/L (ref 136–145)
TROPONIN I SERPL-MCNC: <0.02 NG/ML (ref 0–0.04)
TROPONIN I SERPL-MCNC: <0.02 NG/ML (ref 0–0.04)
WBC # BLD AUTO: 6.9 K/UL (ref 4.6–13.2)

## 2019-05-05 PROCEDURE — 80053 COMPREHEN METABOLIC PANEL: CPT

## 2019-05-05 PROCEDURE — 85025 COMPLETE CBC W/AUTO DIFF WBC: CPT

## 2019-05-05 PROCEDURE — 94762 N-INVAS EAR/PLS OXIMTRY CONT: CPT

## 2019-05-05 PROCEDURE — 93005 ELECTROCARDIOGRAM TRACING: CPT

## 2019-05-05 PROCEDURE — 96374 THER/PROPH/DIAG INJ IV PUSH: CPT

## 2019-05-05 PROCEDURE — 84484 ASSAY OF TROPONIN QUANT: CPT

## 2019-05-05 PROCEDURE — 96375 TX/PRO/DX INJ NEW DRUG ADDON: CPT

## 2019-05-05 PROCEDURE — 99285 EMERGENCY DEPT VISIT HI MDM: CPT

## 2019-05-05 PROCEDURE — 80307 DRUG TEST PRSMV CHEM ANLYZR: CPT

## 2019-05-05 PROCEDURE — 96376 TX/PRO/DX INJ SAME DRUG ADON: CPT

## 2019-05-05 PROCEDURE — 74011250636 HC RX REV CODE- 250/636: Performed by: EMERGENCY MEDICINE

## 2019-05-05 PROCEDURE — C9113 INJ PANTOPRAZOLE SODIUM, VIA: HCPCS | Performed by: EMERGENCY MEDICINE

## 2019-05-05 PROCEDURE — 96361 HYDRATE IV INFUSION ADD-ON: CPT

## 2019-05-05 PROCEDURE — 74011250637 HC RX REV CODE- 250/637: Performed by: NURSE PRACTITIONER

## 2019-05-05 PROCEDURE — 71045 X-RAY EXAM CHEST 1 VIEW: CPT

## 2019-05-05 PROCEDURE — 83690 ASSAY OF LIPASE: CPT

## 2019-05-05 PROCEDURE — 74011250636 HC RX REV CODE- 250/636: Performed by: NURSE PRACTITIONER

## 2019-05-05 RX ORDER — HYDROCODONE BITARTRATE AND ACETAMINOPHEN 7.5; 325 MG/1; MG/1
1 TABLET ORAL
Status: COMPLETED | OUTPATIENT
Start: 2019-05-05 | End: 2019-05-05

## 2019-05-05 RX ORDER — HYDROMORPHONE HYDROCHLORIDE 1 MG/ML
1 INJECTION, SOLUTION INTRAMUSCULAR; INTRAVENOUS; SUBCUTANEOUS
Status: COMPLETED | OUTPATIENT
Start: 2019-05-05 | End: 2019-05-05

## 2019-05-05 RX ORDER — IBUPROFEN 800 MG/1
800 TABLET ORAL
Qty: 20 TAB | Refills: 0 | Status: SHIPPED | OUTPATIENT
Start: 2019-05-05 | End: 2019-05-12

## 2019-05-05 RX ORDER — PANTOPRAZOLE SODIUM 40 MG/10ML
40 INJECTION, POWDER, LYOPHILIZED, FOR SOLUTION INTRAVENOUS
Status: COMPLETED | OUTPATIENT
Start: 2019-05-05 | End: 2019-05-05

## 2019-05-05 RX ADMIN — SODIUM CHLORIDE 1000 ML: 900 INJECTION, SOLUTION INTRAVENOUS at 18:40

## 2019-05-05 RX ADMIN — SODIUM CHLORIDE 1000 ML: 900 INJECTION, SOLUTION INTRAVENOUS at 20:31

## 2019-05-05 RX ADMIN — HYDROMORPHONE HYDROCHLORIDE 1 MG: 1 INJECTION, SOLUTION INTRAMUSCULAR; INTRAVENOUS; SUBCUTANEOUS at 21:00

## 2019-05-05 RX ADMIN — SODIUM CHLORIDE 1000 ML: 900 INJECTION, SOLUTION INTRAVENOUS at 18:33

## 2019-05-05 RX ADMIN — HYDROMORPHONE HYDROCHLORIDE 1 MG: 1 INJECTION, SOLUTION INTRAMUSCULAR; INTRAVENOUS; SUBCUTANEOUS at 19:29

## 2019-05-05 RX ADMIN — PANTOPRAZOLE SODIUM 40 MG: 40 INJECTION, POWDER, FOR SOLUTION INTRAVENOUS at 18:43

## 2019-05-05 RX ADMIN — HYDROCODONE BITARTRATE AND ACETAMINOPHEN 1 TABLET: 7.5; 325 TABLET ORAL at 18:38

## 2019-05-05 NOTE — ED PROVIDER NOTES
Madelene Bumpers is a 53year old feamle who presents to the ED with a c/o chest and abdomen pain. States she has a Hx of MI, and a Hx of pancreatitis. She admits that she drinks every day, and was drinking wine earlier in the day. The pain is constant, and she rates it as severe. Past Medical History:  
Diagnosis Date  Alcohol abuse  Anxiety  Arrhythmia Venetta Silvino  Asthma   
 controlled  Bipolar disorder ([facility] [address]  Common migraine  COPD (chronic obstructive pulmonary disease) ([facility]) Home O2  PRN ,  pt use also for Tachycardia  Depression  Esophageal reflux  Gout  Heart attack ([facility] [address] 01/2019  Hypertension  Inverted nipple Left breast always have.  Kidney stone on left side  Microhematuria  Pancreatitis  Recurrent UTI  Staghorn renal calculus  Urine leukocytes Past Surgical History:  
Procedure Laterality Date  HX COLONOSCOPY    
 HX CYST REMOVAL Left   
 x 3 surgeries  HX ENDOSCOPY    
 HX GYN    
 tubal ligation  HX LUMBAR LAMINECTOMY  HX UROLOGICAL  08/10/2018 Cysto, bilat RPG, left ureteral pyeloscopy, HLL, left JJ stent placement, Dr. Reji Deluca Family History:  
Family history unknown: Yes  
 
 
Social History Socioeconomic History  Marital status:  Spouse name: Not on file  Number of children: Not on file  Years of education: Not on file  Highest education level: Not on file Occupational History  Not on file Social Needs  Financial resource strain: Not on file  Food insecurity:  
  Worry: Not on file Inability: Not on file  Transportation needs:  
  Medical: Not on file Non-medical: Not on file Tobacco Use  Smoking status: Current Every Day Smoker Packs/day: 1.50 Years: 33.00 Pack years: 49.50 Types: Cigarettes  Smokeless tobacco: Never Used Substance and Sexual Activity  Alcohol use: Yes Comment: alvin  Drug use: No  
  Comment: 11years old- Ryne Martínez  Sexual activity: Not on file Lifestyle  Physical activity:  
  Days per week: Not on file Minutes per session: Not on file  Stress: Not on file Relationships  Social connections:  
  Talks on phone: Not on file Gets together: Not on file Attends Yazdanism service: Not on file Active member of club or organization: Not on file Attends meetings of clubs or organizations: Not on file Relationship status: Not on file  Intimate partner violence:  
  Fear of current or ex partner: Not on file Emotionally abused: Not on file Physically abused: Not on file Forced sexual activity: Not on file Other Topics Concern  Not on file Social History Narrative  Not on file ALLERGIES: Lithium; Morphine; Amitriptyline; and Elavil Review of Systems Constitutional: Negative. HENT: Negative. Eyes: Negative. Respiratory: Negative. Cardiovascular: Negative. Gastrointestinal: Positive for abdominal pain (Left upper quadrant). Endocrine: Negative. Genitourinary: Negative. Musculoskeletal: Negative. Skin: Negative. Allergic/Immunologic: Negative. Neurological: Negative. Hematological: Negative. Psychiatric/Behavioral: Negative. Vitals:  
 05/05/19 1718 05/05/19 1830 05/05/19 1845 BP: 112/87 107/75 117/77 Pulse: (!) 108 (!) 109 (!) 114 Resp: 12 9 13 Temp: 98.6 °F (37 °C) SpO2: 99% Weight: 54.4 kg (120 lb) Height: 5' 1.5\" (1.562 m) Physical Exam  
Constitutional: She appears well-developed and well-nourished. No distress. HENT:  
Head: Normocephalic and atraumatic. Nose: Nose normal.  
Eyes: EOM are normal. Right eye exhibits no discharge. Left eye exhibits no discharge. No scleral icterus. Neck: Normal range of motion. Neck supple. No tracheal deviation present.   
Cardiovascular: Normal rate, regular rhythm, normal heart sounds and intact distal pulses. Palpation to the anterior chest wall does not reproduce the Pt's pain. Pulmonary/Chest: Effort normal and breath sounds normal.  
Abdominal: Soft. Bowel sounds are normal. She exhibits no distension. There is tenderness (Pt is very TTP in the left upper quadrant. ). There is guarding (voluntary guarding in the left upper quadrant. ). Genitourinary:  
Genitourinary Comments: NE 
  
Musculoskeletal: Normal range of motion. She exhibits no deformity. Neurological: She is alert. Coordination normal.  
Skin: Skin is warm and dry. NE. Psychiatric: She has a normal mood and affect. Her behavior is normal.  
Nursing note and vitals reviewed. MDM Number of Diagnoses or Management Options Alcohol abuse: Alcohol-induced acute pancreatitis, unspecified complication status:  
Diagnosis management comments: PROGRESS NOTE:  CXR reviewed. No acute findings. Magaly Nelson NP   7:37 PM 
 
PROGRESS NOTE:   Rounded on the Pt, who states she is stil in pain, and would like another dose of pain medication. I have discussed findings, and DC plan with the Pt. Second troponin pending. Magaly Nelson NP   8:51 PM 
 
 
 
  
Amount and/or Complexity of Data Reviewed Clinical lab tests: reviewed and ordered Tests in the radiology section of CPT®: ordered and reviewed Independent visualization of images, tracings, or specimens: yes (CXR 
) Risk of Complications, Morbidity, and/or Mortality Presenting problems: low Diagnostic procedures: low Management options: low Patient Progress Patient progress: stable Procedures Diagnosis: 1. Alcohol-induced acute pancreatitis, unspecified complication status 2. Alcohol abuse Disposition:   Discharged to Home Follow-up Information Follow up With Specialties Details Why Contact Info Gabi Cabral MD Family Practice Call in the morning for a follow up    [address] [id number] [phone] Patient's Medications Start Taking IBUPROFEN (MOTRIN) 800 MG TABLET    Take 1 Tab by mouth every eight (8) hours as needed for Pain for up to 7 days. Continue Taking ACETAMINOPHEN (TYLENOL) 325 MG TABLET    Take 2 Tabs by mouth every four (4) hours as needed for Pain. ALBUTEROL (PROVENTIL VENTOLIN) 2.5 MG /3 ML (0.083 %) NEBULIZER SOLUTION    1.5 mL by Nebulization route every four (4) hours as needed for Wheezing or Shortness of Breath. BUSPIRONE (BUSPAR) 15 MG TABLET    Take 1 Tab by mouth two (2) times a day. CALCIUM-CHOLECALCIFEROL, D3, (CALTRATE 600+D) TABLET      
 CETIRIZINE (ZYRTEC) 10 MG TABLET      
 CHLORDIAZEPOXIDE (LIBRIUM) 25 MG CAPSULE    Take 1 Cap by mouth three (3) times daily as needed for Anxiety. Max Daily Amount: 75 mg. CLONIDINE HCL (CATAPRES) 0.2 MG TABLET    Take 0.5 Tabs by mouth two (2) times a day. CREON 24,000-76,000 -120,000 UNIT CAPSULE      
 FOLIC ACID (FOLVITE) 1 MG TABLET    Take 1 Tab by mouth daily. GABAPENTIN (NEURONTIN) 600 MG TABLET    Take 600 mg by mouth two (2) times a day. GUAIFENESIN ER (MUCINEX) 600 MG ER TABLET    Take 1 Tab by mouth two (2) times a day. HYDROCORTISONE (CORTAID) 0.5 % TOPICAL CREAM    Apply  to affected area two (2) times a day. use thin layer LAMOTRIGINE (LAMICTAL) 200 MG TABLET    Take  by mouth daily. ONDANSETRON (ZOFRAN ODT) 8 MG DISINTEGRATING TABLET    Take 1 Tab by mouth every eight (8) hours as needed for Nausea. PANTOPRAZOLE (PROTONIX) 40 MG TABLET    Take 1 Tab by mouth Before breakfast and dinner. POTASSIUM CITRATE (UROCIT-K) TBER TABLET    Take 1 Tab by mouth three (3) times daily (with meals). QUETIAPINE (SEROQUEL) 300 MG TABLET      
 SUCRALFATE (CARAFATE) 100 MG/ML SUSPENSION    Take 10 mL by mouth Before breakfast, lunch, dinner and at bedtime.   
 SUMATRIPTAN (IMITREX) 100 MG TABLET    TAKE 1 TO 2 TABLETS BY MOUTH DAILY AS NEEDED FOR MIGRAINE . DO NOT EXCEED 200 MG IN 24 HOUR PERIOD  
 TOPIRAMATE (TOPAMAX) 100 MG TABLET    100 mg two (2) times a day. These Medications have changed No medications on file Stop Taking AMOXICILLIN (AMOXIL) 250 MG CAPSULE    Take  by mouth three (3) times daily. HYDROCODONE-ACETAMINOPHEN (NORCO) 5-325 MG PER TABLET    Take 1 Tab by mouth every six (6) hours as needed for Pain for up to 3 days. Max Daily Amount: 4 Tabs. IBUPROFEN (MOTRIN) 600 MG TABLET    Take 1 Tab by mouth every six (6) hours as needed for Pain. IPRATROPIUM-ALBUTEROL (COMBIVENT)  MCG/ACTUATION INHALER    Take 2 Puffs by inhalation every six (6) hours. PREVIDENT 5000 PLUS 1.1 % CREA      
 VITAMIN C 500 MG TABLET

## 2019-05-05 NOTE — ED TRIAGE NOTES
Pt arrived via EMS for c/o chest pain, pt reports that she has been drinking cabernet sauvignon all day

## 2019-05-06 ENCOUNTER — APPOINTMENT (OUTPATIENT)
Dept: CT IMAGING | Age: 54
End: 2019-05-06
Attending: EMERGENCY MEDICINE
Payer: MEDICARE

## 2019-05-06 ENCOUNTER — HOSPITAL ENCOUNTER (EMERGENCY)
Age: 54
Discharge: HOME | End: 2019-05-07
Attending: EMERGENCY MEDICINE
Payer: MEDICARE

## 2019-05-06 DIAGNOSIS — F10.10 ALCOHOL ABUSE: ICD-10-CM

## 2019-05-06 DIAGNOSIS — F10.930 ALCOHOL WITHDRAWAL SYNDROME WITHOUT COMPLICATION (HCC): ICD-10-CM

## 2019-05-06 DIAGNOSIS — F10.939 ALCOHOL WITHDRAWAL SYNDROME WITH COMPLICATION (HCC): ICD-10-CM

## 2019-05-06 DIAGNOSIS — R10.13 RECURRENT EPIGASTRIC ABDOMINAL PAIN: Primary | ICD-10-CM

## 2019-05-06 LAB
ALBUMIN SERPL-MCNC: 3.9 G/DL (ref 3.4–5)
ALBUMIN/GLOB SERPL: 1 {RATIO} (ref 0.8–1.7)
ALP SERPL-CCNC: 90 U/L (ref 45–117)
ALT SERPL-CCNC: 89 U/L (ref 13–56)
ANION GAP SERPL CALC-SCNC: 9 MMOL/L (ref 3–18)
AST SERPL-CCNC: 87 U/L (ref 15–37)
ATRIAL RATE: 111 BPM
BASOPHILS # BLD: 0 K/UL (ref 0–0.1)
BASOPHILS NFR BLD: 1 % (ref 0–2)
BILIRUB SERPL-MCNC: 0.3 MG/DL (ref 0.2–1)
BUN SERPL-MCNC: 7 MG/DL (ref 7–18)
BUN/CREAT SERPL: 10 (ref 12–20)
CALCIUM SERPL-MCNC: 8.9 MG/DL (ref 8.5–10.1)
CALCULATED P AXIS, ECG09: 47 DEGREES
CALCULATED R AXIS, ECG10: 80 DEGREES
CALCULATED T AXIS, ECG11: 73 DEGREES
CHLORIDE SERPL-SCNC: 109 MMOL/L (ref 100–108)
CO2 SERPL-SCNC: 24 MMOL/L (ref 21–32)
CREAT SERPL-MCNC: 0.73 MG/DL (ref 0.6–1.3)
DIAGNOSIS, 93000: NORMAL
DIFFERENTIAL METHOD BLD: ABNORMAL
EOSINOPHIL # BLD: 0 K/UL (ref 0–0.4)
EOSINOPHIL NFR BLD: 1 % (ref 0–5)
ERYTHROCYTE [DISTWIDTH] IN BLOOD BY AUTOMATED COUNT: 15.7 % (ref 11.6–14.5)
ETHANOL SERPL-MCNC: 39 MG/DL (ref 0–3)
GLOBULIN SER CALC-MCNC: 3.8 G/DL (ref 2–4)
GLUCOSE SERPL-MCNC: 99 MG/DL (ref 74–99)
HCT VFR BLD AUTO: 37.7 % (ref 35–45)
HGB BLD-MCNC: 12.3 G/DL (ref 12–16)
LIPASE SERPL-CCNC: 60 U/L (ref 73–393)
LYMPHOCYTES # BLD: 3.3 K/UL (ref 0.9–3.6)
LYMPHOCYTES NFR BLD: 49 % (ref 21–52)
MCH RBC QN AUTO: 31.5 PG (ref 24–34)
MCHC RBC AUTO-ENTMCNC: 32.6 G/DL (ref 31–37)
MCV RBC AUTO: 96.7 FL (ref 74–97)
MONOCYTES # BLD: 0.4 K/UL (ref 0.05–1.2)
MONOCYTES NFR BLD: 5 % (ref 3–10)
NEUTS SEG # BLD: 2.9 K/UL (ref 1.8–8)
NEUTS SEG NFR BLD: 44 % (ref 40–73)
P-R INTERVAL, ECG05: 124 MS
PLATELET # BLD AUTO: 191 K/UL (ref 135–420)
PMV BLD AUTO: 10 FL (ref 9.2–11.8)
POTASSIUM SERPL-SCNC: 4.5 MMOL/L (ref 3.5–5.5)
PROT SERPL-MCNC: 7.7 G/DL (ref 6.4–8.2)
Q-T INTERVAL, ECG07: 296 MS
QRS DURATION, ECG06: 70 MS
QTC CALCULATION (BEZET), ECG08: 402 MS
RBC # BLD AUTO: 3.9 M/UL (ref 4.2–5.3)
SODIUM SERPL-SCNC: 142 MMOL/L (ref 136–145)
TROPONIN I SERPL-MCNC: <0.02 NG/ML (ref 0–0.04)
VENTRICULAR RATE, ECG03: 111 BPM
WBC # BLD AUTO: 6.6 K/UL (ref 4.6–13.2)

## 2019-05-06 PROCEDURE — 85025 COMPLETE CBC W/AUTO DIFF WBC: CPT

## 2019-05-06 PROCEDURE — 99285 EMERGENCY DEPT VISIT HI MDM: CPT

## 2019-05-06 PROCEDURE — 74011636320 HC RX REV CODE- 636/320: Performed by: EMERGENCY MEDICINE

## 2019-05-06 PROCEDURE — 84484 ASSAY OF TROPONIN QUANT: CPT

## 2019-05-06 PROCEDURE — 93005 ELECTROCARDIOGRAM TRACING: CPT

## 2019-05-06 PROCEDURE — 83690 ASSAY OF LIPASE: CPT

## 2019-05-06 PROCEDURE — 74177 CT ABD & PELVIS W/CONTRAST: CPT

## 2019-05-06 PROCEDURE — 80307 DRUG TEST PRSMV CHEM ANLYZR: CPT

## 2019-05-06 PROCEDURE — 80053 COMPREHEN METABOLIC PANEL: CPT

## 2019-05-06 RX ORDER — FAMOTIDINE 10 MG/ML
20 INJECTION INTRAVENOUS
Status: COMPLETED | OUTPATIENT
Start: 2019-05-06 | End: 2019-05-07

## 2019-05-06 RX ORDER — HYDROMORPHONE HYDROCHLORIDE 1 MG/ML
0.5 INJECTION, SOLUTION INTRAMUSCULAR; INTRAVENOUS; SUBCUTANEOUS
Status: COMPLETED | OUTPATIENT
Start: 2019-05-06 | End: 2019-05-07

## 2019-05-06 RX ORDER — ONDANSETRON 2 MG/ML
4 INJECTION INTRAMUSCULAR; INTRAVENOUS
Status: COMPLETED | OUTPATIENT
Start: 2019-05-06 | End: 2019-05-07

## 2019-05-06 RX ADMIN — IOPAMIDOL 100 ML: 612 INJECTION, SOLUTION INTRAVENOUS at 23:08

## 2019-05-06 NOTE — ED NOTES
[facility]

## 2019-05-06 NOTE — DISCHARGE INSTRUCTIONS
[employer] Activation    Thank you for requesting access to [facility]. Please follow the instructions below to securely access and download your online medical record. [facility] allows you to send messages to your doctor, view your test results, renew your prescriptions, schedule appointments, and more. How Do I Sign Up? 1. In your internet browser, go to [email or web address]  2. Click on the First Time User? Click Here link in the Sign In box. You will be redirect to the New Member Sign Up page. 3. Enter your [facility] Access Code exactly as it appears below. You will not need to use this code after youve completed the sign-up process. If you do not sign up before the expiration date, you must request a new code. [facility] Access Code: Activation code not generated  Current [facility] Status: Active (This is the date your [facility] access code will )    4. Enter the last four digits of your Social Security Number (xxxx) and Date of Birth (mm/dd/yyyy) as indicated and click Submit. You will be taken to the next sign-up page. 5. Create a [facility] ID. This will be your [facility] login ID and cannot be changed, so think of one that is secure and easy to remember. 6. Create a [facility] password. You can change your password at any time. 7. Enter your Password Reset Question and Answer. This can be used at a later time if you forget your password. 8. Enter your e-mail address. You will receive e-mail notification when new information is available in [address] 9. Click Sign Up. You can now view and download portions of your medical record. 10. Click the Download Summary menu link to download a portable copy of your medical information. Additional Information    If you have questions, please visit the Frequently Asked Questions section of the [facility] website at [email or web address] Remember, [facility] is NOT to be used for urgent needs. For medical emergencies, dial 911.     Follow up with  Jessica Zelaya. Clear fluids only until  your pain resolves. Do not use alcohol again.

## 2019-05-06 NOTE — ED NOTES
I have reviewed discharge instructions with the patient. The patient verbalized understanding. Patient verbalized understanding not to drive due to narcotic pain medication given to her in ED. Pt  enroute to pick her up from ED.

## 2019-05-07 VITALS
TEMPERATURE: 99.9 F | SYSTOLIC BLOOD PRESSURE: 144 MMHG | DIASTOLIC BLOOD PRESSURE: 90 MMHG | HEART RATE: 97 BPM | OXYGEN SATURATION: 100 % | RESPIRATION RATE: 14 BRPM

## 2019-05-07 LAB
AMPHET UR QL SCN: NEGATIVE
APPEARANCE UR: CLEAR
ATRIAL RATE: 119 BPM
BARBITURATES UR QL SCN: NEGATIVE
BENZODIAZ UR QL: POSITIVE
BILIRUB UR QL: NEGATIVE
CALCULATED P AXIS, ECG09: 71 DEGREES
CALCULATED R AXIS, ECG10: 78 DEGREES
CALCULATED T AXIS, ECG11: 75 DEGREES
CANNABINOIDS UR QL SCN: NEGATIVE
COCAINE UR QL SCN: NEGATIVE
COLOR UR: YELLOW
DIAGNOSIS, 93000: NORMAL
GLUCOSE UR STRIP.AUTO-MCNC: NEGATIVE MG/DL
HDSCOM,HDSCOM: ABNORMAL
HGB UR QL STRIP: NEGATIVE
KETONES UR QL STRIP.AUTO: NEGATIVE MG/DL
LEUKOCYTE ESTERASE UR QL STRIP.AUTO: NEGATIVE
METHADONE UR QL: NEGATIVE
NITRITE UR QL STRIP.AUTO: NEGATIVE
OPIATES UR QL: NEGATIVE
P-R INTERVAL, ECG05: 130 MS
PCP UR QL: NEGATIVE
PH UR STRIP: 7.5 [PH] (ref 5–8)
PROT UR STRIP-MCNC: NEGATIVE MG/DL
Q-T INTERVAL, ECG07: 310 MS
QRS DURATION, ECG06: 70 MS
QTC CALCULATION (BEZET), ECG08: 436 MS
SP GR UR REFRACTOMETRY: >1.03 (ref 1–1.03)
UROBILINOGEN UR QL STRIP.AUTO: 0.2 EU/DL (ref 0.2–1)
VENTRICULAR RATE, ECG03: 119 BPM

## 2019-05-07 PROCEDURE — 96374 THER/PROPH/DIAG INJ IV PUSH: CPT

## 2019-05-07 PROCEDURE — 74011250636 HC RX REV CODE- 250/636: Performed by: EMERGENCY MEDICINE

## 2019-05-07 PROCEDURE — 96375 TX/PRO/DX INJ NEW DRUG ADDON: CPT

## 2019-05-07 PROCEDURE — 81003 URINALYSIS AUTO W/O SCOPE: CPT

## 2019-05-07 PROCEDURE — 74011250637 HC RX REV CODE- 250/637: Performed by: EMERGENCY MEDICINE

## 2019-05-07 PROCEDURE — 96361 HYDRATE IV INFUSION ADD-ON: CPT

## 2019-05-07 PROCEDURE — 80307 DRUG TEST PRSMV CHEM ANLYZR: CPT

## 2019-05-07 RX ORDER — GABAPENTIN 600 MG/1
600 TABLET ORAL 2 TIMES DAILY
Qty: 30 TAB | Refills: 0 | Status: SHIPPED | OUTPATIENT
Start: 2019-05-07 | End: 2020-08-21

## 2019-05-07 RX ORDER — PANTOPRAZOLE SODIUM 40 MG/1
40 TABLET, DELAYED RELEASE ORAL
Qty: 60 TAB | Refills: 0 | Status: SHIPPED | OUTPATIENT
Start: 2019-05-07 | End: 2020-08-21

## 2019-05-07 RX ORDER — GABAPENTIN 300 MG/1
300 CAPSULE ORAL
Status: COMPLETED | OUTPATIENT
Start: 2019-05-07 | End: 2019-05-07

## 2019-05-07 RX ORDER — CHLORDIAZEPOXIDE HYDROCHLORIDE 25 MG/1
25 CAPSULE, GELATIN COATED ORAL
Status: COMPLETED | OUTPATIENT
Start: 2019-05-07 | End: 2019-05-07

## 2019-05-07 RX ORDER — FOLIC ACID 1 MG/1
1 TABLET ORAL DAILY
Qty: 30 TAB | Refills: 0 | Status: ON HOLD | OUTPATIENT
Start: 2019-05-07 | End: 2020-02-06 | Stop reason: SDUPTHER

## 2019-05-07 RX ORDER — ONDANSETRON 8 MG/1
8 TABLET, ORALLY DISINTEGRATING ORAL
Qty: 15 TAB | Refills: 0 | Status: SHIPPED | OUTPATIENT
Start: 2019-05-07 | End: 2020-08-21

## 2019-05-07 RX ORDER — TRAMADOL HYDROCHLORIDE 50 MG/1
50 TABLET ORAL
Qty: 12 TAB | Refills: 0 | Status: SHIPPED | OUTPATIENT
Start: 2019-05-07 | End: 2019-05-10

## 2019-05-07 RX ORDER — CHLORDIAZEPOXIDE HYDROCHLORIDE 25 MG/1
25 CAPSULE, GELATIN COATED ORAL
Qty: 15 CAP | Refills: 0 | Status: SHIPPED | OUTPATIENT
Start: 2019-05-07 | End: 2020-02-04

## 2019-05-07 RX ORDER — CLONIDINE HYDROCHLORIDE 0.1 MG/1
0.1 TABLET ORAL 2 TIMES DAILY
Qty: 20 TAB | Refills: 0 | Status: SHIPPED | OUTPATIENT
Start: 2019-05-07 | End: 2020-08-21

## 2019-05-07 RX ORDER — ACETAMINOPHEN 500 MG
1000 TABLET ORAL
Qty: 40 TAB | Refills: 0 | Status: SHIPPED | OUTPATIENT
Start: 2019-05-07 | End: 2020-02-04

## 2019-05-07 RX ADMIN — HYDROMORPHONE HYDROCHLORIDE 0.5 MG: 1 INJECTION, SOLUTION INTRAMUSCULAR; INTRAVENOUS; SUBCUTANEOUS at 00:18

## 2019-05-07 RX ADMIN — CHLORDIAZEPOXIDE HYDROCHLORIDE 25 MG: 25 CAPSULE ORAL at 00:58

## 2019-05-07 RX ADMIN — FAMOTIDINE 20 MG: 10 INJECTION, SOLUTION INTRAVENOUS at 00:12

## 2019-05-07 RX ADMIN — SODIUM CHLORIDE 1000 ML: 900 INJECTION, SOLUTION INTRAVENOUS at 00:12

## 2019-05-07 RX ADMIN — GABAPENTIN 300 MG: 300 CAPSULE ORAL at 00:58

## 2019-05-07 RX ADMIN — ONDANSETRON 4 MG: 2 INJECTION INTRAMUSCULAR; INTRAVENOUS at 00:12

## 2019-05-07 NOTE — ED TRIAGE NOTES
Patient comes in by EMS for complaints of chest pain that went to her left arm. Patient states that she was here last night and was diagnosed with pancreatitis. Patient states that she has been drinking wine all day. EKG done in triage.

## 2019-05-07 NOTE — ED PROVIDER NOTES
Wilfredo Hernández is a 53 y.o. Female with history of alcohol abuse and pancreatitis with complaints of recurrent chest pain and upper abdominal pain today after starting to decrease her alcohol intake. Patient was seen yesterday similar symptoms. The patient denied any vomiting today. There is no melena or blood in stool. Pain radiates into her left arm as well. Also radiates from her abdomen to her back as well there is no syncope. Pain is sharp and worse with p.o. intake she denies any take any medications for this. Patient states she is having withdrawal symptoms as well from decreasing her alcohol The history is provided by the patient. Past Medical History:  
Diagnosis Date  Alcohol abuse  Anxiety  Arrhythmia Fernanda Mckenzie  Asthma   
 controlled  Bipolar disorder ([facility] [address]  Common migraine  COPD (chronic obstructive pulmonary disease) ([facility] Home O2  PRN ,  pt use also for Tachycardia  Depression  Esophageal reflux  Gout  Heart attack ([facility] [address] 01/2019  Hypertension  Inverted nipple Left breast always have.  Kidney stone on left side  Microhematuria  Pancreatitis  Recurrent UTI  Staghorn renal calculus  Urine leukocytes Past Surgical History:  
Procedure Laterality Date  HX COLONOSCOPY    
 HX CYST REMOVAL Left   
 x 3 surgeries  HX ENDOSCOPY    
 HX GYN    
 tubal ligation  HX LUMBAR LAMINECTOMY  HX UROLOGICAL  08/10/2018 Cysto, bilat RPG, left ureteral pyeloscopy, HLL, left JJ stent placement, Dr. Jose Reynolds Family History:  
Family history unknown: Yes  
 
 
Social History Socioeconomic History  Marital status:  Spouse name: Not on file  Number of children: Not on file  Years of education: Not on file  Highest education level: Not on file Occupational History  Not on file Social Needs  Financial resource strain: Not on file  Food insecurity: Worry: Not on file Inability: Not on file  Transportation needs:  
  Medical: Not on file Non-medical: Not on file Tobacco Use  Smoking status: Current Every Day Smoker Packs/day: 1.50 Years: 33.00 Pack years: 49.50 Types: Cigarettes  Smokeless tobacco: Never Used Substance and Sexual Activity  Alcohol use: Yes Comment: vodka  Drug use: No  
  Comment: 11years old- [employer]  Sexual activity: Not on file Lifestyle  Physical activity:  
  Days per week: Not on file Minutes per session: Not on file  Stress: Not on file Relationships  Social connections:  
  Talks on phone: Not on file Gets together: Not on file Attends [employer] service: Not on file Active member of club or organization: Not on file Attends meetings of clubs or organizations: Not on file Relationship status: Not on file  Intimate partner violence:  
  Fear of current or ex partner: Not on file Emotionally abused: Not on file Physically abused: Not on file Forced sexual activity: Not on file Other Topics Concern  Not on file Social History Narrative  Not on file ALLERGIES: Lithium; Morphine; Amitriptyline; and Elavil Review of Systems Constitutional: Positive for appetite change. Negative for fever. HENT: Negative for sore throat. Eyes: Negative for visual disturbance. Respiratory: Negative for shortness of breath. Cardiovascular: Positive for chest pain. Gastrointestinal: Positive for abdominal pain. Endocrine: Negative for polyuria. Genitourinary: Negative for difficulty urinating. Musculoskeletal: Positive for back pain. Negative for gait problem. Skin: Negative for rash. Allergic/Immunologic: Negative for immunocompromised state. Neurological: Negative for syncope. Psychiatric/Behavioral: Positive for sleep disturbance. Vitals:  
 05/07/19 0006 05/07/19 0030 05/07/19 0059 05/07/19 0100 BP:  144/90 Pulse: 92 86 97 97 Resp: 14 11 15 14 Temp:      
SpO2: 100% 100% 100% 100% Physical Exam  
Constitutional: She is oriented to person, place, and time. She appears well-developed and well-nourished. Non-toxic appearance. She does not have a sickly appearance. She does not appear ill. No distress. HENT:  
Head: Normocephalic and atraumatic. Right Ear: External ear normal.  
Left Ear: External ear normal.  
Nose: Nose normal.  
Mouth/Throat: Uvula is midline, oropharynx is clear and moist and mucous membranes are normal.  
Eyes: Conjunctivae are normal. No scleral icterus. Neck: Neck supple. Cardiovascular: Normal rate, regular rhythm, normal heart sounds and intact distal pulses. Pulmonary/Chest: Effort normal and breath sounds normal.  
Abdominal: Soft. Normal appearance. There is no hepatosplenomegaly. There is tenderness in the epigastric area and left upper quadrant. There is no rigidity, no rebound, no guarding, no CVA tenderness, no tenderness at McBurney's point and negative Traylor's sign. Musculoskeletal: She exhibits no edema. Neurological: She is alert and oriented to person, place, and time. Gait normal.  
Skin: Skin is warm and dry. She is not diaphoretic. Psychiatric: Her behavior is normal.  
Nursing note and vitals reviewed. MDM Procedures Vitals: 
Patient Vitals for the past 12 hrs: 
 Temp Pulse Resp BP SpO2  
05/07/19 0100  97 14  100 % 05/07/19 0059  97 15  100 % 05/07/19 0030  86 11 144/90 100 % 05/07/19 0006  92 14  100 % 05/07/19 0004    (!) 137/94   
05/06/19 2113 99.9 °F (37.7 °C) (!) 128 14 121/79 99 % Medications ordered:  
Medications  
sodium chloride 0.9 % bolus infusion 1,000 mL (0 mL IntraVENous IV Completed 5/7/19 0123)  
sodium chloride 0.9 % bolus infusion 1,000 mL (0 mL IntraVENous IV Completed 5/7/19 0123) ondansetron [facility] injection 4 mg (4 mg IntraVENous Given 5/7/19 0012) HYDROmorphone (PF) (DILAUDID) injection 0.5 mg (0.5 mg IntraVENous Given 5/7/19 0018) famotidine (PF) (PEPCID) injection 20 mg (20 mg IntraVENous Given 5/7/19 0012) iopamidol (ISOVUE 300) 61 % contrast injection 100 mL (100 mL IntraVENous Given 5/6/19 2308) chlordiazePOXIDE (LIBRIUM) capsule 25 mg (25 mg Oral Given 5/7/19 0058) gabapentin (NEURONTIN) capsule 300 mg (300 mg Oral Given 5/7/19 0058) Lab findings: 
Recent Results (from the past 12 hour(s)) EKG, 12 LEAD, INITIAL Collection Time: 05/06/19  9:15 PM  
Result Value Ref Range Ventricular Rate 119 BPM  
 Atrial Rate 119 BPM  
 P-R Interval 130 ms QRS Duration 70 ms Q-T Interval 310 ms QTC Calculation (Bezet) 436 ms Calculated P Axis 71 degrees Calculated R Axis 78 degrees Calculated T Axis 75 degrees Diagnosis Sinus tachycardia Possible Left atrial enlargement Borderline ECG When compared with ECG of 05-MAY-2019 17:18, No significant change was found CBC WITH AUTOMATED DIFF Collection Time: 05/06/19 10:00 PM  
Result Value Ref Range WBC 6.6 4.6 - 13.2 K/uL  
 RBC 3.90 (L) 4.20 - 5.30 M/uL  
 HGB 12.3 12.0 - 16.0 g/dL HCT 37.7 35.0 - 45.0 % MCV 96.7 74.0 - 97.0 FL  
 MCH 31.5 24.0 - 34.0 PG  
 MCHC 32.6 31.0 - 37.0 g/dL  
 RDW 15.7 (H) 11.6 - 14.5 % PLATELET 176 221 - 958 K/uL MPV 10.0 9.2 - 11.8 FL  
 NEUTROPHILS 44 40 - 73 % LYMPHOCYTES 49 21 - 52 % MONOCYTES 5 3 - 10 % EOSINOPHILS 1 0 - 5 % BASOPHILS 1 0 - 2 %  
 ABS. NEUTROPHILS 2.9 1.8 - 8.0 K/UL  
 ABS. LYMPHOCYTES 3.3 0.9 - 3.6 K/UL  
 ABS. MONOCYTES 0.4 0.05 - 1.2 K/UL  
 ABS. EOSINOPHILS 0.0 0.0 - 0.4 K/UL  
 ABS. BASOPHILS 0.0 0.0 - 0.1 K/UL  
 DF AUTOMATED METABOLIC PANEL, COMPREHENSIVE Collection Time: 05/06/19 10:00 PM  
Result Value Ref Range Sodium 142 136 - 145 mmol/L Potassium 4.5 3.5 - 5.5 mmol/L Chloride 109 (H) 100 - 108 mmol/L  
 CO2 24 21 - 32 mmol/L  Anion gap 9 3.0 - 18 mmol/L  
 Glucose 99 74 - 99 mg/dL BUN 7 7.0 - 18 MG/DL Creatinine 0.73 0.6 - 1.3 MG/DL  
 BUN/Creatinine ratio 10 (L) 12 - 20 GFR est AA >60 >60 ml/min/1.73m2 GFR est non-AA >60 >60 ml/min/1.73m2 Calcium 8.9 8.5 - 10.1 MG/DL Bilirubin, total 0.3 0.2 - 1.0 MG/DL  
 ALT (SGPT) 89 (H) 13 - 56 U/L  
 AST (SGOT) 87 (H) 15 - 37 U/L Alk. phosphatase 90 45 - 117 U/L Protein, total 7.7 6.4 - 8.2 g/dL Albumin 3.9 3.4 - 5.0 g/dL Globulin 3.8 2.0 - 4.0 g/dL A-G Ratio 1.0 0.8 - 1.7 ETHYL ALCOHOL Collection Time: 05/06/19 10:00 PM  
Result Value Ref Range ALCOHOL(ETHYL),SERUM 39 (H) 0 - 3 MG/DL  
LIPASE Collection Time: 05/06/19 10:00 PM  
Result Value Ref Range Lipase 60 (L) 73 - 393 U/L  
TROPONIN I Collection Time: 05/06/19 10:00 PM  
Result Value Ref Range Troponin-I, QT <0.02 0.0 - 0.045 NG/ML  
DRUG SCREEN, URINE Collection Time: 05/07/19 12:02 AM  
Result Value Ref Range BENZODIAZEPINES POSITIVE (A) NEG    
 BARBITURATES NEGATIVE  NEG    
 THC (TH-CANNABINOL) NEGATIVE  NEG    
 OPIATES NEGATIVE  NEG    
 PCP(PHENCYCLIDINE) NEGATIVE  NEG    
 COCAINE NEGATIVE  NEG    
 AMPHETAMINES NEGATIVE  NEG METHADONE NEGATIVE  NEG HDSCOM (NOTE) URINALYSIS W/ RFLX MICROSCOPIC Collection Time: 05/07/19 12:02 AM  
Result Value Ref Range Color YELLOW Appearance CLEAR Specific gravity >1.030 (H) 1.005 - 1.030  
 pH (UA) 7.5 5.0 - 8.0 Protein NEGATIVE  NEG mg/dL Glucose NEGATIVE  NEG mg/dL Ketone NEGATIVE  NEG mg/dL Bilirubin NEGATIVE  NEG Blood NEGATIVE  NEG Urobilinogen 0.2 0.2 - 1.0 EU/dL Nitrites NEGATIVE  NEG Leukocyte Esterase NEGATIVE  NEG    
 
 
EKG interpretation by ED Physician: 
Sinus tachycardia with no acute ST or T wave changes Rate 119 QRS 70 QTc 436 No significant change from previous Monitor: Tachycardia than to normal rate with regular rhythm no ectopy Pulse ox: 100% room air X-Ray, CT or other radiology findings or impressions: 
CT ABD PELV W CONT Final Result IMPRESSION:   
  
1. No acute radiographic abnormality to explain the patient's clinical  
symptomatology. 2. 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 3. Nonobstructing left renal calculus. Progress notes, Consult notes or additional Procedure notes: I do not feel patient requires admission. She appears comfortable in the room. I will prescribe her medications for withdrawal. 
I have discussed with patient and/or family/sig other the results, interpretation of any imaging if performed, suspected diagnosis and treatment plan to include instructions regarding the diagnoses listed to which understanding was expressed with all questions answered Reevaluation of patient:  
stable Disposition: 
Diagnosis: 1. Recurrent epigastric abdominal pain 2. Alcohol abuse 3. Alcohol withdrawal syndrome with complication ([address] 4. Alcohol withdrawal syndrome without complication ([facility] [address] Disposition: home Follow-up Information Follow up With Specialties Details Why Contact Info Sudheer Starkey MD Family Practice Schedule an appointment as soon as possible for a visit this week for recheck [address] [place] [phone] 
[phone] [facility] EMERGENCY DEPT Emergency Medicine  If symptoms worsen [address] [facility] 
[phone] Discharge Medication List as of 5/7/2019 12:41 AM  
  
START taking these medications Details  
traMADol (ULTRAM) 50 mg tablet Take 1 Tab by mouth every six (6) hours as needed for Pain for up to 3 days. Max Daily Amount: 200 mg., Print, Disp-12 Tab, R-0  
  
  
CONTINUE these medications which have CHANGED Details  
chlordiazePOXIDE (LIBRIUM) 25 mg capsule Take 1 Cap by mouth three (3) times daily as needed for Anxiety.  Max Daily Amount: 75 mg., Print, Disp-15 Cap, R-0  
  
 ondansetron (ZOFRAN ODT) 8 mg disintegrating tablet Take 1 Tab by mouth every eight (8) hours as needed for Nausea. , Print, Disp-15 Tab, R-0  
  
folic acid (FOLVITE) 1 mg tablet Take 1 Tab by mouth daily. , Print, Disp-30 Tab, R-0  
  
pantoprazole (PROTONIX) 40 mg tablet Take 1 Tab by mouth Before breakfast and dinner., Print, Disp-60 Tab, R-0  
  
gabapentin (NEURONTIN) 600 mg tablet Take 1 Tab by mouth two (2) times a day., Print, Disp-30 Tab, R-0  
  
acetaminophen (TYLENOL EXTRA STRENGTH) 500 mg tablet Take 2 Tabs by mouth every six (6) hours as needed for Pain., Print, Disp-40 Tab, R-0  
  
cloNIDine HCl (CATAPRES) 0.1 mg tablet Take 1 Tab by mouth two (2) times a day., Print, Disp-20 Tab, R-0  
  
  
CONTINUE these medications which have NOT CHANGED Details  
ibuprofen (MOTRIN) 800 mg tablet Take 1 Tab by mouth every eight (8) hours as needed for Pain for up to 7 days. , Print, Disp-20 Tab, R-0  
  
lamoTRIgine (LAMICTAL) 200 mg tablet Take  by mouth daily. , Historical Med Potassium Citrate (UROCIT-K) TbER tablet Take 1 Tab by mouth three (3) times daily (with meals). , Normal, Disp-90 Tab, R-11  
  
hydrocortisone (CORTAID) 0.5 % topical cream Apply  to affected area two (2) times a day. use thin layer, Normal, Disp-30 g, R-0  
  
sucralfate (CARAFATE) 100 mg/mL suspension Take 10 mL by mouth Before breakfast, lunch, dinner and at bedtime. , Print, Disp-1200 mL, R-0  
  
calcium-cholecalciferol, D3, (CALTRATE 600+D) tablet Historical Med  
  
topiramate (TOPAMAX) 100 mg tablet 100 mg two (2) times a day., Historical Med QUEtiapine (SEROQUEL) 300 mg tablet Historical Med CREON 24,000-76,000 -120,000 unit capsule Historical Med, LINDA  
  
cetirizine (ZYRTEC) 10 mg tablet Historical Med  
  
SUMAtriptan (IMITREX) 100 mg tablet TAKE 1 TO 2 TABLETS BY MOUTH DAILY AS NEEDED FOR MIGRAINE .  DO NOT EXCEED 200 MG IN 24 HOUR PERIOD, Historical Med, R-0  
  
 busPIRone (BUSPAR) 15 mg tablet Take 1 Tab by mouth two (2) times a day., Print, Disp-60 Tab, R-0  
  
albuterol (PROVENTIL VENTOLIN) 2.5 mg /3 mL (0.083 %) nebulizer solution 1.5 mL by Nebulization route every four (4) hours as needed for Wheezing or Shortness of Breath., Print, Disp-24 Each, R-0  
  
  
STOP taking these medications  
  
 guaiFENesin ER (MUCINEX) 600 mg ER tablet Comments:  
Reason for Stopping:

## 2019-05-07 NOTE — ED NOTES
Patient had PIV removed from right wrist placed by ED tech in RW. I have reviewed discharge instructions with the patient. The patient verbalized understanding. Patient armband removed and shredded. VSS. Patient wheeled to waiting room to wait for  to pick her up in wheelchair

## 2019-05-07 NOTE — DISCHARGE INSTRUCTIONS
Patient Education        Abdominal Pain: Care Instructions  Your Care Instructions    Abdominal pain has many possible causes. Some aren't serious and get better on their own in a few days. Others need more testing and treatment. If your pain continues or gets worse, you need to be rechecked and may need more tests to find out what is wrong. You may need surgery to correct the problem. Don't ignore new symptoms, such as fever, nausea and vomiting, urination problems, pain that gets worse, and dizziness. These may be signs of a more serious problem. Your doctor may have recommended a follow-up visit in the next 8 to 12 hours. If you are not getting better, you may need more tests or treatment. The doctor has checked you carefully, but problems can develop later. If you notice any problems or new symptoms, get medical treatment right away. Follow-up care is a key part of your treatment and safety. Be sure to make and go to all appointments, and call your doctor if you are having problems. It's also a good idea to know your test results and keep a list of the medicines you take. How can you care for yourself at home? · Rest until you feel better. · To prevent dehydration, drink plenty of fluids, enough so that your urine is light yellow or clear like water. Choose water and other caffeine-free clear liquids until you feel better. If you have kidney, heart, or liver disease and have to limit fluids, talk with your doctor before you increase the amount of fluids you drink. · If your stomach is upset, eat mild foods, such as rice, dry toast or crackers, bananas, and applesauce. Try eating several small meals instead of two or three large ones. · Wait until 48 hours after all symptoms have gone away before you have spicy foods, alcohol, and drinks that contain caffeine. · Do not eat foods that are high in fat. · Avoid anti-inflammatory medicines such as aspirin, ibuprofen (Advil, Motrin), and naproxen (Aleve). These can cause stomach upset. Talk to your doctor if you take daily aspirin for another health problem. When should you call for help? Call 911 anytime you think you may need emergency care. For example, call if:    · You passed out (lost consciousness).     · You pass maroon or very bloody stools.     · You vomit blood or what looks like coffee grounds.     · You have new, severe belly pain.    Call your doctor now or seek immediate medical care if:    · Your pain gets worse, especially if it becomes focused in one area of your belly.     · You have a new or higher fever.     · Your stools are black and look like tar, or they have streaks of blood.     · You have unexpected vaginal bleeding.     · You have symptoms of a urinary tract infection. These may include:  ? Pain when you urinate. ? Urinating more often than usual.  ? Blood in your urine.     · You are dizzy or lightheaded, or you feel like you may faint.    Watch closely for changes in your health, and be sure to contact your doctor if:    · You are not getting better after 1 day (24 hours). Where can you learn more? Go to http://shannon-heriberto.info/. Enter Z056 in the search box to learn more about \"Abdominal Pain: Care Instructions. \"  Current as of: September 23, 2018  Content Version: 11.9  © 8104-9432 [employer]. Care instructions adapted under license by [employer] (which disclaims liability or warranty for this information). If you have questions about a medical condition or this instruction, always ask your healthcare professional. [place] [address] any warranty or liability for your use of this information. Patient Education        Alcohol Detoxification and Withdrawal: Care Instructions  Your Care Instructions    If you drink alcohol regularly and then suddenly stop, you may go through some physical and emotional problems while the alcohol clears out of your system. Clearing the alcohol from your body is called detoxification, or detox. Physical and emotional problems that may happen during detox are called withdrawal.  Symptoms of withdrawal can be scary and dangerous. Mild symptoms include nausea and vomiting, sweating, shakiness, and intense worry. Severe symptoms include being confused and irritable, feeling things on your body that are not there, seeing or hearing things that are not there, and trembling. You may even have seizures. If your symptoms become severe you must see a doctor. People who drink large amounts of alcohol should not try to detox at home. A person can die of severe alcohol withdrawal.  Symptoms of alcohol withdrawal may begin from 4 to 12 hours after you stop drinking. But they may not start for several days after the last drink. They can last a few days. It is hard to stop drinking. But when you have cleared the alcohol from your system, you will be able to start the next part of your life, free from the burden of being dependent. Follow-up care is a key part of your treatment and safety. Be sure to make and go to all appointments, and call your doctor if you are having problems. It's also a good idea to know your test results and keep a list of the medicines you take. How can you care for yourself at home? · Before you stop drinking, talk to your doctor about how you plan to stop. Be sure to be completely honest with him or her about how much you have been drinking. Your doctor will figure out whether you need to detox in a supervised medical center. · Take your medicines exactly as prescribed. Call your doctor if you think you are having a problem with your medicine. · Make sure someone you trust is with you the whole time. Have friends and family members take turns staying with you until you are done with detox. · Put a list of emergency numbers near the phone.  This should include your doctor, the police, the nearest hospital and emergency room, and neighbors who can help if needed. · Make sure all alcohol is removed from the house before you start. This includes beverages as well as medicines, rubbing alcohol, and certain flavorings like vanilla extract. · Keep \"drinking buddies\" away during the time you are going through detox. · Make your surroundings calm. Soft lights, soft music, and a comfortable place to sit or lie down can help make the process easier. · Drink lots of fluids and eat snacks such as fruit, cheese and crackers, and pretzels. Foods high in carbohydrate may help reduce the craving for alcohol. · Understand that detox is going to be hard. · Keep in mind that the people watching over you during detox are there to help. Explain to them before you start that you may not act like yourself until detox is finished. · Consider joining a support group such as Alcoholics Anonymous. Sharing your experiences with other people who face similar challenges may help you feel less overwhelmed. · Keep the numbers for these national suicide hotlines: 3-862-237-TALK (3-810.294.7000) and 0-289-RMWNABB (3-113.471.3057). If you or someone you know talks about suicide or feeling hopeless, get help right away. When should you call for help? Call 911 anytime you think you may need emergency care.  For example, call if:    · You feel you cannot stop from hurting yourself or someone else.     · You vomit many times and cannot stop.     · You vomit blood or what looks like coffee grounds.     · You have trouble breathing or are breathing very fast.     · Your heart beats more than 120 times a minute and will not slow down.     · You have chest pain.     · You have a seizure.     · You see or feel things that are not there (hallucinate).    If you are caring for someone who is going through detox, call if:    · The person passes out (loses consciousness).     · The person sees or feels things that are not there and sees or hears the same things many times.     · The person is very agitated and will not calm down.     · The person becomes violent or threatens to be violent.     · The person has a seizure.    Call your doctor now or seek immediate medical care if:    · You have a high fever.     · You have severe belly pain.     · You are very shaky.    Watch closely for changes in your health, and be sure to contact your doctor if:    · You do not get better as expected. Where can you learn more? Go to http://shannon-heriberto.info/. Enter [phone] in the search box to learn more about \"Alcohol Detoxification and Withdrawal: Care Instructions. \"  Current as of: May 7, 2018  Content Version: 11.9  © 8427-9953 [employer]. Care instructions adapted under license by [employer] (which disclaims liability or warranty for this information). If you have questions about a medical condition or this instruction, always ask your healthcare professional. [address] any warranty or liability for your use of this information.

## 2019-05-22 ENCOUNTER — APPOINTMENT (OUTPATIENT)
Dept: NON INVASIVE DIAGNOSTICS | Age: 54
End: 2019-05-22
Attending: PHYSICIAN ASSISTANT
Payer: MEDICARE

## 2019-05-22 ENCOUNTER — APPOINTMENT (OUTPATIENT)
Dept: CT IMAGING | Age: 54
End: 2019-05-22
Attending: HOSPITALIST
Payer: MEDICARE

## 2019-05-22 ENCOUNTER — APPOINTMENT (OUTPATIENT)
Dept: CT IMAGING | Age: 54
End: 2019-05-22
Attending: EMERGENCY MEDICINE
Payer: MEDICARE

## 2019-05-22 ENCOUNTER — HOSPITAL ENCOUNTER (OUTPATIENT)
Age: 54
Setting detail: OBSERVATION
Discharge: HOME | End: 2019-05-24
Attending: EMERGENCY MEDICINE | Admitting: HOSPITALIST
Payer: MEDICARE

## 2019-05-22 ENCOUNTER — APPOINTMENT (OUTPATIENT)
Dept: GENERAL RADIOLOGY | Age: 54
End: 2019-05-22
Attending: EMERGENCY MEDICINE
Payer: MEDICARE

## 2019-05-22 ENCOUNTER — APPOINTMENT (OUTPATIENT)
Dept: NUCLEAR MEDICINE | Age: 54
End: 2019-05-22
Attending: PHYSICIAN ASSISTANT
Payer: MEDICARE

## 2019-05-22 DIAGNOSIS — R07.9 ACUTE CHEST PAIN: Primary | ICD-10-CM

## 2019-05-22 DIAGNOSIS — R77.8 ELEVATED TROPONIN: ICD-10-CM

## 2019-05-22 DIAGNOSIS — E83.52 HYPERCALCEMIA: ICD-10-CM

## 2019-05-22 LAB
ALBUMIN SERPL-MCNC: 4.1 G/DL (ref 3.4–5)
ALBUMIN/GLOB SERPL: 1.2 {RATIO} (ref 0.8–1.7)
ALP SERPL-CCNC: 113 U/L (ref 45–117)
ALT SERPL-CCNC: 69 U/L (ref 13–56)
ANION GAP SERPL CALC-SCNC: 23 MMOL/L (ref 3–18)
APTT PPP: 24.9 SEC (ref 23–36.4)
APTT PPP: 80.3 SEC (ref 23–36.4)
AST SERPL-CCNC: 154 U/L (ref 15–37)
ATRIAL RATE: 122 BPM
ATRIAL RATE: 135 BPM
BASOPHILS # BLD: 0 K/UL (ref 0–0.1)
BASOPHILS NFR BLD: 0 % (ref 0–2)
BILIRUB SERPL-MCNC: 1.2 MG/DL (ref 0.2–1)
BUN SERPL-MCNC: 14 MG/DL (ref 7–18)
BUN/CREAT SERPL: 13 (ref 12–20)
CA-I SERPL-SCNC: 1.33 MMOL/L (ref 1.12–1.32)
CALCIUM SERPL-MCNC: 13.8 MG/DL (ref 8.5–10.1)
CALCULATED P AXIS, ECG09: 69 DEGREES
CALCULATED P AXIS, ECG09: 75 DEGREES
CALCULATED R AXIS, ECG10: 72 DEGREES
CALCULATED R AXIS, ECG10: 76 DEGREES
CALCULATED T AXIS, ECG11: 62 DEGREES
CALCULATED T AXIS, ECG11: 67 DEGREES
CHLORIDE SERPL-SCNC: 85 MMOL/L (ref 100–108)
CK MB CFR SERPL CALC: 7.6 % (ref 0–4)
CK MB SERPL-MCNC: 5.9 NG/ML (ref 5–25)
CK SERPL-CCNC: 78 U/L (ref 26–192)
CO2 SERPL-SCNC: 24 MMOL/L (ref 21–32)
CREAT SERPL-MCNC: 1.07 MG/DL (ref 0.6–1.3)
D DIMER PPP FEU-MCNC: 3.53 UG/ML(FEU)
DIAGNOSIS, 93000: NORMAL
DIAGNOSIS, 93000: NORMAL
DIFFERENTIAL METHOD BLD: ABNORMAL
EOSINOPHIL # BLD: 0 K/UL (ref 0–0.4)
EOSINOPHIL NFR BLD: 0 % (ref 0–5)
ERYTHROCYTE [DISTWIDTH] IN BLOOD BY AUTOMATED COUNT: 14.8 % (ref 11.6–14.5)
ETHANOL SERPL-MCNC: 4 MG/DL (ref 0–3)
GLOBULIN SER CALC-MCNC: 3.5 G/DL (ref 2–4)
GLUCOSE SERPL-MCNC: 172 MG/DL (ref 74–99)
HCT VFR BLD AUTO: 42.8 % (ref 35–45)
HGB BLD-MCNC: 14.8 G/DL (ref 12–16)
LIPASE SERPL-CCNC: 95 U/L (ref 73–393)
LYMPHOCYTES # BLD: 3 K/UL (ref 0.9–3.6)
LYMPHOCYTES NFR BLD: 26 % (ref 21–52)
MCH RBC QN AUTO: 32.2 PG (ref 24–34)
MCHC RBC AUTO-ENTMCNC: 34.6 G/DL (ref 31–37)
MCV RBC AUTO: 93.2 FL (ref 74–97)
MONOCYTES # BLD: 0.6 K/UL (ref 0.05–1.2)
MONOCYTES NFR BLD: 5 % (ref 3–10)
NEUTS SEG # BLD: 7.6 K/UL (ref 1.8–8)
NEUTS SEG NFR BLD: 69 % (ref 40–73)
P-R INTERVAL, ECG05: 118 MS
P-R INTERVAL, ECG05: 136 MS
PLATELET # BLD AUTO: 179 K/UL (ref 135–420)
PMV BLD AUTO: 9.7 FL (ref 9.2–11.8)
POTASSIUM SERPL-SCNC: 3.8 MMOL/L (ref 3.5–5.5)
PROT SERPL-MCNC: 7.6 G/DL (ref 6.4–8.2)
Q-T INTERVAL, ECG07: 296 MS
Q-T INTERVAL, ECG07: 304 MS
QRS DURATION, ECG06: 74 MS
QRS DURATION, ECG06: 78 MS
QTC CALCULATION (BEZET), ECG08: 433 MS
QTC CALCULATION (BEZET), ECG08: 444 MS
RBC # BLD AUTO: 4.59 M/UL (ref 4.2–5.3)
SODIUM SERPL-SCNC: 132 MMOL/L (ref 136–145)
STRESS ST DEPRESSION: 0 MM
STRESS ST ELEVATION: 0 MM
STRESS TARGET HR: 165 BPM
TROPONIN I SERPL-MCNC: 0.56 NG/ML (ref 0–0.04)
TROPONIN I SERPL-MCNC: 0.64 NG/ML (ref 0–0.04)
VENTRICULAR RATE, ECG03: 122 BPM
VENTRICULAR RATE, ECG03: 135 BPM
WBC # BLD AUTO: 11.2 K/UL (ref 4.6–13.2)

## 2019-05-22 PROCEDURE — 74011250636 HC RX REV CODE- 250/636: Performed by: HOSPITALIST

## 2019-05-22 PROCEDURE — 36415 COLL VENOUS BLD VENIPUNCTURE: CPT

## 2019-05-22 PROCEDURE — 74011000258 HC RX REV CODE- 258: Performed by: HOSPITALIST

## 2019-05-22 PROCEDURE — 82330 ASSAY OF CALCIUM: CPT

## 2019-05-22 PROCEDURE — 80307 DRUG TEST PRSMV CHEM ANLYZR: CPT

## 2019-05-22 PROCEDURE — 83690 ASSAY OF LIPASE: CPT

## 2019-05-22 PROCEDURE — 74011636320 HC RX REV CODE- 636/320: Performed by: HOSPITALIST

## 2019-05-22 PROCEDURE — 74011250636 HC RX REV CODE- 250/636: Performed by: EMERGENCY MEDICINE

## 2019-05-22 PROCEDURE — C1751 CATH, INF, PER/CENT/MIDLINE: HCPCS

## 2019-05-22 PROCEDURE — 71275 CT ANGIOGRAPHY CHEST: CPT

## 2019-05-22 PROCEDURE — 65390000012 HC CONDITION CODE 44 OBSERVATION

## 2019-05-22 PROCEDURE — 96366 THER/PROPH/DIAG IV INF ADDON: CPT

## 2019-05-22 PROCEDURE — 85379 FIBRIN DEGRADATION QUANT: CPT

## 2019-05-22 PROCEDURE — 99285 EMERGENCY DEPT VISIT HI MDM: CPT

## 2019-05-22 PROCEDURE — 96365 THER/PROPH/DIAG IV INF INIT: CPT

## 2019-05-22 PROCEDURE — 96374 THER/PROPH/DIAG INJ IV PUSH: CPT

## 2019-05-22 PROCEDURE — 74011250637 HC RX REV CODE- 250/637: Performed by: HOSPITALIST

## 2019-05-22 PROCEDURE — 71045 X-RAY EXAM CHEST 1 VIEW: CPT

## 2019-05-22 PROCEDURE — 93005 ELECTROCARDIOGRAM TRACING: CPT

## 2019-05-22 PROCEDURE — 82550 ASSAY OF CK (CPK): CPT

## 2019-05-22 PROCEDURE — A9500 TC99M SESTAMIBI: HCPCS

## 2019-05-22 PROCEDURE — 96361 HYDRATE IV INFUSION ADD-ON: CPT

## 2019-05-22 PROCEDURE — 74011000250 HC RX REV CODE- 250: Performed by: EMERGENCY MEDICINE

## 2019-05-22 PROCEDURE — 85730 THROMBOPLASTIN TIME PARTIAL: CPT

## 2019-05-22 PROCEDURE — 80053 COMPREHEN METABOLIC PANEL: CPT

## 2019-05-22 PROCEDURE — 74011250637 HC RX REV CODE- 250/637: Performed by: EMERGENCY MEDICINE

## 2019-05-22 PROCEDURE — 84484 ASSAY OF TROPONIN QUANT: CPT

## 2019-05-22 PROCEDURE — 93017 CV STRESS TEST TRACING ONLY: CPT

## 2019-05-22 PROCEDURE — 77010033678 HC OXYGEN DAILY

## 2019-05-22 PROCEDURE — 75810000455 HC PLCMT CENT VENOUS CATH LVL 2 5182

## 2019-05-22 PROCEDURE — 85025 COMPLETE CBC W/AUTO DIFF WBC: CPT

## 2019-05-22 PROCEDURE — 65660000000 HC RM CCU STEPDOWN

## 2019-05-22 PROCEDURE — 96375 TX/PRO/DX INJ NEW DRUG ADDON: CPT

## 2019-05-22 RX ORDER — HEPARIN SODIUM 1000 [USP'U]/ML
80 INJECTION, SOLUTION INTRAVENOUS; SUBCUTANEOUS ONCE
Status: COMPLETED | OUTPATIENT
Start: 2019-05-22 | End: 2019-05-22

## 2019-05-22 RX ORDER — PANTOPRAZOLE SODIUM 40 MG/1
40 TABLET, DELAYED RELEASE ORAL
Status: DISCONTINUED | OUTPATIENT
Start: 2019-05-22 | End: 2019-05-24 | Stop reason: HOSPADM

## 2019-05-22 RX ORDER — DIPHENHYDRAMINE HYDROCHLORIDE 50 MG/ML
12.5 INJECTION, SOLUTION INTRAMUSCULAR; INTRAVENOUS
Status: COMPLETED | OUTPATIENT
Start: 2019-05-22 | End: 2019-05-22

## 2019-05-22 RX ORDER — SODIUM CHLORIDE 9 MG/ML
100 INJECTION, SOLUTION INTRAVENOUS ONCE
Status: DISCONTINUED | OUTPATIENT
Start: 2019-05-22 | End: 2019-05-22

## 2019-05-22 RX ORDER — LORAZEPAM 2 MG/ML
2 INJECTION INTRAMUSCULAR
Status: DISCONTINUED | OUTPATIENT
Start: 2019-05-22 | End: 2019-05-24 | Stop reason: HOSPADM

## 2019-05-22 RX ORDER — SODIUM CHLORIDE 9 MG/ML
100 INJECTION, SOLUTION INTRAVENOUS ONCE
Status: COMPLETED | OUTPATIENT
Start: 2019-05-22 | End: 2019-05-22

## 2019-05-22 RX ORDER — SODIUM CHLORIDE 9 MG/ML
150 INJECTION, SOLUTION INTRAVENOUS CONTINUOUS
Status: DISCONTINUED | OUTPATIENT
Start: 2019-05-22 | End: 2019-05-24 | Stop reason: HOSPADM

## 2019-05-22 RX ORDER — THERA TABS 400 MCG
1 TAB ORAL DAILY
Status: DISCONTINUED | OUTPATIENT
Start: 2019-05-22 | End: 2019-05-24 | Stop reason: HOSPADM

## 2019-05-22 RX ORDER — FOLIC ACID 1 MG/1
1 TABLET ORAL DAILY
Status: DISCONTINUED | OUTPATIENT
Start: 2019-05-22 | End: 2019-05-24 | Stop reason: HOSPADM

## 2019-05-22 RX ORDER — GABAPENTIN 300 MG/1
600 CAPSULE ORAL 2 TIMES DAILY
Status: DISCONTINUED | OUTPATIENT
Start: 2019-05-22 | End: 2019-05-24 | Stop reason: HOSPADM

## 2019-05-22 RX ORDER — QUETIAPINE FUMARATE 300 MG/1
300 TABLET, FILM COATED ORAL
Status: DISCONTINUED | OUTPATIENT
Start: 2019-05-22 | End: 2019-05-24 | Stop reason: HOSPADM

## 2019-05-22 RX ORDER — SODIUM CHLORIDE 0.9 % (FLUSH) 0.9 %
5-40 SYRINGE (ML) INJECTION EVERY 8 HOURS
Status: DISCONTINUED | OUTPATIENT
Start: 2019-05-22 | End: 2019-05-24 | Stop reason: HOSPADM

## 2019-05-22 RX ORDER — LORAZEPAM 1 MG/1
2 TABLET ORAL
Status: DISCONTINUED | OUTPATIENT
Start: 2019-05-22 | End: 2019-05-24 | Stop reason: HOSPADM

## 2019-05-22 RX ORDER — CLONIDINE HYDROCHLORIDE 0.1 MG/1
0.1 TABLET ORAL 2 TIMES DAILY
Status: DISCONTINUED | OUTPATIENT
Start: 2019-05-22 | End: 2019-05-24 | Stop reason: HOSPADM

## 2019-05-22 RX ORDER — TOPIRAMATE 100 MG/1
100 TABLET, FILM COATED ORAL 2 TIMES DAILY
Status: DISCONTINUED | OUTPATIENT
Start: 2019-05-22 | End: 2019-05-24 | Stop reason: HOSPADM

## 2019-05-22 RX ORDER — METOPROLOL TARTRATE 5 MG/5ML
2.5 INJECTION INTRAVENOUS
Status: COMPLETED | OUTPATIENT
Start: 2019-05-22 | End: 2019-05-22

## 2019-05-22 RX ORDER — METOCLOPRAMIDE HYDROCHLORIDE 5 MG/ML
10 INJECTION INTRAMUSCULAR; INTRAVENOUS
Status: COMPLETED | OUTPATIENT
Start: 2019-05-22 | End: 2019-05-22

## 2019-05-22 RX ORDER — METOPROLOL TARTRATE 25 MG/1
25 TABLET, FILM COATED ORAL
Status: COMPLETED | OUTPATIENT
Start: 2019-05-22 | End: 2019-05-22

## 2019-05-22 RX ORDER — ALBUTEROL SULFATE 0.83 MG/ML
1.25 SOLUTION RESPIRATORY (INHALATION)
Status: DISCONTINUED | OUTPATIENT
Start: 2019-05-22 | End: 2019-05-24 | Stop reason: HOSPADM

## 2019-05-22 RX ORDER — SODIUM CHLORIDE 0.9 % (FLUSH) 0.9 %
5-40 SYRINGE (ML) INJECTION AS NEEDED
Status: DISCONTINUED | OUTPATIENT
Start: 2019-05-22 | End: 2019-05-24 | Stop reason: HOSPADM

## 2019-05-22 RX ORDER — HYDROCODONE BITARTRATE AND ACETAMINOPHEN 5; 325 MG/1; MG/1
1 TABLET ORAL
Status: DISCONTINUED | OUTPATIENT
Start: 2019-05-22 | End: 2019-05-23

## 2019-05-22 RX ORDER — LORAZEPAM 2 MG/ML
1 INJECTION INTRAMUSCULAR
Status: DISCONTINUED | OUTPATIENT
Start: 2019-05-22 | End: 2019-05-24 | Stop reason: HOSPADM

## 2019-05-22 RX ORDER — SUCRALFATE 1 G/1
1 TABLET ORAL
Status: DISCONTINUED | OUTPATIENT
Start: 2019-05-22 | End: 2019-05-24 | Stop reason: HOSPADM

## 2019-05-22 RX ORDER — LORAZEPAM 2 MG/ML
3 INJECTION INTRAMUSCULAR
Status: DISCONTINUED | OUTPATIENT
Start: 2019-05-22 | End: 2019-05-24 | Stop reason: HOSPADM

## 2019-05-22 RX ORDER — CHLORDIAZEPOXIDE HYDROCHLORIDE 25 MG/1
25 CAPSULE, GELATIN COATED ORAL
Status: DISCONTINUED | OUTPATIENT
Start: 2019-05-22 | End: 2019-05-24 | Stop reason: HOSPADM

## 2019-05-22 RX ORDER — LAMOTRIGINE 100 MG/1
200 TABLET ORAL DAILY
Status: DISCONTINUED | OUTPATIENT
Start: 2019-05-22 | End: 2019-05-24 | Stop reason: HOSPADM

## 2019-05-22 RX ORDER — BUSPIRONE HYDROCHLORIDE 7.5 MG/1
15 TABLET ORAL 2 TIMES DAILY
Status: DISCONTINUED | OUTPATIENT
Start: 2019-05-22 | End: 2019-05-24 | Stop reason: HOSPADM

## 2019-05-22 RX ORDER — GUAIFENESIN 100 MG/5ML
162 LIQUID (ML) ORAL
Status: COMPLETED | OUTPATIENT
Start: 2019-05-22 | End: 2019-05-22

## 2019-05-22 RX ORDER — ASPIRIN 325 MG/1
100 TABLET, FILM COATED ORAL DAILY
Status: DISCONTINUED | OUTPATIENT
Start: 2019-05-22 | End: 2019-05-24 | Stop reason: HOSPADM

## 2019-05-22 RX ORDER — FAMOTIDINE 10 MG/ML
20 INJECTION INTRAVENOUS
Status: COMPLETED | OUTPATIENT
Start: 2019-05-22 | End: 2019-05-22

## 2019-05-22 RX ORDER — HEPARIN SODIUM 10000 [USP'U]/100ML
18-36 INJECTION, SOLUTION INTRAVENOUS
Status: DISCONTINUED | OUTPATIENT
Start: 2019-05-22 | End: 2019-05-22

## 2019-05-22 RX ORDER — HYDROMORPHONE HYDROCHLORIDE 1 MG/ML
0.5 INJECTION, SOLUTION INTRAMUSCULAR; INTRAVENOUS; SUBCUTANEOUS
Status: COMPLETED | OUTPATIENT
Start: 2019-05-22 | End: 2019-05-22

## 2019-05-22 RX ORDER — LORAZEPAM 1 MG/1
1 TABLET ORAL
Status: DISCONTINUED | OUTPATIENT
Start: 2019-05-22 | End: 2019-05-24 | Stop reason: HOSPADM

## 2019-05-22 RX ADMIN — PANCRELIPASE LIPASE, PANCRELIPASE PROTEASE, PANCRELIPASE AMYLASE 1 CAPSULE: 10000; 32000; 42000 CAPSULE, DELAYED RELEASE ORAL at 10:04

## 2019-05-22 RX ADMIN — THERA TABS 1 TABLET: TAB at 10:04

## 2019-05-22 RX ADMIN — ASPIRIN 81 MG 162 MG: 81 TABLET ORAL at 03:57

## 2019-05-22 RX ADMIN — FAMOTIDINE 20 MG: 10 INJECTION, SOLUTION INTRAVENOUS at 02:33

## 2019-05-22 RX ADMIN — HYDROCODONE BITARTRATE AND ACETAMINOPHEN 1 TABLET: 5; 325 TABLET ORAL at 23:52

## 2019-05-22 RX ADMIN — BUSPIRONE HYDROCHLORIDE 15 MG: 7.5 TABLET ORAL at 10:05

## 2019-05-22 RX ADMIN — METOCLOPRAMIDE 10 MG: 5 INJECTION, SOLUTION INTRAMUSCULAR; INTRAVENOUS at 02:33

## 2019-05-22 RX ADMIN — HEPARIN SODIUM AND DEXTROSE 18 UNITS/KG/HR: 10000; 5 INJECTION INTRAVENOUS at 03:58

## 2019-05-22 RX ADMIN — NITROGLYCERIN 1 INCH: 20 OINTMENT TOPICAL at 04:57

## 2019-05-22 RX ADMIN — PANCRELIPASE LIPASE, PANCRELIPASE PROTEASE, PANCRELIPASE AMYLASE 1 CAPSULE: 10000; 32000; 42000 CAPSULE, DELAYED RELEASE ORAL at 16:49

## 2019-05-22 RX ADMIN — TOPIRAMATE 100 MG: 100 TABLET, FILM COATED ORAL at 17:00

## 2019-05-22 RX ADMIN — SUCRALFATE 1 G: 1 TABLET ORAL at 23:52

## 2019-05-22 RX ADMIN — METOPROLOL TARTRATE 25 MG: 25 TABLET ORAL at 05:20

## 2019-05-22 RX ADMIN — PANTOPRAZOLE SODIUM 40 MG: 40 TABLET, DELAYED RELEASE ORAL at 16:49

## 2019-05-22 RX ADMIN — LAMOTRIGINE 200 MG: 100 TABLET ORAL at 10:05

## 2019-05-22 RX ADMIN — SODIUM CHLORIDE 150 ML/HR: 900 INJECTION, SOLUTION INTRAVENOUS at 11:23

## 2019-05-22 RX ADMIN — TOPIRAMATE 100 MG: 100 TABLET, FILM COATED ORAL at 10:05

## 2019-05-22 RX ADMIN — IOPAMIDOL 75 ML: 755 INJECTION, SOLUTION INTRAVENOUS at 12:22

## 2019-05-22 RX ADMIN — Medication 10 ML: at 23:53

## 2019-05-22 RX ADMIN — Medication 10 ML: at 16:07

## 2019-05-22 RX ADMIN — CLONIDINE HYDROCHLORIDE 0.1 MG: 0.1 TABLET ORAL at 10:05

## 2019-05-22 RX ADMIN — QUETIAPINE FUMARATE 300 MG: 300 TABLET ORAL at 23:52

## 2019-05-22 RX ADMIN — CLONIDINE HYDROCHLORIDE 0.1 MG: 0.1 TABLET ORAL at 17:00

## 2019-05-22 RX ADMIN — SODIUM CHLORIDE 100 ML: 900 INJECTION, SOLUTION INTRAVENOUS at 12:22

## 2019-05-22 RX ADMIN — HYDROMORPHONE HYDROCHLORIDE 0.5 MG: 1 INJECTION, SOLUTION INTRAMUSCULAR; INTRAVENOUS; SUBCUTANEOUS at 03:56

## 2019-05-22 RX ADMIN — HEPARIN SODIUM 4060 UNITS: 1000 INJECTION, SOLUTION INTRAVENOUS; SUBCUTANEOUS at 03:57

## 2019-05-22 RX ADMIN — Medication 10 ML: at 11:24

## 2019-05-22 RX ADMIN — PANTOPRAZOLE SODIUM 40 MG: 40 TABLET, DELAYED RELEASE ORAL at 10:04

## 2019-05-22 RX ADMIN — SUCRALFATE 1 G: 1 TABLET ORAL at 16:50

## 2019-05-22 RX ADMIN — SODIUM CHLORIDE 150 ML/HR: 900 INJECTION, SOLUTION INTRAVENOUS at 04:55

## 2019-05-22 RX ADMIN — Medication 100 MG: at 10:04

## 2019-05-22 RX ADMIN — GABAPENTIN 600 MG: 300 CAPSULE ORAL at 10:05

## 2019-05-22 RX ADMIN — SUCRALFATE 1 G: 1 TABLET ORAL at 10:05

## 2019-05-22 RX ADMIN — SODIUM CHLORIDE 1000 ML: 900 INJECTION, SOLUTION INTRAVENOUS at 02:07

## 2019-05-22 RX ADMIN — DIPHENHYDRAMINE HYDROCHLORIDE 12.5 MG: 50 INJECTION, SOLUTION INTRAMUSCULAR; INTRAVENOUS at 02:33

## 2019-05-22 RX ADMIN — HYDROCODONE BITARTRATE AND ACETAMINOPHEN 1 TABLET: 5; 325 TABLET ORAL at 16:49

## 2019-05-22 RX ADMIN — FOLIC ACID 1 MG: 1 TABLET ORAL at 10:05

## 2019-05-22 RX ADMIN — BUSPIRONE HYDROCHLORIDE 15 MG: 7.5 TABLET ORAL at 17:00

## 2019-05-22 RX ADMIN — GABAPENTIN 600 MG: 300 CAPSULE ORAL at 17:00

## 2019-05-22 RX ADMIN — SODIUM CHLORIDE 1000 ML: 900 INJECTION, SOLUTION INTRAVENOUS at 02:37

## 2019-05-22 RX ADMIN — REGADENOSON 0.4 MG: 0.08 INJECTION, SOLUTION INTRAVENOUS at 14:55

## 2019-05-22 RX ADMIN — METOPROLOL TARTRATE 2.5 MG: 5 INJECTION INTRAVENOUS at 04:10

## 2019-05-22 NOTE — PROGRESS NOTES
Pt seen and examined  Pt c/o chest pain and \"pancreas pain\" and requests dilaudid  Will order CT PE  Cardiology on case  [place] prn pain  CIWA protocol    For rest of plan see Dr. Mary Soler H&P

## 2019-05-22 NOTE — H&P
History and Physical    Patient: Mansoor Navarrete               Sex: female          DOA: 5/22/2019       YOB: 1965      Age:  53 y.o.        LOS:  LOS: 0 days        HPI:     Mansoor Navarrete is a 53 y.o. female who presented to the ER with chest pain. The pain is in the left side of her chest and radiates to her left arm. The pain has been present for \"about a day and half. \"  She has associated SOB. She has chronic alcoholism and she had been \"cutting back because I was withdrawing. \"  In the ER she also has tachycardia and hypoxia. She will be admitted for ongoing management. Past Medical History:   Diagnosis Date    Alcohol abuse     Anxiety     Arrhythmia     Tacchycardai    Asthma     controlled    [facility]     Common migraine     [facility]ch[facility] obstructive [facility] disease[facility]     Home O2  PRN ,  pt use also for Tachycardia    Depression     Esophageal reflux     Gout     Heart attack ([facility] 01/2019    Hypertension     Inverted nipple     Left breast always have.  Kidney stone on left side     Microhematuria     Pancreatitis     Recurrent UTI     Staghorn renal calculus     Urine leukocytes        Social History:   Tobacco use:  Patient has smoked since the age of 12   Alcohol use:  Patient uses alcohol daily   Occupation:  Patient is on disability for Bipolar disorder    Family History: Mother has HTN    Review of Systems    Constitutional:  No fever or weight loss  HEENT:  No headache or visual changes  Cardiovascular:  Chest pain as above, no diaphoresis  Respiratory:  No coughing, wheezing, or shortness of breath. GI:  No nausea or vomitting.   No diarrhea  :  No hematuria or dysuria  Skin:  No rashes or moles  Neuro:  No seizures or syncope  Hematological:  No bruising or bleeding  Endocrine:  No diabetes or thyroid disease    Physical Exam:      Visit Vitals  BP (!) 133/94   Pulse (!) 103   Temp 98.8 °F (37.1 °C)   Resp 19   Ht 5' 1\" (1.549 m)   Wt 50.8 kg (112 lb)   SpO2 97%   BMI 21.16 kg/m²       Physical Exam:    Gen:  No distress, alert  HEENT:  Normal cephalic atraumatic, extra-occular movements are intact. Neck:  Supple, No JVD  Lungs:  Clear bilaterally, no wheeze, no rales, normal effort  Heart:  Regular Rate and Rhythm, normal S1 and S2, no edema  Abdomen:  Soft, non tender, normal bowel sounds, no guarding. Extremities:  Well perfused, no cyanosis or edema  Neurological:  Awake and alert, CN's are intact, normal strength throughout extremities  Skin:  No rashes or moles  Mental Status:  Normal thought process, does not appear anxious    Laboratory Studies:    BMP:   Lab Results   Component Value Date/Time     (L) 05/22/2019 02:09 AM    K 3.8 05/22/2019 02:09 AM    CL 85 (L) 05/22/2019 02:09 AM    CO2 24 05/22/2019 02:09 AM    AGAP 23 (H) 05/22/2019 02:09 AM     (H) 05/22/2019 02:09 AM    BUN 14 05/22/2019 02:09 AM    CREA 1.07 05/22/2019 02:09 AM    GFRAA >60 05/22/2019 02:09 AM    GFRNA 53 (L) 05/22/2019 02:09 AM     CBC:   Lab Results   Component Value Date/Time    WBC 11.2 05/22/2019 02:09 AM    HGB 14.8 05/22/2019 02:09 AM    HCT 42.8 05/22/2019 02:09 AM     05/22/2019 02:09 AM     All Cardiac Markers in the last 24 hours:   Lab Results   Component Value Date/Time    TROIQ 0.64 (H) 05/22/2019 02:09 AM       Assessment/Plan     Principal Problem:    NSTEMI (non-ST elevated myocardial infarction) ([facility] (1/15/2019)    Active Problems:    HTN (hypertension) (1/28/2016)      COPD (chronic obstructive pulmonary disease) ([facility] (2/25/2016)      Bipolar disorder (manic depression) ([facility] (3/8/2013)      Alcoholism ([facility] (1/28/2016)        PLAN:    Follow cardiac enzymes  Continue heparin  BP control  CIWA protocol  Follow respiratory status  Cardiology consulted from ER  Patient needs improved IV access to pursue CTA testing.

## 2019-05-22 NOTE — ED NOTES
TRANSFER - ED to INPATIENT REPORT:    SBAR report made available to receiving floor on this patient being transferred to [address] [place] [address] ([facility])  for routine progression of care       Admitting diagnosis NSTEMI (non-ST elevated myocardial infarction) ([facility] [I21.4]    Information from the following report(s) SBAR, ED Summary, MAR and Recent Results was made available to receiving floor. Lines:   Peripheral IV 05/22/19 Left External jugular (Active)   Site Assessment Clean, dry, & intact 5/22/2019  3:55 AM   Phlebitis Assessment 0 5/22/2019  3:55 AM   Infiltration Assessment 0 5/22/2019  3:55 AM   Dressing Status Clean, dry, & intact 5/22/2019  3:55 AM   Dressing Type Tape;Transparent 5/22/2019  3:55 AM   Hub Color/Line Status Green;Patent; Flushed 5/22/2019  3:55 AM        Medication list updated today    Opportunity for questions and clarification was provided.       Patient is oriented to time, place, person and situation   Patient is  continent, did not void in the ED and ambulatory with assist     Valuables transported with patient     Patient transported with:   Monitor  O2 @ 2 liters  Registered Nurse    MAP (Monitor): 107 =Monitored (most recent)  Vitals w/ MEWS Score (last day)     Date/Time MEWS Score Pulse Resp Temp BP Level of Consciousness SpO2    05/22/19 0600  2  101  (Abnormal)   15  98.3 °F (36.8 °C)  144/98  (Abnormal)   Alert  96 %    05/22/19 0545    107  (Abnormal)   15    148/101  (Abnormal)     97 %    05/22/19 0530    109  (Abnormal)   14    147/100  (Abnormal)     97 %    05/22/19 0520    103  (Abnormal)       133/94  (Abnormal)         05/22/19 0515    109  (Abnormal)   14    133/94  (Abnormal)     97 %    05/22/19 0500    112  (Abnormal)   19    150/99  (Abnormal)     97 %    05/22/19 0457    110  (Abnormal)       158/94  (Abnormal)         05/22/19 0445    111  (Abnormal)   17    158/94  (Abnormal)     96 %    05/22/19 0430    111  (Abnormal)   15    147/96  (Abnormal)   96 %    05/22/19 0415    106  (Abnormal)   15    155/98  (Abnormal)     96 %    05/22/19 0410    124  (Abnormal)       149/94  (Abnormal)         05/22/19 0400    123  (Abnormal)   16    149/94  (Abnormal)     89 %  (Abnormal)     05/22/19 0351    120  (Abnormal)   23        95 %    05/22/19 0350          145/86        05/22/19 0245    126  (Abnormal)   23    141/92  (Abnormal)     89 %  (Abnormal)     05/22/19 0230    122  (Abnormal)   22    143/98  (Abnormal)     89 %  (Abnormal)     05/22/19 0215    137  (Abnormal)   22    148/92  (Abnormal)     91 %    05/22/19 02:03:08    135  (Abnormal)   16    127/104  (Abnormal)   Alert  92 %    05/22/19 0200    136  (Abnormal)   16    126/91  (Abnormal)     90 %    05/22/19 0159        98.8 °F (37.1 °C)    Alert  

## 2019-05-22 NOTE — ROUTINE PROCESS
5975 Received report from [facility]. Patient alert and oriented. IV heparin infusing. 0900 Patient needed 18 gauge PIV to [facility] for Chest CT. PICC Nurse and made aware. 1130 Heparin drip no change. 1200 Patient off the floor to Radiology for Ct scan then [address].  
 
1600 Patient back from [address]. IV Heparin discontinued as ordered. 1930 Bedside and Verbal shift change report given to [facility] RN (oncoming nurse) by me (offgoing nurse). Report included the following information SBAR, Kardex, Intake/Output, MAR, Recent Results and Cardiac Rhythm NSR.

## 2019-05-22 NOTE — ED TRIAGE NOTES
Alert female arrived by EMS c/o chest pain pointing under left breast  And left arm. Pt stated pt has been drinking, detoxing, and last drink was 1 hr ago.

## 2019-05-22 NOTE — PROGRESS NOTES
conducted an initial consultation and Spiritual Assessment for Rudi Aguilera, who is a 53 y.o.,female. Patients Primary Language is: [place]. According to the patients EMR Synagogue Affiliation is: [place]. The reason the Patient came to the hospital is:   Patient Active Problem List    Diagnosis Date Noted    Bipolar disorder (manic depression) ([facility] 03/08/2013     Priority: 1 - One    Abdominal pain 41/84/5349    Metabolic acidosis 24/48/5233    [facility]STEMI (non-ST elevated [facility] infarction) ([facility] 01/15/2019    Rhabdomyolysis 01/15/2019    Upper GI bleed 01/15/2019    Recurrent UTI (urinary tract infection) 04/24/2018    Staghorn renal calculus 04/24/2018    UTI (urinary tract infection) 03/11/2018    Bronchitis 03/11/2018    Syncope 03/08/2018    Alcohol withdrawal ([facility] 03/08/2018    GI bleed 03/08/2018    Hyponatremia 03/08/2018    Hypercalcemia 03/08/2018    Dizziness 09/14/2017    Pancreatitis 09/14/2017    Hypokalemia 09/14/2017    Anemia 09/14/2017    Encephalopathy 09/14/2017    Nicotine withdrawal 02/27/2016    Pneumonia 02/25/2016    COPD (chronic obstructive pulmonary disease) ([facility] 02/25/2016    Tylenol overdose 02/20/2016    SIRS (systemic inflammatory response syndrome) ([facility] 02/20/2016    Acute pancreatitis 02/19/2016    Pancreatitis, alcoholic, acute 88/79/2344    Tachycardia 01/28/2016    Alcoholism ([facility] 01/28/2016    Esophageal stricture 01/28/2016    Dilated pancreatic duct 01/28/2016    Common bile duct dilation 01/28/2016    Elevated LFTs 01/28/2016    Bipolar depression ([facility] 01/28/2016    HTN (hypertension) 01/28/2016        The  provided the following Interventions:  Initiated a relationship of care and support. Explored issues of owen, spirituality and/or Jew needs while hospitalized. Listened empathically. Provided chaplaincy education. Provided information about Spiritual Care Services.   Offered prayer and assurance of continued prayers on patient's behalf. Chart reviewed. The following outcomes were achieved:  Patient shared some information about their medical narrative and spiritual journey/beliefs. Patient processed feeling about current hospitalization. Patient expressed gratitude for the 's visit. Assessment:  Patient did not indicate any spiritual or Synagogue issues which require Spiritual Care Services interventions at this time. Patient does not have any Synagogue/cultural needs that will affect patients preferences in health care. Plan:  Chaplains will continue to follow and will provide pastoral care on an as needed or requested basis.  recommends bedside caregivers page  on duty if patient shows signs of acute spiritual or emotional distress.     [address] [facility]   Staff [address] [facility]   [phone]

## 2019-05-22 NOTE — CONSULTS
[facility] - Consult Note    Consultation request by Dr. Devin Koch for advice/opinion related to evaluating chest pain    Date of  Admission: 5/22/2019  1:59 AM   Primary Care Physician:  Deysi Adkins MD    I saw, evaluated, interviewed and examined the patient personally. I agree with the findings and plan of care as documented below with PA-C note  Admitted with some nausea, vomiting and chest discomfort. Chest discomfort is highly reproducible on exam.  EKG without any ST changes of ischemia. Minimal elevation of troponin with normal CK, doubt ACS  Nuclear stress test without any reversible defect to suggest ischemia  Risk factor modification advised  Elevated d-dimer noted, CT chest pending. Defer to primary care      Lizzie Mascorro MD         Assessment:     -Chest pain, atypical/reproducible by description, reports had been vomiting all day x 1-2 days, initial troponin 0.64  -Alcohol abuse, can drink \"a fifth\" of liquor a day, states has been trying to \"detox\"   -HTN  -Hx pancreatitis  -Tobacco use     Plan:     -Rechecking troponin this AM, will plan to proceed with nuclear stress test as long as troponin remains stable. She had been having frequent vomiting the day or two prior to presentation prior to onset of her chest pain, pain is reproducible on exam.  Started on heparin infusion by ER.  -Continue Clonidine. -CTA chest this afternoon with circumferential mural thickening throughout distal thoracic esophagus, no evidence of pulmonary thromboembolic disease.  -Further recommendations to follow based on test results. History of Present Illness: This is a 53 y.o. female admitted for NSTEMI (non-ST elevated myocardial infarction) ([facility] [address] [I21.4].     Patient complains of:  Chest pain    Lida Millan is a 53 y.o. female with PMHx as described above, who presented to the hospital due to left-sided chest pain located around her left breast which she states is reproducible to touch.  She describes the pain as sharp, stabbing, and radiating to back. She states that she has been attempting to detox from EtOH and reports that she had been vomiting for 1-2 days prior to her chest pain. Cardiac risk factors: smoking/ tobacco exposure, hypertension      Review of Symptoms:  Except as stated above include:  Constitutional:  negative  Respiratory:  negative  Cardiovascular:  + chest pain  Gastrointestinal: As per HPI  Genitourinary:  negative  Musculoskeletal:  Negative  Neurological:  Negative  Dermatological:  Negative  Endocrinological: Negative  Psychological:  Negative     Past Medical History:     Past Medical History:   Diagnosis Date    Alcohol abuse     Anxiety     Arrhythmia     Tacchycardai    Asthma     controlled    Bipolar disorder ([facility] [address]     Common migraine     COPD [facility]chronic obstructive pulmonary disease) ([facility])     Home O2  PRN ,  pt use also for Tachycardia    Depression     Esophageal reflux     Gout     Hypertension     Inverted nipple     Left breast always have.  Kidney stone on left side     Pancreatitis     Staghorn renal calculus     Urine leukocytes          Social History:     Social History     Socioeconomic History    Marital status:      Spouse name: Not on file    Number of children: Not on file    Years of education: Not on file    Highest education level: Not on file   Tobacco Use    Smoking status: Current Every Day Smoker     Packs/day: 1.50     Years: 33.00     Pack years: 49.50     Types: Cigarettes    Smokeless tobacco: Never Used   Substance and Sexual Activity    Alcohol use: Yes     Comment: vodka    Drug use: No     Comment: 11years old- [employer]        Family History:     Family History   Family history unknown: Yes        Medications:      Allergies   Allergen Reactions    Lithium Anaphylaxis    Morphine Hives     Can now take this medication    Amitriptyline Other (comments)     Left leg went numb    Elavil Other (comments)     Left leg went numb        Current Facility-Administered Medications   Medication Dose Route Frequency    heparin 25,000 units in D5W 250 ml infusion  18-36 Units/kg/hr IntraVENous TITRATE    0.9% sodium chloride infusion  150 mL/hr IntraVENous CONTINUOUS    albuterol (PROVENTIL VENTOLIN) nebulizer solution 1.25 mg  1.25 mg Nebulization Q4H PRN    busPIRone (BUSPAR) tablet 15 mg  15 mg Oral BID    chlordiazePOXIDE (LIBRIUM) capsule 25 mg  25 mg Oral TID PRN    cloNIDine HCl (CATAPRES) tablet 0.1 mg  0.1 mg Oral BID    lipase-protease-amylase (ZENPEP 10,000) capsule 1 Cap  1 Cap Oral TID WITH MEALS    gabapentin (NEURONTIN) capsule 600 mg  600 mg Oral BID    lamoTRIgine (LaMICtal) tablet 200 mg  200 mg Oral DAILY    pantoprazole (PROTONIX) tablet 40 mg  40 mg Oral ACB&D    QUEtiapine (SEROquel) tablet 300 mg  300 mg Oral QHS    sucralfate (CARAFATE) tablet 1 g  1 g Oral AC&HS    topiramate (TOPAMAX) tablet 100 mg  100 mg Oral BID    sodium chloride (NS) flush 5-40 mL  5-40 mL IntraVENous Q8H    sodium chloride (NS) flush 5-40 mL  5-40 mL IntraVENous PRN    LORazepam (ATIVAN) tablet 1 mg  1 mg Oral Q1H PRN    Or    LORazepam (ATIVAN) injection 1 mg  1 mg IntraVENous Q1H PRN    LORazepam (ATIVAN) tablet 2 mg  2 mg Oral Q1H PRN    Or    LORazepam (ATIVAN) injection 2 mg  2 mg IntraVENous Q1H PRN    LORazepam (ATIVAN) injection 3 mg  3 mg IntraVENous F96MHC PRN    folic acid (FOLVITE) tablet 1 mg  1 mg Oral DAILY    thiamine mononitrate (B-1) tablet 100 mg  100 mg Oral DAILY    therapeutic multivitamin (THERAGRAN) tablet 1 Tab  1 Tab Oral DAILY         Physical Exam:     Visit Vitals  BP (!) 141/97 (BP 1 Location: Left arm, BP Patient Position: Sitting)   Pulse 93   Temp 98.6 °F (37 °C)   Resp 16   Ht 5' 1\" (1.549 m)   Wt 112 lb (50.8 kg)   SpO2 97%   BMI 21.16 kg/m²     BP Readings from Last 3 Encounters:   05/22/19 (!) 141/97   05/07/19 144/90   05/05/19 144/80     Pulse Readings from Last 3 Encounters:   05/22/19 93   05/07/19 97   05/05/19 95     Wt Readings from Last 3 Encounters:   05/22/19 112 lb (50.8 kg)   05/05/19 120 lb (54.4 kg)   05/02/19 121 lb (54.9 kg)       General:  alert, cooperative, no distress, appears stated age  Neck:  No JVD  Lungs:  clear to auscultation bilaterally  Heart:  Regular rate and rhythm  Abdomen:  abdomen is soft with diffuse tenderness across upper abdomen, no masses, organomegaly or guarding  Extremities:  Atraumatic, no edema  Skin: Warm and dry.    Neuro: alert, oriented x3, affect appropriate, no focal neurological deficits, moves all extremities well, no involuntary movements  Psych: non focal     Data Review:     Recent Labs     05/22/19 0209   WBC 11.2   HGB 14.8   HCT 42.8        Recent Labs     05/22/19 0209   *   K 3.8   CL 85*   CO2 24   *   BUN 14   CREA 1.07   CA 13.8*   ALB 4.1   SGOT 154*   ALT 69*       Results for orders placed or performed during the hospital encounter of 05/22/19   EKG, 12 LEAD, INITIAL   Result Value Ref Range    Ventricular Rate 135 BPM    Atrial Rate 135 BPM    P-R Interval 118 ms    QRS Duration 78 ms    Q-T Interval 296 ms    QTC Calculation (Bezet) 444 ms    Calculated P Axis 75 degrees    Calculated R Axis 76 degrees    Calculated T Axis 62 degrees    Diagnosis       Sinus tachycardia  Possible Left atrial enlargement  Borderline ECG  When compared with ECG of 06-MAY-2019 21:15,  No significant change was found         All Cardiac Markers in the last 24 hours:    Lab Results   Component Value Date/Time    TROIQ 0.64 (H) 05/22/2019 02:09 AM       Last Lipid:    Lab Results   Component Value Date/Time    Cholesterol, total 320 (H) 11/09/2010 01:49 PM    HDL Cholesterol 135 (H) 11/09/2010 01:49 PM    LDL, calculated 170 (H) 11/09/2010 01:49 PM    Triglyceride 75 11/09/2010 01:49 PM    CHOL/HDL Ratio 2.4 11/09/2010 01:49 PM       Signed By: Jah Woodward PA-C     May 22, 2019

## 2019-05-22 NOTE — ED NOTES
Multiple IV access attempted as previous IV got infiltrated after CT scan.  Unable to obtain IV access at this time

## 2019-05-22 NOTE — ED PROVIDER NOTES
Khalif Clark is a 53 y.o. Female with history of alcohol abuse recurrent pancreatitis with complaints of recurrent upper abdominal pain rating to her chest since earlier today. Patient states she has been vomiting today and has been drinking alcohol as well. She denies any diarrhea or melena. There is no blood in her stool. She has no syncope. Symptoms are worse with swallowing. The patient is continued drink alcohol since her previous visit earlier this month. Pain is sharp and stabbing rating to her back. The history is provided by the patient. Past Medical History:   Diagnosis Date    Alcohol abuse     Anxiety     Arrhythmia     Tacchycardai    Asthma     controlled    [facility] disorder ([facility] [address]     Common migraine     COPD [facility]ch[facility] obstructive pulmonary disease) ([facility])     Home O2  PRN ,  pt use also for Tachycardia    Depression     Esophageal reflux     Gout     Heart attack ([facility] [address] 01/2019    Hypertension     Inverted nipple     Left breast always have.     Kidney stone on left side     Microhematuria     Pancreatitis     Recurrent UTI     Staghorn renal calculus     Urine leukocytes        Past Surgical History:   Procedure Laterality Date    HX COLONOSCOPY      HX CYST REMOVAL Left     x 3 surgeries    HX ENDOSCOPY      HX GYN      tubal ligation    HX LUMBAR LAMINECTOMY      HX UROLOGICAL  08/10/2018    Cysto, bilat RPG, left ureteral pyeloscopy, HLL, left JJ stent placement, Dr. Audrey Magana         Family History:   Family history unknown: Yes       Social History     Socioeconomic History    Marital status:      Spouse name: Not on file    Number of children: Not on file    Years of education: Not on file    Highest education level: Not on file   Occupational History    Not on file   Social Needs    Financial resource strain: Not on file    Food insecurity:     Worry: Not on file     Inability: Not on file    Transportation needs:     Medical: Not on file Non-medical: Not on file   Tobacco Use    Smoking status: Current Every Day Smoker     Packs/day: 1.50     Years: 33.00     Pack years: 49.50     Types: Cigarettes    Smokeless tobacco: Never Used   Substance and Sexual Activity    Alcohol use: Yes     Comment: vodka    Drug use: No     Comment: [address] old- [place]    Sexual activity: Not on file   Lifestyle    Physical activity:     Days per week: Not on file     Minutes per session: Not on file    Stress: Not on file   Relationships    Social connections:     Talks on phone: Not on file     Gets together: Not on file     Attends Hoahaoism service: Not on file     Active member of club or organization: Not on file     Attends meetings of clubs or organizations: Not on file     Relationship status: Not on file    Intimate partner violence:     Fear of current or ex partner: Not on file     Emotionally abused: Not on file     Physically abused: Not on file     Forced sexual activity: Not on file   Other Topics Concern    Not on file   Social History Narrative    Not on file         ALLERGIES: Lithium; Morphine; Amitriptyline; and Elavil    Review of Systems   Constitutional: Positive for appetite change. Negative for fever. HENT: Negative for sore throat. Eyes: Negative for visual disturbance. Respiratory: Negative for shortness of breath. Cardiovascular: Positive for chest pain. Gastrointestinal: Positive for abdominal pain. Negative for blood in stool and constipation. Endocrine: Negative for polyuria. Genitourinary: Negative for difficulty urinating. Musculoskeletal: Negative for gait problem. Skin: Negative for rash. Allergic/Immunologic: Negative for immunocompromised state. Neurological: Negative for syncope. Psychiatric/Behavioral: Positive for sleep disturbance.        Vitals:    05/22/19 0430 05/22/19 0445 05/22/19 0457 05/22/19 0500   BP: (!) 147/96 (!) 158/94 (!) 158/94 (!) 150/99   Pulse: (!) 111 (!) 111 (!) 110 (!) 112   Resp: 15 17  19   Temp:       SpO2: 96% 96%  97%   Weight:       Height:                Physical Exam   Constitutional: She is oriented to person, place, and time. She appears well-developed and well-nourished. No distress. HENT:   Head: Normocephalic and atraumatic. Right Ear: External ear normal.   Left Ear: External ear normal.   Nose: Nose normal.   Mouth/Throat: Uvula is midline, oropharynx is clear and moist and mucous membranes are normal.   Eyes: Conjunctivae are normal. No scleral icterus. Neck: Neck supple. Cardiovascular: Normal rate, regular rhythm, normal heart sounds and intact distal pulses. Pulmonary/Chest: Effort normal and breath sounds normal.   Abdominal: Soft. Normal appearance. There is no hepatosplenomegaly. There is tenderness in the epigastric area and left upper quadrant. There is no rigidity, no rebound, no guarding, no CVA tenderness, no tenderness at McBurney's point and negative Traylor's sign. Musculoskeletal: She exhibits no edema. Neurological: She is alert and oriented to person, place, and time. Gait normal.   Skin: Skin is warm and dry. She is not diaphoretic. Psychiatric: Her behavior is normal.   Nursing note and vitals reviewed.        MDM       Procedures  Vitals:  Patient Vitals for the past 12 hrs:   Temp Pulse Resp BP SpO2   05/22/19 0500  (!) 112 19 (!) 150/99 97 %   05/22/19 0457  (!) 110  (!) 158/94    05/22/19 0445  (!) 111 17 (!) 158/94 96 %   05/22/19 0430  (!) 111 15 (!) 147/96 96 %   05/22/19 0415  (!) 106 15 (!) 155/98 96 %   05/22/19 0410  (!) 124  (!) 149/94    05/22/19 0400  (!) 123 16 (!) 149/94 (!) 89 %   05/22/19 0351  (!) 120 23  95 %   05/22/19 0350    145/86    05/22/19 0245  (!) 126 23 (!) 141/92 (!) 89 %   05/22/19 0230  (!) 122 22 (!) 143/98 (!) 89 %   05/22/19 0215  (!) 137 22 (!) 148/92 91 %   05/22/19 0203  (!) 135 16 (!) 127/104 92 %   05/22/19 0200  (!) 136 16 (!) 126/91 90 %   05/22/19 0159 98.8 °F (37.1 °C)             Medications ordered:   Medications   heparin 25,000 units in D5W 250 ml infusion (18 Units/kg/hr × 50.8 kg IntraVENous New Bag 5/22/19 0358)   0.9% sodium chloride infusion (150 mL/hr IntraVENous New Bag 5/22/19 0455)   metoprolol tartrate (LOPRESSOR) tablet 25 mg (has no administration in time range)   sodium chloride 0.9 % bolus infusion 1,000 mL (1,000 mL IntraVENous New Bag 5/22/19 0207)   metoclopramide HCl (REGLAN) injection 10 mg (10 mg IntraVENous Given 5/22/19 0233)   diphenhydrAMINE (BENADRYL) injection 12.5 mg (12.5 mg IntraVENous Given 5/22/19 0233)   famotidine (PF) (PEPCID) injection 20 mg (20 mg IntraVENous Given 5/22/19 0233)   sodium chloride 0.9 % bolus infusion 1,000 mL (1,000 mL IntraVENous New Bag 5/22/19 0237)   HYDROmorphone (PF) (DILAUDID) injection 0.5 mg (0.5 mg IntraVENous Given 5/22/19 0356)   aspirin chewable tablet 162 mg (162 mg Oral Given 5/22/19 0357)   heparin (porcine) 1,000 unit/mL injection 4,060 Units (4,060 Units IntraVENous Given 5/22/19 0357)   metoprolol (LOPRESSOR) injection 2.5 mg (2.5 mg IntraVENous Given 5/22/19 0410)   nitroglycerin (NITROBID) 2 % ointment 1 Inch (1 Inch Topical Given 5/22/19 0457)         Lab findings:  Recent Results (from the past 12 hour(s))   PTT    Collection Time: 05/22/19  2:00 AM   Result Value Ref Range    aPTT 24.9 23.0 - 36.4 SEC   EKG, 12 LEAD, INITIAL    Collection Time: 05/22/19  2:02 AM   Result Value Ref Range    Ventricular Rate 135 BPM    Atrial Rate 135 BPM    P-R Interval 118 ms    QRS Duration 78 ms    Q-T Interval 296 ms    QTC Calculation (Bezet) 444 ms    Calculated P Axis 75 degrees    Calculated R Axis 76 degrees    Calculated T Axis 62 degrees    Diagnosis       Sinus tachycardia  Possible Left atrial enlargement  Borderline ECG  When compared with ECG of 06-MAY-2019 21:15,  No significant change was found     CBC WITH AUTOMATED DIFF    Collection Time: 05/22/19  2:09 AM   Result Value Ref Range WBC 11.2 4.6 - 13.2 K/uL    RBC 4.59 4.20 - 5.30 M/uL    HGB 14.8 12.0 - 16.0 g/dL    HCT 42.8 35.0 - 45.0 %    MCV 93.2 74.0 - 97.0 FL    MCH 32.2 24.0 - 34.0 PG    MCHC 34.6 31.0 - 37.0 g/dL    RDW 14.8 (H) 11.6 - 14.5 %    PLATELET 489 404 - 699 K/uL    MPV 9.7 9.2 - 11.8 FL    NEUTROPHILS 69 40 - 73 %    LYMPHOCYTES 26 21 - 52 %    MONOCYTES 5 3 - 10 %    EOSINOPHILS 0 0 - 5 %    BASOPHILS 0 0 - 2 %    ABS. NEUTROPHILS 7.6 1.8 - 8.0 K/UL    ABS. LYMPHOCYTES 3.0 0.9 - 3.6 K/UL    ABS. MONOCYTES 0.6 0.05 - 1.2 K/UL    ABS. EOSINOPHILS 0.0 0.0 - 0.4 K/UL    ABS. BASOPHILS 0.0 0.0 - 0.1 K/UL    DF AUTOMATED     METABOLIC PANEL, COMPREHENSIVE    Collection Time: 05/22/19  2:09 AM   Result Value Ref Range    Sodium 132 (L) 136 - 145 mmol/L    Potassium 3.8 3.5 - 5.5 mmol/L    Chloride 85 (L) 100 - 108 mmol/L    CO2 24 21 - 32 mmol/L    Anion gap 23 (H) 3.0 - 18 mmol/L    Glucose 172 (H) 74 - 99 mg/dL    BUN 14 7.0 - 18 MG/DL    Creatinine 1.07 0.6 - 1.3 MG/DL    BUN/Creatinine ratio 13 12 - 20      GFR est AA >60 >60 ml/min/1.73m2    GFR est non-AA 53 (L) >60 ml/min/1.73m2    Calcium 13.8 (HH) 8.5 - 10.1 MG/DL    Bilirubin, total 1.2 (H) 0.2 - 1.0 MG/DL    ALT (SGPT) 69 (H) 13 - 56 U/L    AST (SGOT) 154 (H) 15 - 37 U/L    Alk.  phosphatase 113 45 - 117 U/L    Protein, total 7.6 6.4 - 8.2 g/dL    Albumin 4.1 3.4 - 5.0 g/dL    Globulin 3.5 2.0 - 4.0 g/dL    A-G Ratio 1.2 0.8 - 1.7     ETHYL ALCOHOL    Collection Time: 05/22/19  2:09 AM   Result Value Ref Range    ALCOHOL(ETHYL),SERUM 4 (H) 0 - 3 MG/DL   LIPASE    Collection Time: 05/22/19  2:09 AM   Result Value Ref Range    Lipase 95 73 - 393 U/L   TROPONIN I    Collection Time: 05/22/19  2:09 AM   Result Value Ref Range    Troponin-I, QT 0.64 (H) 0.0 - 0.045 NG/ML   D DIMER    Collection Time: 05/22/19  2:09 AM   Result Value Ref Range    D DIMER 3.53 (H) <0.46 ug/ml(FEU)   EKG, 12 LEAD, SUBSEQUENT    Collection Time: 05/22/19  4:10 AM   Result Value Ref Range Ventricular Rate 122 BPM    Atrial Rate 122 BPM    P-R Interval 136 ms    QRS Duration 74 ms    Q-T Interval 304 ms    QTC Calculation (Bezet) 433 ms    Calculated P Axis 69 degrees    Calculated R Axis 72 degrees    Calculated T Axis 67 degrees    Diagnosis       Sinus tachycardia  Otherwise normal ECG  When compared with ECG of 22-MAY-2019 02:02,  No significant change was found         EKG interpretation by ED Physician:  Sinus tachycardia with no acute ST or T wave changes  Rate 135 QRS 78   No significant change from previous    Cardiac monitor: Tachycardia with regular rhythm no ectopy  Pulse ox: 95% on room air    Repeat: Sinus tach with no acute ST or T wave changes  Rate 122 QRS 74   Not significantly change from the initial one      X-Ray, CT or other radiology findings or impressions:  XR CHEST PORT    (Results Pending)   nap per my interp    Progress notes, Consult notes or additional Procedure notes:   Unable to get IV access to do CTA. Patient was given heparin and aspirin given her elevated troponin. Serial EKGs with no acute ST or T wave changes indicative of ongoing or worsening ischemic changes. Discussed with patient need for admission. Discussed with Dr. Michele Maynard on-call for the hospitalist service will admit. Please consult for cardiology as well. ED Critical Care Note    System at risk for life threatening failure: Neuro, cardiac, pulmonary  Associated problems: Tachycardia, cardiac ischemia, hypertension    Critical Care services provided: Management of chest pain, cardiac ischemia, tachycardia, documentation, consultation, bedside reassessment  Excluded procedures (time not included in critical care): EG interpretation    Total Critical Care Time (in minutes) 48        Reevaluation of patient:   stable    Disposition:  Diagnosis:   1. Acute chest pain    2. Elevated troponin    3.  Hypercalcemia        Disposition: admit    Follow-up Information    None           Patient's Medications   Start Taking    No medications on file   Continue Taking    ACETAMINOPHEN (TYLENOL EXTRA STRENGTH) 500 MG TABLET    Take 2 Tabs by mouth every six (6) hours as needed for Pain. ALBUTEROL (PROVENTIL VENTOLIN) 2.5 MG /3 ML (0.083 %) NEBULIZER SOLUTION    1.5 mL by Nebulization route every four (4) hours as needed for Wheezing or Shortness of Breath. BUSPIRONE (BUSPAR) 15 MG TABLET    Take 1 Tab by mouth two (2) times a day. CALCIUM-CHOLECALCIFEROL, D3, (CALTRATE 600+D) TABLET        CETIRIZINE (ZYRTEC) 10 MG TABLET        CHLORDIAZEPOXIDE (LIBRIUM) 25 MG CAPSULE    Take 1 Cap by mouth three (3) times daily as needed for Anxiety. Max Daily Amount: 75 mg. CLONIDINE HCL (CATAPRES) 0.1 MG TABLET    Take 1 Tab by mouth two (2) times a day. CREON 24,000-76,000 -120,000 UNIT CAPSULE        FOLIC ACID (FOLVITE) 1 MG TABLET    Take 1 Tab by mouth daily. GABAPENTIN (NEURONTIN) 600 MG TABLET    Take 1 Tab by mouth two (2) times a day. HYDROCORTISONE (CORTAID) 0.5 % TOPICAL CREAM    Apply  to affected area two (2) times a day. use thin layer    LAMOTRIGINE (LAMICTAL) 200 MG TABLET    Take  by mouth daily. ONDANSETRON (ZOFRAN ODT) 8 MG DISINTEGRATING TABLET    Take 1 Tab by mouth every eight (8) hours as needed for Nausea. PANTOPRAZOLE (PROTONIX) 40 MG TABLET    Take 1 Tab by mouth Before breakfast and dinner. POTASSIUM CITRATE (UROCIT-K) TBER TABLET    Take 1 Tab by mouth three (3) times daily (with meals). QUETIAPINE (SEROQUEL) 300 MG TABLET        SUCRALFATE (CARAFATE) 100 MG/ML SUSPENSION    Take 10 mL by mouth Before breakfast, lunch, dinner and at bedtime. SUMATRIPTAN (IMITREX) 100 MG TABLET    TAKE 1 TO 2 TABLETS BY MOUTH DAILY AS NEEDED FOR MIGRAINE . DO NOT EXCEED 200 MG IN 24 HOUR PERIOD    TOPIRAMATE (TOPAMAX) 100 MG TABLET    100 mg two (2) times a day.    These Medications have changed    No medications on file   Stop Taking    No medications on file

## 2019-05-23 ENCOUNTER — APPOINTMENT (OUTPATIENT)
Dept: NON INVASIVE DIAGNOSTICS | Age: 54
End: 2019-05-23
Attending: PHYSICIAN ASSISTANT
Payer: MEDICARE

## 2019-05-23 PROBLEM — K20.90 ESOPHAGITIS: Status: ACTIVE | Noted: 2019-05-23

## 2019-05-23 PROBLEM — R07.9 CHEST PAIN: Status: ACTIVE | Noted: 2019-05-23

## 2019-05-23 LAB
APTT PPP: 27.2 SEC (ref 23–36.4)
BASOPHILS # BLD: 0 K/UL (ref 0–0.1)
BASOPHILS NFR BLD: 0 % (ref 0–2)
DIFFERENTIAL METHOD BLD: ABNORMAL
ECHO AO ROOT DIAM: 3.14 CM
ECHO AV PEAK GRADIENT: 0 MMHG
ECHO AV PEAK VELOCITY: 0 CM/S
ECHO IVC SNIFF: 1.75 CM
ECHO LA VOL 2C: 18.2 ML (ref 22–52)
ECHO LA VOL 4C: 17.24 ML (ref 22–52)
ECHO LA VOLUME INDEX A2C: 12.33 ML/M2 (ref 16–28)
ECHO LA VOLUME INDEX A4C: 11.68 ML/M2 (ref 16–28)
ECHO LV EDV A2C: 80.5 ML
ECHO LV EDV A4C: 70.8 ML
ECHO LV EDV BP: 77.8 ML (ref 56–104)
ECHO LV EDV INDEX A4C: 48 ML/M2
ECHO LV EDV INDEX BP: 52.7 ML/M2
ECHO LV EDV NDEX A2C: 54.5 ML/M2
ECHO LV EJECTION FRACTION A2C: 48 %
ECHO LV EJECTION FRACTION A4C: 47 %
ECHO LV EJECTION FRACTION BIPLANE: 48.7 % (ref 55–100)
ECHO LV ESV A2C: 41.8 ML
ECHO LV ESV A4C: 37.9 ML
ECHO LV ESV BP: 39.9 ML (ref 19–49)
ECHO LV ESV INDEX A2C: 28.3 ML/M2
ECHO LV ESV INDEX A4C: 25.7 ML/M2
ECHO LV ESV INDEX BP: 27 ML/M2
ECHO LV INTERNAL DIMENSION DIASTOLIC: 3.58 CM (ref 3.9–5.3)
ECHO LV INTERNAL DIMENSION SYSTOLIC: 2.92 CM
ECHO LV IVSD: 1.04 CM (ref 0.6–0.9)
ECHO LV MASS 2D: 114.8 G (ref 67–162)
ECHO LV MASS INDEX 2D: 77.8 G/M2 (ref 43–95)
ECHO LV POSTERIOR WALL DIASTOLIC: 0.91 CM (ref 0.6–0.9)
ECHO LVOT DIAM: 2.07 CM
ECHO LVOT PEAK GRADIENT: 2.6 MMHG
ECHO LVOT PEAK VELOCITY: 81.08 CM/S
ECHO MV A VELOCITY: 83.16 CM/S
ECHO MV AREA PHT: 13.9 CM2
ECHO MV E DECELERATION TIME (DT): 54.8 MS
ECHO MV E VELOCITY: 48.66 CM/S
ECHO MV E/A RATIO: 0.59
ECHO MV PRESSURE HALF TIME (PHT): 15.9 MS
EOSINOPHIL # BLD: 0.1 K/UL (ref 0–0.4)
EOSINOPHIL NFR BLD: 3 % (ref 0–5)
ERYTHROCYTE [DISTWIDTH] IN BLOOD BY AUTOMATED COUNT: 15.4 % (ref 11.6–14.5)
HCT VFR BLD AUTO: 35.3 % (ref 35–45)
HGB BLD-MCNC: 11.5 G/DL (ref 12–16)
LYMPHOCYTES # BLD: 2 K/UL (ref 0.9–3.6)
LYMPHOCYTES NFR BLD: 40 % (ref 21–52)
MCH RBC QN AUTO: 31.3 PG (ref 24–34)
MCHC RBC AUTO-ENTMCNC: 32.6 G/DL (ref 31–37)
MCV RBC AUTO: 95.9 FL (ref 74–97)
MONOCYTES # BLD: 0.1 K/UL (ref 0.05–1.2)
MONOCYTES NFR BLD: 2 % (ref 3–10)
NEUTS SEG # BLD: 2.7 K/UL (ref 1.8–8)
NEUTS SEG NFR BLD: 55 % (ref 40–73)
PLATELET # BLD AUTO: 76 K/UL (ref 135–420)
PMV BLD AUTO: 10.1 FL (ref 9.2–11.8)
RBC # BLD AUTO: 3.68 M/UL (ref 4.2–5.3)
WBC # BLD AUTO: 4.9 K/UL (ref 4.6–13.2)

## 2019-05-23 PROCEDURE — 85730 THROMBOPLASTIN TIME PARTIAL: CPT

## 2019-05-23 PROCEDURE — 36415 COLL VENOUS BLD VENIPUNCTURE: CPT

## 2019-05-23 PROCEDURE — 74011250636 HC RX REV CODE- 250/636: Performed by: HOSPITALIST

## 2019-05-23 PROCEDURE — 96366 THER/PROPH/DIAG IV INF ADDON: CPT

## 2019-05-23 PROCEDURE — 74011250637 HC RX REV CODE- 250/637: Performed by: HOSPITALIST

## 2019-05-23 PROCEDURE — 65390000012 HC CONDITION CODE 44 OBSERVATION

## 2019-05-23 PROCEDURE — 77010033678 HC OXYGEN DAILY

## 2019-05-23 PROCEDURE — 99218 HC RM OBSERVATION: CPT

## 2019-05-23 PROCEDURE — 96376 TX/PRO/DX INJ SAME DRUG ADON: CPT

## 2019-05-23 PROCEDURE — 96375 TX/PRO/DX INJ NEW DRUG ADDON: CPT

## 2019-05-23 PROCEDURE — 85025 COMPLETE CBC W/AUTO DIFF WBC: CPT

## 2019-05-23 PROCEDURE — 93306 TTE W/DOPPLER COMPLETE: CPT

## 2019-05-23 PROCEDURE — 74011000250 HC RX REV CODE- 250: Performed by: HOSPITALIST

## 2019-05-23 PROCEDURE — 77030011256 HC DRSG MEPILEX <16IN NO BORD MOLN -A

## 2019-05-23 RX ORDER — OXYCODONE AND ACETAMINOPHEN 5; 325 MG/1; MG/1
1 TABLET ORAL
Status: DISCONTINUED | OUTPATIENT
Start: 2019-05-23 | End: 2019-05-24

## 2019-05-23 RX ORDER — MORPHINE SULFATE 2 MG/ML
2 INJECTION, SOLUTION INTRAMUSCULAR; INTRAVENOUS ONCE
Status: COMPLETED | OUTPATIENT
Start: 2019-05-23 | End: 2019-05-23

## 2019-05-23 RX ORDER — ONDANSETRON 2 MG/ML
6 INJECTION INTRAMUSCULAR; INTRAVENOUS
Status: DISCONTINUED | OUTPATIENT
Start: 2019-05-23 | End: 2019-05-24 | Stop reason: HOSPADM

## 2019-05-23 RX ADMIN — TOPIRAMATE 100 MG: 100 TABLET, FILM COATED ORAL at 17:37

## 2019-05-23 RX ADMIN — TOPIRAMATE 100 MG: 100 TABLET, FILM COATED ORAL at 11:00

## 2019-05-23 RX ADMIN — SODIUM CHLORIDE 150 ML/HR: 900 INJECTION, SOLUTION INTRAVENOUS at 10:56

## 2019-05-23 RX ADMIN — PANCRELIPASE LIPASE, PANCRELIPASE PROTEASE, PANCRELIPASE AMYLASE 1 CAPSULE: 10000; 32000; 42000 CAPSULE, DELAYED RELEASE ORAL at 12:24

## 2019-05-23 RX ADMIN — PANTOPRAZOLE SODIUM 40 MG: 40 TABLET, DELAYED RELEASE ORAL at 17:37

## 2019-05-23 RX ADMIN — Medication 10 ML: at 17:37

## 2019-05-23 RX ADMIN — GABAPENTIN 600 MG: 300 CAPSULE ORAL at 17:37

## 2019-05-23 RX ADMIN — SUCRALFATE 1 G: 1 TABLET ORAL at 22:00

## 2019-05-23 RX ADMIN — MORPHINE SULFATE 2 MG: 2 INJECTION, SOLUTION INTRAMUSCULAR; INTRAVENOUS at 10:10

## 2019-05-23 RX ADMIN — THERA TABS 1 TABLET: TAB at 11:00

## 2019-05-23 RX ADMIN — CLONIDINE HYDROCHLORIDE 0.1 MG: 0.1 TABLET ORAL at 17:37

## 2019-05-23 RX ADMIN — GABAPENTIN 600 MG: 300 CAPSULE ORAL at 11:00

## 2019-05-23 RX ADMIN — LAMOTRIGINE 200 MG: 100 TABLET ORAL at 11:00

## 2019-05-23 RX ADMIN — Medication 5 ML: at 06:00

## 2019-05-23 RX ADMIN — ONDANSETRON 6 MG: 2 INJECTION INTRAMUSCULAR; INTRAVENOUS at 10:10

## 2019-05-23 RX ADMIN — SUCRALFATE 1 G: 1 TABLET ORAL at 12:24

## 2019-05-23 RX ADMIN — PANCRELIPASE LIPASE, PANCRELIPASE PROTEASE, PANCRELIPASE AMYLASE 1 CAPSULE: 10000; 32000; 42000 CAPSULE, DELAYED RELEASE ORAL at 17:37

## 2019-05-23 RX ADMIN — PANTOPRAZOLE SODIUM 40 MG: 40 TABLET, DELAYED RELEASE ORAL at 09:07

## 2019-05-23 RX ADMIN — FOLIC ACID 1 MG: 1 TABLET ORAL at 11:00

## 2019-05-23 RX ADMIN — Medication 5 ML: at 22:00

## 2019-05-23 RX ADMIN — BUSPIRONE HYDROCHLORIDE 15 MG: 7.5 TABLET ORAL at 09:08

## 2019-05-23 RX ADMIN — CLONIDINE HYDROCHLORIDE 0.1 MG: 0.1 TABLET ORAL at 11:00

## 2019-05-23 RX ADMIN — LIDOCAINE HYDROCHLORIDE 40 ML: 20 SOLUTION ORAL; TOPICAL at 12:20

## 2019-05-23 RX ADMIN — SUCRALFATE 1 G: 1 TABLET ORAL at 09:07

## 2019-05-23 RX ADMIN — SUCRALFATE 1 G: 1 TABLET ORAL at 17:37

## 2019-05-23 RX ADMIN — BUSPIRONE HYDROCHLORIDE 15 MG: 7.5 TABLET ORAL at 17:37

## 2019-05-23 RX ADMIN — SODIUM CHLORIDE 150 ML/HR: 900 INJECTION, SOLUTION INTRAVENOUS at 17:39

## 2019-05-23 RX ADMIN — FAMOTIDINE 20 MG: 10 INJECTION, SOLUTION INTRAVENOUS at 17:33

## 2019-05-23 RX ADMIN — OXYCODONE HYDROCHLORIDE AND ACETAMINOPHEN 1 TABLET: 5; 325 TABLET ORAL at 19:16

## 2019-05-23 RX ADMIN — PANCRELIPASE LIPASE, PANCRELIPASE PROTEASE, PANCRELIPASE AMYLASE 1 CAPSULE: 10000; 32000; 42000 CAPSULE, DELAYED RELEASE ORAL at 09:08

## 2019-05-23 RX ADMIN — Medication 100 MG: at 11:00

## 2019-05-23 RX ADMIN — OXYCODONE HYDROCHLORIDE AND ACETAMINOPHEN 1 TABLET: 5; 325 TABLET ORAL at 12:24

## 2019-05-23 NOTE — PHYSICIAN ADVISORY
Letter of admission status determination Funmilayo Kowalski Age: 53 y.o. MRN: [id number] [facility]:  [id number] Date of hospitalization: 5/22/2019 I have reviewed this case as it involves a Medicare patient admitted as inpatient that has not been hospitalized for at least two midnights and has a discharge order placed. Patient's condition and the documented plan of care at the time of admission do not fully support the expectation that the patient would require medically necessary hospital care for two or more midnights. Therefore, I recommend changing the hospitalization status to Outpatient. The final decision regarding the patient's hospitalization status depends on the attending physician's judgment. Joanie Garg MD, [place], [facility], [facility] Physician Advisor [address] [place] [address] [phone]

## 2019-05-23 NOTE — PROGRESS NOTES
Problem: Discharge Planning  Goal: *Discharge to safe environment, home  5/23/2019 0927 by Evelyne Lopez  Outcome: Resolved/Met  Reason for Admission:   NSTEMI                  RRAT Score:    18              Do you (patient/family) have any concerns for transition/discharge? Plan for utilizing home health:       Current Advanced Directive/Advance Care Plan:  Not on file    Likelihood of readmission?  mod             Transition of Care Plan:      Home  Interviewed patient, she agrees to share her discharge information with her spouse, Sal Art . Demographic information verified. She was independent prior to admission and sees Dr Georgiana Dixon for her primary care needs. Her discharge plan so to return home, sister to transport. Care Management Interventions  PCP Verified by CM: Yes  Palliative Care Criteria Met (RRAT>21 & CHF Dx)?: No  Mode of Transport at Discharge: Other (see comment)(spouse)  Transition of Care Consult (CM Consult): Discharge Planning  MyChart Signup: No  Discharge Durable Medical Equipment: No  Health Maintenance Reviewed: Yes  Physical Therapy Consult: No  Occupational Therapy Consult: No  Speech Therapy Consult: No  Current Support Network: Lives with Spouse  Confirm Follow Up Transport: Family  Plan discussed with Pt/Family/Caregiver: Yes  [place] Resource Information Provided?: No  Discharge Location  Discharge Placement: Home   Patient has been changed from inpatient to observation and a Condition 44 has been completed based on Medicare criteria. The patient / or next of kin will be made aware of this change in status and given a copy of  the [facility] Outpatient Observation Information and Notification letter. Patient and/or next of kin has been given and has signed the [facility] Outpatient Observation  Notification letter and all questions answered. Copy of this notice given to patient and copy placed on chart.     Patient and/or next of kin has been given the Outpatient Observation Information and Notification letter and all questions answered. Patient and/or next of kin has been given the Outpatient Observation Information and Notification letter and all questions answered.

## 2019-05-23 NOTE — DISCHARGE INSTRUCTIONS
DISCHARGE SUMMARY from Nurse    PATIENT INSTRUCTIONS:    After general anesthesia or intravenous sedation, for 24 hours or while taking prescription Narcotics:  · Limit your activities  · Do not drive and operate hazardous machinery  · Do not make important personal or business decisions  · Do  not drink alcoholic beverages  · If you have not urinated within 8 hours after discharge, please contact your surgeon on call. Report the following to your surgeon:  · Excessive pain, swelling, redness or odor of or around the surgical area  · Temperature over 100.5  · Nausea and vomiting lasting longer than 4 hours or if unable to take medications  · Any signs of decreased circulation or nerve impairment to extremity: change in color, persistent  numbness, tingling, coldness or increase pain  · Any questions    What to do at Home:  Recommended activity: Activity as tolerated    If you experience any of the following symptoms chest pain, palpitations, shortness of breath, please follow up with emergency room. *  Please give a list of your current medications to your Primary Care Provider. *  Please update this list whenever your medications are discontinued, doses are      changed, or new medications (including over-the-counter products) are added. *  Please carry medication information at all times in case of emergency situations. These are general instructions for a healthy lifestyle:    No smoking/ No tobacco products/ Avoid exposure to second hand smoke  Surgeon General's Warning:  Quitting smoking now greatly reduces serious risk to your health.     Obesity, smoking, and sedentary lifestyle greatly increases your risk for illness    A healthy diet, regular physical exercise & weight monitoring are important for maintaining a healthy lifestyle    You may be retaining fluid if you have a history of heart failure or if you experience any of the following symptoms:  Weight gain of 3 pounds or more overnight or 5 pounds in a week, increased swelling in our hands or feet or shortness of breath while lying flat in bed. Please call your doctor as soon as you notice any of these symptoms; do not wait until your next office visit. Recognize signs and symptoms of STROKE:    F-face looks uneven    A-arms unable to move or move unevenly    S-speech slurred or non-existent    T-time-call 911 as soon as signs and symptoms begin-DO NOT go       Back to bed or wait to see if you get better-TIME IS BRAIN. Warning Signs of HEART ATTACK     Call 911 if you have these symptoms:   Chest discomfort. Most heart attacks involve discomfort in the center of the chest that lasts more than a few minutes, or that goes away and comes back. It can feel like uncomfortable pressure, squeezing, fullness, or pain.  Discomfort in other areas of the upper body. Symptoms can include pain or discomfort in one or both arms, the back, neck, jaw, or stomach.  Shortness of breath with or without chest discomfort.  Other signs may include breaking out in a cold sweat, nausea, or lightheadedness. Don't wait more than five minutes to call 911 - MINUTES MATTER! Fast action can save your life. Calling 911 is almost always the fastest way to get lifesaving treatment. Emergency Medical Services staff can begin treatment when they arrive -- up to an hour sooner than if someone gets to the hospital by car. The discharge information has been reviewed with the patient. The patient verbalized understanding. Discharge medications reviewed with the patient and appropriate educational materials and side effects teaching were provided. Patient armband removed and shredded.

## 2019-05-23 NOTE — PROGRESS NOTES
Progress Note      Patient: Natasha Barnhart               Sex: female          DOA: 5/22/2019       YOB: 1965      Age:  53 y.o.        LOS:  LOS: 1 day             CHIEF COMPLAINT:  Chest Pain      Subjective:     Pt continues to complain of burning substernal pain and epigastric pain secondary to esophagitis/pancreatitis, she is in tears and states she cannot eat as a result of pain. Objective:     Visit Vitals  /90   Pulse 92   Temp 97.5 °F (36.4 °C)   Resp 18   Ht 5' 1\" (1.549 m)   Wt 50.8 kg (112 lb)   SpO2 98%   BMI 21.16 kg/m²        Physical Exam:  Gen: thin cachectic female  Ears: hearing is intact  Eyes: Icterus is not present  Lungs: clear to auscultation bilaterally  Heart: regular rate and rhythm, S1, S2 normal, no murmur, click, rub or gallop  Gastrointestinal: soft, mild tenderness. Bowel sounds normal. No masses,  no organomegaly  Neurological:  New Focal Deficits are not present  Psychiatric:  Mood is stable    Lab/Data Reviewed:  CMP: No results found for: NA, K, CL, CO2, AGAP, GLU, BUN, CREA, GFRAA, GFRNA, CA, MG, PHOS, ALB, TBIL, TP, ALB, GLOB, AGRAT, SGOT, ALT, GPT  CBC:   Lab Results   Component Value Date/Time    WBC 4.9 05/23/2019 07:30 AM    HGB 11.5 (L) 05/23/2019 07:30 AM    HCT 35.3 05/23/2019 07:30 AM    PLT 76 (L) 05/23/2019 07:30 AM       Imaging Reviewed:  No results found. Assessment:     Principal Problem:    NSTEMI (non-ST elevated myocardial infarction) ([facility] (1/15/2019)    Active Problems:    Bipolar disorder (manic depression) ([facility] (3/8/2013)      Alcoholism ([facility] (1/28/2016)      HTN (hypertension) (1/28/2016)      Pancreatitis, alcoholic, acute (0/87/5312)      COPD (chronic obstructive pulmonary disease) ([facility] (2/25/2016)      Chest pain (5/23/2019)      Esophagitis (5/23/2019)        PLAN:  · Troponin elevation - seen by Cardiology, underwent stress test which resulted in low risk result; echo with LVEF 51-55%.  CTA negative for PE  · Esophagitis and pancreatitis - cont IVF, PPI, H2 blocker, percocet  · Cont bipolar meds  · BP control  · Cont IVF  · CIWA protocol  · Cont acceptable home medications for chronic conditions  · DVT protocol    Likely dc in AM    I have personally reviewed all pertinent labs and films that have officially resulted over the last 24 hours. I have personally checked for all pending labs that are awaiting final results.       Signed By: Hossein Fish MD     May 23, 2019

## 2019-05-23 NOTE — ROUTINE PROCESS
5919 Received report from [address] [facility]. Patient alert and oriented. 1100 Patient discharge order discontinued. Pain med was given. Patient verbalized feeling better but abdomen still hurts. Will give Med once available. 1530 Patient asleep, wakes up with name call. Call bell and telephone placed within reach. 1730 Patient assisted to the bathroom and back in the bed. 1930 Bedside and Verbal shift change report given to [facility] RN (oncoming nurse) by me (offgoing nurse). Report included the following information SBAR, Kardex, Intake/Output, MAR, Recent Results and Cardiac Rhythm NSR.

## 2019-05-23 NOTE — PROGRESS NOTES
Answered call light, assisted patient with ambulating to the BR. Patient urinated, assisted back to bed with minimal assistance.

## 2019-05-23 NOTE — ROUTINE PROCESS
Bedside and Verbal shift change report given to [facility] RN (oncoming nurse) by Lovely Katz RN (offgoing nurse). Report included the following information SBAR, Intake/Output, MAR, Recent Results and Cardiac Rhythm ST.  
 
2350 Pt received norco x1 for pain. Pt somnolent throughout much of shift, but rousable. 0400 Phlebotomy unable to draw labs despite multiple attempts by multiple phlebotomists. 0700 Lary phlebotomist able to draw labs. Bedside and Verbal shift change report given to Anjana Richardson (oncoming nurse) by [facility] RN (offgoing nurse). Report included the following information SBAR, Intake/Output and MAR.

## 2019-05-24 VITALS
HEIGHT: 61 IN | HEART RATE: 97 BPM | BODY MASS INDEX: 21.14 KG/M2 | SYSTOLIC BLOOD PRESSURE: 142 MMHG | TEMPERATURE: 98.2 F | OXYGEN SATURATION: 95 % | RESPIRATION RATE: 17 BRPM | WEIGHT: 112 LBS | DIASTOLIC BLOOD PRESSURE: 87 MMHG

## 2019-05-24 PROCEDURE — 74011250637 HC RX REV CODE- 250/637: Performed by: HOSPITALIST

## 2019-05-24 PROCEDURE — 99218 HC RM OBSERVATION: CPT

## 2019-05-24 RX ADMIN — PANTOPRAZOLE SODIUM 40 MG: 40 TABLET, DELAYED RELEASE ORAL at 08:37

## 2019-05-24 RX ADMIN — Medication 10 ML: at 08:38

## 2019-05-24 RX ADMIN — BUSPIRONE HYDROCHLORIDE 15 MG: 7.5 TABLET ORAL at 08:36

## 2019-05-24 RX ADMIN — TOPIRAMATE 100 MG: 100 TABLET, FILM COATED ORAL at 08:36

## 2019-05-24 RX ADMIN — SUCRALFATE 1 G: 1 TABLET ORAL at 08:37

## 2019-05-24 RX ADMIN — Medication 100 MG: at 08:37

## 2019-05-24 RX ADMIN — OXYCODONE HYDROCHLORIDE AND ACETAMINOPHEN 1 TABLET: 5; 325 TABLET ORAL at 01:27

## 2019-05-24 RX ADMIN — CLONIDINE HYDROCHLORIDE 0.1 MG: 0.1 TABLET ORAL at 08:37

## 2019-05-24 RX ADMIN — QUETIAPINE FUMARATE 300 MG: 300 TABLET ORAL at 00:53

## 2019-05-24 RX ADMIN — THERA TABS 1 TABLET: TAB at 08:36

## 2019-05-24 RX ADMIN — FOLIC ACID 1 MG: 1 TABLET ORAL at 08:37

## 2019-05-24 RX ADMIN — PANCRELIPASE LIPASE, PANCRELIPASE PROTEASE, PANCRELIPASE AMYLASE 1 CAPSULE: 10000; 32000; 42000 CAPSULE, DELAYED RELEASE ORAL at 08:36

## 2019-05-24 RX ADMIN — GABAPENTIN 600 MG: 300 CAPSULE ORAL at 08:36

## 2019-05-24 RX ADMIN — LAMOTRIGINE 200 MG: 100 TABLET ORAL at 08:36

## 2019-05-24 NOTE — PROGRESS NOTES
Met with pt to obtain her sister's number, as per previous CM note she was supposed to pick pt up @ discharge. Sister is Sindhu: [phone], attempted to call & phone hung up. Called sister again, left VM re discharge & need for pt to be picked up. Available as needed.   [address] [facility], ext [phone]

## 2019-05-24 NOTE — ROUTINE PROCESS
Bedside and Verbal shift change report given to [facility] RN (oncoming nurse) by Darline Morton RN (offgoing nurse). Report included the following information SBAR, Intake/Output and MAR. Pt set off bed alarms multiple times to go to the bathroom, when asked why she did not use the call bell she stated, \"I wasn't thinking straight. \" Moved from room 3010 to room 3002 for closer observation. Bed alarms set. 0000 Pt upset, tearful, complained that she had not been prescribed dilaudid. Asked to receive percocet early. Stated to nurse, \"I know you could give it to me early if you wanted to. \" PRN percocet administered 6 hours after previous dose, per directions in MAR.  
 
~0200 Episode of pt incontinence. Pt attempted to get out of bed, extremely unsteady on feet. Linens changed, pt placed back in bed, bed alarm reset. 0400 Pt lethargic but rousable. Respiratory rate WNL, regular, SpO2 mid 90s. Pt intermittently requested additional pain medication starting at approximately 0400. RN stated pt was too disoriented to be given pain medication at this time. Pt stated, \"I'm not high, you know,\" and fell asleep. Bedside and Verbal shift change report given to [address] [place] [address] (oncoming nurse) by [facility] RN (offgoing nurse). Pt sleeping quietly at this time, rousable, two bed alarms activated. Report included the following information SBAR, Intake/Output, MAR, Recent Results and Cardiac Rhythm SR/ST.

## 2019-05-24 NOTE — PROGRESS NOTES
Assumed care from [facility] Roxy CARDENAS RN. Pt resting in bed with no signs of pain or distress. Bed locked and in lowest position with call bell in reach.

## 2019-05-24 NOTE — DISCHARGE SUMMARY
Discharge Summary    Patient: Angeline Rosas               Sex: female          DOA: 5/22/2019       YOB: 1965      Age:  53 y.o.        LOS:  LOS: 1 day                Admit Date: 5/22/2019    Discharge Date: 5/24/2019    Admission Diagnoses: NSTEMI (non-ST elevated myocardial infarction) [facility]) [I21.4]  Chest pain [R07.9]    Discharge Diagnoses:    Hospital Problems  Date Reviewed: 5/22/2019          Codes Class Noted POA    Bipolar disorder (manic depression) ([facility] (Chronic) ICD-10-CM: F31.9  ICD-9-CM: 296.80  3/8/2013 Yes        Chest pain ICD-10-CM: R07.9  ICD-9-CM: 786.50  5/23/2019 Unknown        Esophagitis ICD-10-CM: K20.9  ICD-9-CM: 530.10  5/23/2019 Unknown        * (Principal) NSTEMI (non-ST elevated myocardial infarction) ([facility] ICD-10-CM: I21.4  ICD-9-CM: 410.70  1/15/2019 Yes        COPD (chronic obstructive pulmonary disease) ([facility] ICD-10-CM: J44.9  ICD-9-CM: 496  2/25/2016 Yes        Pancreatitis, alcoholic, acute ALEXX-95-PU: K85.20  ICD-9-CM: 577.0  2/19/2016 Yes        Alcoholism ([facility] (Chronic) ICD-10-CM: F10.20  ICD-9-CM: 303.90  1/28/2016 Unknown        HTN (hypertension) (Chronic) ICD-10-CM: I10  ICD-9-CM: 401.9  1/28/2016 Yes              Discharge Condition:  Improved    Hospital Course:  54F w/ h/o ETOH abuse, COPD, HTN, who presented with chest pain. She was found to have elevated troponin and d-dimer. She was started on IV heparin gtt. Cardiology was consulted, she underwent nuclear myocardial perfusion study which supported a low risk stress test. She also had a CTA chest which was negative for PE but did show findings c/w chronic esophagitis secondary to GERD. She was treated with PPI. She was also placed on CIWA protocol for possible ETOH withdrawal. She c/o abd pain related to chronic pancreatitis which improved with IV fluids and pain medications. She was discharged in stable condition to home. She was strongly advised to cut back and quit drinking.      Consults: Cardiology    Labs:  Labs: Results:       Chemistry Recent Labs     05/22/19  0209   *   *   K 3.8   CL 85*   CO2 24   BUN 14   CREA 1.07   CA 13.8*   AGAP 23*   BUCR 13      TP 7.6   ALB 4.1   GLOB 3.5   AGRAT 1.2      CBC w/Diff Recent Labs     05/23/19  0730 05/22/19  0209   WBC 4.9 11.2   RBC 3.68* 4.59   HGB 11.5* 14.8   HCT 35.3 42.8   PLT 76* 179   GRANS 55 69   LYMPH 40 26   EOS 3 0      Cardiac Enzymes Recent Labs     05/22/19  0900   CPK 78   CKND1 7.6*      Coagulation Recent Labs     05/23/19  0730 05/22/19  0900   APTT 27.2 80.3*       Lipid Panel Lab Results   Component Value Date/Time    Cholesterol, total 320 (H) 11/09/2010 01:49 PM    HDL Cholesterol 135 (H) 11/09/2010 01:49 PM    LDL, calculated 170 (H) 11/09/2010 01:49 PM    VLDL, calculated 15 11/09/2010 01:49 PM    Triglyceride 75 11/09/2010 01:49 PM    CHOL/HDL Ratio 2.4 11/09/2010 01:49 PM      BNP No results for input(s): BNPP in the last 72 hours. Liver Enzymes Recent Labs     05/22/19 0209   TP 7.6   ALB 4.1      SGOT 154*      Thyroid Studies Lab Results   Component Value Date/Time    T4, Total 4.7 01/28/2016 10:40 AM    TSH 0.64 01/28/2016 10:40 AM            Significant Diagnostic Studies:   Cta Chest W Or W Wo Cont    Result Date: 5/22/2019  EXAM: CTA CHEST W OR W WO CONT CLINICAL INDICATION/HISTORY: Chest pain, elevated d-dimer COMPARISON: Chest radiograph same day TECHNIQUE: Thin section CT was performed through the chest during pulmonary arterial enhancement. MIP 3D reconstructions were generated to increase sensitivity in the detection of pulmonary emboli. One or more dose reduction techniques were used on this CT: automated exposure control, adjustment of the mAs and/or kVp according to patient size, and iterative reconstruction techniques. The specific techniques used on this CT exam have been documented in the patient's electronic medical record.   Digital Imaging and Communications in Medicine (DICOM) format image data are available to nonaffiliated external healthcare facilities or entities on a secure, media free, reciprocally searchable basis with patient authorization for at least a 12-month period after this study. --- EXAM QUALITY --- 1. Overall exam quality- satisfactory: adequate 2. Adequacy of pulmonary enhancement - suboptimal: insufficient enhancement for diagnosis of subsegmental vessels. Main PA density less than 190 HU 3. Adequacy of breath hold- adequate: sufficient enough to render diagnosis 4. There are no significant noise, beam hardening, streak artifacts affecting scan quality. _______________ FINDINGS: ---  PULMONARY CT ANGIOGRAM  --- PULMONARY VASCULATURE: Majority of the IV contrast is located within the left ventricle and thoracic aorta. There is adequate evaluation of right and left central and primary segmental pulmonary artery distributions. Subsegmental pulmonary arteries are not well evaluated. There is no convincing evidence of intraluminal filling defect identified to suggest pulmonary embolism. THORACIC AORTA: Normal caliber thoracic aorta. No aortic aneurysm, intramural hematoma or dissection. ---  CT CHEST --- LUNG PARENCHYMA:   There is mild bibasilar dependent atelectasis. The lung parenchyma appears otherwise clear. No pulmonary infiltration or consolidation identified. No pulmonary nodule or mass identified. PLEURAL SPACES:   No pleural effusion or pneumothorax. MEDIASTINUM: Moderate circumferential mural thickening is seen throughout distal thoracic esophagus. Small hiatal hernia. No thoracic lymphadenopathy. No global cardiomegaly. No pericardial effusion. OSSEOUS STRUCTURES:   No acute osseous abnormality. Mild thoracic degenerative disk disease. UPPER ABDOMEN: No acute abnormality. _______________     1. No evidence of pulmonary thromboembolic disease.   2. Circumferential mural thickening throughout distal thoracic esophagus, typically representing chronic esophagitis related to gastroesophageal reflux. Small hiatal hernia. No infiltrate or other acute pulmonary abnormality. Ct Abd Pelv W Cont    Result Date: 5/6/2019  EXAM: CT abdomen/pelvis  CLINICAL INDICATION/HISTORY: Chest pain, self-reported history of pancreatitis   > Additional: None. COMPARISON: 01/15/2019 TECHNIQUE: Axial CT imaging of the abdomen and pelvis was performed with intravenous contrast. Multiplanar reformats were generated. One or more dose reduction techniques were used on this CT: automated exposure control, adjustment of the mAs and/or kVp according to patient size, and iterative reconstruction techniques. The specific techniques used on this CT exam have been documented in the patient's electronic medical record. Digital Imaging and Communications in Medicine (DICOM) format image data are available to nonaffiliated external healthcare facilities or entities on a secure, media free, reciprocally searchable basis with patient authorization for at least a 12 month period after this study. _______________ FINDINGS: LOWER CHEST: Minimal linear scarring versus subsegmental atelectasis within the right lower lobe. LIVER, BILIARY: Liver is normal. No biliary dilation. Gallbladder is contracted. PANCREAS: Normal. SPLEEN: Normal. ADRENALS: Normal. KIDNEYS/URETERS/BLADDER: Kidneys enhance symmetrically. There is a 4 mm nonobstructing left renal calculus. No hydroureteronephrosis. Normal bladder. PELVIC ORGANS: Unremarkable. VASCULATURE: Atherosclerotic without aneurysmal dilation. LYMPH NODES: No enlarged lymph nodes. GASTROINTESTINAL TRACT: No bowel dilation or wall thickening. Normal appendix. Small sliding hiatal hernia, likely with stable circumferential thickening of the distal thoracic esophagus. Stomach is decompressed. BONES: No acute or aggressive osseous abnormalities identified. There are areas of serpiginous sclerosis within both femoral heads.  OTHER: None. _______________ IMPRESSION: 1. No acute radiographic abnormality to explain the patient's clinical symptomatology. 2. 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 3. Nonobstructing left renal calculus. Xr Chest Port    Result Date: 5/22/2019  EXAM: XR CHEST PORT CLINICAL INDICATION/HISTORY: chest pain -Additional: None COMPARISON: Several prior exams, most recently May 5, 2019 TECHNIQUE: Frontal view of the chest _______________ FINDINGS: HEART AND MEDIASTINUM: Normal cardiac size and mediastinal contours. LUNGS AND PLEURAL SPACES: No focal pneumonic consolidation, pneumothorax, or pleural effusion. BONY THORAX AND SOFT TISSUES: No acute osseous abnormality _______________     No acute findings in the chest.     Xr Chest Port    Result Date: 5/6/2019  EXAM: XR CHEST PORT CLINICAL INDICATION/HISTORY: chest pain -Additional: None COMPARISON: March 2, 2019 TECHNIQUE: Frontal view of the chest _______________ FINDINGS: HEART AND MEDIASTINUM: Normal cardiac size and mediastinal contours. LUNGS AND PLEURAL SPACES: No focal pneumonic consolidation, pneumothorax, or pleural effusion. BONY THORAX AND SOFT TISSUES: No acute osseous abnormality _______________     IMPRESSION: No acute radiographic cardiopulmonary abnormality. Stable exam.       Discharge Medications:     Current Discharge Medication List      CONTINUE these medications which have NOT CHANGED    Details   chlordiazePOXIDE (LIBRIUM) 25 mg capsule Take 1 Cap by mouth three (3) times daily as needed for Anxiety. Max Daily Amount: 75 mg. Qty: 15 Cap, Refills: 0    Associated Diagnoses: Alcohol withdrawal syndrome without complication (HCC)      ondansetron (ZOFRAN ODT) 8 mg disintegrating tablet Take 1 Tab by mouth every eight (8) hours as needed for Nausea. Qty: 15 Tab, Refills: 0      folic acid (FOLVITE) 1 mg tablet Take 1 Tab by mouth daily.   Qty: 30 Tab, Refills: 0      pantoprazole (PROTONIX) 40 mg tablet Take 1 Tab by mouth Before breakfast and dinner. Qty: 60 Tab, Refills: 0      gabapentin (NEURONTIN) 600 mg tablet Take 1 Tab by mouth two (2) times a day. Qty: 30 Tab, Refills: 0      cloNIDine HCl (CATAPRES) 0.1 mg tablet Take 1 Tab by mouth two (2) times a day. Qty: 20 Tab, Refills: 0      Potassium Citrate (UROCIT-K) TbER tablet Take 1 Tab by mouth three (3) times daily (with meals). Qty: 90 Tab, Refills: 11      sucralfate (CARAFATE) 100 mg/mL suspension Take 10 mL by mouth Before breakfast, lunch, dinner and at bedtime. Qty: 1200 mL, Refills: 0      calcium-cholecalciferol, D3, (CALTRATE 600+D) tablet       topiramate (TOPAMAX) 100 mg tablet 100 mg two (2) times a day. QUEtiapine (SEROQUEL) 300 mg tablet       cetirizine (ZYRTEC) 10 mg tablet       SUMAtriptan (IMITREX) 100 mg tablet TAKE 1 TO 2 TABLETS BY MOUTH DAILY AS NEEDED FOR MIGRAINE . DO NOT EXCEED 200 MG IN 24 HOUR PERIOD  Refills: 0      busPIRone (BUSPAR) 15 mg tablet Take 1 Tab by mouth two (2) times a day. Qty: 60 Tab, Refills: 0      albuterol (PROVENTIL VENTOLIN) 2.5 mg /3 mL (0.083 %) nebulizer solution 1.5 mL by Nebulization route every four (4) hours as needed for Wheezing or Shortness of Breath. Qty: 24 Each, Refills: 0      acetaminophen (TYLENOL EXTRA STRENGTH) 500 mg tablet Take 2 Tabs by mouth every six (6) hours as needed for Pain. Qty: 40 Tab, Refills: 0      hydrocortisone (CORTAID) 0.5 % topical cream Apply  to affected area two (2) times a day. use thin layer  Qty: 30 g, Refills: 0      CREON 24,000-76,000 -120,000 unit capsule              Activity: Activity as tolerated    Diet: Cardiac Diet    Follow-up: PCP in 1 week.          Total time spent including time spent on final examination and discharge discussion, discharge documentation and records reviewed and medication reconciliation: > 30 minutes    Lotus Hamilton MD  [facility]

## 2019-05-24 NOTE — PROGRESS NOTES
Problem: Falls - Risk of  Goal: *Absence of Falls  Description  Document Jac Reardon Fall Risk and appropriate interventions in the flowsheet.   Outcome: Progressing Towards Goal     Problem: Pain  Goal: *Control of Pain  Outcome: Progressing Towards Goal     Problem: Patient Education: Go to Patient Education Activity  Goal: Patient/Family Education  Outcome: Progressing Towards Goal

## 2019-05-24 NOTE — PROGRESS NOTES
Call placed to pt's spouse, no answer, left VM to let him know pt has been discharged & asked for return call. Meg Elliott RN,ext [phone].

## 2019-09-08 ENCOUNTER — HOSPITAL ENCOUNTER (EMERGENCY)
Age: 54
Discharge: HOME | End: 2019-09-08
Attending: EMERGENCY MEDICINE | Admitting: EMERGENCY MEDICINE
Payer: MEDICARE

## 2019-09-08 ENCOUNTER — APPOINTMENT (OUTPATIENT)
Dept: GENERAL RADIOLOGY | Age: 54
End: 2019-09-08
Attending: EMERGENCY MEDICINE
Payer: MEDICARE

## 2019-09-08 VITALS
TEMPERATURE: 98.8 F | OXYGEN SATURATION: 100 % | WEIGHT: 114 LBS | HEART RATE: 106 BPM | RESPIRATION RATE: 9 BRPM | DIASTOLIC BLOOD PRESSURE: 78 MMHG | SYSTOLIC BLOOD PRESSURE: 142 MMHG | BODY MASS INDEX: 20.98 KG/M2 | HEIGHT: 62 IN

## 2019-09-08 DIAGNOSIS — R07.9 CHEST PAIN, UNSPECIFIED TYPE: Primary | ICD-10-CM

## 2019-09-08 LAB
ALBUMIN SERPL-MCNC: 3.7 G/DL (ref 3.4–5)
ALBUMIN/GLOB SERPL: 1.1 {RATIO} (ref 0.8–1.7)
ALP SERPL-CCNC: 84 U/L (ref 45–117)
ALT SERPL-CCNC: 38 U/L (ref 13–56)
ANION GAP SERPL CALC-SCNC: 17 MMOL/L (ref 3–18)
AST SERPL-CCNC: 88 U/L (ref 10–38)
BASOPHILS # BLD: 0 K/UL (ref 0–0.1)
BASOPHILS NFR BLD: 0 % (ref 0–2)
BILIRUB SERPL-MCNC: 0.4 MG/DL (ref 0.2–1)
BUN SERPL-MCNC: 25 MG/DL (ref 7–18)
BUN/CREAT SERPL: 31 (ref 12–20)
CALCIUM SERPL-MCNC: 9.6 MG/DL (ref 8.5–10.1)
CHLORIDE SERPL-SCNC: 95 MMOL/L (ref 100–111)
CO2 SERPL-SCNC: 20 MMOL/L (ref 21–32)
CREAT SERPL-MCNC: 0.81 MG/DL (ref 0.6–1.3)
DIFFERENTIAL METHOD BLD: ABNORMAL
EOSINOPHIL # BLD: 0 K/UL (ref 0–0.4)
EOSINOPHIL NFR BLD: 0 % (ref 0–5)
ERYTHROCYTE [DISTWIDTH] IN BLOOD BY AUTOMATED COUNT: 16.4 % (ref 11.6–14.5)
ETHANOL SERPL-MCNC: 122 MG/DL (ref 0–3)
GLOBULIN SER CALC-MCNC: 3.3 G/DL (ref 2–4)
GLUCOSE SERPL-MCNC: 83 MG/DL (ref 74–99)
HCT VFR BLD AUTO: 37.8 % (ref 35–45)
HGB BLD-MCNC: 12.5 G/DL (ref 12–16)
LIPASE SERPL-CCNC: 89 U/L (ref 73–393)
LYMPHOCYTES # BLD: 4 K/UL (ref 0.9–3.6)
LYMPHOCYTES NFR BLD: 41 % (ref 21–52)
MCH RBC QN AUTO: 31.9 PG (ref 24–34)
MCHC RBC AUTO-ENTMCNC: 33.1 G/DL (ref 31–37)
MCV RBC AUTO: 96.4 FL (ref 74–97)
MONOCYTES # BLD: 0.8 K/UL (ref 0.05–1.2)
MONOCYTES NFR BLD: 8 % (ref 3–10)
NEUTS SEG # BLD: 5 K/UL (ref 1.8–8)
NEUTS SEG NFR BLD: 51 % (ref 40–73)
PLATELET # BLD AUTO: 107 K/UL (ref 135–420)
PMV BLD AUTO: 10.2 FL (ref 9.2–11.8)
POTASSIUM SERPL-SCNC: 4.3 MMOL/L (ref 3.5–5.5)
PROT SERPL-MCNC: 7 G/DL (ref 6.4–8.2)
RBC # BLD AUTO: 3.92 M/UL (ref 4.2–5.3)
SODIUM SERPL-SCNC: 132 MMOL/L (ref 136–145)
TROPONIN I SERPL-MCNC: <0.02 NG/ML (ref 0–0.04)
TROPONIN I SERPL-MCNC: <0.02 NG/ML (ref 0–0.04)
WBC # BLD AUTO: 9.8 K/UL (ref 4.6–13.2)

## 2019-09-08 PROCEDURE — 71045 X-RAY EXAM CHEST 1 VIEW: CPT

## 2019-09-08 PROCEDURE — 83690 ASSAY OF LIPASE: CPT

## 2019-09-08 PROCEDURE — 96374 THER/PROPH/DIAG INJ IV PUSH: CPT

## 2019-09-08 PROCEDURE — 80307 DRUG TEST PRSMV CHEM ANLYZR: CPT

## 2019-09-08 PROCEDURE — 85025 COMPLETE CBC W/AUTO DIFF WBC: CPT

## 2019-09-08 PROCEDURE — 99285 EMERGENCY DEPT VISIT HI MDM: CPT

## 2019-09-08 PROCEDURE — 93005 ELECTROCARDIOGRAM TRACING: CPT

## 2019-09-08 PROCEDURE — 74011250636 HC RX REV CODE- 250/636: Performed by: EMERGENCY MEDICINE

## 2019-09-08 PROCEDURE — 84484 ASSAY OF TROPONIN QUANT: CPT

## 2019-09-08 PROCEDURE — 80053 COMPREHEN METABOLIC PANEL: CPT

## 2019-09-08 RX ORDER — ONDANSETRON 2 MG/ML
4 INJECTION INTRAMUSCULAR; INTRAVENOUS
Status: COMPLETED | OUTPATIENT
Start: 2019-09-08 | End: 2019-09-08

## 2019-09-08 RX ADMIN — SODIUM CHLORIDE 1000 ML: 900 INJECTION, SOLUTION INTRAVENOUS at 19:45

## 2019-09-08 RX ADMIN — ONDANSETRON 4 MG: 2 INJECTION INTRAMUSCULAR; INTRAVENOUS at 22:11

## 2019-09-08 NOTE — ED NOTES
Patient completely dressed and wandering the halls, states she needs her mother and wants to go to the waiting room, patient told to go to her room and she will be placed on the monitor

## 2019-09-08 NOTE — ED NOTES
Patient placed back on monitor and directed to not take monitor off again. Patient's mother at bedside and states that this behavior is normal for patient when Liriano Fuel is drunk\". Primary RN made aware.

## 2019-09-08 NOTE — ED TRIAGE NOTES
Patient has taken all of the monitor off and is at the nursing station asking where the bathroom is, patient given a cup to provide a urine sample, patient apologizing for taking off the monitor.  Will place the patient bckon the monitor after sh is done using the bathroom

## 2019-09-08 NOTE — ED TRIAGE NOTES
Patient arrived via EMS, the patient appears to be anxious, states the throbbing chest pain started at 1800, no radiation of the pain, states she has N/V/D and SOB with the CP.  Patient wandering about the ga asking for help, placed the patient in the bed and placed he on the cardiac monitor, patient given a phone so she can call her mother

## 2019-09-09 LAB
ATRIAL RATE: 113 BPM
CALCULATED P AXIS, ECG09: 66 DEGREES
CALCULATED R AXIS, ECG10: 72 DEGREES
CALCULATED T AXIS, ECG11: 55 DEGREES
DIAGNOSIS, 93000: NORMAL
P-R INTERVAL, ECG05: 130 MS
Q-T INTERVAL, ECG07: 324 MS
QRS DURATION, ECG06: 76 MS
QTC CALCULATION (BEZET), ECG08: 444 MS
VENTRICULAR RATE, ECG03: 113 BPM

## 2019-09-09 NOTE — ED NOTES
Report from Re Pet. Assume care of patient at this time. Patient repeatedly asking for her mother (mother is at bedside). States she feels like she is \"dying\". Speech fast and pressured. informed by mother that this is how patient acts when she is intoxicated. C/o 10/10 pain in chest. Patient is tachycardiac on monitor. breathing even and unlabored. Per mother patient is also bipolar. Reoriented patient to situation. Patient on cardiac monitor. Fluids infusing. Call bell within reach.  Will continue to monitor

## 2019-09-09 NOTE — DISCHARGE INSTRUCTIONS

## 2019-09-09 NOTE — ED NOTES
Patient pressed call light and requested to use the restroom. Patient unhooked from monitor and ambulated independently to restroom.

## 2019-09-09 NOTE — ED NOTES
Fluids moved to higher position to facilitate infusion.  Infusing slowly due to patient frequent ambulation

## 2019-09-09 NOTE — ED NOTES
Patient called and requested to use restroom.  Patient unhooked from monitor and ambulated independently to restroom

## 2019-09-09 NOTE — ED NOTES
Fluids infusing slowly due to IV placement and patient frequent ambulation. Patient requested another IV be placed so she can receive more fluids.

## 2019-09-09 NOTE — ED NOTES
I have reviewed discharge instructions with the patient. The patient verbalized understanding. Patient will redress self and ambulate independently out of care area.  Mother at bedside will drive patient home

## 2019-09-09 NOTE — ED NOTES
Patient pressed call bell, upon entering room patient stated she was having indigestion.  Requests medication for relief

## 2019-09-25 ENCOUNTER — HOSPITAL ENCOUNTER (OUTPATIENT)
Dept: GENERAL RADIOLOGY | Age: 54
Discharge: HOME | End: 2019-09-25
Payer: MEDICARE

## 2019-09-25 DIAGNOSIS — S80.01XA CONTUSION OF RIGHT KNEE: ICD-10-CM

## 2019-09-25 PROCEDURE — 73560 X-RAY EXAM OF KNEE 1 OR 2: CPT

## 2019-10-03 ENCOUNTER — HOSPITAL ENCOUNTER (OUTPATIENT)
Dept: MRI IMAGING | Age: 54
Discharge: HOME | End: 2019-10-03
Attending: FAMILY MEDICINE
Payer: MEDICARE

## 2019-10-03 DIAGNOSIS — M25.561 RIGHT KNEE PAIN: ICD-10-CM

## 2019-10-03 PROCEDURE — 73721 MRI JNT OF LWR EXTRE W/O DYE: CPT

## 2019-10-11 ENCOUNTER — HOSPITAL ENCOUNTER (INPATIENT)
Age: 54
LOS: 1 days | Discharge: HOME | DRG: 445 | End: 2019-10-12
Attending: EMERGENCY MEDICINE | Admitting: HOSPITALIST
Payer: MEDICARE

## 2019-10-11 ENCOUNTER — APPOINTMENT (OUTPATIENT)
Dept: CT IMAGING | Age: 54
DRG: 445 | End: 2019-10-11
Attending: EMERGENCY MEDICINE
Payer: MEDICARE

## 2019-10-11 DIAGNOSIS — E87.5 HYPERKALEMIA: Primary | ICD-10-CM

## 2019-10-11 DIAGNOSIS — F10.939 ALCOHOL WITHDRAWAL SYNDROME WITH COMPLICATION (HCC): ICD-10-CM

## 2019-10-11 DIAGNOSIS — E87.1 HYPONATREMIA: ICD-10-CM

## 2019-10-11 PROBLEM — K83.8 COMMON BILE DUCT DILATATION: Status: ACTIVE | Noted: 2019-10-11

## 2019-10-11 LAB
ALBUMIN SERPL-MCNC: 4 G/DL (ref 3.4–5)
ALBUMIN/GLOB SERPL: 1.1 {RATIO} (ref 0.8–1.7)
ALP SERPL-CCNC: 118 U/L (ref 45–117)
ALT SERPL-CCNC: 31 U/L (ref 13–56)
ANION GAP BLD CALC-SCNC: 21 MMOL/L (ref 10–20)
ANION GAP SERPL CALC-SCNC: 18 MMOL/L (ref 3–18)
AST SERPL-CCNC: 49 U/L (ref 10–38)
BASOPHILS # BLD: 0 K/UL (ref 0–0.1)
BASOPHILS NFR BLD: 0 % (ref 0–2)
BILIRUB SERPL-MCNC: 0.7 MG/DL (ref 0.2–1)
BUN BLD-MCNC: 16 MG/DL (ref 7–18)
BUN SERPL-MCNC: 15 MG/DL (ref 7–18)
BUN/CREAT SERPL: 22 (ref 12–20)
CA-I BLD-MCNC: 0.95 MMOL/L (ref 1.12–1.32)
CALCIUM SERPL-MCNC: 9.1 MG/DL (ref 8.5–10.1)
CHLORIDE BLD-SCNC: 91 MMOL/L (ref 100–108)
CHLORIDE SERPL-SCNC: 90 MMOL/L (ref 100–111)
CO2 BLD-SCNC: 19 MMOL/L (ref 19–24)
CO2 SERPL-SCNC: 17 MMOL/L (ref 21–32)
CREAT SERPL-MCNC: 0.68 MG/DL (ref 0.6–1.3)
CREAT UR-MCNC: 1 MG/DL (ref 0.6–1.3)
DIFFERENTIAL METHOD BLD: ABNORMAL
EOSINOPHIL # BLD: 0.1 K/UL (ref 0–0.4)
EOSINOPHIL NFR BLD: 1 % (ref 0–5)
ERYTHROCYTE [DISTWIDTH] IN BLOOD BY AUTOMATED COUNT: 15.7 % (ref 11.6–14.5)
ETHANOL SERPL-MCNC: 192 MG/DL (ref 0–3)
GLOBULIN SER CALC-MCNC: 3.8 G/DL (ref 2–4)
GLUCOSE BLD STRIP.AUTO-MCNC: 91 MG/DL (ref 74–106)
GLUCOSE SERPL-MCNC: 88 MG/DL (ref 74–99)
HCT VFR BLD AUTO: 47.9 % (ref 35–45)
HCT VFR BLD CALC: 51 % (ref 36–49)
HGB BLD-MCNC: 16.1 G/DL (ref 12–16)
HGB BLD-MCNC: 17.3 G/DL (ref 12–16)
LIPASE SERPL-CCNC: 31 U/L (ref 73–393)
LYMPHOCYTES # BLD: 3.3 K/UL (ref 0.9–3.6)
LYMPHOCYTES NFR BLD: 30 % (ref 21–52)
MCH RBC QN AUTO: 32.6 PG (ref 24–34)
MCHC RBC AUTO-ENTMCNC: 33.6 G/DL (ref 31–37)
MCV RBC AUTO: 97 FL (ref 74–97)
MONOCYTES # BLD: 1 K/UL (ref 0.05–1.2)
MONOCYTES NFR BLD: 9 % (ref 3–10)
NEUTS SEG # BLD: 6.6 K/UL (ref 1.8–8)
NEUTS SEG NFR BLD: 60 % (ref 40–73)
PLATELET # BLD AUTO: 247 K/UL (ref 135–420)
PMV BLD AUTO: 9.9 FL (ref 9.2–11.8)
POTASSIUM BLD-SCNC: 6.1 MMOL/L (ref 3.5–5.5)
POTASSIUM SERPL-SCNC: 5.9 MMOL/L (ref 3.5–5.5)
PROT SERPL-MCNC: 7.8 G/DL (ref 6.4–8.2)
RBC # BLD AUTO: 4.94 M/UL (ref 4.2–5.3)
SODIUM BLD-SCNC: 123 MMOL/L (ref 136–145)
SODIUM SERPL-SCNC: 125 MMOL/L (ref 136–145)
WBC # BLD AUTO: 10.9 K/UL (ref 4.6–13.2)

## 2019-10-11 PROCEDURE — 96375 TX/PRO/DX INJ NEW DRUG ADDON: CPT

## 2019-10-11 PROCEDURE — 83690 ASSAY OF LIPASE: CPT

## 2019-10-11 PROCEDURE — 93005 ELECTROCARDIOGRAM TRACING: CPT

## 2019-10-11 PROCEDURE — 65660000000 HC RM CCU STEPDOWN

## 2019-10-11 PROCEDURE — 80053 COMPREHEN METABOLIC PANEL: CPT

## 2019-10-11 PROCEDURE — 85025 COMPLETE CBC W/AUTO DIFF WBC: CPT

## 2019-10-11 PROCEDURE — 96376 TX/PRO/DX INJ SAME DRUG ADON: CPT

## 2019-10-11 PROCEDURE — 80047 BASIC METABLC PNL IONIZED CA: CPT

## 2019-10-11 PROCEDURE — 74011250636 HC RX REV CODE- 250/636: Performed by: EMERGENCY MEDICINE

## 2019-10-11 PROCEDURE — 74011250637 HC RX REV CODE- 250/637: Performed by: EMERGENCY MEDICINE

## 2019-10-11 PROCEDURE — 99285 EMERGENCY DEPT VISIT HI MDM: CPT

## 2019-10-11 PROCEDURE — 74011636320 HC RX REV CODE- 636/320: Performed by: EMERGENCY MEDICINE

## 2019-10-11 PROCEDURE — 80307 DRUG TEST PRSMV CHEM ANLYZR: CPT

## 2019-10-11 PROCEDURE — 96365 THER/PROPH/DIAG IV INF INIT: CPT

## 2019-10-11 PROCEDURE — 83935 ASSAY OF URINE OSMOLALITY: CPT

## 2019-10-11 PROCEDURE — 74177 CT ABD & PELVIS W/CONTRAST: CPT

## 2019-10-11 RX ORDER — DEXTROSE MONOHYDRATE AND SODIUM CHLORIDE 5; .9 G/100ML; G/100ML
125 INJECTION, SOLUTION INTRAVENOUS CONTINUOUS
Status: DISCONTINUED | OUTPATIENT
Start: 2019-10-12 | End: 2019-10-12 | Stop reason: HOSPADM

## 2019-10-11 RX ORDER — LORAZEPAM 0.5 MG/1
1 TABLET ORAL
Status: DISCONTINUED | OUTPATIENT
Start: 2019-10-11 | End: 2019-10-12 | Stop reason: HOSPADM

## 2019-10-11 RX ORDER — LORAZEPAM 0.5 MG/1
2 TABLET ORAL
Status: DISCONTINUED | OUTPATIENT
Start: 2019-10-11 | End: 2019-10-12 | Stop reason: HOSPADM

## 2019-10-11 RX ORDER — ACETAMINOPHEN 325 MG/1
650 TABLET ORAL
Status: DISCONTINUED | OUTPATIENT
Start: 2019-10-11 | End: 2019-10-12 | Stop reason: HOSPADM

## 2019-10-11 RX ORDER — SODIUM CHLORIDE 0.9 % (FLUSH) 0.9 %
5-40 SYRINGE (ML) INJECTION AS NEEDED
Status: DISCONTINUED | OUTPATIENT
Start: 2019-10-11 | End: 2019-10-12 | Stop reason: HOSPADM

## 2019-10-11 RX ORDER — GABAPENTIN 300 MG/1
600 CAPSULE ORAL 2 TIMES DAILY
Status: DISCONTINUED | OUTPATIENT
Start: 2019-10-12 | End: 2019-10-12 | Stop reason: HOSPADM

## 2019-10-11 RX ORDER — SODIUM POLYSTYRENE SULFONATE 15 G/60ML
30 SUSPENSION ORAL; RECTAL
Status: COMPLETED | OUTPATIENT
Start: 2019-10-11 | End: 2019-10-11

## 2019-10-11 RX ORDER — LORAZEPAM 2 MG/ML
1 INJECTION INTRAMUSCULAR
Status: DISCONTINUED | OUTPATIENT
Start: 2019-10-11 | End: 2019-10-12 | Stop reason: HOSPADM

## 2019-10-11 RX ORDER — CLONIDINE HYDROCHLORIDE 0.1 MG/1
0.1 TABLET ORAL 2 TIMES DAILY
Status: DISCONTINUED | OUTPATIENT
Start: 2019-10-12 | End: 2019-10-12 | Stop reason: HOSPADM

## 2019-10-11 RX ORDER — PANTOPRAZOLE SODIUM 40 MG/1
40 TABLET, DELAYED RELEASE ORAL
Status: DISCONTINUED | OUTPATIENT
Start: 2019-10-12 | End: 2019-10-12 | Stop reason: HOSPADM

## 2019-10-11 RX ORDER — LORAZEPAM 2 MG/ML
1 INJECTION INTRAMUSCULAR
Status: COMPLETED | OUTPATIENT
Start: 2019-10-11 | End: 2019-10-11

## 2019-10-11 RX ORDER — ONDANSETRON 2 MG/ML
4 INJECTION INTRAMUSCULAR; INTRAVENOUS
Status: DISCONTINUED | OUTPATIENT
Start: 2019-10-11 | End: 2019-10-11

## 2019-10-11 RX ORDER — SODIUM CHLORIDE 0.9 % (FLUSH) 0.9 %
5-40 SYRINGE (ML) INJECTION EVERY 8 HOURS
Status: DISCONTINUED | OUTPATIENT
Start: 2019-10-12 | End: 2019-10-12 | Stop reason: HOSPADM

## 2019-10-11 RX ORDER — LORAZEPAM 2 MG/ML
2 INJECTION INTRAMUSCULAR
Status: COMPLETED | OUTPATIENT
Start: 2019-10-11 | End: 2019-10-11

## 2019-10-11 RX ORDER — QUETIAPINE FUMARATE 100 MG/1
200 TABLET, FILM COATED ORAL
Status: DISCONTINUED | OUTPATIENT
Start: 2019-10-12 | End: 2019-10-12 | Stop reason: HOSPADM

## 2019-10-11 RX ORDER — FOLIC ACID 1 MG/1
1 TABLET ORAL DAILY
Status: DISCONTINUED | OUTPATIENT
Start: 2019-10-12 | End: 2019-10-12 | Stop reason: HOSPADM

## 2019-10-11 RX ORDER — KETOROLAC TROMETHAMINE 30 MG/ML
15 INJECTION, SOLUTION INTRAMUSCULAR; INTRAVENOUS
Status: DISCONTINUED | OUTPATIENT
Start: 2019-10-11 | End: 2019-10-12

## 2019-10-11 RX ORDER — DEXTROSE MONOHYDRATE AND SODIUM CHLORIDE 5; .45 G/100ML; G/100ML
125 INJECTION, SOLUTION INTRAVENOUS CONTINUOUS
Status: DISCONTINUED | OUTPATIENT
Start: 2019-10-12 | End: 2019-10-12

## 2019-10-11 RX ORDER — HYDROMORPHONE HYDROCHLORIDE 1 MG/ML
1 INJECTION, SOLUTION INTRAMUSCULAR; INTRAVENOUS; SUBCUTANEOUS ONCE
Status: COMPLETED | OUTPATIENT
Start: 2019-10-11 | End: 2019-10-11

## 2019-10-11 RX ORDER — TOPIRAMATE 100 MG/1
100 TABLET, FILM COATED ORAL 2 TIMES DAILY
Status: DISCONTINUED | OUTPATIENT
Start: 2019-10-12 | End: 2019-10-12 | Stop reason: HOSPADM

## 2019-10-11 RX ORDER — ASPIRIN 325 MG/1
100 TABLET, FILM COATED ORAL DAILY
Status: DISCONTINUED | OUTPATIENT
Start: 2019-10-12 | End: 2019-10-12 | Stop reason: HOSPADM

## 2019-10-11 RX ORDER — ALBUTEROL SULFATE 0.83 MG/ML
2.5 SOLUTION RESPIRATORY (INHALATION)
Status: DISCONTINUED | OUTPATIENT
Start: 2019-10-11 | End: 2019-10-12 | Stop reason: HOSPADM

## 2019-10-11 RX ORDER — SUCRALFATE 1 G/1
1 TABLET ORAL
Status: DISCONTINUED | OUTPATIENT
Start: 2019-10-12 | End: 2019-10-12 | Stop reason: HOSPADM

## 2019-10-11 RX ORDER — LORAZEPAM 2 MG/ML
2 INJECTION INTRAMUSCULAR
Status: DISCONTINUED | OUTPATIENT
Start: 2019-10-11 | End: 2019-10-12 | Stop reason: HOSPADM

## 2019-10-11 RX ORDER — LAMOTRIGINE 25 MG/1
25 TABLET ORAL DAILY
Status: DISCONTINUED | OUTPATIENT
Start: 2019-10-12 | End: 2019-10-12 | Stop reason: HOSPADM

## 2019-10-11 RX ORDER — IBUPROFEN 200 MG
1 TABLET ORAL DAILY
Status: DISCONTINUED | OUTPATIENT
Start: 2019-10-12 | End: 2019-10-12 | Stop reason: HOSPADM

## 2019-10-11 RX ORDER — ONDANSETRON 2 MG/ML
4 INJECTION INTRAMUSCULAR; INTRAVENOUS
Status: DISCONTINUED | OUTPATIENT
Start: 2019-10-11 | End: 2019-10-12 | Stop reason: HOSPADM

## 2019-10-11 RX ORDER — BUSPIRONE HYDROCHLORIDE 10 MG/1
15 TABLET ORAL 2 TIMES DAILY
Status: DISCONTINUED | OUTPATIENT
Start: 2019-10-12 | End: 2019-10-12 | Stop reason: HOSPADM

## 2019-10-11 RX ORDER — LORAZEPAM 2 MG/ML
3 INJECTION INTRAMUSCULAR
Status: DISCONTINUED | OUTPATIENT
Start: 2019-10-11 | End: 2019-10-12 | Stop reason: HOSPADM

## 2019-10-11 RX ORDER — FERROUS SULFATE, DRIED 160(50) MG
1 TABLET, EXTENDED RELEASE ORAL DAILY
Status: DISCONTINUED | OUTPATIENT
Start: 2019-10-12 | End: 2019-10-12 | Stop reason: HOSPADM

## 2019-10-11 RX ADMIN — SODIUM CHLORIDE 1000 ML: 900 INJECTION, SOLUTION INTRAVENOUS at 21:35

## 2019-10-11 RX ADMIN — SODIUM POLYSTYRENE SULFONATE 30 G: 15 SUSPENSION ORAL; RECTAL at 20:04

## 2019-10-11 RX ADMIN — SODIUM CHLORIDE, SODIUM LACTATE, POTASSIUM CHLORIDE, AND CALCIUM CHLORIDE 1000 ML: 600; 310; 30; 20 INJECTION, SOLUTION INTRAVENOUS at 19:13

## 2019-10-11 RX ADMIN — HYDROMORPHONE HYDROCHLORIDE 1 MG: 1 INJECTION, SOLUTION INTRAMUSCULAR; INTRAVENOUS; SUBCUTANEOUS at 19:13

## 2019-10-11 RX ADMIN — IOPAMIDOL 98 ML: 612 INJECTION, SOLUTION INTRAVENOUS at 19:51

## 2019-10-11 RX ADMIN — HYDROMORPHONE HYDROCHLORIDE 1 MG: 1 INJECTION, SOLUTION INTRAMUSCULAR; INTRAVENOUS; SUBCUTANEOUS at 22:11

## 2019-10-11 RX ADMIN — LORAZEPAM 2 MG: 2 INJECTION, SOLUTION INTRAMUSCULAR; INTRAVENOUS at 19:13

## 2019-10-11 RX ADMIN — CALCIUM GLUCONATE 1000 MG: 94 INJECTION, SOLUTION INTRAVENOUS at 19:23

## 2019-10-11 RX ADMIN — LORAZEPAM 1 MG: 2 INJECTION, SOLUTION INTRAMUSCULAR; INTRAVENOUS at 22:07

## 2019-10-11 NOTE — ED PROVIDER NOTES
EMERGENCY DEPARTMENT HISTORY AND PHYSICAL EXAM      Date: 10/11/2019  Patient Name: Alejandra Sutton    History of Presenting Illness     Chief Complaint   Patient presents with    Abdominal Pain    Alcohol Problem       History (Context): Alejandra Sutton is a 53 y.o. Yesenia Shutters who has a pronounced history of chronic alcoholism, who presents with subacute onset, severe, with recent flare abdominal pain in the area of the epigastrium associated with nausea and vomiting, in the setting of trying to detox from drinking on Monday. The patient states that she drinks 1.75 L of vodka a day. The patient states that she has been trying to detox by using wine. On review of systems, the patient denies fever, chills, nausea, vomiting, diarrhea, back pain, chest pain, shortness of breath, diaphoresis, rashes, tick bite, recent travel. PCP: Dale Gtz MD    Current Facility-Administered Medications   Medication Dose Route Frequency Provider Last Rate Last Dose    calcium gluconate 1 gram in 0.9% sodium chloride 50 ml infusion  1 g IntraVENous ONCE Celso Szymanski MD 50 mL/hr at 10/11/19 1923 1,000 mg at 10/11/19 1923    sodium polystyrene (KAYEXALATE) 15 gram/60 mL oral suspension 30 g  30 g Oral NOW Celso Szymanski MD         Current Outpatient Medications   Medication Sig Dispense Refill    chlordiazePOXIDE (LIBRIUM) 25 mg capsule Take 1 Cap by mouth three (3) times daily as needed for Anxiety. Max Daily Amount: 75 mg. 15 Cap 0    ondansetron (ZOFRAN ODT) 8 mg disintegrating tablet Take 1 Tab by mouth every eight (8) hours as needed for Nausea. 15 Tab 0    folic acid (FOLVITE) 1 mg tablet Take 1 Tab by mouth daily. 30 Tab 0    pantoprazole (PROTONIX) 40 mg tablet Take 1 Tab by mouth Before breakfast and dinner. 60 Tab 0    gabapentin (NEURONTIN) 600 mg tablet Take 1 Tab by mouth two (2) times a day.  30 Tab 0    acetaminophen (TYLENOL EXTRA STRENGTH) 500 mg tablet Take 2 Tabs by mouth every six (6) hours as needed for Pain. 40 Tab 0    cloNIDine HCl (CATAPRES) 0.1 mg tablet Take 1 Tab by mouth two (2) times a day. 20 Tab 0    lamoTRIgine (LAMICTAL) 200 mg tablet Take  by mouth daily.  Potassium Citrate (UROCIT-K) TbER tablet Take 1 Tab by mouth three (3) times daily (with meals). 90 Tab 11    hydrocortisone (CORTAID) 0.5 % topical cream Apply  to affected area two (2) times a day. use thin layer 30 g 0    sucralfate (CARAFATE) 100 mg/mL suspension Take 10 mL by mouth Before breakfast, lunch, dinner and at bedtime. 1200 mL 0    calcium-cholecalciferol, D3, (CALTRATE 600+D) tablet       topiramate (TOPAMAX) 100 mg tablet 100 mg two (2) times a day.  QUEtiapine (SEROQUEL) 300 mg tablet       CREON 24,000-76,000 -120,000 unit capsule       cetirizine (ZYRTEC) 10 mg tablet       SUMAtriptan (IMITREX) 100 mg tablet TAKE 1 TO 2 TABLETS BY MOUTH DAILY AS NEEDED FOR MIGRAINE . DO NOT EXCEED 200 MG IN 24 HOUR PERIOD  0    busPIRone (BUSPAR) 15 mg tablet Take 1 Tab by mouth two (2) times a day. 60 Tab 0    albuterol (PROVENTIL VENTOLIN) 2.5 mg /3 mL (0.083 %) nebulizer solution 1.5 mL by Nebulization route every four (4) hours as needed for Wheezing or Shortness of Breath. 24 Each 0       Past History     Past Medical History:  Past Medical History:   Diagnosis Date    Alcohol abuse     Anxiety     Arrhythmia     Tacchycardai    Asthma     controlled    [facility]ipolar disorder [facility] [address]     Common migraine     COPD (chronic obstructive pulmonary disease) ([facility])     Home O2  PRN ,  pt use also for Tachycardia    Depression     Esophageal reflux     Gout     Hypertension     Inverted nipple     Left breast always have.     Kidney stone on left side     Pancreatitis     Staghorn renal calculus     Urine leukocytes        Past Surgical History:  Past Surgical History:   Procedure Laterality Date    HX COLONOSCOPY      HX CYST REMOVAL Left     x 3 surgeries    HX ENDOSCOPY      HX GYN      tubal ligation  HX LUMBAR LAMINECTOMY      HX UROLOGICAL  08/10/2018    Cysto, bilat RPG, left ureteral pyeloscopy, HLL, left JJ stent placement, Dr. Hopkins Sites       Family History:  Family History   Family history unknown: Yes       Social History:  Social History     Tobacco Use    Smoking status: Current Every Day Smoker     Packs/day: 1.50     Years: 33.00     Pack years: 49.50     Types: Cigarettes    Smokeless tobacco: Never Used   Substance Use Topics    Alcohol use: Yes     Comment: vodka    Drug use: No     Comment: 11years old- [facility]       Allergies: Allergies   Allergen Reactions    Lithium Anaphylaxis    Morphine Hives     Can now take this medication    Amitriptyline Other (comments)     Left leg went numb    Elavil Other (comments)     Left leg went numb         Review of Systems   As per HPI, otherwise reviewed and negative. Physical Exam     Vitals:    10/11/19 1858 10/11/19 1900 10/11/19 1915 10/11/19 1930   BP: 127/80 116/68 131/89 119/76   Pulse: (!) 115 (!) 124 (!) 124 (!) 124   Resp: 17 17 16 27   Temp: 98.2 °F (36.8 °C)      SpO2: 95% 96% 98% 96%   Weight: 54.4 kg (120 lb)      Height: 5' 2\" (1.575 m)          Gen: Moderately ill-appearing  HEENT: Normocephalic, sclera anicteric  Cardiovascular: Normal rate, regular rhythm, no murmurs, rubs, gallops. Pulses intact and equal distally. Pulmonary: No respiratory distress. No stridor. Clear lungs. ABD: Soft, tender in the epigastrium, negative Traylor sign, nondistended. Neuro: Alert. Normal speech. Normal mentation. Fine tremor. No tongue fasciculations  Psych: Normal thought content and thought processes. : No CVA tenderness  EXT: Moves all extremities well. No cyanosis or clubbing. Skin: Warm and well-perfused.         Diagnostic Study Results     Labs -     Recent Results (from the past 12 hour(s))   POC CHEM8    Collection Time: 10/11/19  7:12 PM   Result Value Ref Range    CO2, POC 19 19 - 24 MMOL/L    Glucose, POC 91 74 - 106 MG/DL    BUN, POC 16 7 - 18 MG/DL    Creatinine, POC 1.0 0.6 - 1.3 MG/DL    GFRAA, POC >60 >60 ml/min/1.73m2    GFRNA, POC 58 (L) >60 ml/min/1.73m2    Sodium,  (L) 136 - 145 MMOL/L    Potassium, POC 6.1 (HH) 3.5 - 5.5 MMOL/L    Calcium, ionized (POC) 0.95 (L) 1.12 - 1.32 mmol/L    Chloride, POC 91 (L) 100 - 108 MMOL/L    Anion gap, POC 21 (H) 10 - 20      Hematocrit, POC 51 (H) 36 - 49 %    Hemoglobin, POC 17.3 (H) 12 - 16 G/DL   CBC WITH AUTOMATED DIFF    Collection Time: 10/11/19  7:15 PM   Result Value Ref Range    WBC 10.9 4.6 - 13.2 K/uL    RBC 4.94 4.20 - 5.30 M/uL    HGB 16.1 (H) 12.0 - 16.0 g/dL    HCT 47.9 (H) 35.0 - 45.0 %    MCV 97.0 74.0 - 97.0 FL    MCH 32.6 24.0 - 34.0 PG    MCHC 33.6 31.0 - 37.0 g/dL    RDW 15.7 (H) 11.6 - 14.5 %    PLATELET 875 814 - 450 K/uL    MPV 9.9 9.2 - 11.8 FL    NEUTROPHILS 60 40 - 73 %    LYMPHOCYTES 30 21 - 52 %    MONOCYTES 9 3 - 10 %    EOSINOPHILS 1 0 - 5 %    BASOPHILS 0 0 - 2 %    ABS. NEUTROPHILS 6.6 1.8 - 8.0 K/UL    ABS. LYMPHOCYTES 3.3 0.9 - 3.6 K/UL    ABS. MONOCYTES 1.0 0.05 - 1.2 K/UL    ABS. EOSINOPHILS 0.1 0.0 - 0.4 K/UL    ABS. BASOPHILS 0.0 0.0 - 0.1 K/UL    DF AUTOMATED     ETHYL ALCOHOL    Collection Time: 10/11/19  7:15 PM   Result Value Ref Range    ALCOHOL(ETHYL),SERUM 192 (H) 0 - 3 MG/DL       Radiologic Studies -   CT ABD PELV W CONT    (Results Pending)     CT Results  (Last 48 hours)    None        CXR Results  (Last 48 hours)    None            Medical Decision Making   I am the first provider for this patient. I reviewed the vital signs, available nursing notes, past medical history, past surgical history, family history and social history. Vital Signs-Reviewed the patient's vital signs. EKG: Interpreted by myself. Sinus bradycardia and evidence of peaked T waves. Records Reviewed: By myself personally on initial evaluation    MDM:   Patient presents with abdominal pain.   Exam significant for tenderness in the epigastrium. DDX considered: Gastritis, peptic ulcer disease, cholecystitis, choledocholithiasis, SBO, functional abdominal pain, acute intermittent porphyria, gastroparesis, gastroenteritis. DDX thought to be less likely but also considered due to high risk condition: Cholangitis, mesenteric ischemia. Plan:   Pain Control  Antiemetics  Withdrawal treatment  Close Observation    Orders as below:  Orders Placed This Encounter    CT ABD PELV W CONT    CBC WITH AUTOMATED DIFF    COMPREHENSIVE METABOLIC PANEL    ETHYL ALCOHOL    LIPASE    LACTIC ACID, PLASMA    OSMOLALITY, UR    POC CHEM8    LORazepam (ATIVAN) injection 2 mg    lactated ringers bolus infusion 1,000 mL    HYDROmorphone (DILAUDID) injection 1 mg    calcium gluconate 1 gram in 0.9% sodium chloride 50 ml infusion    sodium polystyrene (KAYEXALATE) 15 gram/60 mL oral suspension 30 g    iopamidol (ISOVUE 300) 61 % contrast injection 100 mL        ED Course:   ED Course as of Oct 29 1833   Fri Oct 11, 2019   2023    [DT]   2126 Creatinine, POC: 1.0 [DT]  Sodium 123, potassium 6.1. Will treat for hyperkalemia. Patient with unclear etiology of hyponatremia. Will need urine osmolality evaluation. We will present the patient for admission. ED Course User Index  [DT] Syl Gonsalez MD     Patient ultimately admitted after treatment for hyperkalemia and hyponatremia. Patient CT scan did not demonstrate evidence of acute pathology. Patient is an active withdrawal and will need that treated. The patient admitted and severely ill condition. Disposition:  Admit    Critical Care Time:  The services I provided to this patient were to treat and/or prevent clinically significant deterioration that could result in the failure of one or more body systems and/or organ systems due to alcohol withdrawal, hyponatremia, hyperkalemia, hemodynamic instability.     Services included the following:  -reviewing nursing notes and old charts  -vital sign assessments  -direct patient care  -medication orders and management  -interpreting and reviewing diagnostic studies/labs  -re-evaluations  -documentation time    Aggregate critical care time was 75 minutes, which includes only time during which I was engaged in work directly related to the patient's care as described above, whether I was at bedside or elsewhere in the Emergency Department. It did not include time spent performing other reported procedures or the services of residents, students, nurses, or advance practice providers. DMT    Diagnosis     Clinical Impression:   1. Hyperkalemia    2. Hyponatremia    3. Alcohol withdrawal syndrome with complication ([facility] [address]        Signed,  Lawanda Apodaca MD  Emergency Physician  [facility]    As a voice dictation software was utilized to dictate this note, minor word transpositions can occur. I apologize for confusing wording and typographic errors. Please feel free to contact me for clarification.

## 2019-10-11 NOTE — ED TRIAGE NOTES
Patient arrived via EMS c/o upper abdominal pain. Also states she has been attempting to detox from drinking since Monday. States she has been drinking \"at least a fifth\" of vodka daily since she was 14. States this past week she has switched to red wine. Has drank 2 boxes of wine since Monday. Last drink 1700 today.

## 2019-10-12 ENCOUNTER — APPOINTMENT (OUTPATIENT)
Dept: MRI IMAGING | Age: 54
DRG: 445 | End: 2019-10-12
Attending: FAMILY MEDICINE
Payer: MEDICARE

## 2019-10-12 VITALS
DIASTOLIC BLOOD PRESSURE: 74 MMHG | WEIGHT: 122 LBS | TEMPERATURE: 98.1 F | BODY MASS INDEX: 22.45 KG/M2 | SYSTOLIC BLOOD PRESSURE: 109 MMHG | HEART RATE: 108 BPM | HEIGHT: 62 IN | OXYGEN SATURATION: 94 % | RESPIRATION RATE: 15 BRPM

## 2019-10-12 LAB
ANION GAP SERPL CALC-SCNC: 6 MMOL/L (ref 3–18)
BUN SERPL-MCNC: 14 MG/DL (ref 7–18)
BUN/CREAT SERPL: 18 (ref 12–20)
CALCIUM SERPL-MCNC: 7.6 MG/DL (ref 8.5–10.1)
CHLORIDE SERPL-SCNC: 101 MMOL/L (ref 100–111)
CO2 SERPL-SCNC: 25 MMOL/L (ref 21–32)
CREAT SERPL-MCNC: 0.77 MG/DL (ref 0.6–1.3)
ERYTHROCYTE [DISTWIDTH] IN BLOOD BY AUTOMATED COUNT: 15.9 % (ref 11.6–14.5)
GLUCOSE SERPL-MCNC: 157 MG/DL (ref 74–99)
HCT VFR BLD AUTO: 38.5 % (ref 35–45)
HGB BLD-MCNC: 12.3 G/DL (ref 12–16)
MCH RBC QN AUTO: 31.8 PG (ref 24–34)
MCHC RBC AUTO-ENTMCNC: 31.9 G/DL (ref 31–37)
MCV RBC AUTO: 99.5 FL (ref 74–97)
OSMOLALITY UR: 260 MOSM/KG H2O (ref 50–1400)
PLATELET # BLD AUTO: 155 K/UL (ref 135–420)
PMV BLD AUTO: 9.7 FL (ref 9.2–11.8)
POTASSIUM SERPL-SCNC: 4.7 MMOL/L (ref 3.5–5.5)
RBC # BLD AUTO: 3.87 M/UL (ref 4.2–5.3)
SODIUM SERPL-SCNC: 132 MMOL/L (ref 136–145)
WBC # BLD AUTO: 6.6 K/UL (ref 4.6–13.2)

## 2019-10-12 PROCEDURE — 36415 COLL VENOUS BLD VENIPUNCTURE: CPT

## 2019-10-12 PROCEDURE — 74011250636 HC RX REV CODE- 250/636: Performed by: PHYSICIAN ASSISTANT

## 2019-10-12 PROCEDURE — 90471 IMMUNIZATION ADMIN: CPT

## 2019-10-12 PROCEDURE — 99218 HC RM OBSERVATION: CPT

## 2019-10-12 PROCEDURE — 74011250636 HC RX REV CODE- 250/636: Performed by: FAMILY MEDICINE

## 2019-10-12 PROCEDURE — 74011250636 HC RX REV CODE- 250/636: Performed by: HOSPITALIST

## 2019-10-12 PROCEDURE — 80048 BASIC METABOLIC PNL TOTAL CA: CPT

## 2019-10-12 PROCEDURE — 74011250637 HC RX REV CODE- 250/637: Performed by: PHYSICIAN ASSISTANT

## 2019-10-12 PROCEDURE — A9575 INJ GADOTERATE MEGLUMI 0.1ML: HCPCS | Performed by: HOSPITALIST

## 2019-10-12 PROCEDURE — 77030021566 MRI ABD W MRCP W WO CONT

## 2019-10-12 PROCEDURE — 85027 COMPLETE CBC AUTOMATED: CPT

## 2019-10-12 PROCEDURE — 90686 IIV4 VACC NO PRSV 0.5 ML IM: CPT | Performed by: FAMILY MEDICINE

## 2019-10-12 PROCEDURE — 74011000258 HC RX REV CODE- 258: Performed by: FAMILY MEDICINE

## 2019-10-12 PROCEDURE — 74011250637 HC RX REV CODE- 250/637: Performed by: FAMILY MEDICINE

## 2019-10-12 RX ORDER — TRAMADOL HYDROCHLORIDE 50 MG/1
50 TABLET ORAL
Status: DISCONTINUED | OUTPATIENT
Start: 2019-10-12 | End: 2019-10-12 | Stop reason: HOSPADM

## 2019-10-12 RX ORDER — KETOROLAC TROMETHAMINE 15 MG/ML
15 INJECTION, SOLUTION INTRAMUSCULAR; INTRAVENOUS
Status: DISCONTINUED | OUTPATIENT
Start: 2019-10-12 | End: 2019-10-12 | Stop reason: HOSPADM

## 2019-10-12 RX ORDER — NALOXONE HYDROCHLORIDE 0.4 MG/ML
0.4 INJECTION, SOLUTION INTRAMUSCULAR; INTRAVENOUS; SUBCUTANEOUS AS NEEDED
Status: DISCONTINUED | OUTPATIENT
Start: 2019-10-12 | End: 2019-10-12 | Stop reason: HOSPADM

## 2019-10-12 RX ORDER — GADOTERATE MEGLUMINE 376.9 MG/ML
15 INJECTION INTRAVENOUS
Status: COMPLETED | OUTPATIENT
Start: 2019-10-12 | End: 2019-10-12

## 2019-10-12 RX ADMIN — Medication 10 ML: at 14:08

## 2019-10-12 RX ADMIN — PANCRELIPASE LIPASE, PANCRELIPASE PROTEASE, PANCRELIPASE AMYLASE 2 CAPSULE: 10000; 32000; 42000 CAPSULE, DELAYED RELEASE ORAL at 14:01

## 2019-10-12 RX ADMIN — Medication 10 ML: at 00:06

## 2019-10-12 RX ADMIN — DEXTROSE MONOHYDRATE AND SODIUM CHLORIDE 125 ML/HR: 5; .9 INJECTION, SOLUTION INTRAVENOUS at 10:32

## 2019-10-12 RX ADMIN — LORAZEPAM 1 MG: 0.5 TABLET ORAL at 00:34

## 2019-10-12 RX ADMIN — KETOROLAC TROMETHAMINE 15 MG: 15 INJECTION, SOLUTION INTRAMUSCULAR; INTRAVENOUS at 10:31

## 2019-10-12 RX ADMIN — DEXTROSE MONOHYDRATE AND SODIUM CHLORIDE 125 ML/HR: 5; .9 INJECTION, SOLUTION INTRAVENOUS at 00:02

## 2019-10-12 RX ADMIN — PANTOPRAZOLE SODIUM 40 MG: 40 TABLET, DELAYED RELEASE ORAL at 17:07

## 2019-10-12 RX ADMIN — ACETAMINOPHEN 650 MG: 325 TABLET, FILM COATED ORAL at 14:02

## 2019-10-12 RX ADMIN — SUCRALFATE 1 G: 1 TABLET ORAL at 17:07

## 2019-10-12 RX ADMIN — Medication 10 ML: at 05:47

## 2019-10-12 RX ADMIN — GADOTERATE MEGLUMINE 12 ML: 376.9 INJECTION INTRAVENOUS at 13:09

## 2019-10-12 RX ADMIN — LORAZEPAM 1 MG: 2 INJECTION, SOLUTION INTRAMUSCULAR; INTRAVENOUS at 08:21

## 2019-10-12 RX ADMIN — TRAMADOL HYDROCHLORIDE 50 MG: 50 TABLET, FILM COATED ORAL at 14:01

## 2019-10-12 RX ADMIN — QUETIAPINE FUMARATE 200 MG: 100 TABLET ORAL at 00:34

## 2019-10-12 RX ADMIN — KETOROLAC TROMETHAMINE 15 MG: 30 INJECTION, SOLUTION INTRAMUSCULAR at 00:35

## 2019-10-12 RX ADMIN — INFLUENZA VIRUS VACCINE 0.5 ML: 15; 15; 15; 15 SUSPENSION INTRAMUSCULAR at 18:05

## 2019-10-12 RX ADMIN — SUCRALFATE 1 G: 1 TABLET ORAL at 14:02

## 2019-10-12 RX ADMIN — DEXTROSE MONOHYDRATE AND SODIUM CHLORIDE 125 ML/HR: 5; .9 INJECTION, SOLUTION INTRAVENOUS at 13:43

## 2019-10-12 NOTE — PROGRESS NOTES
NUTRITION INITIAL ASSESSMENT/PLAN OF CARE      RECOMMENDATIONS:   1. CL; diet to be advanced when medically indicated and per PT tolerance   2. Monitor labs, weight and PO intake  3. RD to follow     GOALS:   1. PO intake meets >75% of protein/calorie needs by 10/19   2. Weight Maintenance (+/- 1-2 lb) by 10/19       ASSESSMENT:   Wt status is classified as normal per Body mass index is 21.95 kg/m². Currently advanced to CL diet. Labs noted. Pt w/ hyponatremia. Nutrition recommendations listed. RD to follow. Nutrition Diagnoses:   Inadequate PO intake related to decreased appetite as evidenced by patient diet recall this past week. Nutrition Risk:  [] High  [] Moderate [x]  Low    SUBJECTIVE/OBJECTIVE:    Pt admitted for hyperkalemia, common bile duct dilation and ETOH abuse. MST received for recent 2-13 lb weight loss. Per documented weight records patient with recent weight gain and ~-120 lb. Pt seen in room. Attempted to obtain Hx but Pt not wanting to answer questions as she stated she was to be discharged.       Information Obtained from:    [x] Chart Review    [x] Patient   [] Family/Caregiver   [] Nurse/Physician   [] Interdisciplinary Meeting/Rounds      Diet: Clear Liquid Diet  Medications: [x] Reviewed    IVF: D5 NS @125 mL/hr (510 kcal/day)  Os-Sidney, Catapres, Folvite, ZenPep 10,000; Protonix, Carafate, Vit B1     Allergies: [x] Reviewed   Patient Active Problem List   Diagnosis Code    Bipolar disorder (manic depression) ([facility] F31.9    Tachycardia R00.0    Alcoholism ([facility] [address] F10.20    Esophageal stricture K22.2    Dilated pancreatic duct K86.89    Common bile duct dilation K83.8    Elevated LFTs R94.5    Bipolar depression ([facility]) F31.9    HTN (hypertension) I10    Acute pancreatitis K85.90    Pancreatitis, alcoholic, acute H47.91    Tylenol overdose T39.1X1A    SIRS (systemic inflammatory response syndrome) ([facility]) R65.10    Pneumonia J18.9    COPD (chronic obstructive pulmonary disease) ([facility] J44.9    Nicotine withdrawal F17.203    Dizziness R42    Pancreatitis K85.90    Hypokalemia E87.6    Anemia D64.9    Encephalopathy G93.40    Syncope R55    Alcohol withdrawal ([facility]) F10.239    GI bleed K92.2    Hyponatremia E87.1    Hypercalcemia E83.52    UTI (urinary tract infection) N39.0    Bronchitis J40    Recurrent UTI (urinary tract infection) N39.0    Staghorn renal calculus N20.0    Abdominal pain T14.2    Metabolic acidosis L64.7    NSTEMI (non-ST elevated myocardial infarction) ([facility]) I21.4    Rhabdomyolysis M62.82    Upper GI bleed K92.2    Chest pain R07.9    Esophagitis K20.9    Hyperkalemia E87.5    Common bile duct dilatation K83.8       Past Medical History:   Diagnosis Date    Alcohol abuse     Anxiety     Arrhythmia     Tacchycardai    Asthma     controlled    Bipolar disorder ([facility]     Common migraine     COPD (chronic obstructive pulmonary disease) ([facility])     Home O2  PRN ,  pt use also for Tachycardia    Depression     Esophageal reflux     Gout     Hypertension     Inverted nipple     Left breast always have.     Kidney stone on left side     Pancreatitis     Staghorn renal calculus     Urine leukocytes       Labs:    Lab Results   Component Value Date/Time    Sodium 132 (L) 10/12/2019 01:30 AM    Potassium 4.7 10/12/2019 01:30 AM    Chloride 101 10/12/2019 01:30 AM    CO2 25 10/12/2019 01:30 AM    Anion gap 6 10/12/2019 01:30 AM    Glucose 157 (H) 10/12/2019 01:30 AM    BUN 14 10/12/2019 01:30 AM    Creatinine 0.77 10/12/2019 01:30 AM    Calcium 7.6 (L) 10/12/2019 01:30 AM    Magnesium 1.9 01/18/2019 05:25 AM    Phosphorus 2.5 01/18/2019 05:25 AM    Albumin 4.0 10/11/2019 07:15 PM     Lab Results   Component Value Date/Time     (H) 10/12/2019 01:30 AM    GLU 88 10/11/2019 07:15 PM    GLUCPOC 91 10/11/2019 07:12 PM     Anthropometrics: BMI (calculated): 22.3  Last 3 Recorded Weights in this Encounter    10/11/19 0173 10/12/19 1520   Weight: 54.4 kg (120 lb) 55.3 kg (122 lb)      Ht Readings from Last 1 Encounters:   10/11/19 5' 2\" (1.575 m)       Documented Weight History:  Weight Metrics 10/12/2019 9/8/2019 5/23/2019 5/5/2019 5/2/2019 3/12/2019 3/2/2019   Weight 122 lb 114 lb 112 lb 120 lb 121 lb 117 lb 1.6 oz 120 lb   BMI 22.31 kg/m2 20.85 kg/m2 21.16 kg/m2 22.31 kg/m2 22.13 kg/m2 21.42 kg/m2 21.95 kg/m2       No data found. IBW: 110 lb %IBW: 109% UBW: 115-120 lb ??  [] Weight Loss [x] Weight Gain? [] Weight Stable    Estimated Nutrition Needs: [x] MSJ x 1.2-1.3 AF  Calories: 2176-1530 kcal Based on:   [x] Actual BW    Protein:   67-83 gm Based on:   [x] Actual BW x 1.2-1.5 gm/kg   Fluid:       1500 ml Based on:   [x] Actual BW      [x] No Cultural, Cheondoism or ethnic dietary need identified.     [] Cultural, Cheondoism and ethnic food preferences identified and addressed     Wt Status:  [x] Normal (18.6 - 24.9) [] Underweight (<18.5) [] Overweight (25 - 29.9) [] Mild Obesity (30 - 34.9)  [] Moderate Obesity (35 - 39.9) [] Morbid Obesity (40+)       Nutrition Problems Identified:   [x] Suboptimal PO intake v/s CL  [] Food Allergies  [] Difficulty chewing/swallowing/poor dentition  [] Constipation/Diarrhea   [x] Nausea/Vomiting/ABD pain   [] None  [] Other:     Plan:   [x] Therapeutic Diet  []  Obtained/adjusted food preferences/tolerances and/or snacks options   []  Supplements added   [] Occupational therapy following for feeding techniques  []  HS snack added   []  Modify diet texture   []  Modify diet for food allergies   []  Educate patient   []  Assist with menu selection   [x]  Monitor PO intake on meal rounds   [x]  Continue inpatient monitoring and intervention   [x]  Participated in discharge planning/Interdisciplinary rounds/Team meetings   []  Other:     Education Needs:   [x] Not appropriate for teaching at this time   [] Identified and addressed    Nutrition Monitoring and Evaluation:  [x] Continue ongoing monitoring and intervention  [] Other    Theotis Gowers

## 2019-10-12 NOTE — DISCHARGE INSTRUCTIONS
Patient Education        Learning About Alcohol Use Disorder  What is alcohol use disorder? Alcohol use disorder means that a person drinks alcohol even though it causes harm to themselves or others. It can range from mild to severe. The more signs of this disorder you have, the more severe it may be. Moderate to severe alcohol use disorder is sometimes called addiction. People who have it may find it hard to control their use of alcohol. People who have this disorder may argue with others about how much they're drinking. Their job may be affected because of drinking. They may drink when it's dangerous or illegal, such as when they drive. They also may have a strong need, or craving, to drink. They may feel like they must drink just to get by. Their drinking may increase their risk of getting hurt or being in a car crash. Over time, drinking too much alcohol may cause health problems. These may include high blood pressure, liver problems, or problems with digestion. What are the signs? Maybe you've wondered about your alcohol habits, or how to tell if your drinking is becoming a problem. Here are some of the signs of alcohol use disorder. You may have it if you have two or more of the following signs:  · You drink larger amounts of alcohol than you ever meant to. Or you've been drinking for a longer time than you ever meant to. · You can't cut down or control your use. Or you constantly wish you could cut down. · You spend a lot of time getting or drinking alcohol or recovering from its effects. · You have strong cravings for alcohol. · You can no longer do your main jobs at work, at school, or at home. · You keep drinking alcohol, even though your use hurts your relationships. · You have stopped doing important activities because of your alcohol use. · You drink alcohol in situations where doing so is dangerous. · You keep drinking alcohol even though you know it's causing health problems.   · You need more and more alcohol to get the same effect, or you get less effect from the same amount over time. This is called tolerance. · You have uncomfortable symptoms when you stop drinking alcohol or use less. This is called withdrawal.  Alcohol use disorder can range from mild to severe. The more signs you have, the more severe the disorder may be. Moderate to severe alcohol use disorder is sometimes called addiction. You might not realize that your drinking is a problem. You might not drink large amounts when you drink. Or you might go for days or weeks between drinking episodes. But even if you don't drink very often, your drinking could still be harmful and put you at risk. How is alcohol use disorder treated? Getting help is up to you. But you don't have to do it alone. There are many people and kinds of treatments that can help. Treatment for alcohol use disorder can include:  · Group therapy, one or more types of counseling, and alcohol education. · Medicines that help to:  ? Reduce withdrawal symptoms and help you safely stop drinking. ? Reduce cravings for alcohol. · Support groups. These groups include Alcoholics Anonymous and [employer] (Self-Management and Recovery Training). Some people are able to stop or cut back on drinking with help from a counselor. People who have moderate to severe alcohol use disorder may need medical treatment. They may need to stay in a hospital or treatment center. You may have a treatment team to help you. This team may include a psychologist or psychiatrist, counselors, doctors, social workers, nurses, and a . A  helps plan and manage your treatment. Follow-up care is a key part of your treatment and safety. Be sure to make and go to all appointments, and call your doctor if you are having problems. It's also a good idea to know your test results and keep a list of the medicines you take. Where can you learn more?   Go to http://shannon-heriberto.info/. Enter [id number] [id number] in the search box to learn more about \"Learning About Alcohol Use Disorder. \"  Current as of: February 5, 2019  Content Version: 12.2  © 7987-0033 [employer]. Care instructions adapted under license by [employer] (which disclaims liability or warranty for this information). If you have questions about a medical condition or this instruction, always ask your healthcare professional. [address] any warranty or liability for your use of this information. Patient Education        Alcohol Detoxification and Withdrawal: Care Instructions  Your Care Instructions    If you drink alcohol regularly and then suddenly stop, you may go through some physical and emotional problems while the alcohol clears out of your system. Clearing the alcohol from your body is called detoxification, or detox. Physical and emotional problems that may happen during detox are called withdrawal.  Symptoms of withdrawal can be scary and dangerous. Mild symptoms include nausea and vomiting, sweating, shakiness, and intense worry. Severe symptoms include being confused and irritable, feeling things on your body that are not there, seeing or hearing things that are not there, and trembling. You may even have seizures. If your symptoms become severe you must see a doctor. People who drink large amounts of alcohol should not try to detox at home. A person can die of severe alcohol withdrawal.  Symptoms of alcohol withdrawal may begin from 4 to 12 hours after you stop drinking. But they may not start for several days after the last drink. They can last a few days. It is hard to stop drinking. But when you have cleared the alcohol from your system, you will be able to start the next part of your life, free from the burden of being dependent. Follow-up care is a key part of your treatment and safety.  Be sure to make and go to all appointments, and call your doctor if you are having problems. It's also a good idea to know your test results and keep a list of the medicines you take. How can you care for yourself at home? · Before you stop drinking, talk to your doctor about how you plan to stop. Be sure to be completely honest with him or her about how much you have been drinking. Your doctor will figure out whether you need to detox in a supervised medical center. · Take your medicines exactly as prescribed. Call your doctor if you think you are having a problem with your medicine. · Make sure someone you trust is with you the whole time. Have friends and family members take turns staying with you until you are done with detox. · Put a list of emergency numbers near the phone. This should include your doctor, the police, the nearest hospital and emergency room, and neighbors who can help if needed. · Make sure all alcohol is removed from the house before you start. This includes beverages as well as medicines, rubbing alcohol, and certain flavorings like vanilla extract. · Keep \"drinking buddies\" away during the time you are going through detox. · Make your surroundings calm. Soft lights, soft music, and a comfortable place to sit or lie down can help make the process easier. · Drink lots of fluids and eat snacks such as fruit, cheese and crackers, and pretzels. Foods high in carbohydrate may help reduce the craving for alcohol. · Understand that detox is going to be hard. · Keep in mind that the people watching over you during detox are there to help. Explain to them before you start that you may not act like yourself until detox is finished. · Consider joining a support group such as Alcoholics Anonymous. Sharing your experiences with other people who face similar challenges may help you feel less overwhelmed.   · Keep the numbers for these national suicide hotlines: 0-792-978-TALK (0-218.442.1066) and 2-988-NEVPPEV (0-583.765.6312). If you or someone you know talks about suicide or feeling hopeless, get help right away. When should you call for help? Call 911 anytime you think you may need emergency care. For example, call if:    · You feel you cannot stop from hurting yourself or someone else.     · You vomit many times and cannot stop.     · You vomit blood or what looks like coffee grounds.     · You have trouble breathing or are breathing very fast.     · Your heart beats more than 120 times a minute and will not slow down.     · You have chest pain.     · You have a seizure.     · You see or feel things that are not there (hallucinate).    If you are caring for someone who is going through detox, call if:    · The person passes out (loses consciousness).     · The person sees or feels things that are not there and sees or hears the same things many times.     · The person is very agitated and will not calm down.     · The person becomes violent or threatens to be violent.     · The person has a seizure.    Call your doctor now or seek immediate medical care if:    · You have a high fever.     · You have severe belly pain.     · You are very shaky.    Watch closely for changes in your health, and be sure to contact your doctor if:    · You do not get better as expected. Where can you learn more? Go to http://shannon-heriberto.info/. Enter [id number] in the search box to learn more about \"Alcohol Detoxification and Withdrawal: Care Instructions. \"  Current as of: February 5, 2019  Content Version: 12.2  © 1957-6856 [employer]. Care instructions adapted under license by [employer] (which disclaims liability or warranty for this information). If you have questions about a medical condition or this instruction, always ask your healthcare professional. [address] any warranty or liability for your use of this information.          Patient Education Hyperkalemia: Care Instructions  Your Care Instructions    Hyperkalemia is too much potassium in the blood. Potassium helps keep the right mix of fluids in your body. It also helps your nerves and muscles work as they should. And it keeps your heartbeat in a normal rhythm. Some things can raise potassium levels. These include some health problems, medicines, and kidney problems. (Normally, your kidneys remove extra potassium.)  Too much potassium can cause nausea. It also can cause a heartbeat that isn't normal. But you may not have any symptoms. Too much potassium can be dangerous. That's why it's important to treat it. If you are taking any of the medicines that can raise your levels, your doctor will ask you to stop. You may get medicines to lower your levels. And you may have to limit or not eat foods that have a lot of potassium. Follow-up care is a key part of your treatment and safety. Be sure to make and go to all appointments, and call your doctor if you are having problems. It's also a good idea to know your test results and keep a list of the medicines you take. How can you care for yourself at home? · Take your medicines exactly as prescribed. Call your doctor if you think you are having a problem with your medicine. · Stop taking certain medicines if your doctor asks you to. They may be causing your high potassium levels. If you have concerns about stopping medicine, talk with your doctor. · If you have kidney, heart, or liver disease and have to limit fluids, talk with your doctor before you increase the amount of fluids you drink. If the doctor says it's okay, drink plenty of fluids. This means drinking enough so that your urine is light yellow or clear like water. · Avoid strenuous exercise until your doctor tells you it is okay. · Potassium is in many foods, including vegetables, fruits, and milk products.  Foods high in potassium include bananas, cantaloupe, broccoli, milk, potatoes, and tomatoes. · Low potassium foods include blueberries, raspberries, cucumber, white or brown rice, spaghetti, and macaroni. · Do not use a salt substitute without talking to your doctor first. Most of these are very high in potassium. · Be sure to tell your doctor about any prescription, over-the-counter, or herbal medicines you take. Some of these can raise potassium. When should you call for help? Call 911 anytime you think you may need emergency care. For example, call if:    · You passed out (lost consciousness).     · You have an unusual heartbeat. Your heart may beat fast or skip beats.    Call your doctor now or seek immediate medical care if:    · You have muscle aches.     · You feel very weak.    Watch closely for changes in your health, and be sure to contact your doctor if:    · You do not get better as expected. Where can you learn more? Go to http://shannon-heriberto.info/. Enter N784 in the search box to learn more about \"Hyperkalemia: Care Instructions. \"  Current as of: October 31, 2018  Content Version: 12.2  © 5149-1255 [employer]. Care instructions adapted under license by [employer] (which disclaims liability or warranty for this information). If you have questions about a medical condition or this instruction, always ask your healthcare professional. [address] any warranty or liability for your use of this information.

## 2019-10-12 NOTE — PROGRESS NOTES
Problem: General Medical Care Plan  Goal: *Vital signs within specified parameters  10/12/2019 1736 by Silvia Leventhal, RN  Outcome: Resolved/Met  10/12/2019 1736 by Silvia Leventhal, RN  Outcome: Progressing Towards Goal  Goal: *Labs within defined limits  10/12/2019 1736 by Silvia Leventhal, RN  Outcome: Resolved/Met  10/12/2019 1736 by Silvia Leventhal, RN  Outcome: Progressing Towards Goal  Goal: *Absence of infection signs and symptoms  10/12/2019 1736 by Silvia Leventhal, RN  Outcome: Resolved/Met  10/12/2019 1736 by Silvia Leventhal, RN  Outcome: Progressing Towards Goal  Goal: *Optimal pain control at patient's stated goal  10/12/2019 1736 by Silvia Leventhal, RN  Outcome: Resolved/Met  10/12/2019 1736 by Silvia Leventhal, RN  Outcome: Progressing Towards Goal  Goal: *Skin integrity maintained  10/12/2019 1736 by Silvia Leventhal, RN  Outcome: Resolved/Met  10/12/2019 1736 by Silvia Leventhal, RN  Outcome: Progressing Towards Goal  Goal: *Fluid volume balance  10/12/2019 1736 by Silvia Leventhal, RN  Outcome: Resolved/Met  10/12/2019 1736 by Silvia Leventhal, RN  Outcome: Progressing Towards Goal  Goal: *Optimize nutritional status  10/12/2019 1736 by Silvia Leventhal, RN  Outcome: Resolved/Met  10/12/2019 1736 by Silvia Leventhal, RN  Outcome: Progressing Towards Goal  Goal: *Anxiety reduced or absent  10/12/2019 1736 by Silvia Leventhal, RN  Outcome: Resolved/Met  10/12/2019 1736 by Silvia Leventhal, RN  Outcome: Progressing Towards Goal  Goal: *Progressive mobility and function (eg: ADL's)  10/12/2019 1736 by Silvia Leventhal, RN  Outcome: Resolved/Met  10/12/2019 1736 by Silvia Leventhal, RN  Outcome: Progressing Towards Goal     Problem: Patient Education: Go to Patient Education Activity  Goal: Patient/Family Education  10/12/2019 1736 by Silvia Leventhal, RN  Outcome: Resolved/Met  10/12/2019 1736 by Becky Wetzel RN  Outcome: Progressing Towards Goal     Problem: Falls - Risk of  Goal: *Absence of Falls  Description  Document Jacobo Daigle Fall Risk and appropriate interventions in the flowsheet.   10/12/2019 1736 by Becky Wetzel RN  Outcome: Resolved/Met  Note:   Fall Risk Interventions:            Medication Interventions: Patient to call before getting OOB, Teach patient to arise slowly                10/12/2019 1736 by Becky Wetzel RN  Outcome: Progressing Towards Goal  Note:   Fall Risk Interventions:            Medication Interventions: Patient to call before getting OOB, Teach patient to arise slowly                   Problem: Patient Education: Go to Patient Education Activity  Goal: Patient/Family Education  10/12/2019 1736 by Becky Wetzel RN  Outcome: Resolved/Met  10/12/2019 1736 by Becky Wetzel RN  Outcome: Progressing Towards Goal     Problem: Discharge Planning  Goal: *Discharge to safe environment  10/12/2019 1736 by Becky Wetzel RN  Outcome: Resolved/Met  10/12/2019 1736 by Becky Wetzel RN  Outcome: Progressing Towards Goal

## 2019-10-12 NOTE — H&P
Internal Medicine History and Physical          Subjective     HPI: Dilip Sheriff is a 53 y.o. female who presented to the ED with upper abdominal pain. Also states she has been attempting to detox from drinking since Monday. States she has been drinking \"at least a fifth\" of vodka daily since she was 14. States this past week she has switched to red wine. Has drank 2 boxes of wine since Monday. Last drink 1700 today. She has hx of pancreatitis and elevated LFTs recently. ED evaluation revealed elevated alcohol level consistent with her chronic daily alcohol intake. Hyperkalemia for which she received calcium and kayexalate in the ED. Hyponatremia improving with IVF. CT showed CBD dilatation to 1.6cm. Some esophagitis present on CT. Will admit for further evaluation and treatment      PMHx:  Past Medical History:   Diagnosis Date    Alcohol abuse     Anxiety     Arrhythmia     Tacchycardai    Asthma     controlled    Bipolar disorder ([facility] [address]     Common migraine     COPD (chronic obstructive pulmonary disease) ([facility]     Home O2  PRN ,  pt use also for Tachycardia    Depression     Esophageal reflux     Gout     Hypertension     Inverted nipple     Left breast always have.     Kidney stone on left side     Pancreatitis     Staghorn renal calculus     Urine leukocytes        PSurgHx:  Past Surgical History:   Procedure Laterality Date    HX COLONOSCOPY      HX CYST REMOVAL Left     x 3 surgeries    HX ENDOSCOPY      HX GYN      tubal ligation    HX LUMBAR LAMINECTOMY      HX UROLOGICAL  08/10/2018    Cysto, bilat RPG, left ureteral pyeloscopy, HLL, left JJ stent placement, Dr. Prince Charlton, [facility]       SocialHx:  Social History     Socioeconomic History    Marital status:      Spouse name: Not on file    Number of children: Not on file    Years of education: Not on file    Highest education level: Not on file   Occupational History    Not on file   Social Needs    Financial resource strain: Not on file    Food insecurity:     Worry: Not on file     Inability: Not on file    Transportation needs:     Medical: Not on file     Non-medical: Not on file   Tobacco Use    Smoking status: Current Every Day Smoker     Packs/day: 1.50     Years: 33.00     Pack years: 49.50     Types: Cigarettes    Smokeless tobacco: Never Used   Substance and Sexual Activity    Alcohol use: Yes     Comment: vodka    Drug use: No     Comment: 11years old- emmanuel    Sexual activity: Not on file   Lifestyle    Physical activity:     Days per week: Not on file     Minutes per session: Not on file    Stress: Not on file   Relationships    Social connections:     Talks on phone: Not on file     Gets together: Not on file     Attends Advent service: Not on file     Active member of club or organization: Not on file     Attends meetings of clubs or organizations: Not on file     Relationship status: Not on file    Intimate partner violence:     Fear of current or ex partner: Not on file     Emotionally abused: Not on file     Physically abused: Not on file     Forced sexual activity: Not on file   Other Topics Concern    Not on file   Social History Narrative    Not on file       Allergies: Allergies   Allergen Reactions    Lithium Anaphylaxis    Morphine Hives     Can now take this medication    Amitriptyline Other (comments)     Left leg went numb    Elavil Other (comments)     Left leg went numb       FamilyHx:  Family History   Family history unknown: Yes       Prior to Admission Medications   Prescriptions Last Dose Informant Patient Reported? Taking? CREON 24,000-76,000 -120,000 unit capsule   Yes No   Potassium Citrate (UROCIT-K) TbER tablet   No No   Sig: Take 1 Tab by mouth three (3) times daily (with meals). QUEtiapine (SEROQUEL) 300 mg tablet   Yes No   SUMAtriptan (IMITREX) 100 mg tablet   Yes No   Sig: TAKE 1 TO 2 TABLETS BY MOUTH DAILY AS NEEDED FOR MIGRAINE .  DO NOT EXCEED 200 MG IN 24 HOUR PERIOD   acetaminophen (TYLENOL EXTRA STRENGTH) 500 mg tablet   No No   Sig: Take 2 Tabs by mouth every six (6) hours as needed for Pain. albuterol (PROVENTIL VENTOLIN) 2.5 mg /3 mL (0.083 %) nebulizer solution   No No   Si.5 mL by Nebulization route every four (4) hours as needed for Wheezing or Shortness of Breath. busPIRone (BUSPAR) 15 mg tablet   No No   Sig: Take 1 Tab by mouth two (2) times a day. calcium-cholecalciferol, D3, (CALTRATE 600+D) tablet   Yes No   cetirizine (ZYRTEC) 10 mg tablet   Yes No   chlordiazePOXIDE (LIBRIUM) 25 mg capsule   No No   Sig: Take 1 Cap by mouth three (3) times daily as needed for Anxiety. Max Daily Amount: 75 mg.   cloNIDine HCl (CATAPRES) 0.1 mg tablet   No No   Sig: Take 1 Tab by mouth two (2) times a day. folic acid (FOLVITE) 1 mg tablet   No No   Sig: Take 1 Tab by mouth daily. gabapentin (NEURONTIN) 600 mg tablet   No No   Sig: Take 1 Tab by mouth two (2) times a day. hydrocortisone (CORTAID) 0.5 % topical cream   No No   Sig: Apply  to affected area two (2) times a day. use thin layer   lamoTRIgine (LAMICTAL) 200 mg tablet   Yes No   Sig: Take  by mouth daily. ondansetron (ZOFRAN ODT) 8 mg disintegrating tablet   No No   Sig: Take 1 Tab by mouth every eight (8) hours as needed for Nausea. pantoprazole (PROTONIX) 40 mg tablet   No No   Sig: Take 1 Tab by mouth Before breakfast and dinner. sucralfate (CARAFATE) 100 mg/mL suspension   No No   Sig: Take 10 mL by mouth Before breakfast, lunch, dinner and at bedtime. topiramate (TOPAMAX) 100 mg tablet   Yes No   Si mg two (2) times a day.       Facility-Administered Medications: None       Review of Systems:  CONST: fever(-),   chills(-),   fatigue(-),   malaise(-)  SKIN: itching(-),   rash(-)  EYES: vision - no change from baseline  ENT: ear pain(-),   nasal discharge(-),   sore throat(-),   voice change(-)  RESP: SOB(-),   cough(-),   hemoptysis(-)  CV: chest pain(-),   PND(-),   orthopnea(-), exertional dyspnea(-),   palpitations(-), syncope(-)  GI: abd pain(+),   nausea(-),   vomiting(-),   diarrhea(-),   melena(-),   hematemesis(-), hematochesia(-)  : dysuria(-),   frequency(-),   urgency(-),   hematuria(-)  MS: arthralgias(-),   myalgias(-)  NEURO: speech w/o change,   tremors(-),   seizures(-),   numbness(-),   dizziness(-)    Objective      Visit Vitals  /88   Pulse (!) 122   Temp 98.2 °F (36.8 °C)   Resp 22   Ht 5' 2\" (1.575 m)   Wt 54.4 kg (120 lb)   SpO2 97%   BMI 21.95 kg/m²       Physical Exam:  CONST: NAD,   VSS reviewed  SKIN: rashes(-),   ulcers(-)  EYES: PERRL,   EOMI,   sclerae w/o jaundice  HENT: HEENT,   normal appearing nose and ears,   normal nasal mucosa and turbinates  NECK: supple,   no LA,   trachea is midline,   thyroid w/o goiter or palpable nodules  RESP: normal respiratory effort,   wheezes(-),   rales(-),   rhochi(-),   no consolidation on percussion  CHEST: normal axillae,   retractions(-),   use of accessory muscles(-)  CV: JVD(-),   carotid bruits(-),   RRR,   gallops(-),   murmurs(-),   edema LE(-),   abd bruits(-),   peripheral pulses normal  ABD: soft, tender(+) diffusely,   distended(-),   HSM(-),   BS(+) in all quadrants,   peritoneal signs(-)  MS: NROM in all extremities,  clubbing(-),   peripheral cyanosis(-)  NEURO: speech normal, tremors(+) fine in upper extremities,   CN II-XII(-),   lateralizing signs(-)  PSYCH: AOC x 3,   appropriate affect,   non-suicidal      Laboratory Studies:  CMP:   Lab Results   Component Value Date/Time     (L) 10/11/2019 07:15 PM    K 5.9 (H) 10/11/2019 07:15 PM    CL 90 (L) 10/11/2019 07:15 PM    CO2 17 (L) 10/11/2019 07:15 PM    AGAP 18 10/11/2019 07:15 PM    GLU 88 10/11/2019 07:15 PM    BUN 15 10/11/2019 07:15 PM    CREA 0.68 10/11/2019 07:15 PM    GFRAA >60 10/11/2019 07:15 PM    GFRNA >60 10/11/2019 07:15 PM    CA 9.1 10/11/2019 07:15 PM    ALB 4.0 10/11/2019 07:15 PM    TP 7.8 10/11/2019 07:15 PM    GLOB 3.8 10/11/2019 07:15 PM    AGRAT 1.1 10/11/2019 07:15 PM    SGOT 49 (H) 10/11/2019 07:15 PM    ALT 31 10/11/2019 07:15 PM     CBC:   Lab Results   Component Value Date/Time    WBC 10.9 10/11/2019 07:15 PM    HGB 16.1 (H) 10/11/2019 07:15 PM    HCT 47.9 (H) 10/11/2019 07:15 PM     10/11/2019 07:15 PM     Liver Panel:   Lab Results   Component Value Date/Time    ALB 4.0 10/11/2019 07:15 PM    TP 7.8 10/11/2019 07:15 PM    GLOB 3.8 10/11/2019 07:15 PM    AGRAT 1.1 10/11/2019 07:15 PM    SGOT 49 (H) 10/11/2019 07:15 PM    ALT 31 10/11/2019 07:15 PM     (H) 10/11/2019 07:15 PM     Pancreatic Markers:   Lab Results   Component Value Date/Time    LPSE 31 (L) 10/11/2019 07:15 PM       Imaging Reviewed:  No results found. Assessment/Plan     Active Hospital Problems    Diagnosis Date Noted    Hyperkalemia 10/11/2019    Common bile duct dilatation 10/11/2019    Alcoholism ([address] 01/28/2016     Common bile duct dilatation  MRCP in am  Based on results and clinical progress will consider GI consult    Hyperkalemia  Continue to monitor K and treat accordingly  Telemetry monitor  Serial labs      Alcoholism ([address]  CIWA protocol  IVF        - Cont acceptable home medications for chronic conditions   - DVT protocol and GI prophylaxis    I have personally reviewed all pertinent labs, films and EKGs that have officially resulted. I reviewed available electronic documentation outlining the initial presentation as well as the emergency room physician's encounter. Total time spent with patient and chart review, orders and documentation - 45min out of which more than 50% spent face-to-face with patient.     Мария Alaniz MD  10/11/2019   10:34 PM      [address]

## 2019-10-12 NOTE — PROGRESS NOTES
TRANSFER - IN REPORT:    Verbal report received from [address] (name) on Yoly Given  being received from ED (unit) for routine progression of care      Report consisted of patients Situation, Background, Assessment and   Recommendations(SBAR). Information from the following report(s) SBAR, ED Summary and Quality Measures was reviewed with the receiving nurse. Opportunity for questions and clarification was provided. Assessment completed upon patients arrival to unit and care assumed. Visit Vitals  /87   Pulse (!) 106   Temp 98.5 °F (36.9 °C)   Resp 17   Ht 5' 2\" (1.575 m)   Wt 54.4 kg (120 lb)   SpO2 96%   BMI 21.95 kg/m²     Pt is tachycardic. Requesting dilaudid and nicotine patch. Updated Dr Jai Ocampo. MD advised this RN to administer ordered Toradol and hold off on Dilaudid for now. Clear liquid diet, advance as tolerated. Nicotine patch 14mg.  RBV

## 2019-10-12 NOTE — PROGRESS NOTES
Problem: Discharge Planning  Goal: *Discharge to safe environment  Outcome: Progressing Towards Goal      Plan: home    Reason for Admission:   Hyperkalemia, Common bile duct dilatation, ETOH abuse                  RRAT Score:  18                Do you (patient/family) have any concerns for transition/discharge? Not @ this time. Plan for utilizing home health:  Not indicated. Current Advanced Directive/Advance Care Plan:   Yes, not on file            Transition of Care Plan:    Home with out-pt follow up. Chart reviewed. Met with pt., verified all demographics. [facility] has MCR/ ins. [facility] Dr. Nilsa Burnett is her PCP, last seen 10/3/2019. NOK: Krystal Umanzor, spouse: [phone], with whom she lives with. States either her spouse or her mother will pick her up @ discharge. Uses no DME. Independent with ADL's prior to admit. Will cont to follow for any needs. Meg Elliott RN,ext [phone]. Patient has designated no one to participate in his/her discharge plan and to receive any needed information, states will listen for herself. .     Name:   Address:  Phone number:    Care Management Interventions  PCP Verified by CM: Yes(Dr. Nilsa Burnett)  Last Visit to PCP: 10/03/19  Palliative Care Criteria Met (RRAT>21 & CHF Dx)?: No  Mode of Transport at Discharge:  Other (see comment)(Family)  Transition of Care Consult (CM Consult): Discharge Planning  Discharge Durable Medical Equipment: No  Physical Therapy Consult: No  Occupational Therapy Consult: No  Speech Therapy Consult: No  Current Support Network: Lives with Spouse  Confirm Follow Up Transport: Family  Plan discussed with Pt/Family/Caregiver: Yes  Discharge Location  Discharge Placement: Home

## 2019-10-12 NOTE — PROGRESS NOTES
Problem: General Medical Care Plan  Goal: *Vital signs within specified parameters  Outcome: Progressing Towards Goal  Goal: *Labs within defined limits  Outcome: Progressing Towards Goal  Goal: *Absence of infection signs and symptoms  Outcome: Progressing Towards Goal  Goal: *Optimal pain control at patient's stated goal  Outcome: Progressing Towards Goal  Goal: *Skin integrity maintained  Outcome: Progressing Towards Goal  Goal: *Fluid volume balance  Outcome: Progressing Towards Goal  Goal: *Optimize nutritional status  Outcome: Progressing Towards Goal  Goal: *Anxiety reduced or absent  Outcome: Progressing Towards Goal  Goal: *Progressive mobility and function (eg: ADL's)  Outcome: Progressing Towards Goal     Problem: Patient Education: Go to Patient Education Activity  Goal: Patient/Family Education  Outcome: Progressing Towards Goal

## 2019-10-12 NOTE — DISCHARGE SUMMARY
2 [facility]  Hospitalist Division    Discharge Summary    Patient: Nain Orozco MRN: [id number]  CSN: [id number]    YOB: 1965  Age: 53 y.o.   Sex: female    DOA: 10/11/2019 LOS:  LOS: 1 day   Discharge Date:      Admission Diagnoses: Hyperkalemia [E87.5]  Common bile duct dilatation [K83.8]  Alcoholism ([facility] [F10.20]  Common bile duct dilation [K83.8]    Discharge Diagnoses:    Problem List as of 10/12/2019 Date Reviewed: 5/22/2019          Codes Class Noted - Resolved    Bipolar disorder (manic depression) ([facility] (Chronic) ICD-10-CM: F31.9  ICD-9-CM: 296.80  3/8/2013 - Present        RESOLVED: Alcohol dependence with withdrawal ([facility] (Chronic) ICD-10-CM: G93.592  ICD-9-CM: 303.90, 291.81  3/8/2013 - 1/28/2016        Hyperkalemia ICD-10-CM: E87.5  ICD-9-CM: 276.7  10/11/2019 - Present        * (Principal) Common bile duct dilatation ICD-10-CM: K83.8  ICD-9-CM: 576.8  10/11/2019 - Present        Chest pain ICD-10-CM: R07.9  ICD-9-CM: 786.50  5/23/2019 - Present        Esophagitis ICD-10-CM: K20.9  ICD-9-CM: 530.10  5/23/2019 - Present        Abdominal pain ICD-10-CM: R10.9  ICD-9-CM: 789.00  1/15/2019 - Present        Metabolic acidosis MNA-29-KJ: E87.2  ICD-9-CM: 276.2  1/15/2019 - Present        NSTEMI (non-ST elevated myocardial infarction) ([facility] ICD-10-CM: I21.4  ICD-9-CM: 410.70  1/15/2019 - Present        Rhabdomyolysis ICD-10-CM: M62.82  ICD-9-CM: 728.88  1/15/2019 - Present        Upper GI bleed ICD-10-CM: K92.2  ICD-9-CM: 578.9  1/15/2019 - Present        Recurrent UTI (urinary tract infection) ICD-10-CM: N39.0  ICD-9-CM: 599.0  4/24/2018 - Present        Staghorn renal calculus ICD-10-CM: N20.0  ICD-9-CM: 592.0  4/24/2018 - Present        UTI (urinary tract infection) ICD-10-CM: N39.0  ICD-9-CM: 599.0  3/11/2018 - Present        Bronchitis ICD-10-CM: J40  ICD-9-CM: 733  3/11/2018 - Present        Syncope ICD-10-CM: R55  ICD-9-CM: 780.2  3/8/2018 - Present Alcohol withdrawal ([facility] ICD-10-CM: Q68.929  ICD-9-CM: 291.81  3/8/2018 - Present        GI bleed ICD-10-CM: K92.2  ICD-9-CM: 578.9  3/8/2018 - Present        Hyponatremia ICD-10-CM: E87.1  ICD-9-CM: 276.1  3/8/2018 - Present        Hypercalcemia ICD-10-CM: E83.52  ICD-9-CM: 275.42  3/8/2018 - Present        Dizziness ICD-10-CM: R42  ICD-9-CM: 780.4  9/14/2017 - Present        Pancreatitis ICD-10-CM: K85.90  ICD-9-CM: 871.9  9/14/2017 - Present        Hypokalemia ICD-10-CM: E87.6  ICD-9-CM: 276.8  9/14/2017 - Present        Anemia ICD-10-CM: D64.9  ICD-9-CM: 285.9  9/14/2017 - Present        Encephalopathy ICD-10-CM: G93.40  ICD-9-CM: 348.30  9/14/2017 - Present        Nicotine withdrawal ICD-10-CM: F17.203  ICD-9-CM: 292.0, 305.1  2/27/2016 - Present        Pneumonia ICD-10-CM: J18.9  ICD-9-CM: 033  2/25/2016 - Present        COPD (chronic obstructive pulmonary disease) ([place] [id number].) ICD-10-CM: J44.9  ICD-9-CM: 496  2/25/2016 - Present        Tylenol overdose ICD-10-CM: T39.1X1A  ICD-9-CM: 965.4, E980.0  2/20/2016 - Present        SIRS (systemic inflammatory response syndrome) ([facility] ICD-10-CM: R65.10  ICD-9-CM: 995.90  2/20/2016 - Present        Acute pancreatitis ICD-10-CM: K85.90  ICD-9-CM: 195.4  2/19/2016 - Present        Pancreatitis, alcoholic, acute FVP-76-ZJ: K85.20  ICD-9-CM: 577.0  2/19/2016 - Present        Tachycardia ICD-10-CM: R00.0  ICD-9-CM: 785.0  1/28/2016 - Present        Alcoholism ([facility] (Chronic) ICD-10-CM: F10.20  ICD-9-CM: 303.90  1/28/2016 - Present        Esophageal stricture (Chronic) ICD-10-CM: K22.2  ICD-9-CM: 530.3  1/28/2016 - Present        Dilated pancreatic duct ICD-10-CM: K86.89  ICD-9-CM: 577.8  1/28/2016 - Present        Common bile duct dilation ICD-10-CM: K83.8  ICD-9-CM: 576.8  1/28/2016 - Present        Elevated LFTs ICD-10-CM: R94.5  ICD-9-CM: 790.6  1/28/2016 - Present        Bipolar depression ([facility] (Chronic) ICD-10-CM: F31.9  ICD-9-CM: 296.50  1/28/2016 - Present        HTN (hypertension) (Chronic) ICD-10-CM: I10  ICD-9-CM: 401.9  1/28/2016 - Present              Discharge Condition: Stable    Discharge To: Home    Consults: None    HPI: Per H&P, \"Diana Haywood is a 53 y.o. female who presented to the ED with upper abdominal pain. Also states she has been attempting to detox from drinking since Monday. States she has been drinking \"at least a fifth\" of vodka daily since she was 14. States this past week she has switched to red wine. Has drank 2 boxes of wine since Monday. Last drink 1700 today. She has hx of pancreatitis and elevated LFTs recently.     ED evaluation revealed elevated alcohol level consistent with her chronic daily alcohol intake. Hyperkalemia for which she received calcium and kayexalate in the ED. Hyponatremia improving with IVF. CT showed CBD dilatation to 1.6cm. Some esophagitis present on CT.     Will admit for further evaluation and treatment\"    Hospital Course: MRCP showed \"no filling defects or irregularities. This is of doubtful significance in the absence of elevated bilirubin. \" Potassium corrected. Patient is tolerating diet. VS and labs stable for discharge. Physical Exam:  General appearance: alert, cooperative, no distress, appears stated age  Head: Normocephalic, without obvious abnormality, atraumatic  Lungs: clear to auscultation bilaterally  Heart: regular rate and rhythm, S1, S2 normal, no murmur, click, rub or gallop  Abdomen: soft.   Bowel sounds normal. No masses,  no organomegaly  Extremities: no cyanosis or edema  Skin: Skin color, texture normal.  Neurologic: no focal deficits, motor/sensory intact  PSY: Mood and affect normal, appropriately behaved    Significant Diagnostic Studies:     CMP:   Lab Results   Component Value Date/Time     (L) 10/12/2019 01:30 AM    K 4.7 10/12/2019 01:30 AM     10/12/2019 01:30 AM    CO2 25 10/12/2019 01:30 AM    AGAP 6 10/12/2019 01:30 AM     (H) 10/12/2019 01:30 AM    BUN 14 10/12/2019 01:30 AM    CREA 0.77 10/12/2019 01:30 AM    GFRAA >60 10/12/2019 01:30 AM    GFRNA >60 10/12/2019 01:30 AM    CA 7.6 (L) 10/12/2019 01:30 AM    ALB 4.0 10/11/2019 07:15 PM    TP 7.8 10/11/2019 07:15 PM    GLOB 3.8 10/11/2019 07:15 PM    AGRAT 1.1 10/11/2019 07:15 PM    SGOT 49 (H) 10/11/2019 07:15 PM    ALT 31 10/11/2019 07:15 PM     CBC:   Lab Results   Component Value Date/Time    WBC 6.6 10/12/2019 06:00 AM    HGB 12.3 10/12/2019 06:00 AM    HCT 38.5 10/12/2019 06:00 AM     10/12/2019 06:00 AM     Pancreatic Markers:   Lab Results   Component Value Date/Time    LPSE 31 (L) 10/11/2019 07:15 PM       Xr Knee Rt Max 2 Vws    Result Date: 9/26/2019  EXAM:  XR KNEE RT MAX 2 VWS INDICATION:   Right knee pain, injury COMPARISON: None. FINDINGS: 2 views of the right knee demonstrate no fracture, effusion or other acute osseous, articular or soft tissue abnormality. Meniscal calcifications noted of the medial and lateral compartments. IMPRESSION: No acute finding. Meniscal chondral calcifications present. Mri Knee Rt Wo Cont    Result Date: 10/4/2019  ============================ [facility] ============================ INDICATION: 53year-old patient with right knee pain and swelling. Evaluation for internal derangement is requested. TECHNIQUE: Sagittal proton density with and without fat saturation; coronal proton density with and without fat saturation; axial proton density with fat saturation images of the knee were obtained. COMPARISONS:  None FINDINGS: Anatomic detail this exam is limited by the presence of motion artifact. ----------- MEDIAL COMPARTMENT: The medial meniscus appears intact. Meniscal root appears within normal limits. There is mild partial-thickness chondrosis of the central and peripheral weightbearing medial femoral condyle without evidence of subchondral marrow edema.  LATERAL COMPARTMENT: Linear intrameniscal signal within the lateral meniscus body noted, with horizontal component abutting the articular surface in the posterior meniscal horn. Superior and inferior meniscocapsular fascicles appear intact. Lateral tibiofemoral chondral surfaces appear preserved. CRUCIATE AND COLLATERAL LIGAMENTS: The anterior posterior cruciate ligaments are intact and normal in appearance. Superficial and deep components of the MCL both appear intact. There is mild edema signal present within the distal fibers of the fibular collateral ligament and adjacent conjoined tendon. Edema signal throughout the myotendinous junction of the popliteus with small popliteal cyst. EXTENSOR MECHANISM, PATELLOFEMORAL COMPARTMENT: The extensor mechanism is intact. Quadriceps and patellar tendons are uniform thickness and morphology. No morphology of patellar maltracking. Cartilage remains intact, without surface irregularity, partial or full thickness defect. JOINT SPACE, SYNOVIUM: There is a small knee joint effusion present. No loose bodies. Tiny, predominantly collapsed Baker's cyst. OSSEOUS: Linear areas of trabecular microfracture are demonstrated involving the anterior tibia at the level of the tibial spines extending inferiorly to involve both the medial and lateral tibial plateaus. Inferior extension of the medial microfracture is estimated at approximately 3.6 cm, with the inferior extent of the lateral component estimated closer to 2.2 cm. No evidence of a transverse component demonstrated. Overall degree of marrow edema on fluid sensitive sequences is mild in nature. Small rounded enchondroma noted within the proximal fibular neck. . REGIONAL SOFT TISSUES: Neurovascular bundles are normal in appearance. IMPRESSION: -------------- 1. Bicondylar tibial plateau microfractures without significant articular surface incongruence or displacement. 2. Horizontal tear of the lateral meniscus body and posterior horn, potentially with an element of concomitant meniscal contusion.  3. Low-grade sprain of the distal fibers of the fibular collateral ligament and adjacent conjoined tendon fibers. 4. Mild to moderate myotendinous strain of the popliteus with associated popliteus cyst. 5. Small joint effusion with tiny, predominantly collapsed Baker's cyst.    Ct Abd Pelv W Cont    Result Date: 10/12/2019  EXAM: CT ABDOMEN AND PELVIS CLINICAL INDICATION/HISTORY:  Upper abdominal pain. -Additional: None COMPARISON: 5/6/2019 TECHNIQUE: Axial CT imaging of the abdomen and pelvis was performed with intravenous contrast. Multiplanar reformats were generated. One or more dose reduction techniques were used on this CT: automated exposure control, adjustment of the mAs and/or kVp according to patient size, and iterative reconstruction techniques. The specific techniques used on this CT exam have been documented in the patient's electronic medical record. Digital Imaging and Communications in Medicine (DICOM) format image data are available to nonaffiliated external healthcare facilities or entities on a secure, media free, reciprocally searchable basis with patient authorization for at least a 12-month period after this study. _______________ FINDINGS: LOWER CHEST: Unremarkable. LIVER, BILIARY: Moderate hepatic steatosis. Liver appears otherwise normal. Mild intra and extrahepatic biliary dilation increased from prior exam. No obstructing stone or mass identified. Gallbladder is unremarkable. SPLEEN: Normal. PANCREAS: Normal. ADRENALS: Normal. KIDNEYS/URETERS/BLADDER: Small nonobstructing stone lower pole left kidney. Otherwise normal. LYMPH NODES: No enlarged lymph nodes. GASTROINTESTINAL TRACT: Edematous wall thickening of the distal esophagus. Mild fat stranding around the proximal and transverse duodenum. Remainder of GI tract is unremarkable. VASCULATURE: Moderate atherosclerotic calcifications. PELVIC ORGANS: Unremarkable. BONES: No acute or aggressive osseous abnormalities identified.  Serpentine sclerosis in both femoral heads consistent with avascular necrosis. No articular surface collapse. OTHER: None. _______________     IMPRESSION: 1. Edematous wall thickening of distal esophagus suggestive of esophagitis. There is also fat stranding noted around the proximal transverse duodenum, consider duodenitis or pancreatitis. 2. Mild biliary dilation new/increased from prior exam. This is of doubtful significance in the setting of normal bilirubin and no specific additional workup recommended. 3. Moderate hepatic steatosis. 4. Bilateral femoral head AVN without articular surface collapse. Preliminary resident report was reviewed. There is no significant discrepancy. Mri Abd W Mrcp W Wo Cont    Result Date: 10/12/2019  EXAM: MRI ABDOMEN CLINICAL INDICATION/HISTORY:  Upper abdominal pain. Biliary dilation on CT dated 10/11/2019. -Additional: None COMPARISON: 10/11/2019 TECHNIQUE: MRI of the abdomen was performed with and without intravenous contrast. MRCP included. Post processing 3D rendering was done on a separate workstation under concurrent physician supervision. _______________ FINDINGS: LOWER CHEST: Unremarkable. LIVER: Normal liver contour. Mild hepatic steatosis. BILIARY: Mild intrahepatic biliary dilation and more prominent dilation of the CBD, which measures up to 1.3 cm. No ductal irregularities or filling defects. Smooth tapering at the ampulla. Gallbladder is unremarkable. PANCREAS: Normal. SPLEEN: Normal. ADRENALS: Normal. KIDNEYS: Normal. LYMPH NODES: No enlarged lymph nodes. GASTROINTESTINAL TRACT: Edematous wall thickening of the distal esophagus. Remainder of GI tract appears normal. VASCULATURE: Atherosclerotic disease of the aorta. BONES: No acute or aggressive osseous abnormalities identified. OTHER: None. _______________     IMPRESSION: 1. Biliary tree is dilated, but there are no filling defects or irregularities. This is of doubtful significance in the absence of elevated bilirubin.  2. Edematous thickening of the distal esophagus, most likely esophagitis. Discharge Medications:     Current Discharge Medication List      CONTINUE these medications which have NOT CHANGED    Details   cetirizine (ZYRTEC) 10 mg tablet       busPIRone (BUSPAR) 15 mg tablet Take 1 Tab by mouth two (2) times a day. Qty: 60 Tab, Refills: 0      albuterol (PROVENTIL VENTOLIN) 2.5 mg /3 mL (0.083 %) nebulizer solution 1.5 mL by Nebulization route every four (4) hours as needed for Wheezing or Shortness of Breath. Qty: 24 Each, Refills: 0      chlordiazePOXIDE (LIBRIUM) 25 mg capsule Take 1 Cap by mouth three (3) times daily as needed for Anxiety. Max Daily Amount: 75 mg. Qty: 15 Cap, Refills: 0    Associated Diagnoses: Alcohol withdrawal syndrome without complication (HCC)      ondansetron (ZOFRAN ODT) 8 mg disintegrating tablet Take 1 Tab by mouth every eight (8) hours as needed for Nausea. Qty: 15 Tab, Refills: 0      folic acid (FOLVITE) 1 mg tablet Take 1 Tab by mouth daily. Qty: 30 Tab, Refills: 0      pantoprazole (PROTONIX) 40 mg tablet Take 1 Tab by mouth Before breakfast and dinner. Qty: 60 Tab, Refills: 0      gabapentin (NEURONTIN) 600 mg tablet Take 1 Tab by mouth two (2) times a day. Qty: 30 Tab, Refills: 0      acetaminophen (TYLENOL EXTRA STRENGTH) 500 mg tablet Take 2 Tabs by mouth every six (6) hours as needed for Pain. Qty: 40 Tab, Refills: 0      cloNIDine HCl (CATAPRES) 0.1 mg tablet Take 1 Tab by mouth two (2) times a day. Qty: 20 Tab, Refills: 0      lamoTRIgine (LAMICTAL) 200 mg tablet Take  by mouth daily. hydrocortisone (CORTAID) 0.5 % topical cream Apply  to affected area two (2) times a day. use thin layer  Qty: 30 g, Refills: 0      sucralfate (CARAFATE) 100 mg/mL suspension Take 10 mL by mouth Before breakfast, lunch, dinner and at bedtime. Qty: 1200 mL, Refills: 0      calcium-cholecalciferol, D3, (CALTRATE 600+D) tablet       topiramate (TOPAMAX) 100 mg tablet 100 mg two (2) times a day. QUEtiapine (SEROQUEL) 300 mg tablet       CREON 24,000-76,000 -120,000 unit capsule       SUMAtriptan (IMITREX) 100 mg tablet TAKE 1 TO 2 TABLETS BY MOUTH DAILY AS NEEDED FOR MIGRAINE .  DO NOT EXCEED 200 MG IN 24 HOUR PERIOD  Refills: 0         STOP taking these medications       Potassium Citrate (UROCIT-K) TbER tablet Comments:   Reason for Stopping:               Activity: Activity as tolerated    Diet: Resume previous diet    Wound Care: None needed    Follow-up: 1 week with PCP    Discharge time: >35 minutes    [facility]  Office:  [phone]  Pager: [phone]      10/12/2019, 3:44 PM

## 2019-10-12 NOTE — PROGRESS NOTES
Bedside and Verbal shift change report given to SEB Lau (oncoming nurse) by Didi Silver  (offgoing nurse). Report included the following information SBAR, Kardex, MAR and Recent Results. 1100 Patient left the floor to smoke without notifying nurse. Educated patient about not leaving the floor to smoke, also applied nicotine patch. 1630 patient went to bathroom and smoked in the bathroom. Plan on discharging patient as soon as possible. 1700 gave patient flu shot. Patient discharged home,to front entrance to car. No distress noted. Patient has discharge instructions, along with belongings. Voiced understanding of discharge instructions.

## 2019-10-12 NOTE — PROGRESS NOTES
Problem: General Medical Care Plan  Goal: *Vital signs within specified parameters  Outcome: Progressing Towards Goal  Goal: *Labs within defined limits  Outcome: Progressing Towards Goal  Goal: *Absence of infection signs and symptoms  Outcome: Progressing Towards Goal  Goal: *Optimal pain control at patient's stated goal  Outcome: Progressing Towards Goal  Goal: *Skin integrity maintained  Outcome: Progressing Towards Goal  Goal: *Fluid volume balance  Outcome: Progressing Towards Goal  Goal: *Optimize nutritional status  Outcome: Progressing Towards Goal  Goal: *Anxiety reduced or absent  Outcome: Progressing Towards Goal  Goal: *Progressive mobility and function (eg: ADL's)  Outcome: Progressing Towards Goal     Problem: Patient Education: Go to Patient Education Activity  Goal: Patient/Family Education  Outcome: Progressing Towards Goal     Problem: Falls - Risk of  Goal: *Absence of Falls  Description  Document Rosalee Romano Fall Risk and appropriate interventions in the flowsheet.   Outcome: Progressing Towards Goal  Note:   Fall Risk Interventions:            Medication Interventions: Patient to call before getting OOB, Teach patient to arise slowly                   Problem: Patient Education: Go to Patient Education Activity  Goal: Patient/Family Education  Outcome: Progressing Towards Goal     Problem: Discharge Planning  Goal: *Discharge to safe environment  Outcome: Progressing Towards Goal

## 2019-10-13 LAB
ATRIAL RATE: 109 BPM
CALCULATED P AXIS, ECG09: 34 DEGREES
CALCULATED R AXIS, ECG10: 85 DEGREES
CALCULATED T AXIS, ECG11: 78 DEGREES
DIAGNOSIS, 93000: NORMAL
P-R INTERVAL, ECG05: 116 MS
Q-T INTERVAL, ECG07: 320 MS
QRS DURATION, ECG06: 70 MS
QTC CALCULATION (BEZET), ECG08: 430 MS
VENTRICULAR RATE, ECG03: 109 BPM

## 2019-10-22 ENCOUNTER — HOSPITAL ENCOUNTER (EMERGENCY)
Age: 54
Discharge: HOME | End: 2019-10-23
Attending: EMERGENCY MEDICINE | Admitting: EMERGENCY MEDICINE
Payer: MEDICARE

## 2019-10-22 ENCOUNTER — APPOINTMENT (OUTPATIENT)
Dept: GENERAL RADIOLOGY | Age: 54
End: 2019-10-22
Attending: EMERGENCY MEDICINE
Payer: MEDICARE

## 2019-10-22 DIAGNOSIS — E87.5 HYPERKALEMIA: ICD-10-CM

## 2019-10-22 DIAGNOSIS — F10.920 ALCOHOLIC INTOXICATION WITHOUT COMPLICATION (HCC): Primary | ICD-10-CM

## 2019-10-22 DIAGNOSIS — R07.9 CHEST PAIN, UNSPECIFIED TYPE: ICD-10-CM

## 2019-10-22 LAB
ALBUMIN SERPL-MCNC: 3.6 G/DL (ref 3.4–5)
ALBUMIN/GLOB SERPL: 0.9 {RATIO} (ref 0.8–1.7)
ALP SERPL-CCNC: 99 U/L (ref 45–117)
ALT SERPL-CCNC: 49 U/L (ref 13–56)
ANION GAP SERPL CALC-SCNC: 18 MMOL/L (ref 3–18)
AST SERPL-CCNC: 99 U/L (ref 10–38)
BASOPHILS # BLD: 0 K/UL (ref 0–0.1)
BASOPHILS NFR BLD: 0 % (ref 0–2)
BILIRUB SERPL-MCNC: 0.3 MG/DL (ref 0.2–1)
BUN SERPL-MCNC: 20 MG/DL (ref 7–18)
BUN/CREAT SERPL: 23 (ref 12–20)
CALCIUM SERPL-MCNC: 9.5 MG/DL (ref 8.5–10.1)
CHLORIDE SERPL-SCNC: 97 MMOL/L (ref 100–111)
CK MB CFR SERPL CALC: 1 % (ref 0–4)
CK MB SERPL-MCNC: 1.4 NG/ML (ref 5–25)
CK SERPL-CCNC: 138 U/L (ref 26–192)
CO2 SERPL-SCNC: 19 MMOL/L (ref 21–32)
CREAT SERPL-MCNC: 0.86 MG/DL (ref 0.6–1.3)
DIFFERENTIAL METHOD BLD: ABNORMAL
EOSINOPHIL # BLD: 0 K/UL (ref 0–0.4)
EOSINOPHIL NFR BLD: 0 % (ref 0–5)
ERYTHROCYTE [DISTWIDTH] IN BLOOD BY AUTOMATED COUNT: 15.7 % (ref 11.6–14.5)
ETHANOL SERPL-MCNC: 235 MG/DL (ref 0–3)
GLOBULIN SER CALC-MCNC: 4.2 G/DL (ref 2–4)
GLUCOSE SERPL-MCNC: 67 MG/DL (ref 74–99)
HCT VFR BLD AUTO: 41.5 % (ref 35–45)
HGB BLD-MCNC: 13.6 G/DL (ref 12–16)
LIPASE SERPL-CCNC: 35 U/L (ref 73–393)
LYMPHOCYTES # BLD: 3.4 K/UL (ref 0.9–3.6)
LYMPHOCYTES NFR BLD: 32 % (ref 21–52)
MCH RBC QN AUTO: 32.5 PG (ref 24–34)
MCHC RBC AUTO-ENTMCNC: 32.8 G/DL (ref 31–37)
MCV RBC AUTO: 99 FL (ref 74–97)
MONOCYTES # BLD: 0.3 K/UL (ref 0.05–1.2)
MONOCYTES NFR BLD: 3 % (ref 3–10)
NEUTS SEG # BLD: 6.9 K/UL (ref 1.8–8)
NEUTS SEG NFR BLD: 65 % (ref 40–73)
PLATELET # BLD AUTO: 279 K/UL (ref 135–420)
PMV BLD AUTO: 9.1 FL (ref 9.2–11.8)
POTASSIUM SERPL-SCNC: 6 MMOL/L (ref 3.5–5.5)
PROT SERPL-MCNC: 7.8 G/DL (ref 6.4–8.2)
RBC # BLD AUTO: 4.19 M/UL (ref 4.2–5.3)
SODIUM SERPL-SCNC: 134 MMOL/L (ref 136–145)
TROPONIN I SERPL-MCNC: <0.02 NG/ML (ref 0–0.04)
WBC # BLD AUTO: 10.7 K/UL (ref 4.6–13.2)

## 2019-10-22 PROCEDURE — 80307 DRUG TEST PRSMV CHEM ANLYZR: CPT

## 2019-10-22 PROCEDURE — 93005 ELECTROCARDIOGRAM TRACING: CPT

## 2019-10-22 PROCEDURE — 83690 ASSAY OF LIPASE: CPT

## 2019-10-22 PROCEDURE — 99285 EMERGENCY DEPT VISIT HI MDM: CPT

## 2019-10-22 PROCEDURE — 71045 X-RAY EXAM CHEST 1 VIEW: CPT

## 2019-10-22 PROCEDURE — 96375 TX/PRO/DX INJ NEW DRUG ADDON: CPT

## 2019-10-22 PROCEDURE — 80053 COMPREHEN METABOLIC PANEL: CPT

## 2019-10-22 PROCEDURE — 82550 ASSAY OF CK (CPK): CPT

## 2019-10-22 PROCEDURE — 96365 THER/PROPH/DIAG IV INF INIT: CPT

## 2019-10-22 PROCEDURE — 96361 HYDRATE IV INFUSION ADD-ON: CPT

## 2019-10-22 PROCEDURE — 85025 COMPLETE CBC W/AUTO DIFF WBC: CPT

## 2019-10-22 PROCEDURE — 74011250636 HC RX REV CODE- 250/636: Performed by: EMERGENCY MEDICINE

## 2019-10-22 PROCEDURE — 74011000250 HC RX REV CODE- 250: Performed by: EMERGENCY MEDICINE

## 2019-10-22 RX ORDER — SODIUM POLYSTYRENE SULFONATE 15 G/60ML
30 SUSPENSION ORAL; RECTAL
Status: COMPLETED | OUTPATIENT
Start: 2019-10-22 | End: 2019-10-23

## 2019-10-22 RX ORDER — CALCIUM GLUCONATE 94 MG/ML
1 INJECTION, SOLUTION INTRAVENOUS ONCE
Status: DISCONTINUED | OUTPATIENT
Start: 2019-10-22 | End: 2019-10-22

## 2019-10-22 RX ORDER — FUROSEMIDE 10 MG/ML
40 INJECTION INTRAMUSCULAR; INTRAVENOUS
Status: DISCONTINUED | OUTPATIENT
Start: 2019-10-22 | End: 2019-10-22

## 2019-10-22 RX ORDER — ONDANSETRON 2 MG/ML
4 INJECTION INTRAMUSCULAR; INTRAVENOUS
Status: COMPLETED | OUTPATIENT
Start: 2019-10-22 | End: 2019-10-22

## 2019-10-22 RX ORDER — SODIUM BICARBONATE 84 MG/ML
50 INJECTION, SOLUTION INTRAVENOUS
Status: COMPLETED | OUTPATIENT
Start: 2019-10-22 | End: 2019-10-23

## 2019-10-22 RX ORDER — FAMOTIDINE 10 MG/ML
20 INJECTION INTRAVENOUS
Status: COMPLETED | OUTPATIENT
Start: 2019-10-22 | End: 2019-10-22

## 2019-10-22 RX ORDER — ALBUTEROL SULFATE 0.83 MG/ML
2.5 SOLUTION RESPIRATORY (INHALATION)
Status: COMPLETED | OUTPATIENT
Start: 2019-10-22 | End: 2019-10-23

## 2019-10-22 RX ADMIN — SODIUM CHLORIDE 1000 ML: 900 INJECTION, SOLUTION INTRAVENOUS at 21:02

## 2019-10-22 RX ADMIN — ONDANSETRON 4 MG: 2 INJECTION INTRAMUSCULAR; INTRAVENOUS at 21:02

## 2019-10-22 RX ADMIN — CALCIUM GLUCONATE 1000 MG: 94 INJECTION, SOLUTION INTRAVENOUS at 23:08

## 2019-10-22 RX ADMIN — FAMOTIDINE 20 MG: 10 INJECTION, SOLUTION INTRAVENOUS at 21:02

## 2019-10-22 NOTE — ED PROVIDER NOTES
EMERGENCY DEPARTMENT HISTORY AND PHYSICAL EXAM    7:30 PM      Date: 10/22/2019  Patient Name: Shamir Pate    History of Presenting Illness     Chief Complaint   Patient presents with    Alcohol Problem         HISTORY: Shamir Pate is a 53 y.o. female with medical history as listed below who presents with alcohol abuse and chest pain. Patient's initial complaint to EMS was that she was drunk. When I initially asked the patient how I could help her in the emergency department she repeatedly stated because I am drunk, because I am drunk. When asked what else I could help her with she reported having chest pain. She states it is on the left side of her chest.  She is unsure of how long it is been there. Does not radiate. She does endorse nausea and vomiting today. Reports drinking 1/5 of vodka. She says she was scared which prompted her to call the paramedics. Denies suicidal or homicidal thoughts. Denies feeling depressed. History is limited due to the patient's intoxication. PCP: Jamee Gallegos MD    Current Facility-Administered Medications   Medication Dose Route Frequency Provider Last Rate Last Dose    famotidine (PF) (PEPCID) injection 20 mg  20 mg IntraVENous NOW Lindsay Calvillo MD        ondansetron [facility]) injection 4 mg  4 mg IntraVENous NOW Lindsay Calvillo MD        sodium chloride 0.9 % bolus infusion 1,000 mL  1,000 mL IntraVENous ONCE Lindsay Calvillo MD         Current Outpatient Medications   Medication Sig Dispense Refill    chlordiazePOXIDE (LIBRIUM) 25 mg capsule Take 1 Cap by mouth three (3) times daily as needed for Anxiety. Max Daily Amount: 75 mg. 15 Cap 0    ondansetron (ZOFRAN ODT) 8 mg disintegrating tablet Take 1 Tab by mouth every eight (8) hours as needed for Nausea. 15 Tab 0    folic acid (FOLVITE) 1 mg tablet Take 1 Tab by mouth daily. 30 Tab 0    pantoprazole (PROTONIX) 40 mg tablet Take 1 Tab by mouth Before breakfast and dinner.  60 Tab 0    gabapentin (NEURONTIN) 600 mg tablet Take 1 Tab by mouth two (2) times a day. 30 Tab 0    acetaminophen (TYLENOL EXTRA STRENGTH) 500 mg tablet Take 2 Tabs by mouth every six (6) hours as needed for Pain. 40 Tab 0    cloNIDine HCl (CATAPRES) 0.1 mg tablet Take 1 Tab by mouth two (2) times a day. 20 Tab 0    lamoTRIgine (LAMICTAL) 200 mg tablet Take  by mouth daily.  hydrocortisone (CORTAID) 0.5 % topical cream Apply  to affected area two (2) times a day. use thin layer 30 g 0    sucralfate (CARAFATE) 100 mg/mL suspension Take 10 mL by mouth Before breakfast, lunch, dinner and at bedtime. 1200 mL 0    calcium-cholecalciferol, D3, (CALTRATE 600+D) tablet       topiramate (TOPAMAX) 100 mg tablet 100 mg two (2) times a day.  QUEtiapine (SEROQUEL) 300 mg tablet       CREON 24,000-76,000 -120,000 unit capsule       cetirizine (ZYRTEC) 10 mg tablet       SUMAtriptan (IMITREX) 100 mg tablet TAKE 1 TO 2 TABLETS BY MOUTH DAILY AS NEEDED FOR MIGRAINE . DO NOT EXCEED 200 MG IN 24 HOUR PERIOD  0    busPIRone (BUSPAR) 15 mg tablet Take 1 Tab by mouth two (2) times a day. 60 Tab 0    albuterol (PROVENTIL VENTOLIN) 2.5 mg /3 mL (0.083 %) nebulizer solution 1.5 mL by Nebulization route every four (4) hours as needed for Wheezing or Shortness of Breath. 24 Each 0       Past History     Past Medical History:  Past Medical History:   Diagnosis Date    Alcohol abuse     Anxiety     Arrhythmia     Tacchycardai    Asthma     controlled    Bipolar disorder ([facility] [address]     Common migraine     COPD (chronic obstructive pulmonary disease) (HCC)     Home O2  PRN ,  pt use also for Tachycardia    Depression     Esophageal reflux     Gout     Hypertension     Inverted nipple     Left breast always have.     Kidney stone on left side     Pancreatitis     Staghorn renal calculus     Urine leukocytes        Past Surgical History:  Past Surgical History:   Procedure Laterality Date    HX COLONOSCOPY      HX CYST REMOVAL Left     x 3 surgeries    HX ENDOSCOPY      HX GYN      tubal ligation    HX LUMBAR LAMINECTOMY      HX UROLOGICAL  08/10/2018    Cysto, bilat RPG, left ureteral pyeloscopy, HLL, left JJ stent placement, Dr. Vera Balbuena       Family History:  Family History   Family history unknown: Yes       Social History:  Social History     Tobacco Use    Smoking status: Current Every Day Smoker     Packs/day: 1.50     Years: 33.00     Pack years: 49.50     Types: Cigarettes    Smokeless tobacco: Never Used   Substance Use Topics    Alcohol use: Yes     Comment: vodka    Drug use: No     Comment: 11years old- [email or web address]       Allergies: Allergies   Allergen Reactions    Lithium Anaphylaxis    Morphine Hives     Can now take this medication    Amitriptyline Other (comments)     Left leg went numb    Elavil Other (comments)     Left leg went numb         Review of Systems       Review of Systems   Constitutional: Negative for fever. HENT: Positive for congestion. Cardiovascular: Positive for chest pain. Gastrointestinal: Positive for nausea and vomiting. Negative for abdominal pain and diarrhea. Genitourinary: Negative for dysuria. Musculoskeletal: Negative for back pain. Neurological: Negative for headaches. All other systems reviewed and are negative. Physical Exam     Visit Vitals  /85 (BP Patient Position: At rest)   Pulse (!) 116   Temp 98.1 °F (36.7 °C)   Resp 13   Ht 5' 5\" (1.651 m)   Wt 49.4 kg (109 lb)   LMP 04/01/2013   BMI 18.14 kg/m²     Physical Exam  General Exam: Patient is a well developed and well nourished in no distress. Patient does not appear acutely ill or toxic. Smells strongly of ETOH. Eye Exam: Lids and conjunctiva are normal  ENT Exam: The general head and facial exam is normal.  The neck is supple without meningeal signs. No significant adenopathy. Pulmonary Exam: No respiratory distress. The respiratory rate is normal.  No stridor.   The breath sounds are equal bilaterally. There are no wheezes, rales, or rhonchi noted. Cardiac Exam: The cardiac rate is tachycardic and rhythm are normal.  No significant murmurs, rubs, or gallops. The peripheral pulses of the upper extremities are normal.  Abdominal Exam: Abdomen is soft and non-distended. No pulsatile masses. There is no local tenderness. There is no rebound or guarding noted. Skin and Soft Tissue: The skin is warm and dry, without significant abnormality. Good color. Musculoskeletal Exam: No peripheral edema. The musculoskeletal exam of the lower extremities is normal without significant local tenderness. Psychiatric: Denies SI/HI      Diagnostic Study Results     Labs -  No results found for this or any previous visit (from the past 12 hour(s)). Radiologic Studies -   XR CHEST PORT    (Results Pending)         Medical Decision Making   I am the first provider for this patient. I reviewed the vital signs, available nursing notes, past medical history, past surgical history, family history and social history. Vital Signs-Reviewed the patient's vital signs. EKG: Interpreted by the EP. EKG performed at 1932. Interpretation rate 127, sinus tachycardia, no STEMI, peaked T waves in inferior and anterior lateral leads, no significant change from previous EKG obtained October 11, 2019    Records Reviewed: Nursing Notes (Time of Review: 7:30 PM)    ED Course: Progress Notes, Reevaluation, and Consults:    Provider Notes (Medical Decision Making): Patient with history of alcohol abuse presenting for evaluation of chest pain. Initial EKG is unremarkable. She is tachycardic. However do not think her symptoms are related to a pulmonary embolism. She is intoxicated. She is easily ambulatory and conversant without distress. Troponin is negative x2. Do not think she needs admission for further cardiac evaluation. On initial lab work her potassium was elevated.   Patient was given medications and a repeat BMP was improved. CO2 is mildly low. Patient did receive IV fluids for hydration. Patient was monitored in the ER for significant amount of time. She is clinically sober. She is not wanting since abuse resources or placement for alcohol detox. She does not to be in significant withdrawal.  Patient would like to go home. Expect that she will continue drinking when she returns home. Patient wishes to go home. No indication to keep the patient here in the emergency department any longer or against her will. Has continued to deny suicidal or homicidal thoughts.      4:06 AM  Patient says that she feels better and wants to go home. It does not want resources for substance abuse. Does not want to stay for evaluation by case management to go to a facility to help with alcohol withdrawal.          Diagnosis     Clinical Impression:   1. Alcoholic intoxication without complication ([address]    2. Hyperkalemia    3. Chest pain, unspecified type        Disposition: DC    Follow-up Information    None          Patient's Medications   Start Taking    No medications on file   Continue Taking    ACETAMINOPHEN (TYLENOL EXTRA STRENGTH) 500 MG TABLET    Take 2 Tabs by mouth every six (6) hours as needed for Pain. ALBUTEROL (PROVENTIL VENTOLIN) 2.5 MG /3 ML (0.083 %) NEBULIZER SOLUTION    1.5 mL by Nebulization route every four (4) hours as needed for Wheezing or Shortness of Breath. BUSPIRONE (BUSPAR) 15 MG TABLET    Take 1 Tab by mouth two (2) times a day. CALCIUM-CHOLECALCIFEROL, D3, (CALTRATE 600+D) TABLET        CETIRIZINE (ZYRTEC) 10 MG TABLET        CHLORDIAZEPOXIDE (LIBRIUM) 25 MG CAPSULE    Take 1 Cap by mouth three (3) times daily as needed for Anxiety. Max Daily Amount: 75 mg. CLONIDINE HCL (CATAPRES) 0.1 MG TABLET    Take 1 Tab by mouth two (2) times a day. CREON 24,000-76,000 -120,000 UNIT CAPSULE        FOLIC ACID (FOLVITE) 1 MG TABLET    Take 1 Tab by mouth daily.     GABAPENTIN (NEURONTIN) 600 MG TABLET    Take 1 Tab by mouth two (2) times a day. HYDROCORTISONE (CORTAID) 0.5 % TOPICAL CREAM    Apply  to affected area two (2) times a day. use thin layer    LAMOTRIGINE (LAMICTAL) 200 MG TABLET    Take  by mouth daily. ONDANSETRON (ZOFRAN ODT) 8 MG DISINTEGRATING TABLET    Take 1 Tab by mouth every eight (8) hours as needed for Nausea. PANTOPRAZOLE (PROTONIX) 40 MG TABLET    Take 1 Tab by mouth Before breakfast and dinner. QUETIAPINE (SEROQUEL) 300 MG TABLET        SUCRALFATE (CARAFATE) 100 MG/ML SUSPENSION    Take 10 mL by mouth Before breakfast, lunch, dinner and at bedtime. SUMATRIPTAN (IMITREX) 100 MG TABLET    TAKE 1 TO 2 TABLETS BY MOUTH DAILY AS NEEDED FOR MIGRAINE . DO NOT EXCEED 200 MG IN 24 HOUR PERIOD    TOPIRAMATE (TOPAMAX) 100 MG TABLET    100 mg two (2) times a day. These Medications have changed    No medications on file   Stop Taking    No medications on file     _______________________________    Please note that this dictation was completed with [employer], the [employer] voice recognition software. Quite often unanticipated grammatical, syntax, homophones, and other interpretive errors are inadvertently transcribed by the computer software. Please disregard these errors. Please excuse any errors that have escaped final proofreading.

## 2019-10-22 NOTE — ED TRIAGE NOTES
Pt to ED via EMS with c/o intoxication. Patient states \"I am drunk, I drank a lot, I drank vodka\". Pt also c/o midsternal chest pain.

## 2019-10-23 VITALS
WEIGHT: 109 LBS | BODY MASS INDEX: 18.16 KG/M2 | RESPIRATION RATE: 15 BRPM | HEIGHT: 65 IN | OXYGEN SATURATION: 99 % | HEART RATE: 117 BPM | SYSTOLIC BLOOD PRESSURE: 134 MMHG | DIASTOLIC BLOOD PRESSURE: 86 MMHG | TEMPERATURE: 98.1 F

## 2019-10-23 LAB
ANION GAP SERPL CALC-SCNC: 18 MMOL/L (ref 3–18)
ATRIAL RATE: 127 BPM
BUN SERPL-MCNC: 16 MG/DL (ref 7–18)
BUN/CREAT SERPL: 19 (ref 12–20)
CALCIUM SERPL-MCNC: 8.4 MG/DL (ref 8.5–10.1)
CALCULATED P AXIS, ECG09: 77 DEGREES
CALCULATED R AXIS, ECG10: 83 DEGREES
CALCULATED T AXIS, ECG11: 70 DEGREES
CHLORIDE SERPL-SCNC: 102 MMOL/L (ref 100–111)
CO2 SERPL-SCNC: 19 MMOL/L (ref 21–32)
CREAT SERPL-MCNC: 0.86 MG/DL (ref 0.6–1.3)
DIAGNOSIS, 93000: NORMAL
GLUCOSE SERPL-MCNC: 74 MG/DL (ref 74–99)
P-R INTERVAL, ECG05: 130 MS
POTASSIUM SERPL-SCNC: 4 MMOL/L (ref 3.5–5.5)
Q-T INTERVAL, ECG07: 300 MS
QRS DURATION, ECG06: 74 MS
QTC CALCULATION (BEZET), ECG08: 436 MS
SODIUM SERPL-SCNC: 139 MMOL/L (ref 136–145)
TROPONIN I SERPL-MCNC: <0.02 NG/ML (ref 0–0.04)
VENTRICULAR RATE, ECG03: 127 BPM

## 2019-10-23 PROCEDURE — 74011000250 HC RX REV CODE- 250: Performed by: EMERGENCY MEDICINE

## 2019-10-23 PROCEDURE — 74011250637 HC RX REV CODE- 250/637: Performed by: EMERGENCY MEDICINE

## 2019-10-23 PROCEDURE — 77030029684 HC NEB SM VOL KT MONA -A

## 2019-10-23 PROCEDURE — 80048 BASIC METABOLIC PNL TOTAL CA: CPT

## 2019-10-23 PROCEDURE — 74011250636 HC RX REV CODE- 250/636: Performed by: EMERGENCY MEDICINE

## 2019-10-23 PROCEDURE — 84484 ASSAY OF TROPONIN QUANT: CPT

## 2019-10-23 RX ADMIN — SODIUM CHLORIDE 1000 ML: 900 INJECTION, SOLUTION INTRAVENOUS at 00:54

## 2019-10-23 RX ADMIN — ALBUTEROL SULFATE 2.5 MG: 2.5 SOLUTION RESPIRATORY (INHALATION) at 00:04

## 2019-10-23 RX ADMIN — SODIUM POLYSTYRENE SULFONATE 30 G: 15 SUSPENSION ORAL; RECTAL at 00:28

## 2019-10-23 RX ADMIN — SODIUM BICARBONATE 50 MEQ: 84 INJECTION, SOLUTION INTRAVENOUS at 00:29

## 2019-10-23 NOTE — ED NOTES
I have reviewed discharge instructions with the patient. The patient verbalized understanding. Pt discharged to ED lobby currently awaiting ride home, stable, no distress noted.

## 2019-10-23 NOTE — ED NOTES
Attempted to obtained blood specimen as ordered , 2 unsuccessful attempts at PIV insertions. ED Tech will attempt to draw blood at this time. MD notified.

## 2019-10-23 NOTE — DISCHARGE INSTRUCTIONS
Patient Education        Chest Pain: Care Instructions  Your Care Instructions    There are many things that can cause chest pain. Some are not serious and will get better on their own in a few days. But some kinds of chest pain need more testing and treatment. Your doctor may have recommended a follow-up visit in the next 8 to 12 hours. If you are not getting better, you may need more tests or treatment. Even though your doctor has released you, you still need to watch for any problems. The doctor carefully checked you, but sometimes problems can develop later. If you have new symptoms or if your symptoms do not get better, get medical care right away. If you have worse or different chest pain or pressure that lasts more than 5 minutes or you passed out (lost consciousness), call 911 or seek other emergency help right away. A medical visit is only one step in your treatment. Even if you feel better, you still need to do what your doctor recommends, such as going to all suggested follow-up appointments and taking medicines exactly as directed. This will help you recover and help prevent future problems. How can you care for yourself at home? · Rest until you feel better. · Take your medicine exactly as prescribed. Call your doctor if you think you are having a problem with your medicine. · Do not drive after taking a prescription pain medicine. When should you call for help? Call 911 if:    · You passed out (lost consciousness).     · You have severe difficulty breathing.     · You have symptoms of a heart attack. These may include:  ? Chest pain or pressure, or a strange feeling in your chest.  ? Sweating. ? Shortness of breath. ? Nausea or vomiting. ? Pain, pressure, or a strange feeling in your back, neck, jaw, or upper belly or in one or both shoulders or arms. ? Lightheadedness or sudden weakness. ? A fast or irregular heartbeat.   After you call 911, the  may tell you to chew 1 adult-strength or 2 to 4 low-dose aspirin. Wait for an ambulance. Do not try to drive yourself.    Call your doctor today if:    · You have any trouble breathing.     · Your chest pain gets worse.     · You are dizzy or lightheaded, or you feel like you may faint.     · You are not getting better as expected.     · You are having new or different chest pain. Where can you learn more? Go to http://shannon-heriberto.info/. Enter A120 in the search box to learn more about \"Chest Pain: Care Instructions. \"  Current as of: June 26, 2019  Content Version: 12.2  © 1367-7398 [employer]. Care instructions adapted under license by [employer] (which disclaims liability or warranty for this information). If you have questions about a medical condition or this instruction, always ask your healthcare professional. [address] any warranty or liability for your use of this information. Patient Education        Hyperkalemia: Care Instructions  Your Care Instructions    Hyperkalemia is too much potassium in the blood. Potassium helps keep the right mix of fluids in your body. It also helps your nerves and muscles work as they should. And it keeps your heartbeat in a normal rhythm. Some things can raise potassium levels. These include some health problems, medicines, and kidney problems. (Normally, your kidneys remove extra potassium.)  Too much potassium can cause nausea. It also can cause a heartbeat that isn't normal. But you may not have any symptoms. Too much potassium can be dangerous. That's why it's important to treat it. If you are taking any of the medicines that can raise your levels, your doctor will ask you to stop. You may get medicines to lower your levels. And you may have to limit or not eat foods that have a lot of potassium. Follow-up care is a key part of your treatment and safety.  Be sure to make and go to all appointments, and call your doctor if you are having problems. It's also a good idea to know your test results and keep a list of the medicines you take. How can you care for yourself at home? · Take your medicines exactly as prescribed. Call your doctor if you think you are having a problem with your medicine. · Stop taking certain medicines if your doctor asks you to. They may be causing your high potassium levels. If you have concerns about stopping medicine, talk with your doctor. · If you have kidney, heart, or liver disease and have to limit fluids, talk with your doctor before you increase the amount of fluids you drink. If the doctor says it's okay, drink plenty of fluids. This means drinking enough so that your urine is light yellow or clear like water. · Avoid strenuous exercise until your doctor tells you it is okay. · Potassium is in many foods, including vegetables, fruits, and milk products. Foods high in potassium include bananas, cantaloupe, broccoli, milk, potatoes, and tomatoes. · Low potassium foods include blueberries, raspberries, cucumber, white or brown rice, spaghetti, and macaroni. · Do not use a salt substitute without talking to your doctor first. Most of these are very high in potassium. · Be sure to tell your doctor about any prescription, over-the-counter, or herbal medicines you take. Some of these can raise potassium. When should you call for help? Call 911 anytime you think you may need emergency care. For example, call if:    · You passed out (lost consciousness).     · You have an unusual heartbeat. Your heart may beat fast or skip beats.    Call your doctor now or seek immediate medical care if:    · You have muscle aches.     · You feel very weak.    Watch closely for changes in your health, and be sure to contact your doctor if:    · You do not get better as expected. Where can you learn more? Go to http://shannon-heriberto.info/.   Enter Q517 in the search box to learn more about \"Hyperkalemia: Care Instructions. \"  Current as of: October 31, 2018  Content Version: 12.2  © 2346-5626 [employer]. Care instructions adapted under license by [employer] (which disclaims liability or warranty for this information). If you have questions about a medical condition or this instruction, always ask your healthcare professional. [address] any warranty or liability for your use of this information.

## 2020-02-04 ENCOUNTER — HOSPITAL ENCOUNTER (INPATIENT)
Age: 55
LOS: 2 days | Discharge: HOME | DRG: 440 | End: 2020-02-06
Attending: EMERGENCY MEDICINE | Admitting: HOSPITALIST
Payer: MEDICARE

## 2020-02-04 DIAGNOSIS — K85.20 ALCOHOL-INDUCED ACUTE PANCREATITIS, UNSPECIFIED COMPLICATION STATUS: Primary | ICD-10-CM

## 2020-02-04 DIAGNOSIS — F10.10 ALCOHOL ABUSE: ICD-10-CM

## 2020-02-04 DIAGNOSIS — K85.20 ALCOHOL-INDUCED ACUTE PANCREATITIS WITHOUT INFECTION OR NECROSIS: ICD-10-CM

## 2020-02-04 LAB
ALBUMIN SERPL-MCNC: 3.2 G/DL (ref 3.4–5)
ALBUMIN/GLOB SERPL: 1 {RATIO} (ref 0.8–1.7)
ALP SERPL-CCNC: 113 U/L (ref 45–117)
ALT SERPL-CCNC: 154 U/L (ref 13–56)
ANION GAP SERPL CALC-SCNC: 8 MMOL/L (ref 3–18)
AST SERPL-CCNC: 216 U/L (ref 10–38)
BASOPHILS # BLD: 0 K/UL (ref 0–0.1)
BASOPHILS NFR BLD: 0 % (ref 0–2)
BILIRUB SERPL-MCNC: 1 MG/DL (ref 0.2–1)
BUN SERPL-MCNC: 10 MG/DL (ref 7–18)
BUN/CREAT SERPL: 12 (ref 12–20)
CALCIUM SERPL-MCNC: 9.3 MG/DL (ref 8.5–10.1)
CHLORIDE SERPL-SCNC: 99 MMOL/L (ref 100–111)
CO2 SERPL-SCNC: 28 MMOL/L (ref 21–32)
CREAT SERPL-MCNC: 0.83 MG/DL (ref 0.6–1.3)
DIFFERENTIAL METHOD BLD: ABNORMAL
EOSINOPHIL # BLD: 0.1 K/UL (ref 0–0.4)
EOSINOPHIL NFR BLD: 1 % (ref 0–5)
ERYTHROCYTE [DISTWIDTH] IN BLOOD BY AUTOMATED COUNT: 16.9 % (ref 11.6–14.5)
ETHANOL SERPL-MCNC: <3 MG/DL (ref 0–3)
GLOBULIN SER CALC-MCNC: 3.3 G/DL (ref 2–4)
GLUCOSE SERPL-MCNC: 134 MG/DL (ref 74–99)
HCT VFR BLD AUTO: 38.9 % (ref 35–45)
HGB BLD-MCNC: 13.5 G/DL (ref 12–16)
LIPASE SERPL-CCNC: 1195 U/L (ref 73–393)
LYMPHOCYTES # BLD: 1.8 K/UL (ref 0.9–3.6)
LYMPHOCYTES NFR BLD: 35 % (ref 21–52)
MCH RBC QN AUTO: 33.8 PG (ref 24–34)
MCHC RBC AUTO-ENTMCNC: 34.7 G/DL (ref 31–37)
MCV RBC AUTO: 97.5 FL (ref 74–97)
MONOCYTES # BLD: 0.4 K/UL (ref 0.05–1.2)
MONOCYTES NFR BLD: 8 % (ref 3–10)
NEUTS SEG # BLD: 2.9 K/UL (ref 1.8–8)
NEUTS SEG NFR BLD: 56 % (ref 40–73)
PLATELET # BLD AUTO: 95 K/UL (ref 135–420)
PMV BLD AUTO: 9.4 FL (ref 9.2–11.8)
POTASSIUM SERPL-SCNC: 3.7 MMOL/L (ref 3.5–5.5)
PROT SERPL-MCNC: 6.5 G/DL (ref 6.4–8.2)
RBC # BLD AUTO: 3.99 M/UL (ref 4.2–5.3)
SODIUM SERPL-SCNC: 135 MMOL/L (ref 136–145)
WBC # BLD AUTO: 5.2 K/UL (ref 4.6–13.2)

## 2020-02-04 PROCEDURE — 74011250636 HC RX REV CODE- 250/636: Performed by: EMERGENCY MEDICINE

## 2020-02-04 PROCEDURE — 77030040361 HC SLV COMPR DVT MDII -B

## 2020-02-04 PROCEDURE — 96375 TX/PRO/DX INJ NEW DRUG ADDON: CPT

## 2020-02-04 PROCEDURE — 74011250637 HC RX REV CODE- 250/637: Performed by: HOSPITALIST

## 2020-02-04 PROCEDURE — 65270000029 HC RM PRIVATE

## 2020-02-04 PROCEDURE — 74011250636 HC RX REV CODE- 250/636: Performed by: HOSPITALIST

## 2020-02-04 PROCEDURE — 74011000250 HC RX REV CODE- 250: Performed by: HOSPITALIST

## 2020-02-04 PROCEDURE — 96365 THER/PROPH/DIAG IV INF INIT: CPT

## 2020-02-04 PROCEDURE — 93005 ELECTROCARDIOGRAM TRACING: CPT

## 2020-02-04 PROCEDURE — 80307 DRUG TEST PRSMV CHEM ANLYZR: CPT

## 2020-02-04 PROCEDURE — 74011000258 HC RX REV CODE- 258: Performed by: HOSPITALIST

## 2020-02-04 PROCEDURE — 94762 N-INVAS EAR/PLS OXIMTRY CONT: CPT

## 2020-02-04 PROCEDURE — 83690 ASSAY OF LIPASE: CPT

## 2020-02-04 PROCEDURE — 74011000258 HC RX REV CODE- 258: Performed by: EMERGENCY MEDICINE

## 2020-02-04 PROCEDURE — 85025 COMPLETE CBC W/AUTO DIFF WBC: CPT

## 2020-02-04 PROCEDURE — 77030027138 HC INCENT SPIROMETER -A

## 2020-02-04 PROCEDURE — 99285 EMERGENCY DEPT VISIT HI MDM: CPT

## 2020-02-04 PROCEDURE — 74011000250 HC RX REV CODE- 250: Performed by: EMERGENCY MEDICINE

## 2020-02-04 PROCEDURE — 80053 COMPREHEN METABOLIC PANEL: CPT

## 2020-02-04 RX ORDER — LORAZEPAM 2 MG/ML
2 INJECTION INTRAMUSCULAR
Status: DISCONTINUED | OUTPATIENT
Start: 2020-02-04 | End: 2020-02-06 | Stop reason: HOSPADM

## 2020-02-04 RX ORDER — ONDANSETRON 2 MG/ML
4 INJECTION INTRAMUSCULAR; INTRAVENOUS
Status: COMPLETED | OUTPATIENT
Start: 2020-02-04 | End: 2020-02-04

## 2020-02-04 RX ORDER — GABAPENTIN 300 MG/1
600 CAPSULE ORAL 2 TIMES DAILY
Status: DISCONTINUED | OUTPATIENT
Start: 2020-02-04 | End: 2020-02-06 | Stop reason: HOSPADM

## 2020-02-04 RX ORDER — ONDANSETRON 2 MG/ML
6 INJECTION INTRAMUSCULAR; INTRAVENOUS
Status: DISCONTINUED | OUTPATIENT
Start: 2020-02-04 | End: 2020-02-06 | Stop reason: HOSPADM

## 2020-02-04 RX ORDER — SODIUM CHLORIDE 0.9 % (FLUSH) 0.9 %
5-40 SYRINGE (ML) INJECTION AS NEEDED
Status: DISCONTINUED | OUTPATIENT
Start: 2020-02-04 | End: 2020-02-06 | Stop reason: HOSPADM

## 2020-02-04 RX ORDER — BUSPIRONE HYDROCHLORIDE 7.5 MG/1
15 TABLET ORAL 2 TIMES DAILY
Status: DISCONTINUED | OUTPATIENT
Start: 2020-02-04 | End: 2020-02-06 | Stop reason: HOSPADM

## 2020-02-04 RX ORDER — TRAZODONE HYDROCHLORIDE 100 MG/1
200 TABLET ORAL
COMMUNITY

## 2020-02-04 RX ORDER — LORAZEPAM 2 MG/ML
3 INJECTION INTRAMUSCULAR
Status: DISCONTINUED | OUTPATIENT
Start: 2020-02-04 | End: 2020-02-06 | Stop reason: HOSPADM

## 2020-02-04 RX ORDER — LAMOTRIGINE 100 MG/1
200 TABLET ORAL DAILY
Status: DISCONTINUED | OUTPATIENT
Start: 2020-02-04 | End: 2020-02-06 | Stop reason: HOSPADM

## 2020-02-04 RX ORDER — MORPHINE SULFATE 2 MG/ML
2 INJECTION, SOLUTION INTRAMUSCULAR; INTRAVENOUS
Status: DISCONTINUED | OUTPATIENT
Start: 2020-02-04 | End: 2020-02-04

## 2020-02-04 RX ORDER — BISMUTH SUBSALICYLATE 262 MG
1 TABLET,CHEWABLE ORAL DAILY
Status: ON HOLD | COMMUNITY
End: 2020-02-06 | Stop reason: SDUPTHER

## 2020-02-04 RX ORDER — ALBUTEROL SULFATE 0.83 MG/ML
1.25 SOLUTION RESPIRATORY (INHALATION)
Status: DISCONTINUED | OUTPATIENT
Start: 2020-02-04 | End: 2020-02-06 | Stop reason: HOSPADM

## 2020-02-04 RX ORDER — COLCHICINE 0.6 MG/1
0.6 TABLET ORAL DAILY
COMMUNITY

## 2020-02-04 RX ORDER — MORPHINE SULFATE 4 MG/ML
4 INJECTION, SOLUTION INTRAMUSCULAR; INTRAVENOUS
Status: COMPLETED | OUTPATIENT
Start: 2020-02-04 | End: 2020-02-04

## 2020-02-04 RX ORDER — ACETAMINOPHEN, DIPHENHYDRAMINE HCL, PHENYLEPHRINE HCL 325; 25; 5 MG/1; MG/1; MG/1
10 TABLET ORAL
COMMUNITY

## 2020-02-04 RX ORDER — MORPHINE SULFATE 2 MG/ML
2 INJECTION, SOLUTION INTRAMUSCULAR; INTRAVENOUS
Status: DISCONTINUED | OUTPATIENT
Start: 2020-02-04 | End: 2020-02-06 | Stop reason: HOSPADM

## 2020-02-04 RX ORDER — LORAZEPAM 1 MG/1
2 TABLET ORAL
Status: DISCONTINUED | OUTPATIENT
Start: 2020-02-04 | End: 2020-02-06 | Stop reason: HOSPADM

## 2020-02-04 RX ORDER — LORAZEPAM 1 MG/1
1 TABLET ORAL
Status: DISCONTINUED | OUTPATIENT
Start: 2020-02-04 | End: 2020-02-06 | Stop reason: HOSPADM

## 2020-02-04 RX ORDER — ZOLPIDEM TARTRATE 5 MG/1
5 TABLET ORAL
Status: DISCONTINUED | OUTPATIENT
Start: 2020-02-04 | End: 2020-02-06 | Stop reason: HOSPADM

## 2020-02-04 RX ORDER — NALOXONE HYDROCHLORIDE 0.4 MG/ML
0.4 INJECTION, SOLUTION INTRAMUSCULAR; INTRAVENOUS; SUBCUTANEOUS AS NEEDED
Status: DISCONTINUED | OUTPATIENT
Start: 2020-02-04 | End: 2020-02-06 | Stop reason: HOSPADM

## 2020-02-04 RX ORDER — QUETIAPINE FUMARATE 100 MG/1
300 TABLET, FILM COATED ORAL
Status: DISCONTINUED | OUTPATIENT
Start: 2020-02-04 | End: 2020-02-06 | Stop reason: HOSPADM

## 2020-02-04 RX ORDER — DOCUSATE SODIUM 100 MG/1
100 CAPSULE, LIQUID FILLED ORAL 3 TIMES DAILY
COMMUNITY

## 2020-02-04 RX ORDER — SODIUM CHLORIDE 0.9 % (FLUSH) 0.9 %
5-40 SYRINGE (ML) INJECTION EVERY 8 HOURS
Status: DISCONTINUED | OUTPATIENT
Start: 2020-02-04 | End: 2020-02-06 | Stop reason: HOSPADM

## 2020-02-04 RX ORDER — MORPHINE SULFATE 2 MG/ML
2 INJECTION, SOLUTION INTRAMUSCULAR; INTRAVENOUS ONCE
Status: COMPLETED | OUTPATIENT
Start: 2020-02-04 | End: 2020-02-04

## 2020-02-04 RX ORDER — CLONIDINE HYDROCHLORIDE 0.1 MG/1
0.1 TABLET ORAL 2 TIMES DAILY
Status: DISCONTINUED | OUTPATIENT
Start: 2020-02-04 | End: 2020-02-06 | Stop reason: HOSPADM

## 2020-02-04 RX ORDER — DEXTROSE, SODIUM CHLORIDE, AND POTASSIUM CHLORIDE 5; .45; .15 G/100ML; G/100ML; G/100ML
125 INJECTION INTRAVENOUS CONTINUOUS
Status: DISCONTINUED | OUTPATIENT
Start: 2020-02-04 | End: 2020-02-04

## 2020-02-04 RX ORDER — TOPIRAMATE 100 MG/1
100 TABLET, FILM COATED ORAL 2 TIMES DAILY
Status: DISCONTINUED | OUTPATIENT
Start: 2020-02-04 | End: 2020-02-06 | Stop reason: HOSPADM

## 2020-02-04 RX ORDER — LORAZEPAM 2 MG/ML
1 INJECTION INTRAMUSCULAR
Status: DISCONTINUED | OUTPATIENT
Start: 2020-02-04 | End: 2020-02-06 | Stop reason: HOSPADM

## 2020-02-04 RX ADMIN — GABAPENTIN 600 MG: 300 CAPSULE ORAL at 21:14

## 2020-02-04 RX ADMIN — FOLIC ACID: 5 INJECTION, SOLUTION INTRAMUSCULAR; INTRAVENOUS; SUBCUTANEOUS at 10:00

## 2020-02-04 RX ADMIN — MORPHINE SULFATE 2 MG: 2 INJECTION, SOLUTION INTRAMUSCULAR; INTRAVENOUS at 12:23

## 2020-02-04 RX ADMIN — MORPHINE SULFATE 2 MG: 2 INJECTION, SOLUTION INTRAMUSCULAR; INTRAVENOUS at 16:40

## 2020-02-04 RX ADMIN — CLONIDINE HYDROCHLORIDE 0.1 MG: 0.1 TABLET ORAL at 08:34

## 2020-02-04 RX ADMIN — PANCRELIPASE LIPASE, PANCRELIPASE PROTEASE, PANCRELIPASE AMYLASE 2 CAPSULE: 10000; 32000; 42000 CAPSULE, DELAYED RELEASE ORAL at 17:49

## 2020-02-04 RX ADMIN — CLONIDINE HYDROCHLORIDE 0.1 MG: 0.1 TABLET ORAL at 17:49

## 2020-02-04 RX ADMIN — BUSPIRONE HYDROCHLORIDE 15 MG: 7.5 TABLET ORAL at 11:02

## 2020-02-04 RX ADMIN — ONDANSETRON 4 MG: 2 INJECTION INTRAMUSCULAR; INTRAVENOUS at 06:16

## 2020-02-04 RX ADMIN — MORPHINE SULFATE 4 MG: 4 INJECTION, SOLUTION INTRAMUSCULAR; INTRAVENOUS at 06:49

## 2020-02-04 RX ADMIN — MORPHINE SULFATE 2 MG: 2 INJECTION, SOLUTION INTRAMUSCULAR; INTRAVENOUS at 21:16

## 2020-02-04 RX ADMIN — BUSPIRONE HYDROCHLORIDE 15 MG: 7.5 TABLET ORAL at 21:12

## 2020-02-04 RX ADMIN — LAMOTRIGINE 200 MG: 100 TABLET ORAL at 11:02

## 2020-02-04 RX ADMIN — GABAPENTIN 600 MG: 300 CAPSULE ORAL at 12:15

## 2020-02-04 RX ADMIN — FOLIC ACID: 5 INJECTION, SOLUTION INTRAMUSCULAR; INTRAVENOUS; SUBCUTANEOUS at 10:30

## 2020-02-04 RX ADMIN — FOLIC ACID: 5 INJECTION, SOLUTION INTRAMUSCULAR; INTRAVENOUS; SUBCUTANEOUS at 06:12

## 2020-02-04 RX ADMIN — SODIUM CHLORIDE 1000 ML: 900 INJECTION, SOLUTION INTRAVENOUS at 06:13

## 2020-02-04 RX ADMIN — DEXTROSE MONOHYDRATE, SODIUM CHLORIDE, AND POTASSIUM CHLORIDE 125 ML/HR: 50; 4.5; 1.49 INJECTION, SOLUTION INTRAVENOUS at 11:02

## 2020-02-04 RX ADMIN — QUETIAPINE FUMARATE 300 MG: 100 TABLET ORAL at 21:14

## 2020-02-04 RX ADMIN — MORPHINE SULFATE 2 MG: 2 INJECTION, SOLUTION INTRAMUSCULAR; INTRAVENOUS at 16:38

## 2020-02-04 RX ADMIN — PANCRELIPASE LIPASE, PANCRELIPASE PROTEASE, PANCRELIPASE AMYLASE 2 CAPSULE: 10000; 32000; 42000 CAPSULE, DELAYED RELEASE ORAL at 12:15

## 2020-02-04 NOTE — ED NOTES
Verbal shift change report given to   Jerrell (oncoming nurse) by Kalyani Tao (offgoing nurse). Report included the following information SBAR, ED Summary and MAR.

## 2020-02-04 NOTE — PROGRESS NOTES
NUTRITION INITIAL ASSESSMENT/PLAN OF CARE      RECOMMENDATIONS:   1. NPO: diet to be advanced when medically indicated and per Pt tolerance  2. Monitor labs, weight and PO intake  3. RD to follow     GOALS:   1. Diet advanced/ PO intake meets >75% of protein/calorie needs by 2/7    ASSESSMENT:   Wt status is classified as normal per Body mass index is 20.12 kg/m². Pt at nutrition risk d/t poor PO intake x 4 days and chronic ETOH abuse. Currently NPO and not meeting nutritional needs. Labs noted. Pt w/ hyponatremia, elevated LFTs and elevated lipase (1195). Nutrition recommendations listed. RD to follow. Nutrition Diagnoses: Altered GI function related to pancreatitis as evidenced by an elevated lipase (1195) and ABD Pain with nausea x 4 days. SUBJECTIVE/OBJECTIVE:    Pt admitted for pancreatitis. Noted per chart review Pt w/ ABD Pain and Nausea x 4 days after attempting to detox herself from ETOH. Pt seen in room this morning; appears thin with some noted muscle wasting and SQ fat loss. Stated she is still having Nausea and ABD Pain but denies any episodes of emesis. Reports having problems with constipation but unsure of last BM. NKFA or problems chewing/swallowing. Appetite is variable d/t ETOH abuse and at most consumes 2 small meals per day. Stated she has not eaten anything in the past 3 days and before that was only able to get some pudding. Pt confirmed weight fluctuation d/t intake and -120 lb. Pt stated current weight at 110 lb, but unable to verify as bed scale not working. Will continue to monitor. Information Obtained from:    [x] Chart Review   [x] Patient   [] Family/Caregiver   [] Nurse/Physician   [] Interdisciplinary Meeting/Rounds      Diet: NPO  Medications: [x] Reviewed  IVF: D5 1/2 NS w/ KCl 20 mEq/L @125 mL/kcal (510 kcal/day) and banana bag   Catapres, PRN: Creon 24,000-76,000-120,000   Allergies: [x] Reviewed   Encounter Diagnoses     ICD-10-CM ICD-9-CM   1. Alcohol-induced acute pancreatitis, unspecified complication status J09.82 577.0   2. Alcohol abuse F10.10 305.00     Past Medical History:   Diagnosis Date    Alcohol abuse     Anxiety     Arrhythmia     Tacchycardai    Asthma     controlled    Bipolar disorder ([address]     Common migraine     COPD [facility]ch[facility] obstructive pulmonary disease) ([facility])     Home O2  PRN ,  pt use also for Tachycardia    Depression     Esophageal reflux     Gout     Hypertension     Hypocitraturia     Inverted nipple     Left breast always have.  Kidney stone on left side     Pancreatitis     Recurrent UTI     Staghorn renal calculus     Urine leukocytes       Labs:    Lab Results   Component Value Date/Time    Sodium 135 (L) 02/04/2020 05:45 AM    Potassium 3.7 02/04/2020 05:45 AM    Chloride 99 (L) 02/04/2020 05:45 AM    CO2 28 02/04/2020 05:45 AM    Anion gap 8 02/04/2020 05:45 AM    Glucose 134 (H) 02/04/2020 05:45 AM    BUN 10 02/04/2020 05:45 AM    Creatinine 0.83 02/04/2020 05:45 AM    Calcium 9.3 02/04/2020 05:45 AM    Magnesium 1.9 01/18/2019 05:25 AM    Phosphorus 2.5 01/18/2019 05:25 AM    Albumin 3.2 (L) 02/04/2020 05:45 AM     Lab Results   Component Value Date/Time     (H) 02/04/2020 05:45 AM     Anthropometrics: BMI (calculated): 20.1  Last 3 Recorded Weights in this Encounter    02/04/20 0547   Weight: 49.9 kg (110 lb)      Ht Readings from Last 1 Encounters:   02/04/20 5' 2\" (1.575 m)       Documented Weight History:  Weight Metrics 2/4/2020 10/22/2019 10/12/2019 9/8/2019 5/23/2019 5/5/2019 5/2/2019   Weight 110 lb 109 lb 122 lb 114 lb 112 lb 120 lb 121 lb   BMI 20.12 kg/m2 18.14 kg/m2 22.31 kg/m2 20.85 kg/m2 21.16 kg/m2 22.31 kg/m2 22.13 kg/m2       No data found.       IBW: 110 lb %IBW: 100% UBW: 115-120 lb over the past year   [x] Weight Loss [] Weight Gain [] Weight Stable    Estimated Nutrition Needs: [x] MSJ x 1.3  [x] Other: 30 kcal/kg  Calories: 4836-8242 kcal Based on:   [x] Actual BW Protein:   60-75 g Based on:   [x] Actual BW x 1.2-1.5 gm/kg   Fluid:       1500 mL     [x] No Cultural, Baptist or ethnic dietary need identified.     [] Cultural, Baptist and ethnic food preferences identified and addressed     Wt Status:  [x] Normal (18.6 - 24.9) [] Underweight (<18.5) [] Overweight (25 - 29.9) [] Mild Obesity (30 - 34.9)  [] Moderate Obesity (35 - 39.9) [] Morbid Obesity (40+)       Nutrition Problems Identified:   [x] Suboptimal PO intake v/s NPO  [] Food Allergies  [] Difficulty chewing/swallowing/poor dentition  [x] Constipation/Diarrhea   [x] Nausea/Vomiting/ABD PAin   [] None  [] Other:     Plan:   [] Therapeutic Diet  []  Obtained/adjusted food preferences/tolerances and/or snacks options   []  Supplements added   [] Occupational therapy following for feeding techniques  []  HS snack added   []  Modify diet texture   []  Modify diet for food allergies   []  Educate patient   []  Assist with menu selection   []  Monitor PO intake on meal rounds   [x]  Continue inpatient monitoring and intervention   [x]  Participated in discharge planning/Interdisciplinary rounds/Team meetings   []  Other:     Education Needs:   [x] Not appropriate for teaching at this time    [] Identified and addressed    Nutrition Monitoring and Evaluation:  [x] Continue ongoing monitoring and intervention  [] Chyna    [facility]

## 2020-02-04 NOTE — H&P
History and Physical    Patient: Rudi Aguilera               Sex: female          DOA: 2/4/2020       YOB: 1965      Age:  53 y.o.        LOS:  LOS: 0 days        HPI:     Rudi Aguilera is a 53 y.o. female who presented to the ER with abdominal pain. The pain began Sunday AM and is in the upper portion of her abdomen. She reports that she is a \"heavy drinker. \"  She drinks vodka, multiple drinks per day. Her last drink was 3 days ago, she said that she wanted to \"detox herself. \"  She did not have nausea, vomiting, or diarrhea. In the ER she was found to have Pancreatitis and will be admitted for ongoing management. Past Medical History:   Diagnosis Date    Alcohol abuse     Anxiety     Arrhythmia     Tacchycardai    Asthma     controlled    Bipolar disorder ([facility] [address]     Common migraine     COPD (chronic obstructive pulmonary disease) (HCC)     Home O2  PRN ,  pt use also for Tachycardia    Depression     Esophageal reflux     Gout     Hypertension     Hypocitraturia     Inverted nipple     Left breast always have.  Kidney stone on left side     Pancreatitis     Recurrent UTI     Staghorn renal calculus     Urine leukocytes        Social History:   Tobacco use:  Patient has smoked since the age of  year   Alcohol use:  Patient uses alcohol most days. Last drink was 3 days ago in the morning   Occupation:   Patient is on disability from Bipolar disorder    Family History: Mother had chronic neck and back issues   Father's history is unknown    Review of Systems    Constitutional:  No fever or weight loss  HEENT:  No headache or visual changes  Cardiovascular:  No chest pain or diaphoresis  Respiratory:  No coughing, wheezing, or shortness of breath. GI:  No nausea or vomitting.   No diarrhea  :  No hematuria or dysuria  Skin:  No rashes or moles  Neuro:  No seizures or syncope  Hematological:  No bruising or bleeding  Endocrine:  No diabetes or thyroid disease    Physical Exam:      Visit Vitals  /87 (BP 1 Location: Left arm, BP Patient Position: At rest)   Pulse 92   Temp 98.2 °F (36.8 °C)   Resp 16   Ht 5' 2\" (1.575 m)   Wt 49.9 kg (110 lb)   SpO2 100%   BMI 20.12 kg/m²       Physical Exam:    Gen:  No distress, alert  HEENT:  Normal cephalic atraumatic, extra-occular movements are intact. Neck:  Supple, No JVD  Lungs:  Clear bilaterally, no wheeze, no rales, normal effort  Heart:  Regular Rate and Rhythm, normal S1 and S2, no edema  Abdomen:  Soft, tender to palpation. No guarding or rigidity.   No rebound tenderness  Extremities:  Well perfused, no cyanosis or edema  Neurological:  Awake and alert, CN's are intact, normal strength throughout extremities  Skin:  No rashes or moles  Mental Status:  Normal thought process, does not appear anxious    Laboratory Studies:    BMP:   Lab Results   Component Value Date/Time     (L) 02/04/2020 05:45 AM    K 3.7 02/04/2020 05:45 AM    CL 99 (L) 02/04/2020 05:45 AM    CO2 28 02/04/2020 05:45 AM    AGAP 8 02/04/2020 05:45 AM     (H) 02/04/2020 05:45 AM    BUN 10 02/04/2020 05:45 AM    CREA 0.83 02/04/2020 05:45 AM    GFRAA >60 02/04/2020 05:45 AM    GFRNA >60 02/04/2020 05:45 AM     CBC:   Lab Results   Component Value Date/Time    WBC 5.2 02/04/2020 05:45 AM    HGB 13.5 02/04/2020 05:45 AM    HCT 38.9 02/04/2020 05:45 AM    PLT 95 (L) 02/04/2020 05:45 AM       Assessment/Plan     Principal Problem:    Pancreatitis (9/14/2017)    Active Problems:    Alcoholism ([facility] [address] (1/28/2016)      HTN (hypertension) (1/28/2016)      COPD (chronic obstructive pulmonary disease) ([facility] [address] (2/25/2016)      Bipolar depression ([facility] [address] (1/28/2016)        PLAN:    Maintain NPO  Alcohol withdrawal protocol  Pain and nausea control  BP control  Mobilize

## 2020-02-04 NOTE — ED NOTES
Sarah Nails TRANSFER - ED to INPATIENT REPORT:    Verbal report given to Kyung Erickson (name) on Remi Valdez  [facility] for routine progression of care       Report consisted of patients Situation, Background, Assessment and   Recommendations(SBAR). SBAR report made available to receiving floor on this patient being transferred to [address] [place] [address] (2200)  for routine progression of care       Admitting diagnosis Pancreatitis [K85.90]    Information from the following report(s) SBAR, ED Summary, MAR and Recent Results was made available to receiving floor. Lines:   Peripheral IV 02/04/20 Left Wrist (Active)   Site Assessment Clean, dry, & intact 2/4/2020  6:21 AM   Phlebitis Assessment 0 2/4/2020  6:21 AM   Infiltration Assessment 0 2/4/2020  6:21 AM   Dressing Status Clean, dry, & intact 2/4/2020  6:21 AM   Dressing Type Transparent 2/4/2020  6:21 AM   Hub Color/Line Status Pink 2/4/2020  6:21 AM   Action Taken Blood drawn 2/4/2020  6:21 AM        Medication list confirmed with patient    Opportunity for questions and clarification was provided.       Patient is oriented to time, place, person and situation N/A  Patient is  continent and ambulatory with assist     Valuables transported with patient     Patient transported with:   Tech    MAP (Monitor): 123 =Monitored (most recent)  Vitals w/ MEWS Score (last day)     Date/Time MEWS Score Pulse Resp Temp BP Level of Consciousness SpO2    02/04/20 0730    (!) 106  21    (!) 138/118    96 %    02/04/20 0715    (!) 107  15    151/89    97 %    02/04/20 0700    (!) 105  14    (!) 145/91    97 %    02/04/20 0547  3  (!) 111  18  98.2 °F (36.8 °C)  118/85  Alert  100 %              Septic Patients:     Lactic Acid  Lab Results   Component Value Date    LACPO 1.09 03/02/2019    LACPOC 1.04 01/15/2019    (Most recent on top)  Repeat drawn: N/A Time: N/A     ALL LACTIC ACIDS GREATER THAN 2 MUST BE REPEATED POC WITHIN 4 HOURS OR PRIOR TO ADMISSION Total Fluid Bolus initiated and documented on MAR: NA    All ordered antibiotics initiated within first 3 hours of TIME ZERO?   NA

## 2020-02-04 NOTE — ED TRIAGE NOTES
Pt arrives a/o x 4 from home via ems with c/c of abd pain. Pt has a 40+ yr of ETOH abuse and is currently in detox. Pt states she wanted to come and be seen b/c she feels she's starting to withdraw. PMH: HTN, MI 2019, pancreatitis. No sweats, shakes noted.

## 2020-02-04 NOTE — PROGRESS NOTES
Problem: Discharge Planning  Goal: *Discharge to safe environment  Outcome: Progressing Towards Goal  Plan is home    Reason for Admission:  Pancreatitis                RUR Score:     22%             Resources/supports as identified by patient/family:                   Top Challenges facing patient (as identified by patient/family and CM): Finances/Medication cost?                    Transportation? Says her mom will drive her home              Support system or lack thereof? Living arrangements? Lives with            Self-care/ADLs/Cognition? She is independent with ADL, has a rolling walker from when she fractured her knee in September. She says her knee is ok now. Current Advanced Directive/Advance Care Plan:                            Plan for utilizing home health:   no                  Transition of Care Plan:  Chart reviewed and pt verified demographics. She lives with her  and is independent with ADL. She said she is trying to not drink any more, but says it is hard due to not having places in [facility] [phone]  to go to [place]. I printed up listing of [place] [address] meetings in that area, also in other zip codes in [place]. Her plan is to go home at [place]. Patient has designated __No one______________________ to participate in his/her discharge plan and to receive any needed information. She does not want to have  or mom to be able to get information about her.  Aida Becerril [phone]. Mom Cody Micheline [phone]    Care Management Interventions  PCP Verified by CM: Yes(Seen Dr Mary Olivera in December)  Palliative Care Criteria Met (RRAT>21 & CHF Dx)?: No  Mode of Transport at Discharge:  Other (see comment)(mom)  Transition of Care Consult (CM Consult): Discharge Planning  MyChart Signup: No  Discharge Durable Medical Equipment: No  Physical Therapy Consult: No  Occupational Therapy Consult: No  Speech Therapy Consult: No  Current Support Network: Lives with Spouse  Confirm Follow Up Transport: Family  Discharge Location  Discharge Placement: Home     Yane Loyola BSN  [facility]  [phone], Pager [phone]  [email or web address]

## 2020-02-04 NOTE — PROGRESS NOTES
attempted to complete the initial Spiritual Assessment of the patient in bed 7 of the emergency room, and offer Pastoral Care support but found patient resting peacefully at present and unable to communicate now. No family seen at this visit. Patient does not have any Nondenominational/cultural needs that will affect patients preferences in health care. Chaplains will continue to follow and will provide pastoral care on an as needed/requested basis.     [facility] Care Department  [phone]

## 2020-02-04 NOTE — PROGRESS NOTES
Patient received via stretcher, alert x4  Dr. Darnell Aranda in to see patient  1230 morphine given for pain  Patient  Instructed on using I.S.   Patient complainimg of pain Dr. Darnell Aranda called orders obtained  1640 morphine given  Bedside shift report given to [facility] with sbar

## 2020-02-04 NOTE — ED NOTES
Assumed care of pt. Pt is A&OX4. Pt is ambulatory. Breathing is spontaneous and unlabored. Equal chest rise/fall. Skin is warm and dry. Pt is on full cardiac monitor. Sinus tach is noted.  VVS.

## 2020-02-04 NOTE — ED PROVIDER NOTES
[facility] EMERGENCY DEPT      6:59 AM    Date: 2/4/2020  Patient Name: Remi Valdez    History of Presenting Illness     Chief Complaint   Patient presents with    Abdominal Pain       53 y.o. female with noted past medical history who presents to the emergency department with 4 days of bilateral upper abdomen/subcostal bilateral abdominal pain that is been like prior episodes of pancreatitis. The patient states that she has been a drinker for 40 years and that she is an alcoholic. She states that she has attempted to detox herself. She started to have some abdominal pain with nausea but no josé luis vomiting over the last 4 days. The pain is become so severe that she is crying due to it. She denies any other associated symptoms with including no diarrhea no fever and chills. Patient denies any other associated signs or symptoms. Patient denies any other complaints. Nursing notes regarding the HPI and triage nursing notes were reviewed. Prior medical records were reviewed. She does    Current Outpatient Medications   Medication Sig Dispense Refill    chlordiazePOXIDE (LIBRIUM) 25 mg capsule Take 1 Cap by mouth three (3) times daily as needed for Anxiety. Max Daily Amount: 75 mg. 15 Cap 0    ondansetron (ZOFRAN ODT) 8 mg disintegrating tablet Take 1 Tab by mouth every eight (8) hours as needed for Nausea. 15 Tab 0    folic acid (FOLVITE) 1 mg tablet Take 1 Tab by mouth daily. 30 Tab 0    pantoprazole (PROTONIX) 40 mg tablet Take 1 Tab by mouth Before breakfast and dinner. 60 Tab 0    gabapentin (NEURONTIN) 600 mg tablet Take 1 Tab by mouth two (2) times a day. 30 Tab 0    acetaminophen (TYLENOL EXTRA STRENGTH) 500 mg tablet Take 2 Tabs by mouth every six (6) hours as needed for Pain. 40 Tab 0    cloNIDine HCl (CATAPRES) 0.1 mg tablet Take 1 Tab by mouth two (2) times a day. 20 Tab 0    lamoTRIgine (LAMICTAL) 200 mg tablet Take  by mouth daily.       hydrocortisone (CORTAID) 0.5 % topical cream Apply  to affected area two (2) times a day. use thin layer 30 g 0    sucralfate (CARAFATE) 100 mg/mL suspension Take 10 mL by mouth Before breakfast, lunch, dinner and at bedtime. 1200 mL 0    calcium-cholecalciferol, D3, (CALTRATE 600+D) tablet       topiramate (TOPAMAX) 100 mg tablet 100 mg two (2) times a day.  QUEtiapine (SEROQUEL) 300 mg tablet       CREON 24,000-76,000 -120,000 unit capsule       cetirizine (ZYRTEC) 10 mg tablet       SUMAtriptan (IMITREX) 100 mg tablet TAKE 1 TO 2 TABLETS BY MOUTH DAILY AS NEEDED FOR MIGRAINE . DO NOT EXCEED 200 MG IN 24 HOUR PERIOD  0    busPIRone (BUSPAR) 15 mg tablet Take 1 Tab by mouth two (2) times a day. 60 Tab 0    albuterol (PROVENTIL VENTOLIN) 2.5 mg /3 mL (0.083 %) nebulizer solution 1.5 mL by Nebulization route every four (4) hours as needed for Wheezing or Shortness of Breath. 24 Each 0       Past History     Past Medical History:  Past Medical History:   Diagnosis Date    Alcohol abuse     Anxiety     Arrhythmia     Tacchycardai    Asthma     controlled    Bipolar disorder ([facility] [address]     Common migraine     COPD [facility]     Home O2  PRN ,  pt use also for Tachycardia    Depression     Esophageal reflux     Gout     Hypertension     Hypocitraturia     Inverted nipple     Left breast always have.     Kidney stone on left side     Pancreatitis     Recurrent UTI     Staghorn renal calculus     Urine leukocytes        Past Surgical History:  Past Surgical History:   Procedure Laterality Date    HX COLONOSCOPY      HX CYST REMOVAL Left     x 3 surgeries    HX ENDOSCOPY      HX GYN      tubal ligation    HX LUMBAR LAMINECTOMY      HX UROLOGICAL  08/10/2018    Cysto, bilat RPG, left ureteral pyeloscopy, HLL, left JJ stent placement, Dr. Misti Liriano       Family History:  Family History   Family history unknown: Yes       Social History:  Social History     Tobacco Use    Smoking status: Current Every Day Smoker     Packs/day: 1.50     Years: 33.00     Pack years: 49.50     Types: Cigarettes    Smokeless tobacco: Never Used   Substance Use Topics    Alcohol use: Yes     Comment: vodka    Drug use: No     Comment: 11years old- bayron       Allergies: Allergies   Allergen Reactions    Lithium Anaphylaxis    Morphine Hives     Pt states she is not allergic to Morphine.  Amitriptyline Other (comments)     Left leg went numb    Elavil Other (comments)     Left leg went numb       Patient's primary care provider (as noted in EPIC):  Joo Sweeney MD    Review of Systems   Constitutional: Negative for diaphoresis. HENT: Negative for congestion. Eyes: Negative for discharge. Respiratory: Negative for shortness of breath, wheezing and stridor. Cardiovascular: Negative for chest pain, palpitations and leg swelling. Gastrointestinal: Positive for abdominal pain and nausea. Negative for diarrhea and vomiting. Endocrine: Negative for heat intolerance. Genitourinary: Negative for flank pain. Musculoskeletal: Negative for back pain. Neurological: Negative for weakness. Psychiatric/Behavioral: Negative for hallucinations. All other systems reviewed and are negative. Visit Vitals  /85 (BP 1 Location: Left arm)   Pulse (!) 111   Temp 98.2 °F (36.8 °C)   Resp 18   Ht 5' 2\" (1.575 m)   Wt 49.9 kg (110 lb)   SpO2 100%   BMI 20.12 kg/m²       PHYSICAL EXAM:    CONSTITUTIONAL:  Alert, in no apparent distress;  well developed;  well nourished. HEAD:  Normocephalic, atraumatic. EYES:  EOMI. Non-icteric sclera. Normal conjunctiva. ENTM:  Nose:  no rhinorrhea. Throat:  no erythema or exudate, mucous membranes moist.  NECK:  No JVD. Supple  RESPIRATORY:  Chest clear, equal breath sounds, good air movement. CARDIOVASCULAR:  Regular rate and rhythm. No murmurs, rubs, or gallops. GI:  Normal bowel sounds, abdomen soft and non-tender. No rebound or guarding. No palpable masses. BACK:  Non-tender. UPPER EXT:  Normal inspection. LOWER EXT:  No edema, no calf tenderness. Distal pulses intact. NEURO:  Moves all four extremities, and grossly normal motor exam.  SKIN:  No rashes;  Normal for age. PSYCH:  Alert and normal affect. DIFFERENTIAL DIAGNOSES/ MEDICAL DECISION MAKING:  Gastritis, gerd, peptic ulcer disease, cholecystitis, pancreatitis, gastroenteritis, constipation related pain, atypical appendicitis pain, obstruction, atypical cardiac (ami or anginal pain), referred pain from pulmonary process (pneumonia, empyema), or combination of the above versus many other processes.       Diagnostic Study Results     Abnormal lab results from this emergency department encounter:  Labs Reviewed   CBC WITH AUTOMATED DIFF - Abnormal; Notable for the following components:       Result Value    RBC 3.99 (*)     MCV 97.5 (*)     RDW 16.9 (*)     PLATELET 95 (*)     All other components within normal limits   METABOLIC PANEL, COMPREHENSIVE - Abnormal; Notable for the following components:    Sodium 135 (*)     Chloride 99 (*)     Glucose 134 (*)     ALT (SGPT) 154 (*)     AST (SGOT) 216 (*)     Albumin 3.2 (*)     All other components within normal limits   LIPASE - Abnormal; Notable for the following components:    Lipase 1,195 (*)     All other components within normal limits   ETHYL ALCOHOL       Lab values for this patient within approximately the last 12 hours:  Recent Results (from the past 12 hour(s))   CBC WITH AUTOMATED DIFF    Collection Time: 02/04/20  5:45 AM   Result Value Ref Range    WBC 5.2 4.6 - 13.2 K/uL    RBC 3.99 (L) 4.20 - 5.30 M/uL    HGB 13.5 12.0 - 16.0 g/dL    HCT 38.9 35.0 - 45.0 %    MCV 97.5 (H) 74.0 - 97.0 FL    MCH 33.8 24.0 - 34.0 PG    MCHC 34.7 31.0 - 37.0 g/dL    RDW 16.9 (H) 11.6 - 14.5 %    PLATELET 95 (L) 929 - 420 K/uL    MPV 9.4 9.2 - 11.8 FL    NEUTROPHILS 56 40 - 73 %    LYMPHOCYTES 35 21 - 52 %    MONOCYTES 8 3 - 10 % EOSINOPHILS 1 0 - 5 %    BASOPHILS 0 0 - 2 %    ABS. NEUTROPHILS 2.9 1.8 - 8.0 K/UL    ABS. LYMPHOCYTES 1.8 0.9 - 3.6 K/UL    ABS. MONOCYTES 0.4 0.05 - 1.2 K/UL    ABS. EOSINOPHILS 0.1 0.0 - 0.4 K/UL    ABS. BASOPHILS 0.0 0.0 - 0.1 K/UL    DF AUTOMATED     METABOLIC PANEL, COMPREHENSIVE    Collection Time: 02/04/20  5:45 AM   Result Value Ref Range    Sodium 135 (L) 136 - 145 mmol/L    Potassium 3.7 3.5 - 5.5 mmol/L    Chloride 99 (L) 100 - 111 mmol/L    CO2 28 21 - 32 mmol/L    Anion gap 8 3.0 - 18 mmol/L    Glucose 134 (H) 74 - 99 mg/dL    BUN 10 7.0 - 18 MG/DL    Creatinine 0.83 0.6 - 1.3 MG/DL    BUN/Creatinine ratio 12 12 - 20      GFR est AA >60 >60 ml/min/1.73m2    GFR est non-AA >60 >60 ml/min/1.73m2    Calcium 9.3 8.5 - 10.1 MG/DL    Bilirubin, total 1.0 0.2 - 1.0 MG/DL    ALT (SGPT) 154 (H) 13 - 56 U/L    AST (SGOT) 216 (H) 10 - 38 U/L    Alk. phosphatase 113 45 - 117 U/L    Protein, total 6.5 6.4 - 8.2 g/dL    Albumin 3.2 (L) 3.4 - 5.0 g/dL    Globulin 3.3 2.0 - 4.0 g/dL    A-G Ratio 1.0 0.8 - 1.7     LIPASE    Collection Time: 02/04/20  5:45 AM   Result Value Ref Range    Lipase 1,195 (H) 73 - 393 U/L   ETHYL ALCOHOL    Collection Time: 02/04/20  6:10 AM   Result Value Ref Range    ALCOHOL(ETHYL),SERUM <3 0 - 3 MG/DL       Radiologist and cardiologist interpretations if available at time of this note:  No results found. Medication(s) ordered for patient during this emergency visit encounter:  Medications   sodium chloride 0.9 % bolus infusion 1,000 mL (1,000 mL IntraVENous New Bag 2/4/20 0613)   dextrose 5% and 0.9% NaCl 1,000 mL with mvi, adult no. 4 with vit K 10 mL, thiamine 917 mg, folic acid 1 mg infusion ( IntraVENous New Bag 2/4/20 0612)   ondansetron (ZOFRAN) injection 4 mg (4 mg IntraVENous Given 2/4/20 0616)   morphine injection 4 mg (4 mg IntraVENous Given 2/4/20 0996)       Medical Decision Making     I am the first provider for this patient.     I reviewed the vital signs, available nursing notes, past medical history, past surgical history, family history and social history. Vital Signs:  Reviewed the patient's vital signs. Labs:  Serum labs were reviewed. Elevated serum lipase noted. ED COURSE:  Pain and nausea improved in the ED with noted meds. IMPRESSION AND MEDICAL DECISION MAKING:  Based upon the patient's presentation with noted HPI and PE, along with the work   up done in the emergency department, I believe that the patient is having acute pancreatitis. I believe that the patient, given his presentation, needs admission for bowel rest, pain control, and nausea/vomiting control. I spoke to the admitting team and the admitting physician accepts the patient for admission. Admit to Hospitalist    The patient was presented to the accepting hospitalist, Dr. Jennifer Horton. The patient's primary doctor is Louise Proctor MD, and admissions for this physician are with the hospitalist.  If the patient has no primary doctor, then admission is to the hospitalist as well. As the emergency physician, I wrote courtesy admission orders for the hospitalist physician. The courtesy orders included explicit instructions for the floor nursing staff to call the admitting attending physician upon patient arrival on the floor. Coding Diagnoses     Clinical Impression:   1. Alcohol-induced acute pancreatitis, unspecified complication status    2. Alcohol abuse        Disposition     Disposition: Admit. Joon Triplett M.D. [facility] Board Certified Emergency Physician    Provider Attestation:  If a scribe was utilized in generation of this patient record, I personally performed the services described in the documentation, reviewed the documentation, as recorded by the scribe in my presence, and it accurately records the patient's history of presenting illness, review of systems, patient physical examination, and procedures performed by me as the attending physician. Dell Mckeon M.D.   [facility] Board Certified Emergency Physician  2/4/2020.  7:00 AM

## 2020-02-04 NOTE — ED NOTES
Bedside report received by nurse, states her pain is a 10/10, previously a 15/10, asked when she could get more pain medication. Patient has no tremors at this time, A&O x 4, on the cardiac monitor, waiting for a room.

## 2020-02-05 LAB
ATRIAL RATE: 109 BPM
BASOPHILS # BLD: 0 K/UL (ref 0–0.1)
BASOPHILS NFR BLD: 1 % (ref 0–2)
CALCULATED P AXIS, ECG09: 64 DEGREES
CALCULATED R AXIS, ECG10: 65 DEGREES
CALCULATED T AXIS, ECG11: 68 DEGREES
DIAGNOSIS, 93000: NORMAL
DIFFERENTIAL METHOD BLD: ABNORMAL
EOSINOPHIL # BLD: 0.1 K/UL (ref 0–0.4)
EOSINOPHIL NFR BLD: 3 % (ref 0–5)
ERYTHROCYTE [DISTWIDTH] IN BLOOD BY AUTOMATED COUNT: 17.1 % (ref 11.6–14.5)
HCT VFR BLD AUTO: 36 % (ref 35–45)
HGB BLD-MCNC: 11.9 G/DL (ref 12–16)
LIPASE SERPL-CCNC: 418 U/L (ref 73–393)
LYMPHOCYTES # BLD: 2.1 K/UL (ref 0.9–3.6)
LYMPHOCYTES NFR BLD: 49 % (ref 21–52)
MCH RBC QN AUTO: 33.2 PG (ref 24–34)
MCHC RBC AUTO-ENTMCNC: 33.1 G/DL (ref 31–37)
MCV RBC AUTO: 100.6 FL (ref 74–97)
MONOCYTES # BLD: 0.3 K/UL (ref 0.05–1.2)
MONOCYTES NFR BLD: 8 % (ref 3–10)
NEUTS SEG # BLD: 1.6 K/UL (ref 1.8–8)
NEUTS SEG NFR BLD: 39 % (ref 40–73)
P-R INTERVAL, ECG05: 128 MS
PLATELET # BLD AUTO: 81 K/UL (ref 135–420)
PMV BLD AUTO: 10.7 FL (ref 9.2–11.8)
Q-T INTERVAL, ECG07: 330 MS
QRS DURATION, ECG06: 68 MS
QTC CALCULATION (BEZET), ECG08: 444 MS
RBC # BLD AUTO: 3.58 M/UL (ref 4.2–5.3)
VENTRICULAR RATE, ECG03: 109 BPM
WBC # BLD AUTO: 4.1 K/UL (ref 4.6–13.2)

## 2020-02-05 PROCEDURE — 36415 COLL VENOUS BLD VENIPUNCTURE: CPT

## 2020-02-05 PROCEDURE — 74011250637 HC RX REV CODE- 250/637: Performed by: HOSPITALIST

## 2020-02-05 PROCEDURE — 85025 COMPLETE CBC W/AUTO DIFF WBC: CPT

## 2020-02-05 PROCEDURE — 65270000029 HC RM PRIVATE

## 2020-02-05 PROCEDURE — 74011000250 HC RX REV CODE- 250: Performed by: HOSPITALIST

## 2020-02-05 PROCEDURE — 83690 ASSAY OF LIPASE: CPT

## 2020-02-05 PROCEDURE — 74011250636 HC RX REV CODE- 250/636: Performed by: HOSPITALIST

## 2020-02-05 PROCEDURE — 74011000258 HC RX REV CODE- 258: Performed by: HOSPITALIST

## 2020-02-05 RX ADMIN — PANCRELIPASE LIPASE, PANCRELIPASE PROTEASE, PANCRELIPASE AMYLASE 2 CAPSULE: 10000; 32000; 42000 CAPSULE, DELAYED RELEASE ORAL at 18:01

## 2020-02-05 RX ADMIN — LAMOTRIGINE 200 MG: 100 TABLET ORAL at 10:06

## 2020-02-05 RX ADMIN — CLONIDINE HYDROCHLORIDE 0.1 MG: 0.1 TABLET ORAL at 17:06

## 2020-02-05 RX ADMIN — Medication 10 ML: at 21:04

## 2020-02-05 RX ADMIN — MORPHINE SULFATE 2 MG: 2 INJECTION, SOLUTION INTRAMUSCULAR; INTRAVENOUS at 08:02

## 2020-02-05 RX ADMIN — BUSPIRONE HYDROCHLORIDE 15 MG: 7.5 TABLET ORAL at 10:11

## 2020-02-05 RX ADMIN — TOPIRAMATE 100 MG: 100 TABLET, FILM COATED ORAL at 10:06

## 2020-02-05 RX ADMIN — FOLIC ACID: 5 INJECTION, SOLUTION INTRAMUSCULAR; INTRAVENOUS; SUBCUTANEOUS at 07:53

## 2020-02-05 RX ADMIN — QUETIAPINE FUMARATE 300 MG: 100 TABLET ORAL at 21:03

## 2020-02-05 RX ADMIN — GABAPENTIN 600 MG: 300 CAPSULE ORAL at 10:06

## 2020-02-05 RX ADMIN — CLONIDINE HYDROCHLORIDE 0.1 MG: 0.1 TABLET ORAL at 10:06

## 2020-02-05 RX ADMIN — MORPHINE SULFATE 2 MG: 2 INJECTION, SOLUTION INTRAMUSCULAR; INTRAVENOUS at 21:04

## 2020-02-05 RX ADMIN — MORPHINE SULFATE 2 MG: 2 INJECTION, SOLUTION INTRAMUSCULAR; INTRAVENOUS at 12:36

## 2020-02-05 RX ADMIN — ONDANSETRON 6 MG: 2 INJECTION INTRAMUSCULAR; INTRAVENOUS at 21:04

## 2020-02-05 RX ADMIN — PANCRELIPASE LIPASE, PANCRELIPASE PROTEASE, PANCRELIPASE AMYLASE 2 CAPSULE: 10000; 32000; 42000 CAPSULE, DELAYED RELEASE ORAL at 10:06

## 2020-02-05 RX ADMIN — TOPIRAMATE 100 MG: 100 TABLET, FILM COATED ORAL at 21:03

## 2020-02-05 RX ADMIN — BUSPIRONE HYDROCHLORIDE 15 MG: 7.5 TABLET ORAL at 21:03

## 2020-02-05 RX ADMIN — Medication 10 ML: at 14:00

## 2020-02-05 RX ADMIN — GABAPENTIN 600 MG: 300 CAPSULE ORAL at 21:03

## 2020-02-05 RX ADMIN — MORPHINE SULFATE 2 MG: 2 INJECTION, SOLUTION INTRAMUSCULAR; INTRAVENOUS at 17:08

## 2020-02-05 RX ADMIN — FOLIC ACID: 5 INJECTION, SOLUTION INTRAMUSCULAR; INTRAVENOUS; SUBCUTANEOUS at 18:03

## 2020-02-05 NOTE — PROGRESS NOTES
Bedside shift report received from [facility] with sbar  0830 morphine given for pain oob to bathroom  oob to bathroom  Sips of water with med  1245 morphine given for pain  Dr. Ayala Perez in to see patient  Patient tolerated broth at supper time, spoke with  order for full liquis obtained  Skin intact

## 2020-02-05 NOTE — PROGRESS NOTES
Bedside shift change report given to [address] [place] [address] (oncoming nurse) by Alonzo Lees (offgoing nurse). Report included the following information SBAR, Kardex, Intake/Output, MAR and Recent Results.

## 2020-02-05 NOTE — PROGRESS NOTES
Problem: Pain  Goal: *Control of Pain  Outcome: Progressing Towards Goal     Problem: Patient Education: Go to Patient Education Activity  Goal: Patient/Family Education  Outcome: Progressing Towards Goal     Problem: Falls - Risk of  Goal: *Absence of Falls  Description  Document Bayron Gaona Fall Risk and appropriate interventions in the flowsheet.   Outcome: Progressing Towards Goal  Note: Fall Risk Interventions:  Mobility Interventions: Patient to call before getting OOB         Medication Interventions: Patient to call before getting OOB, Teach patient to arise slowly    Elimination Interventions: Call light in reach, Patient to call for help with toileting needs              Problem: Nutrition Deficit  Goal: *Optimize nutritional status  Outcome: Progressing Towards Goal     Problem: Patient Education: Go to Patient Education Activity  Goal: Patient/Family Education  Outcome: Progressing Towards Goal     Problem: Discharge Planning  Goal: *Discharge to safe environment  Outcome: Progressing Towards Goal

## 2020-02-05 NOTE — PROGRESS NOTES
Progress Note      Patient: Angeline Rosas               Sex: female          DOA: 2/4/2020       YOB: 1965      Age:  53 y.o.        LOS:  LOS: 1 day             CHIEF COMPLAINT:  Pancreatitis in the setting of alcoholism    Subjective:     Patient has some pain now  It is moderate, she drank earlier today  Enzyme level much better    Objective:      Visit Vitals  /85 (BP 1 Location: Right arm, BP Patient Position: At rest)   Pulse 96   Temp 97.8 °F (36.6 °C)   Resp 16   Ht 5' 2\" (1.575 m)   Wt 49.9 kg (110 lb)   SpO2 94%   BMI 20.12 kg/m²       Physical Exam:  Gen:  No distress, no complaint  Lungs:  Clear bilaterally, no wheeze or rhonchi  Heart:  Regular rate and rhythm, no murmurs or gallops  Abdomen:  Soft, non-tender, normal bowel sounds        Lab/Data Reviewed:  Pancreatic Markers:   Lab Results   Component Value Date/Time    LPSE 418 (H) 02/05/2020 05:00 AM           Assessment/Plan     Principal Problem:    Pancreatitis (9/14/2017)    Active Problems:    Alcoholism ([facility] [address] (1/28/2016)      HTN (hypertension) (1/28/2016)      COPD (chronic obstructive pulmonary disease) ([facility] [address] (2/25/2016)      [facility] (1/28/2016)        Plan:  Patient wants to try broth for dinner  Pain control  Follow lipase after broth  No sign of withdrawal.

## 2020-02-06 VITALS
RESPIRATION RATE: 14 BRPM | BODY MASS INDEX: 20.24 KG/M2 | TEMPERATURE: 97.4 F | DIASTOLIC BLOOD PRESSURE: 83 MMHG | SYSTOLIC BLOOD PRESSURE: 121 MMHG | WEIGHT: 110 LBS | OXYGEN SATURATION: 94 % | HEART RATE: 101 BPM | HEIGHT: 62 IN

## 2020-02-06 LAB — LIPASE SERPL-CCNC: 214 U/L (ref 73–393)

## 2020-02-06 PROCEDURE — 74011250637 HC RX REV CODE- 250/637: Performed by: HOSPITALIST

## 2020-02-06 PROCEDURE — 74011250636 HC RX REV CODE- 250/636: Performed by: HOSPITALIST

## 2020-02-06 PROCEDURE — 83690 ASSAY OF LIPASE: CPT

## 2020-02-06 PROCEDURE — 74011000250 HC RX REV CODE- 250: Performed by: HOSPITALIST

## 2020-02-06 PROCEDURE — 36415 COLL VENOUS BLD VENIPUNCTURE: CPT

## 2020-02-06 PROCEDURE — 74011000258 HC RX REV CODE- 258: Performed by: HOSPITALIST

## 2020-02-06 RX ORDER — FOLIC ACID 1 MG/1
1 TABLET ORAL DAILY
Qty: 30 TAB | Refills: 0 | Status: SHIPPED | OUTPATIENT
Start: 2020-02-06 | End: 2020-08-21

## 2020-02-06 RX ORDER — BISMUTH SUBSALICYLATE 262 MG
1 TABLET,CHEWABLE ORAL DAILY
Qty: 30 TAB | Refills: 0 | Status: SHIPPED | OUTPATIENT
Start: 2020-02-06 | End: 2020-08-21

## 2020-02-06 RX ORDER — LANOLIN ALCOHOL/MO/W.PET/CERES
100 CREAM (GRAM) TOPICAL
Qty: 30 TAB | Refills: 0 | Status: SHIPPED | OUTPATIENT
Start: 2020-02-06

## 2020-02-06 RX ORDER — HYDROCODONE BITARTRATE AND ACETAMINOPHEN 5; 325 MG/1; MG/1
1 TABLET ORAL
Qty: 10 TAB | Refills: 0 | Status: SHIPPED | OUTPATIENT
Start: 2020-02-06 | End: 2020-02-09

## 2020-02-06 RX ADMIN — PANCRELIPASE LIPASE, PANCRELIPASE PROTEASE, PANCRELIPASE AMYLASE 2 CAPSULE: 10000; 32000; 42000 CAPSULE, DELAYED RELEASE ORAL at 09:18

## 2020-02-06 RX ADMIN — TOPIRAMATE 100 MG: 100 TABLET, FILM COATED ORAL at 09:18

## 2020-02-06 RX ADMIN — FOLIC ACID: 5 INJECTION, SOLUTION INTRAMUSCULAR; INTRAVENOUS; SUBCUTANEOUS at 03:51

## 2020-02-06 RX ADMIN — LAMOTRIGINE 200 MG: 100 TABLET ORAL at 09:18

## 2020-02-06 RX ADMIN — ONDANSETRON 6 MG: 2 INJECTION INTRAMUSCULAR; INTRAVENOUS at 05:27

## 2020-02-06 RX ADMIN — MORPHINE SULFATE 2 MG: 2 INJECTION, SOLUTION INTRAMUSCULAR; INTRAVENOUS at 05:27

## 2020-02-06 RX ADMIN — MORPHINE SULFATE 2 MG: 2 INJECTION, SOLUTION INTRAMUSCULAR; INTRAVENOUS at 00:58

## 2020-02-06 RX ADMIN — BUSPIRONE HYDROCHLORIDE 15 MG: 7.5 TABLET ORAL at 09:17

## 2020-02-06 RX ADMIN — MORPHINE SULFATE 2 MG: 2 INJECTION, SOLUTION INTRAMUSCULAR; INTRAVENOUS at 09:50

## 2020-02-06 RX ADMIN — CLONIDINE HYDROCHLORIDE 0.1 MG: 0.1 TABLET ORAL at 09:17

## 2020-02-06 RX ADMIN — GABAPENTIN 600 MG: 300 CAPSULE ORAL at 09:17

## 2020-02-06 RX ADMIN — Medication 10 ML: at 05:05

## 2020-02-06 NOTE — PROGRESS NOTES
Problem: Pain  Goal: *Control of Pain  Outcome: Progressing Towards Goal     Problem: Falls - Risk of  Goal: *Absence of Falls  Description  Document Radha Woo Fall Risk and appropriate interventions in the flowsheet.   Outcome: Progressing Towards Goal  Note: Fall Risk Interventions:  Mobility Interventions: Patient to call before getting OOB         Medication Interventions: Patient to call before getting OOB    Elimination Interventions: Call light in reach

## 2020-02-06 NOTE — ROUTINE PROCESS
Patient stated that she hit her hand on something in her room and was bleeding. Upon entering room, noted that patient had accidentally pulled out her IV. Attempted to start new PIV times three. Unsuccessful. Notified ICU charge nurse to attempt to gain access. Assisted patient into a new gown and changed linens. Blood also cleansed from bed rail and floor.    
 
Chris Rosales RN, BSN

## 2020-02-06 NOTE — PROGRESS NOTES
0750.Bedside and Verbal shift change report given to this nurse Colonel Villanueva (oncoming nurse) by Jeffery Doty (offgoing nurse). Report included the following information SBAR, Kardex and MAR.  
 
1110  D/C instructions given to pt at this time. Pt states understanding.

## 2020-02-06 NOTE — PROGRESS NOTES
1955-patient assessed, see flow sheet; A&Ox4, RA, IV infusing, SCD's no distress, call light in reach    2030-assisted to BR, voiding clear yellow urine, no BM    2200- resting in bed, no distress    0000-assisted to BR, no distress, IV infusing,    0100-medicated for ABD pain    0200- resting quietly, no signs of withdrawal    0400- patient sleeping    0600- uneventful shift

## 2020-02-06 NOTE — PROGRESS NOTES
Problem: Discharge Planning  Goal: *Discharge to safe environment  Outcome: resolved/met  Plan is home    Asked pt if had ride home , she states her mother will pick her up 1p-130p. Told her if needed I could get her a cab ride if was going to be later. Care Management Interventions  PCP Verified by CM: Yes(Seen Dr Concepción Mann in December)  Palliative Care Criteria Met (RRAT>21 & CHF Dx)?: No  Mode of Transport at Discharge:  Other (see comment)(mom)  Transition of Care Consult (CM Consult): Discharge Planning  MyChart Signup: No  Discharge Durable Medical Equipment: No  Physical Therapy Consult: No  Occupational Therapy Consult: No  Speech Therapy Consult: No  Current Support Network: Lives with Spouse  Confirm Follow Up Transport: Family  Discharge Location  Discharge Placement: Home

## 2020-02-06 NOTE — DISCHARGE INSTRUCTIONS
Patient Education     DISCHARGE SUMMARY from Nurse    PATIENT INSTRUCTIONS:    After general anesthesia or intravenous sedation, for 24 hours or while taking prescription Narcotics:  · Limit your activities  · Do not drive and operate hazardous machinery  · Do not make important personal or business decisions  · Do  not drink alcoholic beverages  · If you have not urinated within 8 hours after discharge, please contact your surgeon on call. Report the following to your surgeon:  · Excessive pain, swelling, redness or odor of or around the surgical area  · Temperature over 100.5  · Nausea and vomiting lasting longer than 4 hours or if unable to take medications  · Any signs of decreased circulation or nerve impairment to extremity: change in color, persistent  numbness, tingling, coldness or increase pain  · Any questions    What to do at Home:  Recommended activity: Activity as tolerated,     If you experience any of the symptoms listed in your discharge instructions, please follow up with your PCP. *  Please give a list of your current medications to your Primary Care Provider. *  Please update this list whenever your medications are discontinued, doses are      changed, or new medications (including over-the-counter products) are added. *  Please carry medication information at all times in case of emergency situations. These are general instructions for a healthy lifestyle:    No smoking/ No tobacco products/ Avoid exposure to second hand smoke  Surgeon General's Warning:  Quitting smoking now greatly reduces serious risk to your health.     Obesity, smoking, and sedentary lifestyle greatly increases your risk for illness    A healthy diet, regular physical exercise & weight monitoring are important for maintaining a healthy lifestyle    You may be retaining fluid if you have a history of heart failure or if you experience any of the following symptoms:  Weight gain of 3 pounds or more overnight or 5 pounds in a week, increased swelling in our hands or feet or shortness of breath while lying flat in bed. Please call your doctor as soon as you notice any of these symptoms; do not wait until your next office visit. The discharge information has been reviewed with the patient. The patient verbalized understanding. Discharge medications reviewed with the patient and appropriate educational materials and side effects teaching were provided. ___________________________________________________________________________________________________________________________________     Learning About Acute Pancreatitis  What is acute pancreatitis? The pancreas is an organ behind the stomach. It makes hormones like insulin that help control how your body uses sugar. It also makes enzymes that help you digest food. Usually these enzymes flow from the pancreas to the intestines. But if they leak into the pancreas, they can irritate it and cause pain and swelling. When this happens suddenly, it's called acute pancreatitis. What causes it? Most of the time, acute pancreatitis is caused by gallstones or by heavy alcohol use. But several other things can cause it, such as:  · High levels of fats in the blood. · An injury. · A problem after a surgery or a procedure. · Certain medicines. What are the symptoms? The main symptom is pain in the upper belly. The pain can be severe. You also may have a fever, nausea, or vomiting. Some people get so sick that they have problems breathing. They also may have low blood pressure. How is it diagnosed? Your doctor will diagnose pancreatitis with an exam and by looking at your lab tests. Your doctor may think that you have this problem based on your symptoms and where you have pain in your belly. You may have blood tests of enzymes called amylase and lipase.  In pancreatitis, the level of these enzymes is usually much higher than normal.  You also may have imaging tests of the belly. These may include an ultrasound, a CT scan, or an MRI. A special MRI called MRCP can show images of the bile ducts. This test can be very helpful when gallstones are causing the problem. How is it treated? Treatment of acute pancreatitis usually takes place in the hospital. It focuses on taking care of pain and supporting your body while your pancreas heals. In severe cases, treatment may occur in an intensive care unit to support breathing, treat serious infections, or help raise very low blood pressure. If a gallstone is causing the problem, the gallstone may need to be removed. This is done during a procedure called ERCP. The doctor puts a scope in your mouth and moves it gently through the stomach and into the ducts from the pancreas and gallbladder. The doctor is then able to see a stone and remove it. Sometimes the gallbladder, which makes gallstones, needs to be removed by surgery. People with pancreatitis often need a lot of fluid to help support their other organs and their blood pressure. They get fluids through a vein (intravenous, or IV). Instead of food by mouth, nutrition is sometimes given through a vein while the pancreas heals. Follow-up care is a key part of your treatment and safety. Be sure to make and go to all appointments, and call your doctor if you are having problems. It's also a good idea to know your test results and keep a list of the medicines you take. Where can you learn more? Go to http://shannon-heriberto.info/. Enter Y839 in the search box to learn more about \"Learning About Acute Pancreatitis. \"  Current as of: November 7, 2018  Content Version: 12.2  © 0353-1427 [email or web address]. Care instructions adapted under license by [employer] (which disclaims liability or warranty for this information).  If you have questions about a medical condition or this instruction, always ask your healthcare professional. Meghan Antoine disclaims any warranty or liability for your use of this information.     Patient armband removed and shredded

## 2020-02-08 NOTE — DISCHARGE SUMMARY
[address]    Name:  Archie Horner  MR#:   [id number]  :  1965  ACCOUNT #:  [de-identified]  ADMIT DATE:  2020  DISCHARGE DATE:  2020    ADMITTING DIAGNOSIS:  Pancreatitis. HISTORY OF PRESENT ILLNESS:  The patient is a 76-year-old female who presented to the emergency department with abdominal pain. She acknowledges a long history of alcoholism. She stopped drinking because she said she wanted to \"detox herself. \"  In the emergency room, she was found to have pancreatitis. She was admitted for ongoing management. HOSPITAL COURSE:  The patient was maintained n.p.o. and her lipase improved steadily throughout hospitalization. She exhibits no signs of alcohol withdrawal.  She continued to improve and by 2020, she was considered to be ready for discharge. She was discharged home. DISCHARGE DIAGNOSES:  1. Pancreatitis, resolved. 2.  Alcoholism. 3.  Chronic obstructive pulmonary disease. 4.  Chronic pain,    CONDITION ON DISCHARGE:  Improved. ACTIVITY:  Ad bryan. DISCHARGE INSTRUCTIONS:  Followup is with her primary care provider in 1 week, patient will arrange. DISCHARGE MEDICATIONS:  1. Folic acid 1 mg by mouth daily. 2.  Multivitamin one tablet by mouth daily. 3.  Thiamine 100 mg by mouth daily. 4.  Norco 500/325 mg one tablet by mouth every 4 hours as needed for pain. 5.  Combivent one puff by inhalation two times daily as needed for wheezing. 6.  Trazodone 100 mg by mouth nightly. 7.  Melatonin 10 mg by mouth nightly. 8. Colchicine 0.6 mg by mouth daily. 9.  Colace 100 mg by mouth three times daily. 10.  Zofran 8 mg by mouth every 8 hours as needed for nausea. 11.  Protonix 40 mg by mouth daily. 12.  Neurontin 600 mg by mouth two times daily. 13.  Clonidine 0.1 mg by mouth two times daily. 14.  Lamictal 200 mg by mouth daily. 15.  Carafate 10 mL solution before breakfast, lunch and dinner.   16.  Topamax 100 mg by mouth two times daily.  17.  Seroquel 300 mg by mouth nightly. 18.  Creon, resume home dose at discharge. DISPOSITION:  Home. DIET:  Regular.       Milton Lincoln MD    Total Discharge time 35 minutes      [id number]/  D:  02/07/2020 19:56  T:  02/08/2020 6:58  JOB #:  [id number]  CC:  Amalia Keith MD

## 2020-04-04 ENCOUNTER — APPOINTMENT (OUTPATIENT)
Dept: CT IMAGING | Age: 55
End: 2020-04-04
Attending: PHYSICIAN ASSISTANT
Payer: MEDICARE

## 2020-04-04 ENCOUNTER — HOSPITAL ENCOUNTER (EMERGENCY)
Age: 55
Discharge: HOME | End: 2020-04-04
Attending: EMERGENCY MEDICINE | Admitting: EMERGENCY MEDICINE
Payer: MEDICARE

## 2020-04-04 VITALS
RESPIRATION RATE: 25 BRPM | OXYGEN SATURATION: 99 % | DIASTOLIC BLOOD PRESSURE: 86 MMHG | WEIGHT: 113 LBS | TEMPERATURE: 97.8 F | SYSTOLIC BLOOD PRESSURE: 146 MMHG | HEIGHT: 63 IN | HEART RATE: 91 BPM | BODY MASS INDEX: 20.02 KG/M2

## 2020-04-04 DIAGNOSIS — R10.32 ABDOMINAL PAIN, LLQ (LEFT LOWER QUADRANT): ICD-10-CM

## 2020-04-04 DIAGNOSIS — F10.10 ALCOHOL ABUSE: Primary | ICD-10-CM

## 2020-04-04 DIAGNOSIS — R20.2 TINGLING: ICD-10-CM

## 2020-04-04 LAB
ALBUMIN SERPL-MCNC: 3.8 G/DL (ref 3.4–5)
ALBUMIN/GLOB SERPL: 1 {RATIO} (ref 0.8–1.7)
ALP SERPL-CCNC: 139 U/L (ref 45–117)
ALT SERPL-CCNC: 53 U/L (ref 13–56)
AMPHET UR QL SCN: NEGATIVE
ANION GAP SERPL CALC-SCNC: 9 MMOL/L (ref 3–18)
APPEARANCE UR: CLEAR
AST SERPL-CCNC: 115 U/L (ref 10–38)
BACTERIA URNS QL MICRO: ABNORMAL /HPF
BARBITURATES UR QL SCN: NEGATIVE
BASOPHILS # BLD: 0 K/UL (ref 0–0.1)
BASOPHILS NFR BLD: 0 % (ref 0–2)
BENZODIAZ UR QL: NEGATIVE
BILIRUB SERPL-MCNC: 1.5 MG/DL (ref 0.2–1)
BILIRUB UR QL: NEGATIVE
BUN SERPL-MCNC: 23 MG/DL (ref 7–18)
BUN/CREAT SERPL: 31 (ref 12–20)
CALCIUM SERPL-MCNC: 9.6 MG/DL (ref 8.5–10.1)
CANNABINOIDS UR QL SCN: NEGATIVE
CHLORIDE SERPL-SCNC: 99 MMOL/L (ref 100–111)
CK MB CFR SERPL CALC: 1.9 % (ref 0–4)
CK MB SERPL-MCNC: 11.2 NG/ML (ref 5–25)
CK SERPL-CCNC: 593 U/L (ref 26–192)
CO2 SERPL-SCNC: 25 MMOL/L (ref 21–32)
COCAINE UR QL SCN: NEGATIVE
COLOR UR: YELLOW
CREAT SERPL-MCNC: 0.74 MG/DL (ref 0.6–1.3)
DIFFERENTIAL METHOD BLD: ABNORMAL
EOSINOPHIL # BLD: 0 K/UL (ref 0–0.4)
EOSINOPHIL NFR BLD: 0 % (ref 0–5)
EPITH CASTS URNS QL MICRO: ABNORMAL /LPF (ref 0–5)
ERYTHROCYTE [DISTWIDTH] IN BLOOD BY AUTOMATED COUNT: 13.8 % (ref 11.6–14.5)
ETHANOL SERPL-MCNC: <3 MG/DL (ref 0–3)
GLOBULIN SER CALC-MCNC: 3.9 G/DL (ref 2–4)
GLUCOSE SERPL-MCNC: 95 MG/DL (ref 74–99)
GLUCOSE UR STRIP.AUTO-MCNC: NEGATIVE MG/DL
HCT VFR BLD AUTO: 42.9 % (ref 35–45)
HDSCOM,HDSCOM: NORMAL
HGB BLD-MCNC: 14.5 G/DL (ref 12–16)
HGB UR QL STRIP: ABNORMAL
KETONES UR QL STRIP.AUTO: 15 MG/DL
LEUKOCYTE ESTERASE UR QL STRIP.AUTO: NEGATIVE
LIPASE SERPL-CCNC: 42 U/L (ref 73–393)
LYMPHOCYTES # BLD: 2.5 K/UL (ref 0.9–3.6)
LYMPHOCYTES NFR BLD: 25 % (ref 21–52)
MAGNESIUM SERPL-MCNC: 2.1 MG/DL (ref 1.6–2.6)
MCH RBC QN AUTO: 34.3 PG (ref 24–34)
MCHC RBC AUTO-ENTMCNC: 33.8 G/DL (ref 31–37)
MCV RBC AUTO: 101.4 FL (ref 74–97)
METHADONE UR QL: NEGATIVE
MONOCYTES # BLD: 0.6 K/UL (ref 0.05–1.2)
MONOCYTES NFR BLD: 6 % (ref 3–10)
NEUTS SEG # BLD: 6.8 K/UL (ref 1.8–8)
NEUTS SEG NFR BLD: 69 % (ref 40–73)
NITRITE UR QL STRIP.AUTO: NEGATIVE
OPIATES UR QL: NEGATIVE
PCP UR QL: NEGATIVE
PH UR STRIP: 7 [PH] (ref 5–8)
PLATELET # BLD AUTO: 144 K/UL (ref 135–420)
PMV BLD AUTO: 10.1 FL (ref 9.2–11.8)
POTASSIUM SERPL-SCNC: 5.2 MMOL/L (ref 3.5–5.5)
PROT SERPL-MCNC: 7.7 G/DL (ref 6.4–8.2)
PROT UR STRIP-MCNC: 30 MG/DL
RBC # BLD AUTO: 4.23 M/UL (ref 4.2–5.3)
RBC #/AREA URNS HPF: ABNORMAL /HPF (ref 0–5)
SODIUM SERPL-SCNC: 133 MMOL/L (ref 136–145)
SP GR UR REFRACTOMETRY: 1.01 (ref 1–1.03)
TROPONIN I SERPL-MCNC: <0.02 NG/ML (ref 0–0.04)
UROBILINOGEN UR QL STRIP.AUTO: 0.2 EU/DL (ref 0.2–1)
WBC # BLD AUTO: 10 K/UL (ref 4.6–13.2)
WBC URNS QL MICRO: ABNORMAL /HPF (ref 0–4)

## 2020-04-04 PROCEDURE — 81001 URINALYSIS AUTO W/SCOPE: CPT

## 2020-04-04 PROCEDURE — 70450 CT HEAD/BRAIN W/O DYE: CPT

## 2020-04-04 PROCEDURE — 93005 ELECTROCARDIOGRAM TRACING: CPT

## 2020-04-04 PROCEDURE — 96374 THER/PROPH/DIAG INJ IV PUSH: CPT

## 2020-04-04 PROCEDURE — 74011636320 HC RX REV CODE- 636/320: Performed by: EMERGENCY MEDICINE

## 2020-04-04 PROCEDURE — 83690 ASSAY OF LIPASE: CPT

## 2020-04-04 PROCEDURE — 99285 EMERGENCY DEPT VISIT HI MDM: CPT

## 2020-04-04 PROCEDURE — 87086 URINE CULTURE/COLONY COUNT: CPT

## 2020-04-04 PROCEDURE — 74177 CT ABD & PELVIS W/CONTRAST: CPT

## 2020-04-04 PROCEDURE — 82550 ASSAY OF CK (CPK): CPT

## 2020-04-04 PROCEDURE — 85025 COMPLETE CBC W/AUTO DIFF WBC: CPT

## 2020-04-04 PROCEDURE — 80307 DRUG TEST PRSMV CHEM ANLYZR: CPT

## 2020-04-04 PROCEDURE — 80053 COMPREHEN METABOLIC PANEL: CPT

## 2020-04-04 PROCEDURE — 74011250636 HC RX REV CODE- 250/636: Performed by: PHYSICIAN ASSISTANT

## 2020-04-04 PROCEDURE — 83735 ASSAY OF MAGNESIUM: CPT

## 2020-04-04 PROCEDURE — 96375 TX/PRO/DX INJ NEW DRUG ADDON: CPT

## 2020-04-04 RX ORDER — TRAZODONE HYDROCHLORIDE 100 MG/1
100-200 TABLET ORAL
COMMUNITY
End: 2020-05-04

## 2020-04-04 RX ORDER — SUCRALFATE 1 G/10ML
1 SUSPENSION ORAL
Qty: 1200 ML | Refills: 0 | Status: SHIPPED | OUTPATIENT
Start: 2020-04-04 | End: 2020-08-21

## 2020-04-04 RX ORDER — HYDROCODONE BITARTRATE AND ACETAMINOPHEN 5; 325 MG/1; MG/1
1 TABLET ORAL
Qty: 6 TAB | Refills: 0 | Status: SHIPPED | OUTPATIENT
Start: 2020-04-04 | End: 2020-04-07

## 2020-04-04 RX ORDER — MORPHINE SULFATE 2 MG/ML
2 INJECTION, SOLUTION INTRAMUSCULAR; INTRAVENOUS
Status: COMPLETED | OUTPATIENT
Start: 2020-04-04 | End: 2020-04-04

## 2020-04-04 RX ORDER — ONDANSETRON 2 MG/ML
4 INJECTION INTRAMUSCULAR; INTRAVENOUS
Status: COMPLETED | OUTPATIENT
Start: 2020-04-04 | End: 2020-04-04

## 2020-04-04 RX ORDER — LAMOTRIGINE 150 MG/1
150 TABLET ORAL DAILY
COMMUNITY

## 2020-04-04 RX ADMIN — SODIUM CHLORIDE 1000 ML: 900 INJECTION, SOLUTION INTRAVENOUS at 19:15

## 2020-04-04 RX ADMIN — SODIUM CHLORIDE 1000 ML: 900 INJECTION, SOLUTION INTRAVENOUS at 17:20

## 2020-04-04 RX ADMIN — ONDANSETRON 4 MG: 2 INJECTION INTRAMUSCULAR; INTRAVENOUS at 16:01

## 2020-04-04 RX ADMIN — MORPHINE SULFATE 2 MG: 2 INJECTION, SOLUTION INTRAMUSCULAR; INTRAVENOUS at 16:13

## 2020-04-04 RX ADMIN — IOPAMIDOL 100 ML: 612 INJECTION, SOLUTION INTRAVENOUS at 17:39

## 2020-04-04 NOTE — ED NOTES
Pt states that she \"a very heavy drinker , drinks 2 quarts of vodka  A day. Pt wants to quit drinking, last drink was sometime last night.

## 2020-04-04 NOTE — ED TRIAGE NOTES
Pt arrived by EMS complaining of abdominal pain and thinks \" have a UTI'. Per EMS , pt has been drinking for 3 days and now has abdominal pain. Pt is alert and oriented. Pt is very cooperative and pleasant.

## 2020-04-05 ENCOUNTER — TELEPHONE (OUTPATIENT)
Dept: CASE MANAGEMENT | Age: 55
End: 2020-04-05

## 2020-04-05 LAB
ATRIAL RATE: 89 BPM
CALCULATED P AXIS, ECG09: 63 DEGREES
CALCULATED R AXIS, ECG10: 65 DEGREES
CALCULATED T AXIS, ECG11: 62 DEGREES
DIAGNOSIS, 93000: NORMAL
P-R INTERVAL, ECG05: 126 MS
Q-T INTERVAL, ECG07: 346 MS
QRS DURATION, ECG06: 74 MS
QTC CALCULATION (BEZET), ECG08: 420 MS
VENTRICULAR RATE, ECG03: 89 BPM

## 2020-04-05 NOTE — DISCHARGE INSTRUCTIONS
Patient Education        Abdominal Pain: Care Instructions  Your Care Instructions    Abdominal pain has many possible causes. Some aren't serious and get better on their own in a few days. Others need more testing and treatment. If your pain continues or gets worse, you need to be rechecked and may need more tests to find out what is wrong. You may need surgery to correct the problem. Don't ignore new symptoms, such as fever, nausea and vomiting, urination problems, pain that gets worse, and dizziness. These may be signs of a more serious problem. Your doctor may have recommended a follow-up visit in the next 8 to 12 hours. If you are not getting better, you may need more tests or treatment. The doctor has checked you carefully, but problems can develop later. If you notice any problems or new symptoms, get medical treatment right away. Follow-up care is a key part of your treatment and safety. Be sure to make and go to all appointments, and call your doctor if you are having problems. It's also a good idea to know your test results and keep a list of the medicines you take. How can you care for yourself at home? · Rest until you feel better. · To prevent dehydration, drink plenty of fluids, enough so that your urine is light yellow or clear like water. Choose water and other caffeine-free clear liquids until you feel better. If you have kidney, heart, or liver disease and have to limit fluids, talk with your doctor before you increase the amount of fluids you drink. · If your stomach is upset, eat mild foods, such as rice, dry toast or crackers, bananas, and applesauce. Try eating several small meals instead of two or three large ones. · Wait until 48 hours after all symptoms have gone away before you have spicy foods, alcohol, and drinks that contain caffeine. · Do not eat foods that are high in fat. · Avoid anti-inflammatory medicines such as aspirin, ibuprofen (Advil, Motrin), and naproxen (Aleve). These can cause stomach upset. Talk to your doctor if you take daily aspirin for another health problem. When should you call for help? Call 911 anytime you think you may need emergency care. For example, call if:    · You passed out (lost consciousness).     · You pass maroon or very bloody stools.     · You vomit blood or what looks like coffee grounds.     · You have new, severe belly pain.    Call your doctor now or seek immediate medical care if:    · Your pain gets worse, especially if it becomes focused in one area of your belly.     · You have a new or higher fever.     · Your stools are black and look like tar, or they have streaks of blood.     · You have unexpected vaginal bleeding.     · You have symptoms of a urinary tract infection. These may include:  ? Pain when you urinate. ? Urinating more often than usual.  ? Blood in your urine.     · You are dizzy or lightheaded, or you feel like you may faint.    Watch closely for changes in your health, and be sure to contact your doctor if:    · You are not getting better after 1 day (24 hours). Where can you learn more? Go to http://shannonMarblarheriberto.info/  Enter I949 in the search box to learn more about \"Abdominal Pain: Care Instructions. \"  Current as of: June 26, 2019Content Version: 12.4  © 0080-6966 [employer]wise[employer]. Care instructions adapted under license by [employer] (which disclaims liability or warranty for this information). If you have questions about a medical condition or this instruction, always ask your healthcare professional. [place] [address] any warranty or liability for your use of this information. Patient Education        Learning About Alcohol Use Disorder  What is alcohol use disorder? Alcohol use disorder means that a person drinks alcohol even though it causes harm to themselves or others. It can range from mild to severe.  The more signs of this disorder you have, the more severe it may be. Moderate to severe alcohol use disorder is sometimes called addiction. People who have it may find it hard to control their use of alcohol. People who have this disorder may argue with others about how much they're drinking. Their job may be affected because of drinking. They may drink when it's dangerous or illegal, such as when they drive. They also may have a strong need, or craving, to drink. They may feel like they must drink just to get by. Their drinking may increase their risk of getting hurt or being in a car crash. Over time, drinking too much alcohol may cause health problems. These may include high blood pressure, liver problems, or problems with digestion. What are the signs? Maybe you've wondered about your alcohol habits, or how to tell if your drinking is becoming a problem. Here are some of the signs of alcohol use disorder. You may have it if you have two or more of the following signs:  · You drink larger amounts of alcohol than you ever meant to. Or you've been drinking for a longer time than you ever meant to. · You can't cut down or control your use. Or you constantly wish you could cut down. · You spend a lot of time getting or drinking alcohol or recovering from its effects. · You have strong cravings for alcohol. · You can no longer do your main jobs at work, at school, or at home. · You keep drinking alcohol, even though your use hurts your relationships. · You have stopped doing important activities because of your alcohol use. · You drink alcohol in situations where doing so is dangerous. · You keep drinking alcohol even though you know it's causing health problems. · You need more and more alcohol to get the same effect, or you get less effect from the same amount over time. This is called tolerance. · You have uncomfortable symptoms when you stop drinking alcohol or use less.  This is called withdrawal.  Alcohol use disorder can range from mild to severe. The more signs you have, the more severe the disorder may be. Moderate to severe alcohol use disorder is sometimes called addiction. You might not realize that your drinking is a problem. You might not drink large amounts when you drink. Or you might go for days or weeks between drinking episodes. But even if you don't drink very often, your drinking could still be harmful and put you at risk. How is alcohol use disorder treated? Getting help is up to you. But you don't have to do it alone. There are many people and kinds of treatments that can help. Treatment for alcohol use disorder can include:  · Group therapy, one or more types of counseling, and alcohol education. · Medicines that help to:  ? Reduce withdrawal symptoms and help you safely stop drinking. ? Reduce cravings for alcohol. · Support groups. These groups include Alcoholics Anonymous and [facility] (Self-Management and Recovery Training). Some people are able to stop or cut back on drinking with help from a counselor. People who have moderate to severe alcohol use disorder may need medical treatment. They may need to stay in a hospital or treatment center. You may have a treatment team to help you. This team may include a psychologist or psychiatrist, counselors, doctors, social workers, nurses, and a . A  helps plan and manage your treatment. Follow-up care is a key part of your treatment and safety. Be sure to make and go to all appointments, and call your doctor if you are having problems. It's also a good idea to know your test results and keep a list of the medicines you take. Where can you learn more? Go to http://shannon-heriberto.info/  Enter H758 in the search box to learn more about \"Learning About Alcohol Use Disorder. \"  Current as of: August 21, 2019Content Version: 12.4  © 8451-4862 Healthwise, Incorporated.   Care instructions adapted under license by [employer] (which disclaims liability or warranty for this information). If you have questions about a medical condition or this instruction, always ask your healthcare professional. [address] any warranty or liability for your use of this information.

## 2020-04-05 NOTE — ED NOTES
I have reviewed discharge instructions with the patient. The patient verbalized understanding. Patient armband removed and shredded    Patient getting dressed and will be ambulating to to ED lobby.

## 2020-04-06 ENCOUNTER — TELEPHONE (OUTPATIENT)
Dept: CASE MANAGEMENT | Age: 55
End: 2020-04-06

## 2020-04-06 LAB
BACTERIA SPEC CULT: NORMAL
SERVICE CMNT-IMP: NORMAL

## 2020-04-07 ENCOUNTER — TELEPHONE (OUTPATIENT)
Dept: CASE MANAGEMENT | Age: 55
End: 2020-04-07

## 2020-04-10 ENCOUNTER — TELEPHONE (OUTPATIENT)
Dept: CASE MANAGEMENT | Age: 55
End: 2020-04-10

## 2020-04-10 NOTE — TELEPHONE ENCOUNTER
Patient returned my call and denied loop at this time. Patient states she has an appointment with her PCP for ER followup. COVID-19 ED/Flu Clinic Initial Outreach Note    Patient contacted regarding recent visit for viral symptoms. This Meghna Leos contacted the patient by telephone to perform post discharge call. Verified name and  with patient as identifiers. Provided introduction to self, and reason for call due to viral symptoms of infection and/or exposure to COVID-19. Patient presented to emergency department/flu clinic with complaints of viral symptoms/exposure to COVID. Patient reports symptoms are improving. Due to no new or worsening symptoms the RN CTN/JAY was not notified for escalation. Discussed exposure protocols and quarantine with CDC Guidelines What To Do If You Are Sick    Patient was given an opportunity for questions and concerns. Stay home except to get medical care    Separate yourself from other people and animals in your home    Call ahead before visiting your doctor    Wear a facemask    Cover your coughs and sneezes    Clean your hands often    Avoid sharing personal household items    Clean all high-touch surfaces everyday    Monitor your symptoms  Seek prompt medical attention if your illness is worsening (e.g., difficulty breathing). Before seeking care, call your healthcare provider and tell them that you have, or are being evaluated for, COVID-19. Put on a facemask before you enter the facility. These steps will help the healthcare provider's office to keep other people in the office or waiting room from getting infected or exposed. Ask your healthcare provider to call the local or state health department. Persons who are placed under If you have a medical emergency and need to call 911, notify the dispatch personnel that you have, or are being evaluated for COVID-19. If possible, put on a facemask before emergency medical services arrive.     The patient agrees to contact the [employer] exposure line [phone], [facility] department [address]  ([phone] and PCP office for questions related to their healthcare. Author provided contact information for future reference. Patient/family/caregiver given information for [employer] and agrees to enroll no  Patient preferred e-mail:   Patient preferred phone contact:  Based on Loop alert triggers, patient will be contacted by nurse care manager for worsening symptoms.

## 2020-04-13 NOTE — ED PROVIDER NOTES
EMERGENCY DEPARTMENT HISTORY AND PHYSICAL EXAM    Date: 4/4/2020  Patient Name: Renae Fisher    History of Presenting Illness     Chief Complaint   Patient presents with    Abdominal Pain         History Provided By: patient        Additional History (Context): Renae Fisher is a 53-year-old female with history of hypertension, bipolar disorder, gout, alcohol abuse, anxiety, depression, acid reflux, kidney stones, arrhythmias, asthma, COPD, pancreatitis, UTI presenting to the emergency department with abdominal pain and alcohol abuse. Patient states she has upper abdominal pain, consistent with previous pancreatitis episodes therefore is concerned this is what is causing her pain today. Patient states she drinks about gallons of vodka daily. States she has been doing this since she was in her teens. Patient also reports some nausea. Denies vomiting, Diarrhea, constipation, urinary symptoms, back pain, chest pain, shortness of breath, headache, dizziness or confusion patient has no other complaints at this time    PCP: Tyra Molina MD    Current Outpatient Medications   Medication Sig Dispense Refill    lamoTRIgine (LaMICtal) 150 mg tablet Take 150 mg by mouth daily.  sucralfate (CARAFATE) 100 mg/mL suspension Take 10 mL by mouth Before breakfast, lunch, dinner and at bedtime. 2055 mL 0    folic acid (FOLVITE) 1 mg tablet Take 1 Tab by mouth daily. 30 Tab 0    multivitamin (ONE A DAY) tablet Take 1 Tab by mouth daily. 30 Tab 0    thiamine HCL (B-1) 100 mg tablet Take 1 Tab by mouth daily (with breakfast). 30 Tab 0    ipratropium-albuteroL (COMBIVENT RESPIMAT)  mcg/actuation inhaler Take 1 Puff by inhalation as needed for Wheezing.  traZODone (DESYREL) 100 mg tablet Take 200 mg by mouth nightly.  melatonin 10 mg tab Take 10 mg by mouth nightly.  MILK THISTLE PO Take 175 mg by mouth two (2) times a day.  colchicine 0.6 mg tablet Take 0.6 mg by mouth daily.       benzonatate (TESSALON PERLE PO) Take  by mouth as needed. Indications: cough      docusate sodium (COLACE) 100 mg capsule Take 100 mg by mouth three (3) times daily.  ondansetron (ZOFRAN ODT) 8 mg disintegrating tablet Take 1 Tab by mouth every eight (8) hours as needed for Nausea. 15 Tab 0    pantoprazole (PROTONIX) 40 mg tablet Take 1 Tab by mouth Before breakfast and dinner. 60 Tab 0    gabapentin (NEURONTIN) 600 mg tablet Take 1 Tab by mouth two (2) times a day. 30 Tab 0    cloNIDine HCl (CATAPRES) 0.1 mg tablet Take 1 Tab by mouth two (2) times a day. 20 Tab 0    lamoTRIgine (LAMICTAL) 200 mg tablet Take  by mouth daily.  topiramate (TOPAMAX) 100 mg tablet 100 mg two (2) times a day.  QUEtiapine (SEROQUEL) 300 mg tablet       CREON 24,000-76,000 -120,000 unit capsule       cetirizine (ZYRTEC) 10 mg tablet       SUMAtriptan (IMITREX) 100 mg tablet TAKE 1 TO 2 TABLETS BY MOUTH DAILY AS NEEDED FOR MIGRAINE . DO NOT EXCEED 200 MG IN 24 HOUR PERIOD  0    busPIRone (BUSPAR) 15 mg tablet Take 1 Tab by mouth two (2) times a day. 60 Tab 0    lipase-protease-amylase (CREON 24,000) 24,000-76,000 -120,000 unit capsule Take 1 Cap by mouth.  traZODone (DESYREL) 100 mg tablet Take 100-200 mg by mouth nightly.  hydrocortisone (CORTAID) 0.5 % topical cream Apply  to affected area two (2) times a day. use thin layer 30 g 0       Past History     Past Medical History:  Past Medical History:   Diagnosis Date    Alcohol abuse     Anxiety     Arrhythmia     Tacchycardai    Asthma     controlled    Bipolar disorder ([facility] [address]     Common migraine     COPD (chronic obstructive pulmonary disease) (HCC)     Home O2  PRN ,  pt use also for Tachycardia    Depression     Esophageal reflux     Gout     Hypertension     Hypocitraturia     Inverted nipple     Left breast always have.     Kidney stone on left side     Pancreatitis     Recurrent UTI     Staghorn renal calculus     Urine leukocytes Past Surgical History:  Past Surgical History:   Procedure Laterality Date    HX COLONOSCOPY      HX CYST REMOVAL Left     x 3 surgeries    HX ENDOSCOPY      HX GYN      tubal ligation    HX LUMBAR LAMINECTOMY      HX UROLOGICAL  08/10/2018    Cysto, bilat RPG, left ureteral pyeloscopy, HLL, left JJ stent placement, Dr. En Kee       Family History:  Family History   Family history unknown: Yes       Social History:  Social History     Tobacco Use    Smoking status: Current Every Day Smoker     Packs/day: 1.50     Years: 33.00     Pack years: 49.50     Types: Cigarettes    Smokeless tobacco: Never Used   Substance Use Topics    Alcohol use: Not Currently     Comment: vodka    Drug use: No     Comment: 11years old- [id number]       Allergies: Allergies   Allergen Reactions    Lithium Anaphylaxis    Morphine Hives     Pt states she is not allergic to Morphine.  Amitriptyline Other (comments)     Left leg went numb    Elavil Other (comments)     Left leg went numb         Review of Systems       Review of Systems   Constitutional: Negative for chills and fever. HENT: Negative for nasal congestion, sore throat, rhinorrhea  Eyes: Negative. Respiratory: Negative for cough and negative for shortness of breath. Cardiovascular: Negative for chest pain and palpitations. Gastrointestinal: pos for abdominal pain, constipation, diarrhea, nausea and vomiting. Genitourinary: Negative. Negative for difficulty urinating and flank pain. Musculoskeletal: Negative for back pain. Negative for gait problem and neck pain. Skin: Negative. Allergic/Immunologic: Negative. Neurological: Negative for dizziness, weakness, numbness and headaches. Psychiatric/Behavioral: Negative. All other systems reviewed and are negative.   All Other Systems Negative  Physical Exam     Vitals:    04/04/20 1720 04/04/20 1820 04/04/20 1830 04/04/20 1845   BP: 139/82 (!) 144/97 142/85 146/86   Pulse: 87  86 91   Resp: 12  14 25   Temp:       SpO2: 98%  99% 99%   Weight:       Height:         Physical Exam  Vitals signs and nursing note reviewed. Constitutional:       General: She is not in acute distress. Appearance: She is well-developed. She is not diaphoretic. HENT:      Head: Normocephalic and atraumatic. Nose: Nose normal.   Eyes:      Conjunctiva/sclera: Conjunctivae normal.      Pupils: Pupils are equal, round, and reactive to light. Neck:      Musculoskeletal: Normal range of motion and neck supple. Cardiovascular:      Rate and Rhythm: Normal rate and regular rhythm. Pulmonary:      Effort: Pulmonary effort is normal. No respiratory distress. Breath sounds: Normal breath sounds. Abdominal:      Palpations: Abdomen is soft. Tenderness: There is abdominal tenderness in the epigastric area. There is no right CVA tenderness, left CVA tenderness, guarding or rebound. Negative signs include Traylor's sign, Rovsing's sign, McBurney's sign, psoas sign and obturator sign. Musculoskeletal: Normal range of motion. Skin:     General: Skin is warm. Findings: No rash. Neurological:      Mental Status: She is alert and oriented to person, place, and time. Cranial Nerves: No cranial nerve deficit. Coordination: Coordination normal.   Psychiatric:         Behavior: Behavior normal.           Diagnostic Study Results     Labs -   No results found for this or any previous visit (from the past 12 hour(s)). Radiologic Studies -   CT ABD PELV W CONT   Final Result   IMPRESSION:         1. No acute findings within the abdomen or pelvis to explain the patient's   abdominal pain      2. Left lower lobe centrilobular opacities suggestive of aspiration or pneumonia      Preliminary interpretation provided by on-call radiology resident.       Additional findings were discussed with Dr. Tyree Schmidt on April 5, 2020 at 10:50 AM            CT HEAD WO CONT   Final Result   IMPRESSION:      No acute intracranial abnormalities. CT Results  (Last 48 hours)    None        CXR Results  (Last 48 hours)    None            Medical Decision Making   I am the first provider for this patient. I reviewed the vital signs, available nursing notes, past medical history, past surgical history, family history and social history. Vital Signs-Reviewed the patient's vital signs. Records Reviewed: Nursing notes, old medical records and any previous labs, imaging, visits, consultations pertinent to patient care    Procedures:  Procedures      ED Course: Progress Notes, Reevaluation, and Consults:      Provider Notes (Medical Decision Making):   Pt presents ambulatory in NAD, well-hydrated, non-toxic in appearance, with normal vitals. Benign exam of abdomen with minimal epigastric TTP and no peritoneal signs. Unremarkable labs. CT abdomen/pelvis WNL. Tolerating PO. Pt appears well, comfortable. Doubt acute surgical process. Repeat abdominal exam reveals soft and non tender abdomen. No new sx. Pt has no pain and improvement in nausea. Do not feel that any additional emergent imaging is warranted at this time. Will DC home with zofran. MED RECONCILIATION:  No current facility-administered medications for this encounter. Current Outpatient Medications   Medication Sig    lamoTRIgine (LaMICtal) 150 mg tablet Take 150 mg by mouth daily.  sucralfate (CARAFATE) 100 mg/mL suspension Take 10 mL by mouth Before breakfast, lunch, dinner and at bedtime.  folic acid (FOLVITE) 1 mg tablet Take 1 Tab by mouth daily.  multivitamin (ONE A DAY) tablet Take 1 Tab by mouth daily.  thiamine HCL (B-1) 100 mg tablet Take 1 Tab by mouth daily (with breakfast).  ipratropium-albuteroL (COMBIVENT RESPIMAT)  mcg/actuation inhaler Take 1 Puff by inhalation as needed for Wheezing.  traZODone (DESYREL) 100 mg tablet Take 200 mg by mouth nightly.  melatonin 10 mg tab Take 10 mg by mouth nightly.  MILK THISTLE PO Take 175 mg by mouth two (2) times a day.  colchicine 0.6 mg tablet Take 0.6 mg by mouth daily.  benzonatate (TESSALON PERLE PO) Take  by mouth as needed. Indications: cough    docusate sodium (COLACE) 100 mg capsule Take 100 mg by mouth three (3) times daily.  ondansetron (ZOFRAN ODT) 8 mg disintegrating tablet Take 1 Tab by mouth every eight (8) hours as needed for Nausea.  pantoprazole (PROTONIX) 40 mg tablet Take 1 Tab by mouth Before breakfast and dinner.  gabapentin (NEURONTIN) 600 mg tablet Take 1 Tab by mouth two (2) times a day.  cloNIDine HCl (CATAPRES) 0.1 mg tablet Take 1 Tab by mouth two (2) times a day.  lamoTRIgine (LAMICTAL) 200 mg tablet Take  by mouth daily.  topiramate (TOPAMAX) 100 mg tablet 100 mg two (2) times a day.  QUEtiapine (SEROQUEL) 300 mg tablet     CREON 24,000-76,000 -120,000 unit capsule     cetirizine (ZYRTEC) 10 mg tablet     SUMAtriptan (IMITREX) 100 mg tablet TAKE 1 TO 2 TABLETS BY MOUTH DAILY AS NEEDED FOR MIGRAINE . DO NOT EXCEED 200 MG IN 24 HOUR PERIOD    busPIRone (BUSPAR) 15 mg tablet Take 1 Tab by mouth two (2) times a day.  lipase-protease-amylase (CREON 24,000) 24,000-76,000 -120,000 unit capsule Take 1 Cap by mouth.  traZODone (DESYREL) 100 mg tablet Take 100-200 mg by mouth nightly.  hydrocortisone (CORTAID) 0.5 % topical cream Apply  to affected area two (2) times a day. use thin layer       Disposition:  home    DISCHARGE NOTE:     Pt has been reexamined. Patient has no new complaints, changes, or physical findings. Care plan outlined and precautions discussed. Discussed proper way to take medications. Discussed treatment plan, return precautions, symptomatic relief, and expected time to improvement. All questions answered. Patient is stable for discharge and outpatient management. Patient is ready to go home.     Follow-up Information     Follow up With Specialties Details Why Contact [facility] EMERGENCY DEPT Emergency Medicine   [address] [place] [address]    Taylor Bennett., MD [address]  [phone]            Discharge Medication List as of 4/4/2020  8:14 PM      START taking these medications    Details   HYDROcodone-acetaminophen (Norco) 5-325 mg per tablet Take 1 Tab by mouth every eight (8) hours as needed for Pain for up to 3 days. Max Daily Amount: 3 Tabs., Print, Disp-6 Tab, R-0         CONTINUE these medications which have CHANGED    Details   sucralfate (CARAFATE) 100 mg/mL suspension Take 10 mL by mouth Before breakfast, lunch, dinner and at bedtime. , Print, Disp-1200 mL, R-0         CONTINUE these medications which have NOT CHANGED    Details   !! lamoTRIgine (LaMICtal) 150 mg tablet Take 150 mg by mouth daily. , Historical Med      folic acid (FOLVITE) 1 mg tablet Take 1 Tab by mouth daily. , Print, Disp-30 Tab, R-0      multivitamin (ONE A DAY) tablet Take 1 Tab by mouth daily. , Print, Disp-30 Tab, R-0      thiamine HCL (B-1) 100 mg tablet Take 1 Tab by mouth daily (with breakfast). , Print, Disp-30 Tab, R-0      ipratropium-albuteroL (COMBIVENT RESPIMAT)  mcg/actuation inhaler Take 1 Puff by inhalation as needed for Wheezing., Historical Med      !! traZODone (DESYREL) 100 mg tablet Take 200 mg by mouth nightly., Historical Med      melatonin 10 mg tab Take 10 mg by mouth nightly., Historical Med      MILK THISTLE PO Take 175 mg by mouth two (2) times a day., Historical Med      colchicine 0.6 mg tablet Take 0.6 mg by mouth daily. , Historical Med      benzonatate (TESSALON PERLE PO) Take  by mouth as needed. Indications: cough, Historical Med      docusate sodium (COLACE) 100 mg capsule Take 100 mg by mouth three (3) times daily. , Historical Med      ondansetron (ZOFRAN ODT) 8 mg disintegrating tablet Take 1 Tab by mouth every eight (8) hours as needed for Nausea. , Print, Disp-15 Tab, R-0      pantoprazole (PROTONIX) 40 mg tablet Take 1 Tab by mouth Before breakfast and dinner., Print, Disp-60 Tab, R-0      gabapentin (NEURONTIN) 600 mg tablet Take 1 Tab by mouth two (2) times a day., Print, Disp-30 Tab, R-0      cloNIDine HCl (CATAPRES) 0.1 mg tablet Take 1 Tab by mouth two (2) times a day., Print, Disp-20 Tab, R-0      !! lamoTRIgine (LAMICTAL) 200 mg tablet Take  by mouth daily. , Historical Med      topiramate (TOPAMAX) 100 mg tablet 100 mg two (2) times a day., Historical Med      QUEtiapine (SEROQUEL) 300 mg tablet Historical Med      !! CREON 24,000-76,000 -120,000 unit capsule Historical Med, LINDA      cetirizine (ZYRTEC) 10 mg tablet Historical Med      SUMAtriptan (IMITREX) 100 mg tablet TAKE 1 TO 2 TABLETS BY MOUTH DAILY AS NEEDED FOR MIGRAINE . DO NOT EXCEED 200 MG IN 24 HOUR PERIOD, Historical Med, R-0      busPIRone (BUSPAR) 15 mg tablet Take 1 Tab by mouth two (2) times a day., Print, Disp-60 Tab, R-0      !! lipase-protease-amylase (CREON 24,000) 24,000-76,000 -120,000 unit capsule Take 1 Cap by mouth., Historical Med      !! traZODone (DESYREL) 100 mg tablet Take 100-200 mg by mouth nightly., Historical Med      hydrocortisone (CORTAID) 0.5 % topical cream Apply  to affected area two (2) times a day. use thin layer, Normal, Disp-30 g, R-0       !! - Potential duplicate medications found. Please discuss with provider. Diagnosis     Clinical Impression:   1. Alcohol abuse    2. Abdominal pain, LLQ (left lower quadrant)    3. Tingling            Dictation disclaimer:  Please note that this dictation was completed with [employer], the [email or web address] voice recognition software. Quite often unanticipated grammatical, syntax, homophones, and other interpretive errors are inadvertently transcribed by the computer software. Please disregard these errors. Please excuse any errors that have escaped final proofreading.

## 2020-04-26 ENCOUNTER — APPOINTMENT (OUTPATIENT)
Dept: GENERAL RADIOLOGY | Age: 55
End: 2020-04-26
Attending: PHYSICIAN ASSISTANT
Payer: MEDICARE

## 2020-04-26 ENCOUNTER — HOSPITAL ENCOUNTER (EMERGENCY)
Age: 55
Discharge: HOME | End: 2020-04-26
Attending: EMERGENCY MEDICINE
Payer: MEDICARE

## 2020-04-26 VITALS
WEIGHT: 110 LBS | SYSTOLIC BLOOD PRESSURE: 130 MMHG | TEMPERATURE: 98.4 F | DIASTOLIC BLOOD PRESSURE: 75 MMHG | HEART RATE: 100 BPM | BODY MASS INDEX: 20.24 KG/M2 | RESPIRATION RATE: 18 BRPM | HEIGHT: 62 IN | OXYGEN SATURATION: 100 %

## 2020-04-26 DIAGNOSIS — S20.211A CONTUSION OF RIB ON RIGHT SIDE, INITIAL ENCOUNTER: ICD-10-CM
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
## 2020-04-26 PROCEDURE — 74011250637 HC RX REV CODE- 250/637: Performed by: PHYSICIAN ASSISTANT

## 2020-04-26 PROCEDURE — 71100 X-RAY EXAM RIBS UNI 2 VIEWS: CPT

## 2020-04-26 PROCEDURE — 99285 EMERGENCY DEPT VISIT HI MDM: CPT

## 2020-04-26 RX ORDER — LIDOCAINE 50 MG/G
PATCH TOPICAL
Qty: 30 EACH | Refills: 0 | Status: SHIPPED | OUTPATIENT
Start: 2020-04-26 | End: 2020-04-26

## 2020-04-26 RX ORDER — LIDOCAINE 50 MG/G
PATCH TOPICAL
Qty: 30 EACH | Refills: 0 | Status: SHIPPED | OUTPATIENT
Start: 2020-04-26

## 2020-04-26 RX ORDER — TRAMADOL HYDROCHLORIDE 50 MG/1
50 TABLET ORAL
Status: COMPLETED | OUTPATIENT
Start: 2020-04-26 | End: 2020-04-26

## 2020-04-26 RX ADMIN — TRAMADOL HYDROCHLORIDE 50 MG: 50 TABLET, FILM COATED ORAL at 13:38

## 2020-04-26 NOTE — ED TRIAGE NOTES
Pt fell 2 days ago on the right side. No loss of consciousness, bruise on right eye and small bruise on right ribcage. Pt states it hurts to sit up and when she coughs. Pt has a chronic cough. Pt has had some alcohol today. Hx of alcoholism .

## 2020-04-26 NOTE — DISCHARGE INSTRUCTIONS
Patient Education        Chest Contusion: Care Instructions  Your Care Instructions  A chest contusion, or bruise, is caused by a fall or direct blow to the chest. Car crashes, falls, getting punched, and injury from bicycle handlebars are common causes of chest contusions. A very forceful blow to the chest can injure the heart or blood vessels in the chest, the lungs, the airway, the liver, or the spleen. Pain may be caused by an injury to muscles, cartilage, or ribs. Deep breathing, coughing, or sneezing can increase your pain. Lying on the injured area also can cause pain. Follow-up care is a key part of your treatment and safety. Be sure to make and go to all appointments, and call your doctor if you are having problems. It's also a good idea to know your test results and keep a list of the medicines you take. How can you care for yourself at home? · Rest and protect the injured or sore area. Stop, change, or take a break from any activity that may be causing your pain. · Put ice or a cold pack on the area for 10 to 20 minutes at a time. Put a thin cloth between the ice and your skin. · After 2 or 3 days, if your swelling is gone, apply a heating pad set on low or a warm cloth to your chest. Some doctors suggest that you go back and forth between hot and cold. Put a thin cloth between the heating pad and your skin. · Do not wrap or tape your ribs for support. This may cause you to take smaller breaths, which could increase your risk of pneumonia and lung collapse. · Ask your doctor if you can take an over-the-counter pain medicine, such as acetaminophen (Tylenol), ibuprofen (Advil, Motrin), or naproxen (Aleve). Be safe with medicines. Read and follow all instructions on the label. · Take your medicines exactly as prescribed. Call your doctor if you think you are having a problem with your medicine.   · Gentle stretching and massage may help you feel better after a few days of rest. Stretch slowly to the point just before discomfort begins, then hold the stretch for at least 15 to 30 seconds. Do this 3 or 4 times per day. · As your pain gets better, slowly return to your normal activities. Be patient, because chest bruises can take weeks or months to heal. Any increased pain may be a sign that you need to rest a while longer. When should you call for help? Call 911 anytime you think you may need emergency care. For example, call if:    · You have severe trouble breathing.     · You cough up blood.    Call your doctor now or seek immediate medical care if:    · You have belly pain.     · You are dizzy or lightheaded, or you feel like you may faint.     · You develop new symptoms with the chest pain.     · Your chest pain gets worse.     · You have a fever.     · You have some shortness of breath.     · You have a cough that brings up mucus from the lungs.    Watch closely for changes in your health, and be sure to contact your doctor if:    · Your chest pain is not improving after 1 week. Where can you learn more? Go to http://shannon-heriberto.info/  Enter I174 in the search box to learn more about \"Chest Contusion: Care Instructions. \"  Current as of: June 26, 2019Content Version: 12.4  © 7579-9580 Health[employer], [employer]. Care instructions adapted under license by [employer] (which disclaims liability or warranty for this information). If you have questions about a medical condition or this instruction, always ask your healthcare professional. [place] [address] any warranty or liability for your use of this information. Patient Education        Preventing Falls: Care Instructions  Your Care Instructions    Getting around your home safely can be a challenge if you have injuries or health problems that make it easy for you to fall.  Loose rugs and furniture in walkways are among the dangers for many older people who have problems walking or who have poor eyesight. People who have conditions such as arthritis, osteoporosis, or dementia also have to be careful not to fall. You can make your home safer with a few simple measures. Follow-up care is a key part of your treatment and safety. Be sure to make and go to all appointments, and call your doctor if you are having problems. It's also a good idea to know your test results and keep a list of the medicines you take. How can you care for yourself at home? Taking care of yourself  · You may get dizzy if you do not drink enough water. To prevent dehydration, drink plenty of fluids, enough so that your urine is light yellow or clear like water. Choose water and other caffeine-free clear liquids. If you have kidney, heart, or liver disease and have to limit fluids, talk with your doctor before you increase the amount of fluids you drink. · Exercise regularly to improve your strength, muscle tone, and balance. Walk if you can. Swimming may be a good choice if you cannot walk easily. · Have your vision and hearing checked each year or any time you notice a change. If you have trouble seeing and hearing, you might not be able to avoid objects and could lose your balance. · Know the side effects of the medicines you take. Ask your doctor or pharmacist whether the medicines you take can affect your balance. Sleeping pills or sedatives can affect your balance. · Limit the amount of alcohol you drink. Alcohol can impair your balance and other senses. · Ask your doctor whether calluses or corns on your feet need to be removed. If you wear loose-fitting shoes because of calluses or corns, you can lose your balance and fall. · Talk to your doctor if you have numbness in your feet. Preventing falls at home  · Remove raised doorway thresholds, throw rugs, and clutter. Repair loose carpet or raised areas in the floor. · Move furniture and electrical cords to keep them out of walking paths.   · Use nonskid floor wax, and wipe up spills right away, especially on ceramic tile floors. · If you use a walker or cane, put rubber tips on it. If you use crutches, clean the bottoms of them regularly with an abrasive pad, such as steel wool. · Keep your house well lit, especially [employer], and outside walkways. Use night-lights in areas such as hallways and bathrooms. Add extra light switches or use remote switches (such as switches that go on or off when you clap your hands) to make it easier to turn lights on if you have to get up during the night. · Install sturdy handrails on stairways. · Move items in your cabinets so that the things you use a lot are on the lower shelves (about waist level). · Keep a cordless phone and a flashlight with new batteries by your bed. If possible, put a phone in each of the main rooms of your house, or carry a cell phone in case you fall and cannot reach a phone. Or, you can wear a device around your neck or wrist. You push a button that sends a signal for help. · Wear low-heeled shoes that fit well and give your feet good support. Use footwear with nonskid soles. Check the heels and soles of your shoes for wear. Repair or replace worn heels or soles. · Do not wear socks without shoes on wood floors. · Walk on the grass when the sidewalks are slippery. If you live in an area that gets snow and ice in the winter, sprinkle salt on slippery steps and sidewalks. Preventing falls in the bath  · Install grab bars and nonskid mats inside and outside your shower or tub and near the toilet and sinks. · Use shower chairs and bath benches. · Use a hand-held shower head that will allow you to sit while showering. · Get into a tub or shower by putting the weaker leg in first. Get out of a tub or shower with your strong side first.  · Repair loose toilet seats and consider installing a raised toilet seat to make getting on and off the toilet easier.   · Keep your bathroom door unlocked while you are in the shower. Where can you learn more? Go to http://shannon-heriberto.info/. Enter 0476 [id number] 69 [id number] in the search box to learn more about \"Preventing Falls: Care Instructions. \"  Current as of: 2018  Content Version: 11.8  © 3245-1236 [employer]. Care instructions adapted under license by [employer] (which disclaims liability or warranty for this information). If you have questions about a medical condition or this instruction, always ask your healthcare professional. [address] any warranty or liability for your use of this information. Punchh Activation    Thank you for requesting access to [employer]. Please follow the instructions below to securely access and download your online medical record. P[facility]h allows you to send messages to your doctor, view your test results, renew your prescriptions, schedule appointments, and more. How Do I Sign Up? 1. In your internet browser, go to [email or web address]  2. Click on the First Time User? Click Here link in the Sign In box. You will be redirect to the New Member Sign Up page. 3. Enter your Punch[employer] Access Code exactly as it appears below. You will not need to use this code after youve completed the sign-up process. If you do not sign up before the expiration date, you must request a new code. [employer]unch[employer] Access Code: Activation code not generated  Current Punchh Status: Active (This is the date your Punchh access code will )    4. Enter the last four digits of your Social Security Number (xxxx) and Date of Birth (mm/dd/yyyy) as indicated and click Submit. You will be taken to the next sign-up page. 5. Create a Punchh ID. This will be your P[employer]h login ID and cannot be changed, so think of one that is secure and easy to remember. 6. Create a Punchh password. You can change your password at any time. 7. Enter your Password Reset Question and Answer.  This can be used at a later time if you forget your password. 8. Enter your e-mail address. You will receive e-mail notification when new information is available in [address] 9. Click Sign Up. You can now view and download portions of your medical record. 10. Click the Download Summary menu link to download a portable copy of your medical information. Additional Information    If you have questions, please visit the Frequently Asked Questions section of the [employer] website at [email or web address]. [employer]/. Remember, [employer] is NOT to be used for urgent needs. For medical emergencies, dial 911.

## 2020-04-26 NOTE — ED NOTES
Pt educated on use of incentive spirometer and verbalized understanding. Pt then returned demonstration on proper use of incentive spirometer. Pt provided spirometer for home use.

## 2020-04-26 NOTE — ED PROVIDER NOTES
EMERGENCY DEPARTMENT HISTORY AND PHYSICAL EXAM    Date: 4/26/2020  Patient Name: Fawad Friend    History of Presenting Illness     Chief Complaint   Patient presents with    Rib Pain         History Provided By:patient     Chief Complaint: fall, rib pain and bruising   Duration:2 days  Timing:acute  Location: R anterior and lateral ribs  Quality: throbbing  Severity: moderate  Modifying Factors: worse with coughing and deep inspiration, percocet did not help  Associated Symptoms: none       Additional History (Context): Fawad Friend is a 63 y.o. female with PMH alcohol abuse, anxiety, bipolar disorder, COPD, gout, hypertension, and migraines   who presents with complaints of right-sided rib pain and bruising after a fall 2 days ago. Patient states she experienced a mechanical fall landing on her right rib cage. Patient denies head injury and loss of consciousness. She denies any anticoagulant use. Patient has a history of alcoholism and does admit to having \"some\" alcohol earlier today. She states she took a left over percocet this am with no relief in sx. denies cough or cold symptoms. Denies any known sick exposures. Denies chest pain, shortness of breath, fever, chills, and abdominal complaints. No other complaints ported at this time. PCP: Sagrario Araujo MD    Current Facility-Administered Medications   Medication Dose Route Frequency Provider Last Rate Last Dose    traMADoL Caralee Clifford) tablet 50 mg  50 mg Oral NOW Meri Zamora, WHIT         Current Outpatient Medications   Medication Sig Dispense Refill    lidocaine (LIDODERM) 5 % Apply patch to the affected area for 12 hours a day and remove for 12 hours a day. 30 Each 0    lamoTRIgine (LaMICtal) 150 mg tablet Take 150 mg by mouth daily.  lipase-protease-amylase (CREON 24,000) 24,000-76,000 -120,000 unit capsule Take 1 Cap by mouth.  traZODone (DESYREL) 100 mg tablet Take 100-200 mg by mouth nightly.       sucralfate (CARAFATE) 100 mg/mL suspension Take 10 mL by mouth Before breakfast, lunch, dinner and at bedtime. 9578 mL 0    folic acid (FOLVITE) 1 mg tablet Take 1 Tab by mouth daily. 30 Tab 0    multivitamin (ONE A DAY) tablet Take 1 Tab by mouth daily. 30 Tab 0    thiamine HCL (B-1) 100 mg tablet Take 1 Tab by mouth daily (with breakfast). 30 Tab 0    ipratropium-albuteroL (COMBIVENT RESPIMAT)  mcg/actuation inhaler Take 1 Puff by inhalation as needed for Wheezing.  traZODone (DESYREL) 100 mg tablet Take 200 mg by mouth nightly.  melatonin 10 mg tab Take 10 mg by mouth nightly.  MILK THISTLE PO Take 175 mg by mouth two (2) times a day.  colchicine 0.6 mg tablet Take 0.6 mg by mouth daily.  benzonatate (TESSALON PERLE PO) Take  by mouth as needed. Indications: cough      docusate sodium (COLACE) 100 mg capsule Take 100 mg by mouth three (3) times daily.  ondansetron (ZOFRAN ODT) 8 mg disintegrating tablet Take 1 Tab by mouth every eight (8) hours as needed for Nausea. 15 Tab 0    pantoprazole (PROTONIX) 40 mg tablet Take 1 Tab by mouth Before breakfast and dinner. 60 Tab 0    gabapentin (NEURONTIN) 600 mg tablet Take 1 Tab by mouth two (2) times a day. 30 Tab 0    cloNIDine HCl (CATAPRES) 0.1 mg tablet Take 1 Tab by mouth two (2) times a day. 20 Tab 0    lamoTRIgine (LAMICTAL) 200 mg tablet Take  by mouth daily.  hydrocortisone (CORTAID) 0.5 % topical cream Apply  to affected area two (2) times a day. use thin layer 30 g 0    topiramate (TOPAMAX) 100 mg tablet 100 mg two (2) times a day.  QUEtiapine (SEROQUEL) 300 mg tablet       CREON 24,000-76,000 -120,000 unit capsule       cetirizine (ZYRTEC) 10 mg tablet       SUMAtriptan (IMITREX) 100 mg tablet TAKE 1 TO 2 TABLETS BY MOUTH DAILY AS NEEDED FOR MIGRAINE . DO NOT EXCEED 200 MG IN 24 HOUR PERIOD  0    busPIRone (BUSPAR) 15 mg tablet Take 1 Tab by mouth two (2) times a day.  61 Tab 0       Past History     Past Medical History:  Past Medical History:   Diagnosis Date    Alcohol abuse     Anxiety     Arrhythmia     Tacchycardai    Asthma     controlled    Bipolar disorder ([address]     Common migraine     COPD (chronic obstructive pulmonary disease) (HCC)     Home O2  PRN ,  pt use also for Tachycardia    Depression     Esophageal reflux     Gout     Hypertension     Hypocitraturia     Inverted nipple     Left breast always have.  Kidney stone on left side     Pancreatitis     Recurrent UTI     Staghorn renal calculus     Urine leukocytes        Past Surgical History:  Past Surgical History:   Procedure Laterality Date    HX COLONOSCOPY      HX CYST REMOVAL Left     x 3 surgeries    HX ENDOSCOPY      HX GYN      tubal ligation    HX LUMBAR LAMINECTOMY      HX UROLOGICAL  08/10/2018    Cysto, bilat RPG, left ureteral pyeloscopy, HLL, left JJ stent placement, Dr. Kath Bautista       Family History:  Family History   Family history unknown: Yes       Social History:  Social History     Tobacco Use    Smoking status: Current Every Day Smoker     Packs/day: 1.50     Years: 33.00     Pack years: 49.50     Types: Cigarettes    Smokeless tobacco: Never Used   Substance Use Topics    Alcohol use: Not Currently     Comment: vodka    Drug use: No     Comment: 11years old- [facility]       Allergies: Allergies   Allergen Reactions    Lithium Anaphylaxis    Morphine Hives     Pt states she is not allergic to Morphine.  Amitriptyline Other (comments)     Left leg went numb    Elavil Other (comments)     Left leg went numb         Review of Systems   Review of Systems   Constitutional: Negative. Negative for chills and fever. HENT: Negative. Negative for congestion, ear pain and rhinorrhea. Eyes: Negative. Negative for pain and redness. Respiratory: Negative. Negative for cough, shortness of breath, wheezing and stridor. Cardiovascular: Negative. Negative for chest pain and leg swelling. Gastrointestinal: Negative. Negative for abdominal pain, constipation, diarrhea, nausea and vomiting. Genitourinary: Negative. Negative for dysuria and frequency. Musculoskeletal: Positive for arthralgias. Negative for back pain and neck pain. Skin: Negative. Negative for rash and wound. Neurological: Negative. Negative for dizziness, seizures, syncope and headaches. All other systems reviewed and are negative. All Other Systems Negative  Physical Exam     Vitals:    04/26/20 1143 04/26/20 1156 04/26/20 1200   BP: 124/79  129/76   Pulse: 100     Resp: 18     Temp: 98.4 °F (36.9 °C)     SpO2: 98% 98% 96%   Weight: 49.9 kg (110 lb)     Height: 5' 2\" (1.575 m)       Physical Exam  Vitals signs and nursing note reviewed. Constitutional:       General: She is not in acute distress. Appearance: She is well-developed. She is not diaphoretic. HENT:      Head: Normocephalic and atraumatic. Eyes:      General: No scleral icterus. Right eye: No discharge. Left eye: No discharge. Conjunctiva/sclera: Conjunctivae normal.   Neck:      Musculoskeletal: Normal range of motion and neck supple. Cardiovascular:      Rate and Rhythm: Normal rate and regular rhythm. Heart sounds: Normal heart sounds. No murmur. No friction rub. No gallop. Pulmonary:      Effort: Pulmonary effort is normal. No respiratory distress. Breath sounds: Normal breath sounds. No stridor. No wheezing, rhonchi or rales. Comments: TTP noted to the R anterior and lateral chest wall, most prominent under the R breast.   Chest:      Chest wall: Tenderness present. Musculoskeletal: Normal range of motion. Skin:     General: Skin is warm and dry. Findings: No erythema or rash. Neurological:      Mental Status: She is alert and oriented to person, place, and time. Coordination: Coordination normal.      Comments: Gait is steady and patient exhibits no evidence of ataxia.  Patient is able to ambulate without difficulty. No focal neurological deficit noted. No facial droop, slurred speech, or evidence of altered mentation noted on exam.     Psychiatric:         Behavior: Behavior normal.         Thought Content: Thought content normal.                Diagnostic Study Results     Labs -   No results found for this or any previous visit (from the past 12 hour(s)). Radiologic Studies -   XR RIBS RT UNI 2 V   Final Result   Impression:   -------------      1. No visible fracture. CT Results  (Last 48 hours)    None        CXR Results  (Last 48 hours)    None            Medical Decision Making   I am the first provider for this patient. I reviewed the vital signs, available nursing notes, past medical history, past surgical history, family history and social history. Vital Signs-Reviewed the patient's vital signs. Records Reviewed: Meri Zamora PA-C     Procedures:  Procedures    Provider Notes (Medical Decision Making): Impression:  Fall,rib contusion     Chest x-ray negative for any acute process. Single dose of Ultram given in the ED. Patient is provided with incentive spirometer. We will plan to discharge patient with lidocaine patches and PCP follow-up. She agrees with this plan. Meri Zamora PA-C     MED RECONCILIATION:  Current Facility-Administered Medications   Medication Dose Route Frequency    traMADoL (ULTRAM) tablet 50 mg  50 mg Oral NOW     Current Outpatient Medications   Medication Sig    lidocaine (LIDODERM) 5 % Apply patch to the affected area for 12 hours a day and remove for 12 hours a day.  lamoTRIgine (LaMICtal) 150 mg tablet Take 150 mg by mouth daily.  lipase-protease-amylase (CREON 24,000) 24,000-76,000 -120,000 unit capsule Take 1 Cap by mouth.  traZODone (DESYREL) 100 mg tablet Take 100-200 mg by mouth nightly.  sucralfate (CARAFATE) 100 mg/mL suspension Take 10 mL by mouth Before breakfast, lunch, dinner and at bedtime.     folic acid (FOLVITE) 1 mg tablet Take 1 Tab by mouth daily.  multivitamin (ONE A DAY) tablet Take 1 Tab by mouth daily.  thiamine HCL (B-1) 100 mg tablet Take 1 Tab by mouth daily (with breakfast).  ipratropium-albuteroL (COMBIVENT RESPIMAT)  mcg/actuation inhaler Take 1 Puff by inhalation as needed for Wheezing.  traZODone (DESYREL) 100 mg tablet Take 200 mg by mouth nightly.  melatonin 10 mg tab Take 10 mg by mouth nightly.  MILK THISTLE PO Take 175 mg by mouth two (2) times a day.  colchicine 0.6 mg tablet Take 0.6 mg by mouth daily.  benzonatate (TESSALON PERLE PO) Take  by mouth as needed. Indications: cough    docusate sodium (COLACE) 100 mg capsule Take 100 mg by mouth three (3) times daily.  ondansetron (ZOFRAN ODT) 8 mg disintegrating tablet Take 1 Tab by mouth every eight (8) hours as needed for Nausea.  pantoprazole (PROTONIX) 40 mg tablet Take 1 Tab by mouth Before breakfast and dinner.  gabapentin (NEURONTIN) 600 mg tablet Take 1 Tab by mouth two (2) times a day.  cloNIDine HCl (CATAPRES) 0.1 mg tablet Take 1 Tab by mouth two (2) times a day.  lamoTRIgine (LAMICTAL) 200 mg tablet Take  by mouth daily.  hydrocortisone (CORTAID) 0.5 % topical cream Apply  to affected area two (2) times a day. use thin layer    topiramate (TOPAMAX) 100 mg tablet 100 mg two (2) times a day.  QUEtiapine (SEROQUEL) 300 mg tablet     CREON 24,000-76,000 -120,000 unit capsule     cetirizine (ZYRTEC) 10 mg tablet     SUMAtriptan (IMITREX) 100 mg tablet TAKE 1 TO 2 TABLETS BY MOUTH DAILY AS NEEDED FOR MIGRAINE . DO NOT EXCEED 200 MG IN 24 HOUR PERIOD    busPIRone (BUSPAR) 15 mg tablet Take 1 Tab by mouth two (2) times a day. Disposition:  D/c    DISCHARGE NOTE:   Patient is stable for discharge at this time. I have discussed all the findings from today's work up with the patient, including lab results and imaging. I have answered all questions.  Rx for lidocaine patches given. Rest and close follow-up with the PCP recommended this week. Return to the ED immediately for any new or worsening symptoms. Meri Zamora PA-C     Follow-up Information     Follow up With Specialties Details Why Contact Ryan Stacy MD [facility] Schedule an appointment as soon as possible for a visit in 1 week  [address] [facility]  [address] EMERGENCY DEPT Emergency Medicine  As needed, If symptoms worsen [address]  [phone]          Current Discharge Medication List      START taking these medications    Details   lidocaine (LIDODERM) 5 % Apply patch to the affected area for 12 hours a day and remove for 12 hours a day. Qty: 30 Each, Refills: 0                   Diagnosis     Clinical Impression:   1. Fall, initial encounter    2.  Contusion of rib on right side, initial encounter

## 2020-04-27 ENCOUNTER — PATIENT OUTREACH (OUTPATIENT)
Dept: INTERNAL MEDICINE CLINIC | Age: 55
End: 2020-04-27

## 2020-04-27 NOTE — PROGRESS NOTES
Attempted to contact pt for COVID -19 screening following discharge from ED/Hospital. No answer at number and message left. Will return call pre protocol.

## 2020-04-28 ENCOUNTER — PATIENT OUTREACH (OUTPATIENT)
Dept: INTERNAL MEDICINE CLINIC | Age: 55
End: 2020-04-28

## 2020-04-30 ENCOUNTER — APPOINTMENT (OUTPATIENT)
Dept: CT IMAGING | Age: 55
DRG: 439 | End: 2020-04-30
Attending: EMERGENCY MEDICINE
Payer: MEDICARE

## 2020-04-30 ENCOUNTER — HOSPITAL ENCOUNTER (INPATIENT)
Age: 55
LOS: 3 days | Discharge: HOME | DRG: 439 | End: 2020-05-04
Attending: EMERGENCY MEDICINE | Admitting: INTERNAL MEDICINE
Payer: MEDICARE

## 2020-04-30 DIAGNOSIS — F10.939 ALCOHOL WITHDRAWAL SYNDROME WITH COMPLICATION (HCC): ICD-10-CM

## 2020-04-30 DIAGNOSIS — R11.2 NON-INTRACTABLE VOMITING WITH NAUSEA, UNSPECIFIED VOMITING TYPE: ICD-10-CM

## 2020-04-30 DIAGNOSIS — K85.90 RECURRENT ACUTE PANCREATITIS: Primary | ICD-10-CM

## 2020-04-30 DIAGNOSIS — E83.42 HYPOMAGNESEMIA: ICD-10-CM

## 2020-04-30 DIAGNOSIS — K85.00 IDIOPATHIC ACUTE PANCREATITIS WITHOUT INFECTION OR NECROSIS: ICD-10-CM

## 2020-04-30 DIAGNOSIS — E83.52 HYPERCALCEMIA: ICD-10-CM

## 2020-04-30 LAB
ALBUMIN SERPL-MCNC: 3.6 G/DL (ref 3.4–5)
ALBUMIN/GLOB SERPL: 0.9 {RATIO} (ref 0.8–1.7)
ALP SERPL-CCNC: 142 U/L (ref 45–117)
ALT SERPL-CCNC: 111 U/L (ref 13–56)
ANION GAP SERPL CALC-SCNC: 14 MMOL/L (ref 3–18)
AST SERPL-CCNC: 235 U/L (ref 10–38)
BASOPHILS # BLD: 0 K/UL (ref 0–0.1)
BASOPHILS NFR BLD: 0 % (ref 0–2)
BILIRUB SERPL-MCNC: 1.2 MG/DL (ref 0.2–1)
BUN SERPL-MCNC: 14 MG/DL (ref 7–18)
BUN/CREAT SERPL: 12 (ref 12–20)
CALCIUM SERPL-MCNC: 14.1 MG/DL (ref 8.5–10.1)
CHLORIDE SERPL-SCNC: 88 MMOL/L (ref 100–111)
CO2 SERPL-SCNC: 29 MMOL/L (ref 21–32)
CREAT SERPL-MCNC: 1.13 MG/DL (ref 0.6–1.3)
DIFFERENTIAL METHOD BLD: ABNORMAL
EOSINOPHIL # BLD: 0 K/UL (ref 0–0.4)
EOSINOPHIL NFR BLD: 0 % (ref 0–5)
ERYTHROCYTE [DISTWIDTH] IN BLOOD BY AUTOMATED COUNT: 14.4 % (ref 11.6–14.5)
ETHANOL SERPL-MCNC: <3 MG/DL (ref 0–3)
GLOBULIN SER CALC-MCNC: 4 G/DL (ref 2–4)
GLUCOSE SERPL-MCNC: 135 MG/DL (ref 74–99)
HCT VFR BLD AUTO: 45 % (ref 35–45)
HGB BLD-MCNC: 15.1 G/DL (ref 12–16)
LIPASE SERPL-CCNC: 540 U/L (ref 73–393)
LYMPHOCYTES # BLD: 1.8 K/UL (ref 0.9–3.6)
LYMPHOCYTES NFR BLD: 17 % (ref 21–52)
MAGNESIUM SERPL-MCNC: 1 MG/DL (ref 1.6–2.6)
MCH RBC QN AUTO: 33.4 PG (ref 24–34)
MCHC RBC AUTO-ENTMCNC: 33.6 G/DL (ref 31–37)
MCV RBC AUTO: 99.6 FL (ref 74–97)
MONOCYTES # BLD: 0.9 K/UL (ref 0.05–1.2)
MONOCYTES NFR BLD: 9 % (ref 3–10)
NEUTS SEG # BLD: 7.8 K/UL (ref 1.8–8)
NEUTS SEG NFR BLD: 74 % (ref 40–73)
PLATELET # BLD AUTO: 128 K/UL (ref 135–420)
PMV BLD AUTO: 9.4 FL (ref 9.2–11.8)
POTASSIUM SERPL-SCNC: 3.6 MMOL/L (ref 3.5–5.5)
PROT SERPL-MCNC: 7.6 G/DL (ref 6.4–8.2)
RBC # BLD AUTO: 4.52 M/UL (ref 4.2–5.3)
SODIUM SERPL-SCNC: 131 MMOL/L (ref 136–145)
WBC # BLD AUTO: 10.5 K/UL (ref 4.6–13.2)

## 2020-04-30 PROCEDURE — 96375 TX/PRO/DX INJ NEW DRUG ADDON: CPT

## 2020-04-30 PROCEDURE — 74177 CT ABD & PELVIS W/CONTRAST: CPT

## 2020-04-30 PROCEDURE — 80307 DRUG TEST PRSMV CHEM ANLYZR: CPT

## 2020-04-30 PROCEDURE — 80053 COMPREHEN METABOLIC PANEL: CPT

## 2020-04-30 PROCEDURE — 99285 EMERGENCY DEPT VISIT HI MDM: CPT

## 2020-04-30 PROCEDURE — 74011250636 HC RX REV CODE- 250/636: Performed by: EMERGENCY MEDICINE

## 2020-04-30 PROCEDURE — 83970 ASSAY OF PARATHORMONE: CPT

## 2020-04-30 PROCEDURE — 74011636320 HC RX REV CODE- 636/320: Performed by: EMERGENCY MEDICINE

## 2020-04-30 PROCEDURE — 85025 COMPLETE CBC W/AUTO DIFF WBC: CPT

## 2020-04-30 PROCEDURE — 96374 THER/PROPH/DIAG INJ IV PUSH: CPT

## 2020-04-30 PROCEDURE — 82652 VIT D 1 25-DIHYDROXY: CPT

## 2020-04-30 PROCEDURE — 83690 ASSAY OF LIPASE: CPT

## 2020-04-30 PROCEDURE — 96372 THER/PROPH/DIAG INJ SC/IM: CPT

## 2020-04-30 PROCEDURE — 83735 ASSAY OF MAGNESIUM: CPT

## 2020-04-30 PROCEDURE — 93005 ELECTROCARDIOGRAM TRACING: CPT

## 2020-04-30 RX ORDER — FAMOTIDINE 10 MG/ML
20 INJECTION INTRAVENOUS
Status: COMPLETED | OUTPATIENT
Start: 2020-04-30 | End: 2020-04-30

## 2020-04-30 RX ORDER — SODIUM CHLORIDE 0.9 % (FLUSH) 0.9 %
5-40 SYRINGE (ML) INJECTION EVERY 8 HOURS
Status: DISCONTINUED | OUTPATIENT
Start: 2020-04-30 | End: 2020-05-04 | Stop reason: HOSPADM

## 2020-04-30 RX ORDER — LORAZEPAM 2 MG/ML
3 INJECTION INTRAMUSCULAR
Status: DISCONTINUED | OUTPATIENT
Start: 2020-04-30 | End: 2020-05-04 | Stop reason: HOSPADM

## 2020-04-30 RX ORDER — LORAZEPAM 2 MG/ML
2 INJECTION INTRAMUSCULAR
Status: DISCONTINUED | OUTPATIENT
Start: 2020-04-30 | End: 2020-05-04 | Stop reason: HOSPADM

## 2020-04-30 RX ORDER — MAGNESIUM SULFATE HEPTAHYDRATE 40 MG/ML
2 INJECTION, SOLUTION INTRAVENOUS ONCE
Status: COMPLETED | OUTPATIENT
Start: 2020-04-30 | End: 2020-05-01

## 2020-04-30 RX ORDER — SODIUM CHLORIDE 9 MG/ML
150 INJECTION, SOLUTION INTRAVENOUS CONTINUOUS
Status: DISCONTINUED | OUTPATIENT
Start: 2020-04-30 | End: 2020-05-04

## 2020-04-30 RX ORDER — HYDROMORPHONE HYDROCHLORIDE 1 MG/ML
0.5 INJECTION, SOLUTION INTRAMUSCULAR; INTRAVENOUS; SUBCUTANEOUS
Status: COMPLETED | OUTPATIENT
Start: 2020-04-30 | End: 2020-04-30

## 2020-04-30 RX ORDER — THIAMINE HYDROCHLORIDE 100 MG/ML
200 INJECTION, SOLUTION INTRAMUSCULAR; INTRAVENOUS
Status: COMPLETED | OUTPATIENT
Start: 2020-04-30 | End: 2020-04-30

## 2020-04-30 RX ORDER — ONDANSETRON 2 MG/ML
4 INJECTION INTRAMUSCULAR; INTRAVENOUS
Status: COMPLETED | OUTPATIENT
Start: 2020-04-30 | End: 2020-04-30

## 2020-04-30 RX ORDER — LORAZEPAM 2 MG/ML
1 INJECTION INTRAMUSCULAR
Status: DISCONTINUED | OUTPATIENT
Start: 2020-04-30 | End: 2020-05-04 | Stop reason: HOSPADM

## 2020-04-30 RX ORDER — HYDROMORPHONE HYDROCHLORIDE 1 MG/ML
0.2 INJECTION, SOLUTION INTRAMUSCULAR; INTRAVENOUS; SUBCUTANEOUS
Status: DISCONTINUED | OUTPATIENT
Start: 2020-04-30 | End: 2020-04-30

## 2020-04-30 RX ORDER — LORAZEPAM 1 MG/1
1 TABLET ORAL
Status: DISCONTINUED | OUTPATIENT
Start: 2020-04-30 | End: 2020-05-04 | Stop reason: HOSPADM

## 2020-04-30 RX ORDER — LORAZEPAM 1 MG/1
2 TABLET ORAL
Status: DISCONTINUED | OUTPATIENT
Start: 2020-04-30 | End: 2020-05-04 | Stop reason: HOSPADM

## 2020-04-30 RX ORDER — SODIUM CHLORIDE 0.9 % (FLUSH) 0.9 %
5-40 SYRINGE (ML) INJECTION AS NEEDED
Status: DISCONTINUED | OUTPATIENT
Start: 2020-04-30 | End: 2020-05-04 | Stop reason: HOSPADM

## 2020-04-30 RX ADMIN — FAMOTIDINE 20 MG: 10 INJECTION INTRAVENOUS at 22:28

## 2020-04-30 RX ADMIN — THIAMINE HYDROCHLORIDE 200 MG: 100 INJECTION, SOLUTION INTRAMUSCULAR; INTRAVENOUS at 22:28

## 2020-04-30 RX ADMIN — SODIUM CHLORIDE, SODIUM LACTATE, POTASSIUM CHLORIDE, AND CALCIUM CHLORIDE 1000 ML: 600; 310; 30; 20 INJECTION, SOLUTION INTRAVENOUS at 23:17

## 2020-04-30 RX ADMIN — SODIUM CHLORIDE 1000 ML: 900 INJECTION, SOLUTION INTRAVENOUS at 23:20

## 2020-04-30 RX ADMIN — SODIUM CHLORIDE, SODIUM LACTATE, POTASSIUM CHLORIDE, AND CALCIUM CHLORIDE 1000 ML: 600; 310; 30; 20 INJECTION, SOLUTION INTRAVENOUS at 22:27

## 2020-04-30 RX ADMIN — ONDANSETRON 4 MG: 2 INJECTION INTRAMUSCULAR; INTRAVENOUS at 22:28

## 2020-04-30 RX ADMIN — IOPAMIDOL 100 ML: 612 INJECTION, SOLUTION INTRAVENOUS at 23:55

## 2020-04-30 RX ADMIN — HYDROMORPHONE HYDROCHLORIDE 0.5 MG: 1 INJECTION, SOLUTION INTRAMUSCULAR; INTRAVENOUS; SUBCUTANEOUS at 23:14

## 2020-05-01 ENCOUNTER — APPOINTMENT (OUTPATIENT)
Dept: GENERAL RADIOLOGY | Age: 55
DRG: 439 | End: 2020-05-01
Attending: INTERNAL MEDICINE
Payer: MEDICARE

## 2020-05-01 ENCOUNTER — PATIENT OUTREACH (OUTPATIENT)
Dept: INTERNAL MEDICINE CLINIC | Age: 55
End: 2020-05-01

## 2020-05-01 PROBLEM — I16.0 HYPERTENSIVE URGENCY: Status: ACTIVE | Noted: 2020-05-01

## 2020-05-01 LAB
AMMONIA PLAS-SCNC: 14 UMOL/L (ref 11–32)
AMPHET UR QL SCN: NEGATIVE
APPEARANCE UR: CLEAR
ATRIAL RATE: 121 BPM
BACTERIA URNS QL MICRO: ABNORMAL /HPF
BARBITURATES UR QL SCN: NEGATIVE
BENZODIAZ UR QL: POSITIVE
BILIRUB UR QL: NEGATIVE
CALCIUM SERPL-MCNC: 14.1 MG/DL (ref 8.5–10.1)
CALCULATED P AXIS, ECG09: 73 DEGREES
CALCULATED R AXIS, ECG10: 82 DEGREES
CALCULATED T AXIS, ECG11: 72 DEGREES
CANNABINOIDS UR QL SCN: NEGATIVE
CHOLEST SERPL-MCNC: 175 MG/DL
COCAINE UR QL SCN: NEGATIVE
COLOR UR: YELLOW
DIAGNOSIS, 93000: NORMAL
EPITH CASTS URNS QL MICRO: ABNORMAL /LPF (ref 0–5)
GLUCOSE UR STRIP.AUTO-MCNC: NEGATIVE MG/DL
HDLC SERPL-MCNC: 19 MG/DL (ref 40–60)
HDLC SERPL: 9.2 {RATIO} (ref 0–5)
HDSCOM,HDSCOM: ABNORMAL
HGB UR QL STRIP: NEGATIVE
KETONES UR QL STRIP.AUTO: 40 MG/DL
LDLC SERPL CALC-MCNC: ABNORMAL MG/DL (ref 0–100)
LEUKOCYTE ESTERASE UR QL STRIP.AUTO: NEGATIVE
LIPID PROFILE,FLP: ABNORMAL
METHADONE UR QL: NEGATIVE
MUCOUS THREADS URNS QL MICRO: ABNORMAL /LPF
NITRITE UR QL STRIP.AUTO: NEGATIVE
OPIATES UR QL: NEGATIVE
P-R INTERVAL, ECG05: 130 MS
PCP UR QL: NEGATIVE
PH UR STRIP: 7 [PH] (ref 5–8)
PROT UR STRIP-MCNC: ABNORMAL MG/DL
PTH-INTACT SERPL-MCNC: <6.3 PG/ML (ref 18.4–88)
Q-T INTERVAL, ECG07: 314 MS
QRS DURATION, ECG06: 74 MS
QTC CALCULATION (BEZET), ECG08: 445 MS
RBC #/AREA URNS HPF: ABNORMAL /HPF (ref 0–5)
SP GR UR REFRACTOMETRY: 1.01 (ref 1–1.03)
TRIGL SERPL-MCNC: 423 MG/DL (ref ?–150)
UROBILINOGEN UR QL STRIP.AUTO: 1 EU/DL (ref 0.2–1)
VENTRICULAR RATE, ECG03: 121 BPM
VLDLC SERPL CALC-MCNC: ABNORMAL MG/DL
WBC URNS QL MICRO: ABNORMAL /HPF (ref 0–4)

## 2020-05-01 PROCEDURE — 74011000258 HC RX REV CODE- 258: Performed by: EMERGENCY MEDICINE

## 2020-05-01 PROCEDURE — 77030021352 HC CBL LD SYS DISP COVD -B

## 2020-05-01 PROCEDURE — 80307 DRUG TEST PRSMV CHEM ANLYZR: CPT

## 2020-05-01 PROCEDURE — 96376 TX/PRO/DX INJ SAME DRUG ADON: CPT

## 2020-05-01 PROCEDURE — 74011250636 HC RX REV CODE- 250/636: Performed by: INTERNAL MEDICINE

## 2020-05-01 PROCEDURE — 81001 URINALYSIS AUTO W/SCOPE: CPT

## 2020-05-01 PROCEDURE — 77030038269 HC DRN EXT URIN PURWCK BARD -A

## 2020-05-01 PROCEDURE — 74011250637 HC RX REV CODE- 250/637: Performed by: INTERNAL MEDICINE

## 2020-05-01 PROCEDURE — 82140 ASSAY OF AMMONIA: CPT

## 2020-05-01 PROCEDURE — 74011250636 HC RX REV CODE- 250/636: Performed by: EMERGENCY MEDICINE

## 2020-05-01 PROCEDURE — 65660000000 HC RM CCU STEPDOWN

## 2020-05-01 PROCEDURE — 80061 LIPID PANEL: CPT

## 2020-05-01 PROCEDURE — C9113 INJ PANTOPRAZOLE SODIUM, VIA: HCPCS | Performed by: INTERNAL MEDICINE

## 2020-05-01 PROCEDURE — 71045 X-RAY EXAM CHEST 1 VIEW: CPT

## 2020-05-01 PROCEDURE — 96365 THER/PROPH/DIAG IV INF INIT: CPT

## 2020-05-01 PROCEDURE — 96366 THER/PROPH/DIAG IV INF ADDON: CPT

## 2020-05-01 PROCEDURE — C9113 INJ PANTOPRAZOLE SODIUM, VIA: HCPCS | Performed by: EMERGENCY MEDICINE

## 2020-05-01 PROCEDURE — 77010033678 HC OXYGEN DAILY

## 2020-05-01 PROCEDURE — 96375 TX/PRO/DX INJ NEW DRUG ADDON: CPT

## 2020-05-01 PROCEDURE — 74011000250 HC RX REV CODE- 250: Performed by: EMERGENCY MEDICINE

## 2020-05-01 RX ORDER — DIPHENHYDRAMINE HYDROCHLORIDE 50 MG/ML
25 INJECTION, SOLUTION INTRAMUSCULAR; INTRAVENOUS
Status: COMPLETED | OUTPATIENT
Start: 2020-05-01 | End: 2020-05-01

## 2020-05-01 RX ORDER — LORAZEPAM 2 MG/ML
2 INJECTION INTRAMUSCULAR
Status: DISCONTINUED | OUTPATIENT
Start: 2020-05-01 | End: 2020-05-04 | Stop reason: HOSPADM

## 2020-05-01 RX ORDER — LORAZEPAM 2 MG/ML
3 INJECTION INTRAMUSCULAR
Status: DISCONTINUED | OUTPATIENT
Start: 2020-05-01 | End: 2020-05-04 | Stop reason: HOSPADM

## 2020-05-01 RX ORDER — BISMUTH SUBSALICYLATE 262 MG
1 TABLET,CHEWABLE ORAL DAILY
Status: DISCONTINUED | OUTPATIENT
Start: 2020-05-01 | End: 2020-05-04 | Stop reason: HOSPADM

## 2020-05-01 RX ORDER — ACETAMINOPHEN 325 MG/1
650 TABLET ORAL
Status: DISCONTINUED | OUTPATIENT
Start: 2020-05-01 | End: 2020-05-04 | Stop reason: HOSPADM

## 2020-05-01 RX ORDER — QUETIAPINE FUMARATE 300 MG/1
300 TABLET, FILM COATED ORAL
Status: DISCONTINUED | OUTPATIENT
Start: 2020-05-01 | End: 2020-05-04 | Stop reason: HOSPADM

## 2020-05-01 RX ORDER — TOPIRAMATE 25 MG/1
100 TABLET ORAL 2 TIMES DAILY
Status: DISCONTINUED | OUTPATIENT
Start: 2020-05-01 | End: 2020-05-04 | Stop reason: HOSPADM

## 2020-05-01 RX ORDER — HYDROMORPHONE HYDROCHLORIDE 1 MG/ML
0.5 INJECTION, SOLUTION INTRAMUSCULAR; INTRAVENOUS; SUBCUTANEOUS
Status: DISCONTINUED | OUTPATIENT
Start: 2020-05-01 | End: 2020-05-04 | Stop reason: HOSPADM

## 2020-05-01 RX ORDER — LORAZEPAM 1 MG/1
1 TABLET ORAL
Status: DISCONTINUED | OUTPATIENT
Start: 2020-05-01 | End: 2020-05-04 | Stop reason: HOSPADM

## 2020-05-01 RX ORDER — PANTOPRAZOLE SODIUM 40 MG/10ML
40 INJECTION, POWDER, LYOPHILIZED, FOR SOLUTION INTRAVENOUS EVERY 12 HOURS
Status: DISCONTINUED | OUTPATIENT
Start: 2020-05-01 | End: 2020-05-04 | Stop reason: HOSPADM

## 2020-05-01 RX ORDER — LORAZEPAM 2 MG/ML
1 INJECTION INTRAMUSCULAR
Status: DISCONTINUED | OUTPATIENT
Start: 2020-05-01 | End: 2020-05-04 | Stop reason: HOSPADM

## 2020-05-01 RX ORDER — SODIUM CHLORIDE 0.9 % (FLUSH) 0.9 %
5-40 SYRINGE (ML) INJECTION EVERY 8 HOURS
Status: DISCONTINUED | OUTPATIENT
Start: 2020-05-01 | End: 2020-05-04 | Stop reason: HOSPADM

## 2020-05-01 RX ORDER — HYDROMORPHONE HYDROCHLORIDE 1 MG/ML
1 INJECTION, SOLUTION INTRAMUSCULAR; INTRAVENOUS; SUBCUTANEOUS
Status: COMPLETED | OUTPATIENT
Start: 2020-05-01 | End: 2020-05-01

## 2020-05-01 RX ORDER — HYDROMORPHONE HYDROCHLORIDE 1 MG/ML
0.5 INJECTION, SOLUTION INTRAMUSCULAR; INTRAVENOUS; SUBCUTANEOUS
Status: COMPLETED | OUTPATIENT
Start: 2020-05-01 | End: 2020-05-01

## 2020-05-01 RX ORDER — HYDRALAZINE HYDROCHLORIDE 20 MG/ML
20 INJECTION INTRAMUSCULAR; INTRAVENOUS
Status: DISCONTINUED | OUTPATIENT
Start: 2020-05-01 | End: 2020-05-04 | Stop reason: HOSPADM

## 2020-05-01 RX ORDER — LAMOTRIGINE 100 MG/1
150 TABLET ORAL DAILY
Status: DISCONTINUED | OUTPATIENT
Start: 2020-05-01 | End: 2020-05-04 | Stop reason: HOSPADM

## 2020-05-01 RX ORDER — DOCUSATE SODIUM 100 MG/1
100 CAPSULE, LIQUID FILLED ORAL
Status: DISCONTINUED | OUTPATIENT
Start: 2020-05-01 | End: 2020-05-04 | Stop reason: HOSPADM

## 2020-05-01 RX ORDER — CETIRIZINE HCL 10 MG
10 TABLET ORAL DAILY
Status: DISCONTINUED | OUTPATIENT
Start: 2020-05-01 | End: 2020-05-01 | Stop reason: CLARIF

## 2020-05-01 RX ORDER — LORAZEPAM 1 MG/1
2 TABLET ORAL
Status: DISCONTINUED | OUTPATIENT
Start: 2020-05-01 | End: 2020-05-04 | Stop reason: HOSPADM

## 2020-05-01 RX ORDER — MAGNESIUM SULFATE HEPTAHYDRATE 40 MG/ML
2 INJECTION, SOLUTION INTRAVENOUS ONCE
Status: COMPLETED | OUTPATIENT
Start: 2020-05-01 | End: 2020-05-01

## 2020-05-01 RX ORDER — SODIUM CHLORIDE 0.9 % (FLUSH) 0.9 %
5-40 SYRINGE (ML) INJECTION AS NEEDED
Status: DISCONTINUED | OUTPATIENT
Start: 2020-05-01 | End: 2020-05-04 | Stop reason: HOSPADM

## 2020-05-01 RX ORDER — ONDANSETRON 2 MG/ML
4 INJECTION INTRAMUSCULAR; INTRAVENOUS
Status: DISCONTINUED | OUTPATIENT
Start: 2020-05-01 | End: 2020-05-04 | Stop reason: HOSPADM

## 2020-05-01 RX ORDER — CLONIDINE HYDROCHLORIDE 0.1 MG/1
0.1 TABLET ORAL 2 TIMES DAILY
Status: DISCONTINUED | OUTPATIENT
Start: 2020-05-01 | End: 2020-05-04 | Stop reason: HOSPADM

## 2020-05-01 RX ORDER — IPRATROPIUM BROMIDE AND ALBUTEROL SULFATE 2.5; .5 MG/3ML; MG/3ML
3 SOLUTION RESPIRATORY (INHALATION)
Status: DISCONTINUED | OUTPATIENT
Start: 2020-05-01 | End: 2020-05-04 | Stop reason: HOSPADM

## 2020-05-01 RX ORDER — SODIUM CHLORIDE 9 MG/ML
150 INJECTION, SOLUTION INTRAVENOUS CONTINUOUS
Status: DISCONTINUED | OUTPATIENT
Start: 2020-05-01 | End: 2020-05-04 | Stop reason: HOSPADM

## 2020-05-01 RX ORDER — THIAMINE HYDROCHLORIDE 100 MG/ML
INJECTION, SOLUTION INTRAMUSCULAR; INTRAVENOUS
Status: DISCONTINUED
Start: 2020-05-01 | End: 2020-05-01 | Stop reason: WASHOUT

## 2020-05-01 RX ORDER — ASPIRIN 325 MG/1
100 TABLET, FILM COATED ORAL DAILY
Status: DISCONTINUED | OUTPATIENT
Start: 2020-05-01 | End: 2020-05-01 | Stop reason: SDUPTHER

## 2020-05-01 RX ORDER — ASPIRIN 325 MG/1
100 TABLET, FILM COATED ORAL DAILY
Status: DISCONTINUED | OUTPATIENT
Start: 2020-05-01 | End: 2020-05-04 | Stop reason: HOSPADM

## 2020-05-01 RX ORDER — FOLIC ACID 1 MG/1
1 TABLET ORAL DAILY
Status: DISCONTINUED | OUTPATIENT
Start: 2020-05-01 | End: 2020-05-04 | Stop reason: HOSPADM

## 2020-05-01 RX ORDER — COLCHICINE 0.6 MG/1
0.6 TABLET ORAL DAILY
Status: DISCONTINUED | OUTPATIENT
Start: 2020-05-01 | End: 2020-05-04 | Stop reason: HOSPADM

## 2020-05-01 RX ORDER — LANOLIN ALCOHOL/MO/W.PET/CERES
12 CREAM (GRAM) TOPICAL
Status: DISCONTINUED | OUTPATIENT
Start: 2020-05-01 | End: 2020-05-04 | Stop reason: HOSPADM

## 2020-05-01 RX ORDER — IBUPROFEN 200 MG
1 TABLET ORAL DAILY PRN
Status: DISCONTINUED | OUTPATIENT
Start: 2020-05-01 | End: 2020-05-04 | Stop reason: HOSPADM

## 2020-05-01 RX ORDER — LORATADINE 10 MG/1
10 TABLET ORAL DAILY
Status: DISCONTINUED | OUTPATIENT
Start: 2020-05-01 | End: 2020-05-04 | Stop reason: HOSPADM

## 2020-05-01 RX ORDER — PANTOPRAZOLE SODIUM 40 MG/10ML
40 INJECTION, POWDER, LYOPHILIZED, FOR SOLUTION INTRAVENOUS
Status: ACTIVE | OUTPATIENT
Start: 2020-05-01 | End: 2020-05-02

## 2020-05-01 RX ORDER — METOCLOPRAMIDE HYDROCHLORIDE 5 MG/ML
10 INJECTION INTRAMUSCULAR; INTRAVENOUS
Status: COMPLETED | OUTPATIENT
Start: 2020-05-01 | End: 2020-05-01

## 2020-05-01 RX ORDER — BUSPIRONE HYDROCHLORIDE 7.5 MG/1
15 TABLET ORAL 2 TIMES DAILY
Status: DISCONTINUED | OUTPATIENT
Start: 2020-05-01 | End: 2020-05-04 | Stop reason: HOSPADM

## 2020-05-01 RX ADMIN — HYDROMORPHONE HYDROCHLORIDE 0.5 MG: 1 INJECTION, SOLUTION INTRAMUSCULAR; INTRAVENOUS; SUBCUTANEOUS at 21:04

## 2020-05-01 RX ADMIN — MULTIVITAMIN TABLET 1 TABLET: TABLET at 10:15

## 2020-05-01 RX ADMIN — HYDRALAZINE HYDROCHLORIDE 20 MG: 20 INJECTION INTRAMUSCULAR; INTRAVENOUS at 08:05

## 2020-05-01 RX ADMIN — MAGNESIUM SULFATE HEPTAHYDRATE 2 G: 40 INJECTION, SOLUTION INTRAVENOUS at 00:29

## 2020-05-01 RX ADMIN — BUSPIRONE HYDROCHLORIDE 15 MG: 7.5 TABLET ORAL at 16:52

## 2020-05-01 RX ADMIN — CLONIDINE HYDROCHLORIDE 0.1 MG: 0.1 TABLET ORAL at 16:52

## 2020-05-01 RX ADMIN — LORAZEPAM 2 MG: 2 INJECTION, SOLUTION INTRAMUSCULAR; INTRAVENOUS at 03:59

## 2020-05-01 RX ADMIN — PANTOPRAZOLE SODIUM 40 MG: 40 INJECTION, POWDER, FOR SOLUTION INTRAVENOUS at 01:41

## 2020-05-01 RX ADMIN — SODIUM CHLORIDE 10 ML: 9 INJECTION, SOLUTION INTRAMUSCULAR; INTRAVENOUS; SUBCUTANEOUS at 10:21

## 2020-05-01 RX ADMIN — HYDROMORPHONE HYDROCHLORIDE 0.5 MG: 1 INJECTION, SOLUTION INTRAMUSCULAR; INTRAVENOUS; SUBCUTANEOUS at 00:58

## 2020-05-01 RX ADMIN — HYDROMORPHONE HYDROCHLORIDE 0.5 MG: 1 INJECTION, SOLUTION INTRAMUSCULAR; INTRAVENOUS; SUBCUTANEOUS at 16:59

## 2020-05-01 RX ADMIN — Medication 10 ML: at 13:47

## 2020-05-01 RX ADMIN — FOLIC ACID 1 MG: 1 TABLET ORAL at 10:16

## 2020-05-01 RX ADMIN — LAMOTRIGINE 150 MG: 100 TABLET ORAL at 10:15

## 2020-05-01 RX ADMIN — PANTOPRAZOLE SODIUM 40 MG: 40 INJECTION, POWDER, FOR SOLUTION INTRAVENOUS at 21:03

## 2020-05-01 RX ADMIN — BUSPIRONE HYDROCHLORIDE 15 MG: 7.5 TABLET ORAL at 10:15

## 2020-05-01 RX ADMIN — HYDROMORPHONE HYDROCHLORIDE 0.5 MG: 1 INJECTION, SOLUTION INTRAMUSCULAR; INTRAVENOUS; SUBCUTANEOUS at 10:21

## 2020-05-01 RX ADMIN — FOLIC ACID: 5 INJECTION, SOLUTION INTRAMUSCULAR; INTRAVENOUS; SUBCUTANEOUS at 02:43

## 2020-05-01 RX ADMIN — HYDROMORPHONE HYDROCHLORIDE 1 MG: 1 INJECTION, SOLUTION INTRAMUSCULAR; INTRAVENOUS; SUBCUTANEOUS at 04:31

## 2020-05-01 RX ADMIN — SODIUM CHLORIDE 10 ML: 9 INJECTION, SOLUTION INTRAMUSCULAR; INTRAVENOUS; SUBCUTANEOUS at 13:47

## 2020-05-01 RX ADMIN — MAGNESIUM SULFATE HEPTAHYDRATE 2 G: 40 INJECTION, SOLUTION INTRAVENOUS at 02:44

## 2020-05-01 RX ADMIN — Medication 10 ML: at 00:30

## 2020-05-01 RX ADMIN — METOCLOPRAMIDE 10 MG: 5 INJECTION, SOLUTION INTRAMUSCULAR; INTRAVENOUS at 01:00

## 2020-05-01 RX ADMIN — QUETIAPINE FUMARATE 300 MG: 300 TABLET ORAL at 21:03

## 2020-05-01 RX ADMIN — SODIUM CHLORIDE 150 ML/HR: 900 INJECTION, SOLUTION INTRAVENOUS at 07:23

## 2020-05-01 RX ADMIN — SODIUM CHLORIDE 150 ML/HR: 900 INJECTION, SOLUTION INTRAVENOUS at 13:45

## 2020-05-01 RX ADMIN — Medication 100 MG: at 10:16

## 2020-05-01 RX ADMIN — HYDROMORPHONE HYDROCHLORIDE 0.5 MG: 1 INJECTION, SOLUTION INTRAMUSCULAR; INTRAVENOUS; SUBCUTANEOUS at 14:05

## 2020-05-01 RX ADMIN — TOPIRAMATE 100 MG: 25 TABLET, FILM COATED ORAL at 16:53

## 2020-05-01 RX ADMIN — SODIUM CHLORIDE 150 ML/HR: 900 INJECTION, SOLUTION INTRAVENOUS at 00:29

## 2020-05-01 RX ADMIN — COLCHICINE 0.6 MG: 0.6 TABLET, FILM COATED ORAL at 10:16

## 2020-05-01 RX ADMIN — DIPHENHYDRAMINE HYDROCHLORIDE 25 MG: 50 INJECTION, SOLUTION INTRAMUSCULAR; INTRAVENOUS at 01:00

## 2020-05-01 RX ADMIN — PANTOPRAZOLE SODIUM 40 MG: 40 INJECTION, POWDER, FOR SOLUTION INTRAVENOUS at 08:05

## 2020-05-01 NOTE — PROGRESS NOTES
Problem: Falls - Risk of  Goal: *Absence of Falls  Description: Document Chito Villalobos Fall Risk and appropriate interventions in the flowsheet.   Outcome: Progressing Towards Goal  Note: Fall Risk Interventions:  Mobility Interventions: Bed/chair exit alarm, Utilize walker, cane, or other assistive device         Medication Interventions: Bed/chair exit alarm, Patient to call before getting OOB, Teach patient to arise slowly    Elimination Interventions: Bed/chair exit alarm, Call light in reach, Toileting schedule/hourly rounds    History of Falls Interventions: Bed/chair exit alarm, Door open when patient unattended, Investigate reason for fall         Problem: Patient Education: Go to Patient Education Activity  Goal: Patient/Family Education  Outcome: Progressing Towards Goal     Problem: Pancreatitis  Goal: *Control of acute pain  Outcome: Progressing Towards Goal  Goal: *Absence of nausea/vomiting  Outcome: Progressing Towards Goal  Goal: *Optimize nutritional status  Outcome: Progressing Towards Goal  Goal: *Labs within defined limits  Outcome: Progressing Towards Goal     Problem: Patient Education: Go to Patient Education Activity  Goal: Patient/Family Education  Outcome: Progressing Towards Goal     Problem: Pain - Acute  Goal: *Control of acute pain  Outcome: Progressing Towards Goal     Problem: Patient Education: Go to Patient Education Activity  Goal: Patient/Family Education  Outcome: Progressing Towards Goal     Problem: Hypertension  Goal: *Blood pressure within specified parameters  Outcome: Progressing Towards Goal  Goal: *Fluid volume balance  Outcome: Progressing Towards Goal  Goal: *Labs within defined limits  Outcome: Progressing Towards Goal     Problem: Patient Education: Go to Patient Education Activity  Goal: Patient/Family Education  Outcome: Progressing Towards Goal     Problem: Alcohol Withdrawal  Goal: *STG: Participates in treatment plan  Outcome: Progressing Towards Goal  Goal: *STG: Remains safe in hospital  Outcome: Progressing Towards Goal  Goal: *STG: Seeks staff when symptoms of withdrawal increase  Outcome: Progressing Towards Goal  Goal: *STG: Complies with medication therapy  Outcome: Progressing Towards Goal  Goal: *STG: Attends activities and groups  Outcome: Progressing Towards Goal  Goal: *STG: Will identify negative impact of chemical dependency including the use of tobacco, alcohol, and other substances  Outcome: Progressing Towards Goal  Goal: *STG: Verbalizes abstinence as an achievable goal  Outcome: Progressing Towards Goal  Goal: *STG: Agrees to participate in outpatient after care program to support ongoing mental health  Outcome: Progressing Towards Goal  Goal: *STG: Able to indentify relapse triggers including interpersonal/social and familial factors  Outcome: Progressing Towards Goal  Goal: *STG: Identify lifestyle changes to support long term sobriety such as vocation, employment, education, and legal issues  Outcome: Progressing Towards Goal  Goal: *STG: Maintains appropriate nutrition and hydration  Outcome: Progressing Towards Goal  Goal: *STG: Vital signs within defined limits  Outcome: Progressing Towards Goal  Goal: *STG/LTG: Relapse prevention plan in place to include housing/aftercare, leisure activities, and spirituality  Outcome: Progressing Towards Goal  Goal: Interventions  Outcome: Progressing Towards Goal     Problem: Patient Education: Go to Patient Education Activity  Goal: Patient/Family Education  Outcome: Progressing Towards Goal     Problem: Anxiety  Goal: *Alleviation of anxiety  Outcome: Progressing Towards Goal  Goal: *Alleviation of anxiety (Palliative Care)  Outcome: Progressing Towards Goal     Problem: Patient Education: Go to Patient Education Activity  Goal: Patient/Family Education  Outcome: Progressing Towards Goal

## 2020-05-01 NOTE — PROGRESS NOTES
NUTRITION INITIAL ASSESSMENT/PLAN OF CARE      RECOMMENDATIONS:   1. NPO: diet to be advanced when medically indicated and per Pt tolerance  2. Monitor labs, weight and diet advancement/PO intake  3. RD to follow   4. Obtain actual weight     GOALS:   1. Diet advanced/ PO intake meets >75% of protein/calorie needs by 5/4    ASSESSMENT:   Wt status is classified as normal per Body mass index is 20.12 kg/m². Pt at nutrition risk d/t poor PO intake and chronic ETOH abuse. Currently NPO and not meeting nutritional needs. Labs noted. Pt w/ hyponatremia, hypercalcemia, hypomagnesemia, elevated Bili (1.2), elevated LFTs and elevated lipase (540). Nutrition recommendations listed. RD to follow. Nutrition Diagnoses:   Inadequate PO intake related to patient tolerance 2/2 N/V/AD Pain as evidenced by need for NPO. Altered GI function related to pancreatitis as evidenced by CT scan, elevated lipase (540) and N/V/ABD Pain x 2 days. SUBJECTIVE/OBJECTIVE:    Pt admitted for acute ETOH related pancreatitis and hypercalcemia. Familiar with patient from previous admission. NKFA or problems chewing/swallowing. Appetite is variable d/t ETOH abuse and at most consumes 2 small meals per day. Pt seen in room; appears thin with some noted muscle wasting and SQ fat loss. Visit cut short as needed to utilize the bedpan. Attempted to see Pt a 2nd time and she was sleeping. Placed orders to obtain actual weight as current weight is Pt stated. Will continue to monitor. Information Obtained from:    [x] Chart Review   [x] Patient   [] Family/Caregiver   [x] Nurse/Physician   [] Interdisciplinary Meeting/Rounds      Diet: NPO  Medications: [x] Reviewed  IVF: NS @150 mL/hr   Catapres, colchicine, Folvite, Zenpep (10,000), MVI, Protonix   Allergies: [x] Reviewed   Encounter Diagnoses     ICD-10-CM ICD-9-CM   1. Recurrent acute pancreatitis K85.90 577.0   2. Hypercalcemia E83.52 275.42   3.  Non-intractable vomiting with nausea, unspecified vomiting type R11.2 787.01   4. Alcohol withdrawal syndrome with complication ([facility]) E92.816 291.81   5. Hypomagnesemia E83.42 275.2     Past Medical History:   Diagnosis Date    Alcohol abuse     Anxiety     Arrhythmia     Tacchycardai    Asthma     controlled    Bipolar disorder ([facility] [address]     Common migraine     COPD (chronic obstructive pulmonary disease) ([facility])     Home O2  PRN ,  pt use also for Tachycardia    Depression     Esophageal reflux     Gout     Hypertension     Hypocitraturia     Inverted nipple     Left breast always have.  Kidney stone on left side     Pancreatitis     Recurrent UTI     Staghorn renal calculus     Urine leukocytes       Labs:    Lab Results   Component Value Date/Time    Sodium 131 (L) 04/30/2020 10:24 PM    Potassium 3.6 04/30/2020 10:24 PM    Chloride 88 (L) 04/30/2020 10:24 PM    CO2 29 04/30/2020 10:24 PM    Anion gap 14 04/30/2020 10:24 PM    Glucose 135 (H) 04/30/2020 10:24 PM    BUN 14 04/30/2020 10:24 PM    Creatinine 1.13 04/30/2020 10:24 PM    Calcium 14.1 (HH) 04/30/2020 10:24 PM    Calcium 14.1 ([facility]) 04/30/2020 10:24 PM    Magnesium 1.0 (L) 04/30/2020 10:24 PM    Phosphorus 2.5 01/18/2019 05:25 AM    Albumin 3.6 04/30/2020 10:24 PM     Lab Results   Component Value Date/Time     (H) 04/30/2020 10:24 PM     Anthropometrics: BMI (calculated): 20.1  Last 3 Recorded Weights in this Encounter    04/30/20 2242   Weight: 49.9 kg (110 lb)      Ht Readings from Last 1 Encounters:   04/30/20 5' 2\" (1.575 m)       Documented Weight History:  Weight Metrics 4/30/2020 4/26/2020 4/4/2020 2/4/2020 10/22/2019 10/12/2019 9/8/2019   Weight 110 lb 110 lb 113 lb 110 lb 109 lb 122 lb 114 lb   BMI 20.12 kg/m2 20.12 kg/m2 20.34 kg/m2 20.12 kg/m2 18.14 kg/m2 22.31 kg/m2 20.85 kg/m2       No data found.       IBW: 110 lb %IBW: 100% UBW: 115-120 lb over the past year   [x] Weight Loss [] Weight Gain [] Weight Stable    Estimated Nutrition Needs: [x] JUAN hemphill 1.3  [x] Other: 30 kcal/kg  Calories: 7941-5430 kcal Based on:   [x] Actual BW    Protein:   60-75 g Based on:   [x] Actual BW x 1.2-1.5 gm/kg   Fluid:       1500 mL     [x] No Cultural, Hoahaoism or ethnic dietary need identified.     [] Cultural, Hoahaoism and ethnic food preferences identified and addressed     Wt Status:  [x] Normal (18.6 - 24.9) [] Underweight (<18.5) [] Overweight (25 - 29.9) [] Mild Obesity (30 - 34.9)  [] Moderate Obesity (35 - 39.9) [] Morbid Obesity (40+)       Nutrition Problems Identified:   [x] Suboptimal PO intake v/s NPO  [] Food Allergies  [] Difficulty chewing/swallowing/poor dentition  [x] Constipation/Diarrhea   [x] Nausea/Vomiting/ABD PAin x 2 days  [] None  [] Other:     Plan:   [] Therapeutic Diet  []  Obtained/adjusted food preferences/tolerances and/or snacks options   []  Supplements added   [] Occupational therapy following for feeding techniques  []  HS snack added   []  Modify diet texture   []  Modify diet for food allergies   []  Educate patient   []  Assist with menu selection   []  Monitor PO intake on meal rounds   [x]  Continue inpatient monitoring and intervention   [x]  Participated in discharge planning/Interdisciplinary rounds/Team meetings   []  Other:     Education Needs:   [x] Not appropriate for teaching at this time    [] Identified and addressed    Nutrition Monitoring and Evaluation:  [x] Continue ongoing monitoring and intervention  [] Chyna Smallwood

## 2020-05-01 NOTE — ED PROVIDER NOTES
Fawad Friend is a 63 y.o. female with history of alcohol abuse, pancreatitis with complaints of increased abdominal pain over the last couple of days that she recently binged on a large amount of liquor. Patient last vomited probably 5 hours ago. She has severe abdominal pain in the upper abdomen. She denies any fever, hematemesis or melena. She denies any urinary symptoms. Pain is sharp and stabbing. She does not take anything for the pain today. She has a prior history of pancreatitis before in the past.    The history is provided by the patient and medical records. Past Medical History:   Diagnosis Date    Alcohol abuse     Anxiety     Arrhythmia     [place]    Asthma     controlled    Bipolar disorder ([facility] [address]     Common migraine     COPD (chronic obstructive pulmonary disease) ([facility])     Home O2  PRN ,  pt use also for Tachycardia    Depression     Esophageal reflux     Gout     Hypertension     Hypocitraturia     Inverted nipple     Left breast always have.     Kidney stone on left side     Pancreatitis     Recurrent UTI     Staghorn renal calculus     Urine leukocytes        Past Surgical History:   Procedure Laterality Date    HX COLONOSCOPY      HX CYST REMOVAL Left     x 3 surgeries    HX ENDOSCOPY      HX GYN      tubal ligation    HX LUMBAR LAMINECTOMY      HX UROLOGICAL  08/10/2018    Cysto, bilat RPG, left ureteral pyeloscopy, HLL, left JJ stent placement, Dr. Maki Greer         Family History:   Family history unknown: Yes       Social History     Socioeconomic History    Marital status:      Spouse name: Not on file    Number of children: Not on file    Years of education: Not on file    Highest education level: Not on file   Occupational History    Not on file   Social Needs    Financial resource strain: Not on file    Food insecurity     Worry: Not on file     Inability: Not on file    Transportation needs     Medical: Not on file Non-medical: Not on file   Tobacco Use    Smoking status: Current Every Day Smoker     Packs/day: 1.50     Years: 33.00     Pack years: 49.50     Types: Cigarettes    Smokeless tobacco: Never Used   Substance and Sexual Activity    Alcohol use: Not Currently     Comment: vodka    Drug use: No     Comment: 11years old- [place]    Sexual activity: Not on file   Lifestyle    Physical activity     Days per week: Not on file     Minutes per session: Not on file    Stress: Not on file   Relationships    Social connections     Talks on phone: Not on file     Gets together: Not on file     Attends Voodoo service: Not on file     Active member of club or organization: Not on file     Attends meetings of clubs or organizations: Not on file     Relationship status: Not on file    Intimate partner violence     Fear of current or ex partner: Not on file     Emotionally abused: Not on file     Physically abused: Not on file     Forced sexual activity: Not on file   Other Topics Concern    Not on file   Social History Narrative    Not on file         ALLERGIES: Lithium; Morphine; Amitriptyline; and Elavil    Review of Systems   Constitutional: Positive for appetite change. Negative for fever. HENT: Negative for sore throat and trouble swallowing. Eyes: Negative for visual disturbance. Respiratory: Negative for cough and shortness of breath. Cardiovascular: Negative for chest pain. Gastrointestinal: Positive for abdominal pain. Negative for blood in stool. Genitourinary: Positive for decreased urine volume. Negative for difficulty urinating. Musculoskeletal: Positive for myalgias. Negative for gait problem. Skin: Negative for wound. Allergic/Immunologic: Negative for immunocompromised state. Neurological: Negative for syncope. Psychiatric/Behavioral: Positive for sleep disturbance.        Vitals:    04/30/20 2242 04/30/20 2245 04/30/20 2315 05/01/20 0030   BP:  (!) 172/103 (!) 181/111 (!) 179/113   Pulse: (!) 117 (!) 115 (!) 109 (!) 110   Resp: 18      Temp: 98.7 °F (37.1 °C)      SpO2:  98% 100% 100%   Weight: 49.9 kg (110 lb)      Height: 5' 2\" (1.575 m)               Physical Exam  Vitals signs and nursing note reviewed. Constitutional:       General: She is in acute distress. Appearance: She is well-developed. She is ill-appearing. She is not toxic-appearing or diaphoretic. HENT:      Head: Normocephalic and atraumatic. Right Ear: External ear normal.      Left Ear: External ear normal.      Nose: Nose normal.      Mouth/Throat:      Pharynx: Uvula midline. Eyes:      General: No scleral icterus. Conjunctiva/sclera: Conjunctivae normal.   Neck:      Musculoskeletal: Normal range of motion and neck supple. Cardiovascular:      Rate and Rhythm: Regular rhythm. Tachycardia present. Heart sounds: Normal heart sounds. Pulmonary:      Effort: Pulmonary effort is normal.      Breath sounds: Normal breath sounds. Abdominal:      General: Abdomen is flat. Palpations: Abdomen is soft. There is no hepatomegaly or splenomegaly. Tenderness: There is abdominal tenderness in the epigastric area and left upper quadrant. There is left CVA tenderness, guarding and rebound. Negative signs include Traylor's sign and McBurney's sign. Musculoskeletal:      Right lower leg: No edema. Left lower leg: No edema. Skin:     General: Skin is warm and dry. Capillary Refill: Capillary refill takes less than 2 seconds. Neurological:      Mental Status: She is alert and oriented to person, place, and time.       Gait: Gait normal.   Psychiatric:         Behavior: Behavior normal.          MDM       Procedures  Vitals:  Patient Vitals for the past 12 hrs:   Temp Pulse Resp BP SpO2   05/01/20 0030  (!) 110  (!) 179/113 100 %   04/30/20 2315  (!) 109  (!) 181/111 100 %   04/30/20 2245  (!) 115  (!) 172/103 98 %   04/30/20 2242 98.7 °F (37.1 °C) (!) 117 18     04/30/20 2230  (!) 118  (!) 161/99 97 %   04/30/20 2219    (!) 157/91 95 %         Medications ordered:   Medications   0.9% sodium chloride infusion (150 mL/hr IntraVENous New Bag 5/1/20 0029)   sodium chloride (NS) flush 5-40 mL (10 mL IntraVENous Given 5/1/20 0030)   sodium chloride (NS) flush 5-40 mL (has no administration in time range)   LORazepam (ATIVAN) tablet 1 mg (has no administration in time range)     Or   LORazepam (ATIVAN) injection 1 mg (has no administration in time range)   LORazepam (ATIVAN) tablet 2 mg (has no administration in time range)     Or   LORazepam (ATIVAN) injection 2 mg (has no administration in time range)   LORazepam (ATIVAN) injection 3 mg (has no administration in time range)   dextrose 5% and 0.9% NaCl 1,000 mL with mvi, adult no. 4 with vit K 10 mL, thiamine 928 mg, folic acid 1 mg infusion (has no administration in time range)   magnesium sulfate 2 g/50 ml IVPB (premix or compounded) (has no administration in time range)   thiamine (B-1) 100 mg/mL injection (has no administration in time range)   metoclopramide HCl (REGLAN) injection 10 mg (has no administration in time range)   diphenhydrAMINE (BENADRYL) injection 25 mg (has no administration in time range)   HYDROmorphone (DILAUDID) syringe 0.5 mg (has no administration in time range)   ondansetron (ZOFRAN) injection 4 mg (4 mg IntraVENous Given 4/30/20 2228)   famotidine (PF) (PEPCID) injection 20 mg (20 mg IntraVENous Given 4/30/20 2228)   thiamine (B-1) injection 200 mg (200 mg IntraMUSCular Given 4/30/20 2228)   lactated ringers bolus infusion 1,000 mL (1,000 mL IntraVENous New Bag 4/30/20 2227)     Followed by   lactated ringers bolus infusion 1,000 mL (1,000 mL IntraVENous New Bag 4/30/20 2317)   sodium chloride 0.9 % bolus infusion 1,000 mL (1,000 mL IntraVENous New Bag 4/30/20 2320)   HYDROmorphone (DILAUDID) syringe 0.5 mg (0.5 mg IntraVENous Given 4/30/20 6914)   magnesium sulfate 2 g/50 ml IVPB (premix or compounded) (2 g IntraVENous New Bag 5/1/20 0029)   iopamidoL (ISOVUE 300) 61 % contrast injection 100 mL (100 mL IntraVENous Given 4/30/20 4382)         Lab findings:  Recent Results (from the past 12 hour(s))   CBC WITH AUTOMATED DIFF    Collection Time: 04/30/20 10:24 PM   Result Value Ref Range    WBC 10.5 4.6 - 13.2 K/uL    RBC 4.52 4.20 - 5.30 M/uL    HGB 15.1 12.0 - 16.0 g/dL    HCT 45.0 35.0 - 45.0 %    MCV 99.6 (H) 74.0 - 97.0 FL    MCH 33.4 24.0 - 34.0 PG    MCHC 33.6 31.0 - 37.0 g/dL    RDW 14.4 11.6 - 14.5 %    PLATELET 054 (L) 554 - 420 K/uL    MPV 9.4 9.2 - 11.8 FL    NEUTROPHILS 74 (H) 40 - 73 %    LYMPHOCYTES 17 (L) 21 - 52 %    MONOCYTES 9 3 - 10 %    EOSINOPHILS 0 0 - 5 %    BASOPHILS 0 0 - 2 %    ABS. NEUTROPHILS 7.8 1.8 - 8.0 K/UL    ABS. LYMPHOCYTES 1.8 0.9 - 3.6 K/UL    ABS. MONOCYTES 0.9 0.05 - 1.2 K/UL    ABS. EOSINOPHILS 0.0 0.0 - 0.4 K/UL    ABS. BASOPHILS 0.0 0.0 - 0.1 K/UL    DF AUTOMATED     METABOLIC PANEL, COMPREHENSIVE    Collection Time: 04/30/20 10:24 PM   Result Value Ref Range    Sodium 131 (L) 136 - 145 mmol/L    Potassium 3.6 3.5 - 5.5 mmol/L    Chloride 88 (L) 100 - 111 mmol/L    CO2 29 21 - 32 mmol/L    Anion gap 14 3.0 - 18 mmol/L    Glucose 135 (H) 74 - 99 mg/dL    BUN 14 7.0 - 18 MG/DL    Creatinine 1.13 0.6 - 1.3 MG/DL    BUN/Creatinine ratio 12 12 - 20      GFR est AA >60 >60 ml/min/1.73m2    GFR est non-AA 50 (L) >60 ml/min/1.73m2    Calcium 14.1 (HH) 8.5 - 10.1 MG/DL    Bilirubin, total 1.2 (H) 0.2 - 1.0 MG/DL    ALT (SGPT) 111 (H) 13 - 56 U/L    AST (SGOT) 235 (H) 10 - 38 U/L    Alk.  phosphatase 142 (H) 45 - 117 U/L    Protein, total 7.6 6.4 - 8.2 g/dL    Albumin 3.6 3.4 - 5.0 g/dL    Globulin 4.0 2.0 - 4.0 g/dL    A-G Ratio 0.9 0.8 - 1.7     ETHYL ALCOHOL    Collection Time: 04/30/20 10:24 PM   Result Value Ref Range    ALCOHOL(ETHYL),SERUM <3 0 - 3 MG/DL   LIPASE    Collection Time: 04/30/20 10:24 PM   Result Value Ref Range    Lipase 540 (H) 73 - 393 U/L   MAGNESIUM Collection Time: 04/30/20 10:24 PM   Result Value Ref Range    Magnesium 1.0 (L) 1.6 - 2.6 mg/dL   EKG, 12 LEAD, INITIAL    Collection Time: 04/30/20 10:25 PM   Result Value Ref Range    Ventricular Rate 121 BPM    Atrial Rate 121 BPM    P-R Interval 130 ms    QRS Duration 74 ms    Q-T Interval 314 ms    QTC Calculation (Bezet) 445 ms    Calculated P Axis 73 degrees    Calculated R Axis 82 degrees    Calculated T Axis 72 degrees    Diagnosis       Sinus tachycardia  Otherwise normal ECG  When compared with ECG of 04-APR-2020 13:37,  No significant change was found     URINALYSIS W/ RFLX MICROSCOPIC    Collection Time: 05/01/20 12:04 AM   Result Value Ref Range    Color YELLOW      Appearance CLEAR      Specific gravity 1.014 1.005 - 1.030      pH (UA) 7.0 5.0 - 8.0      Protein TRACE (A) NEG mg/dL    Glucose Negative NEG mg/dL    Ketone 40 (A) NEG mg/dL    Bilirubin Negative NEG      Blood Negative NEG      Urobilinogen 1.0 0.2 - 1.0 EU/dL    Nitrites Negative NEG      Leukocyte Esterase Negative NEG     DRUG SCREEN, URINE    Collection Time: 05/01/20 12:04 AM   Result Value Ref Range    BENZODIAZEPINES Positive (A) NEG      BARBITURATES Negative NEG      THC (TH-CANNABINOL) Negative NEG      OPIATES Negative NEG      PCP(PHENCYCLIDINE) Negative NEG      COCAINE Negative NEG      AMPHETAMINES Negative NEG      METHADONE Negative NEG      HDSCOM (NOTE)    URINE MICROSCOPIC ONLY    Collection Time: 05/01/20 12:04 AM   Result Value Ref Range    WBC NONE 0 - 4 /hpf    RBC 0 to 3 0 - 5 /hpf    Epithelial cells 1+ 0 - 5 /lpf    Bacteria FEW (A) NEG /hpf    Mucus FEW (A) NEG /lpf       EKG interpretation by ED Physician:  Sinus tach with no acute ST or T wave changes indicative of acute ischemia  Rate 121 QRS 74   No significant change from previous    Cardiac monitor: Tachycardia with regular rhythm and no ectopy  Pulse ox: 95% room air    X-Ray, CT or other radiology findings or impressions:  CT ABD PELV W CONT (Results Pending)   Critic-Aid inflammatory changes suggestive of acute pancreatitis. No complications. Reactive ileus. Minimal displaced right 11th posterior rib fracture. Nonobstructing left renal stone. Chronic AVN of bilateral femoral heads. Chronic esophageal thickening edema. Progress notes, Consult notes or additional Procedure notes:   Given patient's significant hypercalcemia along with recurrent pancreatitis she will require admission for further medical management. D/w dr Emerita Soria on call for hospitalist who will admit    ED Critical Care Note    System at risk for life threatening failure: Neuro, cardiac, pulmonary, renal, pancreatic  Associated problems: Hypercalcemia, acute pancreatitis, alcohol withdrawal, hypertension    Critical Care services provided: Bedside management of acute pancreatitis, hypercalcemia, alcohol withdrawal, hypertension, documentation, consultation, bedside reassessment  Excluded procedures (time not included in critical care): EKG interpretation    Total Critical Care Time (in minutes) 68          Reevaluation of patient:   stable    Disposition:  Diagnosis:   1. Recurrent acute pancreatitis    2. Hypercalcemia    3. Non-intractable vomiting with nausea, unspecified vomiting type    4. Alcohol withdrawal syndrome with complication ([address]    5. Hypomagnesemia        Disposition: admit    Follow-up Information    None           Patient's Medications   Start Taking    No medications on file   Continue Taking    BENZONATATE (TESSALON PERLE PO)    Take  by mouth as needed. Indications: cough    BUSPIRONE (BUSPAR) 15 MG TABLET    Take 1 Tab by mouth two (2) times a day. CETIRIZINE (ZYRTEC) 10 MG TABLET        CLONIDINE HCL (CATAPRES) 0.1 MG TABLET    Take 1 Tab by mouth two (2) times a day. COLCHICINE 0.6 MG TABLET    Take 0.6 mg by mouth daily.     CREON 24,000-76,000 -120,000 UNIT CAPSULE        DOCUSATE SODIUM (COLACE) 100 MG CAPSULE    Take 100 mg by mouth three (3) times daily. FOLIC ACID (FOLVITE) 1 MG TABLET    Take 1 Tab by mouth daily. GABAPENTIN (NEURONTIN) 600 MG TABLET    Take 1 Tab by mouth two (2) times a day. HYDROCORTISONE (CORTAID) 0.5 % TOPICAL CREAM    Apply  to affected area two (2) times a day. use thin layer    IPRATROPIUM-ALBUTEROL (COMBIVENT RESPIMAT)  MCG/ACTUATION INHALER    Take 1 Puff by inhalation as needed for Wheezing. LAMOTRIGINE (LAMICTAL) 150 MG TABLET    Take 150 mg by mouth daily. LAMOTRIGINE (LAMICTAL) 200 MG TABLET    Take  by mouth daily. LIDOCAINE (LIDODERM) 5 %    Apply patch to the affected area for 12 hours a day and remove for 12 hours a day. LIPASE-PROTEASE-AMYLASE (CREON 24,000) 24,000-76,000 -120,000 UNIT CAPSULE    Take 1 Cap by mouth. MELATONIN 10 MG TAB    Take 10 mg by mouth nightly. MILK THISTLE PO    Take 175 mg by mouth two (2) times a day. MULTIVITAMIN (ONE A DAY) TABLET    Take 1 Tab by mouth daily. ONDANSETRON (ZOFRAN ODT) 8 MG DISINTEGRATING TABLET    Take 1 Tab by mouth every eight (8) hours as needed for Nausea. PANTOPRAZOLE (PROTONIX) 40 MG TABLET    Take 1 Tab by mouth Before breakfast and dinner. QUETIAPINE (SEROQUEL) 300 MG TABLET        SUCRALFATE (CARAFATE) 100 MG/ML SUSPENSION    Take 10 mL by mouth Before breakfast, lunch, dinner and at bedtime. SUMATRIPTAN (IMITREX) 100 MG TABLET    TAKE 1 TO 2 TABLETS BY MOUTH DAILY AS NEEDED FOR MIGRAINE . DO NOT EXCEED 200 MG IN 24 HOUR PERIOD    THIAMINE HCL (B-1) 100 MG TABLET    Take 1 Tab by mouth daily (with breakfast). TOPIRAMATE (TOPAMAX) 100 MG TABLET    100 mg two (2) times a day. TRAZODONE (DESYREL) 100 MG TABLET    Take 200 mg by mouth nightly. TRAZODONE (DESYREL) 100 MG TABLET    Take 100-200 mg by mouth nightly.    These Medications have changed    No medications on file   Stop Taking    No medications on file

## 2020-05-01 NOTE — PROGRESS NOTES
attempted to complete  the initial Spiritual Assessment of the patient  in bed 15 of the emergency room  Found patient resting peacefully at this time. . Patient does not have any Jewish/cultural needs that will affect patients preferences in health care. Chaplains will continue to follow and will provide pastoral care on an as needed/requested basis.     Kelly Hopkins  Spiritual Care Department  [phone]

## 2020-05-01 NOTE — PROGRESS NOTES
4011 Report received from [address] on pt coming from the ED.     2840 Pt arrived from ER with nurse. Admission assessment completed. Bed alarm on. Call bell in reach. 1021 PO medications administered and tolerated well with sips of water. IV dilaudid administered for pt complaint of 10/10 abdominal pain. 1330 Pt ambulated to bathroom x 1 assist.     1405 PRN iv dilaudid administered for pt complaint of 9/10 abdominal pain. 1736 Report given to [facility] using SBAR and MAR.

## 2020-05-01 NOTE — ED NOTES
TRANSFER - ED to INPATIENT REPORT:    Verbal report given to SEB Rose(name) on Gee Hager  being transferred to [address] [place] [address](unit) for routine progression of care       Report consisted of patients Situation, Background, Assessment and   Recommendations(SBAR). SBAR report made available to receiving floor on this patient being transferred to [address] [place] [address])  for routine progression of care       Admitting diagnosis Acute pancreatitis [K85.90]  Alcohol withdrawal ([facility] [address] [F10.239]  Hypercalcemia [E83.52]    Information from the following report(s) SBAR, ED Summary, Intake/Output, MAR and Recent Results was made available to receiving floor. Lines:   Peripheral IV 04/30/20 Left Antecubital (Active)       Peripheral IV 05/01/20 Right Antecubital (Active)   Site Assessment Clean, dry, & intact 5/1/2020 12:12 AM   Phlebitis Assessment 0 5/1/2020 12:12 AM   Infiltration Assessment 0 5/1/2020 12:12 AM   Dressing Status Clean, dry, & intact 5/1/2020 12:12 AM   Dressing Type Transparent 5/1/2020 12:12 AM   Hub Color/Line Status Pink;Flushed 5/1/2020 12:12 AM        Opportunity for questions and clarification was provided.       Patient is only aware of  time, place and person   Patient is  continent and ambulatory with assist     Valuables transported with patient     Patient transported with:   Monitor  Registered Nurse    MAP (Monitor): 93 =Monitored (most recent)  Vitals w/ MEWS Score (last day)     Date/Time MEWS Score Pulse Resp Temp BP Level of Consciousness SpO2    05/01/20 1001  3  (!) 122  18  97.7 °F (36.5 °C)  138/83  Alert  94 %    05/01/20 0845          133/79    95 %    05/01/20 0830          127/78    94 %    05/01/20 0815          140/80    95 %    05/01/20 0800    (!) 119      143/86    96 %    05/01/20 0730    (!) 116      (!) 151/93    97 %    05/01/20 0545    (!) 121      (!) 162/96    97 %    05/01/20 0530    (!) 121      (!) 174/100    97 %    05/01/20 0515    (!) 121     (!) 167/102    97 %    05/01/20 0500    (!) 120      (!) 166/105    97 %    05/01/20 0445    (!) 121      (!) 160/102    98 %    05/01/20 0430    (!) 116      (!) 167/102    99 %    05/01/20 0415    (!) 120      (!) 186/108    100 %    05/01/20 0400    (!) 113      (!) 183/117    100 %    05/01/20 0345    (!) 121      (!) 183/112    100 %    05/01/20 0330    (!) 123      (!) 185/110    100 %    05/01/20 0315    (!) 112      (!) 168/103    99 %    05/01/20 0300    (!) 112      (!) 175/105    99 %    05/01/20 0245    (!) 114      (!) 180/105    99 %    05/01/20 0230    (!) 115      (!) 176/105    99 %    05/01/20 0215    (!) 114      (!) 164/102    99 %    05/01/20 0130    (!) 115      (!) 175/109    100 %    05/01/20 0100    (!) 115      (!) 171/115    100 %    05/01/20 0030    (!) 110      (!) 179/113    100 %    04/30/20 2315    (!) 109      (!) 181/111    100 %    04/30/20 2245    (!) 115      (!) 172/103    98 %    04/30/20 22:42:56    (!) 117  18  98.7 °F (37.1 °C)    Alert      04/30/20 2230    (!) 118      (!) 161/99    97 %    04/30/20 2219          (!) 157/91    95 %

## 2020-05-01 NOTE — PROGRESS NOTES
Order changed from cetirizine 10mg daily to loratadine 10mg daily per therapeutic interchange policy.

## 2020-05-01 NOTE — H&P
History and Physical    Patient: Dirk Mark MRN: [id number]  SSN: [id number]    YOB: 1965  Age: 63 y.o. Sex: female      Subjective:      Dirk Mark is a 63 y.o. female who presents to [facility] ER with complaint of Abdominal Pain with Nausea and Vomiting. Patient states that her pain began 2 days ago and that she has had non-bloody emeses. Patient states that her last drink was the day before yesterday (possibly 5/28/2020). Patient falls asleep while contemplating the intensity and frequency of her pain. Patient remarks \"sorry\" and \"thank you\" every time that she is roused with moderate auditory stimuli or mild to moderate tactile stimulation. Patient is A&O x4/4 with greater effort; however, she is very somnolent and falls asleep rapidly and seems to lack the motivation to stay awake. In [facility] ER, Patient is noted to have Heart Rate 121 bpm, Blood Pressure 162/96 mm Hg, Calcium 14.1, Magnesium 1.0, , , Alk Phos 142, and Lipase 540. CT Abdomen/Pelvis shows Acute Pancreatitis, Reactive Ileus, Severe Hepatic steatosis, chronic esophageal edema, and chronic Avascular Necrosis of Bilateral Femoral Heads. Patient is admitted to [facility] Telemetry Unit (Non-Covid-19 Cohort) for management of Acute (Alcoholic) Pancreatitis, ETOH Withdrawal, Hypercalcemia, and Hypertensive Urgency. Past Medical History:   Diagnosis Date    Alcohol abuse     Anxiety     Arrhythmia     Tacchycardai    Asthma     controlled    Bipolar disorder ([address]     Common migraine     COPD (chronic obstructive pulmonary disease) (HCC)     Home O2  PRN ,  pt use also for Tachycardia    Depression     Esophageal reflux     Gout     Hypertension     Hypocitraturia     Inverted nipple     Left breast always have.     Kidney stone on left side     Pancreatitis     Recurrent UTI     Staghorn renal calculus     Urine leukocytes      Past Surgical History:   Procedure Laterality Date    HX COLONOSCOPY  HX CYST REMOVAL Left     x 3 surgeries    HX ENDOSCOPY      HX GYN      tubal ligation    HX LUMBAR LAMINECTOMY      HX UROLOGICAL  08/10/2018    Cysto, bilat RPG, left ureteral pyeloscopy, HLL, left JJ stent placement, Dr. Kathy English      Family History   Family history unknown: Yes     Social History     Tobacco Use    Smoking status: Current Every Day Smoker     Packs/day: 1.50     Years: 33.00     Pack years: 49.50     Types: Cigarettes    Smokeless tobacco: Never Used   Substance Use Topics    Alcohol use: Not Currently     Comment: vodka      Prior to Admission medications    Medication Sig Start Date End Date Taking? Authorizing Provider   lidocaine (LIDODERM) 5 % Apply patch to the affected area for 12 hours a day and remove for 12 hours a day. 4/26/20   Meri Zamora PA-C   lamoTRIgine (LaMICtal) 150 mg tablet Take 150 mg by mouth daily. Nawaf Clemente MD   lipase-protease-amylase (CREON 24,000) 24,000-76,000 -120,000 unit capsule Take 1 Cap by mouth. Nawaf Clemente MD   traZODone (DESYREL) 100 mg tablet Take 100-200 mg by mouth nightly. Nawaf Clemente MD   sucralfate (CARAFATE) 100 mg/mL suspension Take 10 mL by mouth Before breakfast, lunch, dinner and at bedtime. 4/4/20   Vincent Santos PA-C   folic acid (FOLVITE) 1 mg tablet Take 1 Tab by mouth daily. 2/6/20   Rubi Hollis MD   multivitamin (ONE A DAY) tablet Take 1 Tab by mouth daily. 2/6/20   Rubi Hollis MD   thiamine HCL (B-1) 100 mg tablet Take 1 Tab by mouth daily (with breakfast). 2/6/20   Rubi Hollis MD   ipratropium-albuteroL (COMBIVENT RESPIMAT)  mcg/actuation inhaler Take 1 Puff by inhalation as needed for Wheezing. Nawaf Clemente MD   traZODone (DESYREL) 100 mg tablet Take 200 mg by mouth nightly. Nawaf Clemente MD   melatonin 10 mg tab Take 10 mg by mouth nightly. Nawaf Clemente MD   MILK THISTLE PO Take 175 mg by mouth two (2) times a day.     Nawaf Clemetne MD   colchicine 0.6 mg tablet Take 0.6 mg by mouth daily. Nawaf Clemente MD   benzonatate (TESSALON PERLE PO) Take  by mouth as needed. Indications: cough    Nawaf Clemente MD   docusate sodium (COLACE) 100 mg capsule Take 100 mg by mouth three (3) times daily. Nawaf Clemente MD   ondansetron (ZOFRAN ODT) 8 mg disintegrating tablet Take 1 Tab by mouth every eight (8) hours as needed for Nausea. 5/7/19   Ruben Levine MD   pantoprazole (PROTONIX) 40 mg tablet Take 1 Tab by mouth Before breakfast and dinner. 5/7/19   Ruben Levine MD   gabapentin (NEURONTIN) 600 mg tablet Take 1 Tab by mouth two (2) times a day. 5/7/19   Ruben Levine MD   cloNIDine HCl (CATAPRES) 0.1 mg tablet Take 1 Tab by mouth two (2) times a day. 5/7/19   Ruben Levine MD   lamoTRIgine (LAMICTAL) 200 mg tablet Take  by mouth daily. Provider, Historical   hydrocortisone (CORTAID) 0.5 % topical cream Apply  to affected area two (2) times a day. use thin layer 8/3/18   Lam Sanchezs, NP   topiramate (TOPAMAX) 100 mg tablet 100 mg two (2) times a day. 3/22/18   Provider, Historical   QUEtiapine (SEROQUEL) 300 mg tablet  3/22/18   Provider, Historical   CREON 24,000-76,000 -120,000 unit capsule  3/28/18   Provider, Historical   cetirizine (ZYRTEC) 10 mg tablet  3/22/18   Provider, Historical   SUMAtriptan (IMITREX) 100 mg tablet TAKE 1 TO 2 TABLETS BY MOUTH DAILY AS NEEDED FOR MIGRAINE . DO NOT EXCEED 200 MG IN 24 HOUR PERIOD 2/28/18   Provider, Historical   busPIRone (BUSPAR) 15 mg tablet Take 1 Tab by mouth two (2) times a day. 3/15/18   Wil Manning, NP        Allergies   Allergen Reactions    Lithium Anaphylaxis    Morphine Hives     Pt states she is not allergic to Morphine.      Amitriptyline Other (comments)     Left leg went numb    Elavil Other (comments)     Left leg went numb       Review of Systems:  Patient is not sufficiently oriented to answer questions accurately:  (+) Abdominal Pain  (+) Nausea  (+) Vomiting  All other systems have been reviewed and are negative      Objective:     Vitals:    05/01/20 0500 05/01/20 0515 05/01/20 0530 05/01/20 0545   BP: (!) 166/105 (!) 167/102 (!) 174/100 (!) 162/96   Pulse: (!) 120 (!) 121 (!) 121 (!) 121   Resp:       Temp:       SpO2: 97% 97% 97% 97%   Weight:       Height:            Physical Exam:  General:  Older Adult female lying in bed in no acute distress  HEENT:  Atraumatic, (+) Mild to Moderate Bilateral Temporal Wasting; Pupils equally round and (+) Minimally reactive to light; (+) Somewhat Dry Oropharynx without erythema, edema, or exudates; (+) NC in place and running at 1.5 L/min O2  Neck:  No Bruits; No Lymphadenopathy  Chest:  No pectus carinatum; No pectus excavatum  Cardiovascular:  (+) Tachycardic rate, regular rhythm without rubs, gallops, or murmurs  Respiratory:  (+) Mild to Moderately reduced lung sounds globally; Otherwise, clear to Auscultation Bilaterally without wheezes, rales, or rhonchi; normal effort of breathing  Abdominal:  Soft, non-tense, (+) Mild to Moderately diffusely tender worse on Left Side; BS present without guarding, rebound, or masses  :  Deferred  Extremities:  Pulses 2+ x4 without edema, clubbing, or cyanosis  Musculoskeletal:  Strength 5/5 and symmetrical in BUE; (+) Strength 4/5 in Plantarflexion; (+) Patient does not meaningfully cooperate with Dorsiflexion  Integument:  No rash on face, forearms, or legs; (+) Slight Desquamation noted on hands  Neurological:  A&O x4/4 (with great effort); No gross deficits of Visual Acuity, Eye Movement, Jaw Opening, Facial Expression, Hearing, Phonation, or Head Movement;  No gross deficits of Tongue Movement or Slurring of Speech  Psychiatric:  (+) Affect is Mild to Moderately Constricted; Language is present and fluent, (+) but Abbreviated; (+) Behavior is Moderately Somnolent and requires near constant Mild tactile stimulation to rouse to participate in interview    Assessment:     Hospital Problems  Date Reviewed: 5/1/2020 Codes Class Noted POA    * (Principal) Acute pancreatitis ICD-10-CM: K85.90  ICD-9-CM: 758.6  2/19/2016 Yes        Alcohol withdrawal ([facility] ICD-10-CM: Q91.504  ICD-9-CM: 291.81  3/8/2018 Yes        Hypercalcemia ICD-10-CM: F27.76  ICD-9-CM: 275.42  3/8/2018 Yes        Hypertensive urgency ICD-10-CM: I16.0  ICD-9-CM: 401.9  5/1/2020 Yes        COPD (chronic obstructive pulmonary disease) ([facility] ICD-10-CM: J44.9  ICD-9-CM: 710  2/25/2016 Yes        Alcoholism ([facility] (Chronic) ICD-10-CM: F10.20  ICD-9-CM: 303.90  1/28/2016 Yes        Bipolar depression ([facility] (Chronic) ICD-10-CM: F31.9  ICD-9-CM: 296.50  1/28/2016 Yes        HTN (hypertension) (Chronic) ICD-10-CM: I10  ICD-9-CM: 401.9  1/28/2016 Yes        Esophagitis ICD-10-CM: K20.9  ICD-9-CM: 530.10  5/23/2019 Yes              Plan:     IV fluids ( mL/hr), NPO (except Medications and Ice Chips), Folic Acid, Thiamine, Daily Vitamin, and IV Pantoprazole, PRN Acetaminophen, PRN Ondansetron, PRN IV Hydromorphone. CIWA Protocol with Telemetry. Check Lipid Panel to rule out Hypertriglyceridemia as cause of Acute Pancreatitis. Continue medications for Bipolar Mood Disorder, HTN, Seasonal Allergies, Insomnia. PRN Hydralazine for Acute Hypertensive. DVT mechanoprophylaxis. Patient's Medication list is highly suspect for inaccuracy and Patient lacks the focus necessary to discuss complex concepts. May need to obtain list of prescribed medications from [address] [place] [address].     Signed By: Bello Wynn DO     May 1, 2020

## 2020-05-01 NOTE — PROGRESS NOTES
Reason for Admission:   Acute Pancreatitis; Alcohol Withdrawal; Hypercalcemia                  RUR Score:     18             PCP: First and Last name:  Sunny Alcaraz   Name of Practice:    Are you a current patient: Yes/No: Yes   Approximate date of last visit: 4/6/2020    Do you (patient/family) have any concerns for transition/discharge? No              Plan for utilizing home health:   No    Current Advanced Directive/Advance Care Plan:  N/A            Transition of Care Plan:      Home no needs. Initial assessment completed with pt in the ED, bed 5. Verified demographic and insurance data for accuracy. Pt was last seen by her PCP on 4/6/2020 via telemedicine. Post hospital follow up appt scheduled for Friday, 5/15/2020 at 1PM.     Prior to admission pt lived alone with her  in a 2 story home with 4 steps to enter. There are bedrooms and full baths on both levels of the residence. Pt was independent with ambulation and ADL's, and denies any prior home care services or DME. Pt is receiving social security disability income for \"severe bipolar\" disorder. Per pt, although she is legally , she and her  \"essentially live as roommates and has done so for the last 12 years. \" Pt still wishes to have her  involved in her discharge planning. Anticipated transition plan is home with no needs. Care management will continue to follow.          Tarik Bello, MSN, RN, ACM-RN   ED Outcomes Manager  [phone] (phone)

## 2020-05-01 NOTE — PROGRESS NOTES
conducted an initial consultation and Spiritual Assessment for Dirk Mark, who is a 63 y.o.,female. Patients Primary Language is: [place]. According to the patients EMR Worship Affiliation is: [place]. The reason the Patient came to the hospital is:   Patient Active Problem List    Diagnosis Date Noted    Bipolar disorder (manic depression) ([facility] 03/08/2013     Priority: 1 - One    Hypertensive urgency 05/01/2020    Hyperkalemia 10/11/2019    Common bile duct dilatation 10/11/2019    Chest pain 05/23/2019    Esophagitis 05/23/2019    Abdominal pain 23/23/6197    Metabolic acidosis 12/43/2693    NSTEMI (non-ST elevated myocardial infarction) ([facility] 01/15/2019    Rhabdomyolysis 01/15/2019    Upper GI bleed 01/15/2019    Recurrent UTI (urinary tract infection) 04/24/2018    Staghorn renal calculus 04/24/2018    UTI (urinary tract infection) 03/11/2018    Bronchitis 03/11/2018    Syncope 03/08/2018    Alcohol withdrawal ([facility] 03/08/2018    GI bleed 03/08/2018    Hyponatremia 03/08/2018    Hypercalcemia 03/08/2018    Dizziness 09/14/2017    Pancreatitis 09/14/2017    Hypokalemia 09/14/2017    Anemia 09/14/2017    Encephalopathy 09/14/2017    Nicotine withdrawal 02/27/2016    Pneumonia 02/25/2016    COPD (chronic obstructive pulmonary disease) ([facility] 02/25/2016    Tylenol overdose 02/20/2016    SIRS (systemic inflammatory response syndrome) ([facility] 02/20/2016    Acute pancreatitis 02/19/2016    Pancreatitis, alcoholic, acute 03/06/1860    Tachycardia 01/28/2016    Alcoholism ([facility] 01/28/2016    Esophageal stricture 01/28/2016    Dilated pancreatic duct 01/28/2016    Common bile duct dilation 01/28/2016    Elevated LFTs 01/28/2016    Bipolar depression ([facility] 01/28/2016    HTN (hypertension) 01/28/2016        The  provided the following Interventions:  Initiated a relationship of care and support.    Explored issues of owen, spirituality and/or Hoahaoism needs while hospitalized. Listened empathically. Provided chaplaincy education. Provided information about Spiritual Care Services. Offered prayer and assurance of continued prayers on patient's behalf. Chart reviewed. The following outcomes were achieved:  Patient shared some information about their medical narrative and spiritual journey/beliefs. Patient processed feeling about current hospitalization. Patient expressed gratitude for the 's visit. Assessment:  Patient did not indicate any spiritual or Muslim issues which require Spiritual Care Services interventions at this time. Patient does not have any Muslim/cultural needs that will affect patients preferences in health care. Plan:  Chaplains will continue to follow and will provide pastoral care on an as needed or requested basis.  recommends bedside caregivers page  on duty if patient shows signs of acute spiritual or emotional distress.     [facility]   Staff [facility]   [phone]

## 2020-05-01 NOTE — ED NOTES
CIWA reassessed after pain and nausea medications given. Pt resting at this time and CIWA reassessed at 2.   Ativan returned

## 2020-05-01 NOTE — PROGRESS NOTES
Patient admitted to [facility] 4-30-20.     D/C from COVID -19 screening

## 2020-05-01 NOTE — CDMP QUERY
Patient admitted with N/V; acute pancreatitis, noted to have a serum sodium of 131. If possible, please document in progress notes and d/c summary if you are evaluating and/or treating any of the following:  Hyponatremia  No clinical significance to sodium level  Other, please specify  Unable to Determine The medical record reflects the following: 
  Risk Factors: N/V; acute pancreatitis; alcohol withdrawal  
  Clinical Indicators: Serum sodium of 131 Treatment: NS @ 150 mL/hr; BMP ;Protonix;  Zofran Thank you, Rose Schneider RN/MERLYN [email or web address]

## 2020-05-01 NOTE — ED TRIAGE NOTES
Pt arrived via EMS w/ complaint of bilateral upper quadrant abd pain x 2 days w/ vomitting. Pt did state to EMS that she had been drinking heavily x 2 days. Pt also states hx of pancreatitis.

## 2020-05-01 NOTE — PROGRESS NOTES
Reason for Admission:   Acute pancreatitis               RUR Score:  29%       PCP: First and Last name: Allen Camargo   Name of Practice:   Are you a current patient: Yes/No: yes   Approximate date of last visit: \"couple of months ago\"             Resources/supports as identified by patient/family:  Has Medicare parts a and b, [employer], spouse                 Top Challenges facing patient (as identified by patient/family and CM): Finances/Medication cost?   no               Transportation? [address] or lack thereof? As above                     Living arrangements? Lives with spouse               Self-care/ADLs/Cognition? Appears to oriented for the most part, but very somnolent, continually drops off to sleep during interview          Current Advanced Directive/Advance Care Plan:  No.  Unable to get pt alert enough to address having AMD done. She continually falls asleep. Plan for utilizing home health:   Not at this time                  Transition of Care Plan:      Irene Bailey pt and verified face sheet info. Pt is extremely drowsy and continually nods off during interview. Awakens for brief periods with loud voice. States she is independent with ADL's. Uses oxygen at 2 liters on an as needed basis at home. Is unable to tell me her oxygen supply company. Reports that she primarily uses [employer] for transportation and is able to get medications when needed. Reports that her plan will be to return home when medically ready for discharge. Patient has designated _spouse_______________________ to participate in his/her discharge plan and to receive any needed information. Name: Nanci Staley  Address:  Phone number: [phone]    Care Management Interventions  PCP Verified by CM:  Yes  Last Visit to PCP: 03/01/20  Mode of Transport at Discharge: Self( will drive)  Transition of Care Consult (CM Consult): Discharge Planning  Current Support Network: Lives with Spouse  Confirm Follow Up Transport: Self  Discharge Location  Discharge Placement: Home

## 2020-05-01 NOTE — ED NOTES
Pt noted sitting on edge of the bed, very agitated stating \"I'm going to the bathroom\". Pt was was just taken off of the bedpan 10 minutes prior after voiding 200 mls. Pt assisted back into bed.

## 2020-05-02 LAB
ALBUMIN SERPL-MCNC: 2.7 G/DL (ref 3.4–5)
ALBUMIN/GLOB SERPL: 1 {RATIO} (ref 0.8–1.7)
ALP SERPL-CCNC: 106 U/L (ref 45–117)
ALT SERPL-CCNC: 67 U/L (ref 13–56)
ANION GAP SERPL CALC-SCNC: 7 MMOL/L (ref 3–18)
AST SERPL-CCNC: 92 U/L (ref 10–38)
BASOPHILS # BLD: 0 K/UL (ref 0–0.1)
BASOPHILS NFR BLD: 0 % (ref 0–3)
BILIRUB SERPL-MCNC: 0.6 MG/DL (ref 0.2–1)
BUN SERPL-MCNC: 14 MG/DL (ref 7–18)
BUN/CREAT SERPL: 18 (ref 12–20)
CALCIUM SERPL-MCNC: 9.2 MG/DL (ref 8.5–10.1)
CHLORIDE SERPL-SCNC: 108 MMOL/L (ref 100–111)
CO2 SERPL-SCNC: 27 MMOL/L (ref 21–32)
CREAT SERPL-MCNC: 0.8 MG/DL (ref 0.6–1.3)
DIFFERENTIAL METHOD BLD: ABNORMAL
EOSINOPHIL # BLD: 0.1 K/UL (ref 0–0.4)
EOSINOPHIL NFR BLD: 2 % (ref 0–5)
ERYTHROCYTE [DISTWIDTH] IN BLOOD BY AUTOMATED COUNT: 14.7 % (ref 11.6–14.5)
GLOBULIN SER CALC-MCNC: 2.6 G/DL (ref 2–4)
GLUCOSE SERPL-MCNC: 79 MG/DL (ref 74–99)
HCT VFR BLD AUTO: 39 % (ref 35–45)
HGB BLD-MCNC: 12.9 G/DL (ref 12–16)
INR PPP: 0.9 (ref 0.8–1.2)
LYMPHOCYTES # BLD: 1.5 K/UL (ref 0.8–3.5)
LYMPHOCYTES NFR BLD: 24 % (ref 20–51)
MCH RBC QN AUTO: 33.6 PG (ref 24–34)
MCHC RBC AUTO-ENTMCNC: 33.1 G/DL (ref 31–37)
MCV RBC AUTO: 101.6 FL (ref 74–97)
MONOCYTES # BLD: 0.2 K/UL (ref 0–1)
MONOCYTES NFR BLD: 3 % (ref 2–9)
NEUTS SEG # BLD: 4.5 K/UL (ref 1.8–8)
NEUTS SEG NFR BLD: 71 % (ref 42–75)
PLATELET # BLD AUTO: 64 K/UL (ref 135–420)
PLATELET COMMENTS,PCOM: ABNORMAL
PMV BLD AUTO: 10.6 FL (ref 9.2–11.8)
POTASSIUM SERPL-SCNC: 3.5 MMOL/L (ref 3.5–5.5)
PROT SERPL-MCNC: 5.3 G/DL (ref 6.4–8.2)
PROTHROMBIN TIME: 12 SEC (ref 11.5–15.2)
RBC # BLD AUTO: 3.84 M/UL (ref 4.2–5.3)
RBC MORPH BLD: ABNORMAL
SODIUM SERPL-SCNC: 142 MMOL/L (ref 136–145)
WBC # BLD AUTO: 6.3 K/UL (ref 4.6–13.2)

## 2020-05-02 PROCEDURE — 74011000250 HC RX REV CODE- 250: Performed by: INTERNAL MEDICINE

## 2020-05-02 PROCEDURE — 85610 PROTHROMBIN TIME: CPT

## 2020-05-02 PROCEDURE — 80053 COMPREHEN METABOLIC PANEL: CPT

## 2020-05-02 PROCEDURE — 74011250636 HC RX REV CODE- 250/636: Performed by: INTERNAL MEDICINE

## 2020-05-02 PROCEDURE — 77010033678 HC OXYGEN DAILY

## 2020-05-02 PROCEDURE — 74011250637 HC RX REV CODE- 250/637: Performed by: INTERNAL MEDICINE

## 2020-05-02 PROCEDURE — 36415 COLL VENOUS BLD VENIPUNCTURE: CPT

## 2020-05-02 PROCEDURE — C9113 INJ PANTOPRAZOLE SODIUM, VIA: HCPCS | Performed by: INTERNAL MEDICINE

## 2020-05-02 PROCEDURE — 65660000000 HC RM CCU STEPDOWN

## 2020-05-02 PROCEDURE — 85025 COMPLETE CBC W/AUTO DIFF WBC: CPT

## 2020-05-02 RX ADMIN — Medication 100 MG: at 09:05

## 2020-05-02 RX ADMIN — PANTOPRAZOLE SODIUM 40 MG: 40 INJECTION, POWDER, FOR SOLUTION INTRAVENOUS at 20:18

## 2020-05-02 RX ADMIN — COLCHICINE 0.6 MG: 0.6 TABLET, FILM COATED ORAL at 09:05

## 2020-05-02 RX ADMIN — HYDROMORPHONE HYDROCHLORIDE 0.5 MG: 1 INJECTION, SOLUTION INTRAMUSCULAR; INTRAVENOUS; SUBCUTANEOUS at 23:10

## 2020-05-02 RX ADMIN — SODIUM CHLORIDE 150 ML/HR: 900 INJECTION, SOLUTION INTRAVENOUS at 22:58

## 2020-05-02 RX ADMIN — PANTOPRAZOLE SODIUM 40 MG: 40 INJECTION, POWDER, FOR SOLUTION INTRAVENOUS at 09:07

## 2020-05-02 RX ADMIN — LAMOTRIGINE 150 MG: 100 TABLET ORAL at 09:05

## 2020-05-02 RX ADMIN — QUETIAPINE FUMARATE 300 MG: 300 TABLET ORAL at 22:00

## 2020-05-02 RX ADMIN — CLONIDINE HYDROCHLORIDE 0.1 MG: 0.1 TABLET ORAL at 09:05

## 2020-05-02 RX ADMIN — SODIUM CHLORIDE 10 ML: 9 INJECTION, SOLUTION INTRAMUSCULAR; INTRAVENOUS; SUBCUTANEOUS at 22:45

## 2020-05-02 RX ADMIN — PANCRELIPASE LIPASE, PANCRELIPASE PROTEASE, PANCRELIPASE AMYLASE 2 CAPSULE: 10000; 32000; 42000 CAPSULE, DELAYED RELEASE ORAL at 12:04

## 2020-05-02 RX ADMIN — HYDROMORPHONE HYDROCHLORIDE 0.5 MG: 1 INJECTION, SOLUTION INTRAMUSCULAR; INTRAVENOUS; SUBCUTANEOUS at 16:30

## 2020-05-02 RX ADMIN — HYDROMORPHONE HYDROCHLORIDE 0.5 MG: 1 INJECTION, SOLUTION INTRAMUSCULAR; INTRAVENOUS; SUBCUTANEOUS at 20:05

## 2020-05-02 RX ADMIN — SODIUM CHLORIDE 150 ML/HR: 900 INJECTION, SOLUTION INTRAVENOUS at 12:01

## 2020-05-02 RX ADMIN — MULTIVITAMIN TABLET 1 TABLET: TABLET at 09:05

## 2020-05-02 RX ADMIN — LORATADINE 10 MG: 10 TABLET ORAL at 09:05

## 2020-05-02 RX ADMIN — PANCRELIPASE LIPASE, PANCRELIPASE PROTEASE, PANCRELIPASE AMYLASE 2 CAPSULE: 10000; 32000; 42000 CAPSULE, DELAYED RELEASE ORAL at 09:05

## 2020-05-02 RX ADMIN — BUSPIRONE HYDROCHLORIDE 15 MG: 7.5 TABLET ORAL at 09:05

## 2020-05-02 RX ADMIN — Medication 10 ML: at 22:45

## 2020-05-02 RX ADMIN — SODIUM CHLORIDE 10 ML: 9 INJECTION, SOLUTION INTRAMUSCULAR; INTRAVENOUS; SUBCUTANEOUS at 09:07

## 2020-05-02 RX ADMIN — FOLIC ACID 1 MG: 1 TABLET ORAL at 09:05

## 2020-05-02 RX ADMIN — TOPIRAMATE 100 MG: 25 TABLET, FILM COATED ORAL at 09:05

## 2020-05-02 NOTE — ROUTINE PROCESS
Bedside and Verbal shift change report given to Tracy Eli, RN (oncoming nurse) by Ayad Moy RN, BSN (offgoing nurse). Report given with SBAR, Kardex, Intake/Output, MAR and Recent Results.

## 2020-05-02 NOTE — ROUTINE PROCESS
1730-Assumed care, assessment completed, no change in condition, resting quietly, no distress noted. 1950-Bedside and Verbal shift change report given to Amauri Adler (oncoming nurse) by Ortiz Duran (offgoing nurse). Report included the following information SBAR, MAR and Recent Results.

## 2020-05-02 NOTE — PROGRESS NOTES
Problem: Falls - Risk of  Goal: *Absence of Falls  Description: Document Ira Pichardo Fall Risk and appropriate interventions in the flowsheet.   Outcome: Progressing Towards Goal  Note: Fall Risk Interventions:  Mobility Interventions: Patient to call before getting OOB         Medication Interventions: Evaluate medications/consider consulting pharmacy, Patient to call before getting OOB    Elimination Interventions: Call light in reach, Patient to call for help with toileting needs    History of Falls Interventions: Door open when patient unattended, Evaluate medications/consider consulting pharmacy         Problem: Patient Education: Go to Patient Education Activity  Goal: Patient/Family Education  Outcome: Progressing Towards Goal     Problem: Pancreatitis  Goal: *Control of acute pain  Outcome: Progressing Towards Goal     Problem: Pain - Acute  Goal: *Control of acute pain  Outcome: Progressing Towards Goal     Problem: Patient Education: Go to Patient Education Activity  Goal: Patient/Family Education  Outcome: Progressing Towards Goal

## 2020-05-02 NOTE — PROGRESS NOTES
Internal Medicine Progress Note    Patient's Name: Angela Brunner Date: 4/30/2020  Length of Stay: 1      Assessment/Plan     Active Hospital Problems    Diagnosis Date Noted    Hypertensive urgency 05/01/2020    Esophagitis 05/23/2019    Alcohol withdrawal ([facility] 03/08/2018    Hypercalcemia 03/08/2018    Hyponatremia 03/08/2018    COPD (chronic obstructive pulmonary disease) ([facility] 02/25/2016    Acute pancreatitis 02/19/2016    Alcoholism ([facility] 01/28/2016    Bipolar depression ([facility] 01/28/2016    HTN (hypertension) 01/28/2016     - Cont aggressive IVFs  - Pain control  - Advance to clears  - CIWA  - Thiamine, folate, MVI  - Cont acceptable home medications for chronic conditions   - DVT protocol    I have personally reviewed all pertinent labs and films that have officially resulted over the last 24 hours. I have personally checked for all pending labs that are awaiting final results.     Subjective     Pt s/e @ bedside  No major events overnight  Feeling better  Still w/ epigastric pain  Denies CP or SOB    Objective     Visit Vitals  /89 (BP 1 Location: Left arm, BP Patient Position: At rest)   Pulse (!) 109   Temp 97.7 °F (36.5 °C)   Resp 16   Ht 5' 2\" (1.575 m)   Wt 53.5 kg (118 lb)   SpO2 96%   BMI 21.58 kg/m²       Physical Exam:  General Appearance: NAD, conversant  Lungs: CTA with normal respiratory effort  CV: RRR, no m/r/g  Abdomen: soft, mod TTP, normal bowel sounds  Extremities: no cyanosis, no peripheral edema  Neuro: No focal deficits, motor/sensory intact    Lab/Data Reviewed:  BMP:   Lab Results   Component Value Date/Time     05/02/2020 04:00 AM    K 3.5 05/02/2020 04:00 AM     05/02/2020 04:00 AM    CO2 27 05/02/2020 04:00 AM    AGAP 7 05/02/2020 04:00 AM    GLU 79 05/02/2020 04:00 AM    BUN 14 05/02/2020 04:00 AM    CREA 0.80 05/02/2020 04:00 AM    GFRAA >60 05/02/2020 04:00 AM    GFRNA >60 05/02/2020 04:00 AM     CBC:   Lab Results   Component Value Date/Time WBC 6.3 05/02/2020 04:00 AM    HGB 12.9 05/02/2020 04:00 AM    HCT 39.0 05/02/2020 04:00 AM    PLT 64 (L) 05/02/2020 04:00 AM       Imaging Reviewed:  No results found.     Medications Reviewed:  Current Facility-Administered Medications   Medication Dose Route Frequency    pantoprazole (PROTONIX) injection 40 mg  40 mg IntraVENous Q12H    busPIRone (BUSPAR) tablet 15 mg  15 mg Oral BID    cloNIDine HCL (CATAPRES) tablet 0.1 mg  0.1 mg Oral BID    colchicine tablet 0.6 mg  0.6 mg Oral DAILY    lipase-protease-amylase (ZENPEP 10,000) capsule 2 Cap  2 Cap Oral TID WITH MEALS    folic acid (FOLVITE) tablet 1 mg  1 mg Oral DAILY    lamoTRIgine (LaMICtal) tablet 150 mg  150 mg Oral DAILY    multivitamin (ONE A DAY) tablet 1 Tab  1 Tab Oral DAILY    QUEtiapine (SEROquel) tablet 300 mg  300 mg Oral QHS    thiamine mononitrate (B-1) tablet 100 mg  100 mg Oral DAILY    topiramate (TOPAMAX) tablet 100 mg  100 mg Oral BID    sodium chloride (NS) flush 5-40 mL  5-40 mL IntraVENous Q8H    sodium chloride (NS) flush 5-40 mL  5-40 mL IntraVENous PRN    LORazepam (ATIVAN) tablet 1 mg  1 mg Oral Q1H PRN    Or    LORazepam (ATIVAN) injection 1 mg  1 mg IntraVENous Q1H PRN    LORazepam (ATIVAN) tablet 2 mg  2 mg Oral Q1H PRN    Or    LORazepam (ATIVAN) injection 2 mg  2 mg IntraVENous Q1H PRN    LORazepam (ATIVAN) injection 3 mg  3 mg IntraVENous Q15MIN PRN    ondansetron (ZOFRAN) injection 4 mg  4 mg IntraVENous Q6H PRN    0.9% sodium chloride infusion  150 mL/hr IntraVENous CONTINUOUS    docusate sodium (COLACE) capsule 100 mg  100 mg Oral BID PRN    melatonin tablet 12 mg  12 mg Oral QHS PRN    albuterol-ipratropium (DUO-NEB) 2.5 MG-0.5 MG/3 ML  3 mL Nebulization Q6H PRN    acetaminophen (TYLENOL) tablet 650 mg  650 mg Oral Q6H PRN    nicotine (NICODERM CQ) 21 mg/24 hr patch 1 Patch  1 Patch TransDERmal DAILY PRN    HYDROmorphone (DILAUDID) syringe 0.5 mg  0.5 mg IntraVENous Q3H PRN    hydrALAZINE (APRESOLINE) 20 mg/mL injection 20 mg  20 mg IntraVENous Q6H PRN    loratadine (CLARITIN) tablet 10 mg  10 mg Oral DAILY    0.9% sodium chloride infusion  150 mL/hr IntraVENous CONTINUOUS    sodium chloride (NS) flush 5-40 mL  5-40 mL IntraVENous Q8H    sodium chloride (NS) flush 5-40 mL  5-40 mL IntraVENous PRN    LORazepam (ATIVAN) tablet 1 mg  1 mg Oral Q1H PRN    Or    LORazepam (ATIVAN) injection 1 mg  1 mg IntraVENous Q1H PRN    LORazepam (ATIVAN) tablet 2 mg  2 mg Oral Q1H PRN    Or    LORazepam (ATIVAN) injection 2 mg  2 mg IntraVENous Q1H PRN    LORazepam (ATIVAN) injection 3 mg  3 mg IntraVENous Q15MIN PRN           Shantell Alatorre DO  Internal Medicine, Hospitalist  Pager: [phone]  [address] [place] [address] Drive [facility]

## 2020-05-02 NOTE — PROGRESS NOTES
1915- Bedside report given by Vasu Crum RN . Pt in bed awake looks drowsy, alert and oriented to self ,place reoriented to time and situation. Assessment completed , plan of care for the shift explained pt verbalized understanding. IVF infusing well. Pt on ciwa protocal 2005- Pt medicated for pain 8/10 with dilaudid 0.5 mg iv. wlll reassess. [address] completed score 4    0130- Notified by RN that pt was assisted to bedside commode - both iv out. 1- New IV Line inserted on the left forearm gauge 22 attempt x 1 successfully. Pt tolerated well IVF resumed infusing well. Pt sleeping. 7199- Pt sleeping. 0340- Pt assisted to [facility] - voided small amount of urine compared to input-. Noted that since the beginning of shift pt has only voided 200 ml of urine. Bladder scan done 1070 ml of urine obtained. 36- Dr Kandace Yun paged returned call promptly was notified about pt urine retention of 1070 - order received to straight cath pt   0407- Pt medicated for pain with diluadid 0.5 mg iv  0410- Straight catheterization done, aseptic technique maintained - output 1000 ml tejal concentrated urine. Pt tolerated procedure well. 0740- Bedside and Verbal shift change report given to Kamran Villeda RN (oncoming nurse) by Winston Pratt RN (offgoing nurse). Report included the following information SBAR, Kardex, Intake/Output, MAR and Recent Results.

## 2020-05-02 NOTE — PROGRESS NOTES
Patient received in bed awake. CIWA withdraw precautions in place. Patient A&O x3, Denies pain. No distress noted. Frequently use items within reach. Bed locked in low position. Call bell within reach and Patient verbalized understanding of use for assistance and needs. [facility] (Telemetry nurse) called to report that Patients 's sustaining. This nurse to bedside. Patient noted trying to climb out of bed. Patient stated \"I'm trying to go to the bathroom. \" Patient repositioned; pericare provided and purewick placed. Bed alarm activated. Call bell w/in reach. 1200- Dr. Fawn Ramos to floor and gave this nurse V.O. for Patient to have Clear liquid diet (RBV). 1630- Patient given Dilaudid 0.5 mg IV for abd pain 9/10. See MAR and pain assessments.

## 2020-05-02 NOTE — PROGRESS NOTES
Assumed pt care from [place], [address] [place] [address]. Pt in bed, alert and oriented x 4 with drowsy. Not in any form of distress. Denies pain. Frequent use items and call bell within reach. Verbalized understanding to call for assistance. Bed locked in lowest position. Notified Dr. Earlene Mckeon of patient's MEWS score of 3 due to elevated heart rate. No arrhthymias noted on the telemetry. On continuous pulse ox. 0740 - Bedside and Verbal shift change report given to Ramiro Milton RN (oncoming nurse) by Mary Hughes RN (offgoing nurse). Report included the following information SBAR, Kardex, Intake/Output, MAR and Recent Results.

## 2020-05-03 PROBLEM — R33.8 ACUTE URINARY RETENTION: Status: ACTIVE | Noted: 2020-05-03

## 2020-05-03 PROCEDURE — 74011250636 HC RX REV CODE- 250/636: Performed by: HOSPITALIST

## 2020-05-03 PROCEDURE — C9113 INJ PANTOPRAZOLE SODIUM, VIA: HCPCS | Performed by: INTERNAL MEDICINE

## 2020-05-03 PROCEDURE — 74011250636 HC RX REV CODE- 250/636: Performed by: INTERNAL MEDICINE

## 2020-05-03 PROCEDURE — 65660000000 HC RM CCU STEPDOWN

## 2020-05-03 PROCEDURE — 74011250637 HC RX REV CODE- 250/637: Performed by: INTERNAL MEDICINE

## 2020-05-03 PROCEDURE — 74011250637 HC RX REV CODE- 250/637: Performed by: HOSPITALIST

## 2020-05-03 PROCEDURE — 51798 US URINE CAPACITY MEASURE: CPT

## 2020-05-03 PROCEDURE — 77010033678 HC OXYGEN DAILY

## 2020-05-03 RX ORDER — NALOXONE HYDROCHLORIDE 0.4 MG/ML
0.4 INJECTION, SOLUTION INTRAMUSCULAR; INTRAVENOUS; SUBCUTANEOUS AS NEEDED
Status: DISCONTINUED | OUTPATIENT
Start: 2020-05-03 | End: 2020-05-04 | Stop reason: HOSPADM

## 2020-05-03 RX ORDER — OXYCODONE AND ACETAMINOPHEN 5; 325 MG/1; MG/1
1 TABLET ORAL
Status: DISCONTINUED | OUTPATIENT
Start: 2020-05-03 | End: 2020-05-04 | Stop reason: HOSPADM

## 2020-05-03 RX ADMIN — HYDROMORPHONE HYDROCHLORIDE 0.5 MG: 1 INJECTION, SOLUTION INTRAMUSCULAR; INTRAVENOUS; SUBCUTANEOUS at 04:07

## 2020-05-03 RX ADMIN — PANTOPRAZOLE SODIUM 40 MG: 40 INJECTION, POWDER, FOR SOLUTION INTRAVENOUS at 08:54

## 2020-05-03 RX ADMIN — PANTOPRAZOLE SODIUM 40 MG: 40 INJECTION, POWDER, FOR SOLUTION INTRAVENOUS at 21:41

## 2020-05-03 RX ADMIN — LORATADINE 10 MG: 10 TABLET ORAL at 08:55

## 2020-05-03 RX ADMIN — QUETIAPINE FUMARATE 300 MG: 300 TABLET ORAL at 21:41

## 2020-05-03 RX ADMIN — SODIUM CHLORIDE 10 ML: 9 INJECTION, SOLUTION INTRAMUSCULAR; INTRAVENOUS; SUBCUTANEOUS at 15:12

## 2020-05-03 RX ADMIN — LAMOTRIGINE 150 MG: 100 TABLET ORAL at 08:55

## 2020-05-03 RX ADMIN — BUSPIRONE HYDROCHLORIDE 15 MG: 7.5 TABLET ORAL at 08:54

## 2020-05-03 RX ADMIN — SODIUM CHLORIDE 10 ML: 9 INJECTION, SOLUTION INTRAMUSCULAR; INTRAVENOUS; SUBCUTANEOUS at 22:00

## 2020-05-03 RX ADMIN — HYDROMORPHONE HYDROCHLORIDE 0.5 MG: 1 INJECTION, SOLUTION INTRAMUSCULAR; INTRAVENOUS; SUBCUTANEOUS at 17:23

## 2020-05-03 RX ADMIN — CLONIDINE HYDROCHLORIDE 0.1 MG: 0.1 TABLET ORAL at 17:23

## 2020-05-03 RX ADMIN — HYDROMORPHONE HYDROCHLORIDE 0.5 MG: 1 INJECTION, SOLUTION INTRAMUSCULAR; INTRAVENOUS; SUBCUTANEOUS at 08:56

## 2020-05-03 RX ADMIN — SODIUM CHLORIDE 150 ML/HR: 900 INJECTION, SOLUTION INTRAVENOUS at 20:03

## 2020-05-03 RX ADMIN — HYDROMORPHONE HYDROCHLORIDE 0.5 MG: 1 INJECTION, SOLUTION INTRAMUSCULAR; INTRAVENOUS; SUBCUTANEOUS at 12:34

## 2020-05-03 RX ADMIN — Medication 10 ML: at 22:00

## 2020-05-03 RX ADMIN — OXYCODONE HYDROCHLORIDE AND ACETAMINOPHEN 1 TABLET: 5; 325 TABLET ORAL at 21:41

## 2020-05-03 RX ADMIN — Medication 100 MG: at 08:55

## 2020-05-03 RX ADMIN — FOLIC ACID 1 MG: 1 TABLET ORAL at 08:55

## 2020-05-03 RX ADMIN — PANCRELIPASE LIPASE, PANCRELIPASE PROTEASE, PANCRELIPASE AMYLASE 2 CAPSULE: 10000; 32000; 42000 CAPSULE, DELAYED RELEASE ORAL at 08:55

## 2020-05-03 RX ADMIN — LORAZEPAM 1 MG: 1 TABLET ORAL at 15:11

## 2020-05-03 RX ADMIN — CLONIDINE HYDROCHLORIDE 0.1 MG: 0.1 TABLET ORAL at 08:55

## 2020-05-03 RX ADMIN — TOPIRAMATE 100 MG: 25 TABLET, FILM COATED ORAL at 17:24

## 2020-05-03 RX ADMIN — SODIUM CHLORIDE 10 ML: 9 INJECTION, SOLUTION INTRAMUSCULAR; INTRAVENOUS; SUBCUTANEOUS at 05:47

## 2020-05-03 RX ADMIN — OXYCODONE HYDROCHLORIDE AND ACETAMINOPHEN 1 TABLET: 5; 325 TABLET ORAL at 15:11

## 2020-05-03 RX ADMIN — PANCRELIPASE LIPASE, PANCRELIPASE PROTEASE, PANCRELIPASE AMYLASE 2 CAPSULE: 10000; 32000; 42000 CAPSULE, DELAYED RELEASE ORAL at 17:23

## 2020-05-03 RX ADMIN — TOPIRAMATE 100 MG: 25 TABLET, FILM COATED ORAL at 08:55

## 2020-05-03 RX ADMIN — COLCHICINE 0.6 MG: 0.6 TABLET, FILM COATED ORAL at 08:55

## 2020-05-03 RX ADMIN — Medication 10 ML: at 15:11

## 2020-05-03 RX ADMIN — PANCRELIPASE LIPASE, PANCRELIPASE PROTEASE, PANCRELIPASE AMYLASE 2 CAPSULE: 10000; 32000; 42000 CAPSULE, DELAYED RELEASE ORAL at 12:34

## 2020-05-03 RX ADMIN — SODIUM CHLORIDE 150 ML/HR: 900 INJECTION, SOLUTION INTRAVENOUS at 05:49

## 2020-05-03 RX ADMIN — MULTIVITAMIN TABLET 1 TABLET: TABLET at 08:55

## 2020-05-03 RX ADMIN — BUSPIRONE HYDROCHLORIDE 15 MG: 7.5 TABLET ORAL at 17:23

## 2020-05-03 NOTE — PROGRESS NOTES
INTERIM UPDATE - [id number] EST on 5/03/2020    Nursing Staff calls to report that Patient has had little urine output this shift and that Bladder Scan reveals just over 1 L of urine in Patient's Bladder. Plan:  As this is the first instance, Nursing Staff is instructed to Straight Catheterize the Patient to relieve urine retention. Nursing Staff is advised that if this situation reoccurs next shift to consider requesting Stanley Catheter placement.

## 2020-05-03 NOTE — PROGRESS NOTES
1915 Bedside, Verbal and Written shift change report given to Jade Gracia RN (oncoming nurse) by     RN (offgoing nurse). Report included the following information SBAR, Kardex, Intake/Output, MAR and Recent Results. -- PM medications administered, pt tolerated with ease, will continue to monitor. -- Shift reassessment, pt condition unchanged, will continue to monitor. --  Shift reassessment, pt sleeping between care, will continue to monitor. -- Bedside, Verbal and Written shift change report given to - RN (oncoming nurse) by Jade Gracia RN (offgoing nurse). Report included the following information SBAR, Kardex, Intake/Output, MAR and Recent Results. Skin assessment completed.

## 2020-05-03 NOTE — PROGRESS NOTES
Problem: Falls - Risk of  Goal: *Absence of Falls  Description: Document Francisco Moreno Fall Risk and appropriate interventions in the flowsheet.   Outcome: Progressing Towards Goal  Note: Fall Risk Interventions:  Mobility Interventions: Patient to call before getting OOB, PT Consult for mobility concerns, Bed/chair exit alarm         Medication Interventions: Bed/chair exit alarm, Evaluate medications/consider consulting pharmacy, Patient to call before getting OOB, Teach patient to arise slowly    Elimination Interventions: Call light in reach, Patient to call for help with toileting needs, Bed/chair exit alarm    History of Falls Interventions: Door open when patient unattended, Bed/chair exit alarm, Evaluate medications/consider consulting pharmacy         Problem: Pancreatitis  Goal: *Control of acute pain  Outcome: Progressing Towards Goal     Problem: Pain - Acute  Goal: *Control of acute pain  Outcome: Progressing Towards Goal     Problem: Alcohol Withdrawal  Goal: *STG: Participates in treatment plan  Outcome: Progressing Towards Goal     Problem: Alcohol Withdrawal  Goal: *STG: Remains safe in hospital  Outcome: Progressing Towards Goal     Problem: Alcohol Withdrawal  Goal: *STG: Seeks staff when symptoms of withdrawal increase  Outcome: Progressing Towards Goal     Problem: Anxiety  Goal: *Alleviation of anxiety  Outcome: Progressing Towards Goal

## 2020-05-03 NOTE — PROGRESS NOTES
Problem: Falls - Risk of  Goal: *Absence of Falls  Description: Document [facility] Fall Risk and appropriate interventions in the flowsheet.   Outcome: Progressing Towards Goal  Note: Fall Risk Interventions:  Mobility Interventions: Patient to call before getting OOB         Medication Interventions: Evaluate medications/consider consulting pharmacy, Patient to call before getting OOB    Elimination Interventions: Call light in reach, Patient to call for help with toileting needs    History of Falls Interventions: Door open when patient unattended, Evaluate medications/consider consulting pharmacy         Problem: Patient Education: Go to Patient Education Activity  Goal: Patient/Family Education  Outcome: Progressing Towards Goal     Problem: Pancreatitis  Goal: *Control of acute pain  Outcome: Progressing Towards Goal  Goal: *Absence of nausea/vomiting  Outcome: Progressing Towards Goal  Goal: *Optimize nutritional status  Outcome: Progressing Towards Goal  Goal: *Labs within defined limits  Outcome: Progressing Towards Goal     Problem: Patient Education: Go to Patient Education Activity  Goal: Patient/Family Education  Outcome: Progressing Towards Goal     Problem: Pain - Acute  Goal: *Control of acute pain  Outcome: Progressing Towards Goal     Problem: Patient Education: Go to Patient Education Activity  Goal: Patient/Family Education  Outcome: Progressing Towards Goal     Problem: Hypertension  Goal: *Blood pressure within specified parameters  Outcome: Progressing Towards Goal  Goal: *Fluid volume balance  Outcome: Progressing Towards Goal  Goal: *Labs within defined limits  Outcome: Progressing Towards Goal     Problem: Patient Education: Go to Patient Education Activity  Goal: Patient/Family Education  Outcome: Progressing Towards Goal     Problem: Alcohol Withdrawal  Goal: *STG: Participates in treatment plan  Outcome: Progressing Towards Goal  Goal: *STG: Remains safe in hospital  Outcome: Progressing Towards Goal  Goal: *STG: Seeks staff when symptoms of withdrawal increase  Outcome: Progressing Towards Goal  Goal: *STG: Complies with medication therapy  Outcome: Progressing Towards Goal  Goal: *STG: Attends activities and groups  Outcome: Progressing Towards Goal  Goal: *STG: Will identify negative impact of chemical dependency including the use of tobacco, alcohol, and other substances  Outcome: Progressing Towards Goal  Goal: *STG: Verbalizes abstinence as an achievable goal  Outcome: Progressing Towards Goal  Goal: *STG: Agrees to participate in outpatient after care program to support ongoing mental health  Outcome: Progressing Towards Goal  Goal: *STG: Able to indentify relapse triggers including interpersonal/social and familial factors  Outcome: Progressing Towards Goal  Goal: *STG: Identify lifestyle changes to support long term sobriety such as vocation, employment, education, and legal issues  Outcome: Progressing Towards Goal  Goal: *STG: Maintains appropriate nutrition and hydration  Outcome: Progressing Towards Goal  Goal: *STG: Vital signs within defined limits  Outcome: Progressing Towards Goal  Goal: *STG/LTG: Relapse prevention plan in place to include housing/aftercare, leisure activities, and spirituality  Outcome: Progressing Towards Goal  Goal: Interventions  Outcome: Progressing Towards Goal     Problem: Patient Education: Go to Patient Education Activity  Goal: Patient/Family Education  Outcome: Progressing Towards Goal     Problem: Anxiety  Goal: *Alleviation of anxiety  Outcome: Progressing Towards Goal  Goal: *Alleviation of anxiety (Palliative Care)  Outcome: Progressing Towards Goal     Problem: Patient Education: Go to Patient Education Activity  Goal: Patient/Family Education  Outcome: Progressing Towards Goal     Problem: Discharge Planning  Goal: *Discharge to safe environment  Outcome: Progressing Towards Goal     Problem: Altered nutrition  Goal: *Optimize nutritional status  Outcome: Progressing Towards Goal     Problem: Pressure Injury - Risk of  Goal: *Prevention of pressure injury  Description: Document Rafa Scale and appropriate interventions in the flowsheet. Outcome: Progressing Towards Goal  Note: Pressure Injury Interventions:             Activity Interventions: Pressure redistribution bed/mattress(bed type)    Mobility Interventions: HOB 30 degrees or less, Pressure redistribution bed/mattress (bed type)    Nutrition Interventions: Document food/fluid/supplement intake                     Problem: Patient Education: Go to Patient Education Activity  Goal: Patient/Family Education  Outcome: Progressing Towards Goal

## 2020-05-03 NOTE — PROGRESS NOTES
3563-  Bedside and Verbal shift change report given to Kurt Long RN (oncoming nurse) by Cody Rodriguez RN (offgoing nurse). Report included the following information SBAR, Kardex, Intake/Output, MAR and Recent Results. 0727-  Patient medicated for pain. 1234-  Patient medicated for pain. 1511-   Patient medicated for pain. 1723-  Patient medicated for pain. 1915-  Bedside and Verbal shift change report given to SEB Kong (oncoming nurse) by Kurt Long RN (offgoing nurse). Report included the following information SBAR, Kardex, Intake/Output, MAR and Recent Results.

## 2020-05-03 NOTE — PROGRESS NOTES
1920 Bedside, Verbal and Written shift change report given to Geovanny Mayen RN (oncoming nurse) by Uriel Bernard (offgoing nurse). Report included the following information SBAR, Kardex, Intake/Output, MAR and Recent Results. Denies pain currrently. 2141 PM medications administered, pt tolerated with ease, will continue to monitor. Complained of upper gastric region pain, 8/10.    2246 Dr. Gerald Bal notified of bladder residual 1350ml. New orders received. 2330 16fr johnson inserted without difficulty. Drained 850ml of clear yellow urine. Clamped for 15 minutes and drained 650ml. 0000 Shift reassessment. Inserted 16fr johnson catheter without difficulty. Patient is resting with eyes closed. 7780 Patient complained of pain 6/10, upper abdomen. PRN medication for pain given as ordered. 8917 Patient resting with eyes closed, arouses to voice. Denies pain. 0400  Shift reassessment, pt sleeping between care, will continue to monitor. 0720 Bedside, Verbal and Written shift change report given to Leandra Payne (oncoming nurse) by Geovanny Mayen RN (offgoing nurse). Report included the following information SBAR, Kardex, Intake/Output, MAR and Recent Results. Skin assessment completed.

## 2020-05-03 NOTE — PROGRESS NOTES
Internal Medicine Progress Note    Patient's Name: Ethel Turner  Admit Date: 4/30/2020  Length of Stay: 2      Assessment/Plan     Active Hospital Problems    Diagnosis Date Noted    Acute urinary retention 05/03/2020     At 0348 EST on 5/03/2020.  Hypertensive urgency 05/01/2020    Esophagitis 05/23/2019    [facility] ([facility] 03/08/2018    Hypercalcemia 03/08/2018    Hyponatremia 03/08/2018    COPD (chronic obstructive pulmonary disease) ([facility] 02/25/2016    Acute pancreatitis 02/19/2016    Alcoholism ([facility] 01/28/2016    Bipolar depression ([facility] 01/28/2016    HTN (hypertension) 01/28/2016     - Cont aggressive IVFs  - Pain control  - Advance to full liquid and if tolerates today can advance to reg andriy  - CIWA  - Thiamine, folate, MVI  - Had urinary retention, likely 2/2 narcotics  - Started PO w/ breakthrough IV only  - Cont bladder scans  - Cont acceptable home medications for chronic conditions   - DVT protocol    I have personally reviewed all pertinent labs and films that have officially resulted over the last 24 hours. I have personally checked for all pending labs that are awaiting final results.     Subjective     Pt s/e @ bedside  Had urinary retention this AM w/ straight cath  Still w/ epigastric pain but tolerating clears  Denies CP or SOB    Objective     Visit Vitals  BP (!) 149/92 (BP 1 Location: Left arm, BP Patient Position: At rest;Supine)   Pulse (!) 110   Temp 97.9 °F (36.6 °C)   Resp 16   Ht 5' 2\" (1.575 m)   Wt 53.5 kg (118 lb)   SpO2 98%   BMI 21.58 kg/m²       Physical Exam:  General Appearance: NAD, conversant  Lungs: CTA with normal respiratory effort  CV: RRR, no m/r/g  Abdomen: soft, mod TTP, normal bowel sounds  Extremities: no cyanosis, no peripheral edema  Neuro: No focal deficits, motor/sensory intact    Lab/Data Reviewed:  BMP:   No results found for: NA, K, CL, CO2, AGAP, GLU, BUN, CREA, GFRAA, GFRNA  CBC:   No results found for: WBC, HGB, HGBEXT, HCT, HCTEXT, PLT, PLTEXT, HGBEXT, HCTEXT, PLTEXT    Imaging Reviewed:  No results found.     Medications Reviewed:  Current Facility-Administered Medications   Medication Dose Route Frequency    pantoprazole (PROTONIX) injection 40 mg  40 mg IntraVENous Q12H    busPIRone (BUSPAR) tablet 15 mg  15 mg Oral BID    cloNIDine HCL (CATAPRES) tablet 0.1 mg  0.1 mg Oral BID    colchicine tablet 0.6 mg  0.6 mg Oral DAILY    lipase-protease-amylase (ZENPEP 10,000) capsule 2 Cap  2 Cap Oral TID WITH MEALS    folic acid (FOLVITE) tablet 1 mg  1 mg Oral DAILY    lamoTRIgine (LaMICtal) tablet 150 mg  150 mg Oral DAILY    multivitamin (ONE A DAY) tablet 1 Tab  1 Tab Oral DAILY    QUEtiapine (SEROquel) tablet 300 mg  300 mg Oral QHS    thiamine mononitrate (B-1) tablet 100 mg  100 mg Oral DAILY    topiramate (TOPAMAX) tablet 100 mg  100 mg Oral BID    sodium chloride (NS) flush 5-40 mL  5-40 mL IntraVENous Q8H    sodium chloride (NS) flush 5-40 mL  5-40 mL IntraVENous PRN    LORazepam (ATIVAN) tablet 1 mg  1 mg Oral Q1H PRN    Or    LORazepam (ATIVAN) injection 1 mg  1 mg IntraVENous Q1H PRN    LORazepam (ATIVAN) tablet 2 mg  2 mg Oral Q1H PRN    Or    LORazepam (ATIVAN) injection 2 mg  2 mg IntraVENous Q1H PRN    LORazepam (ATIVAN) injection 3 mg  3 mg IntraVENous Q15MIN PRN    ondansetron (ZOFRAN) injection 4 mg  4 mg IntraVENous Q6H PRN    0.9% sodium chloride infusion  150 mL/hr IntraVENous CONTINUOUS    docusate sodium (COLACE) capsule 100 mg  100 mg Oral BID PRN    melatonin tablet 12 mg  12 mg Oral QHS PRN    albuterol-ipratropium (DUO-NEB) 2.5 MG-0.5 MG/3 ML  3 mL Nebulization Q6H PRN    acetaminophen (TYLENOL) tablet 650 mg  650 mg Oral Q6H PRN    nicotine (NICODERM CQ) 21 mg/24 hr patch 1 Patch  1 Patch TransDERmal DAILY PRN    HYDROmorphone (DILAUDID) syringe 0.5 mg  0.5 mg IntraVENous Q3H PRN    hydrALAZINE (APRESOLINE) 20 mg/mL injection 20 mg  20 mg IntraVENous Q6H PRN    loratadine (CLARITIN) tablet 10 mg  10 mg Oral DAILY    0.9% sodium chloride infusion  150 mL/hr IntraVENous CONTINUOUS    sodium chloride (NS) flush 5-40 mL  5-40 mL IntraVENous Q8H    sodium chloride (NS) flush 5-40 mL  5-40 mL IntraVENous PRN    LORazepam (ATIVAN) tablet 1 mg  1 mg Oral Q1H PRN    Or    LORazepam (ATIVAN) injection 1 mg  1 mg IntraVENous Q1H PRN    LORazepam (ATIVAN) tablet 2 mg  2 mg Oral Q1H PRN    Or    LORazepam (ATIVAN) injection 2 mg  2 mg IntraVENous Q1H PRN    LORazepam (ATIVAN) injection 3 mg  3 mg IntraVENous Q15MIN PRN           Radha Norwood,   Internal Medicine, Hospitalist  Pager: [phone]  [address] [place] [address] Drive [facility]

## 2020-05-04 VITALS
SYSTOLIC BLOOD PRESSURE: 145 MMHG | DIASTOLIC BLOOD PRESSURE: 78 MMHG | OXYGEN SATURATION: 96 % | HEART RATE: 105 BPM | TEMPERATURE: 97.8 F | BODY MASS INDEX: 21.71 KG/M2 | HEIGHT: 62 IN | WEIGHT: 118 LBS | RESPIRATION RATE: 16 BRPM

## 2020-05-04 LAB — 1,25(OH)2D3 SERPL-MCNC: 23.5 PG/ML (ref 19.9–79.3)

## 2020-05-04 PROCEDURE — C9113 INJ PANTOPRAZOLE SODIUM, VIA: HCPCS | Performed by: INTERNAL MEDICINE

## 2020-05-04 PROCEDURE — 77010033678 HC OXYGEN DAILY

## 2020-05-04 PROCEDURE — 74011250637 HC RX REV CODE- 250/637: Performed by: INTERNAL MEDICINE

## 2020-05-04 PROCEDURE — 77030040830 HC CATH URETH FOL MDII -A

## 2020-05-04 PROCEDURE — 74011250637 HC RX REV CODE- 250/637: Performed by: HOSPITALIST

## 2020-05-04 PROCEDURE — 74011250636 HC RX REV CODE- 250/636: Performed by: INTERNAL MEDICINE

## 2020-05-04 RX ORDER — OXYCODONE AND ACETAMINOPHEN 5; 325 MG/1; MG/1
1 TABLET ORAL
Qty: 20 TAB | Refills: 0 | Status: SHIPPED | OUTPATIENT
Start: 2020-05-04 | End: 2020-05-09

## 2020-05-04 RX ADMIN — SODIUM CHLORIDE 150 ML/HR: 900 INJECTION, SOLUTION INTRAVENOUS at 08:58

## 2020-05-04 RX ADMIN — PANCRELIPASE LIPASE, PANCRELIPASE PROTEASE, PANCRELIPASE AMYLASE 2 CAPSULE: 10000; 32000; 42000 CAPSULE, DELAYED RELEASE ORAL at 17:28

## 2020-05-04 RX ADMIN — BUSPIRONE HYDROCHLORIDE 15 MG: 7.5 TABLET ORAL at 17:29

## 2020-05-04 RX ADMIN — LAMOTRIGINE 100 MG: 100 TABLET ORAL at 09:43

## 2020-05-04 RX ADMIN — PANCRELIPASE LIPASE, PANCRELIPASE PROTEASE, PANCRELIPASE AMYLASE 2 CAPSULE: 10000; 32000; 42000 CAPSULE, DELAYED RELEASE ORAL at 09:37

## 2020-05-04 RX ADMIN — CLONIDINE HYDROCHLORIDE 0.1 MG: 0.1 TABLET ORAL at 17:29

## 2020-05-04 RX ADMIN — BUSPIRONE HYDROCHLORIDE 15 MG: 7.5 TABLET ORAL at 09:37

## 2020-05-04 RX ADMIN — PANTOPRAZOLE SODIUM 40 MG: 40 INJECTION, POWDER, FOR SOLUTION INTRAVENOUS at 09:42

## 2020-05-04 RX ADMIN — CLONIDINE HYDROCHLORIDE 0.1 MG: 0.1 TABLET ORAL at 09:38

## 2020-05-04 RX ADMIN — SODIUM CHLORIDE 150 ML/HR: 900 INJECTION, SOLUTION INTRAVENOUS at 17:32

## 2020-05-04 RX ADMIN — COLCHICINE 0.6 MG: 0.6 TABLET, FILM COATED ORAL at 09:46

## 2020-05-04 RX ADMIN — SODIUM CHLORIDE 150 ML/HR: 900 INJECTION, SOLUTION INTRAVENOUS at 02:31

## 2020-05-04 RX ADMIN — TOPIRAMATE 100 MG: 25 TABLET, FILM COATED ORAL at 17:29

## 2020-05-04 RX ADMIN — Medication 10 ML: at 06:00

## 2020-05-04 RX ADMIN — TOPIRAMATE 100 MG: 25 TABLET, FILM COATED ORAL at 09:41

## 2020-05-04 RX ADMIN — OXYCODONE HYDROCHLORIDE AND ACETAMINOPHEN 1 TABLET: 5; 325 TABLET ORAL at 10:13

## 2020-05-04 RX ADMIN — Medication 100 MG: at 09:37

## 2020-05-04 RX ADMIN — OXYCODONE HYDROCHLORIDE AND ACETAMINOPHEN 1 TABLET: 5; 325 TABLET ORAL at 06:59

## 2020-05-04 RX ADMIN — OXYCODONE HYDROCHLORIDE AND ACETAMINOPHEN 1 TABLET: 5; 325 TABLET ORAL at 17:30

## 2020-05-04 RX ADMIN — SODIUM CHLORIDE 10 ML: 9 INJECTION, SOLUTION INTRAMUSCULAR; INTRAVENOUS; SUBCUTANEOUS at 06:00

## 2020-05-04 RX ADMIN — FOLIC ACID 1 MG: 1 TABLET ORAL at 09:38

## 2020-05-04 RX ADMIN — OXYCODONE HYDROCHLORIDE AND ACETAMINOPHEN 1 TABLET: 5; 325 TABLET ORAL at 02:47

## 2020-05-04 RX ADMIN — MULTIVITAMIN TABLET 1 TABLET: TABLET at 09:37

## 2020-05-04 NOTE — PROGRESS NOTES
INTERIM UPDATE - [id number] on 5/03/2020    Nursing Staff reports Bladder Scan for Patient shows >1300 mL in bladder. Patient required Straight Catheterization yesterday for same issue. Assessment:  Urinary Rentention    Plan:  Nursing Staff instructed to place a Stanley Catheter and to clamp it after ~1 L of urine is drained. Nursing Staff told to release the clamp 10-15 minutes later.

## 2020-05-04 NOTE — ROUTINE PROCESS
Medicare pt has received, reviewed, and signed 2nd IM letter informing them of their right to appeal the discharge. Signed copy has been placed on pt bedside chart. Pt wanting to appeal. She was made aware that she must place appeal in prior to midnight or will be made to leave

## 2020-05-04 NOTE — PROGRESS NOTES
Problem: Discharge Planning  Goal: *Discharge to safe environment  Outcome: Resolved/Met   Home    Care Management Interventions  PCP Verified by CM:  Yes  Last Visit to PCP: 03/01/20  Mode of Transport at Discharge: Self( will drive)  Transition of Care Consult (CM Consult): Discharge Planning  Current Support Network: Lives with Spouse  Confirm Follow Up Transport: Self  Discharge Location  Discharge Placement: Home

## 2020-05-04 NOTE — PROGRESS NOTES
Problem: Falls - Risk of  Goal: *Absence of Falls  Description: Document Js High Fall Risk and appropriate interventions in the flowsheet.   Outcome: Progressing Towards Goal  Note: Fall Risk Interventions:  Mobility Interventions: Patient to call before getting OOB, Bed/chair exit alarm         Medication Interventions: Bed/chair exit alarm, Patient to call before getting OOB, Teach patient to arise slowly    Elimination Interventions: Bed/chair exit alarm, Call light in reach, Patient to call for help with toileting needs, Toilet paper/wipes in reach    History of Falls Interventions: Bed/chair exit alarm, Door open when patient unattended         Problem: Patient Education: Go to Patient Education Activity  Goal: Patient/Family Education  Outcome: Progressing Towards Goal     Problem: Pancreatitis  Goal: *Control of acute pain  Outcome: Progressing Towards Goal  Goal: *Absence of nausea/vomiting  Outcome: Progressing Towards Goal  Goal: *Optimize nutritional status  Outcome: Progressing Towards Goal  Goal: *Labs within defined limits  Outcome: Progressing Towards Goal     Problem: Hypertension  Goal: *Blood pressure within specified parameters  Outcome: Progressing Towards Goal  Goal: *Fluid volume balance  Outcome: Progressing Towards Goal  Goal: *Labs within defined limits  Outcome: Progressing Towards Goal     Problem: Alcohol Withdrawal  Goal: *STG: Participates in treatment plan  Outcome: Progressing Towards Goal  Goal: *STG: Remains safe in hospital  Outcome: Progressing Towards Goal  Goal: *STG: Seeks staff when symptoms of withdrawal increase  Outcome: Progressing Towards Goal  Goal: *STG: Complies with medication therapy  Outcome: Progressing Towards Goal  Goal: *STG: Maintains appropriate nutrition and hydration  Outcome: Progressing Towards Goal  Goal: *STG: Vital signs within defined limits  Outcome: Progressing Towards Goal  Goal: Interventions  Outcome: Progressing Towards Goal     Problem: Anxiety  Goal: *Alleviation of anxiety  Outcome: Progressing Towards Goal  Goal: *Alleviation of anxiety (Palliative Care)  Outcome: Progressing Towards Goal     Problem: Pressure Injury - Risk of  Goal: *Prevention of pressure injury  Description: Document Rafa Scale and appropriate interventions in the flowsheet.   Outcome: Progressing Towards Goal  Note: Pressure Injury Interventions:       Moisture Interventions: Absorbent underpads    Activity Interventions: Pressure redistribution bed/mattress(bed type), PT/OT evaluation    Mobility Interventions: Pressure redistribution bed/mattress (bed type)    Nutrition Interventions: Document food/fluid/supplement intake, Offer support with meals,snacks and hydration    Friction and Shear Interventions: Minimize layers, Apply protective barrier, creams and emollients

## 2020-05-04 NOTE — PROGRESS NOTES
Problem: Falls - Risk of  Goal: *Absence of Falls  Description: Document Lauren Lopez Fall Risk and appropriate interventions in the flowsheet.   Outcome: Progressing Towards Goal  Note: Fall Risk Interventions:  Mobility Interventions: Patient to call before getting OOB, Bed/chair exit alarm         Medication Interventions: Bed/chair exit alarm, Patient to call before getting OOB, Teach patient to arise slowly    Elimination Interventions: Bed/chair exit alarm, Call light in reach, Patient to call for help with toileting needs, Toilet paper/wipes in reach    History of Falls Interventions: Bed/chair exit alarm, Door open when patient unattended         Problem: Patient Education: Go to Patient Education Activity  Goal: Patient/Family Education  Outcome: Progressing Towards Goal     Problem: Pancreatitis  Goal: *Control of acute pain  Outcome: Progressing Towards Goal  Goal: *Absence of nausea/vomiting  Outcome: Progressing Towards Goal  Goal: *Optimize nutritional status  Outcome: Progressing Towards Goal  Goal: *Labs within defined limits  Outcome: Progressing Towards Goal     Problem: Patient Education: Go to Patient Education Activity  Goal: Patient/Family Education  Outcome: Progressing Towards Goal     Problem: Pain - Acute  Goal: *Control of acute pain  Outcome: Progressing Towards Goal     Problem: Patient Education: Go to Patient Education Activity  Goal: Patient/Family Education  Outcome: Progressing Towards Goal     Problem: Hypertension  Goal: *Blood pressure within specified parameters  Outcome: Progressing Towards Goal  Goal: *Fluid volume balance  Outcome: Progressing Towards Goal  Goal: *Labs within defined limits  Outcome: Progressing Towards Goal     Problem: Patient Education: Go to Patient Education Activity  Goal: Patient/Family Education  Outcome: Progressing Towards Goal     Problem: Alcohol Withdrawal  Goal: *STG: Participates in treatment plan  Outcome: Progressing Towards Goal  Goal: *STG: Remains safe in hospital  Outcome: Progressing Towards Goal  Goal: *STG: Seeks staff when symptoms of withdrawal increase  Outcome: Progressing Towards Goal  Goal: *STG: Complies with medication therapy  Outcome: Progressing Towards Goal  Goal: *STG: Attends activities and groups  Outcome: Progressing Towards Goal  Goal: *STG: Will identify negative impact of chemical dependency including the use of tobacco, alcohol, and other substances  Outcome: Progressing Towards Goal  Goal: *STG: Verbalizes abstinence as an achievable goal  Outcome: Progressing Towards Goal  Goal: *STG: Agrees to participate in outpatient after care program to support ongoing mental health  Outcome: Progressing Towards Goal  Goal: *STG: Able to indentify relapse triggers including interpersonal/social and familial factors  Outcome: Progressing Towards Goal  Goal: *STG: Identify lifestyle changes to support long term sobriety such as vocation, employment, education, and legal issues  Outcome: Progressing Towards Goal  Goal: *STG: Maintains appropriate nutrition and hydration  Outcome: Progressing Towards Goal  Goal: *STG: Vital signs within defined limits  Outcome: Progressing Towards Goal  Goal: *STG/LTG: Relapse prevention plan in place to include housing/aftercare, leisure activities, and spirituality  Outcome: Progressing Towards Goal  Goal: Interventions  Outcome: Progressing Towards Goal     Problem: Patient Education: Go to Patient Education Activity  Goal: Patient/Family Education  Outcome: Progressing Towards Goal     Problem: Anxiety  Goal: *Alleviation of anxiety  Outcome: Progressing Towards Goal  Goal: *Alleviation of anxiety (Palliative Care)  Outcome: Progressing Towards Goal     Problem: Patient Education: Go to Patient Education Activity  Goal: Patient/Family Education  Outcome: Progressing Towards Goal     Problem: Altered nutrition  Goal: *Optimize nutritional status  Outcome: Progressing Towards Goal     Problem: Pressure Injury - Risk of  Goal: *Prevention of pressure injury  Description: Document Rafa Scale and appropriate interventions in the flowsheet.   Outcome: Progressing Towards Goal  Note: Pressure Injury Interventions:       Moisture Interventions: Moisture barrier    Activity Interventions: Pressure redistribution bed/mattress(bed type)    Mobility Interventions: HOB 30 degrees or less    Nutrition Interventions: Offer support with meals,snacks and hydration    Friction and Shear Interventions: Minimize layers, Apply protective barrier, creams and emollients                Problem: Patient Education: Go to Patient Education Activity  Goal: Patient/Family Education  Outcome: Progressing Towards Goal

## 2020-05-04 NOTE — PROGRESS NOTES
rec'd patient in bed tearful thi am assessed for pain . C/o pain to both lateral sides 9/10 \" takes my breath away ' Constant   rec'd pain meds effective today   Patient johnson d/c @11:22 am due to void 1700  17:28 patient voided in bedside commode 225 ml clear yellow urine. MD paged for f/u . Charge nurse f/u with MD  Patient d/c .  Given d/c in struction and 1 rx for pain meds   Escorted out by staff via w/c [id number]

## 2020-05-04 NOTE — PROGRESS NOTES
5/4/2020  Dicussed dc with patient to see if she was going to appeal. She says she is going home, but needs a lyft ride. Discussed with my Director. We can offer her lyft ride to get home. Lyft ride scheduled for 7 PM at the Emergency Room. Zachery Gaviota.  LENNY Ga  [facility]  [phone], Pager [phone]  [email or web address]

## 2020-05-05 ENCOUNTER — PATIENT OUTREACH (OUTPATIENT)
Dept: FAMILY MEDICINE CLINIC | Facility: CLINIC | Age: 55
End: 2020-05-05

## 2020-05-05 NOTE — PROGRESS NOTES
Concerns for Covid 19 Transition    Patient Outreached Attempted. Received patient's voicemail box. Message left requesting call back.

## 2020-05-19 NOTE — DISCHARGE SUMMARY
Discharge Summary    Patient: Renae Fisher               Sex: female          DOA: 4/30/2020         YOB: 1965      Age:  63 y.o.        LOS:  LOS: 3 days                Admit Date: 4/30/2020    Discharge Date: 5/4/2020    Discharge Medications:     Discharge Medication List as of 5/4/2020  6:19 PM      START taking these medications    Details   oxyCODONE-acetaminophen (PERCOCET) 5-325 mg per tablet Take 1 Tab by mouth every four (4) hours as needed for Pain for up to 5 days. Max Daily Amount: 6 Tabs., Print, Disp-20 Tab, R-0         CONTINUE these medications which have NOT CHANGED    Details   lidocaine (LIDODERM) 5 % Apply patch to the affected area for 12 hours a day and remove for 12 hours a day., Print, Disp-30 Each, R-0      !! lamoTRIgine (LaMICtal) 150 mg tablet Take 150 mg by mouth daily. , Historical Med      !! lipase-protease-amylase (CREON 24,000) 24,000-76,000 -120,000 unit capsule Take 1 Cap by mouth., Historical Med      sucralfate (CARAFATE) 100 mg/mL suspension Take 10 mL by mouth Before breakfast, lunch, dinner and at bedtime. , Print, [id number] mL, R-0      folic acid (FOLVITE) 1 mg tablet Take 1 Tab by mouth daily. , Print, Disp-30 Tab, R-0      multivitamin (ONE A DAY) tablet Take 1 Tab by mouth daily. , Print, Disp-30 Tab, R-0      thiamine HCL (B-1) 100 mg tablet Take 1 Tab by mouth daily (with breakfast). , Print, Disp-30 Tab, R-0      ipratropium-albuteroL (COMBIVENT RESPIMAT)  mcg/actuation inhaler Take 1 Puff by inhalation as needed for Wheezing., Historical Med      traZODone (DESYREL) 100 mg tablet Take 200 mg by mouth nightly., Historical Med      melatonin 10 mg tab Take 10 mg by mouth nightly., Historical Med      MILK THISTLE PO Take 175 mg by mouth two (2) times a day., Historical Med      colchicine 0.6 mg tablet Take 0.6 mg by mouth daily. , Historical Med      benzonatate (TESSALON PERLE PO) Take  by mouth as needed.  Indications: cough, Historical Med docusate sodium (COLACE) 100 mg capsule Take 100 mg by mouth three (3) times daily. , Historical Med      ondansetron (ZOFRAN ODT) 8 mg disintegrating tablet Take 1 Tab by mouth every eight (8) hours as needed for Nausea. , Print, Disp-15 Tab, R-0      pantoprazole (PROTONIX) 40 mg tablet Take 1 Tab by mouth Before breakfast and dinner., Print, Disp-60 Tab, R-0      gabapentin (NEURONTIN) 600 mg tablet Take 1 Tab by mouth two (2) times a day., Print, Disp-30 Tab, R-0      cloNIDine HCl (CATAPRES) 0.1 mg tablet Take 1 Tab by mouth two (2) times a day., Print, Disp-20 Tab, R-0      !! lamoTRIgine (LAMICTAL) 200 mg tablet Take  by mouth daily. , Historical Med      hydrocortisone (CORTAID) 0.5 % topical cream Apply  to affected area two (2) times a day. use thin layer, Normal, Disp-30 g, R-0      topiramate (TOPAMAX) 100 mg tablet 100 mg two (2) times a day., Historical Med      QUEtiapine (SEROQUEL) 300 mg tablet Historical Med      !! CREON 24,000-76,000 -120,000 unit capsule Historical Med, LINDA      cetirizine (ZYRTEC) 10 mg tablet Historical Med      SUMAtriptan (IMITREX) 100 mg tablet TAKE 1 TO 2 TABLETS BY MOUTH DAILY AS NEEDED FOR MIGRAINE . DO NOT EXCEED 200 MG IN 24 HOUR PERIOD, Historical Med, R-0      busPIRone (BUSPAR) 15 mg tablet Take 1 Tab by mouth two (2) times a day., Print, Disp-60 Tab, R-0       !! - Potential duplicate medications found. Please discuss with provider. Follow-up: pcp    Discharge Condition: Stable    Activity: Activity as tolerated    Diet: Resume previous diet    Labs:  Labs: Results:       Chemistry No results for input(s): GLU, NA, K, CL, CO2, BUN, CREA, CA, AGAP, BUCR, TBIL, GPT, AP, TP, ALB, GLOB, AGRAT in the last 72 hours. CBC w/Diff No results for input(s): WBC, RBC, HGB, HCT, PLT, GRANS, LYMPH, EOS, HGBEXT, HCTEXT, PLTEXT in the last 72 hours. Cardiac Enzymes No results for input(s): CPK, CKND1, GUSTAVO in the last 72 hours.     No lab exists for component: CKRMB, TROIP   Coagulation No results for input(s): PTP, INR, APTT, INREXT in the last 72 hours. Lipid Panel Lab Results   Component Value Date/Time    Cholesterol, total 175 05/01/2020 09:32 AM    HDL Cholesterol 19 (L) 05/01/2020 09:32 AM    LDL, calculated  05/01/2020 09:32 AM     LDL AND VLDL CHOLESTEROL NOT CALCULATED WHEN TRIGLYCERIDES >400 MG/DL OR HDL CHOLESTEROL <20 MG/DL    VLDL, calculated  05/01/2020 09:32 AM     Calculation not valid with this patient's other Lipid values. Triglyceride 423 (H) 05/01/2020 09:32 AM    CHOL/HDL Ratio 9.2 (H) 05/01/2020 09:32 AM      BNP No results for input(s): BNPP in the last 72 hours. Liver Enzymes No results for input(s): TP, ALB, TBIL, AP, SGOT, GPT in the last 72 hours. No lab exists for component: DBIL   Thyroid Studies Lab Results   Component Value Date/Time    T4, Total 4.7 01/28/2016 10:40 AM    TSH 0.64 01/28/2016 10:40 AM          Imaging:    Xr Ribs Rt Uni 2 V    Result Date: 4/26/2020  --------------------------------------------------------------------------- <<<<<<<<<           [facility]           >>>>>>>>> --------------------------------------------------------------------------- CLINICAL HISTORY: Injury, with right chest wall pain. COMPARISON EXAMINATIONS: None. --- 3 views of the right ribs --- LUNGS, PLEURA: Right lung unremarkable. MEDIASTINUM:  Atherosclerotic arterial calcification. Mediastinum incompletely imaged. BONES:  No fracture or suspicious bone lesion.  -------------    Impression: ------------- 1. No visible fracture. Ct Abd Pelv W Cont    Result Date: 5/1/2020  EXAM: CT of the abdomen and pelvis INDICATION: Pain. COMPARISON: 4/4/2020 TECHNIQUE: Axial CT imaging of the abdomen and pelvis was performed following intravenous contrast. Multiplanar reformats were generated.  One or more dose reduction techniques were used on this CT: automated exposure control, adjustment of the mAs and/or kVp according to patient size, and iterative reconstruction techniques. The specific techniques used on this CT exam have been documented in the patient's electronic medical record. Digital Imaging and Communications in Medicine (DICOM) format image data are available to nonaffiliated external healthcare facilities or entities on a secure, media free, reciprocally searchable basis with patient authorization for at least a 12-month period after this study. _______________ FINDINGS: LOWER CHEST: Minimal dependent atelectasis. New mildly displaced posterior right 11th rib fracture which appears fairly acute. Mild adjacent atelectasis. Interval clearing of left lower lobe process noted previously. Coronary arterial calcifications are noted. PERITONEAL CAVITY: No free air or free fluid. LIVER, BILIARY: The liver is prominently diffusely hypodense consistent with fatty infiltration. This is stable without focal mass. No biliary dilation. Gallbladder wall minimally prominent and enhancing but no gallbladder distention or pericholecystic changes. PANCREAS: New heterogeneous hypodensity in the head of pancreas with adjacent peripancreatic edema. No fluid collection. Pancreatic duct top normal in size. Remainder the pancreas appears normal. SPLEEN: Normal. ADRENALS: Normal. KIDNEYS: Nonobstructing intrarenal calculi lower pole left kidney. Otherwise normal. LYMPH NODES: No enlarged lymph nodes. GASTROINTESTINAL TRACT: Small hiatal hernia with a question of some wall thickening of the distal esophagus. This is unchanged. PELVIC ORGANS: Unremarkable. VASCULATURE: Atherosclerotic calcifications of the aorta without josé luis aneurysm. BONES: Ill-defined sclerosis of the femoral heads with some possible cystic change without collapse suspect chronic avascular necrosis. This is slightly more prominent on the left. This is unchanged from previous. OTHER: _______________     IMPRESSION: 1.  Evidence of acute pancreatitis involving the head of the pancreas. No drainable fluid collection. 2. Prominent fatty infiltration of the liver. 3. Nonobstructing intrarenal calculi left kidney. 4. Hiatal hernia with some esophageal wall thickening above the hernia with edema, this could be from esophagitis. 5. Changes suggestive of chronic AVN bilateral femoral heads without collapse. 6. New acute appearing mildly displaced right posterior 11th rib fracture. Preliminary report provided by on-call radiology resident. Xr Chest Port    Result Date: 5/1/2020  A. P. portable chest: Indication: Abdominal pain. Concern for pancreatitis. Comparison 10/22/2019. The lungs are clear. The heart and vascularity are normal.     Impression: Negative portable chest.       Consults: None    Treatment Team: Treatment Team: Consulting Provider: Dasha Thibodeaux DO; Utilization Review: Lizbet Villareal RN; Care Manager: Lashon Buck; Staff Nurse: Mallika Kelley    Significant Diagnostic Studies: labs: No results found for this or any previous visit (from the past 25 hour(s)). Procedures:  none    Discharge diagnoses:    Problem List as of 5/4/2020 Date Reviewed: 5/1/2020          Codes Class Noted - Resolved    Acute urinary retention ICD-10-CM: R33.8  ICD-9-CM: 788.29  5/3/2020 - Present    Overview Signed 5/3/2020  3:48 AM by Dasha Thibodeaux DO     At 0050 EST on 5/03/2020.              Hypertensive urgency ICD-10-CM: I16.0  ICD-9-CM: 401.9  5/1/2020 - Present        Hyperkalemia ICD-10-CM: E87.5  ICD-9-CM: 276.7  10/11/2019 - Present        Common bile duct dilatation ICD-10-CM: K83.8  ICD-9-CM: 576.8  10/11/2019 - Present        Chest pain ICD-10-CM: R07.9  ICD-9-CM: 786.50  5/23/2019 - Present        Esophagitis ICD-10-CM: K20.9  ICD-9-CM: 530.10  5/23/2019 - Present        Metabolic acidosis RIN-97-TV: E87.2  ICD-9-CM: 276.2  1/15/2019 - Present        NSTEMI (non-ST elevated myocardial infarction) ([facility] [address] ICD-10-CM: I21.4  ICD-9-CM: 410.70  1/15/2019 - Present Rhabdomyolysis ICD-10-CM: M62.82  ICD-9-CM: 728.88  1/15/2019 - Present        Upper GI bleed ICD-10-CM: K92.2  ICD-9-CM: 578.9  1/15/2019 - Present        Recurrent UTI (urinary tract infection) ICD-10-CM: N39.0  ICD-9-CM: 599.0  4/24/2018 - Present        Staghorn renal calculus ICD-10-CM: N20.0  ICD-9-CM: 592.0  4/24/2018 - Present        UTI (urinary tract infection) ICD-10-CM: N39.0  ICD-9-CM: 599.0  3/11/2018 - Present        Bronchitis ICD-10-CM: J40  ICD-9-CM: 490  3/11/2018 - Present        Syncope ICD-10-CM: R55  ICD-9-CM: 780.2  3/8/2018 - Present        Alcohol withdrawal ([facility] ICD-10-CM: J01.468  ICD-9-CM: 291.81  3/8/2018 - Present        GI bleed ICD-10-CM: K92.2  ICD-9-CM: 578.9  3/8/2018 - Present        Hyponatremia ICD-10-CM: E87.1  ICD-9-CM: 276.1  3/8/2018 - Present        Hypercalcemia ICD-10-CM: E83.52  ICD-9-CM: 275.42  3/8/2018 - Present        Dizziness ICD-10-CM: R42  ICD-9-CM: 780.4  9/14/2017 - Present        Pancreatitis ICD-10-CM: K85.90  ICD-9-CM: 577.0  9/14/2017 - Present        Anemia ICD-10-CM: D64.9  ICD-9-CM: 285.9  9/14/2017 - Present        Encephalopathy ICD-10-CM: G93.40  ICD-9-CM: 348.30  9/14/2017 - Present        Nicotine withdrawal ICD-10-CM: F17.203  ICD-9-CM: 292.0, 305.1  2/27/2016 - Present        Pneumonia ICD-10-CM: J18.9  ICD-9-CM: 208  2/25/2016 - Present        COPD (chronic obstructive pulmonary disease) ([place] [address].) ICD-10-CM: J44.9  ICD-9-CM: 496  2/25/2016 - Present        Tylenol overdose ICD-10-CM: T39.1X1A  ICD-9-CM: 965.4, E980.0  2/20/2016 - Present        SIRS (systemic inflammatory response syndrome) ([place] [address].) ICD-10-CM: R65.10  ICD-9-CM: 995.90  2/20/2016 - Present        * (Principal) Acute pancreatitis ICD-10-CM: K85.90  ICD-9-CM: 992.7  2/19/2016 - Present        Pancreatitis, alcoholic, acute JPU-[id number]: K85.20  ICD-9-CM: 577.0  2/19/2016 - Present        Tachycardia ICD-10-CM: R00.0  ICD-9-CM: 785.0  1/28/2016 - Present        Alcoholism ([place] [address].) (Chronic) ICD-10-CM: F10.20  ICD-9-CM: 303.90  1/28/2016 - Present        Esophageal stricture (Chronic) ICD-10-CM: K22.2  ICD-9-CM: 530.3  1/28/2016 - Present        Dilated pancreatic duct ICD-10-CM: K86.89  ICD-9-CM: 577.8  1/28/2016 - Present        Common bile duct dilation ICD-10-CM: K83.8  ICD-9-CM: 576.8  1/28/2016 - Present        Elevated LFTs ICD-10-CM: R79.89  ICD-9-CM: 790.6  1/28/2016 - Present        Bipolar depression ([address] (Chronic) ICD-10-CM: F31.9  ICD-9-CM: 296.50  1/28/2016 - Present        HTN (hypertension) (Chronic) ICD-10-CM: I10  ICD-9-CM: 401.9  1/28/2016 - Present        Bipolar disorder (manic depression) (HCC) (Chronic) ICD-10-CM: F31.9  ICD-9-CM: 296.80  3/8/2013 - Present        RESOLVED: Alcohol dependence with withdrawal (HCC) (Chronic) ICD-10-CM: Z81.634  ICD-9-CM: 303.90, 291.81  3/8/2013 - 1/28/2016        Abdominal pain ICD-10-CM: R10.9  ICD-9-CM: 789.00  1/15/2019 - Present        Hypokalemia ICD-10-CM: E87.6  ICD-9-CM: 276.8  9/14/2017 - Present            Hospital Course:  Major issues addressed during hospitalization outlined  below. Marcia Cui is a 63 y.o. female who presents to [facility] ER with complaint of Abdominal Pain with Nausea and Vomiting. Patient states that her pain began 2 days ago and that she has had non-bloody emeses. Patient states that her last drink was the day before yesterday (possibly 5/28/2020). Patient falls asleep while contemplating the intensity and frequency of her pain. Patient remarks \"sorry\" and \"thank you\" every time that she is roused with moderate auditory stimuli or mild to moderate tactile stimulation. Patient is A&O x4/4 with greater effort; however, she is very somnolent and falls asleep rapidly and seems to lack the motivation to stay awake.     In [facility] ER, Patient is noted to have Heart Rate 121 bpm, Blood Pressure 162/96 mm Hg, Calcium 14.1, Magnesium 1.0, , , Alk Phos 142, and Lipase 540.   CT Abdomen/Pelvis shows Acute Pancreatitis, Reactive Ileus, Severe Hepatic steatosis, chronic esophageal edema, and chronic Avascular Necrosis of Bilateral Femoral Heads.     Patient is admitted to [facility] Telemetry Unit (Non-Covid-19 Cohort) for management of Acute (Alcoholic) Pancreatitis, ETOH Withdrawal, Hypercalcemia, and Hypertensive Urgency. Patient was treated with aggressive IVF, pain meds prn, was placed on ciwa and vitamins. She experienced some urinary retention and required straight cath, we ensured she was able to urinate before dc. Once she was able to rely on a regular diet and po medications she was safe to dc home.     DC: home    Emerita Whiteside MD  May 19, 2020        Total time spent 45 minutes

## 2020-05-30 ENCOUNTER — HOSPITAL ENCOUNTER (EMERGENCY)
Age: 55
Discharge: HOME | End: 2020-05-30
Attending: EMERGENCY MEDICINE
Payer: MEDICARE

## 2020-05-30 ENCOUNTER — APPOINTMENT (OUTPATIENT)
Dept: GENERAL RADIOLOGY | Age: 55
End: 2020-05-30
Attending: EMERGENCY MEDICINE
Payer: MEDICARE

## 2020-05-30 VITALS
RESPIRATION RATE: 11 BRPM | SYSTOLIC BLOOD PRESSURE: 146 MMHG | HEART RATE: 90 BPM | DIASTOLIC BLOOD PRESSURE: 90 MMHG | OXYGEN SATURATION: 99 %

## 2020-05-30 DIAGNOSIS — E87.1 HYPONATREMIA: ICD-10-CM

## 2020-05-30 DIAGNOSIS — E87.6 HYPOKALEMIA: ICD-10-CM

## 2020-05-30 DIAGNOSIS — F10.10 ALCOHOL ABUSE: Primary | ICD-10-CM

## 2020-05-30 DIAGNOSIS — N30.00 ACUTE CYSTITIS WITHOUT HEMATURIA: ICD-10-CM

## 2020-05-30 LAB
ALBUMIN SERPL-MCNC: 3.2 G/DL (ref 3.4–5)
ALBUMIN/GLOB SERPL: 0.8 {RATIO} (ref 0.8–1.7)
ALP SERPL-CCNC: 130 U/L (ref 45–117)
ALT SERPL-CCNC: 47 U/L (ref 13–56)
ANION GAP SERPL CALC-SCNC: 11 MMOL/L (ref 3–18)
APPEARANCE UR: ABNORMAL
APTT PPP: 25.3 SEC (ref 23–36.4)
AST SERPL-CCNC: 72 U/L (ref 10–38)
ATRIAL RATE: 108 BPM
BACTERIA URNS QL MICRO: ABNORMAL /HPF
BASOPHILS # BLD: 0 K/UL (ref 0–0.1)
BASOPHILS NFR BLD: 1 % (ref 0–2)
BILIRUB SERPL-MCNC: 0.5 MG/DL (ref 0.2–1)
BILIRUB UR QL: NEGATIVE
BUN SERPL-MCNC: 2 MG/DL (ref 7–18)
BUN/CREAT SERPL: 3 (ref 12–20)
CALCIUM SERPL-MCNC: 8.9 MG/DL (ref 8.5–10.1)
CALCULATED P AXIS, ECG09: 72 DEGREES
CALCULATED R AXIS, ECG10: 75 DEGREES
CALCULATED T AXIS, ECG11: 65 DEGREES
CHLORIDE SERPL-SCNC: 97 MMOL/L (ref 100–111)
CK MB CFR SERPL CALC: 2.2 % (ref 0–4)
CK MB SERPL-MCNC: 1.1 NG/ML (ref 5–25)
CK SERPL-CCNC: 49 U/L (ref 26–192)
CO2 SERPL-SCNC: 27 MMOL/L (ref 21–32)
COLOR UR: YELLOW
CREAT SERPL-MCNC: 0.8 MG/DL (ref 0.6–1.3)
DIAGNOSIS, 93000: NORMAL
DIFFERENTIAL METHOD BLD: ABNORMAL
EOSINOPHIL # BLD: 0 K/UL (ref 0–0.4)
EOSINOPHIL NFR BLD: 0 % (ref 0–5)
EPITH CASTS URNS QL MICRO: ABNORMAL /LPF (ref 0–5)
ERYTHROCYTE [DISTWIDTH] IN BLOOD BY AUTOMATED COUNT: 14.5 % (ref 11.6–14.5)
ETHANOL SERPL-MCNC: 86 MG/DL (ref 0–3)
GLOBULIN SER CALC-MCNC: 3.8 G/DL (ref 2–4)
GLUCOSE SERPL-MCNC: 149 MG/DL (ref 74–99)
GLUCOSE UR STRIP.AUTO-MCNC: NEGATIVE MG/DL
HCT VFR BLD AUTO: 39.5 % (ref 35–45)
HGB BLD-MCNC: 13.5 G/DL (ref 12–16)
HGB UR QL STRIP: NEGATIVE
INR PPP: 1 (ref 0.8–1.2)
KETONES UR QL STRIP.AUTO: NEGATIVE MG/DL
LEUKOCYTE ESTERASE UR QL STRIP.AUTO: ABNORMAL
LIPASE SERPL-CCNC: 62 U/L (ref 73–393)
LYMPHOCYTES # BLD: 2.2 K/UL (ref 0.9–3.6)
LYMPHOCYTES NFR BLD: 47 % (ref 21–52)
MCH RBC QN AUTO: 32.8 PG (ref 24–34)
MCHC RBC AUTO-ENTMCNC: 34.2 G/DL (ref 31–37)
MCV RBC AUTO: 96.1 FL (ref 74–97)
MONOCYTES # BLD: 0.3 K/UL (ref 0.05–1.2)
MONOCYTES NFR BLD: 6 % (ref 3–10)
NEUTS SEG # BLD: 2.2 K/UL (ref 1.8–8)
NEUTS SEG NFR BLD: 46 % (ref 40–73)
NITRITE UR QL STRIP.AUTO: NEGATIVE
P-R INTERVAL, ECG05: 132 MS
PH UR STRIP: 5 [PH] (ref 5–8)
PLATELET # BLD AUTO: 248 K/UL (ref 135–420)
PMV BLD AUTO: 9.5 FL (ref 9.2–11.8)
POTASSIUM SERPL-SCNC: 2.7 MMOL/L (ref 3.5–5.5)
PROT SERPL-MCNC: 7 G/DL (ref 6.4–8.2)
PROT UR STRIP-MCNC: 30 MG/DL
PROTHROMBIN TIME: 12.7 SEC (ref 11.5–15.2)
Q-T INTERVAL, ECG07: 364 MS
QRS DURATION, ECG06: 82 MS
QTC CALCULATION (BEZET), ECG08: 487 MS
RBC # BLD AUTO: 4.11 M/UL (ref 4.2–5.3)
RBC #/AREA URNS HPF: ABNORMAL /HPF (ref 0–5)
SODIUM SERPL-SCNC: 135 MMOL/L (ref 136–145)
SP GR UR REFRACTOMETRY: 1.01 (ref 1–1.03)
TROPONIN I SERPL-MCNC: <0.02 NG/ML (ref 0–0.04)
UROBILINOGEN UR QL STRIP.AUTO: 1 EU/DL (ref 0.2–1)
VENTRICULAR RATE, ECG03: 108 BPM
WBC # BLD AUTO: 4.7 K/UL (ref 4.6–13.2)
WBC URNS QL MICRO: ABNORMAL /HPF (ref 0–4)

## 2020-05-30 PROCEDURE — 96375 TX/PRO/DX INJ NEW DRUG ADDON: CPT

## 2020-05-30 PROCEDURE — 96368 THER/DIAG CONCURRENT INF: CPT

## 2020-05-30 PROCEDURE — 74011250637 HC RX REV CODE- 250/637: Performed by: PHYSICIAN ASSISTANT

## 2020-05-30 PROCEDURE — 74011250636 HC RX REV CODE- 250/636: Performed by: PHYSICIAN ASSISTANT

## 2020-05-30 PROCEDURE — 93005 ELECTROCARDIOGRAM TRACING: CPT

## 2020-05-30 PROCEDURE — 83690 ASSAY OF LIPASE: CPT

## 2020-05-30 PROCEDURE — 82550 ASSAY OF CK (CPK): CPT

## 2020-05-30 PROCEDURE — 85025 COMPLETE CBC W/AUTO DIFF WBC: CPT

## 2020-05-30 PROCEDURE — 74011000250 HC RX REV CODE- 250: Performed by: PHYSICIAN ASSISTANT

## 2020-05-30 PROCEDURE — 80053 COMPREHEN METABOLIC PANEL: CPT

## 2020-05-30 PROCEDURE — 96365 THER/PROPH/DIAG IV INF INIT: CPT

## 2020-05-30 PROCEDURE — 80307 DRUG TEST PRSMV CHEM ANLYZR: CPT

## 2020-05-30 PROCEDURE — 96366 THER/PROPH/DIAG IV INF ADDON: CPT

## 2020-05-30 PROCEDURE — 85610 PROTHROMBIN TIME: CPT

## 2020-05-30 PROCEDURE — 71045 X-RAY EXAM CHEST 1 VIEW: CPT

## 2020-05-30 PROCEDURE — 99285 EMERGENCY DEPT VISIT HI MDM: CPT

## 2020-05-30 PROCEDURE — 81001 URINALYSIS AUTO W/SCOPE: CPT

## 2020-05-30 PROCEDURE — 85730 THROMBOPLASTIN TIME PARTIAL: CPT

## 2020-05-30 RX ORDER — MAG HYDROX/ALUMINUM HYD/SIMETH 200-200-20
30 SUSPENSION, ORAL (FINAL DOSE FORM) ORAL
Status: COMPLETED | OUTPATIENT
Start: 2020-05-30 | End: 2020-05-30

## 2020-05-30 RX ORDER — LORAZEPAM 0.5 MG/1
0.5 TABLET ORAL
Qty: 8 TAB | Refills: 0 | OUTPATIENT
Start: 2020-05-30 | End: 2020-08-21

## 2020-05-30 RX ORDER — ONDANSETRON 2 MG/ML
4 INJECTION INTRAMUSCULAR; INTRAVENOUS
Status: COMPLETED | OUTPATIENT
Start: 2020-05-30 | End: 2020-05-30

## 2020-05-30 RX ORDER — ONDANSETRON 4 MG/1
4 TABLET, ORALLY DISINTEGRATING ORAL
Qty: 20 TAB | Refills: 0 | OUTPATIENT
Start: 2020-05-30 | End: 2020-08-21

## 2020-05-30 RX ORDER — MORPHINE SULFATE 4 MG/ML
4 INJECTION, SOLUTION INTRAMUSCULAR; INTRAVENOUS
Status: COMPLETED | OUTPATIENT
Start: 2020-05-30 | End: 2020-05-30

## 2020-05-30 RX ORDER — ONDANSETRON 4 MG/1
4 TABLET, ORALLY DISINTEGRATING ORAL
Qty: 20 TAB | Refills: 0 | Status: SHIPPED | OUTPATIENT
Start: 2020-05-30 | End: 2020-05-30 | Stop reason: CLARIF

## 2020-05-30 RX ORDER — POTASSIUM CHLORIDE 7.45 MG/ML
10 INJECTION INTRAVENOUS ONCE
Status: COMPLETED | OUTPATIENT
Start: 2020-05-30 | End: 2020-05-30

## 2020-05-30 RX ORDER — POTASSIUM CHLORIDE 7.45 MG/ML
10 INJECTION INTRAVENOUS
Status: COMPLETED | OUTPATIENT
Start: 2020-05-30 | End: 2020-05-30

## 2020-05-30 RX ORDER — LIDOCAINE HYDROCHLORIDE 20 MG/ML
15 SOLUTION OROPHARYNGEAL
Status: COMPLETED | OUTPATIENT
Start: 2020-05-30 | End: 2020-05-30

## 2020-05-30 RX ORDER — LORAZEPAM 0.5 MG/1
0.5 TABLET ORAL
Qty: 10 TAB | Refills: 0 | Status: SHIPPED | OUTPATIENT
Start: 2020-05-30 | End: 2020-05-30 | Stop reason: CLARIF

## 2020-05-30 RX ORDER — NITROFURANTOIN 25; 75 MG/1; MG/1
100 CAPSULE ORAL 2 TIMES DAILY
Qty: 10 CAP | Refills: 0 | Status: SHIPPED | OUTPATIENT
Start: 2020-05-30 | End: 2020-06-04

## 2020-05-30 RX ORDER — NITROFURANTOIN 25; 75 MG/1; MG/1
100 CAPSULE ORAL 2 TIMES DAILY
Qty: 10 CAP | Refills: 0 | Status: SHIPPED | OUTPATIENT
Start: 2020-05-30 | End: 2020-05-30 | Stop reason: CLARIF

## 2020-05-30 RX ADMIN — POTASSIUM CHLORIDE 10 MEQ: 7.46 INJECTION, SOLUTION INTRAVENOUS at 15:04

## 2020-05-30 RX ADMIN — ALUMINUM HYDROXIDE, MAGNESIUM HYDROXIDE, AND SIMETHICONE 30 ML: 200; 200; 20 SUSPENSION ORAL at 12:24

## 2020-05-30 RX ADMIN — FOLIC ACID: 5 INJECTION, SOLUTION INTRAMUSCULAR; INTRAVENOUS; SUBCUTANEOUS at 13:19

## 2020-05-30 RX ADMIN — ONDANSETRON 4 MG: 2 INJECTION INTRAMUSCULAR; INTRAVENOUS at 13:18

## 2020-05-30 RX ADMIN — POTASSIUM CHLORIDE 10 MEQ: 7.46 INJECTION, SOLUTION INTRAVENOUS at 14:16

## 2020-05-30 RX ADMIN — LIDOCAINE HYDROCHLORIDE 15 ML: 20 SOLUTION ORAL; TOPICAL at 12:25

## 2020-05-30 RX ADMIN — MORPHINE SULFATE 4 MG: 4 INJECTION, SOLUTION INTRAMUSCULAR; INTRAVENOUS at 13:51

## 2020-05-30 RX ADMIN — POTASSIUM CHLORIDE 10 MEQ: 7.46 INJECTION, SOLUTION INTRAVENOUS at 13:19

## 2020-05-30 NOTE — PROGRESS NOTES
Transition of Care/ DC Planning    Asked by PA to speak with patient and given resources for alcohol rehab. Met with patient who indicates she has been in alcohol rehab 15 times at [facility] and stays about 15 days. She is interested in a longer program. Gave pt folder with both Inpatient // Outpatient treatment Centers and also included [facility] info and [address] [place] [address] with contact name and number. Pt is aware she is to contact them herself for admittance.     Alma Ojeda RN    Outcomes Manager  [phone]  [phone]

## 2020-05-30 NOTE — ED TRIAGE NOTES
Per EMS-\"Patient complains of chest pain and states she is in rehab but had some alcoholic drinks this morning. \"

## 2020-05-30 NOTE — ED PROVIDER NOTES
EMERGENCY DEPARTMENT HISTORY AND PHYSICAL EXAM    Date: 5/30/2020  Patient Name: Dirk Mark    History of Presenting Illness     Chief Complaint   Patient presents with    Chest Pain         History Provided By: Patient    Chief Complaint: Alcohol abuse, abdominal pain, acid reflux, anxiety, chest pain  Duration: Last drink was at 8 AM this morning, symptoms started shortly after  Timing: Acute  Location: Right upper quadrant, left upper quadrant, left-sided chest  Quality: Burning  Severity: Moderate to severe  Modifying Factors: Worse after her last drink at around 8 AM  Associated Symptoms: none       Additional History (Context): Dirk Mark is a 63 y.o. female with a history of pancreatitis, bipolar disorder, alcohol abuse who presents today for history as listed above. Patient reports she was recently admitted in April for alcohol detox, reports that she was doing well at home until she had a relapse. Reports that she has been drinking a gallon of vodka every 4 days. Also reports that she bought a couple bottles of wine 2 days ago. Patient reports her last drink was at 8 AM this morning. Reports abdominal pain, left-sided chest pain, anxiety, acid reflux, and nausea. Patient reports he has had difficulties finding outpatient rehab facilities. Patient also reports concerns for dehydration and states she has not been eating a well-balanced diet.       PCP: Sergio Landry MD    Current Facility-Administered Medications   Medication Dose Route Frequency Provider Last Rate Last Dose    potassium chloride 10 mEq in 100 ml IVPB  10 mEq IntraVENous NOW YARY Saleh 100 mL/hr at 05/30/20 1416 10 mEq at 05/30/20 1416    potassium chloride 10 mEq in 100 ml IVPB  10 mEq IntraVENous ONCE George Salehma         Current Outpatient Medications   Medication Sig Dispense Refill    ondansetron (ZOFRAN ODT) 4 mg disintegrating tablet Take 1 Tab by mouth every eight (8) hours as needed for Nausea or Vomiting. 20 Tab 0    LORazepam (Ativan) 0.5 mg tablet Take 1 Tab by mouth every eight (8) hours as needed for Anxiety. Max Daily Amount: 1.5 mg. 10 Tab 0    nitrofurantoin, macrocrystal-monohydrate, (Macrobid) 100 mg capsule Take 1 Cap by mouth two (2) times a day for 5 days. 10 Cap 0    lidocaine (LIDODERM) 5 % Apply patch to the affected area for 12 hours a day and remove for 12 hours a day. 30 Each 0    lamoTRIgine (LaMICtal) 150 mg tablet Take 150 mg by mouth daily.  lipase-protease-amylase (CREON 24,000) 24,000-76,000 -120,000 unit capsule Take 1 Cap by mouth.  sucralfate (CARAFATE) 100 mg/mL suspension Take 10 mL by mouth Before breakfast, lunch, dinner and at bedtime. 0183 mL 0    folic acid (FOLVITE) 1 mg tablet Take 1 Tab by mouth daily. 30 Tab 0    multivitamin (ONE A DAY) tablet Take 1 Tab by mouth daily. 30 Tab 0    thiamine HCL (B-1) 100 mg tablet Take 1 Tab by mouth daily (with breakfast). 30 Tab 0    ipratropium-albuteroL (COMBIVENT RESPIMAT)  mcg/actuation inhaler Take 1 Puff by inhalation as needed for Wheezing.  traZODone (DESYREL) 100 mg tablet Take 200 mg by mouth nightly.  melatonin 10 mg tab Take 10 mg by mouth nightly.  MILK THISTLE PO Take 175 mg by mouth two (2) times a day.  colchicine 0.6 mg tablet Take 0.6 mg by mouth daily.  benzonatate (TESSALON PERLE PO) Take  by mouth as needed. Indications: cough      docusate sodium (COLACE) 100 mg capsule Take 100 mg by mouth three (3) times daily.  ondansetron (ZOFRAN ODT) 8 mg disintegrating tablet Take 1 Tab by mouth every eight (8) hours as needed for Nausea. 15 Tab 0    pantoprazole (PROTONIX) 40 mg tablet Take 1 Tab by mouth Before breakfast and dinner. 60 Tab 0    gabapentin (NEURONTIN) 600 mg tablet Take 1 Tab by mouth two (2) times a day. 30 Tab 0    cloNIDine HCl (CATAPRES) 0.1 mg tablet Take 1 Tab by mouth two (2) times a day.  20 Tab 0    lamoTRIgine (LAMICTAL) 200 mg tablet Take  by mouth daily.  hydrocortisone (CORTAID) 0.5 % topical cream Apply  to affected area two (2) times a day. use thin layer 30 g 0    topiramate (TOPAMAX) 100 mg tablet 100 mg two (2) times a day.  QUEtiapine (SEROQUEL) 300 mg tablet       CREON 24,000-76,000 -120,000 unit capsule       cetirizine (ZYRTEC) 10 mg tablet       SUMAtriptan (IMITREX) 100 mg tablet TAKE 1 TO 2 TABLETS BY MOUTH DAILY AS NEEDED FOR MIGRAINE . DO NOT EXCEED 200 MG IN 24 HOUR PERIOD  0    busPIRone (BUSPAR) 15 mg tablet Take 1 Tab by mouth two (2) times a day. 61 Tab 0       Past History     Past Medical History:  Past Medical History:   Diagnosis Date    Alcohol abuse     Anxiety     Arrhythmia     Tacchycardai    Asthma     controlled    Bipolar disorder ([address]     Common migraine     COPD [facility]ch[facility] obstructive pulmonary disease[facility]     Home O2  PRN ,  pt use also for Tachycardia    Depression     Esophageal reflux     Gout     Hypertension     Hypocitraturia     Inverted nipple     Left breast always have.  Kidney stone on left side     Pancreatitis     Recurrent UTI     Staghorn renal calculus     Urine leukocytes        Past Surgical History:  Past Surgical History:   Procedure Laterality Date    HX COLONOSCOPY      HX CYST REMOVAL Left     x 3 surgeries    HX ENDOSCOPY      HX GYN      tubal ligation    HX LUMBAR LAMINECTOMY      HX UROLOGICAL  08/10/2018    Cysto, bilat RPG, left ureteral pyeloscopy, HLL, left JJ stent placement, Dr. Suad Pandya       Family History:  Family History   Family history unknown: Yes       Social History:  Social History     Tobacco Use    Smoking status: Current Every Day Smoker     Packs/day: 1.50     Years: 33.00     Pack years: 49.50     Types: Cigarettes    Smokeless tobacco: Never Used   Substance Use Topics    Alcohol use: Not Currently     Comment: vodka    Drug use: No     Comment: 11years old- [place]       Allergies:   Allergies Allergen Reactions    Lithium Anaphylaxis    Amitriptyline Other (comments)     Left leg went numb    Elavil Other (comments)     Left leg went numb         Review of Systems   Review of Systems   Constitutional: Positive for appetite change. Negative for chills and fever. HENT: Negative for congestion, rhinorrhea and sore throat. Respiratory: Negative for cough and shortness of breath. Cardiovascular: Positive for chest pain. Gastrointestinal: Positive for abdominal pain and nausea. Negative for blood in stool, constipation, diarrhea and vomiting. Genitourinary: Negative for dysuria, frequency and hematuria. Musculoskeletal: Negative for back pain and myalgias. Skin: Negative for rash and wound. Neurological: Negative for dizziness and headaches. All other systems reviewed and are negative. All Other Systems Negative  Physical Exam     Vitals:    05/30/20 1200 05/30/20 1215 05/30/20 1230 05/30/20 1245   BP: 144/86 (!) 151/94 (!) 142/94 146/90   Pulse:       Resp:       SpO2: 100% 100% 99% 99%     Physical Exam  Vitals signs and nursing note reviewed. Constitutional:       General: She is not in acute distress. Appearance: She is well-developed. She is not diaphoretic. Comments: Overall well-appearing   HENT:      Head: Normocephalic and atraumatic. Eyes:      Conjunctiva/sclera: Conjunctivae normal.   Neck:      Musculoskeletal: Normal range of motion and neck supple. Cardiovascular:      Rate and Rhythm: Normal rate and regular rhythm. No extrasystoles are present. Pulses: Normal pulses. No decreased pulses. Heart sounds: Normal heart sounds. No systolic murmur. No diastolic murmur. Pulmonary:      Effort: Pulmonary effort is normal. No respiratory distress. Breath sounds: Normal breath sounds. No stridor, decreased air movement or transmitted upper airway sounds. No decreased breath sounds, wheezing or rhonchi. Chest:      Chest wall: No tenderness. Abdominal:      General: Bowel sounds are normal. There is no distension. Palpations: Abdomen is soft. Tenderness: There is no abdominal tenderness. There is no guarding or rebound. Musculoskeletal:         General: No deformity. Skin:     General: Skin is warm and dry. Neurological:      General: No focal deficit present. Mental Status: She is alert and oriented to person, place, and time. Cranial Nerves: Cranial nerves are intact. Sensory: Sensation is intact. Motor: Motor function is intact. No tremor. Deep Tendon Reflexes: Reflexes are normal and symmetric. Psychiatric:         Attention and Perception: Attention normal.         Mood and Affect: Mood is anxious (mild). Speech: Speech normal.         Behavior: Behavior is uncooperative. Thought Content:  Thought content normal.         Cognition and Memory: Cognition normal.           Diagnostic Study Results     Labs -     Recent Results (from the past 12 hour(s))   EKG, 12 LEAD, INITIAL    Collection Time: 05/30/20 10:49 AM   Result Value Ref Range    Ventricular Rate 108 BPM    Atrial Rate 108 BPM    P-R Interval 132 ms    QRS Duration 82 ms    Q-T Interval 364 ms    QTC Calculation (Bezet) 487 ms    Calculated P Axis 72 degrees    Calculated R Axis 75 degrees    Calculated T Axis 65 degrees    Diagnosis       Sinus tachycardia  Possible Left atrial enlargement  Borderline ECG  When compared with ECG of 30-APR-2020 22:25,  No significant change was found     CARDIAC PANEL,(CK, CKMB & TROPONIN)    Collection Time: 05/30/20 11:00 AM   Result Value Ref Range    CK - MB 1.1 <3.6 ng/ml    CK-MB Index 2.2 0.0 - 4.0 %    CK 49 26 - 192 U/L    Troponin-I, QT <0.02 0.0 - 0.045 NG/ML   CBC WITH AUTOMATED DIFF    Collection Time: 05/30/20 11:00 AM   Result Value Ref Range    WBC 4.7 4.6 - 13.2 K/uL    RBC 4.11 (L) 4.20 - 5.30 M/uL    HGB 13.5 12.0 - 16.0 g/dL    HCT 39.5 35.0 - 45.0 %    MCV 96.1 74.0 - 97.0 FL MCH 32.8 24.0 - 34.0 PG    MCHC 34.2 31.0 - 37.0 g/dL    RDW 14.5 11.6 - 14.5 %    PLATELET 071 131 - 015 K/uL    MPV 9.5 9.2 - 11.8 FL    NEUTROPHILS 46 40 - 73 %    LYMPHOCYTES 47 21 - 52 %    MONOCYTES 6 3 - 10 %    EOSINOPHILS 0 0 - 5 %    BASOPHILS 1 0 - 2 %    ABS. NEUTROPHILS 2.2 1.8 - 8.0 K/UL    ABS. LYMPHOCYTES 2.2 0.9 - 3.6 K/UL    ABS. MONOCYTES 0.3 0.05 - 1.2 K/UL    ABS. EOSINOPHILS 0.0 0.0 - 0.4 K/UL    ABS. BASOPHILS 0.0 0.0 - 0.1 K/UL    DF AUTOMATED     METABOLIC PANEL, COMPREHENSIVE    Collection Time: 05/30/20 11:00 AM   Result Value Ref Range    Sodium 135 (L) 136 - 145 mmol/L    Potassium 2.7 (LL) 3.5 - 5.5 mmol/L    Chloride 97 (L) 100 - 111 mmol/L    CO2 27 21 - 32 mmol/L    Anion gap 11 3.0 - 18 mmol/L    Glucose 149 (H) 74 - 99 mg/dL    BUN 2 (L) 7.0 - 18 MG/DL    Creatinine 0.80 0.6 - 1.3 MG/DL    BUN/Creatinine ratio 3 (L) 12 - 20      GFR est AA >60 >60 ml/min/1.73m2    GFR est non-AA >60 >60 ml/min/1.73m2    Calcium 8.9 8.5 - 10.1 MG/DL    Bilirubin, total 0.5 0.2 - 1.0 MG/DL    ALT (SGPT) 47 13 - 56 U/L    AST (SGOT) 72 (H) 10 - 38 U/L    Alk.  phosphatase 130 (H) 45 - 117 U/L    Protein, total 7.0 6.4 - 8.2 g/dL    Albumin 3.2 (L) 3.4 - 5.0 g/dL    Globulin 3.8 2.0 - 4.0 g/dL    A-G Ratio 0.8 0.8 - 1.7     PROTHROMBIN TIME + INR    Collection Time: 05/30/20 11:00 AM   Result Value Ref Range    Prothrombin time 12.7 11.5 - 15.2 sec    INR 1.0 0.8 - 1.2     PTT    Collection Time: 05/30/20 11:00 AM   Result Value Ref Range    aPTT 25.3 23.0 - 36.4 SEC   LIPASE    Collection Time: 05/30/20 11:45 AM   Result Value Ref Range    Lipase 62 (L) 73 - 393 U/L   URINALYSIS W/ RFLX MICROSCOPIC    Collection Time: 05/30/20 12:00 PM   Result Value Ref Range    Color YELLOW      Appearance CLOUDY      Specific gravity 1.013 1.005 - 1.030      pH (UA) 5.0 5.0 - 8.0      Protein 30 (A) NEG mg/dL    Glucose Negative NEG mg/dL    Ketone Negative NEG mg/dL    Bilirubin Negative NEG      Blood Negative NEG      Urobilinogen 1.0 0.2 - 1.0 EU/dL    Nitrites Negative NEG      Leukocyte Esterase SMALL (A) NEG     ETHYL ALCOHOL    Collection Time: 05/30/20 12:00 PM   Result Value Ref Range    ALCOHOL(ETHYL),SERUM 86 (H) 0 - 3 MG/DL   URINE MICROSCOPIC ONLY    Collection Time: 05/30/20 12:00 PM   Result Value Ref Range    WBC 11 to 20 0 - 4 /hpf    RBC NONE 0 - 5 /hpf    Epithelial cells FEW 0 - 5 /lpf    Bacteria 2+ (A) NEG /hpf       Radiologic Studies -   XR CHEST PORT   Final Result   IMPRESSION:      No active cardiopulmonary disease. CT Results  (Last 48 hours)    None        CXR Results  (Last 48 hours)               05/30/20 1124  XR CHEST PORT Final result    Impression:  IMPRESSION:       No active cardiopulmonary disease. Narrative:  EXAM: Portable Chest       CLINICAL INDICATION: chest pain   -Additional: None       COMPARISON: 05/01/20       TECHNIQUE: AP portable view at 1045       ______________       FINDINGS:       HEART AND MEDIASTINUM: The heart size is normal.       LUNGS AND AIRWAYS: The lungs are clear. PLEURA: No pleural effusion or pneumothorax. BONES: No acute or aggressive osseous lesions. OTHER: None.       ______________                   Medical Decision Making   I am the first provider for this patient. I reviewed the vital signs, available nursing notes, past medical history, past surgical history, family history and social history. Vital Signs-Reviewed the patient's vital signs. Records Reviewed: Nursing Notes and Old Medical Records     Procedures: None   Procedures    Provider Notes (Medical Decision Making):     Differential: ACS, arrhythmia, alcohol withdrawal, DTs, malingering, pancreatitis, cholecystitis, bipolar disorder, SI, HI      Plan: Patient does not need emergent psych evaluation. Will order fluids, Zofran, GI cocktail, cardiac work-up, labs and lipase.   Patient has asked for pain medication multiple times, did discuss we will try GI cocktail and review labs accordingly. Patient's physical exam was unremarkable, abdomen is soft and nontender. Initial CIWA upon arrival was 2, will not order ativan at this time. 12:38 PM  Potassium 2.7, replacement has been ordered, patient also now admits that she has been taking Ativan at home that she had saved from a prior prescription originally given to her for a flight. Will consult case management     1:26 PM  Discussed case with Case management, Gris Carson who agrees to look into out patient treatment centers, do not feel patient meets inpatient treatment needs at this time, patient would also prefer outpatient treatment       2:48 PM  Patient has discuss outpatient resources with Gris Carson and states she feels very comfortable going home and following up. Feels she has the tools to initiate outpatient treatment. Have agreed to discharge home with Zofran and small amount of Ativan. Patient is well-appearing and feeling much better at this time. Will discharge home. Patient does have UTI, will discharge home with Macrobid. MED RECONCILIATION:  Current Facility-Administered Medications   Medication Dose Route Frequency    potassium chloride 10 mEq in 100 ml IVPB  10 mEq IntraVENous NOW    potassium chloride 10 mEq in 100 ml IVPB  10 mEq IntraVENous ONCE     Current Outpatient Medications   Medication Sig    ondansetron (ZOFRAN ODT) 4 mg disintegrating tablet Take 1 Tab by mouth every eight (8) hours as needed for Nausea or Vomiting.  LORazepam (Ativan) 0.5 mg tablet Take 1 Tab by mouth every eight (8) hours as needed for Anxiety. Max Daily Amount: 1.5 mg.    nitrofurantoin, macrocrystal-monohydrate, (Macrobid) 100 mg capsule Take 1 Cap by mouth two (2) times a day for 5 days.  lidocaine (LIDODERM) 5 % Apply patch to the affected area for 12 hours a day and remove for 12 hours a day.  lamoTRIgine (LaMICtal) 150 mg tablet Take 150 mg by mouth daily.     lipase-protease-amylase (CREON 24,000) 24,000-76,000 -120,000 unit capsule Take 1 Cap by mouth.  sucralfate (CARAFATE) 100 mg/mL suspension Take 10 mL by mouth Before breakfast, lunch, dinner and at bedtime.  folic acid (FOLVITE) 1 mg tablet Take 1 Tab by mouth daily.  multivitamin (ONE A DAY) tablet Take 1 Tab by mouth daily.  thiamine HCL (B-1) 100 mg tablet Take 1 Tab by mouth daily (with breakfast).  ipratropium-albuteroL (COMBIVENT RESPIMAT)  mcg/actuation inhaler Take 1 Puff by inhalation as needed for Wheezing.  traZODone (DESYREL) 100 mg tablet Take 200 mg by mouth nightly.  melatonin 10 mg tab Take 10 mg by mouth nightly.  MILK THISTLE PO Take 175 mg by mouth two (2) times a day.  colchicine 0.6 mg tablet Take 0.6 mg by mouth daily.  benzonatate (TESSALON PERLE PO) Take  by mouth as needed. Indications: cough    docusate sodium (COLACE) 100 mg capsule Take 100 mg by mouth three (3) times daily.  ondansetron (ZOFRAN ODT) 8 mg disintegrating tablet Take 1 Tab by mouth every eight (8) hours as needed for Nausea.  pantoprazole (PROTONIX) 40 mg tablet Take 1 Tab by mouth Before breakfast and dinner.  gabapentin (NEURONTIN) 600 mg tablet Take 1 Tab by mouth two (2) times a day.  cloNIDine HCl (CATAPRES) 0.1 mg tablet Take 1 Tab by mouth two (2) times a day.  lamoTRIgine (LAMICTAL) 200 mg tablet Take  by mouth daily.  hydrocortisone (CORTAID) 0.5 % topical cream Apply  to affected area two (2) times a day. use thin layer    topiramate (TOPAMAX) 100 mg tablet 100 mg two (2) times a day.  QUEtiapine (SEROQUEL) 300 mg tablet     CREON 24,000-76,000 -120,000 unit capsule     cetirizine (ZYRTEC) 10 mg tablet     SUMAtriptan (IMITREX) 100 mg tablet TAKE 1 TO 2 TABLETS BY MOUTH DAILY AS NEEDED FOR MIGRAINE . DO NOT EXCEED 200 MG IN 24 HOUR PERIOD    busPIRone (BUSPAR) 15 mg tablet Take 1 Tab by mouth two (2) times a day.        Disposition:  Home     DISCHARGE NOTE:   Pt has been reexamined. Patient has no new complaints, changes, or physical findings. Care plan outlined and precautions discussed. Results of workup were reviewed with the patient. All medications were reviewed with the patient. All of pt's questions and concerns were addressed. Patient was instructed and agrees to follow up with PCP as well as to return to the ED upon further deterioration. Patient is ready to go home. Follow-up Information     Follow up With Specialties Details Why [facility] EMERGENCY DEPT Emergency Medicine  As needed [address]  [phone]          Current Discharge Medication List      START taking these medications    Details   !! ondansetron (ZOFRAN ODT) 4 mg disintegrating tablet Take 1 Tab by mouth every eight (8) hours as needed for Nausea or Vomiting. Qty: 20 Tab, Refills: 0      LORazepam (Ativan) 0.5 mg tablet Take 1 Tab by mouth every eight (8) hours as needed for Anxiety. Max Daily Amount: 1.5 mg.  Qty: 10 Tab, Refills: 0    Associated Diagnoses: Alcohol abuse      nitrofurantoin, macrocrystal-monohydrate, (Macrobid) 100 mg capsule Take 1 Cap by mouth two (2) times a day for 5 days. Qty: 10 Cap, Refills: 0       !! - Potential duplicate medications found. Please discuss with provider. CONTINUE these medications which have NOT CHANGED    Details   !! ondansetron (ZOFRAN ODT) 8 mg disintegrating tablet Take 1 Tab by mouth every eight (8) hours as needed for Nausea. Qty: 15 Tab, Refills: 0       !! - Potential duplicate medications found. Please discuss with provider. Diagnosis     Clinical Impression:   1. Alcohol abuse    2. Hyponatremia    3. Hypokalemia    4. Acute cystitis without hematuria          \"Please note that this dictation was completed with [employer], the [employer] voice recognition software.  Quite often unanticipated grammatical, syntax, homophones, and other interpretive errors are inadvertently transcribed by the computer software. Please disregard these errors. Please excuse any errors that have escaped final proofreading. \"

## 2020-05-30 NOTE — DISCHARGE INSTRUCTIONS
Patient Education        Learning About Alcohol Use Disorder  What is alcohol use disorder? Alcohol use disorder means that a person drinks alcohol even though it causes harm to themselves or others. It can range from mild to severe. The more signs of this disorder you have, the more severe it may be. Moderate to severe alcohol use disorder is sometimes called addiction. People who have it may find it hard to control their use of alcohol. People who have this disorder may argue with others about how much they're drinking. Their job may be affected because of drinking. They may drink when it's dangerous or illegal, such as when they drive. They also may have a strong need, or craving, to drink. They may feel like they must drink just to get by. Their drinking may increase their risk of getting hurt or being in a car crash. Over time, drinking too much alcohol may cause health problems. These may include high blood pressure, liver problems, or problems with digestion. What are the signs? Maybe you've wondered about your alcohol habits, or how to tell if your drinking is becoming a problem. Here are some of the signs of alcohol use disorder. You may have it if you have two or more of the following signs:  · You drink larger amounts of alcohol than you ever meant to. Or you've been drinking for a longer time than you ever meant to. · You can't cut down or control your use. Or you constantly wish you could cut down. · You spend a lot of time getting or drinking alcohol or recovering from its effects. · You have strong cravings for alcohol. · You can no longer do your main jobs at work, at school, or at home. · You keep drinking alcohol, even though your use hurts your relationships. · You have stopped doing important activities because of your alcohol use. · You drink alcohol in situations where doing so is dangerous. · You keep drinking alcohol even though you know it's causing health problems.   · You need more and more alcohol to get the same effect, or you get less effect from the same amount over time. This is called tolerance. · You have uncomfortable symptoms when you stop drinking alcohol or use less. This is called withdrawal.  Alcohol use disorder can range from mild to severe. The more signs you have, the more severe the disorder may be. Moderate to severe alcohol use disorder is sometimes called addiction. You might not realize that your drinking is a problem. You might not drink large amounts when you drink. Or you might go for days or weeks between drinking episodes. But even if you don't drink very often, your drinking could still be harmful and put you at risk. How is alcohol use disorder treated? Getting help is up to you. But you don't have to do it alone. There are many people and kinds of treatments that can help. Treatment for alcohol use disorder can include:  · Group therapy, one or more types of counseling, and alcohol education. · Medicines that help to:  ? Reduce withdrawal symptoms and help you safely stop drinking. ? Reduce cravings for alcohol. · Support groups. These groups include Alcoholics Anonymous and [employer] (Self-Management and Recovery Training). Some people are able to stop or cut back on drinking with help from a counselor. People who have moderate to severe alcohol use disorder may need medical treatment. They may need to stay in a hospital or treatment center. You may have a treatment team to help you. This team may include a psychologist or psychiatrist, counselors, doctors, social workers, nurses, and a . A  helps plan and manage your treatment. Follow-up care is a key part of your treatment and safety. Be sure to make and go to all appointments, and call your doctor if you are having problems. It's also a good idea to know your test results and keep a list of the medicines you take. Where can you learn more?   Go to http://thom.info/  Enter [id number] in the search box to learn more about \"Learning About Alcohol Use Disorder. \"  Current as of: August 22, 2019               Content Version: 12.5  © 5682-8255 [employer], [employer]. Care instructions adapted under license by [employer] (which disclaims liability or warranty for this information). If you have questions about a medical condition or this instruction, always ask your healthcare professional. [place] [address] any warranty or liability for your use of this information.

## 2020-06-01 ENCOUNTER — PATIENT OUTREACH (OUTPATIENT)
Dept: FAMILY MEDICINE CLINIC | Facility: CLINIC | Age: 55
End: 2020-06-01

## 2020-06-01 NOTE — PROGRESS NOTES
Patient contacted regarding recent discharge and COVID-19 risk. Discussed COVID-19 related testing which was not done at this time. Test results were not done. Patient informed of results, if available? n/a    Care Coordinator contacted the patient by telephone to perform post discharge assessment. Verified name and  with patient as identifiers. Patient has following risk factors of: COPD. Care coordinator reviewed discharge instructions, medical action plan and red flags related to discharge diagnosis. Reviewed and educated them on any new and changed medications related to discharge diagnosis. Advised obtaining a 90-day supply of all daily and as-needed medications. Education provided regarding infection prevention, and signs and symptoms of COVID-19 and when to seek medical attention with patient who verbalized understanding. Discussed exposure protocols and quarantine from [address] you at higher risk for severe illness  and given an opportunity for questions and concerns. The patient agrees to contact the COVID-19 hotline [phone] or PCP office for questions related to their healthcare. Care Coordinator provided contact information for future reference. From CDC: Are you at higher risk for severe illness?  Wash your hands often.  Avoid close contact (6 feet, which is about two arm lengths) with people who are sick.  Put distance between yourself and other people if COVID-19 is spreading in your community.  Clean and disinfect frequently touched surfaces.  Avoid all cruise travel and non-essential air travel.  Call your healthcare professional if you have concerns about COVID-19 and your underlying condition or if you are sick.     For more information on steps you can take to protect yourself, see CDC's How to Protect Yourself      Patient/family/caregiver given information for Nena Santacruz and agrees to enroll no  Patient's preferred e-mail:  n/a  Patient's preferred phone number: n/a  Based on Loop alert triggers, patient will be contacted by nurse care manager for worsening symptoms. Plan for follow-up call in 7-14 days based on severity of symptoms and risk factors.

## 2020-06-16 ENCOUNTER — PATIENT OUTREACH (OUTPATIENT)
Dept: FAMILY MEDICINE CLINIC | Facility: CLINIC | Age: 55
End: 2020-06-16

## 2020-06-16 NOTE — PROGRESS NOTES
Date/Time:  6/16/2020 11:34 AM  Attempted to reach patient by telephone. Left HIPPA compliant message requesting a return call. this episode is resolved.

## 2020-07-10 NOTE — TELEPHONE ENCOUNTER
Follow your After Visit Summary. Be consistent with your diet and vitamin K foods. Contact office with any medication changes including supplements or over the counter medicines. Monitor for any signs of bleeding or unusual bruising- call office or seek medical attention if they occur. Monitor for any signs of a clot such as sudden shortness of breath, chest pain, or redness, warmth, swelling of arms or legs- seek medical attention if they occur. Call office with any questions.        LVM for a return call

## 2020-07-18 ENCOUNTER — APPOINTMENT (OUTPATIENT)
Dept: CT IMAGING | Age: 55
End: 2020-07-18
Attending: NURSE PRACTITIONER
Payer: MEDICARE

## 2020-07-18 ENCOUNTER — HOSPITAL ENCOUNTER (EMERGENCY)
Age: 55
Discharge: HOME | End: 2020-07-18
Attending: EMERGENCY MEDICINE
Payer: MEDICARE

## 2020-07-18 VITALS
OXYGEN SATURATION: 98 % | TEMPERATURE: 96.7 F | DIASTOLIC BLOOD PRESSURE: 79 MMHG | HEART RATE: 100 BPM | SYSTOLIC BLOOD PRESSURE: 113 MMHG | RESPIRATION RATE: 18 BRPM

## 2020-07-18 DIAGNOSIS — E83.42 HYPOMAGNESEMIA: ICD-10-CM

## 2020-07-18 DIAGNOSIS — R74.8 ELEVATED LIVER ENZYMES: ICD-10-CM

## 2020-07-18 DIAGNOSIS — F10.20 ALCOHOLISM (HCC): Primary | ICD-10-CM

## 2020-07-18 DIAGNOSIS — E87.6 HYPOKALEMIA: ICD-10-CM

## 2020-07-18 LAB
ALBUMIN SERPL-MCNC: 3.4 G/DL (ref 3.4–5)
ALBUMIN/GLOB SERPL: 1 {RATIO} (ref 0.8–1.7)
ALP SERPL-CCNC: 163 U/L (ref 45–117)
ALT SERPL-CCNC: 172 U/L (ref 13–56)
AMMONIA PLAS-SCNC: 10 UMOL/L (ref 11–32)
ANION GAP SERPL CALC-SCNC: 6 MMOL/L (ref 3–18)
APPEARANCE UR: CLEAR
AST SERPL-CCNC: 377 U/L (ref 10–38)
BASOPHILS # BLD: 0 K/UL (ref 0–0.1)
BASOPHILS NFR BLD: 0 % (ref 0–2)
BILIRUB SERPL-MCNC: 1.2 MG/DL (ref 0.2–1)
BILIRUB UR QL: NEGATIVE
BUN SERPL-MCNC: 4 MG/DL (ref 7–18)
BUN/CREAT SERPL: 5 (ref 12–20)
CALCIUM SERPL-MCNC: 9.5 MG/DL (ref 8.5–10.1)
CHLORIDE SERPL-SCNC: 98 MMOL/L (ref 100–111)
CO2 SERPL-SCNC: 31 MMOL/L (ref 21–32)
COLOR UR: YELLOW
CREAT SERPL-MCNC: 0.8 MG/DL (ref 0.6–1.3)
DIFFERENTIAL METHOD BLD: ABNORMAL
EOSINOPHIL # BLD: 0 K/UL (ref 0–0.4)
EOSINOPHIL NFR BLD: 1 % (ref 0–5)
ERYTHROCYTE [DISTWIDTH] IN BLOOD BY AUTOMATED COUNT: 15.1 % (ref 11.6–14.5)
GLOBULIN SER CALC-MCNC: 3.5 G/DL (ref 2–4)
GLUCOSE SERPL-MCNC: 96 MG/DL (ref 74–99)
GLUCOSE UR STRIP.AUTO-MCNC: NEGATIVE MG/DL
HCT VFR BLD AUTO: 36.1 % (ref 35–45)
HGB BLD-MCNC: 12.3 G/DL (ref 12–16)
HGB UR QL STRIP: NEGATIVE
INR PPP: 0.9 (ref 0.8–1.2)
KETONES UR QL STRIP.AUTO: ABNORMAL MG/DL
LEUKOCYTE ESTERASE UR QL STRIP.AUTO: NEGATIVE
LIPASE SERPL-CCNC: 49 U/L (ref 73–393)
LYMPHOCYTES # BLD: 2.2 K/UL (ref 0.9–3.6)
LYMPHOCYTES NFR BLD: 36 % (ref 21–52)
MAGNESIUM SERPL-MCNC: 1.5 MG/DL (ref 1.6–2.6)
MCH RBC QN AUTO: 33.7 PG (ref 24–34)
MCHC RBC AUTO-ENTMCNC: 34.1 G/DL (ref 31–37)
MCV RBC AUTO: 98.9 FL (ref 74–97)
MONOCYTES # BLD: 0.2 K/UL (ref 0.05–1.2)
MONOCYTES NFR BLD: 3 % (ref 3–10)
NEUTS SEG # BLD: 3.6 K/UL (ref 1.8–8)
NEUTS SEG NFR BLD: 60 % (ref 40–73)
NITRITE UR QL STRIP.AUTO: NEGATIVE
PH UR STRIP: 7 [PH] (ref 5–8)
PLATELET # BLD AUTO: 149 K/UL (ref 135–420)
PMV BLD AUTO: 9.9 FL (ref 9.2–11.8)
POTASSIUM SERPL-SCNC: 3.3 MMOL/L (ref 3.5–5.5)
PROT SERPL-MCNC: 6.9 G/DL (ref 6.4–8.2)
PROT UR STRIP-MCNC: NEGATIVE MG/DL
PROTHROMBIN TIME: 11.9 SEC (ref 11.5–15.2)
RBC # BLD AUTO: 3.65 M/UL (ref 4.2–5.3)
SODIUM SERPL-SCNC: 135 MMOL/L (ref 136–145)
SP GR UR REFRACTOMETRY: 1.01 (ref 1–1.03)
UROBILINOGEN UR QL STRIP.AUTO: 0.2 EU/DL (ref 0.2–1)
WBC # BLD AUTO: 6 K/UL (ref 4.6–13.2)

## 2020-07-18 PROCEDURE — 96367 TX/PROPH/DG ADDL SEQ IV INF: CPT

## 2020-07-18 PROCEDURE — 74011250636 HC RX REV CODE- 250/636: Performed by: NURSE PRACTITIONER

## 2020-07-18 PROCEDURE — 82140 ASSAY OF AMMONIA: CPT

## 2020-07-18 PROCEDURE — 85610 PROTHROMBIN TIME: CPT

## 2020-07-18 PROCEDURE — 96375 TX/PRO/DX INJ NEW DRUG ADDON: CPT

## 2020-07-18 PROCEDURE — 96365 THER/PROPH/DIAG IV INF INIT: CPT

## 2020-07-18 PROCEDURE — 83690 ASSAY OF LIPASE: CPT

## 2020-07-18 PROCEDURE — 90471 IMMUNIZATION ADMIN: CPT

## 2020-07-18 PROCEDURE — 74011000250 HC RX REV CODE- 250: Performed by: NURSE PRACTITIONER

## 2020-07-18 PROCEDURE — 96366 THER/PROPH/DIAG IV INF ADDON: CPT

## 2020-07-18 PROCEDURE — 80053 COMPREHEN METABOLIC PANEL: CPT

## 2020-07-18 PROCEDURE — 90715 TDAP VACCINE 7 YRS/> IM: CPT | Performed by: NURSE PRACTITIONER

## 2020-07-18 PROCEDURE — 85025 COMPLETE CBC W/AUTO DIFF WBC: CPT

## 2020-07-18 PROCEDURE — 99284 EMERGENCY DEPT VISIT MOD MDM: CPT

## 2020-07-18 PROCEDURE — 81003 URINALYSIS AUTO W/O SCOPE: CPT

## 2020-07-18 PROCEDURE — 74011250637 HC RX REV CODE- 250/637: Performed by: NURSE PRACTITIONER

## 2020-07-18 PROCEDURE — 70450 CT HEAD/BRAIN W/O DYE: CPT

## 2020-07-18 PROCEDURE — 83735 ASSAY OF MAGNESIUM: CPT

## 2020-07-18 RX ORDER — MAGNESIUM SULFATE 1 G/100ML
1 INJECTION INTRAVENOUS ONCE
Status: COMPLETED | OUTPATIENT
Start: 2020-07-18 | End: 2020-07-18

## 2020-07-18 RX ORDER — POTASSIUM CHLORIDE 20 MEQ/1
40 TABLET, EXTENDED RELEASE ORAL
Status: COMPLETED | OUTPATIENT
Start: 2020-07-18 | End: 2020-07-18

## 2020-07-18 RX ORDER — ONDANSETRON 2 MG/ML
4 INJECTION INTRAMUSCULAR; INTRAVENOUS
Status: COMPLETED | OUTPATIENT
Start: 2020-07-18 | End: 2020-07-18

## 2020-07-18 RX ORDER — ONDANSETRON 4 MG/1
4 TABLET, ORALLY DISINTEGRATING ORAL
Qty: 12 TAB | Refills: 0 | OUTPATIENT
Start: 2020-07-18 | End: 2020-08-21

## 2020-07-18 RX ORDER — IBUPROFEN 200 MG
1 TABLET ORAL
Status: DISCONTINUED | OUTPATIENT
Start: 2020-07-18 | End: 2020-07-19 | Stop reason: HOSPADM

## 2020-07-18 RX ADMIN — ONDANSETRON 4 MG: 2 SOLUTION INTRAMUSCULAR; INTRAVENOUS at 18:58

## 2020-07-18 RX ADMIN — TETANUS TOXOID, REDUCED DIPHTHERIA TOXOID AND ACELLULAR PERTUSSIS VACCINE, ADSORBED 0.5 ML: 5; 2.5; 8; 8; 2.5 SUSPENSION INTRAMUSCULAR at 21:19

## 2020-07-18 RX ADMIN — MAGNESIUM SULFATE IN DEXTROSE 1 G: 10 INJECTION, SOLUTION INTRAVENOUS at 21:18

## 2020-07-18 RX ADMIN — FOLIC ACID: 5 INJECTION, SOLUTION INTRAMUSCULAR; INTRAVENOUS; SUBCUTANEOUS at 18:41

## 2020-07-18 RX ADMIN — POTASSIUM CHLORIDE 40 MEQ: 20 TABLET, EXTENDED RELEASE ORAL at 21:18

## 2020-07-18 NOTE — ED PROVIDER NOTES
EMERGENCY DEPARTMENT HISTORY AND PHYSICAL EXAM    6:08 PM      Date: 7/18/2020  Patient Name: Carlos Mello    History of Presenting Illness     Chief Complaint   Patient presents with    Other         History Provided By: Patient    Additional History (Context): Carlos Mello is a 63 y.o. female with past medical history significant for chronic alcoholism, anxiety, asthma, bipolar, COPD, hypertension, pancreatitis who presents with diffuse abdominal pain with vomiting and diarrhea since yesterday. She states she was on an alcohol dalton for the last 5 days and had little intake of food. She had 2 shots of vodka yesterday and 2 today. She normally drinks a half a gallon of vodka daily. She is concerned because she fell and hit her head and sustained a laceration above her left eyebrowshe is unsure when this happened but thinks it might of been yesterday. She denies any visual changes, headache, or dizziness. No neck pain, loss of consciousness, paresthesias, or extremity weakness. She denies any black tarry stool or blood in the stool. She denies any blood in the vomit or coffee-ground emesis. PCP: Willis Peterson MD    Current Facility-Administered Medications   Medication Dose Route Frequency Provider Last Rate Last Dose    nicotine (NICODERM CQ) 21 mg/24 hr patch 1 Patch  1 Patch TransDERmal NOW JACQUELINE Tapia        diph,Pertuss(AC),Tet Vac-PF (BOOSTRIX) suspension 0.5 mL  0.5 mL IntraMUSCular PRIOR TO DISCHARGE Rebecca Sorensonne Odor, FNP        magnesium sulfate 1 g/100 ml IVPB (premix or compounded)  1 g IntraVENous ONCE Rebecca Sorensonne Odor, FNP        potassium chloride (K-DUR, KLOR-CON) SR tablet 40 mEq  40 mEq Oral NOW JACQUELINE Jewell         Current Outpatient Medications   Medication Sig Dispense Refill    ondansetron (Zofran ODT) 4 mg disintegrating tablet Take 1 Tab by mouth every eight (8) hours as needed for Nausea or Vomiting for up to 12 doses.  12 Tab 0    LORazepam (Ativan) 0.5 mg tablet Take 1 Tab by mouth every eight (8) hours as needed for Anxiety. Max Daily Amount: 1.5 mg. 8 Tab 0    ondansetron (ZOFRAN ODT) 4 mg disintegrating tablet Take 1 Tab by mouth every eight (8) hours as needed for Nausea or Vomiting. 20 Tab 0    lidocaine (LIDODERM) 5 % Apply patch to the affected area for 12 hours a day and remove for 12 hours a day. 30 Each 0    lamoTRIgine (LaMICtal) 150 mg tablet Take 150 mg by mouth daily.  lipase-protease-amylase (CREON 24,000) 24,000-76,000 -120,000 unit capsule Take 1 Cap by mouth.  sucralfate (CARAFATE) 100 mg/mL suspension Take 10 mL by mouth Before breakfast, lunch, dinner and at bedtime. 6428 mL 0    folic acid (FOLVITE) 1 mg tablet Take 1 Tab by mouth daily. 30 Tab 0    multivitamin (ONE A DAY) tablet Take 1 Tab by mouth daily. 30 Tab 0    thiamine HCL (B-1) 100 mg tablet Take 1 Tab by mouth daily (with breakfast). 30 Tab 0    ipratropium-albuteroL (COMBIVENT RESPIMAT)  mcg/actuation inhaler Take 1 Puff by inhalation as needed for Wheezing.  traZODone (DESYREL) 100 mg tablet Take 200 mg by mouth nightly.  melatonin 10 mg tab Take 10 mg by mouth nightly.  MILK THISTLE PO Take 175 mg by mouth two (2) times a day.  colchicine 0.6 mg tablet Take 0.6 mg by mouth daily.  benzonatate (TESSALON PERLE PO) Take  by mouth as needed. Indications: cough      docusate sodium (COLACE) 100 mg capsule Take 100 mg by mouth three (3) times daily.  ondansetron (ZOFRAN ODT) 8 mg disintegrating tablet Take 1 Tab by mouth every eight (8) hours as needed for Nausea. 15 Tab 0    pantoprazole (PROTONIX) 40 mg tablet Take 1 Tab by mouth Before breakfast and dinner. 60 Tab 0    gabapentin (NEURONTIN) 600 mg tablet Take 1 Tab by mouth two (2) times a day. 30 Tab 0    cloNIDine HCl (CATAPRES) 0.1 mg tablet Take 1 Tab by mouth two (2) times a day. 20 Tab 0    lamoTRIgine (LAMICTAL) 200 mg tablet Take  by mouth daily.       hydrocortisone (CORTAID) 0.5 % topical cream Apply  to affected area two (2) times a day. use thin layer 30 g 0    topiramate (TOPAMAX) 100 mg tablet 100 mg two (2) times a day.  QUEtiapine (SEROQUEL) 300 mg tablet       CREON 24,000-76,000 -120,000 unit capsule       cetirizine (ZYRTEC) 10 mg tablet       SUMAtriptan (IMITREX) 100 mg tablet TAKE 1 TO 2 TABLETS BY MOUTH DAILY AS NEEDED FOR MIGRAINE . DO NOT EXCEED 200 MG IN 24 HOUR PERIOD  0    busPIRone (BUSPAR) 15 mg tablet Take 1 Tab by mouth two (2) times a day. 61 Tab 0       Past History     Past Medical History:  Past Medical History:   Diagnosis Date    Alcohol abuse     Anxiety     Arrhythmia     Tacchycardai    Asthma     controlled    Bipolar disorder ([address]     Common migraine     [facility]chronic obstructive pulmonary disease) [facility])     Home O2  PRN ,  pt use also for Tachycardia    Depression     Esophageal reflux     Gout     Hypertension     Hypocitraturia     Inverted nipple     Left breast always have.  Kidney stone on left side     Pancreatitis     Recurrent UTI     Staghorn renal calculus     Urine leukocytes        Past Surgical History:  Past Surgical History:   Procedure Laterality Date    HX COLONOSCOPY      HX CYST REMOVAL Left     x 3 surgeries    HX ENDOSCOPY      HX GYN      tubal ligation    HX LUMBAR LAMINECTOMY      HX UROLOGICAL  08/10/2018    Cysto, bilat RPG, left ureteral pyeloscopy, HLL, left JJ stent placement, Dr. José Landis       Family History:  Family History   Family history unknown: Yes       Social History:  Social History     Tobacco Use    Smoking status: Current Every Day Smoker     Packs/day: 1.50     Years: 33.00     Pack years: 49.50     Types: Cigarettes    Smokeless tobacco: Never Used   Substance Use Topics    Alcohol use: Not Currently     Comment: vodka    Drug use: No     Comment: 11years old- [place]       Allergies:   Allergies   Allergen Reactions    Lithium Anaphylaxis    Amitriptyline Other (comments)     Left leg went numb    Elavil Other (comments)     Left leg went numb         Review of Systems       Review of Systems   Constitutional: Negative. Negative for chills and fever. HENT: Negative. Negative for congestion, ear pain and rhinorrhea. Eyes: Negative. Negative for pain and redness. Respiratory: Negative. Negative for cough and shortness of breath. Cardiovascular: Negative. Negative for chest pain, palpitations and leg swelling. Gastrointestinal: Positive for abdominal pain, diarrhea, nausea and vomiting. Negative for constipation. Genitourinary: Negative. Negative for dysuria, frequency, hematuria and urgency. Musculoskeletal: Negative. Negative for arthralgias, back pain, gait problem, joint swelling, myalgias, neck pain and neck stiffness. Skin: Positive for wound. Negative for rash. Neurological: Negative. Negative for dizziness, seizures, speech difficulty, weakness, light-headedness and headaches. Hematological: Negative for adenopathy. Does not bruise/bleed easily. All other systems reviewed and are negative. Physical Exam     Visit Vitals  /79   Pulse 100   Temp (!) 96.7 °F (35.9 °C)   Resp 18   LMP 04/01/2013   SpO2 98%         Physical Exam  Vitals signs and nursing note reviewed. Constitutional:       General: She is not in acute distress. Appearance: Normal appearance. She is normal weight. She is not ill-appearing, toxic-appearing or diaphoretic. HENT:      Head: Normocephalic and atraumatic. Nose: Nose normal. No congestion or rhinorrhea. Mouth/Throat:      Mouth: Mucous membranes are moist.      Pharynx: Oropharynx is clear. No oropharyngeal exudate or posterior oropharyngeal erythema. Eyes:      General: Lids are normal. Vision grossly intact. Gaze aligned appropriately. No scleral icterus. Extraocular Movements: Extraocular movements intact.       Conjunctiva/sclera: Conjunctivae normal.      Right eye: Right conjunctiva is not injected. Left eye: Left conjunctiva is not injected. Pupils: Pupils are equal, round, and reactive to light. Neck:      Musculoskeletal: Full passive range of motion without pain, normal range of motion and neck supple. No neck rigidity or muscular tenderness. Vascular: No carotid bruit. Cardiovascular:      Rate and Rhythm: Normal rate and regular rhythm. Pulses: Normal pulses. Heart sounds: Normal heart sounds. No murmur. No friction rub. No gallop. Pulmonary:      Effort: Pulmonary effort is normal. No respiratory distress. Breath sounds: Normal breath sounds. No stridor. No wheezing, rhonchi or rales. Chest:      Chest wall: No tenderness. Abdominal:      General: Abdomen is flat. Bowel sounds are normal. There is no distension. Palpations: Abdomen is soft. There is no hepatomegaly or splenomegaly. Tenderness: There is abdominal tenderness (Diffusely to epigastrium, right upper quadrant, and left upper quadrantno voluntary guarding ). There is no right CVA tenderness, left CVA tenderness, guarding or rebound. Hernia: No hernia is present. Musculoskeletal: Normal range of motion. Cervical back: She exhibits normal range of motion, no tenderness and no bony tenderness. Thoracic back: She exhibits normal range of motion, no tenderness and no bony tenderness. Lumbar back: She exhibits normal range of motion, no tenderness and no bony tenderness. Lymphadenopathy:      Cervical: No cervical adenopathy. Skin:     General: Skin is warm and dry. Capillary Refill: Capillary refill takes less than 2 seconds. Findings: Laceration present. Comments: 3 cm laceration to the left lateral eyebrow with scabbed over crust.  Appears to be several days old. No surrounding erythema, tenderness or crepitus, drainage, warmth, or swelling.    Neurological:      General: No focal deficit present. Mental Status: She is alert and oriented to person, place, and time. Sensory: Sensation is intact. No sensory deficit. Motor: Motor function is intact. No weakness, tremor or pronator drift. Coordination: Coordination is intact. Romberg sign negative. Coordination normal.   Psychiatric:         Mood and Affect: Mood normal.         Behavior: Behavior normal. Behavior is cooperative. Diagnostic Study Results     Labs -  Recent Results (from the past 12 hour(s))   URINALYSIS W/ RFLX MICROSCOPIC    Collection Time: 07/18/20  6:20 PM   Result Value Ref Range    Color YELLOW      Appearance CLEAR      Specific gravity 1.006 1.005 - 1.030      pH (UA) 7.0 5.0 - 8.0      Protein Negative NEG mg/dL    Glucose Negative NEG mg/dL    Ketone TRACE (A) NEG mg/dL    Bilirubin Negative NEG      Blood Negative NEG      Urobilinogen 0.2 0.2 - 1.0 EU/dL    Nitrites Negative NEG      Leukocyte Esterase Negative NEG     METABOLIC PANEL, COMPREHENSIVE    Collection Time: 07/18/20  6:41 PM   Result Value Ref Range    Sodium 135 (L) 136 - 145 mmol/L    Potassium 3.3 (L) 3.5 - 5.5 mmol/L    Chloride 98 (L) 100 - 111 mmol/L    CO2 31 21 - 32 mmol/L    Anion gap 6 3.0 - 18 mmol/L    Glucose 96 74 - 99 mg/dL    BUN 4 (L) 7.0 - 18 MG/DL    Creatinine 0.80 0.6 - 1.3 MG/DL    BUN/Creatinine ratio 5 (L) 12 - 20      GFR est AA >60 >60 ml/min/1.73m2    GFR est non-AA >60 >60 ml/min/1.73m2    Calcium 9.5 8.5 - 10.1 MG/DL    Bilirubin, total 1.2 (H) 0.2 - 1.0 MG/DL    ALT (SGPT) 172 (H) 13 - 56 U/L    AST (SGOT) 377 (H) 10 - 38 U/L    Alk.  phosphatase 163 (H) 45 - 117 U/L    Protein, total 6.9 6.4 - 8.2 g/dL    Albumin 3.4 3.4 - 5.0 g/dL    Globulin 3.5 2.0 - 4.0 g/dL    A-G Ratio 1.0 0.8 - 1.7     CBC WITH AUTOMATED DIFF    Collection Time: 07/18/20  6:41 PM   Result Value Ref Range    WBC 6.0 4.6 - 13.2 K/uL    RBC 3.65 (L) 4.20 - 5.30 M/uL    HGB 12.3 12.0 - 16.0 g/dL    HCT 36.1 35.0 - 45.0 %    MCV 98.9 (H) 74.0 - 97.0 FL    MCH 33.7 24.0 - 34.0 PG    MCHC 34.1 31.0 - 37.0 g/dL    RDW 15.1 (H) 11.6 - 14.5 %    PLATELET 276 774 - 960 K/uL    MPV 9.9 9.2 - 11.8 FL    NEUTROPHILS 60 40 - 73 %    LYMPHOCYTES 36 21 - 52 %    MONOCYTES 3 3 - 10 %    EOSINOPHILS 1 0 - 5 %    BASOPHILS 0 0 - 2 %    ABS. NEUTROPHILS 3.6 1.8 - 8.0 K/UL    ABS. LYMPHOCYTES 2.2 0.9 - 3.6 K/UL    ABS. MONOCYTES 0.2 0.05 - 1.2 K/UL    ABS. EOSINOPHILS 0.0 0.0 - 0.4 K/UL    ABS. BASOPHILS 0.0 0.0 - 0.1 K/UL    DF AUTOMATED     LIPASE    Collection Time: 07/18/20  6:41 PM   Result Value Ref Range    Lipase 49 (L) 73 - 393 U/L   MAGNESIUM    Collection Time: 07/18/20  6:41 PM   Result Value Ref Range    Magnesium 1.5 (L) 1.6 - 2.6 mg/dL   AMMONIA    Collection Time: 07/18/20  6:41 PM   Result Value Ref Range    Ammonia 10 (L) 11 - 32 UMOL/L   PROTHROMBIN TIME + INR    Collection Time: 07/18/20  6:41 PM   Result Value Ref Range    Prothrombin time 11.9 11.5 - 15.2 sec    INR 0.9 0.8 - 1.2         Radiologic Studies -   CT HEAD WO CONT    (Results Pending)         Medical Decision Making   I am the first provider for this patient. I reviewed available nursing notes, past medical history, past surgical history, family history and social history. Vital Signs-Reviewed the patient's vital signs. Records Reviewed: Nursing Notes and Old Medical Records (Time of Review: 6:08 PM)    Pulse Oximetry Analysis - 98% on room air      ED Course: Progress Notes, Reevaluation, and Consults:  6:08 PM  Initial assessment performed. The patients presenting problems have been discussed, and they/their family are in agreement with the care plan formulated and outlined with them. I have encouraged them to ask questions as they arise throughout their visit. Provider Notes (Medical Decision Making): Patient is a 58-year-old female with history of chronic alcoholism.   She reports to the ER with complaints of vomiting and diarrhea with some upper abdominal pain.  On physical examination is noted that she has a healing laceration to the left eyebrow and she states that she did have a fall but did not lose consciousness. She has no evidence of alcohol withdrawal at this time and she does not appear clinically intoxicated. Her vital signs are within normal limits. She has very diffuse abdominal pain without guarding or rebound to the epigastrium and upper quadrants bilaterally. She appears well-hydrated no acute distress. She is nontoxic in appearance. Due to the fall will obtain a CT of the head and cover for tetanus she sustained a laceration. The laceration is old and does not meet criteria for wound repair at this time. Will obtain appropriate studies to evaluate patient's complaints and treat symptomatically. Will disposition after reassessment assuming no clinical change or worsening and appropriate response to symptomatic treatment. CBC shows no leukocytosis or anemia. MCV is elevated at 98.9 consistent with alcoholism. Urinalysis shows no signs of infection. INR is within normal limits. CMP shows a sodium of 135, potassium of 3.3, magnesium of 1.5 with significantly elevated liver enzymes with an ALT of 172 and AST of 137. Alk phos is 163. Lipase however is within normal limits. Also checked an ammonia due to head injury and it was normal.  No signs of hepatic encephalopathy. Her magnesium and potassium were replaced in the ER and she tolerated this well. Additionally, she was given a banana bag with IV fluids. She is remained stable throughout her ER stay. CT of the head shows nothing acute. At this time she stable for discharge home. She was educated on limiting her alcohol but not cutting it out cold turkey. She will be given Zofran as needed. She has been tolerating p.o. well in the ER and there has been no vomiting or diarrhea. Her abdomen is reassessed and is nontender with no changes.   Results were discussed at length and she verbalized understanding as well as need for close follow-up with her PCP. Diagnosis     Clinical Impression:   1. Alcoholism ([address]    2. Hypokalemia    3. Hypomagnesemia    4. Elevated liver enzymes        Disposition: Discharged home in stable condition    DISCHARGE NOTE:     Patient has been reexamined. Patient has no new complaints, changes, or physical findings. Care plan outlined and precautions discussed. Results of labs and CT were reviewed with the patient. All medications were reviewed with the patient; will discharge home with Zofran. All of patient's questions and concerns were addressed. Patient was instructed and agrees to follow up with PCP, as well as to return to the ED upon further deterioration. Patient is ready to go home. Follow-up Information     Follow up With Specialties Details Why Contact Info    Dale Gtz MD [facility] Schedule an appointment as soon as possible for a visit Follow-up from the Emergency Department [address] EMERGENCY DEPT Emergency Medicine  As needed, If symptoms worsen [address] [facility].  [phone]           Current Discharge Medication List      START taking these medications    Details   !! ondansetron (Zofran ODT) 4 mg disintegrating tablet Take 1 Tab by mouth every eight (8) hours as needed for Nausea or Vomiting for up to 12 doses. Qty: 12 Tab, Refills: 0       !! - Potential duplicate medications found. Please discuss with provider. CONTINUE these medications which have NOT CHANGED    Details   LORazepam (Ativan) 0.5 mg tablet Take 1 Tab by mouth every eight (8) hours as needed for Anxiety. Max Daily Amount: 1.5 mg.  Qty: 8 Tab, Refills: 0    Associated Diagnoses: Alcohol abuse; Acute cystitis without hematuria      !! ondansetron (ZOFRAN ODT) 4 mg disintegrating tablet Take 1 Tab by mouth every eight (8) hours as needed for Nausea or Vomiting.   Qty: 20 Tab, Refills: 0      lidocaine (LIDODERM) 5 % Apply patch to the affected area for 12 hours a day and remove for 12 hours a day. Qty: 30 Each, Refills: 0      !! lamoTRIgine (LaMICtal) 150 mg tablet Take 150 mg by mouth daily. !! lipase-protease-amylase (CREON 24,000) 24,000-76,000 -120,000 unit capsule Take 1 Cap by mouth.      sucralfate (CARAFATE) 100 mg/mL suspension Take 10 mL by mouth Before breakfast, lunch, dinner and at bedtime. Qty: 1200 mL, Refills: 0      folic acid (FOLVITE) 1 mg tablet Take 1 Tab by mouth daily. Qty: 30 Tab, Refills: 0      multivitamin (ONE A DAY) tablet Take 1 Tab by mouth daily. Qty: 30 Tab, Refills: 0      thiamine HCL (B-1) 100 mg tablet Take 1 Tab by mouth daily (with breakfast). Qty: 30 Tab, Refills: 0      ipratropium-albuteroL (COMBIVENT RESPIMAT)  mcg/actuation inhaler Take 1 Puff by inhalation as needed for Wheezing. traZODone (DESYREL) 100 mg tablet Take 200 mg by mouth nightly. melatonin 10 mg tab Take 10 mg by mouth nightly. MILK THISTLE PO Take 175 mg by mouth two (2) times a day. colchicine 0.6 mg tablet Take 0.6 mg by mouth daily. benzonatate (TESSALON PERLE PO) Take  by mouth as needed. Indications: cough      docusate sodium (COLACE) 100 mg capsule Take 100 mg by mouth three (3) times daily. !! ondansetron (ZOFRAN ODT) 8 mg disintegrating tablet Take 1 Tab by mouth every eight (8) hours as needed for Nausea. Qty: 15 Tab, Refills: 0      pantoprazole (PROTONIX) 40 mg tablet Take 1 Tab by mouth Before breakfast and dinner. Qty: 60 Tab, Refills: 0      gabapentin (NEURONTIN) 600 mg tablet Take 1 Tab by mouth two (2) times a day. Qty: 30 Tab, Refills: 0      cloNIDine HCl (CATAPRES) 0.1 mg tablet Take 1 Tab by mouth two (2) times a day. Qty: 20 Tab, Refills: 0      !! lamoTRIgine (LAMICTAL) 200 mg tablet Take  by mouth daily. hydrocortisone (CORTAID) 0.5 % topical cream Apply  to affected area two (2) times a day. use thin layer  Qty: 30 g, Refills: 0      topiramate (TOPAMAX) 100 mg tablet 100 mg two (2) times a day. QUEtiapine (SEROQUEL) 300 mg tablet       !! CREON 24,000-76,000 -120,000 unit capsule       cetirizine (ZYRTEC) 10 mg tablet       SUMAtriptan (IMITREX) 100 mg tablet TAKE 1 TO 2 TABLETS BY MOUTH DAILY AS NEEDED FOR MIGRAINE . DO NOT EXCEED 200 MG IN 24 HOUR PERIOD  Refills: 0      busPIRone (BUSPAR) 15 mg tablet Take 1 Tab by mouth two (2) times a day. Qty: 60 Tab, Refills: 0       !! - Potential duplicate medications found. Please discuss with provider. Dictation disclaimer:  Please note that this dictation was completed with [employer], the [employer] voice recognition software. Quite often unanticipated grammatical, syntax, homophones, and other interpretive errors are inadvertently transcribed by the computer software. Please disregard these errors. Please excuse any errors that have escaped final proofreading.

## 2020-07-19 NOTE — DISCHARGE INSTRUCTIONS
Patient Education        Learning About Alcohol Use Disorder  What is alcohol use disorder? Alcohol use disorder means that a person drinks alcohol even though it causes harm to themselves or others. It can range from mild to severe. The more signs of this disorder you have, the more severe it may be. Moderate to severe alcohol use disorder is sometimes called addiction. People who have it may find it hard to control their use of alcohol. People who have this disorder may argue with others about how much they're drinking. Their job may be affected because of drinking. They may drink when it's dangerous or illegal, such as when they drive. They also may have a strong need, or craving, to drink. They may feel like they must drink just to get by. Their drinking may increase their risk of getting hurt or being in a car crash. Over time, drinking too much alcohol may cause health problems. These may include high blood pressure, liver problems, or problems with digestion. What are the signs? Maybe you've wondered about your alcohol habits, or how to tell if your drinking is becoming a problem. Here are some of the signs of alcohol use disorder. You may have it if you have two or more of the following signs:  · You drink larger amounts of alcohol than you ever meant to. Or you've been drinking for a longer time than you ever meant to. · You can't cut down or control your use. Or you constantly wish you could cut down. · You spend a lot of time getting or drinking alcohol or recovering from its effects. · You have strong cravings for alcohol. · You can no longer do your main jobs at work, at school, or at home. · You keep drinking alcohol, even though your use hurts your relationships. · You have stopped doing important activities because of your alcohol use. · You drink alcohol in situations where doing so is dangerous. · You keep drinking alcohol even though you know it's causing health problems.   · You need more and more alcohol to get the same effect, or you get less effect from the same amount over time. This is called tolerance. · You have uncomfortable symptoms when you stop drinking alcohol or use less. This is called withdrawal.  Alcohol use disorder can range from mild to severe. The more signs you have, the more severe the disorder may be. Moderate to severe alcohol use disorder is sometimes called addiction. You might not realize that your drinking is a problem. You might not drink large amounts when you drink. Or you might go for days or weeks between drinking episodes. But even if you don't drink very often, your drinking could still be harmful and put you at risk. How is alcohol use disorder treated? Getting help is up to you. But you don't have to do it alone. There are many people and kinds of treatments that can help. Treatment for alcohol use disorder can include:  · Group therapy, one or more types of counseling, and alcohol education. · Medicines that help to:  ? Reduce withdrawal symptoms and help you safely stop drinking. ? Reduce cravings for alcohol. · Support groups. These groups include Alcoholics Anonymous and [employer] (Self-Management and Recovery Training). Some people are able to stop or cut back on drinking with help from a counselor. People who have moderate to severe alcohol use disorder may need medical treatment. They may need to stay in a hospital or treatment center. You may have a treatment team to help you. This team may include a psychologist or psychiatrist, counselors, doctors, social workers, nurses, and a . A  helps plan and manage your treatment. Follow-up care is a key part of your treatment and safety. Be sure to make and go to all appointments, and call your doctor if you are having problems. It's also a good idea to know your test results and keep a list of the medicines you take. Where can you learn more?   Go to http://shannon-heriberto.info/  Enter [id number] in the search box to learn more about \"Learning About Alcohol Use Disorder. \"  Current as of: August 22, 2019               Content Version: 12.5  © 2006-2020 [employer]. Care instructions adapted under license by [employer] (which disclaims liability or warranty for this information). If you have questions about a medical condition or this instruction, always ask your healthcare professional. [place] [address] any warranty or liability for your use of this information. Patient Education        Hypokalemia: Care Instructions  Your Care Instructions     Hypokalemia (say \"aa-fb-tqo-BLANCA-gonzalo-uh\") is a low level of potassium. The heart, muscles, kidneys, and nervous system all need potassium to work well. This problem has many different causes. Kidney problems, diet, and medicines like diuretics and laxatives can cause it. So can vomiting or diarrhea. In some cases, cancer is the cause. Your doctor may do tests to find the cause of your low potassium levels. You may need medicines to bring your potassium levels back to normal. You may also need regular blood tests to check your potassium. If you have very low potassium, you may need intravenous (IV) medicines. You also may need tests to check the electrical activity of your heart. Heart problems caused by low potassium levels can be very serious. Follow-up care is a key part of your treatment and safety. Be sure to make and go to all appointments, and call your doctor if you are having problems. It's also a good idea to know your test results and keep a list of the medicines you take. How can you care for yourself at home? · If your doctor recommends it, eat foods that have a lot of potassium. These include fresh fruits, juices, and vegetables. They also include nuts, beans, and milk. · Be safe with medicines.  If your doctor prescribes medicines or potassium supplements, take them exactly as directed. Call your doctor if you have any problems with your medicines. · Get your potassium levels tested as often as your doctor tells you. When should you call for help? [id number] anytime you think you may need emergency care. For example, call if:  · You feel like your heart is missing beats. Heart problems caused by low potassium can cause death. · You passed out (lost consciousness). · You have a seizure. Call your doctor now or seek immediate medical care if:  · You feel weak or unusually tired. · You have severe arm or leg cramps. · You have tingling or numbness. · You feel sick to your stomach, or you vomit. · You have belly cramps. · You feel bloated or constipated. · You have to urinate a lot. · You feel very thirsty most of the time. · You are dizzy or lightheaded, or you feel like you may faint. · You feel depressed, or you lose touch with reality. Watch closely for changes in your health, and be sure to contact your doctor if:  · You do not get better as expected. Where can you learn more? Go to http://www.gray.com/  Enter G358 in the search box to learn more about \"Hypokalemia: Care Instructions. \"  Current as of: July 29, 2019               Content Version: 12.5  © 3760-9977 [employer]. Care instructions adapted under license by [employer] (which disclaims liability or warranty for this information). If you have questions about a medical condition or this instruction, always ask your healthcare professional. [place] [address] any warranty or liability for your use of this information. Out of season

## 2020-07-20 ENCOUNTER — PATIENT OUTREACH (OUTPATIENT)
Dept: CASE MANAGEMENT | Age: 55
End: 2020-07-20

## 2020-07-20 NOTE — PROGRESS NOTES
Patient contacted regarding recent discharge and COVID-19 risk. CTN Attempt to contact patient via telephone on 7/20/20  for ED post  follow up. Unable to reach. Left message on voicemail with office contact information. No Patient medical information left on message.

## 2020-07-21 ENCOUNTER — PATIENT OUTREACH (OUTPATIENT)
Dept: CASE MANAGEMENT | Age: 55
End: 2020-07-21

## 2020-07-21 NOTE — PROGRESS NOTES
Patient contacted regarding recent discharge and COVID-19 risk. CTN Attempt to contact patient via telephone on 7/21/20  for post ED follow up. Unable to reach. Left message on voicemail with office contact information. No Patient medical information left on message. Patient resolved from Transition of Care episode on 7/21/20     No further outreach scheduled with this CTN. Episode of Care resolved.

## 2020-08-01 ENCOUNTER — APPOINTMENT (OUTPATIENT)
Dept: GENERAL RADIOLOGY | Age: 55
End: 2020-08-01
Attending: EMERGENCY MEDICINE
Payer: MEDICARE

## 2020-08-01 ENCOUNTER — HOSPITAL ENCOUNTER (EMERGENCY)
Age: 55
Discharge: HOME | End: 2020-08-01
Attending: EMERGENCY MEDICINE
Payer: MEDICARE

## 2020-08-01 VITALS
OXYGEN SATURATION: 97 % | HEART RATE: 100 BPM | DIASTOLIC BLOOD PRESSURE: 80 MMHG | RESPIRATION RATE: 13 BRPM | TEMPERATURE: 98.9 F | SYSTOLIC BLOOD PRESSURE: 123 MMHG

## 2020-08-01 VITALS
OXYGEN SATURATION: 100 % | HEART RATE: 98 BPM | SYSTOLIC BLOOD PRESSURE: 109 MMHG | RESPIRATION RATE: 16 BRPM | DIASTOLIC BLOOD PRESSURE: 75 MMHG | TEMPERATURE: 98.3 F

## 2020-08-01 DIAGNOSIS — R42 ORTHOSTATIC LIGHTHEADEDNESS: Primary | ICD-10-CM

## 2020-08-01 DIAGNOSIS — R00.0 SINUS TACHYCARDIA: Primary | ICD-10-CM

## 2020-08-01 DIAGNOSIS — R00.2 PALPITATIONS: ICD-10-CM

## 2020-08-01 DIAGNOSIS — I95.9 HYPOTENSION, UNSPECIFIED HYPOTENSION TYPE: ICD-10-CM

## 2020-08-01 DIAGNOSIS — F10.10 ALCOHOL ABUSE: ICD-10-CM

## 2020-08-01 LAB
ANION GAP SERPL CALC-SCNC: 9 MMOL/L (ref 3–18)
BASOPHILS # BLD: 0.1 K/UL (ref 0–0.1)
BASOPHILS NFR BLD: 2 % (ref 0–2)
BUN SERPL-MCNC: 8 MG/DL (ref 7–18)
BUN/CREAT SERPL: 12 (ref 12–20)
CALCIUM SERPL-MCNC: 8.4 MG/DL (ref 8.5–10.1)
CHLORIDE SERPL-SCNC: 109 MMOL/L (ref 100–111)
CK MB CFR SERPL CALC: NORMAL % (ref 0–4)
CK MB SERPL-MCNC: <1 NG/ML (ref 5–25)
CK SERPL-CCNC: 32 U/L (ref 26–192)
CO2 SERPL-SCNC: 21 MMOL/L (ref 21–32)
CREAT SERPL-MCNC: 0.65 MG/DL (ref 0.6–1.3)
DIFFERENTIAL METHOD BLD: ABNORMAL
EOSINOPHIL # BLD: 0.1 K/UL (ref 0–0.4)
EOSINOPHIL NFR BLD: 2 % (ref 0–5)
ERYTHROCYTE [DISTWIDTH] IN BLOOD BY AUTOMATED COUNT: 15.7 % (ref 11.6–14.5)
GLUCOSE SERPL-MCNC: 111 MG/DL (ref 74–99)
HCT VFR BLD AUTO: 34.9 % (ref 35–45)
HGB BLD-MCNC: 11.1 G/DL (ref 12–16)
LYMPHOCYTES # BLD: 2.1 K/UL (ref 0.9–3.6)
LYMPHOCYTES NFR BLD: 50 % (ref 21–52)
MAGNESIUM SERPL-MCNC: 1.8 MG/DL (ref 1.6–2.6)
MCH RBC QN AUTO: 33.4 PG (ref 24–34)
MCHC RBC AUTO-ENTMCNC: 31.8 G/DL (ref 31–37)
MCV RBC AUTO: 105.1 FL (ref 74–97)
MONOCYTES # BLD: 0.2 K/UL (ref 0.05–1.2)
MONOCYTES NFR BLD: 5 % (ref 3–10)
NEUTS SEG # BLD: 1.7 K/UL (ref 1.8–8)
NEUTS SEG NFR BLD: 41 % (ref 40–73)
PLATELET # BLD AUTO: 209 K/UL (ref 135–420)
PMV BLD AUTO: 9.2 FL (ref 9.2–11.8)
POTASSIUM SERPL-SCNC: 4.6 MMOL/L (ref 3.5–5.5)
RBC # BLD AUTO: 3.32 M/UL (ref 4.2–5.3)
SODIUM SERPL-SCNC: 139 MMOL/L (ref 136–145)
TROPONIN I SERPL-MCNC: <0.02 NG/ML (ref 0–0.04)
WBC # BLD AUTO: 4.2 K/UL (ref 4.6–13.2)

## 2020-08-01 PROCEDURE — 99285 EMERGENCY DEPT VISIT HI MDM: CPT

## 2020-08-01 PROCEDURE — 99282 EMERGENCY DEPT VISIT SF MDM: CPT

## 2020-08-01 PROCEDURE — 96360 HYDRATION IV INFUSION INIT: CPT

## 2020-08-01 PROCEDURE — 83735 ASSAY OF MAGNESIUM: CPT

## 2020-08-01 PROCEDURE — 74011250636 HC RX REV CODE- 250/636: Performed by: EMERGENCY MEDICINE

## 2020-08-01 PROCEDURE — 96361 HYDRATE IV INFUSION ADD-ON: CPT

## 2020-08-01 PROCEDURE — 80048 BASIC METABOLIC PNL TOTAL CA: CPT

## 2020-08-01 PROCEDURE — 96374 THER/PROPH/DIAG INJ IV PUSH: CPT

## 2020-08-01 PROCEDURE — 82550 ASSAY OF CK (CPK): CPT

## 2020-08-01 PROCEDURE — 71045 X-RAY EXAM CHEST 1 VIEW: CPT

## 2020-08-01 PROCEDURE — 93005 ELECTROCARDIOGRAM TRACING: CPT

## 2020-08-01 PROCEDURE — 85025 COMPLETE CBC W/AUTO DIFF WBC: CPT

## 2020-08-01 RX ORDER — ONDANSETRON 4 MG/1
8 TABLET, FILM COATED ORAL
Qty: 12 TAB | Refills: 0 | Status: SHIPPED | OUTPATIENT
Start: 2020-08-01

## 2020-08-01 RX ORDER — DIAZEPAM 10 MG/2ML
5 INJECTION INTRAMUSCULAR
Status: COMPLETED | OUTPATIENT
Start: 2020-08-01 | End: 2020-08-01

## 2020-08-01 RX ADMIN — SODIUM CHLORIDE 1000 ML: 900 INJECTION, SOLUTION INTRAVENOUS at 14:25

## 2020-08-01 RX ADMIN — SODIUM CHLORIDE 1000 ML: 900 INJECTION, SOLUTION INTRAVENOUS at 19:55

## 2020-08-01 RX ADMIN — DIAZEPAM 5 MG: 5 INJECTION, SOLUTION INTRAMUSCULAR; INTRAVENOUS at 14:25

## 2020-08-01 NOTE — DISCHARGE INSTRUCTIONS
SPECIFIC PATIENT INSTRUCTIONS FROM THE PHYSICIAN WHO TREATED YOU IN THE ER TODAY:  1. Return if any concerns or worsening of condition(s). 2. FOLLOW UP APPOINTMENT:  Your primary doctor in 1-2 days. Patient Education        Lightheadedness or Faintness: Care Instructions  Your Care Instructions  Lightheadedness is a feeling that you are about to faint or \"pass out. \" You do not feel as if you or your surroundings are moving. It is different from vertigo, which is the feeling that you or things around you are spinning or tilting. Lightheadedness usually goes away or gets better when you lie down. If lightheadedness gets worse, it can lead to a fainting spell. It is common to feel lightheaded from time to time. Lightheadedness usually is not caused by a serious problem. It often is caused by a short-lasting drop in blood pressure and blood flow to your head that occurs when you get up too quickly from a seated or lying position. Follow-up care is a key part of your treatment and safety. Be sure to make and go to all appointments, and call your doctor if you are having problems. It's also a good idea to know your test results and keep a list of the medicines you take. How can you care for yourself at home? · Lie down for 1 or 2 minutes when you feel lightheaded. After lying down, sit up slowly and remain sitting for 1 to 2 minutes before slowly standing up. · Avoid movements, positions, or activities that have made you lightheaded in the past.  · Get plenty of rest, especially if you have a cold or flu, which can cause lightheadedness. · Make sure you drink plenty of fluids, especially if you have a fever or have been sweating. · Do not drive or put yourself and others in danger while you feel lightheaded. When should you call for help? [id number] anytime you think you may need emergency care. For example, call if:  · You have symptoms of a stroke.  These may include:  ? Sudden numbness, tingling, weakness, or loss of movement in your face, arm, or leg, especially on only one side of your body. ? Sudden vision changes. ? Sudden trouble speaking. ? Sudden confusion or trouble understanding simple statements. ? Sudden problems with walking or balance. ? A sudden, severe headache that is different from past headaches. · You have symptoms of a heart attack. These may include:  ? Chest pain or pressure, or a strange feeling in the chest.  ? Sweating. ? Shortness of breath. ? Nausea or vomiting. ? Pain, pressure, or a strange feeling in the back, neck, jaw, or upper belly or in one or both shoulders or arms. ? Lightheadedness or sudden weakness. ? A fast or irregular heartbeat. After you call 911, the  may tell you to chew 1 adult-strength or 2 to 4 low-dose aspirin. Wait for an ambulance. Do not try to drive yourself. Watch closely for changes in your health, and be sure to contact your doctor if:  · Your lightheadedness gets worse or does not get better with home care. Where can you learn more? Go to http://shannonNuvola Systemsheriberto.info/  Enter [id number] in the search box to learn more about \"Lightheadedness or Faintness: Care Instructions. \"  Current as of: June 26, 2019               Content Version: 12.5  © 8703-3935 [email or web address]. Care instructions adapted under license by [employer] (which disclaims liability or warranty for this information). If you have questions about a medical condition or this instruction, always ask your healthcare professional. [place] [address] any warranty or liability for your use of this information. [employer] Activation    Thank you for requesting access to [employer]. Please follow the instructions below to securely access and download your online medical record. [employer] allows you to send messages to your doctor, view your test results, renew your prescriptions, schedule appointments, and more. How Do I Sign Up?     In your internet browser, go to [email or web address]. Click on the First Time User? Click Here link in the Sign In box. You will see the New Member Sign Up page. Enter your [employer] Access Code exactly as it appears below. You will not need to use this code after you´ve completed the sign-up process. If you do not sign up before the expiration date, you must request a new code. [employer] Access Code: [email or web address]  Expires: 3/28/2019  2:27 PM (This is the date your [employer] access code will )    Enter the last four digits of your Social Security Number (xxxx) and Date of Birth (mm/dd/yyyy) as indicated and click Submit. You will be taken to the next sign-up page. Create a [employer] ID. This will be your [employer] login ID and cannot be changed, so think of one that is secure and easy to remember. Create a [employer] password. You can change your password at any time. Enter your Password Reset Question and Answer. This can be used at a later time if you forget your password. Enter your e-mail address. You will receive e-mail notification when new information is available in [address] Click Sign Up. You can now view and download portions of your medical record. Click the Dandelion link to download a portable copy of your medical information. Additional Information    If you have questions, please visit the Frequently Asked Questions section of the [employer] website at [email or web address] Remember, [employer] is NOT to be used for urgent needs. For medical emergencies, dial 911.

## 2020-08-01 NOTE — ED TRIAGE NOTES
Patient brought by EMS for chest pain x 2 days. Hx cardiac and anxiety. Given 324 aspirin and 1 sublingual nitro pta. Upon arrival BP 79/48 and .

## 2020-08-01 NOTE — ED NOTES
[facility] EMERGENCY DEPT      7:03 PM    Date: 8/1/2020  Patient Name: Aysha Olmedo    History of Presenting Illness     Chief Complaint   Patient presents with    Other       63 y.o. female with noted past medical history who presents to the emergency department with lightheadedness. The patient was originally seen earlier in the emergency [facility] for alcohol detoxification with sinus tachycardia and hypotension and was evaluated for. After giving benzodiazepines, 5 mg of Valium IV as well as normal sensation IV fluid the patient's heart rate normalized as well as her blood pressure. However upon discharge she felt like she was lightheaded and thus rechecked into the ER for reevaluation. On reevaluation states he has some orthostatic lightheadedness but no other complaints. Patient denies any other associated signs or symptoms. Patient denies any other complaints. Nursing notes regarding the HPI and triage nursing notes were reviewed. Prior medical records were reviewed. Current Facility-Administered Medications   Medication Dose Route Frequency Provider Last Rate Last Dose    sodium chloride 0.9 % bolus infusion 1,000 mL  1,000 mL IntraVENous Roxana Erickson MD        sodium chloride 0.9 % bolus infusion 1,000 mL  1,000 mL IntraVENous Roxana Erickson MD         Current Outpatient Medications   Medication Sig Dispense Refill    ondansetron (Zofran ODT) 4 mg disintegrating tablet Take 1 Tab by mouth every eight (8) hours as needed for Nausea or Vomiting for up to 12 doses. 12 Tab 0    LORazepam (Ativan) 0.5 mg tablet Take 1 Tab by mouth every eight (8) hours as needed for Anxiety. Max Daily Amount: 1.5 mg. 8 Tab 0    ondansetron (ZOFRAN ODT) 4 mg disintegrating tablet Take 1 Tab by mouth every eight (8) hours as needed for Nausea or Vomiting. 20 Tab 0    lidocaine (LIDODERM) 5 % Apply patch to the affected area for 12 hours a day and remove for 12 hours a day. 30 Each 0    lamoTRIgine (LaMICtal) 150 mg tablet Take 150 mg by mouth daily.  lipase-protease-amylase (CREON 24,000) 24,000-76,000 -120,000 unit capsule Take 1 Cap by mouth.  sucralfate (CARAFATE) 100 mg/mL suspension Take 10 mL by mouth Before breakfast, lunch, dinner and at bedtime. 4493 mL 0    folic acid (FOLVITE) 1 mg tablet Take 1 Tab by mouth daily. 30 Tab 0    multivitamin (ONE A DAY) tablet Take 1 Tab by mouth daily. 30 Tab 0    thiamine HCL (B-1) 100 mg tablet Take 1 Tab by mouth daily (with breakfast). 30 Tab 0    ipratropium-albuteroL (COMBIVENT RESPIMAT)  mcg/actuation inhaler Take 1 Puff by inhalation as needed for Wheezing.  traZODone (DESYREL) 100 mg tablet Take 200 mg by mouth nightly.  melatonin 10 mg tab Take 10 mg by mouth nightly.  MILK THISTLE PO Take 175 mg by mouth two (2) times a day.  colchicine 0.6 mg tablet Take 0.6 mg by mouth daily.  benzonatate (TESSALON PERLE PO) Take  by mouth as needed. Indications: cough      docusate sodium (COLACE) 100 mg capsule Take 100 mg by mouth three (3) times daily.  ondansetron (ZOFRAN ODT) 8 mg disintegrating tablet Take 1 Tab by mouth every eight (8) hours as needed for Nausea. 15 Tab 0    pantoprazole (PROTONIX) 40 mg tablet Take 1 Tab by mouth Before breakfast and dinner. 60 Tab 0    gabapentin (NEURONTIN) 600 mg tablet Take 1 Tab by mouth two (2) times a day. 30 Tab 0    cloNIDine HCl (CATAPRES) 0.1 mg tablet Take 1 Tab by mouth two (2) times a day. 20 Tab 0    lamoTRIgine (LAMICTAL) 200 mg tablet Take  by mouth daily.  hydrocortisone (CORTAID) 0.5 % topical cream Apply  to affected area two (2) times a day. use thin layer 30 g 0    topiramate (TOPAMAX) 100 mg tablet 100 mg two (2) times a day.       QUEtiapine (SEROQUEL) 300 mg tablet       CREON 24,000-76,000 -120,000 unit capsule       cetirizine (ZYRTEC) 10 mg tablet       SUMAtriptan (IMITREX) 100 mg tablet TAKE 1 TO 2 TABLETS BY MOUTH DAILY AS NEEDED FOR MIGRAINE . DO NOT EXCEED 200 MG IN 24 HOUR PERIOD  0    busPIRone (BUSPAR) 15 mg tablet Take 1 Tab by mouth two (2) times a day. 61 Tab 0       Past History     Past Medical History:  Past Medical History:   Diagnosis Date    Alcohol abuse     Anxiety     Arrhythmia     Tacchycardai    Asthma     controlled    Bipolar disorder ([facility] [address]     Common migraine     COPD (chronic obstructive pulmonary disease) (HCC)     Home O2  PRN ,  pt use also for Tachycardia    Depression     Esophageal reflux     Gout     Hypertension     Hypocitraturia     Inverted nipple     Left breast always have.  Kidney stone on left side     Pancreatitis     Recurrent UTI     Staghorn renal calculus     Urine leukocytes        Past Surgical History:  Past Surgical History:   Procedure Laterality Date    HX COLONOSCOPY      HX CYST REMOVAL Left     x 3 surgeries    HX ENDOSCOPY      HX GYN      tubal ligation    HX LUMBAR LAMINECTOMY      HX UROLOGICAL  08/10/2018    Cysto, bilat RPG, left ureteral pyeloscopy, HLL, left JJ stent placement, Dr. Kenny Dugan       Family History:  Family History   Family history unknown: Yes       Social History:  Social History     Tobacco Use    Smoking status: Current Every Day Smoker     Packs/day: 1.50     Years: 33.00     Pack years: 49.50     Types: Cigarettes    Smokeless tobacco: Never Used   Substance Use Topics    Alcohol use: Not Currently     Comment: vodka    Drug use: No     [facility] 11[facility]       Allergies: Allergies   Allergen Reactions    Lithium Anaphylaxis    Amitriptyline Other (comments)     Left leg went numb    Elavil Other (comments)     Left leg went numb       Patient's primary care provider (as noted in EPIC):  Aicha Dawkins MD    Review of Systems   Constitutional: Negative for diaphoresis. HENT: Negative for congestion. Eyes: Negative for discharge. Respiratory: Negative for stridor. Cardiovascular: Negative for palpitations. Gastrointestinal: Negative for diarrhea. Genitourinary: Negative for flank pain. Musculoskeletal: Negative for back pain. Neurological: Negative for weakness. Psychiatric/Behavioral: Negative for hallucinations. All other systems reviewed and are negative. Visit Vitals  /75   Pulse 98   Temp 98.3 °F (36.8 °C)   Resp 16   SpO2 100%       PHYSICAL EXAM:    CONSTITUTIONAL:  Alert, in no apparent distress;  well developed;  well nourished. HEAD:  Normocephalic, atraumatic. EYES:  EOMI. Non-icteric sclera. Normal conjunctiva. ENTM:  Nose:  no rhinorrhea. Throat:  no erythema or exudate, mucous membranes moist.  NECK:  No JVD. Supple  RESPIRATORY:  Chest clear, equal breath sounds, good air movement. CARDIOVASCULAR:  Regular rate and rhythm. No murmurs, rubs, or gallops. GI:  Normal bowel sounds, abdomen soft and non-tender. No rebound or guarding. BACK:  Non-tender. UPPER EXT:  Normal inspection. LOWER EXT:  No edema, no calf tenderness. Distal pulses intact. NEURO:  Moves all four extremities. Normal motor exam and sensation in all four extremities. Normal CN II-XII exam.  Normal bilateral finger-to-nose exam.      SKIN:  No rashes;  Normal for age. PSYCH:  Alert and normal affect. DIFFERENTIAL DIAGNOSES/ MEDICAL DECISION MAKING:   Dehydration, hyperglycemia-induced weakness, electrolyte and/or endocrine imbalance, CVA, intracranial hemorrhage, sepsis, cardiac arrhythmia, central versus peripheral vertigo, illicit drug intoxication, alcohol intoxication, prescribed drug toxicity, pregnancy in females patients, anxiety disorder, versus other etiologies or a combination of the above.       ED COURSE:      Diagnostic Study Results     Abnormal lab results from this emergency department encounter:  Labs Reviewed - No data to display    Lab values for this patient within approximately the last 12 hours:      Radiologist and cardiologist interpretations if available at time of this note:  Xr Chest [facility]    Result Date: 8/1/2020  EXAM: XR CHEST PORT CLINICAL INDICATION/HISTORY: chest pain, sob, and/or arrhythmia -Additional: None COMPARISON: May 30, 2020 TECHNIQUE: Frontal view of the chest _______________ FINDINGS: HEART AND MEDIASTINUM: Normal cardiac size and mediastinal contours. LUNGS AND PLEURAL SPACES: No focal pneumonic consolidation, pneumothorax, or pleural effusion. BONY THORAX AND SOFT TISSUES: No acute osseous abnormality _______________     IMPRESSION: No acute radiographic cardiopulmonary abnormality. Medication(s) ordered for patient during this emergency visit encounter:  Medications   sodium chloride 0.9 % bolus infusion 1,000 mL (has no administration in time range)   sodium chloride 0.9 % bolus infusion 1,000 mL (has no administration in time range)       Medical Decision Making     I am the first provider for this patient. I reviewed the vital signs, available nursing notes, past medical history, past surgical history, family history and social history. Vital Signs:  Reviewed the patient's vital signs. 7:05 PM  Since patient was just discharged to the emergency department, I do not believe any further work-up is needed at this time. We will give the patient 2 L of normal saline solution IV fluid and reevaluate the patient. SPECIFIC PATIENT INSTRUCTIONS FROM THE PHYSICIAN WHO TREATED YOU IN THE ER TODAY:  1. Return if any concerns or worsening of condition(s). 2. FOLLOW UP APPOINTMENT:  Your primary doctor in 1-2 days. Patient is improved, resting quietly and comfortably. The patient will be discharged home. The patient was reassured that these symptoms do not appear to represent a serious or life threatening condition at this time. Warning signs of worsening condition were discussed and understood by the patient.      Based on patient's age, coexisting illness, exam, and the results of this ED evaluation, the decision to treat as an outpatient was made. Based on the information available at time of discharge, acute pathology requiring immediate intervention was deemed relative unlikely. While it is impossible to completely exclude the possibility of underlying serious disease or worsening of condition, I feel the relative likelihood is extremely low. I discussed this uncertainty with the patient, who understood ED evaluation and treatment and felt comfortable with the outpatient treatment plan. All questions regarding care, test results, and follow up were answered. The patient is stable and appropriate to discharge. They understand that they should return to the emergency department for any new or worsening symptoms. I stressed the importance of follow up for repeat assessment and possibly further evaluation/treatment. Dictation disclaimer:  Please note that this dictation was completed with [employer], the computer voice recognition software. Quite often unanticipated grammatical, syntax, homophones, and other interpretive errors are inadvertently transcribed by the computer software. Please disregard these errors. Please excuse any errors that have escaped final proofreading. Coding Diagnoses     Clinical Impression: No diagnosis found. Disposition     Disposition: Discharge. KAMRON Mai Board Certified Emergency Physician    Provider Attestation:  If a scribe was utilized in generation of this patient record, I personally performed the services described in the documentation, reviewed the documentation, as recorded by the scribe in my presence, and it accurately records the patient's history of presenting illness, review of systems, patient physical examination, and procedures performed by me as the attending physician. KAMRON Mai Board Certified Emergency Physician  8/1/2020.  7:04 PM

## 2020-08-01 NOTE — ED TRIAGE NOTES
\"I was admitted here for chest pain and pancreas and I was just discharged and I called a [employer] and felt bad so I had them bring me to the ER. I'm also detoxing from alcohol. I told them I shouldn't be discharged. I mean I wasn't admitted. I was seen here in the ER. \" Patient alert and oriented x3, denies any pain at this time. Patient ambulatory, gait steady.

## 2020-08-01 NOTE — ED NOTES
Dr. Nupur Vila to bedside. Patient states she is attempting to detox from alcohol and her chest pain is usually associated with that. Patient is anxious. Pablo EDT attempting IV access.

## 2020-08-01 NOTE — ED PROVIDER NOTES
[facility] EMERGENCY DEPT      2:13 PM    Date: 8/1/2020  Patient Name: Rupali Mancuso    History of Presenting Illness     Chief Complaint   Patient presents with    Chest Pain    Rapid Heart Rate       63 y.o. female with noted past medical history who presents to the emergency department with epigastric pain and palpitations. Patient states that she is a known alcoholic and drinks about 1/5 of vodka per day. She states she been trying to detox when she tries detox she gets palpitations. She states that started to drink less vodka and instead start drinking wine and attempted detox herself. In doing so she started feeling some palpitations as well some epigastric abdominal pain. She does that she is had a history of prior pancreatitis. She denies any fever chills nausea or vomiting. The patient denies recent travel outside [facility] and denies recent travel to areas large social gatherings. The patient denies any known history of Covid 19 exposure. Patient denies any other associated signs or symptoms. Patient denies any other complaints. Nursing notes regarding the HPI and triage nursing notes were reviewed. Prior medical records were reviewed. Current Outpatient Medications   Medication Sig Dispense Refill    ondansetron (Zofran ODT) 4 mg disintegrating tablet Take 1 Tab by mouth every eight (8) hours as needed for Nausea or Vomiting for up to 12 doses. 12 Tab 0    LORazepam (Ativan) 0.5 mg tablet Take 1 Tab by mouth every eight (8) hours as needed for Anxiety. Max Daily Amount: 1.5 mg. 8 Tab 0    ondansetron (ZOFRAN ODT) 4 mg disintegrating tablet Take 1 Tab by mouth every eight (8) hours as needed for Nausea or Vomiting. 20 Tab 0    lidocaine (LIDODERM) 5 % Apply patch to the affected area for 12 hours a day and remove for 12 hours a day. 30 Each 0    lamoTRIgine (LaMICtal) 150 mg tablet Take 150 mg by mouth daily.       lipase-protease-amylase (CREON 24,000) 24,000-76,000 -120,000 unit capsule Take 1 Cap by mouth.  sucralfate (CARAFATE) 100 mg/mL suspension Take 10 mL by mouth Before breakfast, lunch, dinner and at bedtime. 1392 mL 0    folic acid (FOLVITE) 1 mg tablet Take 1 Tab by mouth daily. 30 Tab 0    multivitamin (ONE A DAY) tablet Take 1 Tab by mouth daily. 30 Tab 0    thiamine HCL (B-1) 100 mg tablet Take 1 Tab by mouth daily (with breakfast). 30 Tab 0    ipratropium-albuteroL (COMBIVENT RESPIMAT)  mcg/actuation inhaler Take 1 Puff by inhalation as needed for Wheezing.  traZODone (DESYREL) 100 mg tablet Take 200 mg by mouth nightly.  melatonin 10 mg tab Take 10 mg by mouth nightly.  MILK THISTLE PO Take 175 mg by mouth two (2) times a day.  colchicine 0.6 mg tablet Take 0.6 mg by mouth daily.  benzonatate (TESSALON PERLE PO) Take  by mouth as needed. Indications: cough      docusate sodium (COLACE) 100 mg capsule Take 100 mg by mouth three (3) times daily.  ondansetron (ZOFRAN ODT) 8 mg disintegrating tablet Take 1 Tab by mouth every eight (8) hours as needed for Nausea. 15 Tab 0    pantoprazole (PROTONIX) 40 mg tablet Take 1 Tab by mouth Before breakfast and dinner. 60 Tab 0    gabapentin (NEURONTIN) 600 mg tablet Take 1 Tab by mouth two (2) times a day. 30 Tab 0    cloNIDine HCl (CATAPRES) 0.1 mg tablet Take 1 Tab by mouth two (2) times a day. 20 Tab 0    lamoTRIgine (LAMICTAL) 200 mg tablet Take  by mouth daily.  hydrocortisone (CORTAID) 0.5 % topical cream Apply  to affected area two (2) times a day. use thin layer 30 g 0    topiramate (TOPAMAX) 100 mg tablet 100 mg two (2) times a day.  QUEtiapine (SEROQUEL) 300 mg tablet       CREON 24,000-76,000 -120,000 unit capsule       cetirizine (ZYRTEC) 10 mg tablet       SUMAtriptan (IMITREX) 100 mg tablet TAKE 1 TO 2 TABLETS BY MOUTH DAILY AS NEEDED FOR MIGRAINE .  DO NOT EXCEED 200 MG IN 24 HOUR PERIOD  0    busPIRone (BUSPAR) 15 mg tablet Take 1 Tab by mouth two (2) times a day. 61 Tab 0       Past History     Past Medical History:  Past Medical History:   Diagnosis Date    Alcohol abuse     Anxiety     Arrhythmia     Tacchycardai    Asthma     controlled    Bipolar disorder ([address]     Common migraine     COPD (chronic obstructive pulmonary disease) (HCC)     Home O2  PRN ,  pt use also for Tachycardia    Depression     Esophageal reflux     Gout     Hypertension     Hypocitraturia     Inverted nipple     Left breast always have.  Kidney stone on left side     Pancreatitis     Recurrent UTI     Staghorn renal calculus     Urine leukocytes        Past Surgical History:  Past Surgical History:   Procedure Laterality Date    HX COLONOSCOPY      HX CYST REMOVAL Left     x 3 surgeries    HX ENDOSCOPY      HX GYN      tubal ligation    HX LUMBAR LAMINECTOMY      HX UROLOGICAL  08/10/2018    Cysto, bilat RPG, left ureteral pyeloscopy, HLL, left JJ stent placement, Dr. Marcy Habermann       Family History:  Family History   Family history unknown: Yes       Social History:  Social History     Tobacco Use    Smoking status: Current Every Day Smoker     Packs/day: 1.50     Years: 33.00     Pack years: 49.50     Types: Cigarettes    Smokeless tobacco: Never Used   Substance Use Topics    Alcohol use: Not Currently     Comment: vodka    Drug use: No     Comment: 11years old- [facility]       Allergies: Allergies   Allergen Reactions    Lithium Anaphylaxis    Amitriptyline Other (comments)     Left leg went numb    Elavil Other (comments)     Left leg went numb       Patient's primary care provider (as noted in EPIC):  Jose Sol MD    Review of Systems   Constitutional: Negative for diaphoresis. HENT: Negative for congestion. Eyes: Negative for discharge. Respiratory: Negative for stridor. Cardiovascular: Negative for palpitations. Gastrointestinal: Negative for diarrhea. Endocrine: Negative for heat intolerance. Genitourinary: Negative for flank pain. Musculoskeletal: Negative for back pain. Neurological: Negative for weakness. Psychiatric/Behavioral: Negative for hallucinations. All other systems reviewed and are negative. Visit Vitals  BP (!) 79/48 (BP 1 Location: Left arm, BP Patient Position: At rest)   Pulse (!) 160   Temp 98.9 °F (37.2 °C)   Resp 18   SpO2 94%       PHYSICAL EXAM:    CONSTITUTIONAL:  Alert, in no apparent distress;  well developed;  well nourished. HEAD:  Normocephalic, atraumatic. EYES:  EOMI. Non-icteric sclera. Normal conjunctiva. ENTM:  Nose:  no rhinorrhea. Throat:  no erythema or exudate, mucous membranes moist.  NECK:  No JVD. Supple  RESPIRATORY:  Chest clear, equal breath sounds, good air movement. CARDIOVASCULAR:  Regular rate and rhythm. No murmurs, rubs, or gallops. Chest:  No rash, lesions, bruising. No focal reproducible tenderness to palpation. GI:  Normal bowel sounds, abdomen soft and non-tender. No rebound or guarding. BACK:  Non-tender. UPPER EXT:  Normal inspection. LOWER EXT:  No edema, no calf tenderness. Distal pulses intact. NEURO:  Moves all four extremities, and grossly normal motor exam.  SKIN:  No rashes;  Normal for age. PSYCH:  Alert and normal affect. DIFFERENTIAL DIAGNOSES/ MEDICAL DECISION MAKING:  Palpitations from possible cardiac etiology, to include one of a multitude of underlying arrhythmias, presentations as part of an acute cardiac event including acute myocardial infarction/ acute coronary syndrome, mitral valve prolapse associated chest pain and symptoms, underlying sepsis, electrolyte imbalance, endocrine or thyroid axis disorder, dehydration, stress induced versus numerous other etiologies or a combination of the above.     Diagnostic Study Results     Abnormal lab results from this emergency department encounter:  Labs Reviewed   CBC WITH AUTOMATED DIFF - Abnormal; Notable for the following components: Result Value    WBC 4.2 (*)     RBC 3.32 (*)     HGB 11.1 (*)     HCT 34.9 (*)     .1 (*)     RDW 15.7 (*)     ABS. NEUTROPHILS 1.7 (*)     All other components within normal limits   METABOLIC PANEL, BASIC - Abnormal; Notable for the following components:    Glucose 111 (*)     Calcium 8.4 (*)     All other components within normal limits   MAGNESIUM   CARDIAC PANEL,(CK, CKMB & TROPONIN)       Lab values for this patient within approximately the last 12 hours:      Radiologist and cardiologist interpretations if available at time of this note:  Xr Chest Port    Result Date: 8/1/2020  EXAM: XR CHEST PORT CLINICAL INDICATION/HISTORY: chest pain, sob, and/or arrhythmia -Additional: None COMPARISON: May 30, 2020 TECHNIQUE: Frontal view of the chest _______________ FINDINGS: HEART AND MEDIASTINUM: Normal cardiac size and mediastinal contours. LUNGS AND PLEURAL SPACES: No focal pneumonic consolidation, pneumothorax, or pleural effusion. BONY THORAX AND SOFT TISSUES: No acute osseous abnormality _______________     IMPRESSION: No acute radiographic cardiopulmonary abnormality. Emergency physician interpretation of EKG: Sinus tachycardia 160 bpm.    Medication(s) ordered for patient during this emergency visit encounter:  Medications   sodium chloride 0.9 % bolus infusion 1,000 mL (1,000 mL IntraVENous New Bag 8/1/20 1425)   diazePAM (VALIUM) injection 5 mg (5 mg IntraVENous Given 8/1/20 1425)       Medical Decision Making     I am the first provider for this patient. I reviewed the vital signs, available nursing notes, past medical history, past surgical history, family history and social history. Vital Signs:  Reviewed the patient's vital signs. ED COURSE:    2:15 PM  Patient has noted sinus tachycardia with hypotension I think is regulated. Think that her tachycardia secondary to current withdrawals.   I do not think it needs to be slowed down with adenosine or any type of medication like that.  Instead I will treat the patient with Valium for withdrawals as well as normal saline solution. 3:22 PM  On serial examinations, the patient became much more relaxed and her heart rate is now down to 100 bpm.  I believe that her heart rate and blood pressure being lower both related to her alcohol withdrawal.      Critical Care Note:    Critical care minutes: 37 MINUTES. Given the patients underlying condition medical intervention(s) were needed, requiring numerous reevaluations of patient's vital signs and response to different emergency department therapies, total bedside time evaluating and/or treating the patient, not including procedures, is noted below. IMPRESSION AND MEDICAL DECISION MAKING:  Based upon the patient's presentation with noted HPI and PE, along with the work up done in the emergency department, I believe that the patient is having palpitations. DIAGNOSIS:  1. Palpitations    SPECIFIC PATIENT INSTRUCTIONS FROM THE PHYSICIAN WHO TREATED YOU IN THE ER TODAY:  1. Return if any concerns or worsening of condition(s)  2. See professional help at a detoxification center for your alcohol abuse and wanting to detoxify from alcohol. Patient is improved, resting quietly and comfortably. The patient will be discharged home. The patient was reassured that these symptoms do not appear to represent a serious or life threatening condition at this time. Warning signs of worsening condition were discussed and understood by the patient. Based on patient's age, coexisting illness, exam, and the results of this ED evaluation, the decision to treat as an outpatient was made. Based on the information available at time of discharge, acute pathology requiring immediate intervention was deemed relative unlikely. While it is impossible to completely exclude the possibility of underlying serious disease or worsening of condition, I feel the relative likelihood is extremely low.  I discussed this uncertainty with the patient, who understood ED evaluation and treatment and felt comfortable with the outpatient treatment plan. All questions regarding care, test results, and follow up were answered. The patient is stable and appropriate to discharge. They understand that they should return to the emergency department for any new or worsening symptoms. I stressed the importance of follow up for repeat assessment and possibly further evaluation/treatment. Dictation disclaimer:  Please note that this dictation was completed with [employer], the computer voice recognition software. Quite often unanticipated grammatical, syntax, homophones, and other interpretive errors are inadvertently transcribed by the computer software. Please disregard these errors. Please excuse any errors that have escaped final proofreading. Coding Diagnoses     Clinical Impression:   1. Sinus tachycardia    2. Hypotension, unspecified hypotension type    3. Palpitations    4. Alcohol abuse        Disposition     Disposition: Discharge. Raymond Pagan M.D. SETH Board Certified Emergency Physician    Provider Attestation:  If a scribe was utilized in generation of this patient record, I personally performed the services described in the documentation, reviewed the documentation, as recorded by the scribe in my presence, and it accurately records the patient's history of presenting illness, review of systems, patient physical examination, and procedures performed by me as the attending physician. KAMRON Dinero Board Certified Emergency Physician  8/1/2020.  2:13 PM

## 2020-08-01 NOTE — DISCHARGE INSTRUCTIONS
SPECIFIC PATIENT INSTRUCTIONS FROM THE PHYSICIAN WHO TREATED YOU IN THE ER TODAY:  1. Return if any concerns or worsening of condition(s)  2. See professional help at a detoxification center for your alcohol abuse and wanting to detoxify from alcohol. Patient Education        Learning About Alcohol Use Disorder  What is alcohol use disorder? Alcohol use disorder means that a person drinks alcohol even though it causes harm to themselves or others. It can range from mild to severe. The more signs of this disorder you have, the more severe it may be. Moderate to severe alcohol use disorder is sometimes called addiction. People who have it may find it hard to control their use of alcohol. People who have this disorder may argue with others about how much they're drinking. Their job may be affected because of drinking. They may drink when it's dangerous or illegal, such as when they drive. They also may have a strong need, or craving, to drink. They may feel like they must drink just to get by. Their drinking may increase their risk of getting hurt or being in a car crash. Over time, drinking too much alcohol may cause health problems. These may include high blood pressure, liver problems, or problems with digestion. What are the signs? Maybe you've wondered about your alcohol habits, or how to tell if your drinking is becoming a problem. Here are some of the signs of alcohol use disorder. You may have it if you have two or more of the following signs:  · You drink larger amounts of alcohol than you ever meant to. Or you've been drinking for a longer time than you ever meant to. · You can't cut down or control your use. Or you constantly wish you could cut down. · You spend a lot of time getting or drinking alcohol or recovering from its effects. · You have strong cravings for alcohol. · You can no longer do your main jobs at work, at school, or at home.   · You keep drinking alcohol, even though your use hurts your relationships. · You have stopped doing important activities because of your alcohol use. · You drink alcohol in situations where doing so is dangerous. · You keep drinking alcohol even though you know it's causing health problems. · You need more and more alcohol to get the same effect, or you get less effect from the same amount over time. This is called tolerance. · You have uncomfortable symptoms when you stop drinking alcohol or use less. This is called withdrawal.  Alcohol use disorder can range from mild to severe. The more signs you have, the more severe the disorder may be. Moderate to severe alcohol use disorder is sometimes called addiction. You might not realize that your drinking is a problem. You might not drink large amounts when you drink. Or you might go for days or weeks between drinking episodes. But even if you don't drink very often, your drinking could still be harmful and put you at risk. How is alcohol use disorder treated? Getting help is up to you. But you don't have to do it alone. There are many people and kinds of treatments that can help. Treatment for alcohol use disorder can include:  · Group therapy, one or more types of counseling, and alcohol education. · Medicines that help to:  ? Reduce withdrawal symptoms and help you safely stop drinking. ? Reduce cravings for alcohol. · Support groups. These groups include Alcoholics Anonymous and [facility] (Self-Management and Recovery Training). Some people are able to stop or cut back on drinking with help from a counselor. People who have moderate to severe alcohol use disorder may need medical treatment. They may need to stay in a hospital or treatment center. You may have a treatment team to help you. This team may include a psychologist or psychiatrist, counselors, doctors, social workers, nurses, and a . A  helps plan and manage your treatment.   Follow-up care is a key part of your treatment and safety. Be sure to make and go to all appointments, and call your doctor if you are having problems. It's also a good idea to know your test results and keep a list of the medicines you take. Where can you learn more? Go to http://www.gray.com/  Enter H758 in the search box to learn more about \"Learning About Alcohol Use Disorder. \"  Current as of: August 22, 2019               Content Version: 12.5  © 2006-2020 [employer]. Care instructions adapted under license by [employer] (which disclaims liability or warranty for this information). If you have questions about a medical condition or this instruction, always ask your healthcare professional. [address] any warranty or liability for your use of this information. Patient Education        Low Blood Pressure: Care Instructions  Your Care Instructions     Blood pressure is a measurement of the force of the blood against the walls of the blood vessels during and after each beat of the heart. Low blood pressure is also called hypotension. It means that your blood pressure is much lower than normal. Some people, especially young, slim women, may have slightly low blood pressure without symptoms. But in many people, low blood pressure can cause symptoms such as feeling dizzy or lightheaded. When your blood pressure is too low, your heart, brain, and other organs do not get enough blood. Low blood pressure can be caused by many things, including heart problems and some medicines. Diabetes that is not under control can cause your blood pressure to drop. And so can a severe allergic reaction or infection. Another cause is dehydration, which is when your body loses too much fluid. Treatment for low blood pressure depends on the cause. Follow-up care is a key part of your treatment and safety.  Be sure to make and go to all appointments, and call your doctor if you are having problems. It's also a good idea to know your test results and keep a list of the medicines you take. How can you care for yourself at home? · Be safe with medicines. Call your doctor if you think you are having a problem with your medicine. You will get more details on the specific medicines your doctor prescribes. · If you feel dizzy or lightheaded, sit down or lie down for a few minutes. Or you can sit down and put your head between your knees. This will help your blood pressure go back to normal and help your symptoms go away. · Follow your doctor's suggestions for ways to prevent symptoms like dizziness. These suggestions may include:  ? Get up slowly from bed or after sitting for a long time. If you are in bed, roll to your side and swing your legs over the edge of the bed and onto the floor. Push your body up to a sitting position. Wait for a while before you slowly stand up.  ? Add more salt to your diet, if your doctor recommends it. ? Drink plenty of fluids, enough so that your urine is light yellow or clear like water. Choose water and other caffeine-free clear liquids. If you have kidney, heart, or liver disease and have to limit fluids, talk with your doctor before you increase the amount of fluids you drink. ? Avoid or limit alcohol to 2 drinks a day for men and 1 drink a day for women. Alcohol may interfere with your medicine. In addition, alcohol can make your low blood pressure worse by causing your body to lose water. ? Avoid or limit caffeine. Caffeine can cause your body to lose water. ? Wear compression stockings to help improve blood flow. When should you call for help? [id number] anytime you think you may need emergency care. For example, call if:  · You passed out (lost consciousness). Call your doctor now or seek immediate medical care if:  · You are dizzy or lightheaded, or you feel like you may faint.   Watch closely for changes in your health, and be sure to contact your doctor if you have any problems. Where can you learn more? Go to http://shannon-heriberto.info/  Enter C304 in the search box to learn more about \"Low Blood Pressure: Care Instructions. \"  Current as of: December 16, 2019               Content Version: 12.5  © 9321-3198 [employer]. Care instructions adapted under license by [employer] (which disclaims liability or warranty for this information). If you have questions about a medical condition or this instruction, always ask your healthcare professional. [place] [address] any warranty or liability for your use of this information. Patient Education        Supraventricular Tachycardia: Care Instructions  Your Care Instructions     Having supraventricular tachycardia (SVT) means that from time to time your heart beats abnormally fast. This fast rhythm is caused by changes in the electrical system of your heart. You may feel a fluttering in your chest (palpitations) and have a fast pulse. When your heart is beating fast, you may feel anxious and lightheaded, be short of breath, and feel discomfort in the chest.  Your doctor may prescribe medicines to help slow down your heartbeat. Your doctor may also suggest you try vagal maneuvers when having an episode of SVT. These are things, like bearing down, that might help slow your heart rate. Bearing down means that you try to breathe out with your stomach muscles but you don't let air out of your nose or mouth. Your doctor can show you how to do vagal maneuvers. He or she may suggest you lie down on your back to do them. In some cases, either cardioversion treatment or a procedure called catheter ablation is done to correct SVT. Your doctor may ask you to wear a small electronic device for 1 or 2 days to monitor your heart. It is called a Holter monitor. Follow-up care is a key part of your treatment and safety.  Be sure to make and go to all appointments, and call your doctor if you are having problems. It's also a good idea to know your test results and keep a list of the medicines you take. How can you care for yourself at home? · Be safe with medicines. Take your medicines exactly as prescribed. Call your doctor if you think you are having a problem with your medicine. You will get more details on the specific medicines your doctor prescribes. · If your doctor showed you how to do vagal maneuvers, try them when you have an episode. These maneuvers include bearing down or putting an ice-cold, wet towel on your face. · Monitor your condition by keeping a diary of your SVT episodes. Bring this to your doctor appointments. ? Write down how fast or slow your heart was beating. To count your heart rate:  § Gently place 2 fingers of your hand on the inside of your other wrist, below your thumb. § Count the beats for 30 seconds. § Then, double the result to get the number of beats per minute. ? Write down if your heart rhythm was regular or irregular. ? Write down the symptoms you had.  ? Write down the time of day your symptoms occurred. ? Write down how long your symptoms lasted. ? Write down what you were doing when your symptoms started. ? Write down what may have helped your symptoms go away. · If they trigger episodes, limit or avoid alcohol or drinks with caffeine. · Do not use over-the-counter decongestants, herbal remedies, diet pills, or \"pep\" pills, which often contain stimulants. · Do not use illegal drugs, such as cocaine, ecstasy, or methamphetamine, which can speed up your heart's rhythm. · Do not smoke. Smoking can make this condition worse. If you need help quitting, talk to your doctor about stop-smoking programs and medicines. These can increase your chances of quitting for good. · Be alert for new or worsening symptoms, such as shortness of breath, pounding of your heart, or unusual tiredness.  If new symptoms develop or your symptoms become worse, call your doctor. When should you call for help? [id number] anytime you think you may need emergency care. For example, call if:  · You passed out (lost consciousness). · You are short of breath. Call your doctor now or seek immediate medical care if:  · You have a fast heartbeat. · You are dizzy or lightheaded, or feel like you may faint. Watch closely for changes in your health, and be sure to contact your doctor if:  · You do not get better as expected. Where can you learn more? Go to http://shannon-heriberto.info/  Enter G244 in the search box to learn more about \"Supraventricular Tachycardia: Care Instructions. \"  Current as of: December 16, 2019               Content Version: 12.5  © 0289-0572 [employer]. Care instructions adapted under license by [employer] (which disclaims liability or warranty for this information). If you have questions about a medical condition or this instruction, always ask your healthcare professional. [place] [address] any warranty or liability for your use of this information. [facility] Activation    Thank you for requesting access to [facility]. Please follow the instructions below to securely access and download your online medical record. [facility] allows you to send messages to your doctor, view your test results, renew your prescriptions, schedule appointments, and more. How Do I Sign Up? In your internet browser, go to [email or web address]. Click on the First Time User? Click Here link in the Sign In box. You will see the New Member Sign Up page. Enter your [facility] [facility] Access Code exactly as it appears below. You will not need to use this code after you´ve completed the sign-up process. If you do not sign up before the expiration date, you must request a new code.     [facility] [facility] Access Code: [email or web address]  Expires: 3/28/2019  2:27 PM (This is the date your [facility] access code will )    Enter the last four digits of your Social Security Number (xxxx) and Date of Birth (mm/dd/yyyy) as indicated and click Submit. You will be taken to the next sign-up page. Create a Swish ID. This will be your Swish login ID and cannot be changed, so think of one that is secure and easy to remember. Create a Swish password. You can change your password at any time. Enter your Password Reset Question and Answer. This can be used at a later time if you forget your password. Enter your e-mail address. You will receive e-mail notification when new information is available in [address] Click Sign Up. You can now view and download portions of your medical record. Click the [email or web address] link to download a portable copy of your medical information. Additional Information    If you have questions, please visit the Frequently Asked Questions section of the Swish website at [email or web address] Remember, Swish is NOT to be used for urgent needs. For medical emergencies, dial 911.

## 2020-08-02 LAB
ATRIAL RATE: 162 BPM
CALCULATED P AXIS, ECG09: 76 DEGREES
CALCULATED R AXIS, ECG10: 76 DEGREES
CALCULATED T AXIS, ECG11: 75 DEGREES
DIAGNOSIS, 93000: NORMAL
P-R INTERVAL, ECG05: 96 MS
Q-T INTERVAL, ECG07: 262 MS
QRS DURATION, ECG06: 58 MS
QTC CALCULATION (BEZET), ECG08: 430 MS
VENTRICULAR RATE, ECG03: 162 BPM

## 2020-08-20 ENCOUNTER — HOSPITAL ENCOUNTER (EMERGENCY)
Age: 55
Discharge: HOME | End: 2020-08-20
Attending: EMERGENCY MEDICINE | Admitting: EMERGENCY MEDICINE
Payer: MEDICARE

## 2020-08-20 ENCOUNTER — APPOINTMENT (OUTPATIENT)
Dept: GENERAL RADIOLOGY | Age: 55
End: 2020-08-20
Attending: PHYSICIAN ASSISTANT
Payer: MEDICARE

## 2020-08-20 VITALS
SYSTOLIC BLOOD PRESSURE: 133 MMHG | TEMPERATURE: 98.8 F | BODY MASS INDEX: 22.08 KG/M2 | WEIGHT: 120 LBS | DIASTOLIC BLOOD PRESSURE: 81 MMHG | OXYGEN SATURATION: 100 % | RESPIRATION RATE: 20 BRPM | HEART RATE: 118 BPM | HEIGHT: 62 IN

## 2020-08-20 DIAGNOSIS — R07.9 CHEST PAIN, UNSPECIFIED TYPE: Primary | ICD-10-CM

## 2020-08-20 DIAGNOSIS — R78.0 ELEVATED BLOOD ALCOHOL LEVEL, BLOOD ALCOHOL LEVEL NOT SPECIFIED: ICD-10-CM

## 2020-08-20 LAB
ALBUMIN SERPL-MCNC: 4.2 G/DL (ref 3.4–5)
ALBUMIN/GLOB SERPL: 1 {RATIO} (ref 0.8–1.7)
ALP SERPL-CCNC: 116 U/L (ref 45–117)
ALT SERPL-CCNC: 47 U/L (ref 13–56)
ANION GAP SERPL CALC-SCNC: 16 MMOL/L (ref 3–18)
AST SERPL-CCNC: 67 U/L (ref 10–38)
BASOPHILS # BLD: 0 K/UL (ref 0–0.1)
BASOPHILS NFR BLD: 1 % (ref 0–2)
BILIRUB SERPL-MCNC: 0.4 MG/DL (ref 0.2–1)
BUN SERPL-MCNC: 10 MG/DL (ref 7–18)
BUN/CREAT SERPL: 13 (ref 12–20)
CALCIUM SERPL-MCNC: 8.8 MG/DL (ref 8.5–10.1)
CHLORIDE SERPL-SCNC: 96 MMOL/L (ref 100–111)
CK MB CFR SERPL CALC: 1.8 % (ref 0–4)
CK MB SERPL-MCNC: 2.4 NG/ML (ref 5–25)
CK SERPL-CCNC: 133 U/L (ref 26–192)
CO2 SERPL-SCNC: 20 MMOL/L (ref 21–32)
CREAT SERPL-MCNC: 0.75 MG/DL (ref 0.6–1.3)
DIFFERENTIAL METHOD BLD: ABNORMAL
EOSINOPHIL # BLD: 0 K/UL (ref 0–0.4)
EOSINOPHIL NFR BLD: 0 % (ref 0–5)
ERYTHROCYTE [DISTWIDTH] IN BLOOD BY AUTOMATED COUNT: 15.5 % (ref 11.6–14.5)
ETHANOL SERPL-MCNC: 249 MG/DL (ref 0–3)
GLOBULIN SER CALC-MCNC: 4.4 G/DL (ref 2–4)
GLUCOSE SERPL-MCNC: 82 MG/DL (ref 74–99)
HCT VFR BLD AUTO: 41.4 % (ref 35–45)
HGB BLD-MCNC: 14.2 G/DL (ref 12–16)
LIPASE SERPL-CCNC: 34 U/L (ref 73–393)
LYMPHOCYTES # BLD: 3.6 K/UL (ref 0.9–3.6)
LYMPHOCYTES NFR BLD: 42 % (ref 21–52)
MAGNESIUM SERPL-MCNC: 1.9 MG/DL (ref 1.6–2.6)
MCH RBC QN AUTO: 34.5 PG (ref 24–34)
MCHC RBC AUTO-ENTMCNC: 34.3 G/DL (ref 31–37)
MCV RBC AUTO: 100.5 FL (ref 74–97)
MONOCYTES # BLD: 0.7 K/UL (ref 0.05–1.2)
MONOCYTES NFR BLD: 8 % (ref 3–10)
NEUTS SEG # BLD: 4.1 K/UL (ref 1.8–8)
NEUTS SEG NFR BLD: 49 % (ref 40–73)
PLATELET # BLD AUTO: 286 K/UL (ref 135–420)
PMV BLD AUTO: 9.3 FL (ref 9.2–11.8)
POTASSIUM SERPL-SCNC: 4.6 MMOL/L (ref 3.5–5.5)
PROT SERPL-MCNC: 8.6 G/DL (ref 6.4–8.2)
RBC # BLD AUTO: 4.12 M/UL (ref 4.2–5.3)
SODIUM SERPL-SCNC: 132 MMOL/L (ref 136–145)
TROPONIN I SERPL-MCNC: <0.02 NG/ML (ref 0–0.04)
WBC # BLD AUTO: 8.4 K/UL (ref 4.6–13.2)

## 2020-08-20 PROCEDURE — 74011250636 HC RX REV CODE- 250/636: Performed by: EMERGENCY MEDICINE

## 2020-08-20 PROCEDURE — 93005 ELECTROCARDIOGRAM TRACING: CPT

## 2020-08-20 PROCEDURE — 82550 ASSAY OF CK (CPK): CPT

## 2020-08-20 PROCEDURE — 99284 EMERGENCY DEPT VISIT MOD MDM: CPT

## 2020-08-20 PROCEDURE — 80307 DRUG TEST PRSMV CHEM ANLYZR: CPT

## 2020-08-20 PROCEDURE — 74011250636 HC RX REV CODE- 250/636: Performed by: PHYSICIAN ASSISTANT

## 2020-08-20 PROCEDURE — 80053 COMPREHEN METABOLIC PANEL: CPT

## 2020-08-20 PROCEDURE — 83735 ASSAY OF MAGNESIUM: CPT

## 2020-08-20 PROCEDURE — 71045 X-RAY EXAM CHEST 1 VIEW: CPT

## 2020-08-20 PROCEDURE — 83690 ASSAY OF LIPASE: CPT

## 2020-08-20 PROCEDURE — 96375 TX/PRO/DX INJ NEW DRUG ADDON: CPT

## 2020-08-20 PROCEDURE — 85025 COMPLETE CBC W/AUTO DIFF WBC: CPT

## 2020-08-20 PROCEDURE — 96374 THER/PROPH/DIAG INJ IV PUSH: CPT

## 2020-08-20 RX ORDER — LORAZEPAM 2 MG/ML
0.5 INJECTION INTRAMUSCULAR
Status: COMPLETED | OUTPATIENT
Start: 2020-08-20 | End: 2020-08-20

## 2020-08-20 RX ORDER — FAMOTIDINE 10 MG/ML
20 INJECTION INTRAVENOUS
Status: COMPLETED | OUTPATIENT
Start: 2020-08-20 | End: 2020-08-20

## 2020-08-20 RX ADMIN — FAMOTIDINE 20 MG: 10 INJECTION, SOLUTION INTRAVENOUS at 21:04

## 2020-08-20 RX ADMIN — LORAZEPAM 0.5 MG: 2 INJECTION, SOLUTION INTRAMUSCULAR; INTRAVENOUS at 21:09

## 2020-08-20 RX ADMIN — SODIUM CHLORIDE 1000 ML: 900 INJECTION, SOLUTION INTRAVENOUS at 21:05

## 2020-08-20 NOTE — ED TRIAGE NOTES
Alert female arrives via EMS for chest pain. States pain is substernal. Has been drinking wine since 5am this morning. Hx bipolar.

## 2020-08-20 NOTE — ED PROVIDER NOTES
EMERGENCY DEPARTMENT HISTORY AND PHYSICAL EXAM    7:46 PM      Date: 8/20/2020  Patient Name: Carlos Mello    History of Presenting Illness     Chief Complaint   Patient presents with    Chest Pain         History Provided By: Patient    Additional History (Context): Carlos Mello is a 63 y.o. female with alcohol abuse, anxiety, asthma, bipolar disorder, COPD, depression, HTN who presents with complaints of chest pain that has been present for approximately 3 hours. Patient states she has been drinking wine since approximately 0500 today and began having the chest pain prior to calling EMS. She states she is very scared as she states she had an MI two years ago with similar symptoms. She denies any fevers, chills, SOB, abdominal pain, diarrhea, dysuria, or hematuria. She does endorse mild nausea. PCP: Willis Peterson MD    Current Facility-Administered Medications   Medication Dose Route Frequency Provider Last Rate Last Dose    sodium chloride 0.9 % bolus infusion 1,000 mL  1,000 mL IntraVENous ONCE Facundo Florian PA-C 1,000 mL/hr at 08/20/20 2105 1,000 mL at 08/20/20 2105     Current Outpatient Medications   Medication Sig Dispense Refill    ondansetron hcl (Zofran) 4 mg tablet Take 2 Tabs by mouth every eight (8) hours as needed for Nausea. 12 Tab 0    ondansetron (Zofran ODT) 4 mg disintegrating tablet Take 1 Tab by mouth every eight (8) hours as needed for Nausea or Vomiting for up to 12 doses. 12 Tab 0    LORazepam (Ativan) 0.5 mg tablet Take 1 Tab by mouth every eight (8) hours as needed for Anxiety. Max Daily Amount: 1.5 mg. 8 Tab 0    ondansetron (ZOFRAN ODT) 4 mg disintegrating tablet Take 1 Tab by mouth every eight (8) hours as needed for Nausea or Vomiting. 20 Tab 0    lidocaine (LIDODERM) 5 % Apply patch to the affected area for 12 hours a day and remove for 12 hours a day. 30 Each 0    lamoTRIgine (LaMICtal) 150 mg tablet Take 150 mg by mouth daily.       lipase-protease-amylase (CREON 24,000) 24,000-76,000 -120,000 unit capsule Take 1 Cap by mouth.  sucralfate (CARAFATE) 100 mg/mL suspension Take 10 mL by mouth Before breakfast, lunch, dinner and at bedtime. 2640 mL 0    folic acid (FOLVITE) 1 mg tablet Take 1 Tab by mouth daily. 30 Tab 0    multivitamin (ONE A DAY) tablet Take 1 Tab by mouth daily. 30 Tab 0    thiamine HCL (B-1) 100 mg tablet Take 1 Tab by mouth daily (with breakfast). 30 Tab 0    ipratropium-albuteroL (COMBIVENT RESPIMAT)  mcg/actuation inhaler Take 1 Puff by inhalation as needed for Wheezing.  traZODone (DESYREL) 100 mg tablet Take 200 mg by mouth nightly.  melatonin 10 mg tab Take 10 mg by mouth nightly.  MILK THISTLE PO Take 175 mg by mouth two (2) times a day.  colchicine 0.6 mg tablet Take 0.6 mg by mouth daily.  benzonatate (TESSALON PERLE PO) Take  by mouth as needed. Indications: cough      docusate sodium (COLACE) 100 mg capsule Take 100 mg by mouth three (3) times daily.  ondansetron (ZOFRAN ODT) 8 mg disintegrating tablet Take 1 Tab by mouth every eight (8) hours as needed for Nausea. 15 Tab 0    pantoprazole (PROTONIX) 40 mg tablet Take 1 Tab by mouth Before breakfast and dinner. 60 Tab 0    gabapentin (NEURONTIN) 600 mg tablet Take 1 Tab by mouth two (2) times a day. 30 Tab 0    cloNIDine HCl (CATAPRES) 0.1 mg tablet Take 1 Tab by mouth two (2) times a day. 20 Tab 0    lamoTRIgine (LAMICTAL) 200 mg tablet Take  by mouth daily.  hydrocortisone (CORTAID) 0.5 % topical cream Apply  to affected area two (2) times a day. use thin layer 30 g 0    topiramate (TOPAMAX) 100 mg tablet 100 mg two (2) times a day.  QUEtiapine (SEROQUEL) 300 mg tablet       CREON 24,000-76,000 -120,000 unit capsule       cetirizine (ZYRTEC) 10 mg tablet       SUMAtriptan (IMITREX) 100 mg tablet TAKE 1 TO 2 TABLETS BY MOUTH DAILY AS NEEDED FOR MIGRAINE .  DO NOT EXCEED 200 MG IN 24 HOUR PERIOD  0    busPIRone (BUSPAR) 15 mg tablet Take 1 Tab by mouth two (2) times a day. 61 Tab 0       Past History     Past Medical History:  Past Medical History:   Diagnosis Date    Alcohol abuse     Anxiety     Arrhythmia     Tacchycardai    Asthma     controlled    B[facility] disorder ([facility] [address]     Common migraine     COPD [facility]chronic obstructive pulmonary disease) ([facility])     Home O2  PRN ,  pt use also for Tachycardia    Depression     Esophageal reflux     Gout     Hypertension     Hypocitraturia     Inverted nipple     Left breast always have.  Kidney stone on left side     Pancreatitis     Recurrent UTI     Staghorn renal calculus     Urine leukocytes        Past Surgical History:  Past Surgical History:   Procedure Laterality Date    HX COLONOSCOPY      HX CYST REMOVAL Left     x 3 surgeries    HX ENDOSCOPY      HX GYN      tubal ligation    HX LUMBAR LAMINECTOMY      HX UROLOGICAL  08/10/2018    Cysto, bilat RPG, left ureteral pyeloscopy, HLL, left JJ stent placement, Dr. Angeles Baker       Family History:  Family History   Family history unknown: Yes       Social History:  Social History     Tobacco Use    Smoking status: Current Every Day Smoker     Packs/day: 1.50     Years: 33.00     Pack years: 49.50     Types: Cigarettes    Smokeless tobacco: Never Used   Substance Use Topics    Alcohol use: Not Currently     Comment: vodka    Drug use: No     Comment: 11years old- [facility]       Allergies: Allergies   Allergen Reactions    Lithium Anaphylaxis    Amitriptyline Other (comments)     Left leg went numb    Elavil Other (comments)     Left leg went numb         Review of Systems       Review of Systems   Constitutional: Negative for chills, diaphoresis, fatigue and fever. HENT: Negative. Respiratory: Negative. Negative for cough, chest tightness, shortness of breath and wheezing. Cardiovascular: Positive for chest pain. Gastrointestinal: Positive for nausea.  Negative for abdominal pain, constipation, diarrhea and vomiting. Genitourinary: Negative. Musculoskeletal: Negative. Skin: Negative. Neurological: Negative. All other systems reviewed and are negative. Physical Exam     Visit Vitals  /64   Pulse (!) 117   Temp 98.8 °F (37.1 °C)   Resp 20   Ht 5' 2\" (1.575 m)   Wt 54.4 kg (120 lb)   LMP 04/01/2013   SpO2 100%   BMI 21.95 kg/m²         Physical Exam  Vitals signs and nursing note reviewed. Constitutional:       General: She is in acute distress. Appearance: She is not ill-appearing, toxic-appearing or diaphoretic. HENT:      Head: Normocephalic and atraumatic. Right Ear: External ear normal.      Left Ear: External ear normal.      Nose: Nose normal.      Mouth/Throat:      Mouth: Mucous membranes are moist.      Pharynx: Oropharynx is clear. Eyes:      Extraocular Movements: Extraocular movements intact. Neck:      Musculoskeletal: Neck supple. Cardiovascular:      Rate and Rhythm: Normal rate and regular rhythm. Pulses: Normal pulses. Heart sounds: Normal heart sounds. No murmur. No gallop. Pulmonary:      Effort: Pulmonary effort is normal.      Breath sounds: Normal breath sounds. No wheezing, rhonchi or rales. Abdominal:      General: Bowel sounds are normal. There is no distension. Palpations: Abdomen is soft. There is no mass. Tenderness: There is no abdominal tenderness. There is no guarding or rebound. Skin:     General: Skin is warm and dry. Capillary Refill: Capillary refill takes less than 2 seconds. Neurological:      General: No focal deficit present. Mental Status: She is alert and oriented to person, place, and time. Cranial Nerves: No cranial nerve deficit.            Diagnostic Study Results     Labs -  Recent Results (from the past 12 hour(s))   CBC WITH AUTOMATED DIFF    Collection Time: 08/20/20  8:48 PM   Result Value Ref Range    WBC 8.4 4.6 - 13.2 K/uL    RBC 4.12 (L) 4.20 - 5.30 M/uL    HGB 14.2 12.0 - 16.0 g/dL    HCT 41.4 35.0 - 45.0 %    .5 (H) 74.0 - 97.0 FL    MCH 34.5 (H) 24.0 - 34.0 PG    MCHC 34.3 31.0 - 37.0 g/dL    RDW 15.5 (H) 11.6 - 14.5 %    PLATELET 353 769 - 675 K/uL    MPV 9.3 9.2 - 11.8 FL    NEUTROPHILS 49 40 - 73 %    LYMPHOCYTES 42 21 - 52 %    MONOCYTES 8 3 - 10 %    EOSINOPHILS 0 0 - 5 %    BASOPHILS 1 0 - 2 %    ABS. NEUTROPHILS 4.1 1.8 - 8.0 K/UL    ABS. LYMPHOCYTES 3.6 0.9 - 3.6 K/UL    ABS. MONOCYTES 0.7 0.05 - 1.2 K/UL    ABS. EOSINOPHILS 0.0 0.0 - 0.4 K/UL    ABS. BASOPHILS 0.0 0.0 - 0.1 K/UL    DF AUTOMATED     METABOLIC PANEL, COMPREHENSIVE    Collection Time: 08/20/20  8:48 PM   Result Value Ref Range    Sodium 132 (L) 136 - 145 mmol/L    Potassium 4.6 3.5 - 5.5 mmol/L    Chloride 96 (L) 100 - 111 mmol/L    CO2 20 (L) 21 - 32 mmol/L    Anion gap 16 3.0 - 18 mmol/L    Glucose 82 74 - 99 mg/dL    BUN 10 7.0 - 18 MG/DL    Creatinine 0.75 0.6 - 1.3 MG/DL    BUN/Creatinine ratio 13 12 - 20      GFR est AA >60 >60 ml/min/1.73m2    GFR est non-AA >60 >60 ml/min/1.73m2    Calcium 8.8 8.5 - 10.1 MG/DL    Bilirubin, total 0.4 0.2 - 1.0 MG/DL    ALT (SGPT) 47 13 - 56 U/L    AST (SGOT) 67 (H) 10 - 38 U/L    Alk.  phosphatase 116 45 - 117 U/L    Protein, total 8.6 (H) 6.4 - 8.2 g/dL    Albumin 4.2 3.4 - 5.0 g/dL    Globulin 4.4 (H) 2.0 - 4.0 g/dL    A-G Ratio 1.0 0.8 - 1.7     CARDIAC PANEL,(CK, CKMB & TROPONIN)    Collection Time: 08/20/20  8:48 PM   Result Value Ref Range    CK - MB 2.4 <3.6 ng/ml    CK-MB Index 1.8 0.0 - 4.0 %     26 - 192 U/L    Troponin-I, QT <0.02 0.0 - 0.045 NG/ML   MAGNESIUM    Collection Time: 08/20/20  8:48 PM   Result Value Ref Range    Magnesium 1.9 1.6 - 2.6 mg/dL   ETHYL ALCOHOL    Collection Time: 08/20/20  8:48 PM   Result Value Ref Range    ALCOHOL(ETHYL),SERUM 249 (H) 0 - 3 MG/DL   LIPASE    Collection Time: 08/20/20  8:48 PM   Result Value Ref Range    Lipase 34 (L) 73 - 393 U/L       Radiologic Studies -   XR CHEST PORT    (Results Pending) Medical Decision Making   I am the first provider for this patient. I reviewed the vital signs, available nursing notes, past medical history, past surgical history, family history and social history. Vital Signs-Reviewed the patient's vital signs. Records Reviewed: Nursing Notes and Old Medical Records (Time of Review: 7:46 PM)    ED Course: Progress Notes, Reevaluation, and Consults:  1946: Met with patient, reviewed history, performed physical exam. Physical exam is unremarkable. Patient is anxious, screaming out for help after completion of exam. Will check CBC, CMP, cardiac panel, lipase, EtOH, EKG, and CXR. 2154: Work-up in the emergency department is largely unremarkable. CBC is unremarkable, CMP shows mild hyponatremia, otherwise unremarkable. Cardiac panel is negative. EtOH level is 249. Chest x-ray is negative for interpretation. Patient symptoms improved after fluids, Pepcid, and Ativan. Will preparation for discharge. Provider Notes (Medical Decision Making):   56-year-old female seen in the emergency department for complaints of chest pain. Work-up in the emergency department is unremarkable. The patient symptoms improved after fluids, Pepcid, and Ativan. Patient is tachycardic in the emergency department, however compared to previous visits, heart rate remains similar. Patient will be discharged and advised to follow-up with her primary care provider for reassessment. She was advised to return to the emergency department if symptoms worsen, or as needed. Diagnosis     Clinical Impression:   1. Chest pain, unspecified type    2. Elevated blood alcohol level, blood alcohol level not specified        Disposition: Home    Follow-up Information     Follow up With Specialties Details Why Contact Info    Willis Peterson MD Family Medicine Call in 1 day For follow up regarding ER visit.  [address] [facility] EMERGENCY DEPT Emergency Medicine  Immediately if symptoms worsen, As needed. [address]  [phone]           Patient's Medications   Start Taking    No medications on file   Continue Taking    BENZONATATE (TESSALON PERLE PO)    Take  by mouth as needed. Indications: cough    BUSPIRONE (BUSPAR) 15 MG TABLET    Take 1 Tab by mouth two (2) times a day. CETIRIZINE (ZYRTEC) 10 MG TABLET        CLONIDINE HCL (CATAPRES) 0.1 MG TABLET    Take 1 Tab by mouth two (2) times a day. COLCHICINE 0.6 MG TABLET    Take 0.6 mg by mouth daily. CREON 24,000-76,000 -120,000 UNIT CAPSULE        DOCUSATE SODIUM (COLACE) 100 MG CAPSULE    Take 100 mg by mouth three (3) times daily. FOLIC ACID (FOLVITE) 1 MG TABLET    Take 1 Tab by mouth daily. GABAPENTIN (NEURONTIN) 600 MG TABLET    Take 1 Tab by mouth two (2) times a day. HYDROCORTISONE (CORTAID) 0.5 % TOPICAL CREAM    Apply  to affected area two (2) times a day. use thin layer    IPRATROPIUM-ALBUTEROL (COMBIVENT RESPIMAT)  MCG/ACTUATION INHALER    Take 1 Puff by inhalation as needed for Wheezing. LAMOTRIGINE (LAMICTAL) 150 MG TABLET    Take 150 mg by mouth daily. LAMOTRIGINE (LAMICTAL) 200 MG TABLET    Take  by mouth daily. LIDOCAINE (LIDODERM) 5 %    Apply patch to the affected area for 12 hours a day and remove for 12 hours a day. LIPASE-PROTEASE-AMYLASE (CREON 24,000) 24,000-76,000 -120,000 UNIT CAPSULE    Take 1 Cap by mouth. LORAZEPAM (ATIVAN) 0.5 MG TABLET    Take 1 Tab by mouth every eight (8) hours as needed for Anxiety. Max Daily Amount: 1.5 mg. MELATONIN 10 MG TAB    Take 10 mg by mouth nightly. MILK THISTLE PO    Take 175 mg by mouth two (2) times a day. MULTIVITAMIN (ONE A DAY) TABLET    Take 1 Tab by mouth daily. ONDANSETRON (ZOFRAN ODT) 4 MG DISINTEGRATING TABLET    Take 1 Tab by mouth every eight (8) hours as needed for Nausea or Vomiting.     ONDANSETRON (ZOFRAN ODT) 4 MG DISINTEGRATING TABLET    Take 1 Tab by mouth every eight (8) hours as needed for Nausea or Vomiting for up to 12 doses. ONDANSETRON (ZOFRAN ODT) 8 MG DISINTEGRATING TABLET    Take 1 Tab by mouth every eight (8) hours as needed for Nausea. ONDANSETRON HCL (ZOFRAN) 4 MG TABLET    Take 2 Tabs by mouth every eight (8) hours as needed for Nausea. PANTOPRAZOLE (PROTONIX) 40 MG TABLET    Take 1 Tab by mouth Before breakfast and dinner. QUETIAPINE (SEROQUEL) 300 MG TABLET        SUCRALFATE (CARAFATE) 100 MG/ML SUSPENSION    Take 10 mL by mouth Before breakfast, lunch, dinner and at bedtime. SUMATRIPTAN (IMITREX) 100 MG TABLET    TAKE 1 TO 2 TABLETS BY MOUTH DAILY AS NEEDED FOR MIGRAINE . DO NOT EXCEED 200 MG IN 24 HOUR PERIOD    THIAMINE HCL (B-1) 100 MG TABLET    Take 1 Tab by mouth daily (with breakfast). TOPIRAMATE (TOPAMAX) 100 MG TABLET    100 mg two (2) times a day. TRAZODONE (DESYREL) 100 MG TABLET    Take 200 mg by mouth nightly. These Medications have changed    No medications on file   Stop Taking    No medications on file         Silviano Rausch PA-C    Dictation disclaimer:  Please note that this dictation was completed with [employer], the computer voice recognition software. Quite often unanticipated grammatical, syntax, homophones, and other interpretive errors are inadvertently transcribed by the computer software. Please disregard these errors. Please excuse any errors that have escaped final proofreading.

## 2020-08-21 ENCOUNTER — HOSPITAL ENCOUNTER (EMERGENCY)
Age: 55
Discharge: HOME | End: 2020-08-21
Attending: EMERGENCY MEDICINE
Payer: MEDICARE

## 2020-08-21 ENCOUNTER — APPOINTMENT (OUTPATIENT)
Dept: GENERAL RADIOLOGY | Age: 55
End: 2020-08-21
Attending: EMERGENCY MEDICINE
Payer: MEDICARE

## 2020-08-21 VITALS
DIASTOLIC BLOOD PRESSURE: 76 MMHG | TEMPERATURE: 98.4 F | HEART RATE: 119 BPM | RESPIRATION RATE: 20 BRPM | OXYGEN SATURATION: 98 % | SYSTOLIC BLOOD PRESSURE: 125 MMHG

## 2020-08-21 VITALS
HEART RATE: 123 BPM | WEIGHT: 120 LBS | SYSTOLIC BLOOD PRESSURE: 135 MMHG | RESPIRATION RATE: 20 BRPM | DIASTOLIC BLOOD PRESSURE: 80 MMHG | HEIGHT: 62 IN | BODY MASS INDEX: 22.08 KG/M2 | OXYGEN SATURATION: 88 % | TEMPERATURE: 98.3 F

## 2020-08-21 DIAGNOSIS — K21.00 GASTROESOPHAGEAL REFLUX DISEASE WITH ESOPHAGITIS: ICD-10-CM

## 2020-08-21 DIAGNOSIS — F31.4 BIPOLAR DISORDER, CURRENT EPISODE DEPRESSED, SEVERE, WITHOUT PSYCHOTIC FEATURES (HCC): Chronic | ICD-10-CM

## 2020-08-21 DIAGNOSIS — R07.9 RECURRENT CHEST PAIN: Primary | ICD-10-CM

## 2020-08-21 DIAGNOSIS — F10.920 ALCOHOLIC INTOXICATION WITHOUT COMPLICATION (HCC): ICD-10-CM

## 2020-08-21 DIAGNOSIS — F10.10 ALCOHOL ABUSE: ICD-10-CM

## 2020-08-21 DIAGNOSIS — F10.10 ALCOHOL ABUSE: Primary | ICD-10-CM

## 2020-08-21 LAB
ALBUMIN SERPL-MCNC: 4.1 G/DL (ref 3.4–5)
ALBUMIN/GLOB SERPL: 1.1 {RATIO} (ref 0.8–1.7)
ALP SERPL-CCNC: 117 U/L (ref 45–117)
ALT SERPL-CCNC: 46 U/L (ref 13–56)
ANION GAP SERPL CALC-SCNC: 16 MMOL/L (ref 3–18)
AST SERPL-CCNC: 73 U/L (ref 10–38)
BASOPHILS # BLD: 0 K/UL (ref 0–0.1)
BASOPHILS NFR BLD: 0 % (ref 0–2)
BILIRUB SERPL-MCNC: 0.6 MG/DL (ref 0.2–1)
BUN SERPL-MCNC: 6 MG/DL (ref 7–18)
BUN/CREAT SERPL: 9 (ref 12–20)
CALCIUM SERPL-MCNC: 9 MG/DL (ref 8.5–10.1)
CHLORIDE SERPL-SCNC: 101 MMOL/L (ref 100–111)
CK MB CFR SERPL CALC: 2.1 % (ref 0–4)
CK MB SERPL-MCNC: 3.6 NG/ML (ref 5–25)
CK SERPL-CCNC: 172 U/L (ref 26–192)
CO2 SERPL-SCNC: 20 MMOL/L (ref 21–32)
CREAT SERPL-MCNC: 0.66 MG/DL (ref 0.6–1.3)
DIFFERENTIAL METHOD BLD: ABNORMAL
EOSINOPHIL # BLD: 0 K/UL (ref 0–0.4)
EOSINOPHIL NFR BLD: 0 % (ref 0–5)
ERYTHROCYTE [DISTWIDTH] IN BLOOD BY AUTOMATED COUNT: 15.7 % (ref 11.6–14.5)
ETHANOL SERPL-MCNC: 311 MG/DL (ref 0–3)
GLOBULIN SER CALC-MCNC: 3.9 G/DL (ref 2–4)
GLUCOSE SERPL-MCNC: 77 MG/DL (ref 74–99)
HCT VFR BLD AUTO: 41.3 % (ref 35–45)
HGB BLD-MCNC: 14 G/DL (ref 12–16)
LYMPHOCYTES # BLD: 3.9 K/UL (ref 0.9–3.6)
LYMPHOCYTES NFR BLD: 53 % (ref 21–52)
MCH RBC QN AUTO: 34.6 PG (ref 24–34)
MCHC RBC AUTO-ENTMCNC: 33.9 G/DL (ref 31–37)
MCV RBC AUTO: 102 FL (ref 74–97)
MONOCYTES # BLD: 0.8 K/UL (ref 0.05–1.2)
MONOCYTES NFR BLD: 11 % (ref 3–10)
NEUTS SEG # BLD: 2.7 K/UL (ref 1.8–8)
NEUTS SEG NFR BLD: 36 % (ref 40–73)
PLATELET # BLD AUTO: 230 K/UL (ref 135–420)
PMV BLD AUTO: 9 FL (ref 9.2–11.8)
POTASSIUM SERPL-SCNC: 4.8 MMOL/L (ref 3.5–5.5)
PROT SERPL-MCNC: 8 G/DL (ref 6.4–8.2)
RBC # BLD AUTO: 4.05 M/UL (ref 4.2–5.3)
SODIUM SERPL-SCNC: 137 MMOL/L (ref 136–145)
TROPONIN I BLD-MCNC: <0.04 NG/ML (ref 0–0.08)
TROPONIN I SERPL-MCNC: <0.02 NG/ML (ref 0–0.04)
WBC # BLD AUTO: 7.5 K/UL (ref 4.6–13.2)

## 2020-08-21 PROCEDURE — 99285 EMERGENCY DEPT VISIT HI MDM: CPT

## 2020-08-21 PROCEDURE — 74011000250 HC RX REV CODE- 250: Performed by: EMERGENCY MEDICINE

## 2020-08-21 PROCEDURE — 84484 ASSAY OF TROPONIN QUANT: CPT

## 2020-08-21 PROCEDURE — 74011250637 HC RX REV CODE- 250/637: Performed by: EMERGENCY MEDICINE

## 2020-08-21 PROCEDURE — 82550 ASSAY OF CK (CPK): CPT

## 2020-08-21 PROCEDURE — 85025 COMPLETE CBC W/AUTO DIFF WBC: CPT

## 2020-08-21 PROCEDURE — 93005 ELECTROCARDIOGRAM TRACING: CPT

## 2020-08-21 PROCEDURE — 80307 DRUG TEST PRSMV CHEM ANLYZR: CPT

## 2020-08-21 PROCEDURE — 71045 X-RAY EXAM CHEST 1 VIEW: CPT

## 2020-08-21 PROCEDURE — 80053 COMPREHEN METABOLIC PANEL: CPT

## 2020-08-21 RX ORDER — BISMUTH SUBSALICYLATE 262 MG
1 TABLET,CHEWABLE ORAL DAILY
Qty: 30 TAB | Refills: 3 | Status: SHIPPED | OUTPATIENT
Start: 2020-08-21

## 2020-08-21 RX ORDER — SUCRALFATE 1 G/1
1 TABLET ORAL
Status: COMPLETED | OUTPATIENT
Start: 2020-08-21 | End: 2020-08-21

## 2020-08-21 RX ORDER — SUCRALFATE 1 G/10ML
1 SUSPENSION ORAL
Qty: 1200 ML | Refills: 0 | Status: SHIPPED | OUTPATIENT
Start: 2020-08-21 | End: 2020-11-09

## 2020-08-21 RX ORDER — FOLIC ACID 1 MG/1
1 TABLET ORAL DAILY
Qty: 30 TAB | Refills: 3 | Status: SHIPPED | OUTPATIENT
Start: 2020-08-21

## 2020-08-21 RX ORDER — OMEPRAZOLE 40 MG/1
40 CAPSULE, DELAYED RELEASE ORAL DAILY
Qty: 90 CAP | Refills: 3 | Status: SHIPPED | OUTPATIENT
Start: 2020-08-21 | End: 2020-11-09

## 2020-08-21 RX ORDER — ONDANSETRON 4 MG/1
4 TABLET, ORALLY DISINTEGRATING ORAL
Status: COMPLETED | OUTPATIENT
Start: 2020-08-21 | End: 2020-08-21

## 2020-08-21 RX ORDER — FAMOTIDINE 20 MG/1
20 TABLET, FILM COATED ORAL
Status: COMPLETED | OUTPATIENT
Start: 2020-08-21 | End: 2020-08-21

## 2020-08-21 RX ORDER — DIAZEPAM 5 MG/1
10 TABLET ORAL
Status: COMPLETED | OUTPATIENT
Start: 2020-08-21 | End: 2020-08-21

## 2020-08-21 RX ADMIN — SUCRALFATE 1 G: 1 TABLET ORAL at 03:16

## 2020-08-21 RX ADMIN — ONDANSETRON 4 MG: 4 TABLET, ORALLY DISINTEGRATING ORAL at 03:16

## 2020-08-21 RX ADMIN — FAMOTIDINE 20 MG: 20 TABLET ORAL at 03:16

## 2020-08-21 RX ADMIN — LIDOCAINE HYDROCHLORIDE 40 ML: 20 SOLUTION ORAL; TOPICAL at 03:15

## 2020-08-21 RX ADMIN — DIAZEPAM 10 MG: 5 TABLET ORAL at 03:15

## 2020-08-21 NOTE — DISCHARGE INSTRUCTIONS
Patient Education        Chest Pain: Care Instructions  Your Care Instructions     There are many things that can cause chest pain. Some are not serious and will get better on their own in a few days. But some kinds of chest pain need more testing and treatment. Your doctor may have recommended a follow-up visit in the next 8 to 12 hours. If you are not getting better, you may need more tests or treatment. Even though your doctor has released you, you still need to watch for any problems. The doctor carefully checked you, but sometimes problems can develop later. If you have new symptoms or if your symptoms do not get better, get medical care right away. If you have worse or different chest pain or pressure that lasts more than 5 minutes or you passed out (lost consciousness), gyce777 or seek other emergency help right away. A medical visit is only one step in your treatment. Even if you feel better, you still need to do what your doctor recommends, such as going to all suggested follow-up appointments and taking medicines exactly as directed. This will help you recover and help prevent future problems. How can you care for yourself at home? · Rest until you feel better. · Take your medicine exactly as prescribed. Call your doctor if you think you are having a problem with your medicine. · Do not drive after taking a prescription pain medicine. When should you call for help? [id number]:   · You passed out (lost consciousness). · You have severe difficulty breathing. · You have symptoms of a heart attack. These may include:  ? Chest pain or pressure, or a strange feeling in your chest.  ? Sweating. ? Shortness of breath. ? Nausea or vomiting. ? Pain, pressure, or a strange feeling in your back, neck, jaw, or upper belly or in one or both shoulders or arms. ? Lightheadedness or sudden weakness. ? A fast or irregular heartbeat.   After you call 911, the  may tell you to chew 1 adult-strength or 2 to 4 low-dose aspirin. Wait for an ambulance. Do not try to drive yourself. Call your doctor today if:   · You have any trouble breathing. · Your chest pain gets worse. · You are dizzy or lightheaded, or you feel like you may faint. · You are not getting better as expected. · You are having new or different chest pain. Where can you learn more? Go to http://shannon-heriberto.info/  Enter A120 in the search box to learn more about \"Chest Pain: Care Instructions. \"  Current as of: June 26, 2019               Content Version: 12.5  © 0932-9208 [employer]. Care instructions adapted under license by [employer] (which disclaims liability or warranty for this information). If you have questions about a medical condition or this instruction, always ask your healthcare professional. [address] any warranty or liability for your use of this information.

## 2020-08-21 NOTE — DISCHARGE INSTRUCTIONS
Patient Education        Acute Alcohol Intoxication: Care Instructions  Your Care Instructions     You have had treatment to help your body rid itself of alcohol. Too much alcohol upsets the body's fluid balance. Your doctor may have given you fluids and vitamins. For some people, drinking too much alcohol is a one-time event. For others, it is an ongoing problem. In either case, it is serious. It can be life-threatening. Follow-up care is a key part of your treatment and safety. Be sure to make and go to all appointments, and call your doctor if you are having problems. It's also a good idea to know your test results and keep a list of the medicines you take. How can you care for yourself at home? · Do not drink and drive. · Be safe with medicines. Take your medicines exactly as prescribed. Call your doctor if you think you are having a problem with your medicine. · Your doctor may have prescribed disulfiram (Antabuse). Do not drink any alcohol while you are taking this medicine. You may have severe or even life-threatening side effects from even small amounts of alcohol. · If you were given medicine to prevent nausea, be sure to take it exactly as prescribed. · Before you take any medicine, tell your doctor if:  ? You have had a bad reaction to any medicines in the past.  ? You are taking other medicines, including over-the-counter ones, or have other health problems. ? You are or could be pregnant. · Be prepared to have some symptoms of withdrawal in the next few days. · Drink plenty of liquids in the next few days. · Seek help if you need it to stop drinking. Getting counseling and joining a support group can help you stay sober. Try a support group such as Alcoholics Anonymous. · Avoid alcohol when you take medicines. It can react with many medicines and cause serious problems. When should you call for help? [id number] anytime you think you may need emergency care.  For example, call if:  · You feel confused and are seeing things that are not there. · You are thinking about killing yourself or hurting others. · You have a seizure. · You vomit blood or what looks like coffee grounds. Call your doctor now or seek immediate medical care if:  · You have trembling, restlessness, sweating, and other withdrawal symptoms that are new or that get worse. · Your withdrawal symptoms come back after not bothering you for days or weeks. · You can't stop vomiting. Watch closely for changes in your health, and be sure to contact your doctor if:  · You need help to stop drinking. Where can you learn more? Go to http://shannon-heriberto.info/  Enter T102 in the search box to learn more about \"Acute Alcohol Intoxication: Care Instructions. \"  Current as of: August 22, 2019               Content Version: 12.5  © 0909-3936 Healthwise, Incorporated. Care instructions adapted under license by [employer] (which disclaims liability or warranty for this information). If you have questions about a medical condition or this instruction, always ask your healthcare professional. [place] [address] any warranty or liability for your use of this information. Patient Education        Learning About Alcohol Use Disorder  What is alcohol use disorder? Alcohol use disorder means that a person drinks alcohol even though it causes harm to themselves or others. It can range from mild to severe. The more signs of this disorder you have, the more severe it may be. Moderate to severe alcohol use disorder is sometimes called addiction. People who have it may find it hard to control their use of alcohol. People who have this disorder may argue with others about how much they're drinking. Their job may be affected because of drinking. They may drink when it's dangerous or illegal, such as when they drive. They also may have a strong need, or craving, to drink.  They may feel like they must drink just to get by. Their drinking may increase their risk of getting hurt or being in a car crash. Over time, drinking too much alcohol may cause health problems. These may include high blood pressure, liver problems, or problems with digestion. What are the signs? Maybe you've wondered about your alcohol habits, or how to tell if your drinking is becoming a problem. Here are some of the signs of alcohol use disorder. You may have it if you have two or more of the following signs:  · You drink larger amounts of alcohol than you ever meant to. Or you've been drinking for a longer time than you ever meant to. · You can't cut down or control your use. Or you constantly wish you could cut down. · You spend a lot of time getting or drinking alcohol or recovering from its effects. · You have strong cravings for alcohol. · You can no longer do your main jobs at work, at school, or at home. · You keep drinking alcohol, even though your use hurts your relationships. · You have stopped doing important activities because of your alcohol use. · You drink alcohol in situations where doing so is dangerous. · You keep drinking alcohol even though you know it's causing health problems. · You need more and more alcohol to get the same effect, or you get less effect from the same amount over time. This is called tolerance. · You have uncomfortable symptoms when you stop drinking alcohol or use less. This is called withdrawal.  Alcohol use disorder can range from mild to severe. The more signs you have, the more severe the disorder may be. Moderate to severe alcohol use disorder is sometimes called addiction. You might not realize that your drinking is a problem. You might not drink large amounts when you drink. Or you might go for days or weeks between drinking episodes. But even if you don't drink very often, your drinking could still be harmful and put you at risk. How is alcohol use disorder treated?   Getting help is up to you. But you don't have to do it alone. There are many people and kinds of treatments that can help. Treatment for alcohol use disorder can include:  · Group therapy, one or more types of counseling, and alcohol education. · Medicines that help to:  ? Reduce withdrawal symptoms and help you safely stop drinking. ? Reduce cravings for alcohol. · Support groups. These groups include Alcoholics Anonymous and [employer] (Self-Management and Recovery Training). Some people are able to stop or cut back on drinking with help from a counselor. People who have moderate to severe alcohol use disorder may need medical treatment. They may need to stay in a hospital or treatment center. You may have a treatment team to help you. This team may include a psychologist or psychiatrist, counselors, doctors, social workers, nurses, and a . A  helps plan and manage your treatment. Follow-up care is a key part of your treatment and safety. Be sure to make and go to all appointments, and call your doctor if you are having problems. It's also a good idea to know your test results and keep a list of the medicines you take. Where can you learn more? Go to http://shannonObsEvaheriberto.info/  Enter H758 in the search box to learn more about \"Learning About Alcohol Use Disorder. \"  Current as of: August 22, 2019               Content Version: 12.5  © 3817-7988 Healthwise, Incorporated. Care instructions adapted under license by [employer] (which disclaims liability or warranty for this information). If you have questions about a medical condition or this instruction, always ask your healthcare professional. [place] [address] any warranty or liability for your use of this information.          Patient Education        Bipolar Disorder: Care Instructions  Your Care Instructions     Bipolar disorder is an illness that causes extreme mood changes, from times of very high energy (manic episodes) to times of depression. But many people with bipolar disorder show only the symptoms of depression. These moods may cause problems with your work, school, family life, friendships, and how well you function. This disease is also called manic-depression. There is no cure for bipolar disorder, but it can be helped with medicines. Counseling may also help. It is important to take your medicines exactly as prescribed, even when you feel well. You may need lifelong treatment. Follow-up care is a key part of your treatment and safety. Be sure to make and go to all appointments, and call your doctor if you are having problems. It's also a good idea to know your test results and keep a list of the medicines you take. How can you care for yourself at home? · Be safe with medicines. Take your medicines exactly as prescribed. Do not stop or change a medicine without talking to your doctor first. Kulwinder Ng and your doctor may need to try different combinations of medicines to find what works for you. · Take your medicines on schedule to keep your moods even. When you feel good, you may think that you do not need your medicines. But it is important to keep taking them. · Go to your counseling sessions. Call and talk with your counselor if you can't go to a session or if you don't think the sessions are helping. Do not just stop going. · Get at least 30 minutes of activity on most days of the week. Walking is a good choice. You also may want to do other things, such as running, swimming, or cycling. · Get enough sleep. Keep your room dark and quiet. Try to go to bed at the same time every night. · Eat a healthy diet. This includes whole grains, dairy, fruits, vegetables, and protein. Eat foods from each of these groups. · Try to lower your stress. Manage your time, build a strong support system, and lead a healthy lifestyle.  To lower your stress, try physical activity, slow deep breathing, or getting a massage. · Do not use alcohol or illegal drugs. · Learn the early signs of your mood changes. You can then take steps to help yourself feel better. · Ask for help from friends and family when you need it. You may need help with daily chores when you are depressed. When you are manic, you may need support to control your high energy levels. What should you do if someone in your family has bipolar disorder? · Learn about the disease and the signs that it is getting worse. · Remind your family member that you love him or her. · Make a plan with all family members about how to take care of your loved one when his or her symptoms are bad. · Talk about your fears and concerns and those of other family members. Seek counseling if needed. · Do not focus attention only on the person who is in treatment. · Remind yourself that it will take time for changes to occur. · Do not blame yourself for the disease. · Know your legal rights and the legal rights of your family member. Support groups or counselors can help you with this information. · Take care of yourself. Keep up with your own interests, such as your career, hobbies, and friends. Use exercise, positive self-talk, deep breathing, and other relaxing exercises to help lower your stress. · Give yourself time to grieve. You may need to deal with emotions such as anger, fear, and frustration. After you work through your feelings, you will be better able to care for yourself and your family. · If you are having a hard time with your feelings or with your relationship with your family member, talk with a counselor. When should you call for help? [id number] anytime you think you may need emergency care. For example, call if:  · You feel like hurting yourself or someone else. · Someone who has bipolar disorder displays dangerous behavior, and you think the person might hurt himself or herself or someone else.   Call your doctor now or seek immediate medical care if:  · You hear voices. · Someone you know has bipolar disorder and talks about suicide. Keep the numbers for these national suicide hotlines: 7-982-850-TALK (9-993.841.6465) and 9-631-EOLUBEF (8-250.771.8561). If a suicide threat seems real, with a specific plan and a way to carry it out, stay with the person, or ask someone you trust to stay with the person, until you can get help. · Someone you know has bipolar disorder and:  ? Starts to give away possessions. ? Is using illegal drugs or drinking alcohol heavily. ? Talks or writes about death, including writing suicide notes or talking about guns, knives, or pills. ? Talks or writes about hurting someone else. ? Starts to spend a lot of time alone. ? Acts very aggressively or suddenly appears calm. ? Talks about beliefs that are not based in reality (delusions). Watch closely for changes in your health, and be sure to contact your doctor if:  · You cannot go to your counseling sessions. Where can you learn more? Go to http://shannonMDC Telecom.info/  Enter K052 in the search box to learn more about \"Bipolar Disorder: Care Instructions. \"  Current as of: January 31, 2020               Content Version: 12.5  © 1528-5110 Healthwise, Incorporated. Care instructions adapted under license by [employer] (which disclaims liability or warranty for this information). If you have questions about a medical condition or this instruction, always ask your healthcare professional. [place] [address] any warranty or liability for your use of this information. Patient Education        Learning About Mood Disorders  What are mood disorders? Mood disorders are medical problems that affect how you feel. They can impact your moods, thoughts, and actions. Mood disorders include:  · Depression. This causes you to feel sad or hopeless for much of the time. · Bipolar disorder.  This causes extreme mood changes from manic episodes of very high energy to extreme lows of depression. · Seasonal affective disorder (SAD). This is a type of depression that affects you during the same season each year. Most often people experience SAD during the fall and winter months when days are shorter and there is less light. What are the symptoms? Depression  You may:  · Feel sad or hopeless nearly every day. · Lose interest in or not get pleasure from most daily activities. You feel this way nearly every day. · Have low energy, changes in your appetite, or changes in how well you sleep. · Have trouble concentrating. · Think about death and suicide. Keep the numbers for these national suicide hotlines: 1-829-729-TALK (7-243.300.6938) and 1-198-JHFIEKH (1-812.382.3285). If you or someone you know talks about suicide or feeling hopeless, get help right away. Bipolar disorder  Symptoms depend on your mood swings. You may:  · Feel very happy, energetic, or on edge. · Feel like you need very little sleep. · Feel overly self-confident. · Do impulsive things, such as spending a lot of money. · Feel sad or hopeless. · Have racing thoughts or trouble thinking and making decisions. · Lose interest in things you have enjoyed in the past.  · Think about death and suicide. Keep the numbers for these national suicide hotlines: 1-912-614-TALK (1-931.257.2061) and 1-824-QCVYIWN (9-366.619.6582). If you or someone you know talks about suicide or feeling hopeless, get help right away. Seasonal affective disorder (SAD)  Symptoms come and go at about the same time each year. For most people with SAD, symptoms come during the winter when there is less daylight. You may:  · Feel sad, grumpy, villarreal, or anxious. · Lose interest in your usual activities. · Eat more and crave carbohydrates, such as bread and pasta. · Gain weight. · Sleep more and feel drowsy during the daytime. How are mood disorders treated?   Mood disorders can be treated with medicines or counseling, or a combination of both. Medicines for depression and SAD may include antidepressants. Medicines for bipolar disorder may include:  · Mood stabilizers. · Antipsychotics. · Benzodiazepines. Counseling may involve cognitive-behavioral therapy. It teaches you how to change the ways you think and behave. This can help you stop thinking bad thoughts about yourself and your life. Light therapy is the main treatment for SAD. This therapy uses a special kind of lamp. You let the lamp shine on you at certain times, usually in the morning. This may help your symptoms during the months when there is less sunlight. Healthy lifestyle  Healthy lifestyle changes may help you feel better. · Be active often. You might try walking or strength training. · Eat a healthy diet. Include fruits, vegetables, lean proteins, and whole grains in your diet each day. · Keep a regular sleep schedule. Try for 8 hours of sleep a night. · Find ways to manage stress, such as relaxation exercises. · Avoid alcohol and illegal drugs. Follow-up care is a key part of your treatment and safety. Be sure to make and go to all appointments, and call your doctor if you are having problems. It's also a good idea to know your test results and keep a list of the medicines you take. Where can you learn more? Go to http://shannon-heriberto.info/  Enter Z126 in the search box to learn more about \"Learning About Mood Disorders. \"  Current as of: January 31, 2020               Content Version: 12.5  © 9507-7693 Healthwise, Incorporated. Care instructions adapted under license by [employer] (which disclaims liability or warranty for this information). If you have questions about a medical condition or this instruction, always ask your healthcare professional. [address] any warranty or liability for your use of this information.

## 2020-08-21 NOTE — DISCHARGE INSTRUCTIONS
Patient Education        Learning About Alcohol Use Disorder  What is alcohol use disorder? Alcohol use disorder means that a person drinks alcohol even though it causes harm to themselves or others. It can range from mild to severe. The more signs of this disorder you have, the more severe it may be. Moderate to severe alcohol use disorder is sometimes called addiction. People who have it may find it hard to control their use of alcohol. People who have this disorder may argue with others about how much they're drinking. Their job may be affected because of drinking. They may drink when it's dangerous or illegal, such as when they drive. They also may have a strong need, or craving, to drink. They may feel like they must drink just to get by. Their drinking may increase their risk of getting hurt or being in a car crash. Over time, drinking too much alcohol may cause health problems. These may include high blood pressure, liver problems, or problems with digestion. What are the signs? Maybe you've wondered about your alcohol habits, or how to tell if your drinking is becoming a problem. Here are some of the signs of alcohol use disorder. You may have it if you have two or more of the following signs:  · You drink larger amounts of alcohol than you ever meant to. Or you've been drinking for a longer time than you ever meant to. · You can't cut down or control your use. Or you constantly wish you could cut down. · You spend a lot of time getting or drinking alcohol or recovering from its effects. · You have strong cravings for alcohol. · You can no longer do your main jobs at work, at school, or at home. · You keep drinking alcohol, even though your use hurts your relationships. · You have stopped doing important activities because of your alcohol use. · You drink alcohol in situations where doing so is dangerous. · You keep drinking alcohol even though you know it's causing health problems.   · You need more and more alcohol to get the same effect, or you get less effect from the same amount over time. This is called tolerance. · You have uncomfortable symptoms when you stop drinking alcohol or use less. This is called withdrawal.  Alcohol use disorder can range from mild to severe. The more signs you have, the more severe the disorder may be. Moderate to severe alcohol use disorder is sometimes called addiction. You might not realize that your drinking is a problem. You might not drink large amounts when you drink. Or you might go for days or weeks between drinking episodes. But even if you don't drink very often, your drinking could still be harmful and put you at risk. How is alcohol use disorder treated? Getting help is up to you. But you don't have to do it alone. There are many people and kinds of treatments that can help. Treatment for alcohol use disorder can include:  · Group therapy, one or more types of counseling, and alcohol education. · Medicines that help to:  ? Reduce withdrawal symptoms and help you safely stop drinking. ? Reduce cravings for alcohol. · Support groups. These groups include Alcoholics Anonymous and [employer] (Self-Management and Recovery Training). Some people are able to stop or cut back on drinking with help from a counselor. People who have moderate to severe alcohol use disorder may need medical treatment. They may need to stay in a hospital or treatment center. You may have a treatment team to help you. This team may include a psychologist or psychiatrist, counselors, doctors, social workers, nurses, and a . A  helps plan and manage your treatment. Follow-up care is a key part of your treatment and safety. Be sure to make and go to all appointments, and call your doctor if you are having problems. It's also a good idea to know your test results and keep a list of the medicines you take. Where can you learn more?   Go to http://shannon-heriberto.info/  Enter [id number] in the search box to learn more about \"Learning About Alcohol Use Disorder. \"  Current as of: August 22, 2019               Content Version: 12.5  © 2006-2020 [employer]. Care instructions adapted under license by [employer] (which disclaims liability or warranty for this information). If you have questions about a medical condition or this instruction, always ask your healthcare professional. [place] [address] any warranty or liability for your use of this information. Patient Education        Gastroesophageal Reflux Disease (GERD): Care Instructions  Your Care Instructions     Gastroesophageal reflux disease (GERD) is the backward flow of stomach acid into the esophagus. The esophagus is the tube that leads from your throat to your stomach. A one-way valve prevents the stomach acid from backing up into this tube. When you have GERD, this valve does not close tightly enough. This can also cause pain and swelling in your esophagus (esophagitis). If you have mild GERD symptoms including heartburn, you may be able to control the problem with antacids or over-the-counter medicine. Changing your diet and eating habits, such as not eating late at night, losing weight, and making other lifestyle changes can also help reduce symptoms. Follow-up care is a key part of your treatment and safety. Be sure to make and go to all appointments, and call your doctor if you are having problems. It's also a good idea to know your test results and keep a list of the medicines you take. How can you care for yourself at home? · Take your medicines exactly as prescribed. Call your doctor if you think you are having a problem with your medicine. · Your doctor may recommend over-the-counter medicine. For mild or occasional indigestion, antacids, such as Tums, Gaviscon, Mylanta, or Maalox, may help.  Your doctor also may recommend over-the-counter acid reducers, such as Pepcid AC (famotidine), Tagamet HB (cimetidine), or Prilosec (omeprazole). Read and follow all instructions on the label. If you use these medicines often, talk with your doctor. · Change your eating habits. ? It's best to eat several small meals instead of two or three large meals. ? After you eat, wait 2 to 3 hours before you lie down. ? Chocolate, mint, and alcohol can make GERD worse. ? Spicy foods, foods that have a lot of acid (like tomatoes and oranges), and coffee can make GERD symptoms worse in some people. If your symptoms are worse after you eat a certain food, you may want to stop eating that food to see if your symptoms get better. · Do not smoke or chew tobacco. Smoking can make GERD worse. If you need help quitting, talk to your doctor about stop-smoking programs and medicines. These can increase your chances of quitting for good. · If you have GERD symptoms at night, raise the head of your bed 6 to 8 inches by putting the frame on blocks or placing a foam wedge under the head of your mattress. (Adding extra pillows does not work.)  · Do not wear tight clothing around your middle. · Lose weight if you need to. Losing just 5 to 10 pounds can help. When should you call for help? Call your doctor now or seek immediate medical care if:  · You have new or different belly pain. · Your stools are black and tarlike or have streaks of blood. Watch closely for changes in your health, and be sure to contact your doctor if:  · Your symptoms have not improved after 2 days. · Food seems to catch in your throat or chest.  Where can you learn more? Go to http://shannon-heriberto.info/  Enter U716 in the search box to learn more about \"Gastroesophageal Reflux Disease (GERD): Care Instructions. \"  Current as of: August 12, 2019               Content Version: 12.5  © 3687-5645 [employer].    Care instructions adapted under license by [employer] (which disclaims liability or warranty for this information). If you have questions about a medical condition or this instruction, always ask your healthcare professional. [address] any warranty or liability for your use of this information.

## 2020-08-21 NOTE — ED PROVIDER NOTES
Rito Monzon is a 63 y.o. female with history of current alcohol abuse, recurrent chest pain, tachycardia who states her chest is still hurting. Patient was just seen earlier with negative work-up with exception of alcohol intoxication. She states that sharp pain in the middle of her chest is worse when she swallows. She has no fever, vomiting but no productive cough. No leg pain or swelling. No history of PE or DVT. Patient continues to drink alcohol    The history is provided by the patient and medical records. Past Medical History:   Diagnosis Date    Alcohol abuse     Anxiety     Arrhythmia     Tacchycardai    Asthma     controlled    Bipolar disorder ([address]     Common migraine     COPD (chronic obstructive pulmonary disease) ([facility])     Home O2  PRN ,  pt use also for Tachycardia    Depression     Esophageal reflux     Gout     Hypertension     Hypocitraturia     Inverted nipple     Left breast always have.     Kidney stone on left side     Pancreatitis     Recurrent UTI     Staghorn renal calculus     Urine leukocytes        Past Surgical History:   Procedure Laterality Date    HX COLONOSCOPY      HX CYST REMOVAL Left     x 3 surgeries    HX ENDOSCOPY      HX GYN      tubal ligation    HX LUMBAR LAMINECTOMY      HX UROLOGICAL  08/10/2018    Cysto, bilat RPG, left ureteral pyeloscopy, HLL, left JJ stent placement, Dr. Essence Shaw         Family History:   Family history unknown: Yes       Social History     Socioeconomic History    Marital status:      Spouse name: Not on file    Number of children: Not on file    Years of education: Not on file    Highest education level: Not on file   Occupational History    Not on file   Social Needs    Financial resource strain: Not on file    Food insecurity     Worry: Not on file     Inability: Not on file    Transportation needs     Medical: Not on file     Non-medical: Not on file   Tobacco Use    Smoking status: Current Every Day Smoker     Packs/day: 1.50     Years: 33.00     Pack years: 49.50     Types: Cigarettes    Smokeless tobacco: Never Used   Substance and Sexual Activity    Alcohol use: Not Currently     Comment: vodka    Drug use: No     Comment: 11years old- [employer]    Sexual activity: Not on file   Lifestyle    Physical activity     Days per week: Not on file     Minutes per session: Not on file    Stress: Not on file   Relationships    Social connections     Talks on phone: Not on file     Gets together: Not on file     Attends Confucianist service: Not on file     Active member of club or organization: Not on file     Attends meetings of clubs or organizations: Not on file     Relationship status: Not on file    Intimate partner violence     Fear of current or ex partner: Not on file     Emotionally abused: Not on file     Physically abused: Not on file     Forced sexual activity: Not on file   Other Topics Concern    Not on file   Social History Narrative    Not on file         ALLERGIES: Lithium; Amitriptyline; and Elavil    Review of Systems   Constitutional: Negative for fever. HENT: Negative for sore throat and trouble swallowing. Eyes: Negative for visual disturbance. Respiratory: Negative for shortness of breath. Cardiovascular: Positive for chest pain and palpitations. Gastrointestinal: Negative for abdominal pain. Genitourinary: Negative for difficulty urinating. Musculoskeletal: Negative for gait problem. Skin: Negative for rash. Allergic/Immunologic: Negative for immunocompromised state. Neurological: Negative for syncope. Psychiatric/Behavioral: Positive for sleep disturbance. The patient is nervous/anxious.         Vitals:    08/21/20 0257 08/21/20 0330   BP: 138/85 135/80   Pulse: (!) 126 (!) 123   Resp: 20 20   Temp: 98.3 °F (36.8 °C)    SpO2: 100% (!) 88%   Weight: 54.4 kg (120 lb)    Height: 5' 2\" (1.575 m)             Physical Exam  Vitals signs and nursing note reviewed. Constitutional:       General: She is in acute distress. Appearance: She is well-developed. She is not ill-appearing, toxic-appearing or diaphoretic. Comments: Very anxious appearing. Smells of alcohol   HENT:      Head: Normocephalic and atraumatic. Right Ear: External ear normal.      Left Ear: External ear normal.      Nose: Nose normal.      Mouth/Throat:      Pharynx: Uvula midline. Eyes:      General: No scleral icterus. Conjunctiva/sclera: Conjunctivae normal.   Neck:      Musculoskeletal: Neck supple. Cardiovascular:      Rate and Rhythm: Regular rhythm. Tachycardia present. Heart sounds: Normal heart sounds. Pulmonary:      Effort: Pulmonary effort is normal.      Breath sounds: Normal breath sounds. Abdominal:      Palpations: Abdomen is soft. Tenderness: There is no abdominal tenderness. Musculoskeletal:      Right lower leg: No edema. Left lower leg: No edema. Skin:     General: Skin is warm and dry. Capillary Refill: Capillary refill takes less than 2 seconds. Neurological:      Mental Status: She is alert and oriented to person, place, and time.       Gait: Gait normal.   Psychiatric:         Behavior: Behavior normal.          MDM       Procedures  Vitals:  Patient Vitals for the past 12 hrs:   Temp Pulse Resp BP SpO2   08/21/20 0330  (!) 123 20 135/80 (!) 88 %   08/21/20 0257 98.3 °F (36.8 °C) (!) 126 20 138/85 100 %         Medications ordered:   Medications   mylanta/viscous lidocaine (GI COCKTAIL) (40 mL Oral Given 8/21/20 0315)   famotidine (PEPCID) tablet 20 mg (20 mg Oral Given 8/21/20 0316)   ondansetron (ZOFRAN ODT) tablet 4 mg (4 mg Oral Given 8/21/20 0316)   sucralfate (CARAFATE) tablet 1 g (1 g Oral Given 8/21/20 0316)   diazePAM (VALIUM) tablet 10 mg (10 mg Oral Given 8/21/20 0315)         Lab findings:  Recent Results (from the past 12 hour(s))   CBC WITH AUTOMATED DIFF    Collection Time: 08/20/20  8:48 PM   Result Value Ref Range    WBC 8.4 4.6 - 13.2 K/uL    RBC 4.12 (L) 4.20 - 5.30 M/uL    HGB 14.2 12.0 - 16.0 g/dL    HCT 41.4 35.0 - 45.0 %    .5 (H) 74.0 - 97.0 FL    MCH 34.5 (H) 24.0 - 34.0 PG    MCHC 34.3 31.0 - 37.0 g/dL    RDW 15.5 (H) 11.6 - 14.5 %    PLATELET 179 477 - 404 K/uL    MPV 9.3 9.2 - 11.8 FL    NEUTROPHILS 49 40 - 73 %    LYMPHOCYTES 42 21 - 52 %    MONOCYTES 8 3 - 10 %    EOSINOPHILS 0 0 - 5 %    BASOPHILS 1 0 - 2 %    ABS. NEUTROPHILS 4.1 1.8 - 8.0 K/UL    ABS. LYMPHOCYTES 3.6 0.9 - 3.6 K/UL    ABS. MONOCYTES 0.7 0.05 - 1.2 K/UL    ABS. EOSINOPHILS 0.0 0.0 - 0.4 K/UL    ABS. BASOPHILS 0.0 0.0 - 0.1 K/UL    DF AUTOMATED     METABOLIC PANEL, COMPREHENSIVE    Collection Time: 08/20/20  8:48 PM   Result Value Ref Range    Sodium 132 (L) 136 - 145 mmol/L    Potassium 4.6 3.5 - 5.5 mmol/L    Chloride 96 (L) 100 - 111 mmol/L    CO2 20 (L) 21 - 32 mmol/L    Anion gap 16 3.0 - 18 mmol/L    Glucose 82 74 - 99 mg/dL    BUN 10 7.0 - 18 MG/DL    Creatinine 0.75 0.6 - 1.3 MG/DL    BUN/Creatinine ratio 13 12 - 20      GFR est AA >60 >60 ml/min/1.73m2    GFR est non-AA >60 >60 ml/min/1.73m2    Calcium 8.8 8.5 - 10.1 MG/DL    Bilirubin, total 0.4 0.2 - 1.0 MG/DL    ALT (SGPT) 47 13 - 56 U/L    AST (SGOT) 67 (H) 10 - 38 U/L    Alk.  phosphatase 116 45 - 117 U/L    Protein, total 8.6 (H) 6.4 - 8.2 g/dL    Albumin 4.2 3.4 - 5.0 g/dL    Globulin 4.4 (H) 2.0 - 4.0 g/dL    A-G Ratio 1.0 0.8 - 1.7     CARDIAC PANEL,(CK, CKMB & TROPONIN)    Collection Time: 08/20/20  8:48 PM   Result Value Ref Range    CK - MB 2.4 <3.6 ng/ml    CK-MB Index 1.8 0.0 - 4.0 %     26 - 192 U/L    Troponin-I, QT <0.02 0.0 - 0.045 NG/ML   MAGNESIUM    Collection Time: 08/20/20  8:48 PM   Result Value Ref Range    Magnesium 1.9 1.6 - 2.6 mg/dL   ETHYL ALCOHOL    Collection Time: 08/20/20  8:48 PM   Result Value Ref Range    ALCOHOL(ETHYL),SERUM 249 (H) 0 - 3 MG/DL   LIPASE    Collection Time: 08/20/20  8:48 PM   Result Value Ref Range    Lipase 34 (L) 73 - 393 U/L   POC TROPONIN-I    Collection Time: 08/21/20  3:36 AM   Result Value Ref Range    Troponin-I (POC) <0.04 0.00 - 0.08 ng/mL       EKG interpretation by ED Physician:      X-Ray, CT or other radiology findings or impressions:  No orders to display       Progress notes, Consult notes or additional Procedure notes:   Patient with persistent tachycardia which is not consistent with PE, acute dissection or acute MI. This most likely a chronic issue for her likely secondary to anxiety state as well. Do not feel patient warrants further testing or repeat imaging. Discussed with patient need to be on PPI along with Carafate given her previous history of esophagitis on EGD. Patient plans on seeking outpatient alcohol treatment    I have discussed with patient and/or family/sig other the results, interpretation of any imaging if performed, suspected diagnosis and treatment plan to include instructions regarding the diagnoses listed to which understanding was expressed with all questions answered      Reevaluation of patient:   stable    Disposition:  Diagnosis:   1. Recurrent chest pain    2. Alcohol abuse    3. Gastroesophageal reflux disease with esophagitis        Disposition: home    Follow-up Information     Follow up With Specialties Details Why Contact Info    Dale Gtz MD [facility] Medicine Schedule an appointment as soon as possible for a visit  [address] [place] [address] EMERGENCY DEPT Emergency Medicine  If symptoms worsen [address] [facility].  [phone]            Patient's Medications   Start Taking    OMEPRAZOLE (PRILOSEC) 40 MG CAPSULE    Take 1 Cap by mouth daily. Continue Taking    BENZONATATE (TESSALON PERLE PO)    Take  by mouth as needed. Indications: cough    COLCHICINE 0.6 MG TABLET    Take 0.6 mg by mouth daily. DOCUSATE SODIUM (COLACE) 100 MG CAPSULE    Take 100 mg by mouth three (3) times daily. IPRATROPIUM-ALBUTEROL (COMBIVENT RESPIMAT)  MCG/ACTUATION INHALER    Take 1 Puff by inhalation as needed for Wheezing. LAMOTRIGINE (LAMICTAL) 150 MG TABLET    Take 150 mg by mouth daily. LIDOCAINE (LIDODERM) 5 %    Apply patch to the affected area for 12 hours a day and remove for 12 hours a day. MELATONIN 10 MG TAB    Take 10 mg by mouth nightly. MILK THISTLE PO    Take 175 mg by mouth two (2) times a day. ONDANSETRON HCL (ZOFRAN) 4 MG TABLET    Take 2 Tabs by mouth every eight (8) hours as needed for Nausea. THIAMINE HCL (B-1) 100 MG TABLET    Take 1 Tab by mouth daily (with breakfast). TRAZODONE (DESYREL) 100 MG TABLET    Take 200 mg by mouth nightly. These Medications have changed    Modified Medication Previous Medication    FOLIC ACID (FOLVITE) 1 MG TABLET folic acid (FOLVITE) 1 mg tablet       Take 1 Tab by mouth daily. Take 1 Tab by mouth daily. LIPASE-PROTEASE-AMYLASE (CREON 24,000) 24,000-76,000 -120,000 UNIT CAPSULE lipase-protease-amylase (CREON 24,000) 24,000-76,000 -120,000 unit capsule       Take 1 Cap by mouth three (3) times daily (with meals). Take 1 Cap by mouth. MULTIVITAMIN (ONE A DAY) TABLET multivitamin (ONE A DAY) tablet       Take 1 Tab by mouth daily. Take 1 Tab by mouth daily. SUCRALFATE (CARAFATE) 100 MG/ML SUSPENSION sucralfate (CARAFATE) 100 mg/mL suspension       Take 10 mL by mouth Before breakfast, lunch, dinner and at bedtime. Take 10 mL by mouth Before breakfast, lunch, dinner and at bedtime. Stop Taking    BUSPIRONE (BUSPAR) 15 MG TABLET    Take 1 Tab by mouth two (2) times a day. CETIRIZINE (ZYRTEC) 10 MG TABLET        CLONIDINE HCL (CATAPRES) 0.1 MG TABLET    Take 1 Tab by mouth two (2) times a day. CREON 24,000-76,000 -120,000 UNIT CAPSULE        GABAPENTIN (NEURONTIN) 600 MG TABLET    Take 1 Tab by mouth two (2) times a day. HYDROCORTISONE (CORTAID) 0.5 % TOPICAL CREAM    Apply  to affected area two (2) times a day. use thin layer    LAMOTRIGINE (LAMICTAL) 200 MG TABLET    Take  by mouth daily. LORAZEPAM (ATIVAN) 0.5 MG TABLET    Take 1 Tab by mouth every eight (8) hours as needed for Anxiety. Max Daily Amount: 1.5 mg.    ONDANSETRON (ZOFRAN ODT) 4 MG DISINTEGRATING TABLET    Take 1 Tab by mouth every eight (8) hours as needed for Nausea or Vomiting. ONDANSETRON (ZOFRAN ODT) 4 MG DISINTEGRATING TABLET    Take 1 Tab by mouth every eight (8) hours as needed for Nausea or Vomiting for up to 12 doses. ONDANSETRON (ZOFRAN ODT) 8 MG DISINTEGRATING TABLET    Take 1 Tab by mouth every eight (8) hours as needed for Nausea. PANTOPRAZOLE (PROTONIX) 40 MG TABLET    Take 1 Tab by mouth Before breakfast and dinner. QUETIAPINE (SEROQUEL) 300 MG TABLET        SUMATRIPTAN (IMITREX) 100 MG TABLET    TAKE 1 TO 2 TABLETS BY MOUTH DAILY AS NEEDED FOR MIGRAINE . DO NOT EXCEED 200 MG IN 24 HOUR PERIOD    TOPIRAMATE (TOPAMAX) 100 MG TABLET    100 mg two (2) times a day.

## 2020-08-21 NOTE — ED TRIAGE NOTES
Alert female arrives via EMS c/o chest pain. Patient seen in ED earlier this evening for same complaint. Patient states she gets pain underneath L breast every time she drinks. This RN spoke with patient's sister Luz Marina Diaz on phone, stated patient drank 3 bottles of red wine b/w being d/c earlier and now.

## 2020-08-21 NOTE — ED PROVIDER NOTES
HPI   Patient with past medical history of alcohol abuse, anxiety, bipolar disorder, tachycardia, COPD, depression, pancreatitis, and recurrent UTI presents with a chief complaint of chest pain. EMS personnel reported that the patient has been to our emergency department twice over the past 24 hours for similar complaints. Patient states that she went home and drank 4 cups of red wine after being discharged. She states that she drank the wine approximately 1 hour prior to arrival.  Patient states that she was scheduled to go to [facility] facility for detox treatment for her alcohol abuse tomorrow. Patient is extremely anxious and crying on arrival to the emergency department and states that she thinks she is going to die. Past Medical History:   Diagnosis Date    Alcohol abuse     Anxiety     Arrhythmia     Tacchycardai    Asthma     controlled    B[facility] [address]     Common migraine     [facility]chronic obstructive [facility] disease) [facility])     Home O2  PRN ,  pt use also for Tachycardia    Depression     Esophageal reflux     Gout     Hypertension     Hypocitraturia     Inverted nipple     Left breast always have.     Kidney stone on left side     Pancreatitis     Recurrent UTI     Staghorn renal calculus     Urine leukocytes        Past Surgical History:   Procedure Laterality Date    HX COLONOSCOPY      HX CYST REMOVAL Left     x 3 surgeries    HX ENDOSCOPY      HX GYN      tubal ligation    HX LUMBAR LAMINECTOMY      HX UROLOGICAL  08/10/2018    Cysto, bilat RPG, left ureteral pyeloscopy, HLL, left JJ stent placement, Dr. Zurdo Green         Family History:   Family history unknown: Yes       Social History     Socioeconomic History    Marital status:      Spouse name: Not on file    Number of children: Not on file    Years of education: Not on file    Highest education level: Not on file   Occupational History    Not on file   Social Needs    Financial resource strain: Not on file    Food insecurity     Worry: Not on file     Inability: Not on file    Transportation needs     Medical: Not on file     Non-medical: Not on file   Tobacco Use    Smoking status: Current Every Day Smoker     Packs/day: 1.50     Years: 33.00     Pack years: 49.50     Types: Cigarettes    Smokeless tobacco: Never Used   Substance and Sexual Activity    Alcohol use: Not Currently     Comment: vodka    Drug use: No     Comment: 11years old- emmanuelaa    Sexual activity: Not on file   Lifestyle    Physical activity     Days per week: Not on file     Minutes per session: Not on file    Stress: Not on file   Relationships    Social connections     Talks on phone: Not on file     Gets together: Not on file     Attends Buddhist service: Not on file     Active member of club or organization: Not on file     Attends meetings of clubs or organizations: Not on file     Relationship status: Not on file    Intimate partner violence     Fear of current or ex partner: Not on file     Emotionally abused: Not on file     Physically abused: Not on file     Forced sexual activity: Not on file   Other Topics Concern    Not on file   Social History Narrative    Not on file         ALLERGIES: Lithium; Amitriptyline; and Elavil    Review of Systems   Constitutional: Negative for chills, fatigue and fever. HENT: Negative for congestion, ear discharge, ear pain, rhinorrhea, trouble swallowing and voice change. Eyes: Negative for pain. Respiratory: Negative for cough, chest tightness, wheezing and stridor. Cardiovascular: Positive for chest pain. Gastrointestinal: Negative for abdominal pain, constipation, diarrhea, nausea and vomiting. Endocrine: Negative for polydipsia, polyphagia and polyuria. Genitourinary: Negative for dysuria, flank pain, frequency and urgency. Musculoskeletal: Negative for arthralgias, gait problem, joint swelling, myalgias, neck pain and neck stiffness. Skin: Negative for pallor, rash and wound. Allergic/Immunologic: Negative for immunocompromised state. Neurological: Negative for dizziness, syncope, numbness and headaches. Hematological: Negative for adenopathy. Does not bruise/bleed easily. Psychiatric/Behavioral: The patient is nervous/anxious. Depression. Vitals:    08/21/20 0908   BP: (!) 136/94   Pulse: (!) 119   Resp: 20   Temp: 98.4 °F (36.9 °C)   SpO2: 99%            Physical Exam  Vitals signs and nursing note reviewed. Constitutional:       General: She is not in acute distress. Appearance: She is well-developed. She is not diaphoretic. Comments: 76-year-old  female in no acute distress. Strong smell of alcohol on breath   HENT:      Head: Normocephalic and atraumatic. Right Ear: Tympanic membrane, ear canal and external ear normal.      Left Ear: Tympanic membrane, ear canal and external ear normal.      Nose: Nose normal.      Mouth/Throat:      Mouth: Mucous membranes are moist.      Pharynx: No oropharyngeal exudate. Eyes:      General: No scleral icterus. Right eye: No discharge. Left eye: No discharge. Conjunctiva/sclera: Conjunctivae normal.      Pupils: Pupils are equal, round, and reactive to light. Neck:      Thyroid: No thyromegaly. Vascular: No JVD. Trachea: No tracheal deviation. Cardiovascular:      Rate and Rhythm: Regular rhythm. Tachycardia present. Heart sounds: Normal heart sounds. No murmur. No friction rub. No gallop. Pulmonary:      Effort: Pulmonary effort is normal. No respiratory distress. Breath sounds: Normal breath sounds. No stridor. No wheezing or rales. Chest:      Chest wall: No tenderness. Abdominal:      General: Bowel sounds are normal. There is no distension. Palpations: Abdomen is soft. There is no mass. Tenderness: There is no abdominal tenderness. There is no guarding or rebound.    Genitourinary: Vagina: Normal. No vaginal discharge. Musculoskeletal: Normal range of motion. General: No tenderness. Lymphadenopathy:      Cervical: No cervical adenopathy. Skin:     General: Skin is warm and dry. Capillary Refill: Capillary refill takes less than 2 seconds. Coloration: Skin is not pale. Findings: No erythema or rash. Neurological:      General: No focal deficit present. Mental Status: She is alert and oriented to person, place, and time. Cranial Nerves: No cranial nerve deficit. Deep Tendon Reflexes: Reflexes are normal and symmetric. Psychiatric:         Behavior: Behavior normal.         Judgment: Judgment normal.      Comments: Extremely anxious and crying. No suicidal or homicidal ideation noted. No auditory or visual hallucinations. MDM  Number of Diagnoses or Management Options  Diagnosis management comments: Differential diagnosis includes: Anxiety, alcohol abuse, depression. ACS, pneumonia, or PE unlikely. Amount and/or Complexity of Data Reviewed  Clinical lab tests: ordered and reviewed  Tests in the radiology section of CPT®: ordered and reviewed  Tests in the medicine section of CPT®: ordered and reviewed  Independent visualization of images, tracings, or specimens: yes    Risk of Complications, Morbidity, and/or Mortality  Presenting problems: high  Diagnostic procedures: moderate  Management options: high    Patient Progress  Patient progress: stable         Procedures    ED EKG interpretation:  Rhythm: Sinus tachycardia. Rate (approx.): 118; Axis: normal; ; QRS interval: normal ; ST/T wave: no acute ST/T wave changes; in all Leads: ; Other findings: normal. This EKG was interpreted by Baldemar Nichols DO,ED Provider.         Recent Results (from the past 12 hour(s))   EKG, 12 LEAD, INITIAL    Collection Time: 08/21/20  3:07 AM   Result Value Ref Range    Ventricular Rate 122 BPM    Atrial Rate 122 BPM    P-R Interval 134 ms    QRS Duration 72 ms    Q-T Interval 316 ms    QTC Calculation (Bezet) 450 ms    Calculated P Axis 69 degrees    Calculated R Axis 70 degrees    Calculated T Axis 76 degrees    Diagnosis       Sinus tachycardia  Otherwise normal ECG  When compared with ECG of 01-AUG-2020 14:03,  Previous ECG has undetermined rhythm, needs review     POC TROPONIN-I    Collection Time: 08/21/20  3:36 AM   Result Value Ref Range    Troponin-I (POC) <0.04 0.00 - 0.08 ng/mL   EKG, 12 LEAD, INITIAL    Collection Time: 08/21/20  9:06 AM   Result Value Ref Range    Ventricular Rate 118 BPM    Atrial Rate 118 BPM    P-R Interval 140 ms    QRS Duration 72 ms    Q-T Interval 318 ms    QTC Calculation (Bezet) 445 ms    Calculated P Axis 78 degrees    Calculated R Axis 81 degrees    Calculated T Axis 80 degrees    Diagnosis       Sinus tachycardia  Otherwise normal ECG  When compared with ECG of 21-AUG-2020 03:07,  No significant change was found     CARDIAC PANEL,(CK, CKMB & TROPONIN)    Collection Time: 08/21/20  9:46 AM   Result Value Ref Range    CK - MB 3.6 (H) <3.6 ng/ml    CK-MB Index 2.1 0.0 - 4.0 %     26 - 192 U/L    Troponin-I, QT <0.02 0.0 - 0.045 NG/ML   CBC WITH AUTOMATED DIFF    Collection Time: 08/21/20  9:46 AM   Result Value Ref Range    WBC 7.5 4.6 - 13.2 K/uL    RBC 4.05 (L) 4.20 - 5.30 M/uL    HGB 14.0 12.0 - 16.0 g/dL    HCT 41.3 35.0 - 45.0 %    .0 (H) 74.0 - 97.0 FL    MCH 34.6 (H) 24.0 - 34.0 PG    MCHC 33.9 31.0 - 37.0 g/dL    RDW 15.7 (H) 11.6 - 14.5 %    PLATELET 518 212 - 629 K/uL    MPV 9.0 (L) 9.2 - 11.8 FL    NEUTROPHILS 36 (L) 40 - 73 %    LYMPHOCYTES 53 (H) 21 - 52 %    MONOCYTES 11 (H) 3 - 10 %    EOSINOPHILS 0 0 - 5 %    BASOPHILS 0 0 - 2 %    ABS. NEUTROPHILS 2.7 1.8 - 8.0 K/UL    ABS. LYMPHOCYTES 3.9 (H) 0.9 - 3.6 K/UL    ABS. MONOCYTES 0.8 0.05 - 1.2 K/UL    ABS. EOSINOPHILS 0.0 0.0 - 0.4 K/UL    ABS.  BASOPHILS 0.0 0.0 - 0.1 K/UL    DF AUTOMATED     METABOLIC PANEL, COMPREHENSIVE    Collection Time: 08/21/20  9:46 AM   Result Value Ref Range    Sodium 137 136 - 145 mmol/L    Potassium 4.8 3.5 - 5.5 mmol/L    Chloride 101 100 - 111 mmol/L    CO2 20 (L) 21 - 32 mmol/L    Anion gap 16 3.0 - 18 mmol/L    Glucose 77 74 - 99 mg/dL    BUN 6 (L) 7.0 - 18 MG/DL    Creatinine 0.66 0.6 - 1.3 MG/DL    BUN/Creatinine ratio 9 (L) 12 - 20      GFR est AA >60 >60 ml/min/1.73m2    GFR est non-AA >60 >60 ml/min/1.73m2    Calcium 9.0 8.5 - 10.1 MG/DL    Bilirubin, total 0.6 0.2 - 1.0 MG/DL    ALT (SGPT) 46 13 - 56 U/L    AST (SGOT) 73 (H) 10 - 38 U/L    Alk. phosphatase 117 45 - 117 U/L    Protein, total 8.0 6.4 - 8.2 g/dL    Albumin 4.1 3.4 - 5.0 g/dL    Globulin 3.9 2.0 - 4.0 g/dL    A-G Ratio 1.1 0.8 - 1.7         11:09 AM  Patient's family member has arrived to take the patient to [facility] for evaluation and treatment of her alcohol abuse       Diagnosis:   1. Alcohol abuse    2. [facility] ([facility]    3. Bipolar disorder, current episode depressed, severe, without psychotic features ([facility] [address]          Disposition: Discharge home    Follow-up Information     Follow up With Specialties Details Why Contact Info    Lucille Castro MD [facility] Schedule an appointment as soon as possible for a visit today  [address] [place] [address] EMERGENCY DEPT Emergency Medicine  As needed, If symptoms worsen [facility] [place] [address] [place] [address] [address]  Today  [address]          Patient's Medications   Start Taking    No medications on file   Continue Taking    BENZONATATE (TESSALON PERLE PO)    Take  by mouth as needed. Indications: cough    COLCHICINE 0.6 MG TABLET    Take 0.6 mg by mouth daily. DOCUSATE SODIUM (COLACE) 100 MG CAPSULE    Take 100 mg by mouth three (3) times daily. FOLIC ACID (FOLVITE) 1 MG TABLET    Take 1 Tab by mouth daily.     IPRATROPIUM-ALBUTEROL (COMBIVENT RESPIMAT)  MCG/ACTUATION INHALER    Take 1 Puff by inhalation as needed for Wheezing. LAMOTRIGINE (LAMICTAL) 150 MG TABLET    Take 150 mg by mouth daily. LIDOCAINE (LIDODERM) 5 %    Apply patch to the affected area for 12 hours a day and remove for 12 hours a day. LIPASE-PROTEASE-AMYLASE (CREON 24,000) 24,000-76,000 -120,000 UNIT CAPSULE    Take 1 Cap by mouth three (3) times daily (with meals). MELATONIN 10 MG TAB    Take 10 mg by mouth nightly. MILK THISTLE PO    Take 175 mg by mouth two (2) times a day. MULTIVITAMIN (ONE A DAY) TABLET    Take 1 Tab by mouth daily. OMEPRAZOLE (PRILOSEC) 40 MG CAPSULE    Take 1 Cap by mouth daily. ONDANSETRON HCL (ZOFRAN) 4 MG TABLET    Take 2 Tabs by mouth every eight (8) hours as needed for Nausea. SUCRALFATE (CARAFATE) 100 MG/ML SUSPENSION    Take 10 mL by mouth Before breakfast, lunch, dinner and at bedtime. THIAMINE HCL (B-1) 100 MG TABLET    Take 1 Tab by mouth daily (with breakfast). TRAZODONE (DESYREL) 100 MG TABLET    Take 200 mg by mouth nightly.    These Medications have changed    No medications on file   Stop Taking    No medications on file

## 2020-08-21 NOTE — ED NOTES
I have reviewed discharge instructions with the patient. The patient verbalized understanding. Pt in no distress at time of discharge and agreeable to terms of discharge.   Pt family states is taking pt for inpatient treatment

## 2020-08-22 LAB
ATRIAL RATE: 111 BPM
ATRIAL RATE: 118 BPM
CALCULATED P AXIS, ECG09: 77 DEGREES
CALCULATED P AXIS, ECG09: 78 DEGREES
CALCULATED R AXIS, ECG10: 74 DEGREES
CALCULATED R AXIS, ECG10: 81 DEGREES
CALCULATED T AXIS, ECG11: 80 DEGREES
CALCULATED T AXIS, ECG11: 80 DEGREES
DIAGNOSIS, 93000: NORMAL
DIAGNOSIS, 93000: NORMAL
P-R INTERVAL, ECG05: 134 MS
P-R INTERVAL, ECG05: 140 MS
Q-T INTERVAL, ECG07: 318 MS
Q-T INTERVAL, ECG07: 326 MS
QRS DURATION, ECG06: 72 MS
QRS DURATION, ECG06: 72 MS
QTC CALCULATION (BEZET), ECG08: 443 MS
QTC CALCULATION (BEZET), ECG08: 445 MS
VENTRICULAR RATE, ECG03: 111 BPM
VENTRICULAR RATE, ECG03: 118 BPM

## 2020-09-21 ENCOUNTER — HOSPITAL ENCOUNTER (EMERGENCY)
Age: 55
Discharge: HOME | End: 2020-09-21
Attending: EMERGENCY MEDICINE
Payer: MEDICARE

## 2020-09-21 ENCOUNTER — APPOINTMENT (OUTPATIENT)
Dept: GENERAL RADIOLOGY | Age: 55
End: 2020-09-21
Attending: EMERGENCY MEDICINE
Payer: MEDICARE

## 2020-09-21 VITALS
BODY MASS INDEX: 22.08 KG/M2 | DIASTOLIC BLOOD PRESSURE: 79 MMHG | OXYGEN SATURATION: 100 % | WEIGHT: 120 LBS | HEIGHT: 62 IN | RESPIRATION RATE: 20 BRPM | HEART RATE: 119 BPM | TEMPERATURE: 99.2 F | SYSTOLIC BLOOD PRESSURE: 128 MMHG

## 2020-09-21 DIAGNOSIS — R07.9 CHEST PAIN, UNSPECIFIED TYPE: ICD-10-CM

## 2020-09-21 DIAGNOSIS — F10.929 ALCOHOLIC INTOXICATION WITH COMPLICATION (HCC): Primary | ICD-10-CM

## 2020-09-21 LAB
ALBUMIN SERPL-MCNC: 3.8 G/DL (ref 3.4–5)
ALBUMIN/GLOB SERPL: 0.9 {RATIO} (ref 0.8–1.7)
ALP SERPL-CCNC: 93 U/L (ref 45–117)
ALT SERPL-CCNC: 31 U/L (ref 13–56)
ANION GAP SERPL CALC-SCNC: 10 MMOL/L (ref 3–18)
APPEARANCE UR: CLEAR
AST SERPL-CCNC: 50 U/L (ref 10–38)
ATRIAL RATE: 109 BPM
BASOPHILS # BLD: 0 K/UL (ref 0–0.1)
BASOPHILS NFR BLD: 1 % (ref 0–2)
BILIRUB SERPL-MCNC: 0.2 MG/DL (ref 0.2–1)
BILIRUB UR QL: NEGATIVE
BUN SERPL-MCNC: 19 MG/DL (ref 7–18)
BUN/CREAT SERPL: 22 (ref 12–20)
CALCIUM SERPL-MCNC: 8.5 MG/DL (ref 8.5–10.1)
CALCULATED P AXIS, ECG09: 77 DEGREES
CALCULATED R AXIS, ECG10: 81 DEGREES
CALCULATED T AXIS, ECG11: 77 DEGREES
CHLORIDE SERPL-SCNC: 101 MMOL/L (ref 100–111)
CO2 SERPL-SCNC: 24 MMOL/L (ref 21–32)
COLOR UR: YELLOW
CREAT SERPL-MCNC: 0.86 MG/DL (ref 0.6–1.3)
DIAGNOSIS, 93000: NORMAL
DIFFERENTIAL METHOD BLD: ABNORMAL
EOSINOPHIL # BLD: 0.1 K/UL (ref 0–0.4)
EOSINOPHIL NFR BLD: 1 % (ref 0–5)
ERYTHROCYTE [DISTWIDTH] IN BLOOD BY AUTOMATED COUNT: 14.2 % (ref 11.6–14.5)
ETHANOL SERPL-MCNC: 209 MG/DL (ref 0–3)
GLOBULIN SER CALC-MCNC: 4.2 G/DL (ref 2–4)
GLUCOSE SERPL-MCNC: 76 MG/DL (ref 74–99)
GLUCOSE UR STRIP.AUTO-MCNC: NEGATIVE MG/DL
HCT VFR BLD AUTO: 37.6 % (ref 35–45)
HGB BLD-MCNC: 12.5 G/DL (ref 12–16)
HGB UR QL STRIP: NEGATIVE
KETONES UR QL STRIP.AUTO: NEGATIVE MG/DL
LEUKOCYTE ESTERASE UR QL STRIP.AUTO: NEGATIVE
LIPASE SERPL-CCNC: 61 U/L (ref 73–393)
LYMPHOCYTES # BLD: 4 K/UL (ref 0.9–3.6)
LYMPHOCYTES NFR BLD: 67 % (ref 21–52)
MCH RBC QN AUTO: 32.7 PG (ref 24–34)
MCHC RBC AUTO-ENTMCNC: 33.2 G/DL (ref 31–37)
MCV RBC AUTO: 98.4 FL (ref 74–97)
MONOCYTES # BLD: 0.3 K/UL (ref 0.05–1.2)
MONOCYTES NFR BLD: 4 % (ref 3–10)
NEUTS SEG # BLD: 1.6 K/UL (ref 1.8–8)
NEUTS SEG NFR BLD: 27 % (ref 40–73)
NITRITE UR QL STRIP.AUTO: NEGATIVE
P-R INTERVAL, ECG05: 134 MS
PH UR STRIP: 5 [PH] (ref 5–8)
PLATELET # BLD AUTO: 159 K/UL (ref 135–420)
PMV BLD AUTO: 9.1 FL (ref 9.2–11.8)
POTASSIUM SERPL-SCNC: 4.3 MMOL/L (ref 3.5–5.5)
PROT SERPL-MCNC: 8 G/DL (ref 6.4–8.2)
PROT UR STRIP-MCNC: NEGATIVE MG/DL
Q-T INTERVAL, ECG07: 332 MS
QRS DURATION, ECG06: 78 MS
QTC CALCULATION (BEZET), ECG08: 447 MS
RBC # BLD AUTO: 3.82 M/UL (ref 4.2–5.3)
SODIUM SERPL-SCNC: 135 MMOL/L (ref 136–145)
SP GR UR REFRACTOMETRY: 1 (ref 1–1.03)
TROPONIN I SERPL-MCNC: <0.02 NG/ML (ref 0–0.04)
UROBILINOGEN UR QL STRIP.AUTO: 0.2 EU/DL (ref 0.2–1)
VENTRICULAR RATE, ECG03: 109 BPM
WBC # BLD AUTO: 5.9 K/UL (ref 4.6–13.2)

## 2020-09-21 PROCEDURE — 74011250636 HC RX REV CODE- 250/636: Performed by: EMERGENCY MEDICINE

## 2020-09-21 PROCEDURE — 83690 ASSAY OF LIPASE: CPT

## 2020-09-21 PROCEDURE — 80307 DRUG TEST PRSMV CHEM ANLYZR: CPT

## 2020-09-21 PROCEDURE — 84484 ASSAY OF TROPONIN QUANT: CPT

## 2020-09-21 PROCEDURE — 93005 ELECTROCARDIOGRAM TRACING: CPT

## 2020-09-21 PROCEDURE — 99285 EMERGENCY DEPT VISIT HI MDM: CPT

## 2020-09-21 PROCEDURE — 71045 X-RAY EXAM CHEST 1 VIEW: CPT

## 2020-09-21 PROCEDURE — 85025 COMPLETE CBC W/AUTO DIFF WBC: CPT

## 2020-09-21 PROCEDURE — 80053 COMPREHEN METABOLIC PANEL: CPT

## 2020-09-21 PROCEDURE — 96360 HYDRATION IV INFUSION INIT: CPT

## 2020-09-21 PROCEDURE — 99283 EMERGENCY DEPT VISIT LOW MDM: CPT

## 2020-09-21 PROCEDURE — 81003 URINALYSIS AUTO W/O SCOPE: CPT

## 2020-09-21 RX ADMIN — SODIUM CHLORIDE 1000 ML: 900 INJECTION, SOLUTION INTRAVENOUS at 03:18

## 2020-09-21 NOTE — ED NOTES
Pt sitting on edge of bed, \"I'm having an anxiety attack, I'm having an attack\". Inquired if she feels the same as when she came in, she states \"no, it is so much worse. \" Pt breathing approx 22 times per minute, . MD notified, no new orders at this time.

## 2020-09-21 NOTE — ED NOTES
Pt came to find this nurse prior to report being given by raeann, states she wants to go home, does not want to stay. Pt states she wants to get a lyft home. MD notified, states he wants to have EKG completed prior to leaving ER.  Will speak with pt

## 2020-09-21 NOTE — ED PROVIDER NOTES
62 yo CF with PMHx etoh abuse presents by EMS with chest pain. Pt states that she relapsed on alcohol and has been drinking mostly wine. Pt states she started to have pressure in center of chest, which she states is common when she drinks. Pt has had vomiting and diarrhea but no fevers, no cough. Past Medical History:   Diagnosis Date    Alcohol abuse     Anxiety     Arrhythmia     Tacchycardai    Asthma     controlled    Bipolar disorder ([address]     Common migraine     COPD (chronic obstructive pulmonary disease) (HCC)     Home O2  PRN ,  pt use also for Tachycardia    Depression     Esophageal reflux     Gout     Hypertension     Hypocitraturia     Inverted nipple     Left breast always have.     Kidney stone on left side     Pancreatitis     Recurrent UTI     Staghorn renal calculus     Urine leukocytes        Past Surgical History:   Procedure Laterality Date    HX COLONOSCOPY      HX CYST REMOVAL Left     x 3 surgeries    HX ENDOSCOPY      HX GYN      tubal ligation    HX LUMBAR LAMINECTOMY      HX UROLOGICAL  08/10/2018    Cysto, bilat RPG, left ureteral pyeloscopy, HLL, left JJ stent placement, Dr. Daly Older         Family History:   Family history unknown: Yes       Social History     Socioeconomic History    Marital status:      Spouse name: Not on file    Number of children: Not on file    Years of education: Not on file    Highest education level: Not on file   Occupational History    Not on file   Social Needs    Financial resource strain: Not on file    Food insecurity     Worry: Not on file     Inability: Not on file   [employer] needs     Medical: Not on file     Non-medical: Not on file   Tobacco Use    Smoking status: Current Every Day Smoker     Packs/day: 1.50     Years: 33.00     Pack years: 49.50     Types: Cigarettes    Smokeless tobacco: Never Used   Substance and Sexual Activity    Alcohol use: Not Currently     Comment: vodka    Drug use: No     Comment: 11years old- [employer]    Sexual activity: Not on file   Lifestyle    Physical activity     Days per week: Not on file     Minutes per session: Not on file    Stress: Not on file   Relationships    Social connections     Talks on phone: Not on file     Gets together: Not on file     Attends Restorationist service: Not on file     Active member of club or organization: Not on file     Attends meetings of clubs or organizations: Not on file     Relationship status: Not on file    Intimate partner violence     Fear of current or ex partner: Not on file     Emotionally abused: Not on file     Physically abused: Not on file     Forced sexual activity: Not on file   Other Topics Concern    Not on file   Social History Narrative    Not on file         ALLERGIES: Lithium; Amitriptyline; and Elavil    Review of Systems   Constitutional: Negative for fever. HENT: Negative for trouble swallowing. Respiratory: Negative for shortness of breath. Cardiovascular: Positive for chest pain. Gastrointestinal: Positive for diarrhea and vomiting. Negative for abdominal pain. Genitourinary: Negative for difficulty urinating. Skin: Negative for wound. Neurological: Negative for syncope. Psychiatric/Behavioral: Negative for behavioral problems. All other systems reviewed and are negative. Vitals:    09/21/20 0320 09/21/20 0337 09/21/20 0345   BP: (!) 137/113 132/71 128/79   Pulse: (!) 119     Resp: 20     Temp: 99.2 °F (37.3 °C)     SpO2: 98% 100% 100%   Weight: 54.4 kg (120 lb)     Height: 5' 2\" (1.575 m)              Physical Exam  Vitals signs and nursing note reviewed. Constitutional:       General: She is not in acute distress. Appearance: She is well-developed. Comments: Chronically ill-appearing, nad   HENT:      Head: Normocephalic and atraumatic. Neck:      Musculoskeletal: Normal range of motion. Cardiovascular:      Rate and Rhythm: Tachycardia present. Pulses: Normal pulses. Pulmonary:      Effort: Pulmonary effort is normal. No respiratory distress. Breath sounds: Normal breath sounds. Abdominal:      Palpations: Abdomen is soft. Tenderness: There is no abdominal tenderness. Musculoskeletal: Normal range of motion. Right lower leg: No edema. Left lower leg: No edema. Comments: Mechanically stable   Skin:     General: Skin is warm. Neurological:      General: No focal deficit present. Mental Status: She is alert and oriented to person, place, and time. Comments: No focal deficits noted   Psychiatric:         Behavior: Behavior normal.          MDM  Number of Diagnoses or Management Options  Alcoholic intoxication with complication [facility]):   Chest pain, unspecified type:   Diagnosis management comments: 62 yo CF with PMHx etoh abuse presents by EMS with chest pain. No fevers. Examination unremarkable with ctab, no increased wob, no abdominal tenderness. Will evaluate for acute process. 3:08 AM  Nursing unable to establish IV access. Using ultra-sound for guidance, I placed a 18-gauge IV in right upper arm. Good flow and blood return. Dressing was placed. Patient tolerated well without any immediate complications. 3:52 AM  etoh 209. Work-up otherwise unremarkable. Pt doing ok. Discussed results and poc for dc home, symptom management, follow-up, return precautions.            Amount and/or Complexity of Data Reviewed  Clinical lab tests: ordered and reviewed  Tests in the radiology section of CPT®: ordered and reviewed  Obtain history from someone other than the patient: yes  Review and summarize past medical records: yes  Independent visualization of images, tracings, or specimens: yes    Patient Progress  Patient progress: stable         EKG    Date/Time: 9/21/2020 2:18 AM  Performed by: Karime Ibarra MD  Authorized by: Karime Ibarra MD     ECG reviewed by ED Physician in the absence of a cardiologist: yes    Previous ECG:     Previous ECG:  Compared to current    Comparison ECG info:  8/21/20    Similarity:  No change  Interpretation:     Interpretation: non-specific    Rate:     ECG rate:  109    ECG rate assessment: tachycardic    Rhythm:     Rhythm: sinus tachycardia    Ectopy:     Ectopy: none    QRS:     QRS axis:  Normal  Conduction:     Conduction: normal    ST segments:     ST segments:  Normal  T waves:     T waves: normal          PROGRESS NOTES    2:10 AM:   Selena Helms MD arrives to the bedside to evaluate the patient. Answered the patient's questions regarding the treatment plan.       CONSULTATIONS  None      MEDICATIONS ORDERED  Medications   sodium chloride 0.9 % bolus infusion 1,000 mL (1,000 mL IntraVENous New Bag 9/21/20 0318)       RADIOLOGY INTERPRETATIONS  XR CHEST PORT    (Results Pending)       EKG READINGS/LABORATORY RESULTS  Recent Results (from the past 12 hour(s))   EKG, 12 LEAD, INITIAL    Collection Time: 09/21/20  2:09 AM   Result Value Ref Range    Ventricular Rate 109 BPM    Atrial Rate 109 BPM    P-R Interval 134 ms    QRS Duration 78 ms    Q-T Interval 332 ms    QTC Calculation (Bezet) 447 ms    Calculated P Axis 77 degrees    Calculated R Axis 81 degrees    Calculated T Axis 77 degrees    Diagnosis       Sinus tachycardia  Possible Left atrial enlargement  Cannot rule out Anterior infarct , age undetermined  Abnormal ECG  When compared with ECG of 21-AUG-2020 09:06,  No significant change was found     CBC WITH AUTOMATED DIFF    Collection Time: 09/21/20  3:15 AM   Result Value Ref Range    WBC 5.9 4.6 - 13.2 K/uL    RBC 3.82 (L) 4.20 - 5.30 M/uL    HGB 12.5 12.0 - 16.0 g/dL    HCT 37.6 35.0 - 45.0 %    MCV 98.4 (H) 74.0 - 97.0 FL    MCH 32.7 24.0 - 34.0 PG    MCHC 33.2 31.0 - 37.0 g/dL    RDW 14.2 11.6 - 14.5 %    PLATELET 369 661 - 043 K/uL    MPV 9.1 (L) 9.2 - 11.8 FL    NEUTROPHILS 27 (L) 40 - 73 %    LYMPHOCYTES 67 (H) 21 - 52 %    MONOCYTES 4 3 - 10 %    EOSINOPHILS 1 0 - 5 %    BASOPHILS 1 0 - 2 %    ABS. NEUTROPHILS 1.6 (L) 1.8 - 8.0 K/UL    ABS. LYMPHOCYTES 4.0 (H) 0.9 - 3.6 K/UL    ABS. MONOCYTES 0.3 0.05 - 1.2 K/UL    ABS. EOSINOPHILS 0.1 0.0 - 0.4 K/UL    ABS. BASOPHILS 0.0 0.0 - 0.1 K/UL    DF AUTOMATED     METABOLIC PANEL, COMPREHENSIVE    Collection Time: 09/21/20  3:15 AM   Result Value Ref Range    Sodium 135 (L) 136 - 145 mmol/L    Potassium 4.3 3.5 - 5.5 mmol/L    Chloride 101 100 - 111 mmol/L    CO2 24 21 - 32 mmol/L    Anion gap 10 3.0 - 18 mmol/L    Glucose 76 74 - 99 mg/dL    BUN 19 (H) 7.0 - 18 MG/DL    Creatinine 0.86 0.6 - 1.3 MG/DL    BUN/Creatinine ratio 22 (H) 12 - 20      GFR est AA >60 >60 ml/min/1.73m2    GFR est non-AA >60 >60 ml/min/1.73m2    Calcium 8.5 8.5 - 10.1 MG/DL    Bilirubin, total 0.2 0.2 - 1.0 MG/DL    ALT (SGPT) 31 13 - 56 U/L    AST (SGOT) 50 (H) 10 - 38 U/L    Alk. phosphatase 93 45 - 117 U/L    Protein, total 8.0 6.4 - 8.2 g/dL    Albumin 3.8 3.4 - 5.0 g/dL    Globulin 4.2 (H) 2.0 - 4.0 g/dL    A-G Ratio 0.9 0.8 - 1.7     LIPASE    Collection Time: 09/21/20  3:15 AM   Result Value Ref Range    Lipase 61 (L) 73 - 393 U/L   TROPONIN I    Collection Time: 09/21/20  3:15 AM   Result Value Ref Range    Troponin-I, QT <0.02 0.0 - 0.045 NG/ML   ETHYL ALCOHOL    Collection Time: 09/21/20  3:15 AM   Result Value Ref Range    ALCOHOL(ETHYL),SERUM 209 (H) 0 - 3 MG/DL   URINALYSIS W/ RFLX MICROSCOPIC    Collection Time: 09/21/20  3:30 AM   Result Value Ref Range    Color YELLOW      Appearance CLEAR      Specific gravity 1.005 1.005 - 1.030      pH (UA) 5.0 5.0 - 8.0      Protein Negative NEG mg/dL    Glucose Negative NEG mg/dL    Ketone Negative NEG mg/dL    Bilirubin Negative NEG      Blood Negative NEG      Urobilinogen 0.2 0.2 - 1.0 EU/dL    Nitrites Negative NEG      Leukocyte Esterase Negative NEG         ED DIAGNOSIS & DISPOSITION INFORMATION  Diagnosis:   1.  Alcoholic intoxication with complication ([facility] [address]    2. Chest pain, unspecified type Disposition: Discharged    Follow-up Information     Follow up With Specialties Details Why Contact Info    Matti Multani MD Family Medicine Schedule an appointment as soon as possible for a visit  [address] [place] [address] EMERGENCY DEPT Emergency Medicine  As needed [address]  [phone]          Patient's Medications   Start Taking    No medications on file   Continue Taking    BENZONATATE (TESSALON PERLE PO)    Take  by mouth as needed. Indications: cough    COLCHICINE 0.6 MG TABLET    Take 0.6 mg by mouth daily. DOCUSATE SODIUM (COLACE) 100 MG CAPSULE    Take 100 mg by mouth three (3) times daily. FOLIC ACID (FOLVITE) 1 MG TABLET    Take 1 Tab by mouth daily. IPRATROPIUM-ALBUTEROL (COMBIVENT RESPIMAT)  MCG/ACTUATION INHALER    Take 1 Puff by inhalation as needed for Wheezing. LAMOTRIGINE (LAMICTAL) 150 MG TABLET    Take 150 mg by mouth daily. LIDOCAINE (LIDODERM) 5 %    Apply patch to the affected area for 12 hours a day and remove for 12 hours a day. LIPASE-PROTEASE-AMYLASE (CREON 24,000) 24,000-76,000 -120,000 UNIT CAPSULE    Take 1 Cap by mouth three (3) times daily (with meals). MELATONIN 10 MG TAB    Take 10 mg by mouth nightly. MILK THISTLE PO    Take 175 mg by mouth two (2) times a day. MULTIVITAMIN (ONE A DAY) TABLET    Take 1 Tab by mouth daily. OMEPRAZOLE (PRILOSEC) 40 MG CAPSULE    Take 1 Cap by mouth daily. ONDANSETRON HCL (ZOFRAN) 4 MG TABLET    Take 2 Tabs by mouth every eight (8) hours as needed for Nausea. SUCRALFATE (CARAFATE) 100 MG/ML SUSPENSION    Take 10 mL by mouth Before breakfast, lunch, dinner and at bedtime. THIAMINE HCL (B-1) 100 MG TABLET    Take 1 Tab by mouth daily (with breakfast). TRAZODONE (DESYREL) 100 MG TABLET    Take 200 mg by mouth nightly.    These Medications have changed    No medications on file   Stop Taking    No medications on file Pamella Sanchez MD.

## 2020-09-21 NOTE — ED NOTES
I reviewed the pt's discharge instructions with her. Pt does not appear anxious or in distress upon reviewing instructions or leaving ER. Pt with no questions or concerns at discharge, will be ordering lyft to get home. States she would prefer to sit in lobby and wait. Pt in NAD upon leaving department.

## 2020-09-21 NOTE — DISCHARGE INSTRUCTIONS
Patient Education        Learning About Alcohol Use Disorder  What is alcohol use disorder? Alcohol use disorder means that a person drinks alcohol even though it causes harm to themselves or others. It can range from mild to severe. The more signs of this disorder you have, the more severe it may be. Moderate to severe alcohol use disorder is sometimes called addiction. People who have it may find it hard to control their use of alcohol. People who have this disorder may argue with others about how much they're drinking. Their job may be affected because of drinking. They may drink when it's dangerous or illegal, such as when they drive. They also may have a strong need, or craving, to drink. They may feel like they must drink just to get by. Their drinking may increase their risk of getting hurt or being in a car crash. Over time, drinking too much alcohol may cause health problems. These may include high blood pressure, liver problems, or problems with digestion. What are the signs? Maybe you've wondered about your alcohol habits, or how to tell if your drinking is becoming a problem. Here are some of the signs of alcohol use disorder. You may have it if you have two or more of the following signs:  · You drink larger amounts of alcohol than you ever meant to. Or you've been drinking for a longer time than you ever meant to. · You can't cut down or control your use. Or you constantly wish you could cut down. · You spend a lot of time getting or drinking alcohol or recovering from its effects. · You have strong cravings for alcohol. · You can no longer do your main jobs at work, at school, or at home. · You keep drinking alcohol, even though your use hurts your relationships. · You have stopped doing important activities because of your alcohol use. · You drink alcohol in situations where doing so is dangerous. · You keep drinking alcohol even though you know it's causing health problems.   · You need more and more alcohol to get the same effect, or you get less effect from the same amount over time. This is called tolerance. · You have uncomfortable symptoms when you stop drinking alcohol or use less. This is called withdrawal.  Alcohol use disorder can range from mild to severe. The more signs you have, the more severe the disorder may be. Moderate to severe alcohol use disorder is sometimes called addiction. You might not realize that your drinking is a problem. You might not drink large amounts when you drink. Or you might go for days or weeks between drinking episodes. But even if you don't drink very often, your drinking could still be harmful and put you at risk. How is alcohol use disorder treated? Getting help is up to you. But you don't have to do it alone. There are many people and kinds of treatments that can help. Treatment for alcohol use disorder can include:  · Group therapy, one or more types of counseling, and alcohol education. · Medicines that help to:  ? Reduce withdrawal symptoms and help you safely stop drinking. ? Reduce cravings for alcohol. · Support groups. These groups include Alcoholics Anonymous and [employer] (Self-Management and Recovery Training). Some people are able to stop or cut back on drinking with help from a counselor. People who have moderate to severe alcohol use disorder may need medical treatment. They may need to stay in a hospital or treatment center. You may have a treatment team to help you. This team may include a psychologist or psychiatrist, counselors, doctors, social workers, nurses, and a . A  helps plan and manage your treatment. Follow-up care is a key part of your treatment and safety. Be sure to make and go to all appointments, and call your doctor if you are having problems. It's also a good idea to know your test results and keep a list of the medicines you take. Where can you learn more?   Go to http://thom.info/  Enter [id number] in the search box to learn more about \"Learning About Alcohol Use Disorder. \"  Current as of: June 29, 2020               Content Version: 12.6  © 5190-6165 [employer]. Care instructions adapted under license by [employer] (which disclaims liability or warranty for this information). If you have questions about a medical condition or this instruction, always ask your healthcare professional. [place] [address] any warranty or liability for your use of this information.

## 2020-09-21 NOTE — ED PROVIDER NOTES
64 yo CF with PMHx etoh abuse presents by EMS with alcohol intoxication. Pt was in ED for same last night. Pt states that she's been drinking, same as last night. Pt states symptoms are the same as last night when she was having chest pain and shortness of breath but she feels better now and wants to go home. No other complaints. Past Medical History:   Diagnosis Date    Alcohol abuse     Anxiety     Arrhythmia     Tacchycardai    Asthma     controlled    Bipolar disorder ([address]     Common migraine     COPD (chronic obstructive pulmonary disease) ([facility])     Home O2  PRN ,  pt use also for Tachycardia    Depression     Esophageal reflux     Gout     Hypertension     Hypocitraturia     Inverted nipple     Left breast always have.     Kidney stone on left side     Pancreatitis     Recurrent UTI     Staghorn renal calculus     Urine leukocytes        Past Surgical History:   Procedure Laterality Date    HX COLONOSCOPY      HX CYST REMOVAL Left     x 3 surgeries    HX ENDOSCOPY      HX GYN      tubal ligation    HX LUMBAR LAMINECTOMY      HX UROLOGICAL  08/10/2018    Cysto, bilat RPG, left ureteral pyeloscopy, HLL, left JJ stent placement, Dr. Zurdo Green         Family History:   Family history unknown: Yes       Social History     Socioeconomic History    Marital status:      Spouse name: Not on file    Number of children: Not on file    Years of education: Not on file    Highest education level: Not on file   Occupational History    Not on file   Social Needs    Financial resource strain: Not on file    Food insecurity     Worry: Not on file     Inability: Not on file   [employer] needs     Medical: Not on file     Non-medical: Not on file   Tobacco Use    Smoking status: Current Every Day Smoker     Packs/day: 1.50     Years: 33.00     Pack years: 49.50     Types: Cigarettes    Smokeless tobacco: Never Used   Substance and Sexual Activity    Alcohol use: Not Currently     Comment: vodka    Drug use: No     Comment: 11years old- [employer]    Sexual activity: Not on file   Lifestyle    Physical activity     Days per week: Not on file     Minutes per session: Not on file    Stress: Not on file   Relationships    Social connections     Talks on phone: Not on file     Gets together: Not on file     Attends Faith service: Not on file     Active member of club or organization: Not on file     Attends meetings of clubs or organizations: Not on file     Relationship status: Not on file    Intimate partner violence     Fear of current or ex partner: Not on file     Emotionally abused: Not on file     Physically abused: Not on file     Forced sexual activity: Not on file   Other Topics Concern    Not on file   Social History Narrative    Not on file         ALLERGIES: Lithium; Amitriptyline; and Elavil    Review of Systems   Constitutional: Negative for fever. HENT: Negative for trouble swallowing. Respiratory: Positive for shortness of breath. Cardiovascular: Positive for chest pain. Gastrointestinal: Negative for abdominal pain and vomiting. Genitourinary: Negative for difficulty urinating. Skin: Negative for wound. Neurological: Negative for syncope. Psychiatric/Behavioral: Negative for behavioral problems. All other systems reviewed and are negative. There were no vitals filed for this visit. Physical Exam  Vitals signs and nursing note reviewed. Constitutional:       General: She is not in acute distress. Appearance: She is well-developed. Comments: Intoxicated, otherwise well-appearing, nad   HENT:      Head: Normocephalic and atraumatic. Neck:      Musculoskeletal: Normal range of motion. Cardiovascular:      Rate and Rhythm: Normal rate. Pulses: Normal pulses. Pulmonary:      Effort: Pulmonary effort is normal. No respiratory distress. Breath sounds: Normal breath sounds.    Abdominal:      Palpations: Abdomen is soft. Tenderness: There is no abdominal tenderness. Musculoskeletal: Normal range of motion. Comments: Mechanically stable   Skin:     General: Skin is warm. Neurological:      General: No focal deficit present. Mental Status: She is alert and oriented to person, place, and time. Comments: No focal deficits noted   Psychiatric:         Behavior: Behavior normal.          MDM  Number of Diagnoses or Management Options  Alcoholic intoxication with complication [facility]):   Diagnosis management comments: 62 yo CF with PMHx etoh abuse presents by EMS with etoh intoxication. Pt was in ED last night for same, seen by me - see notes for details. Pt states she feels better and wants to go home. Examination with some intoxication but otherwise unremarkable. Given multiple previous presentations for same, including presentation for same last night with negative work-ups, will not repeat work-up at this time. EKG unchanged. Pt wants to go home, will dc home, symptom management, follow-up, return precautions.        Amount and/or Complexity of Data Reviewed  Clinical lab tests: ordered and reviewed  Obtain history from someone other than the patient: yes  Review and summarize past medical records: yes  Independent visualization of images, tracings, or specimens: yes    Patient Progress  Patient progress: stable         EKG    Date/Time: 9/21/2020 7:24 PM  Performed by: Celine Best MD  Authorized by: Celine Best MD     ECG reviewed by ED Physician in the absence of a cardiologist: yes    Previous ECG:     Previous ECG:  Compared to current    Comparison ECG info:  9/21/20    Similarity:  No change  Interpretation:     Interpretation: non-specific    Rate:     ECG rate:  122    ECG rate assessment: tachycardic    Rhythm:     Rhythm: sinus tachycardia    QRS:     QRS axis:  Normal  Conduction:     Conduction: normal    ST segments:     ST segments:  Normal  T waves:     T waves: non-specific            PROGRESS NOTES    7:03 PM:   Lucina Patel MD arrives to the bedside to evaluate the patient. Answered the patient's questions regarding the treatment plan. CONSULTATIONS  None      MEDICATIONS ORDERED  Medications - No data to display    RADIOLOGY INTERPRETATIONS  No orders to display       EKG READINGS/LABORATORY RESULTS  Recent Results (from the past 12 hour(s))   EKG, 12 LEAD, INITIAL    Collection Time: 09/21/20  7:21 PM   Result Value Ref Range    Ventricular Rate 122 BPM    Atrial Rate 122 BPM    P-R Interval 130 ms    QRS Duration 70 ms    Q-T Interval 324 ms    QTC Calculation (Bezet) 461 ms    Calculated P Axis 80 degrees    Calculated R Axis 81 degrees    Calculated T Axis 76 degrees    Diagnosis       Sinus tachycardia  Possible Left atrial enlargement  Nonspecific ST abnormality  Abnormal ECG  When compared with ECG of 21-SEP-2020 02:09,  No significant change was found         ED DIAGNOSIS & DISPOSITION INFORMATION  Diagnosis:   1. Alcoholic intoxication with complication [facility])        Disposition: Discharged    Follow-up Information     Follow up With Specialties Details Why Contact Info    Gabriella Pennington MD Family Medicine Schedule an appointment as soon as possible for a visit  [address] [place], [place] [address] [facility] EMERGENCY DEPT Emergency Medicine  As needed [address] [facility]  [phone]          Patient's Medications   Start Taking    No medications on file   Continue Taking    BENZONATATE (TESSALON PERLE PO)    Take  by mouth as needed. Indications: cough    COLCHICINE 0.6 MG TABLET    Take 0.6 mg by mouth daily. DOCUSATE SODIUM (COLACE) 100 MG CAPSULE    Take 100 mg by mouth three (3) times daily. FOLIC ACID (FOLVITE) 1 MG TABLET    Take 1 Tab by mouth daily. IPRATROPIUM-ALBUTEROL (COMBIVENT RESPIMAT)  MCG/ACTUATION INHALER    Take 1 Puff by inhalation as needed for Wheezing.     LAMOTRIGINE (LAMICTAL) 150 MG TABLET    Take 150 mg by mouth daily. LIDOCAINE (LIDODERM) 5 %    Apply patch to the affected area for 12 hours a day and remove for 12 hours a day. LIPASE-PROTEASE-AMYLASE (CREON 24,000) 24,000-76,000 -120,000 UNIT CAPSULE    Take 1 Cap by mouth three (3) times daily (with meals). MELATONIN 10 MG TAB    Take 10 mg by mouth nightly. MILK THISTLE PO    Take 175 mg by mouth two (2) times a day. MULTIVITAMIN (ONE A DAY) TABLET    Take 1 Tab by mouth daily. OMEPRAZOLE (PRILOSEC) 40 MG CAPSULE    Take 1 Cap by mouth daily. ONDANSETRON HCL (ZOFRAN) 4 MG TABLET    Take 2 Tabs by mouth every eight (8) hours as needed for Nausea. SUCRALFATE (CARAFATE) 100 MG/ML SUSPENSION    Take 10 mL by mouth Before breakfast, lunch, dinner and at bedtime. THIAMINE HCL (B-1) 100 MG TABLET    Take 1 Tab by mouth daily (with breakfast). TRAZODONE (DESYREL) 100 MG TABLET    Take 200 mg by mouth nightly.    These Medications have changed    No medications on file   Stop Taking    No medications on file           Chery Stoll MD.

## 2020-09-21 NOTE — ED TRIAGE NOTES
Alert and oriented female to the ED complaining of \"chest pressure\" and pain that occurs when she takes a deep breath. Per pt, this happens when she drinks. Admits to drinking wine this evening. Pt anxious, repeating \"I just don't want to die\" during triage.

## 2020-09-21 NOTE — ED NOTES
Pt left before being registered, did not get discharge paperwork. Did not complete triage. Pt was seen by provider and assessed. Left ER in NAD.

## 2020-09-21 NOTE — DISCHARGE INSTRUCTIONS
Patient Education        Learning About Alcohol Use Disorder  What is alcohol use disorder? Alcohol use disorder means that a person drinks alcohol even though it causes harm to themselves or others. It can range from mild to severe. The more signs of this disorder you have, the more severe it may be. Moderate to severe alcohol use disorder is sometimes called addiction. People who have it may find it hard to control their use of alcohol. People who have this disorder may argue with others about how much they're drinking. Their job may be affected because of drinking. They may drink when it's dangerous or illegal, such as when they drive. They also may have a strong need, or craving, to drink. They may feel like they must drink just to get by. Their drinking may increase their risk of getting hurt or being in a car crash. Over time, drinking too much alcohol may cause health problems. These may include high blood pressure, liver problems, or problems with digestion. What are the signs? Maybe you've wondered about your alcohol habits, or how to tell if your drinking is becoming a problem. Here are some of the signs of alcohol use disorder. You may have it if you have two or more of the following signs:  · You drink larger amounts of alcohol than you ever meant to. Or you've been drinking for a longer time than you ever meant to. · You can't cut down or control your use. Or you constantly wish you could cut down. · You spend a lot of time getting or drinking alcohol or recovering from its effects. · You have strong cravings for alcohol. · You can no longer do your main jobs at work, at school, or at home. · You keep drinking alcohol, even though your use hurts your relationships. · You have stopped doing important activities because of your alcohol use. · You drink alcohol in situations where doing so is dangerous. · You keep drinking alcohol even though you know it's causing health problems.   · You need more and more alcohol to get the same effect, or you get less effect from the same amount over time. This is called tolerance. · You have uncomfortable symptoms when you stop drinking alcohol or use less. This is called withdrawal.  Alcohol use disorder can range from mild to severe. The more signs you have, the more severe the disorder may be. Moderate to severe alcohol use disorder is sometimes called addiction. You might not realize that your drinking is a problem. You might not drink large amounts when you drink. Or you might go for days or weeks between drinking episodes. But even if you don't drink very often, your drinking could still be harmful and put you at risk. How is alcohol use disorder treated? Getting help is up to you. But you don't have to do it alone. There are many people and kinds of treatments that can help. Treatment for alcohol use disorder can include:  · Group therapy, one or more types of counseling, and alcohol education. · Medicines that help to:  ? Reduce withdrawal symptoms and help you safely stop drinking. ? Reduce cravings for alcohol. · Support groups. These groups include Alcoholics Anonymous and [employer] (Self-Management and Recovery Training). Some people are able to stop or cut back on drinking with help from a counselor. People who have moderate to severe alcohol use disorder may need medical treatment. They may need to stay in a hospital or treatment center. You may have a treatment team to help you. This team may include a psychologist or psychiatrist, counselors, doctors, social workers, nurses, and a . A  helps plan and manage your treatment. Follow-up care is a key part of your treatment and safety. Be sure to make and go to all appointments, and call your doctor if you are having problems. It's also a good idea to know your test results and keep a list of the medicines you take. Where can you learn more?   Go to http://www.gray.com/  Enter [id number] in the search box to learn more about \"Learning About Alcohol Use Disorder. \"  Current as of: June 29, 2020               Content Version: 12.6  © 0476-0222 [employer]. Care instructions adapted under license by [employer] (which disclaims liability or warranty for this information). If you have questions about a medical condition or this instruction, always ask your healthcare professional. [place] [address] any warranty or liability for your use of this information.

## 2020-09-21 NOTE — ED NOTES
Several attempts have been made to place IV by multiple staff members with no success. MD aware, will place US guided IV.

## 2020-09-23 LAB
ATRIAL RATE: 122 BPM
CALCULATED P AXIS, ECG09: 80 DEGREES
CALCULATED R AXIS, ECG10: 81 DEGREES
CALCULATED T AXIS, ECG11: 76 DEGREES
DIAGNOSIS, 93000: NORMAL
P-R INTERVAL, ECG05: 130 MS
Q-T INTERVAL, ECG07: 324 MS
QRS DURATION, ECG06: 70 MS
QTC CALCULATION (BEZET), ECG08: 461 MS
VENTRICULAR RATE, ECG03: 122 BPM

## 2020-09-25 ENCOUNTER — HOSPITAL ENCOUNTER (EMERGENCY)
Age: 55
Discharge: HOME | End: 2020-09-26
Attending: EMERGENCY MEDICINE
Payer: MEDICARE

## 2020-09-25 ENCOUNTER — APPOINTMENT (OUTPATIENT)
Dept: GENERAL RADIOLOGY | Age: 55
End: 2020-09-25
Attending: EMERGENCY MEDICINE
Payer: MEDICARE

## 2020-09-25 DIAGNOSIS — R07.9 CHEST PAIN, UNSPECIFIED TYPE: Primary | ICD-10-CM

## 2020-09-25 PROCEDURE — 71045 X-RAY EXAM CHEST 1 VIEW: CPT

## 2020-09-25 PROCEDURE — 93005 ELECTROCARDIOGRAM TRACING: CPT

## 2020-09-25 PROCEDURE — 99285 EMERGENCY DEPT VISIT HI MDM: CPT

## 2020-09-26 VITALS
BODY MASS INDEX: 20.38 KG/M2 | WEIGHT: 115 LBS | HEART RATE: 115 BPM | HEIGHT: 63 IN | TEMPERATURE: 99.7 F | RESPIRATION RATE: 23 BRPM | DIASTOLIC BLOOD PRESSURE: 98 MMHG | OXYGEN SATURATION: 98 % | SYSTOLIC BLOOD PRESSURE: 163 MMHG

## 2020-09-26 LAB
ALBUMIN SERPL-MCNC: 3.8 G/DL (ref 3.4–5)
ALBUMIN/GLOB SERPL: 0.9 {RATIO} (ref 0.8–1.7)
ALP SERPL-CCNC: 106 U/L (ref 45–117)
ALT SERPL-CCNC: 44 U/L (ref 13–56)
ANION GAP SERPL CALC-SCNC: 13 MMOL/L (ref 3–18)
AST SERPL-CCNC: 102 U/L (ref 10–38)
BASOPHILS # BLD: 0 K/UL (ref 0–0.1)
BASOPHILS NFR BLD: 0 % (ref 0–2)
BILIRUB SERPL-MCNC: 1.1 MG/DL (ref 0.2–1)
BUN SERPL-MCNC: 19 MG/DL (ref 7–18)
BUN/CREAT SERPL: 21 (ref 12–20)
CALCIUM SERPL-MCNC: 10.6 MG/DL (ref 8.5–10.1)
CHLORIDE SERPL-SCNC: 90 MMOL/L (ref 100–111)
CO2 SERPL-SCNC: 26 MMOL/L (ref 21–32)
CREAT SERPL-MCNC: 0.91 MG/DL (ref 0.6–1.3)
DIFFERENTIAL METHOD BLD: ABNORMAL
EOSINOPHIL # BLD: 0 K/UL (ref 0–0.4)
EOSINOPHIL NFR BLD: 0 % (ref 0–5)
ERYTHROCYTE [DISTWIDTH] IN BLOOD BY AUTOMATED COUNT: 14.8 % (ref 11.6–14.5)
ETHANOL SERPL-MCNC: <3 MG/DL (ref 0–3)
GLOBULIN SER CALC-MCNC: 4.1 G/DL (ref 2–4)
GLUCOSE SERPL-MCNC: 80 MG/DL (ref 74–99)
HCT VFR BLD AUTO: 40 % (ref 35–45)
HGB BLD-MCNC: 13.8 G/DL (ref 12–16)
LIPASE SERPL-CCNC: 86 U/L (ref 73–393)
LYMPHOCYTES # BLD: 2.6 K/UL (ref 0.9–3.6)
LYMPHOCYTES NFR BLD: 28 % (ref 21–52)
MCH RBC QN AUTO: 33.2 PG (ref 24–34)
MCHC RBC AUTO-ENTMCNC: 34.5 G/DL (ref 31–37)
MCV RBC AUTO: 96.2 FL (ref 74–97)
MONOCYTES # BLD: 0.7 K/UL (ref 0.05–1.2)
MONOCYTES NFR BLD: 7 % (ref 3–10)
NEUTS SEG # BLD: 6 K/UL (ref 1.8–8)
NEUTS SEG NFR BLD: 65 % (ref 40–73)
PLATELET # BLD AUTO: 110 K/UL (ref 135–420)
PMV BLD AUTO: 10.8 FL (ref 9.2–11.8)
POTASSIUM SERPL-SCNC: 5 MMOL/L (ref 3.5–5.5)
PROT SERPL-MCNC: 7.9 G/DL (ref 6.4–8.2)
RBC # BLD AUTO: 4.16 M/UL (ref 4.2–5.3)
SODIUM SERPL-SCNC: 129 MMOL/L (ref 136–145)
TROPONIN I SERPL-MCNC: <0.02 NG/ML (ref 0–0.04)
WBC # BLD AUTO: 9.3 K/UL (ref 4.6–13.2)

## 2020-09-26 PROCEDURE — 84484 ASSAY OF TROPONIN QUANT: CPT

## 2020-09-26 PROCEDURE — 85025 COMPLETE CBC W/AUTO DIFF WBC: CPT

## 2020-09-26 PROCEDURE — 74011250636 HC RX REV CODE- 250/636: Performed by: EMERGENCY MEDICINE

## 2020-09-26 PROCEDURE — 80307 DRUG TEST PRSMV CHEM ANLYZR: CPT

## 2020-09-26 PROCEDURE — 83690 ASSAY OF LIPASE: CPT

## 2020-09-26 PROCEDURE — 80053 COMPREHEN METABOLIC PANEL: CPT

## 2020-09-26 RX ADMIN — SODIUM CHLORIDE 1000 ML: 900 INJECTION, SOLUTION INTRAVENOUS at 00:35

## 2020-09-26 NOTE — ED PROVIDER NOTES
64 yo CF with PMHx alcohol abuse presents by EMS with chest pain. Pt states she has pain all the way down esophagus into chest, states same pain she get with drinking and notes that pain has not gotten any better and she has kept drinking. Pt has had some cough and some vomiting. No fevers. Pt was in ED recently for same. Past Medical History:   Diagnosis Date    Alcohol abuse     Anxiety     Arrhythmia     Tacchycardai    Asthma     controlled    Bipolar disorder ([address]     Common migraine     COPD (chronic obstructive pulmonary disease) (HCC)     Home O2  PRN ,  pt use also for Tachycardia    Depression     Esophageal reflux     Gout     Hypertension     Hypocitraturia     Inverted nipple     Left breast always have.     Kidney stone on left side     Pancreatitis     Recurrent UTI     Staghorn renal calculus     Urine leukocytes        Past Surgical History:   Procedure Laterality Date    HX COLONOSCOPY      HX CYST REMOVAL Left     x 3 surgeries    HX ENDOSCOPY      HX GYN      tubal ligation    HX LUMBAR LAMINECTOMY      HX UROLOGICAL  08/10/2018    Cysto, bilat RPG, left ureteral pyeloscopy, HLL, left JJ stent placement, Dr. Soy Mijares         Family History:   Family history unknown: Yes       Social History     Socioeconomic History    Marital status:      Spouse name: Not on file    Number of children: Not on file    Years of education: Not on file    Highest education level: Not on file   Occupational History    Not on file   Social Needs    Financial resource strain: Not on file    Food insecurity     Worry: Not on file     Inability: Not on file   [employer] needs     Medical: Not on file     Non-medical: Not on file   Tobacco Use    Smoking status: Current Every Day Smoker     Packs/day: 1.50     Years: 33.00     Pack years: 49.50     Types: Cigarettes    Smokeless tobacco: Never Used   Substance and Sexual Activity    Alcohol use: Not Currently Comment: vodka    Drug use: No     Comment: 11years old- [employer]    Sexual activity: Not on file   Lifestyle    Physical activity     Days per week: Not on file     Minutes per session: Not on file    Stress: Not on file   Relationships    Social connections     Talks on phone: Not on file     Gets together: Not on file     Attends [employer] service: Not on file     Active member of club or organization: Not on file     Attends meetings of clubs or organizations: Not on file     Relationship status: Not on file    Intimate partner violence     Fear of current or ex partner: Not on file     Emotionally abused: Not on file     Physically abused: Not on file     Forced sexual activity: Not on file   Other Topics Concern    Not on file   Social History Narrative    Not on file         ALLERGIES: Lithium; Amitriptyline; and Elavil    Review of Systems   Constitutional: Negative for fever. HENT: Negative for trouble swallowing. Respiratory: Positive for cough. Negative for shortness of breath. Cardiovascular: Positive for chest pain. Gastrointestinal: Positive for vomiting. Negative for abdominal pain. Genitourinary: Negative for difficulty urinating. Skin: Negative for wound. Neurological: Negative for syncope. Psychiatric/Behavioral: Negative for behavioral problems. All other systems reviewed and are negative. Vitals:    09/25/20 2236 09/25/20 2245 09/26/20 0130 09/26/20 0200   BP: (!) 159/83 (!) 139/99 (!) 149/103 (!) 162/91   Pulse: (!) 110 (!) 115     Resp: 19 23     Temp: 99.7 °F (37.6 °C)      SpO2: 98% 98% 97% 98%   Weight: 52.2 kg (115 lb)      Height: 5' 2.5\" (1.588 m)               Physical Exam  Vitals signs and nursing note reviewed. Constitutional:       General: She is not in acute distress. Appearance: She is well-developed. Comments: Chronically ill-appearing, nad   HENT:      Head: Normocephalic and atraumatic.    Neck:      Musculoskeletal: Normal range of motion. Cardiovascular:      Rate and Rhythm: Tachycardia present. Pulses: Normal pulses. Pulmonary:      Effort: Pulmonary effort is normal. No respiratory distress. Breath sounds: Normal breath sounds. Abdominal:      Palpations: Abdomen is soft. Tenderness: There is no abdominal tenderness. Musculoskeletal: Normal range of motion. Right lower leg: No edema. Left lower leg: No edema. Comments: Mechanically stable   Skin:     General: Skin is warm. Neurological:      General: No focal deficit present. Mental Status: She is alert and oriented to person, place, and time. Comments: No focal deficits noted   Psychiatric:         Behavior: Behavior normal.          MDM  Number of Diagnoses or Management Options  Chest pain, unspecified type:   Diagnosis management comments: 62 yo CF with PMHx alcohol abuse presents by EMS with chest pain. No fevers. Examination with tachycardia but otherwise unremarkable. Pt in ED recently for same. Will evaluate for acute process, interval changes. 12:28 AM  Nursing unable to establish IV access. Using ultra-sound for guidance, I placed a 18-gauge IV to right upper arm. Good flow and blood return. Dressing was placed. Patient tolerated well without any immediate complications. 2:04 AM  Trop neg, etoh <3.  Work-up otherwise unremarkable. Pt doing ok. Discussed results and poc for dc home, symptom management, follow-up, return precautions.          Amount and/or Complexity of Data Reviewed  Clinical lab tests: ordered and reviewed  Tests in the radiology section of CPT®: ordered and reviewed  Obtain history from someone other than the patient: yes  Review and summarize past medical records: yes  Independent visualization of images, tracings, or specimens: yes    Patient Progress  Patient progress: stable         EKG    Date/Time: 9/25/2020 10:40 PM  Performed by: Lucina Patel MD  Authorized by: Lucina Patel MD ECG reviewed by ED Physician in the absence of a cardiologist: yes    Previous ECG:     Previous ECG:  Compared to current    Comparison ECG info:  9/21/20    Similarity:  No change  Interpretation:     Interpretation: non-specific    Rate:     ECG rate:  119    ECG rate assessment: tachycardic    Rhythm:     Rhythm: sinus tachycardia    Ectopy:     Ectopy: none    QRS:     QRS axis:  Normal  Conduction:     Conduction: normal    ST segments:     ST segments:  Normal  T waves:     T waves: normal        PROGRESS NOTES    10:46 PM:   Mando Estrada MD arrives to the bedside to evaluate the patient. Answered the patient's questions regarding the treatment plan.       CONSULTATIONS  None      MEDICATIONS ORDERED  Medications   sodium chloride 0.9 % bolus infusion 1,000 mL (1,000 mL IntraVENous New Bag 9/26/20 0035)       RADIOLOGY INTERPRETATIONS  XR CHEST PORT    (Results Pending)       EKG READINGS/LABORATORY RESULTS  Recent Results (from the past 12 hour(s))   EKG, 12 LEAD, INITIAL    Collection Time: 09/25/20 10:31 PM   Result Value Ref Range    Ventricular Rate 119 BPM    Atrial Rate 119 BPM    P-R Interval 112 ms    QRS Duration 78 ms    Q-T Interval 304 ms    QTC Calculation (Bezet) 427 ms    Calculated R Axis 146 degrees    Calculated T Axis 146 degrees    Diagnosis       Sinus tachycardia  Left posterior fascicular block  Minimal voltage criteria for LVH, may be normal variant ( [place] product )  Abnormal ECG  When compared with ECG of 21-SEP-2020 19:21,  QRS axis shifted right  T wave amplitude has decreased in Inferior leads     CBC WITH AUTOMATED DIFF    Collection Time: 09/26/20 12:42 AM   Result Value Ref Range    WBC 9.3 4.6 - 13.2 K/uL    RBC 4.16 (L) 4.20 - 5.30 M/uL    HGB 13.8 12.0 - 16.0 g/dL    HCT 40.0 35.0 - 45.0 %    MCV 96.2 74.0 - 97.0 FL    MCH 33.2 24.0 - 34.0 PG    MCHC 34.5 31.0 - 37.0 g/dL    RDW 14.8 (H) 11.6 - 14.5 %    PLATELET 951 (L) 478 - 420 K/uL    MPV 10.8 9.2 - 11.8 FL NEUTROPHILS 65 40 - 73 %    LYMPHOCYTES 28 21 - 52 %    MONOCYTES 7 3 - 10 %    EOSINOPHILS 0 0 - 5 %    BASOPHILS 0 0 - 2 %    ABS. NEUTROPHILS 6.0 1.8 - 8.0 K/UL    ABS. LYMPHOCYTES 2.6 0.9 - 3.6 K/UL    ABS. MONOCYTES 0.7 0.05 - 1.2 K/UL    ABS. EOSINOPHILS 0.0 0.0 - 0.4 K/UL    ABS. BASOPHILS 0.0 0.0 - 0.1 K/UL    DF AUTOMATED     METABOLIC PANEL, COMPREHENSIVE    Collection Time: 09/26/20 12:42 AM   Result Value Ref Range    Sodium 129 (L) 136 - 145 mmol/L    Potassium 5.0 3.5 - 5.5 mmol/L    Chloride 90 (L) 100 - 111 mmol/L    CO2 26 21 - 32 mmol/L    Anion gap 13 3.0 - 18 mmol/L    Glucose 80 74 - 99 mg/dL    BUN 19 (H) 7.0 - 18 MG/DL    Creatinine 0.91 0.6 - 1.3 MG/DL    BUN/Creatinine ratio 21 (H) 12 - 20      GFR est AA >60 >60 ml/min/1.73m2    GFR est non-AA >60 >60 ml/min/1.73m2    Calcium 10.6 (H) 8.5 - 10.1 MG/DL    Bilirubin, total 1.1 (H) 0.2 - 1.0 MG/DL    ALT (SGPT) 44 13 - 56 U/L    AST (SGOT) 102 (H) 10 - 38 U/L    Alk. phosphatase 106 45 - 117 U/L    Protein, total 7.9 6.4 - 8.2 g/dL    Albumin 3.8 3.4 - 5.0 g/dL    Globulin 4.1 (H) 2.0 - 4.0 g/dL    A-G Ratio 0.9 0.8 - 1.7     TROPONIN I    Collection Time: 09/26/20 12:42 AM   Result Value Ref Range    Troponin-I, QT <0.02 0.0 - 0.045 NG/ML   ETHYL ALCOHOL    Collection Time: 09/26/20 12:42 AM   Result Value Ref Range    ALCOHOL(ETHYL),SERUM <3 0 - 3 MG/DL   LIPASE    Collection Time: 09/26/20 12:42 AM   Result Value Ref Range    Lipase 86 73 - 393 U/L       ED DIAGNOSIS & DISPOSITION INFORMATION  Diagnosis:   1.  Chest pain, unspecified type        Disposition: Discharged    Follow-up Information     Follow up With Specialties Details Why Contact Info    Nancy Rizo MD Family Medicine Schedule an appointment as soon as possible for a visit  [address] EMERGENCY DEPT Emergency Medicine  As needed [address]  [phone]          Patient's Medications Start Taking    No medications on file   Continue Taking    BENZONATATE (TESSALON PERLE PO)    Take  by mouth as needed. Indications: cough    COLCHICINE 0.6 MG TABLET    Take 0.6 mg by mouth daily. DOCUSATE SODIUM (COLACE) 100 MG CAPSULE    Take 100 mg by mouth three (3) times daily. FOLIC ACID (FOLVITE) 1 MG TABLET    Take 1 Tab by mouth daily. IPRATROPIUM-ALBUTEROL (COMBIVENT RESPIMAT)  MCG/ACTUATION INHALER    Take 1 Puff by inhalation as needed for Wheezing. LAMOTRIGINE (LAMICTAL) 150 MG TABLET    Take 150 mg by mouth daily. LIDOCAINE (LIDODERM) 5 %    Apply patch to the affected area for 12 hours a day and remove for 12 hours a day. LIPASE-PROTEASE-AMYLASE (CREON 24,000) 24,000-76,000 -120,000 UNIT CAPSULE    Take 1 Cap by mouth three (3) times daily (with meals). MELATONIN 10 MG TAB    Take 10 mg by mouth nightly. MILK THISTLE PO    Take 175 mg by mouth two (2) times a day. MULTIVITAMIN (ONE A DAY) TABLET    Take 1 Tab by mouth daily. OMEPRAZOLE (PRILOSEC) 40 MG CAPSULE    Take 1 Cap by mouth daily. ONDANSETRON HCL (ZOFRAN) 4 MG TABLET    Take 2 Tabs by mouth every eight (8) hours as needed for Nausea. SUCRALFATE (CARAFATE) 100 MG/ML SUSPENSION    Take 10 mL by mouth Before breakfast, lunch, dinner and at bedtime. THIAMINE HCL (B-1) 100 MG TABLET    Take 1 Tab by mouth daily (with breakfast). TRAZODONE (DESYREL) 100 MG TABLET    Take 200 mg by mouth nightly.    These Medications have changed    No medications on file   Stop Taking    No medications on file           Emily Jones MD.

## 2020-09-26 NOTE — ED TRIAGE NOTES
Patient arriving via EMS for complaints of chest pain with radiation to left. Pain is reporoduceable with palpation. Patient denies shortness of breath , diaphoresis.  Patient states that she is an alcoholic has drank 3 boxes of wine in past 5 days

## 2020-09-27 LAB
ATRIAL RATE: 119 BPM
CALCULATED R AXIS, ECG10: 146 DEGREES
CALCULATED T AXIS, ECG11: 146 DEGREES
DIAGNOSIS, 93000: NORMAL
P-R INTERVAL, ECG05: 112 MS
Q-T INTERVAL, ECG07: 304 MS
QRS DURATION, ECG06: 78 MS
QTC CALCULATION (BEZET), ECG08: 427 MS
VENTRICULAR RATE, ECG03: 119 BPM

## 2020-10-19 ENCOUNTER — APPOINTMENT (OUTPATIENT)
Dept: CT IMAGING | Age: 55
DRG: 896 | End: 2020-10-19
Attending: PHYSICIAN ASSISTANT
Payer: MEDICARE

## 2020-10-19 ENCOUNTER — HOSPITAL ENCOUNTER (INPATIENT)
Age: 55
LOS: 2 days | Discharge: HOME | DRG: 896 | End: 2020-10-22
Attending: EMERGENCY MEDICINE | Admitting: HOSPITALIST
Payer: MEDICARE

## 2020-10-19 ENCOUNTER — APPOINTMENT (OUTPATIENT)
Dept: GENERAL RADIOLOGY | Age: 55
DRG: 896 | End: 2020-10-19
Attending: PHYSICIAN ASSISTANT
Payer: MEDICARE

## 2020-10-19 DIAGNOSIS — S06.5X0A TRAUMATIC SUBDURAL HEMORRHAGE WITHOUT LOSS OF CONSCIOUSNESS, INITIAL ENCOUNTER (HCC): ICD-10-CM

## 2020-10-19 DIAGNOSIS — E87.1 HYPONATREMIA: ICD-10-CM

## 2020-10-19 DIAGNOSIS — S20.219A CONTUSION OF CHEST WALL, UNSPECIFIED LATERALITY, INITIAL ENCOUNTER: ICD-10-CM
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
## 2020-10-19 DIAGNOSIS — R79.89 ELEVATED LFTS: ICD-10-CM

## 2020-10-19 DIAGNOSIS — F10.930 ALCOHOL WITHDRAWAL SYNDROME WITHOUT COMPLICATION (HCC): ICD-10-CM

## 2020-10-19 DIAGNOSIS — R07.9 CHEST PAIN, UNSPECIFIED TYPE: ICD-10-CM

## 2020-10-19 DIAGNOSIS — S22.31XA CLOSED FRACTURE OF ONE RIB OF RIGHT SIDE, INITIAL ENCOUNTER: ICD-10-CM

## 2020-10-19 DIAGNOSIS — D72.819 LEUKOPENIA, UNSPECIFIED TYPE: ICD-10-CM

## 2020-10-19 DIAGNOSIS — S06.5XAA SUBDURAL HEMATOMA: Primary | ICD-10-CM

## 2020-10-19 LAB
ALBUMIN SERPL-MCNC: 3.3 G/DL (ref 3.4–5)
ALBUMIN/GLOB SERPL: 1 {RATIO} (ref 0.8–1.7)
ALP SERPL-CCNC: 118 U/L (ref 45–117)
ALT SERPL-CCNC: 97 U/L (ref 13–56)
ANION GAP SERPL CALC-SCNC: 7 MMOL/L (ref 3–18)
AST SERPL-CCNC: 122 U/L (ref 10–38)
BASOPHILS # BLD: 0 K/UL (ref 0–0.1)
BASOPHILS NFR BLD: 0 % (ref 0–2)
BILIRUB SERPL-MCNC: 1.3 MG/DL (ref 0.2–1)
BUN SERPL-MCNC: 22 MG/DL (ref 7–18)
BUN/CREAT SERPL: 26 (ref 12–20)
CALCIUM SERPL-MCNC: 8.4 MG/DL (ref 8.5–10.1)
CHLORIDE SERPL-SCNC: 94 MMOL/L (ref 100–111)
CK MB CFR SERPL CALC: 0.4 % (ref 0–4)
CK MB SERPL-MCNC: 4.6 NG/ML (ref 5–25)
CK SERPL-CCNC: 1113 U/L (ref 26–192)
CO2 SERPL-SCNC: 29 MMOL/L (ref 21–32)
CREAT SERPL-MCNC: 0.85 MG/DL (ref 0.6–1.3)
DIFFERENTIAL METHOD BLD: ABNORMAL
EOSINOPHIL # BLD: 0 K/UL (ref 0–0.4)
EOSINOPHIL NFR BLD: 0 % (ref 0–5)
ERYTHROCYTE [DISTWIDTH] IN BLOOD BY AUTOMATED COUNT: 14.9 % (ref 11.6–14.5)
GLOBULIN SER CALC-MCNC: 3.4 G/DL (ref 2–4)
GLUCOSE SERPL-MCNC: 81 MG/DL (ref 74–99)
HCT VFR BLD AUTO: 28.7 % (ref 35–45)
HGB BLD-MCNC: 9.7 G/DL (ref 12–16)
LYMPHOCYTES # BLD: 1.9 K/UL (ref 0.9–3.6)
LYMPHOCYTES NFR BLD: 25 % (ref 21–52)
MAGNESIUM SERPL-MCNC: 1.9 MG/DL (ref 1.6–2.6)
MCH RBC QN AUTO: 33.3 PG (ref 24–34)
MCHC RBC AUTO-ENTMCNC: 33.8 G/DL (ref 31–37)
MCV RBC AUTO: 98.6 FL (ref 74–97)
MONOCYTES # BLD: 0.4 K/UL (ref 0.05–1.2)
MONOCYTES NFR BLD: 5 % (ref 3–10)
NEUTS SEG # BLD: 5.4 K/UL (ref 1.8–8)
NEUTS SEG NFR BLD: 70 % (ref 40–73)
PLATELET # BLD AUTO: 141 K/UL (ref 135–420)
PMV BLD AUTO: 11.1 FL (ref 9.2–11.8)
POTASSIUM SERPL-SCNC: 4.8 MMOL/L (ref 3.5–5.5)
PROT SERPL-MCNC: 6.7 G/DL (ref 6.4–8.2)
RBC # BLD AUTO: 2.91 M/UL (ref 4.2–5.3)
SODIUM SERPL-SCNC: 130 MMOL/L (ref 136–145)
TROPONIN I SERPL-MCNC: <0.02 NG/ML (ref 0–0.04)
WBC # BLD AUTO: 7.7 K/UL (ref 4.6–13.2)

## 2020-10-19 PROCEDURE — 99285 EMERGENCY DEPT VISIT HI MDM: CPT

## 2020-10-19 PROCEDURE — 73630 X-RAY EXAM OF FOOT: CPT

## 2020-10-19 PROCEDURE — 74011250637 HC RX REV CODE- 250/637: Performed by: PHYSICIAN ASSISTANT

## 2020-10-19 PROCEDURE — 96375 TX/PRO/DX INJ NEW DRUG ADDON: CPT

## 2020-10-19 PROCEDURE — 93005 ELECTROCARDIOGRAM TRACING: CPT

## 2020-10-19 PROCEDURE — 74011250636 HC RX REV CODE- 250/636: Performed by: PHYSICIAN ASSISTANT

## 2020-10-19 PROCEDURE — 71250 CT THORAX DX C-: CPT

## 2020-10-19 PROCEDURE — 82550 ASSAY OF CK (CPK): CPT

## 2020-10-19 PROCEDURE — 80053 COMPREHEN METABOLIC PANEL: CPT

## 2020-10-19 PROCEDURE — 74011000250 HC RX REV CODE- 250: Performed by: PHYSICIAN ASSISTANT

## 2020-10-19 PROCEDURE — 96365 THER/PROPH/DIAG IV INF INIT: CPT

## 2020-10-19 PROCEDURE — 70450 CT HEAD/BRAIN W/O DYE: CPT

## 2020-10-19 PROCEDURE — 83735 ASSAY OF MAGNESIUM: CPT

## 2020-10-19 PROCEDURE — 74011250636 HC RX REV CODE- 250/636: Performed by: EMERGENCY MEDICINE

## 2020-10-19 PROCEDURE — 72125 CT NECK SPINE W/O DYE: CPT

## 2020-10-19 PROCEDURE — 74011250637 HC RX REV CODE- 250/637: Performed by: EMERGENCY MEDICINE

## 2020-10-19 PROCEDURE — 85025 COMPLETE CBC W/AUTO DIFF WBC: CPT

## 2020-10-19 PROCEDURE — 96366 THER/PROPH/DIAG IV INF ADDON: CPT

## 2020-10-19 RX ORDER — ONDANSETRON 2 MG/ML
4 INJECTION INTRAMUSCULAR; INTRAVENOUS
Status: COMPLETED | OUTPATIENT
Start: 2020-10-19 | End: 2020-10-19

## 2020-10-19 RX ORDER — OXYCODONE AND ACETAMINOPHEN 5; 325 MG/1; MG/1
1 TABLET ORAL
Status: COMPLETED | OUTPATIENT
Start: 2020-10-19 | End: 2020-10-19

## 2020-10-19 RX ORDER — LORAZEPAM 2 MG/ML
1 INJECTION INTRAMUSCULAR
Status: DISCONTINUED | OUTPATIENT
Start: 2020-10-19 | End: 2020-10-19

## 2020-10-19 RX ORDER — MORPHINE SULFATE 4 MG/ML
4 INJECTION, SOLUTION INTRAMUSCULAR; INTRAVENOUS
Status: DISCONTINUED | OUTPATIENT
Start: 2020-10-19 | End: 2020-10-20 | Stop reason: HOSPADM

## 2020-10-19 RX ORDER — OXYCODONE HYDROCHLORIDE 5 MG/1
10 TABLET ORAL
Status: DISCONTINUED | OUTPATIENT
Start: 2020-10-19 | End: 2020-10-20 | Stop reason: HOSPADM

## 2020-10-19 RX ORDER — MORPHINE SULFATE 4 MG/ML
4 INJECTION, SOLUTION INTRAMUSCULAR; INTRAVENOUS
Status: COMPLETED | OUTPATIENT
Start: 2020-10-19 | End: 2020-10-19

## 2020-10-19 RX ORDER — LORAZEPAM 0.5 MG/1
0.5 TABLET ORAL
Status: COMPLETED | OUTPATIENT
Start: 2020-10-19 | End: 2020-10-19

## 2020-10-19 RX ORDER — HYDROMORPHONE HYDROCHLORIDE 1 MG/ML
0.5 INJECTION, SOLUTION INTRAMUSCULAR; INTRAVENOUS; SUBCUTANEOUS
Status: DISCONTINUED | OUTPATIENT
Start: 2020-10-19 | End: 2020-10-19

## 2020-10-19 RX ADMIN — FOLIC ACID: 5 INJECTION, SOLUTION INTRAMUSCULAR; INTRAVENOUS; SUBCUTANEOUS at 20:31

## 2020-10-19 RX ADMIN — LORAZEPAM 0.5 MG: 0.5 TABLET ORAL at 18:56

## 2020-10-19 RX ADMIN — MORPHINE SULFATE 4 MG: 4 INJECTION, SOLUTION INTRAMUSCULAR; INTRAVENOUS at 20:25

## 2020-10-19 RX ADMIN — OXYCODONE HYDROCHLORIDE AND ACETAMINOPHEN 1 TABLET: 5; 325 TABLET ORAL at 18:56

## 2020-10-19 RX ADMIN — OXYCODONE 10 MG: 5 TABLET ORAL at 22:59

## 2020-10-19 RX ADMIN — ONDANSETRON 4 MG: 2 INJECTION INTRAMUSCULAR; INTRAVENOUS at 20:27
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
## 2020-10-19 NOTE — ED NOTES
Assumed care of pt at this time. Pt is resting. Have been unable to obtain IV access. Will attempt US guided. Pt is calm and cooperative. She has been evaluated by teleneuro. She has no deficits and is a zero.   She is aware that she will be admitted to the hospital.

## 2020-10-20 ENCOUNTER — HOSPITAL ENCOUNTER (EMERGENCY)
Age: 55
Discharge: OTHER INSTITUTION | End: 2020-10-20
Attending: EMERGENCY MEDICINE | Admitting: EMERGENCY MEDICINE
Payer: MEDICARE

## 2020-10-20 ENCOUNTER — APPOINTMENT (OUTPATIENT)
Dept: MRI IMAGING | Age: 55
End: 2020-10-20
Attending: EMERGENCY MEDICINE
Payer: MEDICARE

## 2020-10-20 PROBLEM — S06.5XAA SUBDURAL HEMATOMA, POST-TRAUMATIC: Status: ACTIVE | Noted: 2020-10-20

## 2020-10-20 PROBLEM — G93.41 ACUTE METABOLIC ENCEPHALOPATHY: Status: ACTIVE | Noted: 2020-10-20

## 2020-10-20 LAB
ATRIAL RATE: 112 BPM
CALCULATED P AXIS, ECG09: 76 DEGREES
CALCULATED R AXIS, ECG10: 75 DEGREES
CALCULATED T AXIS, ECG11: 73 DEGREES
DIAGNOSIS, 93000: NORMAL
P-R INTERVAL, ECG05: 134 MS
Q-T INTERVAL, ECG07: 314 MS
QRS DURATION, ECG06: 70 MS
QTC CALCULATION (BEZET), ECG08: 428 MS
VENTRICULAR RATE, ECG03: 112 BPM

## 2020-10-20 PROCEDURE — 74011250637 HC RX REV CODE- 250/637: Performed by: PHYSICIAN ASSISTANT

## 2020-10-20 PROCEDURE — 74011250636 HC RX REV CODE- 250/636: Performed by: PHYSICIAN ASSISTANT

## 2020-10-20 PROCEDURE — 74011636320 HC RX REV CODE- 636/320: Performed by: EMERGENCY MEDICINE

## 2020-10-20 PROCEDURE — 96375 TX/PRO/DX INJ NEW DRUG ADDON: CPT

## 2020-10-20 PROCEDURE — 74011250637 HC RX REV CODE- 250/637: Performed by: HOSPITALIST

## 2020-10-20 PROCEDURE — 65660000000 HC RM CCU STEPDOWN

## 2020-10-20 PROCEDURE — 74011000258 HC RX REV CODE- 258: Performed by: PHYSICIAN ASSISTANT

## 2020-10-20 PROCEDURE — 75810000275 HC EMERGENCY DEPT VISIT NO LEVEL OF CARE

## 2020-10-20 PROCEDURE — 77030021352 HC CBL LD SYS DISP COVD -B

## 2020-10-20 PROCEDURE — 74011250636 HC RX REV CODE- 250/636: Performed by: EMERGENCY MEDICINE

## 2020-10-20 PROCEDURE — A9575 INJ GADOTERATE MEGLUMI 0.1ML: HCPCS | Performed by: EMERGENCY MEDICINE

## 2020-10-20 PROCEDURE — 96374 THER/PROPH/DIAG INJ IV PUSH: CPT

## 2020-10-20 PROCEDURE — 74011250636 HC RX REV CODE- 250/636: Performed by: HOSPITALIST

## 2020-10-20 PROCEDURE — 70553 MRI BRAIN STEM W/O & W/DYE: CPT

## 2020-10-20 PROCEDURE — 96376 TX/PRO/DX INJ SAME DRUG ADON: CPT

## 2020-10-20 PROCEDURE — 96366 THER/PROPH/DIAG IV INF ADDON: CPT

## 2020-10-20 PROCEDURE — 74011250637 HC RX REV CODE- 250/637: Performed by: EMERGENCY MEDICINE

## 2020-10-20 PROCEDURE — 2709999900 HC NON-CHARGEABLE SUPPLY

## 2020-10-20 RX ORDER — IBUPROFEN 200 MG
1 TABLET ORAL DAILY
Status: DISCONTINUED | OUTPATIENT
Start: 2020-10-20 | End: 2020-10-20 | Stop reason: HOSPADM

## 2020-10-20 RX ORDER — DIPHENHYDRAMINE HCL 25 MG
25 CAPSULE ORAL
Status: COMPLETED | OUTPATIENT
Start: 2020-10-20 | End: 2020-10-20

## 2020-10-20 RX ORDER — PANTOPRAZOLE SODIUM 40 MG/1
40 TABLET, DELAYED RELEASE ORAL
Status: DISCONTINUED | OUTPATIENT
Start: 2020-10-21 | End: 2020-10-22 | Stop reason: HOSPADM

## 2020-10-20 RX ORDER — QUETIAPINE FUMARATE 300 MG/1
600 TABLET, FILM COATED ORAL
Status: DISCONTINUED | OUTPATIENT
Start: 2020-10-20 | End: 2020-10-22 | Stop reason: HOSPADM

## 2020-10-20 RX ORDER — SODIUM CHLORIDE 0.9 % (FLUSH) 0.9 %
5-40 SYRINGE (ML) INJECTION AS NEEDED
Status: DISCONTINUED | OUTPATIENT
Start: 2020-10-20 | End: 2020-10-22 | Stop reason: HOSPADM

## 2020-10-20 RX ORDER — SODIUM CHLORIDE 9 MG/ML
75 INJECTION, SOLUTION INTRAVENOUS CONTINUOUS
Status: DISCONTINUED | OUTPATIENT
Start: 2020-10-20 | End: 2020-10-21

## 2020-10-20 RX ORDER — LORAZEPAM 1 MG/1
1 TABLET ORAL
Status: DISCONTINUED | OUTPATIENT
Start: 2020-10-20 | End: 2020-10-22 | Stop reason: HOSPADM

## 2020-10-20 RX ORDER — LORAZEPAM 1 MG/1
2 TABLET ORAL
Status: DISCONTINUED | OUTPATIENT
Start: 2020-10-20 | End: 2020-10-22 | Stop reason: HOSPADM

## 2020-10-20 RX ORDER — ACETAMINOPHEN 325 MG/1
650 TABLET ORAL
Status: DISCONTINUED | OUTPATIENT
Start: 2020-10-20 | End: 2020-10-22 | Stop reason: HOSPADM

## 2020-10-20 RX ORDER — BUSPIRONE HYDROCHLORIDE 15 MG/1
15 TABLET ORAL 3 TIMES DAILY
COMMUNITY

## 2020-10-20 RX ORDER — LORAZEPAM 2 MG/ML
1 INJECTION INTRAMUSCULAR
Status: DISCONTINUED | OUTPATIENT
Start: 2020-10-20 | End: 2020-10-22 | Stop reason: HOSPADM

## 2020-10-20 RX ORDER — DIPHENHYDRAMINE HYDROCHLORIDE 50 MG/ML
12.5 INJECTION, SOLUTION INTRAMUSCULAR; INTRAVENOUS ONCE
Status: COMPLETED | OUTPATIENT
Start: 2020-10-20 | End: 2020-10-20

## 2020-10-20 RX ORDER — QUETIAPINE FUMARATE 300 MG/1
600 TABLET, FILM COATED ORAL DAILY
COMMUNITY

## 2020-10-20 RX ORDER — TOPIRAMATE 100 MG/1
100 TABLET, FILM COATED ORAL 2 TIMES DAILY
COMMUNITY

## 2020-10-20 RX ORDER — CLONIDINE HYDROCHLORIDE 0.2 MG/1
0.2 TABLET ORAL 2 TIMES DAILY
COMMUNITY

## 2020-10-20 RX ORDER — QUETIAPINE FUMARATE 300 MG/1
600 TABLET, FILM COATED ORAL DAILY
Status: DISCONTINUED | OUTPATIENT
Start: 2020-10-21 | End: 2020-10-22

## 2020-10-20 RX ORDER — OXYCODONE HYDROCHLORIDE 5 MG/1
5 TABLET ORAL
Status: DISCONTINUED | OUTPATIENT
Start: 2020-10-20 | End: 2020-10-22 | Stop reason: HOSPADM

## 2020-10-20 RX ORDER — MORPHINE SULFATE 4 MG/ML
4 INJECTION, SOLUTION INTRAMUSCULAR; INTRAVENOUS
Status: COMPLETED | OUTPATIENT
Start: 2020-10-20 | End: 2020-10-20

## 2020-10-20 RX ORDER — COLCHICINE 0.6 MG/1
0.6 TABLET ORAL
Status: COMPLETED | OUTPATIENT
Start: 2020-10-20 | End: 2020-10-20

## 2020-10-20 RX ORDER — CLONIDINE HYDROCHLORIDE 0.1 MG/1
0.2 TABLET ORAL 2 TIMES DAILY
Status: DISCONTINUED | OUTPATIENT
Start: 2020-10-20 | End: 2020-10-22 | Stop reason: HOSPADM

## 2020-10-20 RX ORDER — LABETALOL HYDROCHLORIDE 5 MG/ML
20 INJECTION, SOLUTION INTRAVENOUS
Status: DISCONTINUED | OUTPATIENT
Start: 2020-10-20 | End: 2020-10-22 | Stop reason: HOSPADM

## 2020-10-20 RX ORDER — TRAZODONE HYDROCHLORIDE 100 MG/1
200 TABLET ORAL
Status: DISCONTINUED | OUTPATIENT
Start: 2020-10-20 | End: 2020-10-22 | Stop reason: HOSPADM

## 2020-10-20 RX ORDER — LORAZEPAM 2 MG/ML
2 INJECTION INTRAMUSCULAR
Status: COMPLETED | OUTPATIENT
Start: 2020-10-20 | End: 2020-10-20

## 2020-10-20 RX ORDER — SODIUM CHLORIDE 0.9 % (FLUSH) 0.9 %
5-40 SYRINGE (ML) INJECTION EVERY 8 HOURS
Status: DISCONTINUED | OUTPATIENT
Start: 2020-10-20 | End: 2020-10-22 | Stop reason: HOSPADM

## 2020-10-20 RX ORDER — ONDANSETRON 2 MG/ML
4 INJECTION INTRAMUSCULAR; INTRAVENOUS
Status: COMPLETED | OUTPATIENT
Start: 2020-10-20 | End: 2020-10-20

## 2020-10-20 RX ORDER — DIPHENHYDRAMINE HYDROCHLORIDE 50 MG/ML
12.5 INJECTION, SOLUTION INTRAMUSCULAR; INTRAVENOUS
Status: COMPLETED | OUTPATIENT
Start: 2020-10-20 | End: 2020-10-20

## 2020-10-20 RX ORDER — NALOXONE HYDROCHLORIDE 0.4 MG/ML
0.4 INJECTION, SOLUTION INTRAMUSCULAR; INTRAVENOUS; SUBCUTANEOUS AS NEEDED
Status: DISCONTINUED | OUTPATIENT
Start: 2020-10-20 | End: 2020-10-22 | Stop reason: HOSPADM

## 2020-10-20 RX ORDER — IPRATROPIUM BROMIDE AND ALBUTEROL SULFATE 2.5; .5 MG/3ML; MG/3ML
3 SOLUTION RESPIRATORY (INHALATION)
Status: DISCONTINUED | OUTPATIENT
Start: 2020-10-20 | End: 2020-10-22 | Stop reason: HOSPADM

## 2020-10-20 RX ORDER — LORAZEPAM 2 MG/ML
1 INJECTION INTRAMUSCULAR
Status: COMPLETED | OUTPATIENT
Start: 2020-10-20 | End: 2020-10-20

## 2020-10-20 RX ORDER — FOLIC ACID 1 MG/1
1 TABLET ORAL DAILY
Status: DISCONTINUED | OUTPATIENT
Start: 2020-10-21 | End: 2020-10-22 | Stop reason: HOSPADM

## 2020-10-20 RX ORDER — LORAZEPAM 2 MG/ML
3 INJECTION INTRAMUSCULAR
Status: DISCONTINUED | OUTPATIENT
Start: 2020-10-20 | End: 2020-10-22 | Stop reason: HOSPADM

## 2020-10-20 RX ORDER — MORPHINE SULFATE 2 MG/ML
1 INJECTION, SOLUTION INTRAMUSCULAR; INTRAVENOUS ONCE
Status: COMPLETED | OUTPATIENT
Start: 2020-10-20 | End: 2020-10-20

## 2020-10-20 RX ORDER — LORAZEPAM 2 MG/ML
2 INJECTION INTRAMUSCULAR
Status: DISCONTINUED | OUTPATIENT
Start: 2020-10-20 | End: 2020-10-22 | Stop reason: HOSPADM

## 2020-10-20 RX ORDER — ASPIRIN 325 MG/1
100 TABLET, FILM COATED ORAL DAILY
Status: DISCONTINUED | OUTPATIENT
Start: 2020-10-20 | End: 2020-10-20

## 2020-10-20 RX ORDER — COLCHICINE 0.6 MG/1
0.6 TABLET ORAL DAILY
Status: DISCONTINUED | OUTPATIENT
Start: 2020-10-21 | End: 2020-10-22 | Stop reason: HOSPADM

## 2020-10-20 RX ADMIN — LORAZEPAM 1 MG: 1 TABLET ORAL at 20:30

## 2020-10-20 RX ADMIN — THIAMINE HYDROCHLORIDE 500 MG: 100 INJECTION, SOLUTION INTRAMUSCULAR; INTRAVENOUS at 15:35

## 2020-10-20 RX ADMIN — COLCHICINE 0.6 MG: 0.6 TABLET, FILM COATED ORAL at 15:34

## 2020-10-20 RX ADMIN — MORPHINE SULFATE 4 MG: 4 INJECTION, SOLUTION INTRAMUSCULAR; INTRAVENOUS at 07:38

## 2020-10-20 RX ADMIN — COLCHICINE 0.6 MG: 0.6 TABLET, FILM COATED ORAL at 17:13

## 2020-10-20 RX ADMIN — QUETIAPINE FUMARATE 600 MG: 300 TABLET ORAL at 21:47

## 2020-10-20 RX ADMIN — LORAZEPAM 1 MG: 1 TABLET ORAL at 21:54

## 2020-10-20 RX ADMIN — DIPHENHYDRAMINE HYDROCHLORIDE 12.5 MG: 50 INJECTION, SOLUTION INTRAMUSCULAR; INTRAVENOUS at 03:59

## 2020-10-20 RX ADMIN — DIPHENHYDRAMINE HYDROCHLORIDE 12.5 MG: 50 INJECTION, SOLUTION INTRAMUSCULAR; INTRAVENOUS at 07:37

## 2020-10-20 RX ADMIN — LORAZEPAM 1 MG: 1 TABLET ORAL at 15:34

## 2020-10-20 RX ADMIN — COLCHICINE 0.6 MG: 0.6 TABLET, FILM COATED ORAL at 21:47

## 2020-10-20 RX ADMIN — Medication 100 MG: at 10:42

## 2020-10-20 RX ADMIN — ONDANSETRON 4 MG: 2 INJECTION INTRAMUSCULAR; INTRAVENOUS at 07:38

## 2020-10-20 RX ADMIN — MORPHINE SULFATE 1 MG: 2 INJECTION, SOLUTION INTRAMUSCULAR; INTRAVENOUS at 10:41

## 2020-10-20 RX ADMIN — THIAMINE HYDROCHLORIDE 500 MG: 100 INJECTION, SOLUTION INTRAMUSCULAR; INTRAVENOUS at 21:47

## 2020-10-20 RX ADMIN — LORAZEPAM 1 MG: 2 INJECTION, SOLUTION INTRAMUSCULAR; INTRAVENOUS at 09:08

## 2020-10-20 RX ADMIN — OXYCODONE 5 MG: 5 TABLET ORAL at 17:39

## 2020-10-20 RX ADMIN — DIPHENHYDRAMINE HYDROCHLORIDE 25 MG: 25 CAPSULE ORAL at 00:22

## 2020-10-20 RX ADMIN — LORAZEPAM 1 MG: 2 INJECTION INTRAMUSCULAR; INTRAVENOUS at 04:00

## 2020-10-20 RX ADMIN — GADOTERATE MEGLUMINE 10 ML: 376.9 INJECTION INTRAVENOUS at 04:59

## 2020-10-20 RX ADMIN — CLONIDINE HYDROCHLORIDE 0.2 MG: 0.1 TABLET ORAL at 22:04

## 2020-10-20 RX ADMIN — Medication 10 ML: at 21:48

## 2020-10-20 RX ADMIN — LORAZEPAM 2 MG: 2 INJECTION, SOLUTION INTRAMUSCULAR; INTRAVENOUS at 07:23

## 2020-10-20 RX ADMIN — SODIUM CHLORIDE 200 ML/HR: 900 INJECTION, SOLUTION INTRAVENOUS at 09:13
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
## 2020-10-20 NOTE — PROGRESS NOTES
Problem: Discharge Planning  Goal: *Discharge to safe environment  Outcome: Progressing Towards Goal     Problem: Falls - Risk of  Goal: *Absence of Falls  Description: Document Dana Mac Fall Risk and appropriate interventions in the flowsheet.   Outcome: Progressing Towards Goal  Note: Fall Risk Interventions:  Mobility Interventions: Bed/chair exit alarm    Mentation Interventions: Bed/chair exit alarm    Medication Interventions: Bed/chair exit alarm    Elimination Interventions: Bed/chair exit alarm    History of Falls Interventions: Bed/chair exit alarm         Problem: Patient Education: Go to Patient Education Activity  Goal: Patient/Family Education  Outcome: Progressing Towards Goal     Problem: Pain  Goal: *Control of Pain  Outcome: Progressing Towards Goal     Problem: Alcohol Withdrawal  Goal: *STG: Participates in treatment plan  Outcome: Progressing Towards Goal  Goal: *STG: Remains safe in hospital  Outcome: Progressing Towards Goal  Goal: *STG: Seeks staff when symptoms of withdrawal increase  Outcome: Progressing Towards Goal  Goal: *STG: Complies with medication therapy  Outcome: Progressing Towards Goal  Goal: *STG: Attends activities and groups  Outcome: Progressing Towards Goal  Goal: *STG: Will identify negative impact of chemical dependency including the use of tobacco, alcohol, and other substances  Outcome: Progressing Towards Goal  Goal: *STG: Verbalizes abstinence as an achievable goal  Outcome: Progressing Towards Goal  Goal: *STG: Agrees to participate in outpatient after care program to support ongoing mental health  Outcome: Progressing Towards Goal  Goal: *STG: Able to indentify relapse triggers including interpersonal/social and familial factors  Outcome: Progressing Towards Goal  Goal: *STG: Identify lifestyle changes to support long term sobriety such as vocation, employment, education, and legal issues  Outcome: Progressing Towards Goal  Goal: *STG: Maintains appropriate nutrition and hydration  Outcome: Progressing Towards Goal  Goal: *STG: Vital signs within defined limits  Outcome: Progressing Towards Goal  Goal: *STG/LTG: Relapse prevention plan in place to include housing/aftercare, leisure activities, and spirituality  Outcome: Progressing Towards Goal  Goal: Interventions  Outcome: Progressing Towards Goal     Problem: Patient Education: Go to Patient Education Activity  Goal: Patient/Family Education  Outcome: Progressing Towards Goal

## 2020-10-20 NOTE — ED NOTES
Attempting to give report. Nurse unavailable at this time. States Jane Stephens will call when ready.

## 2020-10-20 NOTE — ED NOTES
Still awaiting transport. Pt states she has gotten some relief with oral pain meds, no distress at this time.
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
## 2020-10-20 NOTE — ED NOTES
MRI tech at [facility] paged to coordinate transport [facility] for scan

## 2020-10-20 NOTE — PROGRESS NOTES
conducted an initial consultation and Spiritual Assessment for Amor Rich, who is a 63 y.o.,female. Patients Primary Language is: [place]. According to the patients EMR Scientology Affiliation is: [place].      The reason the Patient came to the hospital is:   Patient Active Problem List    Diagnosis Date Noted    Bipolar disorder (manic depression) ([facility] 03/08/2013     Priority: 1 - One    [facility], [facility] 10/20/2020    Acute metabolic encephalopathy 27/90/7015    Acute urinary retention 05/03/2020    Hypertensive urgency 05/01/2020    Hyperkalemia 10/11/2019    Common bile duct dilatation 10/11/2019    Chest pain 05/23/2019    Esophagitis 05/23/2019    Abdominal pain 53/87/1521    Metabolic acidosis 52/06/4971    NSTEMI (non-ST elevated myocardial infarction) ([facility] 01/15/2019    Rhabdomyolysis 01/15/2019    Upper GI bleed 01/15/2019    Recurrent UTI (urinary tract infection) 04/24/2018    Staghorn renal calculus 04/24/2018    UTI (urinary tract infection) 03/11/2018    Bronchitis 03/11/2018    Syncope 03/08/2018    Alcohol withdrawal ([facility] 03/08/2018    GI bleed 03/08/2018    Hyponatremia 03/08/2018    Hypercalcemia 03/08/2018    Dizziness 09/14/2017    Pancreatitis 09/14/2017    Hypokalemia 09/14/2017    Anemia 09/14/2017    Encephalopathy 09/14/2017    Nicotine withdrawal 02/27/2016    Pneumonia 02/25/2016    COPD (chronic obstructive pulmonary disease) ([facility] 02/25/2016    Tylenol overdose 02/20/2016    SIRS (systemic inflammatory response syndrome) ([facility] 02/20/2016    Acute pancreatitis 02/19/2016    Pancreatitis, alcoholic, acute 78/83/0594    Tachycardia 01/28/2016    Alcoholism ([facility] 01/28/2016    Esophageal stricture 01/28/2016    Dilated pancreatic duct 01/28/2016    Common bile duct dilation 01/28/2016    Elevated LFTs 01/28/2016    Bipolar depression ([facility] 01/28/2016    HTN (hypertension) 01/28/2016        The  provided the following Interventions:  Initiated a relationship of care and support. Explored issues of owen, spirituality and/or Adventist needs while hospitalized. Listened empathically. Provided chaplaincy education. Provided information about Spiritual Care Services. Offered prayer and assurance of continued prayers on patient's behalf. Chart reviewed. The following outcomes were achieved:  Patient shared some information about their medical narrative and spiritual journey/beliefs. Patient processed feeling about current hospitalization. Patient expressed gratitude for the 's visit. Assessment:  Patient did not indicate any spiritual or Adventist issues which require Spiritual Care Services interventions at this time. Patient does not have any Adventist/cultural needs that will affect patients preferences in health care. Plan:  Chaplains will continue to follow and will provide pastoral care on an as needed or requested basis.  recommends bedside caregivers page  on duty if patient shows signs of acute spiritual or emotional distress.     [facility]   Staff [facility]   [phone]
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
## 2020-10-20 NOTE — ED NOTES
TRANSFER - OUT REPORT:    Verbal report given to Cuco(name) on [facility]  being transferred to [facility] (unit) for routine progression of care       Report consisted of patients Situation, Background, Assessment and   Recommendations(SBAR). Information from the following report(s) SBAR and ED Summary was reviewed with the receiving nurse. Lines:   Peripheral IV 10/19/20 Left Forearm (Active)   Site Assessment Clean, dry, & intact 10/19/20 2028   Phlebitis Assessment 0 10/19/20 2028   Infiltration Assessment 0 10/19/20 2028   Dressing Status Clean, dry, & intact 10/19/20 2028   Hub Color/Line Status Pink 10/19/20 2028        Opportunity for questions and clarification was provided.       Patient transported with:   Transport Medics    Pt will be transported [facility]

## 2020-10-20 NOTE — ED NOTES
Patient has breakfast tray, spilled milk on her gown, cleaned and new gown placed on the patient, patient used the bedpan to urinate,

## 2020-10-20 NOTE — PROGRESS NOTES
Assumed care of pt    1500-- ciwa score 8   1 mg of ativan given. States pain is 8/10 generalized but refused tylenol requesting morphine instead. 1549-- per tele pt hr 140, currently up to restroom and asymptomatic.     1700-- ciwa scale 5     1741-- 5 mg of cindy given for 8/10 pain in the hands, feet and head     Bedside shift change report given to 1350 Eduardo Garcia  (oncoming nurse) by Teresa Jovel  (offgoing nurse). Report included the following information SBAR, Kardex, Intake/Output, MAR and Recent Results.

## 2020-10-20 NOTE — ED NOTES
TRANSFER - ED to INPATIENT REPORT:    Verbal report given to Sonya (name) on Denis Knight  being transferred to 2207 (unit) for routine progression of care       Report consisted of patients Situation, Background, Assessment and   Recommendations(SBAR). SBAR report made available to receiving floor on this patient being transferred to [address] [place] [address] (2200)  for routine progression of care       Admitting diagnosis Alcohol withdrawal ([facility] [F10.239]  Subdural hematoma, post-traumatic ([facility] [S06.5X9A]  Alcohol withdrawal ([facility] [F10.239]  Alcohol withdrawal ([facility] [F10.239]    Information from the following report(s) SBAR was made available to receiving floor. Lines:   Peripheral IV 10/20/20 Left External jugular (Active)   Site Assessment Clean, dry, & intact 10/20/20 1214   Phlebitis Assessment 0 10/20/20 1214   Infiltration Assessment 0 10/20/20 1214   Dressing Status Clean, dry, & intact 10/20/20 1214   Dressing Type Transparent 10/20/20 1214        HCG Status for ALL Females under 55 y/o: NO         Opportunity for questions and clarification was provided.       Patient is oriented to time, place, person and situation Last NIH 0  Patient is  continent and ambulatory with assist     Valuables transported with patient     Patient transported with:   Monitor  Registered Nurse    MAP (Monitor): 100 =Monitored (most recent)  Vitals w/ MEWS Score (last day)     Date/Time MEWS Score Pulse Resp Temp BP Level of Consciousness SpO2    10/20/20 1340    (!) 123      132/88    99 %    10/20/20 1330    (!) 124      127/83    100 %    10/20/20 1324  4  (!) 120  18  98 °F (36.7 °C)  100/68  Alert  99 %    10/20/20 1140    (!) 123      (!) 147/89        10/20/20 1130    (!) 123      (!) 140/82        10/20/20 1120    (!) 130      133/85        10/20/20 1050    (!) 122      137/73        10/20/20 1010    (!) 137      138/70        10/20/20 1000    (!) 129      126/64        10/20/20 0950   (!) 132              10/20/20 0940    (!) 129      118/86        10/20/20 0930          (!) 125/99        10/20/20 0920    (!) 128      124/87        10/20/20 0910    (!) 136      137/83    98 %    10/20/20 0900    (!) 136      (!) 154/92    100 %    10/20/20 0850    (!) 136      (!) 135/97    100 %    10/20/20 0847    (!) 126      133/88        10/20/20 0830    (!) 126      (!) 152/91    100 %    10/20/20 0820    (!) 123      (!) 159/95    100 %    10/20/20 0800    (!) 119      136/80    92 %    10/20/20 0750    (!) 120      126/81    93 %    10/20/20 0740    (!) 121      (!) 133/97    94 %    10/20/20 0730    (!) 120      127/76    94 %    10/20/20 0720    (!) 129      131/79    93 %    10/19/20 1900    (!) 121      (!) 134/97    100 %    10/19/20 1858          (!) 147/92        10/19/20 1800    (!) 118      130/84    100 %    10/19/20 1745    (!) 112      133/81    100 %    10/19/20 1733  2  (!) 110  20  98.7 °F (37.1 °C)  120/86  Alert  100 %              Septic Patients:     Lactic Acid  Lab Results   Component Value Date    [facility] 1.09 03/02/2019    [facility] 1.04 01/15/2019    (Most recent on top)  Repeat drawn: NO Time: NA     ALL LACTIC ACIDS GREATER THAN 2 MUST BE REPEATED POC WITHIN 4 HOURS OR PRIOR TO ADMISSION               Total Fluid Bolus initiated and documented on MAR: NO    All ordered antibiotics initiated within first 3 hours of TIME ZERO?   NO
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
## 2020-10-20 NOTE — PROGRESS NOTES
1900  -- Bedside, Verbal and Written shift change report given to 2309 Noam Villalta (oncoming nurse) by Pool Galo nurse). Allergy band placed on pt's wrist. Report included the following information SBAR, Kardex, Intake/Output, MAR and Recent Results. Cont pulse ox placed. 2100 -- MD paged, Pt requested at home medications 600mg Seroquel and 200mg Trazadone for sleep. MD approved 600mg Seroquel RBV, order placed; hold Trazadone for tonight.      2147 -- PM medications administered, pt tolerated with ease, will continue to monitor. Pt dropped Clondine, med wasted in Pyxis and MAR, replacement dose administered.      0000 -- Shift reassessment, pt condition unchanged, will continue to monitor. 0300  --  Shift reassessment, pt condition unchanged, will continue to monitor.      0330 -- CIWA medications administered, pt tolerated with ease, will continue to monitor. Pt provided with pink denture cup for earrings.        0700  -- Bedside, Verbal and Written shift change report given to [address]) by ANABELLA (offgoing nurse). Report included the following information SBAR, Kardex, Intake/Output, MAR and Recent Results. Skin assessment completed.

## 2020-10-20 NOTE — PROGRESS NOTES
Received report from [place], [address] [facility]. Will relay to primary nurse, Thalia Gaona.

## 2020-10-21 ENCOUNTER — APPOINTMENT (OUTPATIENT)
Dept: GENERAL RADIOLOGY | Age: 55
DRG: 896 | End: 2020-10-21
Attending: HOSPITALIST
Payer: MEDICARE

## 2020-10-21 LAB
ALBUMIN SERPL-MCNC: 2.8 G/DL (ref 3.4–5)
ALBUMIN/GLOB SERPL: 1.1 {RATIO} (ref 0.8–1.7)
ALP SERPL-CCNC: 105 U/L (ref 45–117)
ALT SERPL-CCNC: 68 U/L (ref 13–56)
ANION GAP SERPL CALC-SCNC: 4 MMOL/L (ref 3–18)
AST SERPL-CCNC: 61 U/L (ref 10–38)
BILIRUB SERPL-MCNC: 0.5 MG/DL (ref 0.2–1)
BUN SERPL-MCNC: 11 MG/DL (ref 7–18)
BUN/CREAT SERPL: 18 (ref 12–20)
CALCIUM SERPL-MCNC: 8.2 MG/DL (ref 8.5–10.1)
CHLORIDE SERPL-SCNC: 109 MMOL/L (ref 100–111)
CK SERPL-CCNC: 231 U/L (ref 26–192)
CO2 SERPL-SCNC: 29 MMOL/L (ref 21–32)
CREAT SERPL-MCNC: 0.62 MG/DL (ref 0.6–1.3)
ERYTHROCYTE [DISTWIDTH] IN BLOOD BY AUTOMATED COUNT: 14.9 % (ref 11.6–14.5)
GLOBULIN SER CALC-MCNC: 2.6 G/DL (ref 2–4)
GLUCOSE SERPL-MCNC: 119 MG/DL (ref 74–99)
HCT VFR BLD AUTO: 26.8 % (ref 35–45)
HGB BLD-MCNC: 8.6 G/DL (ref 12–16)
MCH RBC QN AUTO: 32.8 PG (ref 24–34)
MCHC RBC AUTO-ENTMCNC: 32.1 G/DL (ref 31–37)
MCV RBC AUTO: 102.3 FL (ref 74–97)
PLATELET # BLD AUTO: 114 K/UL (ref 135–420)
PMV BLD AUTO: 9.8 FL (ref 9.2–11.8)
POTASSIUM SERPL-SCNC: 3.8 MMOL/L (ref 3.5–5.5)
PROT SERPL-MCNC: 5.4 G/DL (ref 6.4–8.2)
RBC # BLD AUTO: 2.62 M/UL (ref 4.2–5.3)
SODIUM SERPL-SCNC: 142 MMOL/L (ref 136–145)
WBC # BLD AUTO: 5.4 K/UL (ref 4.6–13.2)

## 2020-10-21 PROCEDURE — 80053 COMPREHEN METABOLIC PANEL: CPT

## 2020-10-21 PROCEDURE — 74011250637 HC RX REV CODE- 250/637: Performed by: PHYSICIAN ASSISTANT

## 2020-10-21 PROCEDURE — 65660000000 HC RM CCU STEPDOWN

## 2020-10-21 PROCEDURE — 2709999900 HC NON-CHARGEABLE SUPPLY

## 2020-10-21 PROCEDURE — 82550 ASSAY OF CK (CPK): CPT

## 2020-10-21 PROCEDURE — 74011250636 HC RX REV CODE- 250/636: Performed by: PHYSICIAN ASSISTANT

## 2020-10-21 PROCEDURE — 73120 X-RAY EXAM OF HAND: CPT

## 2020-10-21 PROCEDURE — 74011250637 HC RX REV CODE- 250/637: Performed by: HOSPITALIST

## 2020-10-21 PROCEDURE — 85027 COMPLETE CBC AUTOMATED: CPT

## 2020-10-21 PROCEDURE — 74011000258 HC RX REV CODE- 258: Performed by: PHYSICIAN ASSISTANT

## 2020-10-21 PROCEDURE — 36415 COLL VENOUS BLD VENIPUNCTURE: CPT

## 2020-10-21 RX ORDER — TOPIRAMATE 25 MG/1
100 TABLET ORAL 2 TIMES DAILY
Status: DISCONTINUED | OUTPATIENT
Start: 2020-10-21 | End: 2020-10-22 | Stop reason: HOSPADM

## 2020-10-21 RX ORDER — BUSPIRONE HYDROCHLORIDE 7.5 MG/1
15 TABLET ORAL 3 TIMES DAILY
Status: DISCONTINUED | OUTPATIENT
Start: 2020-10-21 | End: 2020-10-22 | Stop reason: HOSPADM

## 2020-10-21 RX ORDER — PROCHLORPERAZINE EDISYLATE 5 MG/ML
5 INJECTION INTRAMUSCULAR; INTRAVENOUS
Status: DISCONTINUED | OUTPATIENT
Start: 2020-10-21 | End: 2020-10-22 | Stop reason: HOSPADM

## 2020-10-21 RX ADMIN — TRAZODONE HYDROCHLORIDE 200 MG: 100 TABLET ORAL at 21:06

## 2020-10-21 RX ADMIN — QUETIAPINE FUMARATE 600 MG: 300 TABLET ORAL at 21:06

## 2020-10-21 RX ADMIN — SODIUM CHLORIDE 75 ML/HR: 900 INJECTION, SOLUTION INTRAVENOUS at 01:08

## 2020-10-21 RX ADMIN — PANTOPRAZOLE SODIUM 40 MG: 40 TABLET, DELAYED RELEASE ORAL at 07:38

## 2020-10-21 RX ADMIN — QUETIAPINE FUMARATE 600 MG: 300 TABLET ORAL at 08:29

## 2020-10-21 RX ADMIN — CLONIDINE HYDROCHLORIDE 0.2 MG: 0.1 TABLET ORAL at 17:15

## 2020-10-21 RX ADMIN — FOLIC ACID 1 MG: 1 TABLET ORAL at 08:28

## 2020-10-21 RX ADMIN — OXYCODONE 5 MG: 5 TABLET ORAL at 07:38

## 2020-10-21 RX ADMIN — CLONIDINE HYDROCHLORIDE 0.2 MG: 0.1 TABLET ORAL at 08:28

## 2020-10-21 RX ADMIN — THIAMINE HYDROCHLORIDE 500 MG: 100 INJECTION, SOLUTION INTRAMUSCULAR; INTRAVENOUS at 21:06

## 2020-10-21 RX ADMIN — OXYCODONE 5 MG: 5 TABLET ORAL at 14:20

## 2020-10-21 RX ADMIN — Medication 10 ML: at 06:45

## 2020-10-21 RX ADMIN — BUSPIRONE HYDROCHLORIDE 15 MG: 7.5 TABLET ORAL at 21:06

## 2020-10-21 RX ADMIN — THIAMINE HYDROCHLORIDE 500 MG: 100 INJECTION, SOLUTION INTRAMUSCULAR; INTRAVENOUS at 08:37

## 2020-10-21 RX ADMIN — LAMOTRIGINE 150 MG: 25 TABLET ORAL at 08:28

## 2020-10-21 RX ADMIN — TOPIRAMATE 100 MG: 25 TABLET, FILM COATED ORAL at 14:20

## 2020-10-21 RX ADMIN — OXYCODONE 5 MG: 5 TABLET ORAL at 21:06

## 2020-10-21 RX ADMIN — TOPIRAMATE 100 MG: 25 TABLET, FILM COATED ORAL at 17:15

## 2020-10-21 RX ADMIN — COLCHICINE 0.6 MG: 0.6 TABLET, FILM COATED ORAL at 08:28

## 2020-10-21 RX ADMIN — LORAZEPAM 1 MG: 1 TABLET ORAL at 03:35

## 2020-10-21 RX ADMIN — OXYCODONE 5 MG: 5 TABLET ORAL at 01:09

## 2020-10-21 RX ADMIN — BUSPIRONE HYDROCHLORIDE 15 MG: 7.5 TABLET ORAL at 17:16

## 2020-10-21 RX ADMIN — Medication 10 ML: at 17:29

## 2020-10-21 RX ADMIN — THIAMINE HYDROCHLORIDE 500 MG: 100 INJECTION, SOLUTION INTRAMUSCULAR; INTRAVENOUS at 17:29

## 2020-10-21 RX ADMIN — Medication 10 ML: at 21:07

## 2020-10-21 NOTE — PROGRESS NOTES
Problem: Discharge Planning  Goal: *Discharge to safe environment  Outcome: Progressing Towards Goal  Plan is home.  Will benefit from [facility]
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
## 2020-10-21 NOTE — PROGRESS NOTES
Bedside shift change report given to Shamir Malone (oncoming nurse) by Eliseo Miles (offgoing nurse). Report included the following information SBAR, Kardex, Intake/Output, MAR and Recent Results. 4226-- cindy given for 8/10 aching head pain. ciwa score:6  Pt has swelling and brusing to the left hand    0829-- medication given. 4645-- pt expressing displeasure with service she is receiving. Is requesting ativan and pain medication continuously. Ciwa scale and pain scale has been explained pt express verbal understanding however she is still adamant about getting these medication outside of the scheduled time frame. Has also Stated, \" how do I get another doctor, mine is not taking my concerns serious he's treating me like a junkie. \"       [phone]-- spoke with patient's  Mother about her care received ok from pt.    1400-- pt sleeping will assess CIWA scale when patient wakes. 1420-- 5mg of cindy given for pain 8/10 in head   attempted to place a PIV so that the central line can be pulled but  Was unable to obtain access. Central line still in place      1500-- pt sleeping will complete NIH when she wakes     1800-- ciwa scale will be completed with pt awakes up     Bedside shift change report given to Eliseo Miles  (oncoming nurse) by Shamir Malone  (offgoing nurse). Report included the following information SBAR, ED Summary, Intake/Output, MAR and Recent Results.

## 2020-10-21 NOTE — PROGRESS NOTES
I discussed care with the patient at the bedside. I had to shake her somewhat vigorously to wake her up. She asked for IV pain medication. I told her that I do not think it is safe to give her IV pain medication at this time. She said, \"I think you think that I'm Pill Head. \"  I told her that I do not think anything bad about her whatsoever. But I also said that she came in for Alcohol Withdrawal and we have to take that into consideration while making her care decisions. We agreed that we need to wean her pain medication to prepare her for discharge. She reported that she feels her left hand is fractured. Will send Xray of left hand. Will review her Psych meds and restart some of them.

## 2020-10-21 NOTE — PROGRESS NOTES
Problem: Discharge Planning  Goal: *Discharge to safe environment  Outcome: Progressing Towards Goal     Problem: Falls - Risk of  Goal: *Absence of Falls  Description: Document Devaughn Shaw Fall Risk and appropriate interventions in the flowsheet.   Outcome: Progressing Towards Goal  Note: Fall Risk Interventions:  Mobility Interventions: Bed/chair exit alarm    Mentation Interventions: Bed/chair exit alarm    Medication Interventions: Bed/chair exit alarm    Elimination Interventions: Bed/chair exit alarm    History of Falls Interventions: Bed/chair exit alarm         Problem: Patient Education: Go to Patient Education Activity  Goal: Patient/Family Education  Outcome: Progressing Towards Goal     Problem: Pain  Goal: *Control of Pain  Outcome: Progressing Towards Goal     Problem: Alcohol Withdrawal  Goal: *STG: Participates in treatment plan  Outcome: Progressing Towards Goal  Goal: *STG: Remains safe in hospital  Outcome: Progressing Towards Goal  Goal: *STG: Seeks staff when symptoms of withdrawal increase  Outcome: Progressing Towards Goal  Goal: *STG: Complies with medication therapy  Outcome: Progressing Towards Goal  Goal: *STG: Attends activities and groups  Outcome: Progressing Towards Goal  Goal: *STG: Will identify negative impact of chemical dependency including the use of tobacco, alcohol, and other substances  Outcome: Progressing Towards Goal  Goal: *STG: Verbalizes abstinence as an achievable goal  Outcome: Progressing Towards Goal  Goal: *STG: Agrees to participate in outpatient after care program to support ongoing mental health  Outcome: Progressing Towards Goal  Goal: *STG: Able to indentify relapse triggers including interpersonal/social and familial factors  Outcome: Progressing Towards Goal  Goal: *STG: Identify lifestyle changes to support long term sobriety such as vocation, employment, education, and legal issues  Outcome: Progressing Towards Goal  Goal: *STG: Maintains appropriate nutrition and hydration  Outcome: Progressing Towards Goal  Goal: *STG: Vital signs within defined limits  Outcome: Progressing Towards Goal  Goal: *STG/LTG: Relapse prevention plan in place to include housing/aftercare, leisure activities, and spirituality  Outcome: Progressing Towards Goal  Goal: Interventions  Outcome: Progressing Towards Goal     Problem: Patient Education: Go to Patient Education Activity  Goal: Patient/Family Education  Outcome: Progressing Towards Goal

## 2020-10-21 NOTE — PROGRESS NOTES
In to chart for MEWS rounding. Patient on CIWA protocol, Ativan given by RN per protocol. Will continue to monitor.

## 2020-10-21 NOTE — PROGRESS NOTES
Problem: Discharge Planning  Goal: *Discharge to safe environment  Outcome: Progressing Towards Goal     Problem: Falls - Risk of  Goal: *Absence of Falls  Description: Document David Maldonado Fall Risk and appropriate interventions in the flowsheet.   Outcome: Progressing Towards Goal  Note: Fall Risk Interventions:  Mobility Interventions: Communicate number of staff needed for ambulation/transfer, Patient to call before getting OOB    Mentation Interventions: Bed/chair exit alarm    Medication Interventions: Teach patient to arise slowly, Patient to call before getting OOB    Elimination Interventions: Bed/chair exit alarm    History of Falls Interventions: Room close to nurse's station         Problem: Patient Education: Go to Patient Education Activity  Goal: Patient/Family Education  Outcome: Progressing Towards Goal     Problem: Pain  Goal: *Control of Pain  Outcome: Progressing Towards Goal     Problem: Alcohol Withdrawal  Goal: *STG: Participates in treatment plan  Outcome: Progressing Towards Goal  Goal: *STG: Remains safe in hospital  Outcome: Progressing Towards Goal  Goal: *STG: Seeks staff when symptoms of withdrawal increase  Outcome: Progressing Towards Goal  Goal: *STG: Complies with medication therapy  Outcome: Progressing Towards Goal  Goal: *STG: Attends activities and groups  Outcome: Progressing Towards Goal  Goal: *STG: Will identify negative impact of chemical dependency including the use of tobacco, alcohol, and other substances  Outcome: Progressing Towards Goal  Goal: *STG: Verbalizes abstinence as an achievable goal  Outcome: Progressing Towards Goal  Goal: *STG: Agrees to participate in outpatient after care program to support ongoing mental health  Outcome: Progressing Towards Goal  Goal: *STG: Able to indentify relapse triggers including interpersonal/social and familial factors  Outcome: Progressing Towards Goal  Goal: *STG: Identify lifestyle changes to support long term sobriety such as vocation, employment, education, and legal issues  Outcome: Progressing Towards Goal  Goal: *STG: Maintains appropriate nutrition and hydration  Outcome: Progressing Towards Goal  Goal: *STG: Vital signs within defined limits  Outcome: Progressing Towards Goal  Goal: *STG/LTG: Relapse prevention plan in place to include housing/aftercare, leisure activities, and spirituality  Outcome: Progressing Towards Goal  Goal: Interventions  Outcome: Progressing Towards Goal     Problem: Patient Education: Go to Patient Education Activity  Goal: Patient/Family Education  Outcome: Progressing Towards Goal

## 2020-10-21 NOTE — PROGRESS NOTES
Physician Progress Note      PATIENT:               Shane Leonard  CSN #:                  [id number]  :                       1965  ADMIT DATE:       10/19/2020 5:27 PM  [address] [place] DATE:  RESPONDING  PROVIDER #:        Dirk Maher MD          QUERY TEXT:    Pt admitted with Traumatic subdural hematoma. Pt noted to have AMS. If possible, please document in the progress notes and discharge summary if you are evaluating and / or treating any of the following: The medical record reflects the following:  Risk Factors: 62 yo female with history of alcohol abuse    Clinical Indicators: Per H&P Patient developed AMS, tachycardia and HTN concerning for alcohol withdrawal    Per H&P Physical exam: Neuro: moves all extremities, no focal deficits, somnolent - falling asleep in the middle of her sentences    => Serum sodium 130 in ED    Treatment: NS IV, serial labs, neuro checks      Thank your time,  Speedy Isaac RN, [facility]  [phone]  Options provided:  -- Alcoholic encephalopathy  -- Hypertensive encephalopathy  -- Metabolic encephalopathy  -- Toxic encephalopathy  -- Encephalopathy due to medications or drugs, Please specify  -- Toxic metabolic encephalopathy  -- Wernicke?s encephalopathy  -- Delirium, Please specify cause  -- Other - I will add my own diagnosis  -- Disagree - Not applicable / Not valid  -- Disagree - Clinically unable to determine / Unknown  -- Refer to Clinical Documentation Reviewer    PROVIDER RESPONSE TEXT:    This patient has alcoholic encephalopathy.     Query created by: Jayson Dale on 10/21/2020 9:25 AM      Electronically signed by:  Dirk Maher MD 10/21/2020 1:18 PM

## 2020-10-21 NOTE — PROGRESS NOTES
2037 -- REYNALDO completed score 15, I mg Ativan given. Patient is very agitated, stating she got better care on 3S. Nurse informed her that her nurse will be with her as soon as she can. Needs met at this time, will let night nurse know she wants her [facility] meds.

## 2020-10-22 ENCOUNTER — APPOINTMENT (OUTPATIENT)
Dept: MRI IMAGING | Age: 55
DRG: 896 | End: 2020-10-22
Attending: PHYSICIAN ASSISTANT
Payer: MEDICARE

## 2020-10-22 VITALS
TEMPERATURE: 98.3 F | BODY MASS INDEX: 22.32 KG/M2 | HEIGHT: 62 IN | HEART RATE: 100 BPM | RESPIRATION RATE: 18 BRPM | OXYGEN SATURATION: 98 % | DIASTOLIC BLOOD PRESSURE: 89 MMHG | SYSTOLIC BLOOD PRESSURE: 150 MMHG | WEIGHT: 121.3 LBS

## 2020-10-22 PROBLEM — M25.532 LEFT WRIST PAIN: Status: ACTIVE | Noted: 2020-10-22

## 2020-10-22 PROBLEM — Z72.0 TOBACCO ABUSE: Status: ACTIVE | Noted: 2020-10-22

## 2020-10-22 LAB
COVID-19 RAPID TEST, COVR: NOT DETECTED
SOURCE, COVRS: NORMAL
SPECIMEN TYPE, XMCV1T: NORMAL

## 2020-10-22 PROCEDURE — 74011250637 HC RX REV CODE- 250/637: Performed by: HOSPITALIST

## 2020-10-22 PROCEDURE — 74011250637 HC RX REV CODE- 250/637: Performed by: PHYSICIAN ASSISTANT

## 2020-10-22 PROCEDURE — 74011000258 HC RX REV CODE- 258: Performed by: PHYSICIAN ASSISTANT

## 2020-10-22 PROCEDURE — 74011250636 HC RX REV CODE- 250/636: Performed by: PHYSICIAN ASSISTANT

## 2020-10-22 PROCEDURE — 73221 MRI JOINT UPR EXTREM W/O DYE: CPT

## 2020-10-22 PROCEDURE — 2709999900 HC NON-CHARGEABLE SUPPLY

## 2020-10-22 PROCEDURE — 87635 SARS-COV-2 COVID-19 AMP PRB: CPT

## 2020-10-22 RX ORDER — THIAMINE HCL 250 MG
250 TABLET ORAL DAILY
Qty: 5 TAB | Refills: 0 | Status: SHIPPED | OUTPATIENT
Start: 2020-10-22 | End: 2020-10-27

## 2020-10-22 RX ORDER — OXYCODONE HYDROCHLORIDE 5 MG/1
5 TABLET ORAL
Qty: 12 TAB | Refills: 0 | Status: SHIPPED | OUTPATIENT
Start: 2020-10-22 | End: 2020-10-25

## 2020-10-22 RX ORDER — BENZONATATE 100 MG/1
100 CAPSULE ORAL
Status: DISCONTINUED | OUTPATIENT
Start: 2020-10-22 | End: 2020-10-22 | Stop reason: HOSPADM

## 2020-10-22 RX ADMIN — Medication 10 ML: at 06:12

## 2020-10-22 RX ADMIN — OXYCODONE 5 MG: 5 TABLET ORAL at 10:11

## 2020-10-22 RX ADMIN — LAMOTRIGINE 150 MG: 25 TABLET ORAL at 09:53

## 2020-10-22 RX ADMIN — THIAMINE HYDROCHLORIDE 500 MG: 100 INJECTION, SOLUTION INTRAMUSCULAR; INTRAVENOUS at 09:55

## 2020-10-22 RX ADMIN — OXYCODONE 5 MG: 5 TABLET ORAL at 03:07

## 2020-10-22 RX ADMIN — TOPIRAMATE 100 MG: 25 TABLET, FILM COATED ORAL at 09:54

## 2020-10-22 RX ADMIN — LORAZEPAM 1 MG: 1 TABLET ORAL at 05:03

## 2020-10-22 RX ADMIN — PANTOPRAZOLE SODIUM 40 MG: 40 TABLET, DELAYED RELEASE ORAL at 09:54

## 2020-10-22 RX ADMIN — LORAZEPAM 2 MG: 1 TABLET ORAL at 00:37

## 2020-10-22 RX ADMIN — BUSPIRONE HYDROCHLORIDE 15 MG: 7.5 TABLET ORAL at 09:55

## 2020-10-22 RX ADMIN — FOLIC ACID 1 MG: 1 TABLET ORAL at 09:54

## 2020-10-22 RX ADMIN — CLONIDINE HYDROCHLORIDE 0.2 MG: 0.1 TABLET ORAL at 09:55

## 2020-10-22 RX ADMIN — COLCHICINE 0.6 MG: 0.6 TABLET, FILM COATED ORAL at 09:54

## 2020-10-22 NOTE — PROGRESS NOTES
1510 Discharge instructions provided to pt on behalf of primary nurse [facility] [place]. Written prescriptions for oxycodone and thiamine were given to the pt. Peripheral iv and telemetry monitor removed. Pt stated she doesn't have anyone to pick her up. Called pt cab to go home. 18 Pt escorted via wheelchair to cab to be taken home. Pt left in stable condition.

## 2020-10-22 NOTE — DISCHARGE INSTRUCTIONS
DISCHARGE SUMMARY from Nurse    PATIENT INSTRUCTIONS:    After general anesthesia or intravenous sedation, for 24 hours or while taking prescription Narcotics:  · Limit your activities  · Do not drive and operate hazardous machinery  · Do not make important personal or business decisions  · Do  not drink alcoholic beverages  · If you have not urinated within 8 hours after discharge, please contact your surgeon on call. Report the following to your surgeon:  · Excessive pain, swelling, redness or odor of or around the surgical area  · Temperature over 100.5  · Nausea and vomiting lasting longer than 4 hours or if unable to take medications  · Any signs of decreased circulation or nerve impairment to extremity: change in color, persistent  numbness, tingling, coldness or increase pain  · Any questions    What to do at Home:  Recommended activity: Activity as tolerated    If you experience any of the following symptoms change in alertness/orientation, seizure activity, fever, or chills please follow up with primary care physician/emergency. *  Please give a list of your current medications to your Primary Care Provider. *  Please update this list whenever your medications are discontinued, doses are      changed, or new medications (including over-the-counter products) are added. *  Please carry medication information at all times in case of emergency situations. These are general instructions for a healthy lifestyle:    No smoking/ No tobacco products/ Avoid exposure to second hand smoke  Surgeon General's Warning:  Quitting smoking now greatly reduces serious risk to your health.     Obesity, smoking, and sedentary lifestyle greatly increases your risk for illness    A healthy diet, regular physical exercise & weight monitoring are important for maintaining a healthy lifestyle    You may be retaining fluid if you have a history of heart failure or if you experience any of the following symptoms: Weight gain of 3 pounds or more overnight or 5 pounds in a week, increased swelling in our hands or feet or shortness of breath while lying flat in bed. Please call your doctor as soon as you notice any of these symptoms; do not wait until your next office visit. The discharge information has been reviewed with the patient. The patient verbalized understanding. Discharge medications reviewed with the patient and appropriate educational materials and side effects teaching were provided.   ___________________________________________________________________________________________________________________________________

## 2020-10-22 NOTE — PROGRESS NOTES
Problem: Falls - Risk of  Goal: *Absence of Falls  Description: Document Luann Gomez Fall Risk and appropriate interventions in the flowsheet.   Outcome: Progressing Towards Goal  Note: Fall Risk Interventions:  Mobility Interventions: OT consult for ADLs    Mentation Interventions: Bed/chair exit alarm, Door open when patient unattended, Reorient patient    Medication Interventions: Patient to call before getting OOB, Evaluate medications/consider consulting pharmacy    Elimination Interventions: Patient to call for help with toileting needs, Bed/chair exit alarm    History of Falls Interventions: Bed/chair exit alarm, Door open when patient unattended         Problem: Pain  Goal: *Control of Pain  Outcome: Progressing Towards Goal

## 2020-10-22 NOTE — PROGRESS NOTES
1900  -- Bedside, Verbal and Written shift change report given to [id number] Noam Villalta (oncoming nurse) by [employer] nurse). Allergy band placed on pt's wrist. Report included the following information SBAR, Kardex, Intake/Output, MAR and Recent Results.       2105 -- PM and PRN pain medications administered, pt tolerated with ease, will continue to monitor.     0030 -- Shift reassessment, pt condition unchanged, will continue to monitor. 0045 -- CIWA medications administered, pt tolerated with ease, will continue to monitor.      0400 --  Shift reassessment, pt condition unchanged, will continue to monitor.      0500 -- CIWA medications administered, pt tolerated with ease, will continue to monitor.      0700  -- Bedside, Verbal and Written shift change report given to [id number] Jroge Fuller nurse) by ANABELLA (offgoing nurse). Report included the following information SBAR, Kardex, Intake/Output, MAR and Recent Results. Skin assessment completed.

## 2020-10-22 NOTE — PROGRESS NOTES
Problem: Discharge Planning  Goal: *Discharge to safe environment  Outcome: resolved/met    Pt dc'd home, no services ordered. Care Management Interventions  PCP Verified by CM: Yes  Palliative Care Criteria Met (RRAT>21 & CHF Dx)?: No  Mode of Transport at Discharge:  Other (see comment)(says will get lyft)  Transition of Care Consult (CM Consult): Discharge Planning  Current Support Network: Lives with Spouse  Confirm Follow Up Transport: Other (see comment)(lyft)  The Plan for Transition of Care is Related to the Following Treatment Goals : resolution of acute symptoms  Discharge Location  Discharge Placement: Home

## 2020-10-22 NOTE — ROUTINE PROCESS
0730  -- Bedside, Verbal and Written shift change report received by Yifan Waldron (oncoming nurse) by Elva(offgoing nurse). Allergy band placed on pt's wrist. Report included the following information SBAR, Kardex, Intake/Output, MAR and Recent Results. 1791  --Assessment completed, call bell within reach, no distress noted, voiced no complaints at this time. AM  medications administered, pt tolerated with ease, will continue to monitor. 1200 -- Shift reassessment, pt condition unchanged, will continue to monitor. 1600 --  Shift reassessment, pt condition unchanged, will continue to monitor. Discharge instructions given by Anila Miles. taken down for home via taxi.
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
## 2020-10-22 NOTE — PROGRESS NOTES
Problem: Discharge Planning  Goal: *Discharge to safe environment  Outcome: Progressing Towards Goal     Problem: Falls - Risk of  Goal: *Absence of Falls  Description: Document Marlen Rodriguez Fall Risk and appropriate interventions in the flowsheet.   Outcome: Progressing Towards Goal  Note: Fall Risk Interventions:  Mobility Interventions: Communicate number of staff needed for ambulation/transfer, Patient to call before getting OOB    Mentation Interventions: Bed/chair exit alarm    Medication Interventions: Teach patient to arise slowly, Patient to call before getting OOB    Elimination Interventions: Bed/chair exit alarm    History of Falls Interventions: Room close to nurse's station         Problem: Patient Education: Go to Patient Education Activity  Goal: Patient/Family Education  Outcome: Progressing Towards Goal     Problem: Pain  Goal: *Control of Pain  Outcome: Progressing Towards Goal     Problem: Alcohol Withdrawal  Goal: *STG: Participates in treatment plan  Outcome: Progressing Towards Goal  Goal: *STG: Remains safe in hospital  Outcome: Progressing Towards Goal  Goal: *STG: Seeks staff when symptoms of withdrawal increase  Outcome: Progressing Towards Goal  Goal: *STG: Complies with medication therapy  Outcome: Progressing Towards Goal  Goal: *STG: Attends activities and groups  Outcome: Progressing Towards Goal  Goal: *STG: Will identify negative impact of chemical dependency including the use of tobacco, alcohol, and other substances  Outcome: Progressing Towards Goal  Goal: *STG: Verbalizes abstinence as an achievable goal  Outcome: Progressing Towards Goal  Goal: *STG: Agrees to participate in outpatient after care program to support ongoing mental health  Outcome: Progressing Towards Goal  Goal: *STG: Able to indentify relapse triggers including interpersonal/social and familial factors  Outcome: Progressing Towards Goal  Goal: *STG: Identify lifestyle changes to support long term sobriety such as vocation, employment, education, and legal issues  Outcome: Progressing Towards Goal  Goal: *STG: Maintains appropriate nutrition and hydration  Outcome: Progressing Towards Goal  Goal: *STG: Vital signs within defined limits  Outcome: Progressing Towards Goal  Goal: *STG/LTG: Relapse prevention plan in place to include housing/aftercare, leisure activities, and spirituality  Outcome: Progressing Towards Goal  Goal: Interventions  Outcome: Progressing Towards Goal     Problem: Patient Education: Go to Patient Education Activity  Goal: Patient/Family Education  Outcome: Progressing Towards Goal

## 2020-10-23 ENCOUNTER — PATIENT OUTREACH (OUTPATIENT)
Dept: CASE MANAGEMENT | Age: 55
End: 2020-10-23

## 2020-10-23 NOTE — PROGRESS NOTES
Date/Time:  10/23/2020 9:41 AM   Call within 2 business days of discharge: Yes   Attempted to reach patient by telephone. Left HIPPA compliant message requesting a return call. Will attempt to reach patient again.

## 2020-10-24 ENCOUNTER — PATIENT OUTREACH (OUTPATIENT)
Dept: CASE MANAGEMENT | Age: 55
End: 2020-10-24

## 2020-10-24 NOTE — PROGRESS NOTES
Date/Time:  10/24/2020 11:05 AM   Call within 2 business days of discharge: Yes   2nd attempt to reach patient by telephone. Left HIPPA compliant message requesting a return call. This episode is resolved.

## 2020-11-07 ENCOUNTER — HOSPITAL ENCOUNTER (EMERGENCY)
Age: 55
Discharge: HOME | End: 2020-11-08
Attending: EMERGENCY MEDICINE
Payer: MEDICARE

## 2020-11-07 DIAGNOSIS — F10.929 ALCOHOLIC INTOXICATION WITH COMPLICATION (HCC): ICD-10-CM

## 2020-11-07 DIAGNOSIS — R07.9 CHEST PAIN, UNSPECIFIED TYPE: Primary | ICD-10-CM

## 2020-11-07 PROCEDURE — 93005 ELECTROCARDIOGRAM TRACING: CPT

## 2020-11-07 PROCEDURE — 99285 EMERGENCY DEPT VISIT HI MDM: CPT

## 2020-11-07 PROCEDURE — 96360 HYDRATION IV INFUSION INIT: CPT

## 2020-11-08 ENCOUNTER — APPOINTMENT (OUTPATIENT)
Dept: GENERAL RADIOLOGY | Age: 55
End: 2020-11-08
Attending: EMERGENCY MEDICINE
Payer: MEDICARE

## 2020-11-08 ENCOUNTER — APPOINTMENT (OUTPATIENT)
Dept: CT IMAGING | Age: 55
End: 2020-11-08
Attending: EMERGENCY MEDICINE
Payer: MEDICARE

## 2020-11-08 VITALS
SYSTOLIC BLOOD PRESSURE: 128 MMHG | HEIGHT: 62 IN | HEART RATE: 107 BPM | WEIGHT: 115 LBS | TEMPERATURE: 98.3 F | RESPIRATION RATE: 16 BRPM | BODY MASS INDEX: 21.16 KG/M2 | DIASTOLIC BLOOD PRESSURE: 74 MMHG | OXYGEN SATURATION: 98 %

## 2020-11-08 VITALS
SYSTOLIC BLOOD PRESSURE: 155 MMHG | TEMPERATURE: 98.4 F | RESPIRATION RATE: 18 BRPM | OXYGEN SATURATION: 100 % | DIASTOLIC BLOOD PRESSURE: 89 MMHG | WEIGHT: 117 LBS | BODY MASS INDEX: 21.4 KG/M2 | HEART RATE: 121 BPM

## 2020-11-08 DIAGNOSIS — F10.10 ETOH ABUSE: Primary | ICD-10-CM

## 2020-11-08 DIAGNOSIS — R07.9 ACUTE CHEST PAIN: ICD-10-CM

## 2020-11-08 DIAGNOSIS — R00.0 TACHYCARDIA: ICD-10-CM

## 2020-11-08 LAB
ALBUMIN SERPL-MCNC: 3.6 G/DL (ref 3.4–5)
ALBUMIN SERPL-MCNC: 4.4 G/DL (ref 3.4–5)
ALBUMIN/GLOB SERPL: 0.9 {RATIO} (ref 0.8–1.7)
ALBUMIN/GLOB SERPL: 0.9 {RATIO} (ref 0.8–1.7)
ALP SERPL-CCNC: 109 U/L (ref 45–117)
ALP SERPL-CCNC: 137 U/L (ref 45–117)
ALT SERPL-CCNC: 50 U/L (ref 13–56)
ALT SERPL-CCNC: 60 U/L (ref 13–56)
ANION GAP SERPL CALC-SCNC: 10 MMOL/L (ref 3–18)
ANION GAP SERPL CALC-SCNC: 15 MMOL/L (ref 3–18)
AST SERPL-CCNC: 66 U/L (ref 10–38)
AST SERPL-CCNC: 79 U/L (ref 10–38)
BASOPHILS # BLD: 0 K/UL (ref 0–0.1)
BASOPHILS # BLD: 0 K/UL (ref 0–0.1)
BASOPHILS NFR BLD: 0 % (ref 0–2)
BASOPHILS NFR BLD: 0 % (ref 0–2)
BILIRUB SERPL-MCNC: 0.3 MG/DL (ref 0.2–1)
BILIRUB SERPL-MCNC: 0.4 MG/DL (ref 0.2–1)
BUN SERPL-MCNC: 12 MG/DL (ref 7–18)
BUN SERPL-MCNC: 13 MG/DL (ref 7–18)
BUN/CREAT SERPL: 17 (ref 12–20)
BUN/CREAT SERPL: 19 (ref 12–20)
CALCIUM SERPL-MCNC: 8.6 MG/DL (ref 8.5–10.1)
CALCIUM SERPL-MCNC: 9.3 MG/DL (ref 8.5–10.1)
CHLORIDE SERPL-SCNC: 100 MMOL/L (ref 100–111)
CHLORIDE SERPL-SCNC: 103 MMOL/L (ref 100–111)
CK MB CFR SERPL CALC: 1.7 % (ref 0–4)
CK MB SERPL-MCNC: 1.4 NG/ML (ref 5–25)
CK SERPL-CCNC: 81 U/L (ref 26–192)
CO2 SERPL-SCNC: 20 MMOL/L (ref 21–32)
CO2 SERPL-SCNC: 23 MMOL/L (ref 21–32)
CREAT SERPL-MCNC: 0.67 MG/DL (ref 0.6–1.3)
CREAT SERPL-MCNC: 0.7 MG/DL (ref 0.6–1.3)
DIFFERENTIAL METHOD BLD: ABNORMAL
DIFFERENTIAL METHOD BLD: ABNORMAL
EOSINOPHIL # BLD: 0 K/UL (ref 0–0.4)
EOSINOPHIL # BLD: 0 K/UL (ref 0–0.4)
EOSINOPHIL NFR BLD: 0 % (ref 0–5)
EOSINOPHIL NFR BLD: 1 % (ref 0–5)
ERYTHROCYTE [DISTWIDTH] IN BLOOD BY AUTOMATED COUNT: 14.9 % (ref 11.6–14.5)
ERYTHROCYTE [DISTWIDTH] IN BLOOD BY AUTOMATED COUNT: 14.9 % (ref 11.6–14.5)
ETHANOL SERPL-MCNC: 327 MG/DL (ref 0–3)
ETHANOL SERPL-MCNC: 331 MG/DL (ref 0–3)
GLOBULIN SER CALC-MCNC: 4 G/DL (ref 2–4)
GLOBULIN SER CALC-MCNC: 4.7 G/DL (ref 2–4)
GLUCOSE SERPL-MCNC: 84 MG/DL (ref 74–99)
GLUCOSE SERPL-MCNC: 89 MG/DL (ref 74–99)
HCT VFR BLD AUTO: 40 % (ref 35–45)
HCT VFR BLD AUTO: 43.8 % (ref 35–45)
HGB BLD-MCNC: 13.1 G/DL (ref 12–16)
HGB BLD-MCNC: 14.5 G/DL (ref 12–16)
LIPASE SERPL-CCNC: 60 U/L (ref 73–393)
LIPASE SERPL-CCNC: 63 U/L (ref 73–393)
LYMPHOCYTES # BLD: 2.7 K/UL (ref 0.9–3.6)
LYMPHOCYTES # BLD: 2.9 K/UL (ref 0.9–3.6)
LYMPHOCYTES NFR BLD: 29 % (ref 21–52)
LYMPHOCYTES NFR BLD: 40 % (ref 21–52)
MAGNESIUM SERPL-MCNC: 1.9 MG/DL (ref 1.6–2.6)
MCH RBC QN AUTO: 32.8 PG (ref 24–34)
MCH RBC QN AUTO: 33.3 PG (ref 24–34)
MCHC RBC AUTO-ENTMCNC: 32.8 G/DL (ref 31–37)
MCHC RBC AUTO-ENTMCNC: 33.1 G/DL (ref 31–37)
MCV RBC AUTO: 100 FL (ref 74–97)
MCV RBC AUTO: 100.5 FL (ref 74–97)
MONOCYTES # BLD: 0.3 K/UL (ref 0.05–1.2)
MONOCYTES # BLD: 0.3 K/UL (ref 0.05–1.2)
MONOCYTES NFR BLD: 3 % (ref 3–10)
MONOCYTES NFR BLD: 4 % (ref 3–10)
NEUTS SEG # BLD: 3.9 K/UL (ref 1.8–8)
NEUTS SEG # BLD: 6.4 K/UL (ref 1.8–8)
NEUTS SEG NFR BLD: 55 % (ref 40–73)
NEUTS SEG NFR BLD: 68 % (ref 40–73)
PLATELET # BLD AUTO: 296 K/UL (ref 135–420)
PLATELET # BLD AUTO: 299 K/UL (ref 135–420)
PMV BLD AUTO: 8.9 FL (ref 9.2–11.8)
PMV BLD AUTO: 8.9 FL (ref 9.2–11.8)
POTASSIUM SERPL-SCNC: 4.8 MMOL/L (ref 3.5–5.5)
POTASSIUM SERPL-SCNC: 5 MMOL/L (ref 3.5–5.5)
PROT SERPL-MCNC: 7.6 G/DL (ref 6.4–8.2)
PROT SERPL-MCNC: 9.1 G/DL (ref 6.4–8.2)
RBC # BLD AUTO: 4 M/UL (ref 4.2–5.3)
RBC # BLD AUTO: 4.36 M/UL (ref 4.2–5.3)
SODIUM SERPL-SCNC: 135 MMOL/L (ref 136–145)
SODIUM SERPL-SCNC: 136 MMOL/L (ref 136–145)
TROPONIN I SERPL-MCNC: <0.02 NG/ML (ref 0–0.04)
WBC # BLD AUTO: 7.2 K/UL (ref 4.6–13.2)
WBC # BLD AUTO: 9.5 K/UL (ref 4.6–13.2)

## 2020-11-08 PROCEDURE — 83690 ASSAY OF LIPASE: CPT

## 2020-11-08 PROCEDURE — 84484 ASSAY OF TROPONIN QUANT: CPT

## 2020-11-08 PROCEDURE — 85025 COMPLETE CBC W/AUTO DIFF WBC: CPT

## 2020-11-08 PROCEDURE — 80307 DRUG TEST PRSMV CHEM ANLYZR: CPT

## 2020-11-08 PROCEDURE — 83735 ASSAY OF MAGNESIUM: CPT

## 2020-11-08 PROCEDURE — 74011000250 HC RX REV CODE- 250: Performed by: EMERGENCY MEDICINE

## 2020-11-08 PROCEDURE — 74011250636 HC RX REV CODE- 250/636: Performed by: EMERGENCY MEDICINE

## 2020-11-08 PROCEDURE — 74011250637 HC RX REV CODE- 250/637: Performed by: STUDENT IN AN ORGANIZED HEALTH CARE EDUCATION/TRAINING PROGRAM

## 2020-11-08 PROCEDURE — 74011000636 HC RX REV CODE- 636: Performed by: EMERGENCY MEDICINE

## 2020-11-08 PROCEDURE — 71045 X-RAY EXAM CHEST 1 VIEW: CPT

## 2020-11-08 PROCEDURE — 93005 ELECTROCARDIOGRAM TRACING: CPT

## 2020-11-08 PROCEDURE — 71275 CT ANGIOGRAPHY CHEST: CPT

## 2020-11-08 PROCEDURE — 96375 TX/PRO/DX INJ NEW DRUG ADDON: CPT

## 2020-11-08 PROCEDURE — 74011250637 HC RX REV CODE- 250/637: Performed by: EMERGENCY MEDICINE

## 2020-11-08 PROCEDURE — 96365 THER/PROPH/DIAG IV INF INIT: CPT

## 2020-11-08 PROCEDURE — 82550 ASSAY OF CK (CPK): CPT

## 2020-11-08 PROCEDURE — 80053 COMPREHEN METABOLIC PANEL: CPT

## 2020-11-08 PROCEDURE — 96366 THER/PROPH/DIAG IV INF ADDON: CPT

## 2020-11-08 PROCEDURE — 96360 HYDRATION IV INFUSION INIT: CPT

## 2020-11-08 PROCEDURE — 99285 EMERGENCY DEPT VISIT HI MDM: CPT

## 2020-11-08 RX ORDER — SODIUM CHLORIDE 0.9 % (FLUSH) 0.9 %
5-40 SYRINGE (ML) INJECTION AS NEEDED
Status: DISCONTINUED | OUTPATIENT
Start: 2020-11-08 | End: 2020-11-09 | Stop reason: HOSPADM

## 2020-11-08 RX ORDER — LORAZEPAM 2 MG/ML
2 INJECTION INTRAMUSCULAR
Status: COMPLETED | OUTPATIENT
Start: 2020-11-08 | End: 2020-11-08

## 2020-11-08 RX ORDER — CHLORDIAZEPOXIDE HYDROCHLORIDE 25 MG/1
25 CAPSULE, GELATIN COATED ORAL
Qty: 21 CAP | Refills: 0 | Status: SHIPPED | OUTPATIENT
Start: 2020-11-08 | End: 2020-11-15

## 2020-11-08 RX ORDER — LORAZEPAM 2 MG/ML
3 INJECTION INTRAMUSCULAR
Status: DISCONTINUED | OUTPATIENT
Start: 2020-11-08 | End: 2020-11-09 | Stop reason: HOSPADM

## 2020-11-08 RX ORDER — SODIUM CHLORIDE 0.9 % (FLUSH) 0.9 %
5-40 SYRINGE (ML) INJECTION EVERY 8 HOURS
Status: DISCONTINUED | OUTPATIENT
Start: 2020-11-08 | End: 2020-11-09 | Stop reason: HOSPADM

## 2020-11-08 RX ORDER — LORAZEPAM 0.5 MG/1
1 TABLET ORAL
Qty: 12 TAB | Refills: 0 | Status: SHIPPED | OUTPATIENT
Start: 2020-11-08

## 2020-11-08 RX ORDER — LORAZEPAM 1 MG/1
1 TABLET ORAL
Status: DISCONTINUED | OUTPATIENT
Start: 2020-11-08 | End: 2020-11-09 | Stop reason: HOSPADM

## 2020-11-08 RX ORDER — IBUPROFEN 200 MG
1 TABLET ORAL EVERY 24 HOURS
Status: DISCONTINUED | OUTPATIENT
Start: 2020-11-08 | End: 2020-11-09 | Stop reason: HOSPADM

## 2020-11-08 RX ORDER — LORAZEPAM 2 MG/ML
2 INJECTION INTRAMUSCULAR
Status: DISCONTINUED | OUTPATIENT
Start: 2020-11-08 | End: 2020-11-09 | Stop reason: HOSPADM

## 2020-11-08 RX ORDER — LORAZEPAM 2 MG/ML
1 INJECTION INTRAMUSCULAR
Status: DISCONTINUED | OUTPATIENT
Start: 2020-11-08 | End: 2020-11-09 | Stop reason: HOSPADM

## 2020-11-08 RX ORDER — FAMOTIDINE 10 MG/ML
20 INJECTION INTRAVENOUS
Status: COMPLETED | OUTPATIENT
Start: 2020-11-08 | End: 2020-11-08

## 2020-11-08 RX ORDER — LORAZEPAM 1 MG/1
2 TABLET ORAL
Status: DISCONTINUED | OUTPATIENT
Start: 2020-11-08 | End: 2020-11-09 | Stop reason: HOSPADM

## 2020-11-08 RX ADMIN — IOPAMIDOL 100 ML: 755 INJECTION, SOLUTION INTRAVENOUS at 14:02

## 2020-11-08 RX ADMIN — SODIUM CHLORIDE 1000 ML: 900 INJECTION, SOLUTION INTRAVENOUS at 00:20

## 2020-11-08 RX ADMIN — LORAZEPAM 2 MG: 2 INJECTION, SOLUTION INTRAMUSCULAR; INTRAVENOUS at 18:22

## 2020-11-08 RX ADMIN — FAMOTIDINE 20 MG: 10 INJECTION INTRAVENOUS at 11:41

## 2020-11-08 RX ADMIN — LORAZEPAM 1 MG: 1 TABLET ORAL at 19:28

## 2020-11-08 RX ADMIN — LORAZEPAM 2 MG: 2 INJECTION, SOLUTION INTRAMUSCULAR; INTRAVENOUS at 11:41

## 2020-11-08 RX ADMIN — FOLIC ACID: 5 INJECTION, SOLUTION INTRAMUSCULAR; INTRAVENOUS; SUBCUTANEOUS at 12:12

## 2020-11-08 RX ADMIN — LORAZEPAM 1 MG: 1 TABLET ORAL at 16:19

## 2020-11-08 NOTE — ED PROVIDER NOTES
EMERGENCY DEPARTMENT HISTORY AND PHYSICAL EXAM    11:24 AM      Date: 11/8/2020  Patient Name: Fabrizio High    History of Presenting Illness     Chief Complaint   Patient presents with    Chest Pain         History Provided By: Patient      Additional History (Context): Fabrizio High is a 63 y.o. female who presents with increased chest pain. She was seen here last night complaining of chest pain and alcohol intoxication to stated she went home drank red wine a couple hours later began having the same chest pain. She denies any shortness of breath diaphoresis nausea vomiting she states she does have a bad throat pain associated with this is not sure if it is reflux normal denies any radiations for she has had similar pain to this in the past.  Stated drinking red wine seem to make the pain worse nothing seems to alleviate the discomfort patient denies any symptoms of withdrawal however states she has withdrawal in the past she denies any previous seizures social history is remarkable for daily alcohol use tobacco denies any recreational drug use denies any possibility of pregnancy. PCP: Ashwin Casanova MD      Current Facility-Administered Medications   Medication Dose Route Frequency Provider Last Rate Last Dose    iopamidoL (ISOVUE-370) 76 % injection 100 mL  100 mL IntraVENous RAD ONCE Mercedes Mead MD         Current Outpatient Medications   Medication Sig Dispense Refill    QUEtiapine (SEROqueL) 300 mg tablet Take 600 mg by mouth daily.  busPIRone (BUSPAR) 15 mg tablet Take 15 mg by mouth three (3) times daily.  topiramate (Topamax) 100 mg tablet Take 100 mg by mouth two (2) times a day.  cloNIDine HCL (CATAPRES) 0.2 mg tablet Take 0.2 mg by mouth two (2) times a day.  sucralfate (CARAFATE) 100 mg/mL suspension Take 10 mL by mouth Before breakfast, lunch, dinner and at bedtime. 6514 mL 0    folic acid (FOLVITE) 1 mg tablet Take 1 Tab by mouth daily.  30 Tab 3    multivitamin (ONE A DAY) tablet Take 1 Tab by mouth daily. 30 Tab 3    lipase-protease-amylase (CREON 24,000) 24,000-76,000 -120,000 unit capsule Take 1 Cap by mouth three (3) times daily (with meals). 90 Cap 3    omeprazole (PRILOSEC) 40 mg capsule Take 1 Cap by mouth daily. (Patient taking differently: Take 40 mg by mouth two (2) times a day.) 90 Cap 3    ondansetron hcl (Zofran) 4 mg tablet Take 2 Tabs by mouth every eight (8) hours as needed for Nausea. 12 Tab 0    lidocaine (LIDODERM) 5 % Apply patch to the affected area for 12 hours a day and remove for 12 hours a day. 30 Each 0    lamoTRIgine (LaMICtal) 150 mg tablet Take 150 mg by mouth daily.  thiamine HCL (B-1) 100 mg tablet Take 1 Tab by mouth daily (with breakfast). 30 Tab 0    ipratropium-albuteroL (COMBIVENT RESPIMAT)  mcg/actuation inhaler Take 1 Puff by inhalation as needed for Wheezing.  traZODone (DESYREL) 100 mg tablet Take 200 mg by mouth nightly.  melatonin 10 mg tab Take 10 mg by mouth nightly.  MILK THISTLE PO Take 175 mg by mouth two (2) times a day.  colchicine 0.6 mg tablet Take 0.6 mg by mouth daily.  benzonatate (TESSALON PERLE PO) Take  by mouth as needed. Indications: cough      docusate sodium (COLACE) 100 mg capsule Take 100 mg by mouth three (3) times daily. Past History     Past Medical History:  Past Medical History:   Diagnosis Date    Alcohol abuse     Anxiety     Arrhythmia     Tacchycardai    Asthma     controlled    Bipolar disorder ([facility] [address]     Common migraine     COPD (chronic obstructive pulmonary disease) ([facility])     Home O2  PRN ,  pt use also for Tachycardia    Depression     Esophageal reflux     Gout     Hypertension     Hypocitraturia     Inverted nipple     Left breast always have.     Kidney stone on left side     Pancreatitis     Recurrent UTI     Staghorn renal calculus     Urine leukocytes        Past Surgical History:  Past Surgical History: Procedure Laterality Date    HX COLONOSCOPY      HX CYST REMOVAL Left     x 3 surgeries    HX ENDOSCOPY      HX GYN      tubal ligation    HX LUMBAR LAMINECTOMY      HX UROLOGICAL  08/10/2018    Cysto, bilat RPG, left ureteral pyeloscopy, HLL, left JJ stent placement, Dr. Myra Jordan       Family History:  Family History   Family history unknown: Yes       Social History:  Social History     Tobacco Use    Smoking status: Current Every Day Smoker     Packs/day: 1.50     Years: 33.00     Pack years: 49.50     Types: Cigarettes    Smokeless tobacco: Never Used   Substance Use Topics    Alcohol use: Yes     Comment: red wine    Drug use: No     Comment: 11years old- [facility]       Allergies: Allergies   Allergen Reactions    Lithium Anaphylaxis    Amitriptyline Other (comments)     Left leg went numb    Elavil Other (comments)     Left leg went numb         Review of Systems       Review of Systems   Constitutional: Positive for appetite change. Negative for chills, diaphoresis and fever. HENT: Negative for congestion. Eyes: Negative for visual disturbance. Respiratory: Positive for chest tightness. Negative for cough and shortness of breath. Cardiovascular: Positive for chest pain. Gastrointestinal: Negative for abdominal pain, diarrhea, nausea and vomiting. Endocrine: Negative for polyphagia and polyuria. Genitourinary: Negative for flank pain. Musculoskeletal: Negative for back pain. Skin: Negative for rash. Neurological: Negative for dizziness, syncope and weakness. Hematological: Does not bruise/bleed easily. Psychiatric/Behavioral: Positive for agitation. All other systems reviewed and are negative. Physical Exam     Visit Vitals  /80   Pulse (!) 127   Temp 98.4 °F (36.9 °C)   Resp 18   Wt 53.1 kg (117 lb)   LMP 04/01/2013   SpO2 100%   BMI 21.40 kg/m²       Physical Exam  Vitals signs and nursing note reviewed.    Constitutional:       General: She is not in acute distress. Comments: Thin chronically ill   HENT:      Head: Normocephalic and atraumatic. Eyes:      General: No scleral icterus. Conjunctiva/sclera: Conjunctivae normal.      Pupils: Pupils are equal, round, and reactive to light. Neck:      Musculoskeletal: Normal range of motion and neck supple. Cardiovascular:      Rate and Rhythm: Regular rhythm. Tachycardia present. Heart sounds: Normal heart sounds. No murmur. Pulmonary:      Effort: Pulmonary effort is normal. No respiratory distress. Breath sounds: Normal breath sounds. Abdominal:      General: Bowel sounds are normal. There is no distension. Palpations: Abdomen is soft. Tenderness: There is no abdominal tenderness. Lymphadenopathy:      Cervical: No cervical adenopathy. Skin:     General: Skin is warm and dry. Findings: No rash. Neurological:      Mental Status: She is alert and oriented to person, place, and time.       Coordination: Coordination normal.      Comments: Minimal tremors   Psychiatric:         Behavior: Behavior normal.           Diagnostic Study Results     Labs -  Recent Results (from the past 12 hour(s))   TROPONIN I    Collection Time: 11/08/20  2:30 AM   Result Value Ref Range    Troponin-I, QT <0.02 0.0 - 0.045 NG/ML   EKG, 12 LEAD, INITIAL    Collection Time: 11/08/20 11:03 AM   Result Value Ref Range    Ventricular Rate 126 BPM    Atrial Rate 126 BPM    P-R Interval 130 ms    QRS Duration 74 ms    Q-T Interval 306 ms    QTC Calculation (Bezet) 443 ms    Calculated P Axis 73 degrees    Calculated R Axis 75 degrees    Calculated T Axis 71 degrees    Diagnosis       Sinus tachycardia  Otherwise normal ECG  When compared with ECG of 19-OCT-2020 17:44,  No significant change was found     CARDIAC PANEL,(CK, CKMB & TROPONIN)    Collection Time: 11/08/20 11:30 AM   Result Value Ref Range    CK - MB 1.4 <3.6 ng/ml    CK-MB Index 1.7 0.0 - 4.0 %    CK 81 26 - 192 U/L    Troponin-I, QT <0.02 0.0 - 0.045 NG/ML   CBC WITH AUTOMATED DIFF    Collection Time: 11/08/20 11:30 AM   Result Value Ref Range    WBC 9.5 4.6 - 13.2 K/uL    RBC 4.36 4.20 - 5.30 M/uL    HGB 14.5 12.0 - 16.0 g/dL    HCT 43.8 35.0 - 45.0 %    .5 (H) 74.0 - 97.0 FL    MCH 33.3 24.0 - 34.0 PG    MCHC 33.1 31.0 - 37.0 g/dL    RDW 14.9 (H) 11.6 - 14.5 %    PLATELET 256 208 - 161 K/uL    MPV 8.9 (L) 9.2 - 11.8 FL    NEUTROPHILS 68 40 - 73 %    LYMPHOCYTES 29 21 - 52 %    MONOCYTES 3 3 - 10 %    EOSINOPHILS 0 0 - 5 %    BASOPHILS 0 0 - 2 %    ABS. NEUTROPHILS 6.4 1.8 - 8.0 K/UL    ABS. LYMPHOCYTES 2.7 0.9 - 3.6 K/UL    ABS. MONOCYTES 0.3 0.05 - 1.2 K/UL    ABS. EOSINOPHILS 0.0 0.0 - 0.4 K/UL    ABS. BASOPHILS 0.0 0.0 - 0.1 K/UL    DF AUTOMATED     METABOLIC PANEL, COMPREHENSIVE    Collection Time: 11/08/20 11:30 AM   Result Value Ref Range    Sodium 135 (L) 136 - 145 mmol/L    Potassium 4.8 3.5 - 5.5 mmol/L    Chloride 100 100 - 111 mmol/L    CO2 20 (L) 21 - 32 mmol/L    Anion gap 15 3.0 - 18 mmol/L    Glucose 89 74 - 99 mg/dL    BUN 12 7.0 - 18 MG/DL    Creatinine 0.70 0.6 - 1.3 MG/DL    BUN/Creatinine ratio 17 12 - 20      GFR est AA >60 >60 ml/min/1.73m2    GFR est non-AA >60 >60 ml/min/1.73m2    Calcium 9.3 8.5 - 10.1 MG/DL    Bilirubin, total 0.4 0.2 - 1.0 MG/DL    ALT (SGPT) 60 (H) 13 - 56 U/L    AST (SGOT) 79 (H) 10 - 38 U/L    Alk.  phosphatase 137 (H) 45 - 117 U/L    Protein, total 9.1 (H) 6.4 - 8.2 g/dL    Albumin 4.4 3.4 - 5.0 g/dL    Globulin 4.7 (H) 2.0 - 4.0 g/dL    A-G Ratio 0.9 0.8 - 1.7     MAGNESIUM    Collection Time: 11/08/20 11:30 AM   Result Value Ref Range    Magnesium 1.9 1.6 - 2.6 mg/dL   ETHYL ALCOHOL    Collection Time: 11/08/20 11:30 AM   Result Value Ref Range    ALCOHOL(ETHYL),SERUM 327 (H) 0 - 3 MG/DL   LIPASE    Collection Time: 11/08/20 11:30 AM   Result Value Ref Range    Lipase 60 (L) 73 - 393 U/L       Radiologic Studies -   CTA CHEST W OR W WO CONT    (Results Pending)         Medical Decision Making   I am the first provider for this patient. I reviewed the vital signs, available nursing notes, past medical history, past surgical history, family history and social history. Vital Signs-Reviewed the patient's vital signs. EKG: Sinus tach at a rate of 126 no other acute ST-T wave changes QTC is 443 interpreted by me at 11:20 AM    Records Reviewed: Nursing Notes, Old Medical Records and Previous Laboratory Studies (Time of Review: 11:24 AM)    ED Course: Progress Notes, Reevaluation, and Consults:    Procedure Note for Ultrasound Guided IV. IV access was not able to be obtained by nursing staff due to the patient having very difficult vein access. Skin was cleaned and disinfected prior to IV puncture. Ultrasound was used to find the vein which was compressible and does not have any ultrasound features of an artery. Under real-time ultrasound guidance peripheral access was obtained in the Left upper extremity. Blood return was present and IV flushed without difficulty with no clinical signs of infiltration. IV was taped into position and there were no immediate complications noted and patient tolerated the procedure well. Procedure was completed by myself the attending physician.       Provider Notes (Medical Decision Making):   MDM  Number of Diagnoses or Management Options  Acute chest pain:   ETOH abuse:   Tachycardia:   Diagnosis management comments: Concerning for possible EtOH withdrawal we will start fluids banana bag Ativan IV Pepcid his pain seemed to get worse with drinking the red wine as well as the reflux symptoms EKG in the emergency department shows sinus tach patient had 2 - troponins last night we will repeat this a.m. her total of 3    2:00 PM)  She continues to be tachycardic in the emergency department continue to manage for withdrawals however will CTA chest for complaints of chest pain and tachycardia care transferred to Dr. Hector Shaw for further evaluation          Critical Care Time:       Diagnosis     Clinical Impression:   1. ETOH abuse    2. Acute chest pain    3. Tachycardia        Disposition:     Follow-up Information    None          Patient's Medications   Start Taking    No medications on file   Continue Taking    BENZONATATE (TESSALON PERLE PO)    Take  by mouth as needed. Indications: cough    BUSPIRONE (BUSPAR) 15 MG TABLET    Take 15 mg by mouth three (3) times daily. CLONIDINE HCL (CATAPRES) 0.2 MG TABLET    Take 0.2 mg by mouth two (2) times a day. COLCHICINE 0.6 MG TABLET    Take 0.6 mg by mouth daily. DOCUSATE SODIUM (COLACE) 100 MG CAPSULE    Take 100 mg by mouth three (3) times daily. FOLIC ACID (FOLVITE) 1 MG TABLET    Take 1 Tab by mouth daily. IPRATROPIUM-ALBUTEROL (COMBIVENT RESPIMAT)  MCG/ACTUATION INHALER    Take 1 Puff by inhalation as needed for Wheezing. LAMOTRIGINE (LAMICTAL) 150 MG TABLET    Take 150 mg by mouth daily. LIDOCAINE (LIDODERM) 5 %    Apply patch to the affected area for 12 hours a day and remove for 12 hours a day. LIPASE-PROTEASE-AMYLASE (CREON 24,000) 24,000-76,000 -120,000 UNIT CAPSULE    Take 1 Cap by mouth three (3) times daily (with meals). MELATONIN 10 MG TAB    Take 10 mg by mouth nightly. MILK THISTLE PO    Take 175 mg by mouth two (2) times a day. MULTIVITAMIN (ONE A DAY) TABLET    Take 1 Tab by mouth daily. OMEPRAZOLE (PRILOSEC) 40 MG CAPSULE    Take 1 Cap by mouth daily. ONDANSETRON HCL (ZOFRAN) 4 MG TABLET    Take 2 Tabs by mouth every eight (8) hours as needed for Nausea. QUETIAPINE (SEROQUEL) 300 MG TABLET    Take 600 mg by mouth daily. SUCRALFATE (CARAFATE) 100 MG/ML SUSPENSION    Take 10 mL by mouth Before breakfast, lunch, dinner and at bedtime. THIAMINE HCL (B-1) 100 MG TABLET    Take 1 Tab by mouth daily (with breakfast). TOPIRAMATE (TOPAMAX) 100 MG TABLET    Take 100 mg by mouth two (2) times a day.     TRAZODONE (DESYREL) 100 MG TABLET Take 200 mg by mouth nightly. These Medications have changed    No medications on file   Stop Taking    No medications on file     _______________________________    Please note that this dictation was completed with [employer], the computer voice recognition software. Quite often unanticipated grammatical, syntax, homophones, and other interpretive errors are inadvertently transcribed by the computer software. Please disregard these errors. Please excuse any errors that have escaped final proofreading.

## 2020-11-08 NOTE — ED PROVIDER NOTES
64 yo CF with PMHx etoh abuse presents by EMS for chest pain. Pt states she's been drinking and started to have pain in center of chest that feels aching. No modifying factors. Pt has had some vomiting, no fevers. Pt has not taken anything for symptoms. Pt has had many similar previous episodes. Past Medical History:   Diagnosis Date    Alcohol abuse     Anxiety     Arrhythmia     Tacchycardai    Asthma     controlled    Bipolar disorder ([address]     Common migraine     COPD (chronic obstructive pulmonary disease) ([facility])     Home O2  PRN ,  pt use also for Tachycardia    Depression     Esophageal reflux     Gout     Hypertension     Hypocitraturia     Inverted nipple     Left breast always have.     Kidney stone on left side     Pancreatitis     Recurrent UTI     Staghorn renal calculus     Urine leukocytes        Past Surgical History:   Procedure Laterality Date    HX COLONOSCOPY      HX CYST REMOVAL Left     x 3 surgeries    HX ENDOSCOPY      HX GYN      tubal ligation    HX LUMBAR LAMINECTOMY      HX UROLOGICAL  08/10/2018    Cysto, bilat RPG, left ureteral pyeloscopy, HLL, left JJ stent placement, Dr. Bowen Prieto         Family History:   Family history unknown: Yes       Social History     Socioeconomic History    Marital status:      Spouse name: Not on file    Number of children: Not on file    Years of education: Not on file    Highest education level: Not on file   Occupational History    Not on file   Social Needs    Financial resource strain: Not on file    Food insecurity     Worry: Not on file     Inability: Not on file   [employer] needs     Medical: Not on file     Non-medical: Not on file   Tobacco Use    Smoking status: Current Every Day Smoker     Packs/day: 1.50     Years: 33.00     Pack years: 49.50     Types: Cigarettes    Smokeless tobacco: Never Used   Substance and Sexual Activity    Alcohol use: Yes     Comment: red wine    Drug use: No     Comment: 11years old- [employer]    Sexual activity: Not on file   Lifestyle    Physical activity     Days per week: Not on file     Minutes per session: Not on file    Stress: Not on file   Relationships    Social connections     Talks on phone: Not on file     Gets together: Not on file     Attends Orthodoxy service: Not on file     Active member of club or organization: Not on file     Attends meetings of clubs or organizations: Not on file     Relationship status: Not on file    Intimate partner violence     Fear of current or ex partner: Not on file     Emotionally abused: Not on file     Physically abused: Not on file     Forced sexual activity: Not on file   Other Topics Concern    Not on file   Social History Narrative    Not on file         ALLERGIES: Lithium; Amitriptyline; and Elavil    Review of Systems   Constitutional: Negative for fever. HENT: Negative for trouble swallowing. Respiratory: Negative for shortness of breath. Cardiovascular: Positive for chest pain. Gastrointestinal: Positive for vomiting. Negative for abdominal pain. Genitourinary: Negative for difficulty urinating. Skin: Negative for wound. Neurological: Negative for syncope. Psychiatric/Behavioral: Negative for behavioral problems. All other systems reviewed and are negative. Vitals:    11/07/20 2343   BP: (!) 132/91   Pulse: (!) 123   Resp: 24   Temp: 98.8 °F (37.1 °C)   SpO2: 96%   Weight: 52.2 kg (115 lb)   Height: 5' 2\" (1.575 m)            Physical Exam  Vitals signs and nursing note reviewed. Constitutional:       General: She is not in acute distress. Appearance: She is well-developed. Comments: Chronically ill-appearing, nad   HENT:      Head: Normocephalic and atraumatic. Neck:      Musculoskeletal: Normal range of motion. Cardiovascular:      Rate and Rhythm: Tachycardia present. Pulmonary:      Effort: Pulmonary effort is normal. No respiratory distress.       Breath sounds: Normal breath sounds. Abdominal:      Palpations: Abdomen is soft. Tenderness: There is no abdominal tenderness. Musculoskeletal: Normal range of motion. Right lower leg: No edema. Left lower leg: No edema. Comments: Mechanically stable   Skin:     General: Skin is warm. Neurological:      General: No focal deficit present. Mental Status: She is alert and oriented to person, place, and time. Comments: No focal deficits noted   Psychiatric:         Behavior: Behavior normal.          MDM  Number of Diagnoses or Management Options  Alcoholic intoxication with complication [facility]):   Chest pain, unspecified type:   Diagnosis management comments: 64 yo CF with PMHx etoh abuse presents by EMS with chest pain. No fevers. Examination with tachycardia but otherwise unremarkable. Pt intoxicated. Will evaluate for acute process. 12:27 AM  Nursing unable to establish IV access. Using ultra-sound for guidance, I placed a 18-gauge IV to right upper arm. Good flow and blood return. Dressing was placed. Patient tolerated well without any immediate complications. 3:03 AM  etoh 331. Trop x 2 neg.  cxr and ekg ok to my read. Work-up otherwise unremarkable. Pt doing ok, has been ambulatory in ED. Discussed results and poc for dc home, symptom management, follow-up, return precautions.            Amount and/or Complexity of Data Reviewed  Clinical lab tests: ordered and reviewed  Tests in the radiology section of CPT®: ordered and reviewed  Obtain history from someone other than the patient: yes  Review and summarize past medical records: yes  Independent visualization of images, tracings, or specimens: yes    Patient Progress  Patient progress: stable         EKG    Date/Time: 11/7/2020 11:56 PM  Performed by: Jeanne Mcgraw MD  Authorized by: Jeanne Mcgraw MD     ECG reviewed by ED Physician in the absence of a cardiologist: yes    Previous ECG:     Previous ECG: Compared to current    Comparison ECG info:  10/19/20    Similarity:  No change  Interpretation:     Interpretation: non-specific    Rate:     ECG rate:  124    ECG rate assessment: tachycardic    Rhythm:     Rhythm: sinus tachycardia    Ectopy:     Ectopy: none    QRS:     QRS axis:  Normal  Conduction:     Conduction: normal    ST segments:     ST segments:  Normal  T waves:     T waves: normal            PROGRESS NOTES    11:51 PM:   Jeanne Mcgraw MD arrives to the bedside to evaluate the patient. Answered the patient's questions regarding the treatment plan. CONSULTATIONS  None      MEDICATIONS ORDERED  Medications   sodium chloride 0.9 % bolus infusion 1,000 mL (0 mL IntraVENous IV Completed 11/8/20 0140)       RADIOLOGY INTERPRETATIONS  XR CHEST PORT    (Results Pending)       EKG READINGS/LABORATORY RESULTS  Recent Results (from the past 12 hour(s))   CBC WITH AUTOMATED DIFF    Collection Time: 11/08/20 12:30 AM   Result Value Ref Range    WBC 7.2 4.6 - 13.2 K/uL    RBC 4.00 (L) 4.20 - 5.30 M/uL    HGB 13.1 12.0 - 16.0 g/dL    HCT 40.0 35.0 - 45.0 %    .0 (H) 74.0 - 97.0 FL    MCH 32.8 24.0 - 34.0 PG    MCHC 32.8 31.0 - 37.0 g/dL    RDW 14.9 (H) 11.6 - 14.5 %    PLATELET 442 912 - 288 K/uL    MPV 8.9 (L) 9.2 - 11.8 FL    NEUTROPHILS 55 40 - 73 %    LYMPHOCYTES 40 21 - 52 %    MONOCYTES 4 3 - 10 %    EOSINOPHILS 1 0 - 5 %    BASOPHILS 0 0 - 2 %    ABS. NEUTROPHILS 3.9 1.8 - 8.0 K/UL    ABS. LYMPHOCYTES 2.9 0.9 - 3.6 K/UL    ABS. MONOCYTES 0.3 0.05 - 1.2 K/UL    ABS. EOSINOPHILS 0.0 0.0 - 0.4 K/UL    ABS.  BASOPHILS 0.0 0.0 - 0.1 K/UL    DF AUTOMATED     METABOLIC PANEL, COMPREHENSIVE    Collection Time: 11/08/20 12:30 AM   Result Value Ref Range    Sodium 136 136 - 145 mmol/L    Potassium 5.0 3.5 - 5.5 mmol/L    Chloride 103 100 - 111 mmol/L    CO2 23 21 - 32 mmol/L    Anion gap 10 3.0 - 18 mmol/L    Glucose 84 74 - 99 mg/dL    BUN 13 7.0 - 18 MG/DL    Creatinine 0.67 0.6 - 1.3 MG/DL BUN/Creatinine ratio 19 12 - 20      GFR est AA >60 >60 ml/min/1.73m2    GFR est non-AA >60 >60 ml/min/1.73m2    Calcium 8.6 8.5 - 10.1 MG/DL    Bilirubin, total 0.3 0.2 - 1.0 MG/DL    ALT (SGPT) 50 13 - 56 U/L    AST (SGOT) 66 (H) 10 - 38 U/L    Alk. phosphatase 109 45 - 117 U/L    Protein, total 7.6 6.4 - 8.2 g/dL    Albumin 3.6 3.4 - 5.0 g/dL    Globulin 4.0 2.0 - 4.0 g/dL    A-G Ratio 0.9 0.8 - 1.7     LIPASE    Collection Time: 11/08/20 12:30 AM   Result Value Ref Range    Lipase 63 (L) 73 - 393 U/L   TROPONIN I    Collection Time: 11/08/20 12:30 AM   Result Value Ref Range    Troponin-I, QT <0.02 0.0 - 0.045 NG/ML   ETHYL ALCOHOL    Collection Time: 11/08/20 12:30 AM   Result Value Ref Range    ALCOHOL(ETHYL),SERUM 331 (H) 0 - 3 MG/DL   TROPONIN I    Collection Time: 11/08/20  2:30 AM   Result Value Ref Range    Troponin-I, QT <0.02 0.0 - 0.045 NG/ML       ED DIAGNOSIS & DISPOSITION INFORMATION  Diagnosis:   1. Chest pain, unspecified type    2. Alcoholic intoxication with complication [facility])        Disposition: Discharged    Follow-up Information     Follow up With Specialties Details Why Contact Info    Yesenia Preciado MD Family Medicine Schedule an appointment as soon as possible for a visit  [address] [place] [address] EMERGENCY DEPT Emergency Medicine  As needed [address]  [phone]          Patient's Medications   Start Taking    No medications on file   Continue Taking    BENZONATATE (TESSALON PERLE PO)    Take  by mouth as needed. Indications: cough    BUSPIRONE (BUSPAR) 15 MG TABLET    Take 15 mg by mouth three (3) times daily. CLONIDINE HCL (CATAPRES) 0.2 MG TABLET    Take 0.2 mg by mouth two (2) times a day. COLCHICINE 0.6 MG TABLET    Take 0.6 mg by mouth daily. DOCUSATE SODIUM (COLACE) 100 MG CAPSULE    Take 100 mg by mouth three (3) times daily. FOLIC ACID (FOLVITE) 1 MG TABLET    Take 1 Tab by mouth daily. IPRATROPIUM-ALBUTEROL (COMBIVENT RESPIMAT)  MCG/ACTUATION INHALER    Take 1 Puff by inhalation as needed for Wheezing. LAMOTRIGINE (LAMICTAL) 150 MG TABLET    Take 150 mg by mouth daily. LIDOCAINE (LIDODERM) 5 %    Apply patch to the affected area for 12 hours a day and remove for 12 hours a day. LIPASE-PROTEASE-AMYLASE (CREON 24,000) 24,000-76,000 -120,000 UNIT CAPSULE    Take 1 Cap by mouth three (3) times daily (with meals). MELATONIN 10 MG TAB    Take 10 mg by mouth nightly. MILK THISTLE PO    Take 175 mg by mouth two (2) times a day. MULTIVITAMIN (ONE A DAY) TABLET    Take 1 Tab by mouth daily. OMEPRAZOLE (PRILOSEC) 40 MG CAPSULE    Take 1 Cap by mouth daily. ONDANSETRON HCL (ZOFRAN) 4 MG TABLET    Take 2 Tabs by mouth every eight (8) hours as needed for Nausea. QUETIAPINE (SEROQUEL) 300 MG TABLET    Take 600 mg by mouth daily. SUCRALFATE (CARAFATE) 100 MG/ML SUSPENSION    Take 10 mL by mouth Before breakfast, lunch, dinner and at bedtime. THIAMINE HCL (B-1) 100 MG TABLET    Take 1 Tab by mouth daily (with breakfast). TOPIRAMATE (TOPAMAX) 100 MG TABLET    Take 100 mg by mouth two (2) times a day. TRAZODONE (DESYREL) 100 MG TABLET    Take 200 mg by mouth nightly.    These Medications have changed    No medications on file   Stop Taking    No medications on file           Leti Sousa MD.

## 2020-11-08 NOTE — ED NOTES
3: 17 AM  11/08/20     Discharge instructions given to pt (name) with verbalization of understanding. Patient accompanied by self. Patient discharged with the following prescriptions none. Patient discharged to home (destination).    Pt has called taxi for ride home     Lamar Saldaña RN
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
## 2020-11-09 ENCOUNTER — PATIENT OUTREACH (OUTPATIENT)
Dept: CASE MANAGEMENT | Age: 55
End: 2020-11-09

## 2020-11-09 ENCOUNTER — HOSPITAL ENCOUNTER (EMERGENCY)
Age: 55
Discharge: HOME | End: 2020-11-09
Attending: STUDENT IN AN ORGANIZED HEALTH CARE EDUCATION/TRAINING PROGRAM
Payer: MEDICARE

## 2020-11-09 ENCOUNTER — APPOINTMENT (OUTPATIENT)
Dept: GENERAL RADIOLOGY | Age: 55
End: 2020-11-09
Attending: PHYSICIAN ASSISTANT
Payer: MEDICARE

## 2020-11-09 VITALS
RESPIRATION RATE: 18 BRPM | BODY MASS INDEX: 20.61 KG/M2 | HEIGHT: 62 IN | TEMPERATURE: 97.9 F | WEIGHT: 112 LBS | HEART RATE: 90 BPM | DIASTOLIC BLOOD PRESSURE: 66 MMHG | SYSTOLIC BLOOD PRESSURE: 101 MMHG | OXYGEN SATURATION: 100 %

## 2020-11-09 DIAGNOSIS — R07.9 ACUTE CHEST PAIN: Primary | ICD-10-CM

## 2020-11-09 DIAGNOSIS — F10.929 ACUTE ALCOHOLIC INTOXICATION WITH COMPLICATION (HCC): ICD-10-CM

## 2020-11-09 DIAGNOSIS — F10.10 ALCOHOL ABUSE: ICD-10-CM

## 2020-11-09 LAB
ALBUMIN SERPL-MCNC: 3.3 G/DL (ref 3.4–5)
ALBUMIN/GLOB SERPL: 0.9 {RATIO} (ref 0.8–1.7)
ALP SERPL-CCNC: 104 U/L (ref 45–117)
ALT SERPL-CCNC: 41 U/L (ref 13–56)
ANION GAP SERPL CALC-SCNC: 10 MMOL/L (ref 3–18)
AST SERPL-CCNC: 57 U/L (ref 10–38)
ATRIAL RATE: 124 BPM
ATRIAL RATE: 126 BPM
BASOPHILS # BLD: 0 K/UL (ref 0–0.1)
BASOPHILS NFR BLD: 1 % (ref 0–2)
BILIRUB SERPL-MCNC: 0.4 MG/DL (ref 0.2–1)
BUN SERPL-MCNC: 9 MG/DL (ref 7–18)
BUN/CREAT SERPL: 17 (ref 12–20)
CALCIUM SERPL-MCNC: 9 MG/DL (ref 8.5–10.1)
CALCULATED P AXIS, ECG09: 73 DEGREES
CALCULATED P AXIS, ECG09: 75 DEGREES
CALCULATED R AXIS, ECG10: 75 DEGREES
CALCULATED R AXIS, ECG10: 77 DEGREES
CALCULATED T AXIS, ECG11: 71 DEGREES
CALCULATED T AXIS, ECG11: 74 DEGREES
CHLORIDE SERPL-SCNC: 106 MMOL/L (ref 100–111)
CO2 SERPL-SCNC: 21 MMOL/L (ref 21–32)
CREAT SERPL-MCNC: 0.52 MG/DL (ref 0.6–1.3)
DIAGNOSIS, 93000: NORMAL
DIAGNOSIS, 93000: NORMAL
DIFFERENTIAL METHOD BLD: ABNORMAL
EOSINOPHIL # BLD: 0.1 K/UL (ref 0–0.4)
EOSINOPHIL NFR BLD: 2 % (ref 0–5)
ERYTHROCYTE [DISTWIDTH] IN BLOOD BY AUTOMATED COUNT: 15 % (ref 11.6–14.5)
ETHANOL SERPL-MCNC: 231 MG/DL (ref 0–3)
GLOBULIN SER CALC-MCNC: 3.6 G/DL (ref 2–4)
GLUCOSE SERPL-MCNC: 85 MG/DL (ref 74–99)
HCT VFR BLD AUTO: 35.7 % (ref 35–45)
HGB BLD-MCNC: 12 G/DL (ref 12–16)
LYMPHOCYTES # BLD: 3.2 K/UL (ref 0.9–3.6)
LYMPHOCYTES NFR BLD: 59 % (ref 21–52)
MCH RBC QN AUTO: 32.6 PG (ref 24–34)
MCHC RBC AUTO-ENTMCNC: 33.1 G/DL (ref 31–37)
MCV RBC AUTO: 98.6 FL (ref 74–97)
MONOCYTES # BLD: 0.3 K/UL (ref 0.05–1.2)
MONOCYTES NFR BLD: 5 % (ref 3–10)
NEUTS SEG # BLD: 1.8 K/UL (ref 1.8–8)
NEUTS SEG NFR BLD: 33 % (ref 40–73)
P-R INTERVAL, ECG05: 130 MS
P-R INTERVAL, ECG05: 130 MS
PLATELET # BLD AUTO: 213 K/UL (ref 135–420)
PMV BLD AUTO: 9.2 FL (ref 9.2–11.8)
POTASSIUM SERPL-SCNC: 4.3 MMOL/L (ref 3.5–5.5)
PROT SERPL-MCNC: 6.9 G/DL (ref 6.4–8.2)
Q-T INTERVAL, ECG07: 300 MS
Q-T INTERVAL, ECG07: 306 MS
QRS DURATION, ECG06: 74 MS
QRS DURATION, ECG06: 74 MS
QTC CALCULATION (BEZET), ECG08: 431 MS
QTC CALCULATION (BEZET), ECG08: 443 MS
RBC # BLD AUTO: 3.62 M/UL (ref 4.2–5.3)
SODIUM SERPL-SCNC: 137 MMOL/L (ref 136–145)
TROPONIN I SERPL-MCNC: <0.02 NG/ML (ref 0–0.04)
VENTRICULAR RATE, ECG03: 124 BPM
VENTRICULAR RATE, ECG03: 126 BPM
WBC # BLD AUTO: 5.4 K/UL (ref 4.6–13.2)

## 2020-11-09 PROCEDURE — 99285 EMERGENCY DEPT VISIT HI MDM: CPT

## 2020-11-09 PROCEDURE — 74011250636 HC RX REV CODE- 250/636: Performed by: PHYSICIAN ASSISTANT

## 2020-11-09 PROCEDURE — 93005 ELECTROCARDIOGRAM TRACING: CPT

## 2020-11-09 PROCEDURE — 71045 X-RAY EXAM CHEST 1 VIEW: CPT

## 2020-11-09 PROCEDURE — 80307 DRUG TEST PRSMV CHEM ANLYZR: CPT

## 2020-11-09 PROCEDURE — 85025 COMPLETE CBC W/AUTO DIFF WBC: CPT

## 2020-11-09 PROCEDURE — 80053 COMPREHEN METABOLIC PANEL: CPT

## 2020-11-09 PROCEDURE — 84484 ASSAY OF TROPONIN QUANT: CPT

## 2020-11-09 RX ORDER — SUCRALFATE 1 G/10ML
1 SUSPENSION ORAL
Qty: 1200 ML | Refills: 0 | Status: SHIPPED | OUTPATIENT
Start: 2020-11-09

## 2020-11-09 RX ORDER — OMEPRAZOLE 40 MG/1
40 CAPSULE, DELAYED RELEASE ORAL DAILY
Qty: 90 CAP | Refills: 3 | Status: SHIPPED | OUTPATIENT
Start: 2020-11-09

## 2020-11-09 RX ADMIN — SODIUM CHLORIDE 1000 ML: 900 INJECTION, SOLUTION INTRAVENOUS at 20:17

## 2020-11-09 NOTE — DISCHARGE INSTRUCTIONS
Patient Education        Alcohol Detoxification and Withdrawal: Care Instructions  Your Care Instructions     If you drink alcohol regularly and then suddenly stop, you may go through some physical and emotional problems while the alcohol clears out of your system. Clearing the alcohol from your body is called detoxification, or detox. Physical and emotional problems that may happen during detox are called withdrawal.  Symptoms of withdrawal can be scary and dangerous. Mild symptoms include nausea and vomiting, sweating, shakiness, and intense worry. Severe symptoms include being confused and irritable, feeling things on your body that are not there, seeing or hearing things that are not there, and trembling. You may even have seizures. If your symptoms become severe you must see a doctor. People who drink large amounts of alcohol should not try to detox at home. A person can die of severe alcohol withdrawal.  Symptoms of alcohol withdrawal may begin from 4 to 12 hours after you stop drinking. But they may not start for several days after the last drink. They can last a few days. It is hard to stop drinking. But when you have cleared the alcohol from your system, you will be able to start the next part of your life, free from the burden of being dependent. Follow-up care is a key part of your treatment and safety. Be sure to make and go to all appointments, and call your doctor if you are having problems. It's also a good idea to know your test results and keep a list of the medicines you take. How can you care for yourself at home? · Before you stop drinking, talk to your doctor about how you plan to stop. Be sure to be completely honest with him or her about how much you have been drinking. Your doctor will figure out whether you need to detox in a supervised medical center. · Take your medicines exactly as prescribed. Call your doctor if you think you are having a problem with your medicine.   · Make sure someone you trust is with you the whole time. Have friends and family members take turns staying with you until you are done with detox. · Put a list of emergency numbers near the phone. This should include your doctor, the police, the nearest hospital and emergency room, and neighbors who can help if needed. · Make sure all alcohol is removed from the house before you start. This includes beverages as well as medicines, rubbing alcohol, and certain flavorings like vanilla extract. · Keep \"drinking buddies\" away during the time you are going through detox. · Make your surroundings calm. Soft lights, soft music, and a comfortable place to sit or lie down can help make the process easier. · Drink lots of fluids and eat snacks such as fruit, cheese and crackers, and pretzels. Foods high in carbohydrate may help reduce the craving for alcohol. · Understand that detox is going to be hard. · Keep in mind that the people watching over you during detox are there to help. Explain to them before you start that you may not act like yourself until detox is finished. · Consider joining a support group such as Alcoholics Anonymous. Sharing your experiences with other people who face similar challenges may help you feel less overwhelmed. · Keep the numbers for these national suicide hotlines: 8-746-822-TALK (2-474.504.9857) and 6-406-AJVIQEJ (7-664.608.9790). If you or someone you know talks about suicide or feeling hopeless, get help right away. When should you call for help? Call 911 anytime you think you may need emergency care.  For example, call if:    · You feel you cannot stop from hurting yourself or someone else.     · You vomit many times and cannot stop.     · You vomit blood or what looks like coffee grounds.     · You have trouble breathing or are breathing very fast.     · Your heart beats more than 120 times a minute and will not slow down.     · You have chest pain.     · You have a seizure.     · You see or feel things that are not there (hallucinate). If you are caring for someone who is going through detox, call if:    · The person passes out (loses consciousness).     · The person sees or feels things that are not there and sees or hears the same things many times.     · The person is very agitated and will not calm down.     · The person becomes violent or threatens to be violent.     · The person has a seizure. Call your doctor now or seek immediate medical care if:    · You have a high fever.     · You have severe belly pain.     · You are very shaky. Watch closely for changes in your health, and be sure to contact your doctor if:    · You do not get better as expected. Where can you learn more? Go to http://www.castaneda.com/  Enter D051 in the search box to learn more about \"Alcohol Detoxification and Withdrawal: Care Instructions. \"  Current as of: June 29, 2020               Content Version: 12.6  © 2006-2020 [employer]. Care instructions adapted under license by [employer] (which disclaims liability or warranty for this information). If you have questions about a medical condition or this instruction, always ask your healthcare professional. [address] any warranty or liability for your use of this information.
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
## 2020-11-10 LAB
ATRIAL RATE: 95 BPM
CALCULATED P AXIS, ECG09: 70 DEGREES
CALCULATED R AXIS, ECG10: 72 DEGREES
CALCULATED T AXIS, ECG11: 65 DEGREES
DIAGNOSIS, 93000: NORMAL
P-R INTERVAL, ECG05: 150 MS
Q-T INTERVAL, ECG07: 348 MS
QRS DURATION, ECG06: 72 MS
QTC CALCULATION (BEZET), ECG08: 437 MS
VENTRICULAR RATE, ECG03: 95 BPM

## 2020-11-10 NOTE — DISCHARGE INSTRUCTIONS
Patient Education        Acute Alcohol Intoxication: Care Instructions  Your Care Instructions     You have had treatment to help your body rid itself of alcohol. Too much alcohol upsets the body's fluid balance. Your doctor may have given you fluids and vitamins. For some people, drinking too much alcohol is a one-time event. For others, it is an ongoing problem. In either case, it is serious. It can be life-threatening. Follow-up care is a key part of your treatment and safety. Be sure to make and go to all appointments, and call your doctor if you are having problems. It's also a good idea to know your test results and keep a list of the medicines you take. How can you care for yourself at home? · Do not drink and drive. · Be safe with medicines. Take your medicines exactly as prescribed. Call your doctor if you think you are having a problem with your medicine. · Your doctor may have prescribed disulfiram (Antabuse). Do not drink any alcohol while you are taking this medicine. You may have severe or even life-threatening side effects from even small amounts of alcohol. · If you were given medicine to prevent nausea, be sure to take it exactly as prescribed. · Before you take any medicine, tell your doctor if:  ? You have had a bad reaction to any medicines in the past.  ? You are taking other medicines, including over-the-counter ones, or have other health problems. ? You are or could be pregnant. · Be prepared to have some symptoms of withdrawal in the next few days. · Drink plenty of liquids in the next few days. · Seek help if you need it to stop drinking. Getting counseling and joining a support group can help you stay sober. Try a support group such as Alcoholics Anonymous. · Avoid alcohol when you take medicines. It can react with many medicines and cause serious problems. When should you call for help? Call 911 anytime you think you may need emergency care.  For example, call if:    · You feel confused and are seeing things that are not there.     · You are thinking about killing yourself or hurting others.     · You have a seizure.     · You vomit blood or what looks like coffee grounds. Call your doctor now or seek immediate medical care if:    · You have trembling, restlessness, sweating, and other withdrawal symptoms that are new or that get worse.     · Your withdrawal symptoms come back after not bothering you for days or weeks.     · You can't stop vomiting. Watch closely for changes in your health, and be sure to contact your doctor if:    · You need help to stop drinking. Where can you learn more? Go to http://www.gray.com/  Enter T102 in the search box to learn more about \"Acute Alcohol Intoxication: Care Instructions. \"  Current as of: June 29, 2020               Content Version: 12.6  © 4189-7592 [employer]. Care instructions adapted under license by [employer] (which disclaims liability or warranty for this information). If you have questions about a medical condition or this instruction, always ask your healthcare professional. [place] [address] any warranty or liability for your use of this information. Patient Education        Learning About Alcohol Use Disorder  What is alcohol use disorder? Alcohol use disorder means that a person drinks alcohol even though it causes harm to themselves or others. It can range from mild to severe. The more signs of this disorder you have, the more severe it may be. Moderate to severe alcohol use disorder is sometimes called addiction. People who have it may find it hard to control their use of alcohol. People who have this disorder may argue with others about how much they're drinking. Their job may be affected because of drinking. They may drink when it's dangerous or illegal, such as when they drive. They also may have a strong need, or craving, to drink.  They may feel like they must drink just to get by. Their drinking may increase their risk of getting hurt or being in a car crash. Over time, drinking too much alcohol may cause health problems. These may include high blood pressure, liver problems, or problems with digestion. What are the signs? Maybe you've wondered about your alcohol habits, or how to tell if your drinking is becoming a problem. Here are some of the signs of alcohol use disorder. You may have it if you have two or more of the following signs:  · You drink larger amounts of alcohol than you ever meant to. Or you've been drinking for a longer time than you ever meant to. · You can't cut down or control your use. Or you constantly wish you could cut down. · You spend a lot of time getting or drinking alcohol or recovering from its effects. · You have strong cravings for alcohol. · You can no longer do your main jobs at work, at school, or at home. · You keep drinking alcohol, even though your use hurts your relationships. · You have stopped doing important activities because of your alcohol use. · You drink alcohol in situations where doing so is dangerous. · You keep drinking alcohol even though you know it's causing health problems. · You need more and more alcohol to get the same effect, or you get less effect from the same amount over time. This is called tolerance. · You have uncomfortable symptoms when you stop drinking alcohol or use less. This is called withdrawal.  Alcohol use disorder can range from mild to severe. The more signs you have, the more severe the disorder may be. Moderate to severe alcohol use disorder is sometimes called addiction. You might not realize that your drinking is a problem. You might not drink large amounts when you drink. Or you might go for days or weeks between drinking episodes. But even if you don't drink very often, your drinking could still be harmful and put you at risk.   How is alcohol use disorder treated? Getting help is up to you. But you don't have to do it alone. There are many people and kinds of treatments that can help. Treatment for alcohol use disorder can include:  · Group therapy, one or more types of counseling, and alcohol education. · Medicines that help to:  ? Reduce withdrawal symptoms and help you safely stop drinking. ? Reduce cravings for alcohol. · Support groups. These groups include Alcoholics Anonymous and [employer] (Self-Management and Recovery Training). Some people are able to stop or cut back on drinking with help from a counselor. People who have moderate to severe alcohol use disorder may need medical treatment. They may need to stay in a hospital or treatment center. You may have a treatment team to help you. This team may include a psychologist or psychiatrist, counselors, doctors, social workers, nurses, and a . A  helps plan and manage your treatment. Follow-up care is a key part of your treatment and safety. Be sure to make and go to all appointments, and call your doctor if you are having problems. It's also a good idea to know your test results and keep a list of the medicines you take. Where can you learn more? Go to http://www.gray.com/  Enter H758 in the search box to learn more about \"Learning About Alcohol Use Disorder. \"  Current as of: June 29, 2020               Content Version: 12.6  © 4267-6076 [employer]. Care instructions adapted under license by [employer] (which disclaims liability or warranty for this information). If you have questions about a medical condition or this instruction, always ask your healthcare professional. [place] [address] any warranty or liability for your use of this information. Patient Education        Chest Pain: Care Instructions  Your Care Instructions     There are many things that can cause chest pain.  Some are not serious and will get better on their own in a few days. But some kinds of chest pain need more testing and treatment. Your doctor may have recommended a follow-up visit in the next 8 to 12 hours. If you are not getting better, you may need more tests or treatment. Even though your doctor has released you, you still need to watch for any problems. The doctor carefully checked you, but sometimes problems can develop later. If you have new symptoms or if your symptoms do not get better, get medical care right away. If you have worse or different chest pain or pressure that lasts more than 5 minutes or you passed out (lost consciousness), call 911 or seek other emergency help right away. A medical visit is only one step in your treatment. Even if you feel better, you still need to do what your doctor recommends, such as going to all suggested follow-up appointments and taking medicines exactly as directed. This will help you recover and help prevent future problems. How can you care for yourself at home? · Rest until you feel better. · Take your medicine exactly as prescribed. Call your doctor if you think you are having a problem with your medicine. · Do not drive after taking a prescription pain medicine. When should you call for help? Call 911 if:     · You passed out (lost consciousness).     · You have severe difficulty breathing.     · You have symptoms of a heart attack. These may include:  ? Chest pain or pressure, or a strange feeling in your chest.  ? Sweating. ? Shortness of breath. ? Nausea or vomiting. ? Pain, pressure, or a strange feeling in your back, neck, jaw, or upper belly or in one or both shoulders or arms. ? Lightheadedness or sudden weakness. ? A fast or irregular heartbeat. After you call 911, the  may tell you to chew 1 adult-strength or 2 to 4 low-dose aspirin. Wait for an ambulance. Do not try to drive yourself.    Call your doctor today if:     · You have any trouble breathing.     · Your chest pain gets worse.     · You are dizzy or lightheaded, or you feel like you may faint.     · You are not getting better as expected.     · You are having new or different chest pain. Where can you learn more? Go to http://www.castaneda.com/  Enter A120 in the search box to learn more about \"Chest Pain: Care Instructions. \"  Current as of: June 26, 2019               Content Version: 12.6  © 4409-5357 [employer]. Care instructions adapted under license by [employer] (which disclaims liability or warranty for this information). If you have questions about a medical condition or this instruction, always ask your healthcare professional. [address] any warranty or liability for your use of this information.

## 2020-11-10 NOTE — ED TRIAGE NOTES
Patient came in tonight for chest pain, mid chest 7/8 out of 10. She states she has had 6-7 glasses of wine and a librium. She admits that she is an alcoholic. BP a little, she is afebrile. EKG done and given to [facility].   Report given to incoming RN Alaina Forbes

## 2020-11-10 NOTE — ED PROVIDER NOTES
EMERGENCY DEPARTMENT HISTORY AND PHYSICAL EXAM    7:14 PM      Date: 11/9/2020  Patient Name: Maria Del Rosario Chaidez    History of Presenting Illness     Chief Complaint   Patient presents with    Chest Pain         History Provided By: Patient    Additional History (Context): Maria Del Rosario Chaidez is a 63 y.o. female with PMHx as noted below who presents with complaints of chest pain that has been ongoing for weeks. She states she has been seen for this same issue yesterday and the day prior in this department. She states the pain is substernal in location and does not radiate. There are no aggravating or alleviating factors. She denies any new or worsening pain. States the pain is the same as it was the last two days. She states he has had a few glasses of wine this evening. She denies any difficulties breathing. She denies any fevers or chills, denies any other symptoms at this time. PCP: Nalini Lei MD    Current Facility-Administered Medications   Medication Dose Route Frequency Provider Last Rate Last Dose    sodium chloride 0.9 % bolus infusion 1,000 mL  1,000 mL IntraVENous ONCE Claude Ozuna PA-C 1,000 mL/hr at 11/09/20 2017 1,000 mL at 11/09/20 2017     Current Outpatient Medications   Medication Sig Dispense Refill    sucralfate (CARAFATE) 100 mg/mL suspension Take 10 mL by mouth Before breakfast, lunch, dinner and at bedtime. 1200 mL 0    omeprazole (PRILOSEC) 40 mg capsule Take 1 Cap by mouth daily. 90 Cap 3    chlordiazePOXIDE (LIBRIUM) 25 mg capsule Take 1 Cap by mouth three (3) times daily as needed for Anxiety for up to 7 days. Max Daily Amount: 75 mg. 21 Cap 0    LORazepam (Ativan) 0.5 mg tablet Take 2 Tabs by mouth every eight (8) hours as needed for Anxiety (or panic attacks). Max Daily Amount: 3 mg. 12 Tab 0    QUEtiapine (SEROqueL) 300 mg tablet Take 600 mg by mouth daily.  busPIRone (BUSPAR) 15 mg tablet Take 15 mg by mouth three (3) times daily.       topiramate (Topamax) 100 mg tablet Take 100 mg by mouth two (2) times a day.  cloNIDine HCL (CATAPRES) 0.2 mg tablet Take 0.2 mg by mouth two (2) times a day.  folic acid (FOLVITE) 1 mg tablet Take 1 Tab by mouth daily. 30 Tab 3    multivitamin (ONE A DAY) tablet Take 1 Tab by mouth daily. 30 Tab 3    lipase-protease-amylase (CREON 24,000) 24,000-76,000 -120,000 unit capsule Take 1 Cap by mouth three (3) times daily (with meals). 90 Cap 3    ondansetron hcl (Zofran) 4 mg tablet Take 2 Tabs by mouth every eight (8) hours as needed for Nausea. 12 Tab 0    lidocaine (LIDODERM) 5 % Apply patch to the affected area for 12 hours a day and remove for 12 hours a day. 30 Each 0    lamoTRIgine (LaMICtal) 150 mg tablet Take 150 mg by mouth daily.  thiamine HCL (B-1) 100 mg tablet Take 1 Tab by mouth daily (with breakfast). 30 Tab 0    ipratropium-albuteroL (COMBIVENT RESPIMAT)  mcg/actuation inhaler Take 1 Puff by inhalation as needed for Wheezing.  traZODone (DESYREL) 100 mg tablet Take 200 mg by mouth nightly.  melatonin 10 mg tab Take 10 mg by mouth nightly.  MILK THISTLE PO Take 175 mg by mouth two (2) times a day.  colchicine 0.6 mg tablet Take 0.6 mg by mouth daily.  benzonatate (TESSALON PERLE PO) Take  by mouth as needed. Indications: cough      docusate sodium (COLACE) 100 mg capsule Take 100 mg by mouth three (3) times daily. Past History     Past Medical History:  Past Medical History:   Diagnosis Date    Alcohol abuse     Anxiety     Arrhythmia     Tacchycardai    Asthma     controlled    Bipolar disorder ([facility] [address]     Common migraine     COPD (chronic obstructive pulmonary disease) ([facility])     Home O2  PRN ,  pt use also for Tachycardia    Depression     Esophageal reflux     Gout     Hypertension     Hypocitraturia     Inverted nipple     Left breast always have.     Kidney stone on left side     Pancreatitis     Recurrent UTI     Staghorn renal calculus     Urine leukocytes        Past Surgical History:  Past Surgical History:   Procedure Laterality Date    HX COLONOSCOPY      HX CYST REMOVAL Left     x 3 surgeries    HX ENDOSCOPY      HX GYN      tubal ligation    HX LUMBAR LAMINECTOMY      HX UROLOGICAL  08/10/2018    Cysto, bilat RPG, left ureteral pyeloscopy, HLL, left JJ stent placement, Dr. Choco Hernandez       Family History:  Family History   Family history unknown: Yes       Social History:  Social History     Tobacco Use    Smoking status: Current Every Day Smoker     Packs/day: 1.50     Years: 33.00     Pack years: 49.50     Types: Cigarettes    Smokeless tobacco: Never Used   Substance Use Topics    Alcohol use: Yes     Comment: red wine    Drug use: No     Comment: 11years old- [facility]       Allergies: Allergies   Allergen Reactions    Lithium Anaphylaxis    Amitriptyline Other (comments)     Left leg went numb    Elavil Other (comments)     Left leg went numb         Review of Systems       Review of Systems   Constitutional: Negative. HENT: Negative. Respiratory: Negative. Cardiovascular: Positive for chest pain. Gastrointestinal: Negative. Genitourinary: Negative. Musculoskeletal: Negative. Skin: Negative. Neurological: Negative. All other systems reviewed and are negative.         Physical Exam     Visit Vitals  /66   Pulse 90   Temp 97.9 °F (36.6 °C)   Resp 18   Ht 5' 2\" (1.575 m)   Wt 50.8 kg (112 lb)   LMP 04/01/2013   SpO2 100%   BMI 20.49 kg/m²         Physical Exam      Diagnostic Study Results     Labs -  Recent Results (from the past 12 hour(s))   EKG, 12 LEAD, INITIAL    Collection Time: 11/09/20  7:20 PM   Result Value Ref Range    Ventricular Rate 95 BPM    Atrial Rate 95 BPM    P-R Interval 150 ms    QRS Duration 72 ms    Q-T Interval 348 ms    QTC Calculation (Bezet) 437 ms    Calculated P Axis 70 degrees    Calculated R Axis 72 degrees    Calculated T Axis 65 degrees    Diagnosis       Normal sinus rhythm  Normal ECG  When compared with ECG of 08-NOV-2020 11:03,  No significant change was found     METABOLIC PANEL, COMPREHENSIVE    Collection Time: 11/09/20  8:10 PM   Result Value Ref Range    Sodium 137 136 - 145 mmol/L    Potassium 4.3 3.5 - 5.5 mmol/L    Chloride 106 100 - 111 mmol/L    CO2 21 21 - 32 mmol/L    Anion gap 10 3.0 - 18 mmol/L    Glucose 85 74 - 99 mg/dL    BUN 9 7.0 - 18 MG/DL    Creatinine 0.52 (L) 0.6 - 1.3 MG/DL    BUN/Creatinine ratio 17 12 - 20      GFR est AA >60 >60 ml/min/1.73m2    GFR est non-AA >60 >60 ml/min/1.73m2    Calcium 9.0 8.5 - 10.1 MG/DL    Bilirubin, total 0.4 0.2 - 1.0 MG/DL    ALT (SGPT) 41 13 - 56 U/L    AST (SGOT) 57 (H) 10 - 38 U/L    Alk. phosphatase 104 45 - 117 U/L    Protein, total 6.9 6.4 - 8.2 g/dL    Albumin 3.3 (L) 3.4 - 5.0 g/dL    Globulin 3.6 2.0 - 4.0 g/dL    A-G Ratio 0.9 0.8 - 1.7     TROPONIN I    Collection Time: 11/09/20  8:10 PM   Result Value Ref Range    Troponin-I, QT <0.02 0.0 - 0.045 NG/ML   ETHYL ALCOHOL    Collection Time: 11/09/20  8:10 PM   Result Value Ref Range    ALCOHOL(ETHYL),SERUM 231 (H) 0 - 3 MG/DL       Radiologic Studies -   XR CHEST PORT    (Results Pending)         Medical Decision Making   I am the first provider for this patient. I reviewed the vital signs, available nursing notes, past medical history, past surgical history, family history and social history. Vital Signs-Reviewed the patient's vital signs. Records Reviewed: Nursing Notes and Old Medical Records (Time of Review: 7:14 PM)    ED Course: Progress Notes, Reevaluation, and Consults:  1914: Met with patient, reviewed history, performed physical exam. Will check CBC, CMP, troponin, EtOH, EKG, CXR.    2100: Workup in the ED unremarkable. CBC is pending at this time as it is a send out. Patient's CBC unremarkable from yesterday's ED visit. Will discharge patient.      Provider Notes (Medical Decision Making):   63year old female seen in the ED for complaints of chest pain. Workup in the ED unremarkable. Patient's CBC is pending, but reviewed from yesterday. Patient advised to follow up with her PCP for reassessment. Do not feel patient requires further workup or imaging at this time. Diagnosis     Clinical Impression:   1. Acute chest pain    2. Alcohol abuse    3. Acute alcoholic intoxication with complication [facility])        Disposition: Home    Follow-up Information     Follow up With Specialties Details Why Contact Info    Taylor Serrano MD Family Medicine Schedule an appointment as soon as possible for a visit  [address] [place] [address] EMERGENCY DEPT Emergency Medicine  If symptoms worsen [address]  [phone]           Patient's Medications   Start Taking    No medications on file   Continue Taking    BENZONATATE (TESSALON PERLE PO)    Take  by mouth as needed. Indications: cough    BUSPIRONE (BUSPAR) 15 MG TABLET    Take 15 mg by mouth three (3) times daily. CHLORDIAZEPOXIDE (LIBRIUM) 25 MG CAPSULE    Take 1 Cap by mouth three (3) times daily as needed for Anxiety for up to 7 days. Max Daily Amount: 75 mg. CLONIDINE HCL (CATAPRES) 0.2 MG TABLET    Take 0.2 mg by mouth two (2) times a day. COLCHICINE 0.6 MG TABLET    Take 0.6 mg by mouth daily. DOCUSATE SODIUM (COLACE) 100 MG CAPSULE    Take 100 mg by mouth three (3) times daily. FOLIC ACID (FOLVITE) 1 MG TABLET    Take 1 Tab by mouth daily. IPRATROPIUM-ALBUTEROL (COMBIVENT RESPIMAT)  MCG/ACTUATION INHALER    Take 1 Puff by inhalation as needed for Wheezing. LAMOTRIGINE (LAMICTAL) 150 MG TABLET    Take 150 mg by mouth daily. LIDOCAINE (LIDODERM) 5 %    Apply patch to the affected area for 12 hours a day and remove for 12 hours a day. LIPASE-PROTEASE-AMYLASE (CREON 24,000) 24,000-76,000 -120,000 UNIT CAPSULE    Take 1 Cap by mouth three (3) times daily (with meals).     LORAZEPAM (ATIVAN) 0.5 MG TABLET    Take 2 Tabs by mouth every eight (8) hours as needed for Anxiety (or panic attacks). Max Daily Amount: 3 mg. MELATONIN 10 MG TAB    Take 10 mg by mouth nightly. MILK THISTLE PO    Take 175 mg by mouth two (2) times a day. MULTIVITAMIN (ONE A DAY) TABLET    Take 1 Tab by mouth daily. ONDANSETRON HCL (ZOFRAN) 4 MG TABLET    Take 2 Tabs by mouth every eight (8) hours as needed for Nausea. QUETIAPINE (SEROQUEL) 300 MG TABLET    Take 600 mg by mouth daily. THIAMINE HCL (B-1) 100 MG TABLET    Take 1 Tab by mouth daily (with breakfast). TOPIRAMATE (TOPAMAX) 100 MG TABLET    Take 100 mg by mouth two (2) times a day. TRAZODONE (DESYREL) 100 MG TABLET    Take 200 mg by mouth nightly. These Medications have changed    Modified Medication Previous Medication    OMEPRAZOLE (PRILOSEC) 40 MG CAPSULE omeprazole (PRILOSEC) 40 mg capsule       Take 1 Cap by mouth daily. Take 1 Cap by mouth daily. SUCRALFATE (CARAFATE) 100 MG/ML SUSPENSION sucralfate (CARAFATE) 100 mg/mL suspension       Take 10 mL by mouth Before breakfast, lunch, dinner and at bedtime. Take 10 mL by mouth Before breakfast, lunch, dinner and at bedtime. Stop Taking    No medications on file       Damian Khan PA-C    Dictation disclaimer:  Please note that this dictation was completed with [email or web address], the computer voice recognition software. Quite often unanticipated grammatical, syntax, homophones, and other interpretive errors are inadvertently transcribed by the computer software. Please disregard these errors. Please excuse any errors that have escaped final proofreading.

## 2020-11-11 ENCOUNTER — HOSPITAL ENCOUNTER (EMERGENCY)
Age: 55
Discharge: HOME | End: 2020-11-11
Attending: EMERGENCY MEDICINE
Payer: MEDICARE

## 2020-11-11 VITALS
HEART RATE: 119 BPM | RESPIRATION RATE: 16 BRPM | DIASTOLIC BLOOD PRESSURE: 79 MMHG | TEMPERATURE: 98.6 F | OXYGEN SATURATION: 98 % | SYSTOLIC BLOOD PRESSURE: 117 MMHG

## 2020-11-11 VITALS
BODY MASS INDEX: 21.1 KG/M2 | HEIGHT: 62 IN | HEART RATE: 101 BPM | SYSTOLIC BLOOD PRESSURE: 105 MMHG | DIASTOLIC BLOOD PRESSURE: 63 MMHG | OXYGEN SATURATION: 95 % | RESPIRATION RATE: 15 BRPM | TEMPERATURE: 98.6 F | WEIGHT: 114.64 LBS

## 2020-11-11 VITALS
SYSTOLIC BLOOD PRESSURE: 123 MMHG | OXYGEN SATURATION: 93 % | DIASTOLIC BLOOD PRESSURE: 84 MMHG | HEART RATE: 105 BPM | RESPIRATION RATE: 16 BRPM | TEMPERATURE: 98.2 F

## 2020-11-11 VITALS
OXYGEN SATURATION: 97 % | TEMPERATURE: 97.4 F | DIASTOLIC BLOOD PRESSURE: 73 MMHG | SYSTOLIC BLOOD PRESSURE: 121 MMHG | RESPIRATION RATE: 16 BRPM | HEART RATE: 115 BPM

## 2020-11-11 DIAGNOSIS — F10.129 ALCOHOL ABUSE WITH INTOXICATION (HCC): Primary | ICD-10-CM

## 2020-11-11 DIAGNOSIS — R00.0 TACHYCARDIA: ICD-10-CM

## 2020-11-11 DIAGNOSIS — F10.929 ALCOHOLIC INTOXICATION WITH COMPLICATION (HCC): ICD-10-CM

## 2020-11-11 DIAGNOSIS — F10.129 ALCOHOL ABUSE WITH INTOXICATION (HCC): ICD-10-CM

## 2020-11-11 DIAGNOSIS — F41.1 ANXIETY STATE: ICD-10-CM

## 2020-11-11 DIAGNOSIS — F10.220 ALCOHOL DEPENDENCE WITH UNCOMPLICATED INTOXICATION (HCC): Primary | ICD-10-CM

## 2020-11-11 DIAGNOSIS — K21.9 GASTROESOPHAGEAL REFLUX DISEASE, UNSPECIFIED WHETHER ESOPHAGITIS PRESENT: ICD-10-CM

## 2020-11-11 DIAGNOSIS — F41.0 PANIC ATTACK: Primary | ICD-10-CM

## 2020-11-11 DIAGNOSIS — R07.9 RECURRENT CHEST PAIN: Primary | ICD-10-CM

## 2020-11-11 LAB
ALBUMIN SERPL-MCNC: 3.3 G/DL (ref 3.4–5)
ALBUMIN/GLOB SERPL: 0.9 {RATIO} (ref 0.8–1.7)
ALP SERPL-CCNC: 126 U/L (ref 45–117)
ALT SERPL-CCNC: 43 U/L (ref 13–56)
ANION GAP SERPL CALC-SCNC: 10 MMOL/L (ref 3–18)
AST SERPL-CCNC: 59 U/L (ref 10–38)
ATRIAL RATE: 104 BPM
BASOPHILS # BLD: 0 K/UL (ref 0–0.1)
BASOPHILS NFR BLD: 1 % (ref 0–2)
BILIRUB SERPL-MCNC: 0.4 MG/DL (ref 0.2–1)
BUN BLD-MCNC: 8 MG/DL (ref 7–18)
BUN SERPL-MCNC: 7 MG/DL (ref 7–18)
BUN/CREAT SERPL: 13 (ref 12–20)
CALCIUM SERPL-MCNC: 9.2 MG/DL (ref 8.5–10.1)
CALCULATED P AXIS, ECG09: 71 DEGREES
CALCULATED R AXIS, ECG10: 72 DEGREES
CALCULATED T AXIS, ECG11: 68 DEGREES
CHLORIDE BLD-SCNC: 106 MMOL/L (ref 100–108)
CHLORIDE SERPL-SCNC: 107 MMOL/L (ref 100–111)
CO2 SERPL-SCNC: 20 MMOL/L (ref 21–32)
CREAT SERPL-MCNC: 0.52 MG/DL (ref 0.6–1.3)
DIAGNOSIS, 93000: NORMAL
DIFFERENTIAL METHOD BLD: ABNORMAL
EOSINOPHIL # BLD: 0.1 K/UL (ref 0–0.4)
EOSINOPHIL NFR BLD: 3 % (ref 0–5)
ERYTHROCYTE [DISTWIDTH] IN BLOOD BY AUTOMATED COUNT: 15 % (ref 11.6–14.5)
ETHANOL SERPL-MCNC: 185 MG/DL (ref 0–3)
GLOBULIN SER CALC-MCNC: 3.8 G/DL (ref 2–4)
GLUCOSE BLD STRIP.AUTO-MCNC: 69 MG/DL (ref 74–106)
GLUCOSE SERPL-MCNC: 62 MG/DL (ref 74–99)
HCT VFR BLD AUTO: 35.9 % (ref 35–45)
HCT VFR BLD CALC: 37 % (ref 36–49)
HGB BLD-MCNC: 11.9 G/DL (ref 12–16)
HGB BLD-MCNC: 12.6 G/DL (ref 12–16)
LYMPHOCYTES # BLD: 2.4 K/UL (ref 0.9–3.6)
LYMPHOCYTES NFR BLD: 52 % (ref 21–52)
MCH RBC QN AUTO: 32.3 PG (ref 24–34)
MCHC RBC AUTO-ENTMCNC: 33.1 G/DL (ref 31–37)
MCV RBC AUTO: 97.6 FL (ref 74–97)
MONOCYTES # BLD: 0.2 K/UL (ref 0.05–1.2)
MONOCYTES NFR BLD: 3 % (ref 3–10)
NEUTS SEG # BLD: 1.9 K/UL (ref 1.8–8)
NEUTS SEG NFR BLD: 41 % (ref 40–73)
P-R INTERVAL, ECG05: 142 MS
PLATELET # BLD AUTO: 192 K/UL (ref 135–420)
PMV BLD AUTO: 10.1 FL (ref 9.2–11.8)
POTASSIUM BLD-SCNC: 4.4 MMOL/L (ref 3.5–5.5)
POTASSIUM SERPL-SCNC: 4.8 MMOL/L (ref 3.5–5.5)
PROT SERPL-MCNC: 7.1 G/DL (ref 6.4–8.2)
Q-T INTERVAL, ECG07: 336 MS
QRS DURATION, ECG06: 74 MS
QTC CALCULATION (BEZET), ECG08: 441 MS
RBC # BLD AUTO: 3.68 M/UL (ref 4.2–5.3)
SODIUM BLD-SCNC: 137 MMOL/L (ref 136–145)
SODIUM SERPL-SCNC: 137 MMOL/L (ref 136–145)
TROPONIN I BLD-MCNC: <0.04 NG/ML (ref 0–0.08)
TROPONIN I SERPL-MCNC: <0.02 NG/ML (ref 0–0.04)
TROPONIN I SERPL-MCNC: <0.02 NG/ML (ref 0–0.04)
VENTRICULAR RATE, ECG03: 104 BPM
WBC # BLD AUTO: 4.6 K/UL (ref 4.6–13.2)

## 2020-11-11 PROCEDURE — 93005 ELECTROCARDIOGRAM TRACING: CPT

## 2020-11-11 PROCEDURE — 84484 ASSAY OF TROPONIN QUANT: CPT

## 2020-11-11 PROCEDURE — 99283 EMERGENCY DEPT VISIT LOW MDM: CPT

## 2020-11-11 PROCEDURE — 99284 EMERGENCY DEPT VISIT MOD MDM: CPT

## 2020-11-11 PROCEDURE — 74011250637 HC RX REV CODE- 250/637: Performed by: EMERGENCY MEDICINE

## 2020-11-11 PROCEDURE — 85025 COMPLETE CBC W/AUTO DIFF WBC: CPT

## 2020-11-11 PROCEDURE — 80053 COMPREHEN METABOLIC PANEL: CPT

## 2020-11-11 PROCEDURE — 74011000250 HC RX REV CODE- 250: Performed by: EMERGENCY MEDICINE

## 2020-11-11 PROCEDURE — 82947 ASSAY GLUCOSE BLOOD QUANT: CPT

## 2020-11-11 PROCEDURE — 99285 EMERGENCY DEPT VISIT HI MDM: CPT

## 2020-11-11 PROCEDURE — 80307 DRUG TEST PRSMV CHEM ANLYZR: CPT

## 2020-11-11 RX ORDER — LORAZEPAM 1 MG/1
1 TABLET ORAL
Status: COMPLETED | OUTPATIENT
Start: 2020-11-11 | End: 2020-11-11

## 2020-11-11 RX ADMIN — LIDOCAINE HYDROCHLORIDE 40 ML: 20 SOLUTION ORAL; TOPICAL at 18:07

## 2020-11-11 RX ADMIN — LORAZEPAM 1 MG: 1 TABLET ORAL at 18:06

## 2020-11-11 NOTE — ED TRIAGE NOTES
Pt states after she was discharged this morning, she went home and drank more alcohol. States she is here because \"I'm drunk and I want to make sure I'm not gonna die. \" Pt on O2 @ 2L at home. Placed on O2 for sats in the high 80's on room air.

## 2020-11-11 NOTE — DISCHARGE INSTRUCTIONS
Patient Education        Acute Alcohol Intoxication: Care Instructions  Your Care Instructions     You have had treatment to help your body rid itself of alcohol. Too much alcohol upsets the body's fluid balance. Your doctor may have given you fluids and vitamins. For some people, drinking too much alcohol is a one-time event. For others, it is an ongoing problem. In either case, it is serious. It can be life-threatening. Follow-up care is a key part of your treatment and safety. Be sure to make and go to all appointments, and call your doctor if you are having problems. It's also a good idea to know your test results and keep a list of the medicines you take. How can you care for yourself at home? · Do not drink and drive. · Be safe with medicines. Take your medicines exactly as prescribed. Call your doctor if you think you are having a problem with your medicine. · Your doctor may have prescribed disulfiram (Antabuse). Do not drink any alcohol while you are taking this medicine. You may have severe or even life-threatening side effects from even small amounts of alcohol. · If you were given medicine to prevent nausea, be sure to take it exactly as prescribed. · Before you take any medicine, tell your doctor if:  ? You have had a bad reaction to any medicines in the past.  ? You are taking other medicines, including over-the-counter ones, or have other health problems. ? You are or could be pregnant. · Be prepared to have some symptoms of withdrawal in the next few days. · Drink plenty of liquids in the next few days. · Seek help if you need it to stop drinking. Getting counseling and joining a support group can help you stay sober. Try a support group such as Alcoholics Anonymous. · Avoid alcohol when you take medicines. It can react with many medicines and cause serious problems. When should you call for help? Call 911 anytime you think you may need emergency care.  For example, call if:    · You feel confused and are seeing things that are not there.     · You are thinking about killing yourself or hurting others.     · You have a seizure.     · You vomit blood or what looks like coffee grounds. Call your doctor now or seek immediate medical care if:    · You have trembling, restlessness, sweating, and other withdrawal symptoms that are new or that get worse.     · Your withdrawal symptoms come back after not bothering you for days or weeks.     · You can't stop vomiting. Watch closely for changes in your health, and be sure to contact your doctor if:    · You need help to stop drinking. Where can you learn more? Go to http://www.gray.com/  Enter T102 in the search box to learn more about \"Acute Alcohol Intoxication: Care Instructions. \"  Current as of: June 29, 2020               Content Version: 12.6  © 2479-2882 [employer]. Care instructions adapted under license by [employer] (which disclaims liability or warranty for this information). If you have questions about a medical condition or this instruction, always ask your healthcare professional. [place] [address] any warranty or liability for your use of this information. Patient Education        Learning About Alcohol Use Disorder  What is alcohol use disorder? Alcohol use disorder means that a person drinks alcohol even though it causes harm to themselves or others. It can range from mild to severe. The more signs of this disorder you have, the more severe it may be. Moderate to severe alcohol use disorder is sometimes called addiction. People who have it may find it hard to control their use of alcohol. People who have this disorder may argue with others about how much they're drinking. Their job may be affected because of drinking. They may drink when it's dangerous or illegal, such as when they drive. They also may have a strong need, or craving, to drink.  They may feel like they must drink just to get by. Their drinking may increase their risk of getting hurt or being in a car crash. Over time, drinking too much alcohol may cause health problems. These may include high blood pressure, liver problems, or problems with digestion. What are the signs? Maybe you've wondered about your alcohol habits, or how to tell if your drinking is becoming a problem. Here are some of the signs of alcohol use disorder. You may have it if you have two or more of the following signs:  · You drink larger amounts of alcohol than you ever meant to. Or you've been drinking for a longer time than you ever meant to. · You can't cut down or control your use. Or you constantly wish you could cut down. · You spend a lot of time getting or drinking alcohol or recovering from its effects. · You have strong cravings for alcohol. · You can no longer do your main jobs at work, at school, or at home. · You keep drinking alcohol, even though your use hurts your relationships. · You have stopped doing important activities because of your alcohol use. · You drink alcohol in situations where doing so is dangerous. · You keep drinking alcohol even though you know it's causing health problems. · You need more and more alcohol to get the same effect, or you get less effect from the same amount over time. This is called tolerance. · You have uncomfortable symptoms when you stop drinking alcohol or use less. This is called withdrawal.  Alcohol use disorder can range from mild to severe. The more signs you have, the more severe the disorder may be. Moderate to severe alcohol use disorder is sometimes called addiction. You might not realize that your drinking is a problem. You might not drink large amounts when you drink. Or you might go for days or weeks between drinking episodes. But even if you don't drink very often, your drinking could still be harmful and put you at risk.   How is alcohol use disorder treated? Getting help is up to you. But you don't have to do it alone. There are many people and kinds of treatments that can help. Treatment for alcohol use disorder can include:  · Group therapy, one or more types of counseling, and alcohol education. · Medicines that help to:  ? Reduce withdrawal symptoms and help you safely stop drinking. ? Reduce cravings for alcohol. · Support groups. These groups include Alcoholics Anonymous and [employer] (Self-Management and Recovery Training). Some people are able to stop or cut back on drinking with help from a counselor. People who have moderate to severe alcohol use disorder may need medical treatment. They may need to stay in a hospital or treatment center. You may have a treatment team to help you. This team may include a psychologist or psychiatrist, counselors, doctors, social workers, nurses, and a . A  helps plan and manage your treatment. Follow-up care is a key part of your treatment and safety. Be sure to make and go to all appointments, and call your doctor if you are having problems. It's also a good idea to know your test results and keep a list of the medicines you take. Where can you learn more? Go to http://www.gray.com/  Enter H758 in the search box to learn more about \"Learning About Alcohol Use Disorder. \"  Current as of: June 29, 2020               Content Version: 12.6  © 4436-6675 [employer]. Care instructions adapted under license by [employer] (which disclaims liability or warranty for this information). If you have questions about a medical condition or this instruction, always ask your healthcare professional. [place] [address] any warranty or liability for your use of this information.          Patient Education        Panic Attacks: Care Instructions  Your Care Instructions     During a panic attack, you may have a feeling of intense fear or terror, trouble breathing, chest pain or tightness, heartbeat changes, dizziness, sweating, and shaking. A panic attack starts suddenly and usually lasts from 5 to 20 minutes but may last even longer. You have the most anxiety about 10 minutes after the attack starts. An attack can begin with a stressful event, or it can happen without a cause. Although panic attacks can cause scary symptoms, you can learn to manage them with self-care, counseling, and medicine. Follow-up care is a key part of your treatment and safety. Be sure to make and go to all appointments, and call your doctor if you are having problems. It's also a good idea to know your test results and keep a list of the medicines you take. How can you care for yourself at home? · Take your medicine exactly as directed. Call your doctor if you think you are having a problem with your medicine. · Go to your counseling sessions and follow-up appointments. · Recognize and accept your anxiety. Then, when you are in a situation that makes you anxious, say to yourself, \"This is not an emergency. I feel uncomfortable, but I am not in danger. I can keep going even if I feel anxious. \"  · Be kind to your body:  ? Relieve tension with exercise or a massage. ? Get enough rest.  ? Avoid alcohol, caffeine, nicotine, and illegal drugs. They can increase your anxiety level, cause sleep problems, or trigger a panic attack. ? Learn and do relaxation techniques. See below for more about these techniques. · Engage your mind. Get out and do something you enjoy. Go to a funny movie, or take a walk or hike. Plan your day. Having too much or too little to do can make you anxious. · Keep a record of your symptoms. Discuss your fears with a good friend or family member, or join a support group for people with similar problems. Talking to others sometimes relieves stress. · Get involved in social groups, or volunteer to help others.  Being alone sometimes makes things seem worse than they are. · Get at least 30 minutes of exercise on most days of the week to relieve stress. Walking is a good choice. You also may want to do other activities, such as running, swimming, cycling, or playing tennis or team sports. Relaxation techniques  Do relaxation exercises for 10 to 20 minutes a day. You can play soothing, relaxing music while you do them, if you wish. · Tell others in your house that you are going to do your relaxation exercises. Ask them not to disturb you. · Find a comfortable place, away from all distractions and noise. · Lie down on your back, or sit with your back straight. · Focus on your breathing. Make it slow and steady. · Breathe in through your nose. Breathe out through either your nose or mouth. · Breathe deeply, filling up the area between your navel and your rib cage. Breathe so that your belly goes up and down. · Do not hold your breath. · Breathe like this for 5 to 10 minutes. Notice the feeling of calmness throughout your whole body. As you continue to breathe slowly and deeply, relax by doing the following for another 5 to 10 minutes:  · Tighten and relax each muscle group in your body. You can begin at your toes and work your way up to your head. · Imagine your muscle groups relaxing and becoming heavy. · Empty your mind of all thoughts. · Let yourself relax more and more deeply. · Become aware of the state of calmness that surrounds you. · When your relaxation time is over, you can bring yourself back to alertness by moving your fingers and toes and then your hands and feet and then stretching and moving your entire body. Sometimes people fall asleep during relaxation, but they usually wake up shortly afterward. · Always give yourself time to return to full alertness before you drive a car or do anything that might cause an accident if you are not fully alert. Never play a relaxation tape while driving a car. When should you call for help? Call 911 anytime you think you may need emergency care. For example, call if:    · You feel you cannot stop from hurting yourself or someone else. Watch closely for changes in your health, and be sure to contact your doctor if:    · Your panic attacks get worse.     · You have new or different anxiety.     · You are not getting better as expected. Where can you learn more? Go to http://www.gray.com/  Enter H601 in the search box to learn more about \"Panic Attacks: Care Instructions. \"  Current as of: January 31, 2020               Content Version: 12.6  © 4663-7461 [employer]. Care instructions adapted under license by [employer] (which disclaims liability or warranty for this information). If you have questions about a medical condition or this instruction, always ask your healthcare professional. [address] any warranty or liability for your use of this information.

## 2020-11-11 NOTE — ED PROVIDER NOTES
HPI   39-year-old  female with past medical history of alcohol abuse and anxiety brought in by EMS for evaluation of alcohol intoxication. Patient states that she was discharged from this emergency room earlier today. She states that she went home and drank 1 L of red wine. Patient states that she is drunk and wants to make sure that she is not going to die. On my initial bedside evaluation the patient is reporting that she is experiencing chest pain, and abdominal pain. She is extremely anxious. Patient is on chronic home O2 2 L. She was placed on O2 2 L via nasal cannula on arrival to the emergency room for initial room air in the high 80s. Past Medical History:   Diagnosis Date    Alcohol abuse     Anxiety     Arrhythmia     Tacchycardai    Asthma     controlled    Bipolar disorder ([facility] [address]     Common migraine     COPD [facility]chronic obstructive pulmonary disease) ([facility])     Home O2  PRN ,  pt use also for Tachycardia    Depression     Esophageal reflux     Gout     Hypertension     Hypocitraturia     Inverted nipple     Left breast always have.     Kidney stone on left side     Pancreatitis     Recurrent UTI     Staghorn renal calculus     Urine leukocytes        Past Surgical History:   Procedure Laterality Date    HX COLONOSCOPY      HX CYST REMOVAL Left     x 3 surgeries    HX ENDOSCOPY      HX GYN      tubal ligation    HX LUMBAR LAMINECTOMY      HX UROLOGICAL  08/10/2018    Cysto, bilat RPG, left ureteral pyeloscopy, HLL, left JJ stent placement, Dr. Bowen Prieto         Family History:   Family history unknown: Yes       Social History     Socioeconomic History    Marital status:      Spouse name: Not on file    Number of children: Not on file    Years of education: Not on file    Highest education level: Not on file   Occupational History    Not on file   Social Needs    Financial resource strain: Not on file    Food insecurity     Worry: Not on file Inability: Not on file    Transportation needs     Medical: Not on file     Non-medical: Not on file   Tobacco Use    Smoking status: Current Every Day Smoker     Packs/day: 1.50     Years: 33.00     Pack years: 49.50     Types: Cigarettes    Smokeless tobacco: Never Used   Substance and Sexual Activity    Alcohol use: Yes     Comment: red wine    Drug use: No     Comment: 11years old- [place]    Sexual activity: Not on file   Lifestyle    Physical activity     Days per week: Not on file     Minutes per session: Not on file    Stress: Not on file   Relationships    Social connections     Talks on phone: Not on file     Gets together: Not on file     Attends L[employer]-day service: Not on file     Active member of club or organization: Not on file     Attends meetings of clubs or organizations: Not on file     Relationship status: Not on file    Intimate partner violence     Fear of current or ex partner: Not on file     Emotionally abused: Not on file     Physically abused: Not on file     Forced sexual activity: Not on file   Other Topics Concern    Not on file   Social History Narrative    Not on file         ALLERGIES: Lithium; Amitriptyline; and Elavil    Review of Systems   Constitutional: Negative for chills, diaphoresis, fatigue, fever and unexpected weight change. HENT: Negative for congestion, dental problem, ear discharge, ear pain, hearing loss, nosebleeds, postnasal drip, sinus pressure, sore throat, trouble swallowing and voice change. Eyes: Negative for photophobia, pain, discharge, redness and visual disturbance. Respiratory: Positive for shortness of breath. Negative for cough, chest tightness, wheezing and stridor. Cardiovascular: Negative for chest pain, palpitations and leg swelling. Gastrointestinal: Positive for abdominal pain. Negative for abdominal distention, anal bleeding, blood in stool, constipation, diarrhea, nausea and vomiting.    Genitourinary: Negative for difficulty urinating, dyspareunia, dysuria, flank pain, frequency, genital sores, hematuria, menstrual problem, pelvic pain, urgency, vaginal bleeding, vaginal discharge and vaginal pain. Musculoskeletal: Negative for arthralgias, back pain, joint swelling, myalgias, neck pain and neck stiffness. Skin: Negative for color change, rash and wound. Neurological: Negative for dizziness, tremors, seizures, syncope, weakness, light-headedness, numbness and headaches. Hematological: Negative for adenopathy. Does not bruise/bleed easily. Psychiatric/Behavioral: Negative for agitation, confusion, decreased concentration, hallucinations, sleep disturbance and suicidal ideas. The patient is nervous/anxious. All other systems reviewed and are negative. Vitals:    11/11/20 1115 11/11/20 1200 11/11/20 1300   BP: 113/79 122/87 123/84   Pulse: (!) 105     Resp: 16     Temp: 98.2 °F (36.8 °C)     SpO2: 93%              Physical Exam  Vitals signs and nursing note reviewed. Constitutional:       General: She is not in acute distress. Appearance: Normal appearance. She is well-developed. She is not diaphoretic. Comments: Thin  female, extremely anxious   HENT:      Head: Normocephalic and atraumatic. Right Ear: Tympanic membrane and external ear normal.      Left Ear: Tympanic membrane and external ear normal.      Nose: Nose normal.      Mouth/Throat:      Mouth: Mucous membranes are moist.      Pharynx: No oropharyngeal exudate. Eyes:      General: No scleral icterus. Right eye: No discharge. Left eye: No discharge. Extraocular Movements: Extraocular movements intact. Conjunctiva/sclera: Conjunctivae normal.      Pupils: Pupils are equal, round, and reactive to light. Neck:      Musculoskeletal: Normal range of motion. Thyroid: No thyromegaly. Vascular: No JVD. Trachea: No tracheal deviation.    Cardiovascular:      Rate and Rhythm: Normal rate and regular rhythm. Heart sounds: Normal heart sounds. No murmur. No friction rub. No gallop. Pulmonary:      Effort: Pulmonary effort is normal. No respiratory distress. Breath sounds: Normal breath sounds. No stridor. No wheezing or rales. Chest:      Chest wall: No tenderness. Abdominal:      General: Bowel sounds are normal. There is no distension. Palpations: Abdomen is soft. There is no mass. Tenderness: There is no abdominal tenderness. There is no guarding or rebound. Genitourinary:     Vagina: Normal.   Musculoskeletal: Normal range of motion. General: No swelling, tenderness, deformity or signs of injury. Lymphadenopathy:      Cervical: No cervical adenopathy. Skin:     General: Skin is warm and dry. Coloration: Skin is not pale. Findings: No erythema or rash. Neurological:      Mental Status: She is alert and oriented to person, place, and time. Cranial Nerves: No cranial nerve deficit. Deep Tendon Reflexes: Reflexes are normal and symmetric. Psychiatric:         Behavior: Behavior normal.         Judgment: Judgment normal.          MDM  Number of Diagnoses or Management Options  Diagnosis management comments: Differential diagnosis includes: alcohol abuse, anxiety, gastritis.   ACS unlikely       Amount and/or Complexity of Data Reviewed  Clinical lab tests: ordered and reviewed  Tests in the medicine section of CPT®: ordered and reviewed  Review and summarize past medical records: yes    Risk of Complications, Morbidity, and/or Mortality  Presenting problems: high  Diagnostic procedures: moderate  Management options: moderate    Patient Progress  Patient progress: stable         Procedures      Orders Placed This Encounter    CBC WITH AUTOMATED DIFF     Standing Status:   Standing     Number of Occurrences:   1    COMPREHENSIVE METABOLIC PANEL     Standing Status:   Standing     Number of Occurrences:   1    TROPONIN I     Standing Status: Standing     Number of Occurrences:   1    ETHYL ALCOHOL     Standing Status:   Standing     Number of Occurrences:   1    EKG, 12 LEAD, INITIAL     Standing Status:   Standing     Number of Occurrences:   1     Order Specific Question:   Reason for Exam:     Answer:   chest pain     ED EKG interpretation:  Rhythm: sinus tachycardia. Rate (approx.): 103 bpm; Axis: normal; ; QRS interval: normal ; ST/T wave: no acute ST/T wave changes; in all Leads: ; Other findings: normal. This EKG was interpreted by Kyara Hodgson DO,ED Provider. Recent Results (from the past 12 hour(s))   EKG, 12 LEAD, INITIAL    Collection Time: 11/11/20 12:42 PM   Result Value Ref Range    Ventricular Rate 103 BPM    Atrial Rate 103 BPM    P-R Interval 146 ms    QRS Duration 72 ms    Q-T Interval 322 ms    QTC Calculation (Bezet) 421 ms    Calculated P Axis 65 degrees    Calculated R Axis 70 degrees    Calculated T Axis 68 degrees    Diagnosis       Sinus tachycardia  Otherwise normal ECG  When compared with ECG of 11-NOV-2020 01:07,  No significant change was found     METABOLIC PANEL, COMPREHENSIVE    Collection Time: 11/11/20  1:00 PM   Result Value Ref Range    Sodium 137 136 - 145 mmol/L    Potassium 4.8 3.5 - 5.5 mmol/L    Chloride 107 100 - 111 mmol/L    CO2 20 (L) 21 - 32 mmol/L    Anion gap 10 3.0 - 18 mmol/L    Glucose 62 (L) 74 - 99 mg/dL    BUN 7 7.0 - 18 MG/DL    Creatinine 0.52 (L) 0.6 - 1.3 MG/DL    BUN/Creatinine ratio 13 12 - 20      GFR est AA >60 >60 ml/min/1.73m2    GFR est non-AA >60 >60 ml/min/1.73m2    Calcium 9.2 8.5 - 10.1 MG/DL    Bilirubin, total 0.4 0.2 - 1.0 MG/DL    ALT (SGPT) 43 13 - 56 U/L    AST (SGOT) 59 (H) 10 - 38 U/L    Alk.  phosphatase 126 (H) 45 - 117 U/L    Protein, total 7.1 6.4 - 8.2 g/dL    Albumin 3.3 (L) 3.4 - 5.0 g/dL    Globulin 3.8 2.0 - 4.0 g/dL    A-G Ratio 0.9 0.8 - 1.7     TROPONIN I    Collection Time: 11/11/20  1:00 PM   Result Value Ref Range    Troponin-I, QT <0.02 0.0 - 0.045 NG/ML   ETHYL ALCOHOL    Collection Time: 11/11/20  1:00 PM   Result Value Ref Range    ALCOHOL(ETHYL),SERUM 185 (H) 0 - 3 MG/DL     2:29 PM Upon re-evaluation the patient's symptoms have improved. Pt has non-toxic appearance and condition is stable for discharge. She was informed of her results, instructed to f/u with her PCP and return to the ED upon worsening of symptoms. All questions and concerns were addressed. Diagnosis:   1. Alcohol abuse with intoxication ([address]    2. Anxiety state          Disposition: Discharge home    Follow-up Information     Follow up With Specialties Details Why Contact Info    Fatoumata Diane., MD [facility] Medicine Schedule an appointment as soon as possible for a visit in 1 day  [address] [facility] EMERGENCY DEPT Emergency Medicine  As needed, If symptoms worsen [address]  [phone]          Patient's Medications   Start Taking    No medications on file   Continue Taking    BENZONATATE (TESSALON PERLE PO)    Take  by mouth as needed. Indications: cough    BUSPIRONE (BUSPAR) 15 MG TABLET    Take 15 mg by mouth three (3) times daily. CHLORDIAZEPOXIDE (LIBRIUM) 25 MG CAPSULE    Take 1 Cap by mouth three (3) times daily as needed for Anxiety for up to 7 days. Max Daily Amount: 75 mg. CLONIDINE HCL (CATAPRES) 0.2 MG TABLET    Take 0.2 mg by mouth two (2) times a day. COLCHICINE 0.6 MG TABLET    Take 0.6 mg by mouth daily. DOCUSATE SODIUM (COLACE) 100 MG CAPSULE    Take 100 mg by mouth three (3) times daily. FOLIC ACID (FOLVITE) 1 MG TABLET    Take 1 Tab by mouth daily. IPRATROPIUM-ALBUTEROL (COMBIVENT RESPIMAT)  MCG/ACTUATION INHALER    Take 1 Puff by inhalation as needed for Wheezing. LAMOTRIGINE (LAMICTAL) 150 MG TABLET    Take 150 mg by mouth daily. LIDOCAINE (LIDODERM) 5 %    Apply patch to the affected area for 12 hours a day and remove for 12 hours a day. LIPASE-PROTEASE-AMYLASE (CREON 24,000) 24,000-76,000 -120,000 UNIT CAPSULE    Take 1 Cap by mouth three (3) times daily (with meals). LORAZEPAM (ATIVAN) 0.5 MG TABLET    Take 2 Tabs by mouth every eight (8) hours as needed for Anxiety (or panic attacks). Max Daily Amount: 3 mg. MELATONIN 10 MG TAB    Take 10 mg by mouth nightly. MILK THISTLE PO    Take 175 mg by mouth two (2) times a day. MULTIVITAMIN (ONE A DAY) TABLET    Take 1 Tab by mouth daily. OMEPRAZOLE (PRILOSEC) 40 MG CAPSULE    Take 1 Cap by mouth daily. ONDANSETRON HCL (ZOFRAN) 4 MG TABLET    Take 2 Tabs by mouth every eight (8) hours as needed for Nausea. QUETIAPINE (SEROQUEL) 300 MG TABLET    Take 600 mg by mouth daily. SUCRALFATE (CARAFATE) 100 MG/ML SUSPENSION    Take 10 mL by mouth Before breakfast, lunch, dinner and at bedtime. THIAMINE HCL (B-1) 100 MG TABLET    Take 1 Tab by mouth daily (with breakfast). TOPIRAMATE (TOPAMAX) 100 MG TABLET    Take 100 mg by mouth two (2) times a day. TRAZODONE (DESYREL) 100 MG TABLET    Take 200 mg by mouth nightly.    These Medications have changed    No medications on file   Stop Taking    No medications on file

## 2020-11-11 NOTE — DISCHARGE INSTRUCTIONS
Patient Education        Acute Alcohol Intoxication: Care Instructions  Your Care Instructions     You have had treatment to help your body rid itself of alcohol. Too much alcohol upsets the body's fluid balance. Your doctor may have given you fluids and vitamins. For some people, drinking too much alcohol is a one-time event. For others, it is an ongoing problem. In either case, it is serious. It can be life-threatening. Follow-up care is a key part of your treatment and safety. Be sure to make and go to all appointments, and call your doctor if you are having problems. It's also a good idea to know your test results and keep a list of the medicines you take. How can you care for yourself at home? · Do not drink and drive. · Be safe with medicines. Take your medicines exactly as prescribed. Call your doctor if you think you are having a problem with your medicine. · Your doctor may have prescribed disulfiram (Antabuse). Do not drink any alcohol while you are taking this medicine. You may have severe or even life-threatening side effects from even small amounts of alcohol. · If you were given medicine to prevent nausea, be sure to take it exactly as prescribed. · Before you take any medicine, tell your doctor if:  ? You have had a bad reaction to any medicines in the past.  ? You are taking other medicines, including over-the-counter ones, or have other health problems. ? You are or could be pregnant. · Be prepared to have some symptoms of withdrawal in the next few days. · Drink plenty of liquids in the next few days. · Seek help if you need it to stop drinking. Getting counseling and joining a support group can help you stay sober. Try a support group such as Alcoholics Anonymous. · Avoid alcohol when you take medicines. It can react with many medicines and cause serious problems. When should you call for help? Call 911 anytime you think you may need emergency care.  For example, call if:    · You feel confused and are seeing things that are not there.     · You are thinking about killing yourself or hurting others.     · You have a seizure.     · You vomit blood or what looks like coffee grounds. Call your doctor now or seek immediate medical care if:    · You have trembling, restlessness, sweating, and other withdrawal symptoms that are new or that get worse.     · Your withdrawal symptoms come back after not bothering you for days or weeks.     · You can't stop vomiting. Watch closely for changes in your health, and be sure to contact your doctor if:    · You need help to stop drinking. Where can you learn more? Go to http://www.gray.com/  Enter T102 in the search box to learn more about \"Acute Alcohol Intoxication: Care Instructions. \"  Current as of: June 29, 2020               Content Version: 12.6  © 4561-6939 [employer]. Care instructions adapted under license by [employer] (which disclaims liability or warranty for this information). If you have questions about a medical condition or this instruction, always ask your healthcare professional. [place] [address] any warranty or liability for your use of this information. Patient Education        Learning About Alcohol Use Disorder  What is alcohol use disorder? Alcohol use disorder means that a person drinks alcohol even though it causes harm to themselves or others. It can range from mild to severe. The more signs of this disorder you have, the more severe it may be. Moderate to severe alcohol use disorder is sometimes called addiction. People who have it may find it hard to control their use of alcohol. People who have this disorder may argue with others about how much they're drinking. Their job may be affected because of drinking. They may drink when it's dangerous or illegal, such as when they drive. They also may have a strong need, or craving, to drink.  They may feel like they must drink just to get by. Their drinking may increase their risk of getting hurt or being in a car crash. Over time, drinking too much alcohol may cause health problems. These may include high blood pressure, liver problems, or problems with digestion. What are the signs? Maybe you've wondered about your alcohol habits, or how to tell if your drinking is becoming a problem. Here are some of the signs of alcohol use disorder. You may have it if you have two or more of the following signs:  · You drink larger amounts of alcohol than you ever meant to. Or you've been drinking for a longer time than you ever meant to. · You can't cut down or control your use. Or you constantly wish you could cut down. · You spend a lot of time getting or drinking alcohol or recovering from its effects. · You have strong cravings for alcohol. · You can no longer do your main jobs at work, at school, or at home. · You keep drinking alcohol, even though your use hurts your relationships. · You have stopped doing important activities because of your alcohol use. · You drink alcohol in situations where doing so is dangerous. · You keep drinking alcohol even though you know it's causing health problems. · You need more and more alcohol to get the same effect, or you get less effect from the same amount over time. This is called tolerance. · You have uncomfortable symptoms when you stop drinking alcohol or use less. This is called withdrawal.  Alcohol use disorder can range from mild to severe. The more signs you have, the more severe the disorder may be. Moderate to severe alcohol use disorder is sometimes called addiction. You might not realize that your drinking is a problem. You might not drink large amounts when you drink. Or you might go for days or weeks between drinking episodes. But even if you don't drink very often, your drinking could still be harmful and put you at risk.   How is alcohol use disorder treated? Getting help is up to you. But you don't have to do it alone. There are many people and kinds of treatments that can help. Treatment for alcohol use disorder can include:  · Group therapy, one or more types of counseling, and alcohol education. · Medicines that help to:  ? Reduce withdrawal symptoms and help you safely stop drinking. ? Reduce cravings for alcohol. · Support groups. These groups include Alcoholics Anonymous and [facility] (Self-Management and Recovery Training). Some people are able to stop or cut back on drinking with help from a counselor. People who have moderate to severe alcohol use disorder may need medical treatment. They may need to stay in a hospital or treatment center. You may have a treatment team to help you. This team may include a psychologist or psychiatrist, counselors, doctors, social workers, nurses, and a . A  helps plan and manage your treatment. Follow-up care is a key part of your treatment and safety. Be sure to make and go to all appointments, and call your doctor if you are having problems. It's also a good idea to know your test results and keep a list of the medicines you take. Where can you learn more? Go to http://www.gray.com/  Enter H758 in the search box to learn more about \"Learning About Alcohol Use Disorder. \"  Current as of: June 29, 2020               Content Version: 12.6  © 9828-4225 [employer]. Care instructions adapted under license by [employer] (which disclaims liability or warranty for this information). If you have questions about a medical condition or this instruction, always ask your healthcare professional. [place] [address] any warranty or liability for your use of this information. Patient Education        Anxiety Disorder: Care Instructions  Your Care Instructions     Anxiety is a normal reaction to stress.  Difficult situations can cause you to have symptoms such as sweaty palms and a nervous feeling. In an anxiety disorder, the symptoms are far more severe. Constant worry, muscle tension, trouble sleeping, nausea and diarrhea, and other symptoms can make normal daily activities difficult or impossible. These symptoms may occur for no reason, and they can affect your work, school, or social life. Medicines, counseling, and self-care can all help. Follow-up care is a key part of your treatment and safety. Be sure to make and go to all appointments, and call your doctor if you are having problems. It's also a good idea to know your test results and keep a list of the medicines you take. How can you care for yourself at home? · Take medicines exactly as directed. Call your doctor if you think you are having a problem with your medicine. · Go to your counseling sessions and follow-up appointments. · Recognize and accept your anxiety. Then, when you are in a situation that makes you anxious, say to yourself, \"This is not an emergency. I feel uncomfortable, but I am not in danger. I can keep going even if I feel anxious. \"  · Be kind to your body:  ? Relieve tension with exercise or a massage. ? Get enough rest.  ? Avoid alcohol, caffeine, nicotine, and illegal drugs. They can increase your anxiety level and cause sleep problems. ? Learn and do relaxation techniques. See below for more about these techniques. · Engage your mind. Get out and do something you enjoy. Go to a funny movie, or take a walk or hike. Plan your day. Having too much or too little to do can make you anxious. · Keep a record of your symptoms. Discuss your fears with a good friend or family member, or join a support group for people with similar problems. Talking to others sometimes relieves stress. · Get involved in social groups, or volunteer to help others. Being alone sometimes makes things seem worse than they are.   · Get at least 30 minutes of exercise on most days of the week to relieve stress. Walking is a good choice. You also may want to do other activities, such as running, swimming, cycling, or playing tennis or team sports. Relaxation techniques  Do relaxation exercises 10 to 20 minutes a day. You can play soothing, relaxing music while you do them, if you wish. · Tell others in your house that you are going to do your relaxation exercises. Ask them not to disturb you. · Find a comfortable place, away from all distractions and noise. · Lie down on your back, or sit with your back straight. · Focus on your breathing. Make it slow and steady. · Breathe in through your nose. Breathe out through either your nose or mouth. · Breathe deeply, filling up the area between your navel and your rib cage. Breathe so that your belly goes up and down. · Do not hold your breath. · Breathe like this for 5 to 10 minutes. Notice the feeling of calmness throughout your whole body. As you continue to breathe slowly and deeply, relax by doing the following for another 5 to 10 minutes:  · Tighten and relax each muscle group in your body. You can begin at your toes and work your way up to your head. · Imagine your muscle groups relaxing and becoming heavy. · Empty your mind of all thoughts. · Let yourself relax more and more deeply. · Become aware of the state of calmness that surrounds you. · When your relaxation time is over, you can bring yourself back to alertness by moving your fingers and toes and then your hands and feet and then stretching and moving your entire body. Sometimes people fall asleep during relaxation, but they usually wake up shortly afterward. · Always give yourself time to return to full alertness before you drive a car or do anything that might cause an accident if you are not fully alert. Never play a relaxation tape while you drive a car. When should you call for help? Call 911 anytime you think you may need emergency care. For example, call if:    · You feel you cannot stop from hurting yourself or someone else. Keep the numbers for these national suicide hotlines: 3-318-019-TALK (3-283.108.5662) and 1-610-PXDHSGV (9-789.149.8637). If you or someone you know talks about suicide or feeling hopeless, get help right away. Watch closely for changes in your health, and be sure to contact your doctor if:    · You have anxiety or fear that affects your life.     · You have symptoms of anxiety that are new or different from those you had before. Where can you learn more? Go to http://www.castaneda.com/  Enter P754 in the search box to learn more about \"Anxiety Disorder: Care Instructions. \"  Current as of: January 31, 2020               Content Version: 12.6  © 5530-6150 [employer]. Care instructions adapted under license by [employer] (which disclaims liability or warranty for this information). If you have questions about a medical condition or this instruction, always ask your healthcare professional. [address] any warranty or liability for your use of this information.

## 2020-11-11 NOTE — ED TRIAGE NOTES
States drinking to much states had 5 liters wine tonight also states stomach and chest hurts, was seen here yesterday

## 2020-11-11 NOTE — ED PROVIDER NOTES
Jessica Brandt is a 63 y.o. female with history of recurrent alcohol abuse and daily intoxication with complaints of getting panic tonight with chest pressure that started after she had been drinking alcohol. Patient denied any productive cough, fever, vomiting, syncope, abdominal pain. Patient seen at least twice in the last 3 to 4 days for the same thing with negative work-up. Patient states she is aware of her alcohol issues but is not elected to seek treatment at this time. She did not take anything for the pain prior to arrival.  Patient was not administered any medication by EMS but did have a prehospital EKG which was reviewed by me    The history is provided by the patient and medical records. Past Medical History:   Diagnosis Date    Alcohol abuse     Anxiety     Arrhythmia     Tacchycardai    Asthma     controlled    Bipolar disorder ([facility] [address]     Common migraine     [facility]     Home O2  PRN ,  pt use also for Tachycardia    Depression     Esophageal reflux     Gout     Hypertension     Hypocitraturia     Inverted nipple     Left breast always have.     Kidney stone on left side     Pancreatitis     Recurrent UTI     Staghorn renal calculus     Urine leukocytes        Past Surgical History:   Procedure Laterality Date    HX COLONOSCOPY      HX CYST REMOVAL Left     x 3 surgeries    HX ENDOSCOPY      HX GYN      tubal ligation    HX LUMBAR LAMINECTOMY      HX UROLOGICAL  08/10/2018    Cysto, bilat RPG, left ureteral pyeloscopy, HLL, left JJ stent placement, Dr. Rina Lopez         Family History:   Family history unknown: Yes       Social History     Socioeconomic History    Marital status:      Spouse name: Not on file    Number of children: Not on file    Years of education: Not on file    Highest education level: Not on file   Occupational History    Not on file   Social Needs    Financial resource strain: Not on file   [employer] insecurity     Worry: Not on file     Inability: Not on file    Transportation needs     Medical: Not on file     Non-medical: Not on file   Tobacco Use    Smoking status: Current Every Day Smoker     Packs/day: 1.50     Years: 33.00     Pack years: 49.50     Types: Cigarettes    Smokeless tobacco: Never Used   Substance and Sexual Activity    Alcohol use: Yes     Comment: red wine    Drug use: No     Comment: 11years old- [employer]    Sexual activity: Not on file   Lifestyle    Physical activity     Days per week: Not on file     Minutes per session: Not on file    Stress: Not on file   Relationships    Social connections     Talks on phone: Not on file     Gets together: Not on file     Attends Jainism service: Not on file     Active member of club or organization: Not on file     Attends meetings of clubs or organizations: Not on file     Relationship status: Not on file    Intimate partner violence     Fear of current or ex partner: Not on file     Emotionally abused: Not on file     Physically abused: Not on file     Forced sexual activity: Not on file   Other Topics Concern    Not on file   Social History Narrative    Not on file         ALLERGIES: Lithium; Amitriptyline; and Elavil    Review of Systems   Constitutional: Negative for diaphoresis and fever. HENT: Negative for sore throat and trouble swallowing. Eyes: Negative for visual disturbance. Respiratory: Positive for shortness of breath. Negative for cough. Cardiovascular: Positive for chest pain and palpitations. Gastrointestinal: Negative for abdominal pain. Genitourinary: Negative for difficulty urinating. Musculoskeletal: Negative for gait problem. Skin: Negative for rash. Allergic/Immunologic: Negative for immunocompromised state. Neurological: Negative for syncope. Psychiatric/Behavioral: Positive for sleep disturbance. The patient is nervous/anxious.         Vitals:    11/11/20 0049 11/11/20 0054   BP: 105/63   Pulse:  (!) 101   Resp:  15   Temp: 98.6 °F (37 °C)    SpO2:  95%   Weight:  52 kg (114 lb 10.2 oz)   Height:  5' 2\" (1.575 m)            Physical Exam  Vitals signs and nursing note reviewed. Constitutional:       General: She is in acute distress. Appearance: She is well-developed. She is not ill-appearing, toxic-appearing or diaphoretic. Comments: Smells of alcohol and appears much older than stated age   HENT:      Head: Normocephalic and atraumatic. Right Ear: External ear normal.      Left Ear: External ear normal.      Nose: Nose normal.      Mouth/Throat:      Pharynx: Uvula midline. Eyes:      General: No scleral icterus. Conjunctiva/sclera: Conjunctivae normal.   Neck:      Musculoskeletal: Neck supple. Cardiovascular:      Rate and Rhythm: Regular rhythm. Tachycardia present. Heart sounds: Normal heart sounds. No murmur. No gallop. Pulmonary:      Effort: Pulmonary effort is normal.      Breath sounds: Normal breath sounds. Chest:      Chest wall: Tenderness present. Abdominal:      Palpations: Abdomen is soft. Tenderness: There is no abdominal tenderness. Musculoskeletal:      Right lower leg: No edema. Left lower leg: No edema. Skin:     General: Skin is warm and dry. Capillary Refill: Capillary refill takes less than 2 seconds. Neurological:      Mental Status: She is alert and oriented to person, place, and time.       Gait: Gait normal.   Psychiatric:         Behavior: Behavior normal.          MDM       Procedures    Vitals:  Patient Vitals for the past 12 hrs:   Temp Pulse Resp BP SpO2   11/11/20 0054  (!) 101 15 105/63 95 %   11/11/20 0049 98.6 °F (37 °C)             Medications ordered:   Medications - No data to display      Lab findings:  Recent Results (from the past 12 hour(s))   POC TROPONIN-I    Collection Time: 11/11/20  1:34 AM   Result Value Ref Range    Troponin-I (POC) <0.04 0.00 - 0.08 ng/mL   POC 6 PLUS Collection Time: 11/11/20  1:37 AM   Result Value Ref Range    Sodium,  136 - 145 MMOL/L    Potassium, POC 4.4 3.5 - 5.5 MMOL/L    Chloride,  100 - 108 MMOL/L    BUN, POC 8 7 - 18 MG/DL    Glucose, POC 69 (L) 74 - 106 MG/DL    Hematocrit, POC 37 36 - 49 %    Hemoglobin, POC 12.6 12 - 16 G/DL       EKG interpretation by ED Physician:  Prehospital: Sinus tach with no acute ST or T wave changes  Rate 125 QRS 78   Not significantly different from previous and EKGs today and yesterday. Cardiac monitor: Tachycardia normal rate with regular rhythm and no ectopy  Pulse ox interpretation: 95% on room air, borderline    Cardiac monitor: Sinus tach with no acute ST or T wave changes  Rate 104 QRS 74   Not significantly different from previous EKGs    X-Ray, CT or other radiology findings or impressions:  No orders to display       Progress notes, Consult notes or additional Procedure notes:   Do not feel patient quires imaging or other work-up here. Consistent with her previous alcohol induced anxiety state    I have discussed with patient and/or family/sig other the results, interpretation of any imaging if performed, suspected diagnosis and treatment plan to include instructions regarding the diagnoses listed to which understanding was expressed with all questions answered      Reevaluation of patient:   stable    Disposition:  Diagnosis:   1. Panic attack    2.  Alcohol abuse with intoxication ([facility] [address]        Disposition: home    Follow-up Information     Follow up With Specialties Details Why Contact Info    Nhung Zuluaga., MD Family Medicine Schedule an appointment as soon as possible for a visit  [address] [place] [address] EMERGENCY DEPT Emergency Medicine  If symptoms worsen [address]  [phone]            Patient's Medications   Start Taking    No medications on file   Continue Taking    BENZONATATE (TESSALON PERLE PO)    Take  by mouth as needed. Indications: cough    BUSPIRONE (BUSPAR) 15 MG TABLET    Take 15 mg by mouth three (3) times daily. CHLORDIAZEPOXIDE (LIBRIUM) 25 MG CAPSULE    Take 1 Cap by mouth three (3) times daily as needed for Anxiety for up to 7 days. Max Daily Amount: 75 mg. CLONIDINE HCL (CATAPRES) 0.2 MG TABLET    Take 0.2 mg by mouth two (2) times a day. COLCHICINE 0.6 MG TABLET    Take 0.6 mg by mouth daily. DOCUSATE SODIUM (COLACE) 100 MG CAPSULE    Take 100 mg by mouth three (3) times daily. FOLIC ACID (FOLVITE) 1 MG TABLET    Take 1 Tab by mouth daily. IPRATROPIUM-ALBUTEROL (COMBIVENT RESPIMAT)  MCG/ACTUATION INHALER    Take 1 Puff by inhalation as needed for Wheezing. LAMOTRIGINE (LAMICTAL) 150 MG TABLET    Take 150 mg by mouth daily. LIDOCAINE (LIDODERM) 5 %    Apply patch to the affected area for 12 hours a day and remove for 12 hours a day. LIPASE-PROTEASE-AMYLASE (CREON 24,000) 24,000-76,000 -120,000 UNIT CAPSULE    Take 1 Cap by mouth three (3) times daily (with meals). LORAZEPAM (ATIVAN) 0.5 MG TABLET    Take 2 Tabs by mouth every eight (8) hours as needed for Anxiety (or panic attacks). Max Daily Amount: 3 mg. MELATONIN 10 MG TAB    Take 10 mg by mouth nightly. MILK THISTLE PO    Take 175 mg by mouth two (2) times a day. MULTIVITAMIN (ONE A DAY) TABLET    Take 1 Tab by mouth daily. OMEPRAZOLE (PRILOSEC) 40 MG CAPSULE    Take 1 Cap by mouth daily. ONDANSETRON HCL (ZOFRAN) 4 MG TABLET    Take 2 Tabs by mouth every eight (8) hours as needed for Nausea. QUETIAPINE (SEROQUEL) 300 MG TABLET    Take 600 mg by mouth daily. SUCRALFATE (CARAFATE) 100 MG/ML SUSPENSION    Take 10 mL by mouth Before breakfast, lunch, dinner and at bedtime. THIAMINE HCL (B-1) 100 MG TABLET    Take 1 Tab by mouth daily (with breakfast). TOPIRAMATE (TOPAMAX) 100 MG TABLET    Take 100 mg by mouth two (2) times a day.     TRAZODONE (DESYREL) 100 MG TABLET    Take 200 mg by mouth nightly.    These Medications have changed    No medications on file   Stop Taking    No medications on file

## 2020-11-11 NOTE — ED PROVIDER NOTES
[address] [facility]  EMERGENCY DEPARTMENT HISTORY AND PHYSICAL EXAM       Date: 11/11/2020   Patient Name: Eliseo Mina   YOB: 1965  Medical Record Number: [id number]    HISTORY OF PRESENTING ILLNESS:     Eliseo Mina is a 63 y.o. female presenting with the noted PMH to the ED c/o anxiety. Patient states she does not want to die. She states her anxiety is acting up on her. She states she left here and went home and drank a bottle of white wine. She states she is feeling the symptoms again. She states she feels like she is going to die. She says she is got burning sensation in her chest.  She has mild nasal congestion and occasional \"smoker's cough\". Occasional sore throat. No fevers or chills. No nausea or vomiting. No abdominal pain. No urine or bowel changes. Denies any suicidal homicidal ideation    Rest of complete systems reviewed and negative    Primary Care Provider: Jordyn Harris MD   Specialist:    Past Medical History:   Past Medical History:   Diagnosis Date    Alcohol abuse     Anxiety     Arrhythmia     Tacchycardai    Asthma     controlled    Bipolar disorder ([facility]     Common migraine     COPD (chronic obstructive pulmonary disease) ([facility]     Home O2  PRN ,  pt use also for Tachycardia    Depression     Esophageal reflux     Gout     Hypertension     Hypocitraturia     Inverted nipple     Left breast always have.     Kidney stone on left side     Pancreatitis     Recurrent UTI     Staghorn renal calculus     Urine leukocytes         Past Surgical History:   Past Surgical History:   Procedure Laterality Date    HX COLONOSCOPY      HX CYST REMOVAL Left     x 3 surgeries    HX ENDOSCOPY      HX GYN      tubal ligation    HX LUMBAR LAMINECTOMY      HX UROLOGICAL  08/10/2018    Cysto, bilat RPG, left ureteral pyeloscopy, HLL, left JJ stent placement, Dr. Alex Luu, [address]        Social History:   Social History     Tobacco Use    Smoking status: Current Every Day Smoker     Packs/day: 1.50     Years: 33.00     Pack years: 49.50     Types: Cigarettes    Smokeless tobacco: Never Used   Substance Use Topics    Alcohol use: Yes     Comment: red wine    Drug use: No     Comment: 11years old- latiaarnulfo        Allergies: Allergies   Allergen Reactions    Lithium Anaphylaxis    Amitriptyline Other (comments)     Left leg went numb    Elavil Other (comments)     Left leg went numb        REVIEW OF SYSTEMS:  Review of Systems      PHYSICAL EXAM:  Vitals:    11/11/20 1722 11/11/20 1906   BP: 119/81 121/73   Pulse: (!) 115    Resp: 16    Temp: 97.4 °F (36.3 °C)    SpO2: 97%        Physical Exam   Vital signs reviewed. Alert  in  Mild distress. Anxious. Tearful. HEENT: normocephalic atraumatic. Eyes are PERRLA EOMI. Conjunctiva normal.    External ears and nose normal.    Neck: normal external exam. No midline neck or back TTP. Lungs are clear to ascultation bilaterally. normal effort  Heart is regular rate and rhythm with no murmurs. Abdomen soft and nontender. No rebound rigidity or guarding. Extremities: Moves all 4 extremities and no distress. Full range of motion. 2+ pulses and BCR in all 4 extremities. No edema or calf tenderness. Neuro: Normal gait. 5 out of 5 strength in all 4 extremities. No facial droop. Skin examination: intact. no rashes. No petechia or purpura. Medications   mylanta/viscous lidocaine (GI COCKTAIL) (40 mL Oral Given 11/11/20 1807)   LORazepam (ATIVAN) tablet 1 mg (1 mg Oral Given 11/11/20 1806)       RESULTS:    Labs -   Labs Reviewed   TROPONIN I       Radiologic Studies -  No results found. EKG interpretation:   Done at 1801 read by myself as sinus tachycardia at 119 bpm.  No ST elevation. Peak T waves. Similar to old EKGs on November 11, 2020 and 11/7/2020. MEDICAL DECISION MAKING    1920. Patient reassessed. She states she feels better ready to go.   Discussed with patient once troponin comes back negative we will discharge her home. Suspect anxiety as well as secondary to her alcohol intoxication could be causing gastroesophageal reflux. Discussed with patient about following up with cardiology as well as her primary doctor to be rechecked. Also recommending follow-up with GI. Patient states understanding.  1933. Paging cardiology. 1944. Patient getting up out of the bed states she is leaving now. She states does not want to wait any longer. She will follow-up as outpatient with GI and cardiology. Troponin still pending. Patient aware. [place] discharge performed. Patient return at anytime if she changes her mind. She states she feels better wants to go now.    2000. Cardiology called back. Discussed case with the cardiologist.  She states she will have the office call the patient at home tomorrow and see if she wants to follow-up for full work-up. Patient has no new complaints, changes, or physical findings. Results were reviewed with the patient. Pt's questions and concerns were addressed. Care plan was outlined, including follow-up with PCP/specialist and return precautions were discussed. Patient is felt to be stable for discharge at this time. Diagnosis   Clinical Impression:   1. Alcohol dependence with uncomplicated intoxication ([facility]    2. Gastroesophageal reflux disease, unspecified whether esophagitis present    3.  Tachycardia           Follow-up Information     Follow up With Specialties Details Why Contact Info    Henri Juarez MD Family Medicine Call in 1 day  [address] [place] [address]      Santi Campos MD Cardiology, Internal Medicine Call in 1 day  [facility]  [phone] [facility] EMERGENCY DEPT Emergency Medicine Go in 2 days If symptoms worsen, As needed [address]  [phone]          Current Discharge Medication List discharged in stable and improved condition. This chart was completed using Dragon, a dictation transcription service. Errors may have resulted from using this device.

## 2020-11-11 NOTE — ED TRIAGE NOTES
Pt discharged about 2 hours ago. Arrives again via EMS for c/o alcohol intoxication. Third visit to the ER today for the same. Pt left and went home to drink more. States she did it because \"i'm pathetic\". Pt states she has been to [facility] 13x. Per medics, pt's apartment is littered with empty wine bottles that the pt has delivered to her home. They are also very familiar with the pt. Pt denies SI/HI. But states she has depression, anxiety, and bipolar.

## 2020-11-12 LAB
ATRIAL RATE: 103 BPM
CALCULATED P AXIS, ECG09: 65 DEGREES
CALCULATED R AXIS, ECG10: 70 DEGREES
CALCULATED T AXIS, ECG11: 68 DEGREES
DIAGNOSIS, 93000: NORMAL
P-R INTERVAL, ECG05: 146 MS
Q-T INTERVAL, ECG07: 322 MS
QRS DURATION, ECG06: 72 MS
QTC CALCULATION (BEZET), ECG08: 421 MS
VENTRICULAR RATE, ECG03: 103 BPM

## 2020-11-12 NOTE — ED NOTES
Assuming care of patient at this time. Patient stating she wants to go home, denies any SI/HI. Discussed in length patient's multiple ER visits and alcoholism, expresses wanting to follow up with a rehab facility but states she really just wants to go home for now.

## 2020-11-12 NOTE — DISCHARGE INSTRUCTIONS
Patient Education        Learning About Alcohol Use Disorder  What is alcohol use disorder? Alcohol use disorder means that a person drinks alcohol even though it causes harm to themselves or others. It can range from mild to severe. The more signs of this disorder you have, the more severe it may be. Moderate to severe alcohol use disorder is sometimes called addiction. People who have it may find it hard to control their use of alcohol. People who have this disorder may argue with others about how much they're drinking. Their job may be affected because of drinking. They may drink when it's dangerous or illegal, such as when they drive. They also may have a strong need, or craving, to drink. They may feel like they must drink just to get by. Their drinking may increase their risk of getting hurt or being in a car crash. Over time, drinking too much alcohol may cause health problems. These may include high blood pressure, liver problems, or problems with digestion. What are the signs? Maybe you've wondered about your alcohol habits, or how to tell if your drinking is becoming a problem. Here are some of the signs of alcohol use disorder. You may have it if you have two or more of the following signs:  · You drink larger amounts of alcohol than you ever meant to. Or you've been drinking for a longer time than you ever meant to. · You can't cut down or control your use. Or you constantly wish you could cut down. · You spend a lot of time getting or drinking alcohol or recovering from its effects. · You have strong cravings for alcohol. · You can no longer do your main jobs at work, at school, or at home. · You keep drinking alcohol, even though your use hurts your relationships. · You have stopped doing important activities because of your alcohol use. · You drink alcohol in situations where doing so is dangerous. · You keep drinking alcohol even though you know it's causing health problems.   · You need more and more alcohol to get the same effect, or you get less effect from the same amount over time. This is called tolerance. · You have uncomfortable symptoms when you stop drinking alcohol or use less. This is called withdrawal.  Alcohol use disorder can range from mild to severe. The more signs you have, the more severe the disorder may be. Moderate to severe alcohol use disorder is sometimes called addiction. You might not realize that your drinking is a problem. You might not drink large amounts when you drink. Or you might go for days or weeks between drinking episodes. But even if you don't drink very often, your drinking could still be harmful and put you at risk. How is alcohol use disorder treated? Getting help is up to you. But you don't have to do it alone. There are many people and kinds of treatments that can help. Treatment for alcohol use disorder can include:  · Group therapy, one or more types of counseling, and alcohol education. · Medicines that help to:  ? Reduce withdrawal symptoms and help you safely stop drinking. ? Reduce cravings for alcohol. · Support groups. These groups include Alcoholics Anonymous and [employer] (Self-Management and Recovery Training). Some people are able to stop or cut back on drinking with help from a counselor. People who have moderate to severe alcohol use disorder may need medical treatment. They may need to stay in a hospital or treatment center. You may have a treatment team to help you. This team may include a psychologist or psychiatrist, counselors, doctors, social workers, nurses, and a . A  helps plan and manage your treatment. Follow-up care is a key part of your treatment and safety. Be sure to make and go to all appointments, and call your doctor if you are having problems. It's also a good idea to know your test results and keep a list of the medicines you take. Where can you learn more?   Go to http://www.gray.com/  Enter [id number] in the search box to learn more about \"Learning About Alcohol Use Disorder. \"  Current as of: June 29, 2020               Content Version: 12.6  © 2787-2562 [employer]. Care instructions adapted under license by [employer] (which disclaims liability or warranty for this information). If you have questions about a medical condition or this instruction, always ask your healthcare professional. [place] [address] any warranty or liability for your use of this information.

## 2020-11-12 NOTE — ED PROVIDER NOTES
Braden Chan is a 63 y.o. female with history of recurrent alcohol abuse and intoxication who has been in this facility for recurrent chest pain after drinking 4 times in the last approximately 36 hours. Patient left prior to completion evaluation in the evening. Patient states her chest are hurting again tonight. She did drink wine just prior to arrival.  No vomiting, increased shortness of breath, productive cough or hemoptysis. No hematemesis. Pain is sharp and stabbing. The history is provided by medical records and the patient. Past Medical History:   Diagnosis Date    Alcohol abuse     Anxiety     Arrhythmia     Tacchycardai    Asthma     controlled    B[facility] disorder ([facility] [address] [facility]     Common migraine     COPD [facility]chronic obstructive pulmonary disease) ([facility])     Home O2  PRN ,  pt use also for Tachycardia    Depression     Esophageal reflux     Gout     Hypertension     Hypocitraturia     Inverted nipple     Left breast always have.     Kidney stone on left side     Pancreatitis     Recurrent UTI     Staghorn renal calculus     Urine leukocytes        Past Surgical History:   Procedure Laterality Date    HX COLONOSCOPY      HX CYST REMOVAL Left     x 3 surgeries    HX ENDOSCOPY      HX GYN      tubal ligation    HX LUMBAR LAMINECTOMY      HX UROLOGICAL  08/10/2018    Cysto, bilat RPG, left ureteral pyeloscopy, HLL, left JJ stent placement, Dr. mEerita Abdul         Family History:   Family history unknown: Yes       Social History     Socioeconomic History    Marital status:      Spouse name: Not on file    Number of children: Not on file    Years of education: Not on file    Highest education level: Not on file   Occupational History    Not on file   Social Needs    Financial resource strain: Not on file    Food insecurity     Worry: Not on file     Inability: Not on file    Transportation needs     Medical: Not on file     Non-medical: Not on file   Tobacco Use    Smoking status: Current Every Day Smoker     Packs/day: 1.50     Years: 33.00     Pack years: 49.50     Types: Cigarettes    Smokeless tobacco: Never Used   Substance and Sexual Activity    Alcohol use: Yes     Comment: red wine    Drug use: No     Comment: 11years old- bayron    Sexual activity: Not on file   Lifestyle    Physical activity     Days per week: Not on file     Minutes per session: Not on file    Stress: Not on file   Relationships    Social connections     Talks on phone: Not on file     Gets together: Not on file     Attends Protestant service: Not on file     Active member of club or organization: Not on file     Attends meetings of clubs or organizations: Not on file     Relationship status: Not on file    Intimate partner violence     Fear of current or ex partner: Not on file     Emotionally abused: Not on file     Physically abused: Not on file     Forced sexual activity: Not on file   Other Topics Concern    Not on file   Social History Narrative    Not on file         ALLERGIES: Lithium; Amitriptyline; and Elavil    Review of Systems   Constitutional: Negative for fever. HENT: Negative for sore throat. Eyes: Negative for visual disturbance. Respiratory: Negative for cough. Cardiovascular: Positive for chest pain and palpitations. Gastrointestinal: Negative for abdominal pain. Genitourinary: Negative for difficulty urinating. Musculoskeletal: Negative for gait problem. Skin: Negative for rash. Neurological: Negative for syncope. Psychiatric/Behavioral: Positive for sleep disturbance. The patient is nervous/anxious. Vitals:    11/11/20 2302 11/11/20 2306   BP: 123/86 117/79   Pulse: (!) 112 (!) 119   Resp: 16    Temp: 98.6 °F (37 °C)    SpO2: 98%             Physical Exam  Vitals signs and nursing note reviewed. Constitutional:       General: She is in acute distress. Appearance: She is well-developed.  She is not ill-appearing, toxic-appearing or diaphoretic. Comments: Appears very anxious and older than stated age   HENT:      Head: Normocephalic and atraumatic. Right Ear: External ear normal.      Left Ear: External ear normal.      Nose: Nose normal.      Mouth/Throat:      Pharynx: Uvula midline. Eyes:      General: No scleral icterus. Conjunctiva/sclera: Conjunctivae normal.   Neck:      Musculoskeletal: Neck supple. Cardiovascular:      Rate and Rhythm: Regular rhythm. Tachycardia present. Heart sounds: Normal heart sounds. Pulmonary:      Effort: Pulmonary effort is normal.      Breath sounds: Normal breath sounds. Abdominal:      Palpations: Abdomen is soft. Tenderness: There is no abdominal tenderness. Skin:     General: Skin is warm and dry. Neurological:      Mental Status: She is alert and oriented to person, place, and time. Gait: Gait normal.   Psychiatric:         Attention and Perception: Attention normal.         Mood and Affect: Mood is anxious. Thought Content: Thought content is not paranoid. Thought content does not include homicidal or suicidal ideation.           MDM       Procedures  Vitals:  Patient Vitals for the past 12 hrs:   Temp Pulse Resp BP SpO2   11/11/20 2306  (!) 119  117/79    11/11/20 2302 98.6 °F (37 °C) (!) 112 16 123/86 98 %         Medications ordered:   Medications - No data to display      Lab findings:  Recent Results (from the past 12 hour(s))   EKG, 12 LEAD, INITIAL    Collection Time: 11/11/20 12:42 PM   Result Value Ref Range    Ventricular Rate 103 BPM    Atrial Rate 103 BPM    P-R Interval 146 ms    QRS Duration 72 ms    Q-T Interval 322 ms    QTC Calculation (Bezet) 421 ms    Calculated P Axis 65 degrees    Calculated R Axis 70 degrees    Calculated T Axis 68 degrees    Diagnosis       Sinus tachycardia  Otherwise normal ECG  When compared with ECG of 11-NOV-2020 01:07,  No significant change was found     CBC WITH AUTOMATED DIFF    Collection Time: 11/11/20 1:00 PM   Result Value Ref Range    WBC 4.6 4.6 - 13.2 K/uL    RBC 3.68 (L) 4.20 - 5.30 M/uL    HGB 11.9 (L) 12.0 - 16.0 g/dL    HCT 35.9 35.0 - 45.0 %    MCV 97.6 (H) 74.0 - 97.0 FL    MCH 32.3 24.0 - 34.0 PG    MCHC 33.1 31.0 - 37.0 g/dL    RDW 15.0 (H) 11.6 - 14.5 %    PLATELET 842 516 - 705 K/uL    MPV 10.1 9.2 - 11.8 FL    NEUTROPHILS 41 40 - 73 %    LYMPHOCYTES 52 21 - 52 %    MONOCYTES 3 3 - 10 %    EOSINOPHILS 3 0 - 5 %    BASOPHILS 1 0 - 2 %    ABS. NEUTROPHILS 1.9 1.8 - 8.0 K/UL    ABS. LYMPHOCYTES 2.4 0.9 - 3.6 K/UL    ABS. MONOCYTES 0.2 0.05 - 1.2 K/UL    ABS. EOSINOPHILS 0.1 0.0 - 0.4 K/UL    ABS. BASOPHILS 0.0 0.0 - 0.1 K/UL    DF AUTOMATED     METABOLIC PANEL, COMPREHENSIVE    Collection Time: 11/11/20  1:00 PM   Result Value Ref Range    Sodium 137 136 - 145 mmol/L    Potassium 4.8 3.5 - 5.5 mmol/L    Chloride 107 100 - 111 mmol/L    CO2 20 (L) 21 - 32 mmol/L    Anion gap 10 3.0 - 18 mmol/L    Glucose 62 (L) 74 - 99 mg/dL    BUN 7 7.0 - 18 MG/DL    Creatinine 0.52 (L) 0.6 - 1.3 MG/DL    BUN/Creatinine ratio 13 12 - 20      GFR est AA >60 >60 ml/min/1.73m2    GFR est non-AA >60 >60 ml/min/1.73m2    Calcium 9.2 8.5 - 10.1 MG/DL    Bilirubin, total 0.4 0.2 - 1.0 MG/DL    ALT (SGPT) 43 13 - 56 U/L    AST (SGOT) 59 (H) 10 - 38 U/L    Alk.  phosphatase 126 (H) 45 - 117 U/L    Protein, total 7.1 6.4 - 8.2 g/dL    Albumin 3.3 (L) 3.4 - 5.0 g/dL    Globulin 3.8 2.0 - 4.0 g/dL    A-G Ratio 0.9 0.8 - 1.7     TROPONIN I    Collection Time: 11/11/20  1:00 PM   Result Value Ref Range    Troponin-I, QT <0.02 0.0 - 0.045 NG/ML   ETHYL ALCOHOL    Collection Time: 11/11/20  1:00 PM   Result Value Ref Range    ALCOHOL(ETHYL),SERUM 185 (H) 0 - 3 MG/DL   EKG, 12 LEAD, INITIAL    Collection Time: 11/11/20  6:01 PM   Result Value Ref Range    Ventricular Rate 119 BPM    Atrial Rate 119 BPM    P-R Interval 130 ms    QRS Duration 72 ms    Q-T Interval 314 ms    QTC Calculation (Bezet) 441 ms    Calculated P Axis 76 degrees Calculated R Axis 77 degrees    Calculated T Axis 74 degrees    Diagnosis       Sinus tachycardia  Otherwise normal ECG  When compared with ECG of 11-NOV-2020 12:42,  No significant change was found     TROPONIN I    Collection Time: 11/11/20  7:22 PM   Result Value Ref Range    Troponin-I, QT <0.02 0.0 - 0.045 NG/ML       EKG interpretation by ED Physician:      X-Ray, CT or other radiology findings or impressions:  No orders to display       Progress notes, Consult notes or additional Procedure notes:   Do not feel patient has repeat testing or imaging. She is chronically tachycardic when she comes in. I did offer the patient to stay here and get assistance with rehab placement which she refused. Patient is not actively suicidal homicidal.    I have discussed with patient and/or family/sig other the results, interpretation of any imaging if performed, suspected diagnosis and treatment plan to include instructions regarding the diagnoses listed to which understanding was expressed with all questions answered      Reevaluation of patient:   stable    Disposition:  Diagnosis:   1. Recurrent chest pain    2. Alcoholic intoxication with complication ([facility] [address]        Disposition: home    Follow-up Information     Follow up With Specialties Details Why [address] Schedule an appointment as soon as possible for a visit  [address] [place] [address]  Merry Dank Chait[address] AT [address] [facility] [address] Schedule an appointment as soon as possible for a visit  [place] [address]  [phone]            Patient's Medications   Start Taking    No medications on file   Continue Taking    BENZONATATE (TESSALON PERLE PO)    Take  by mouth as needed. Indications: cough    BUSPIRONE (BUSPAR) 15 MG TABLET    Take 15 mg by mouth three (3) times daily. CHLORDIAZEPOXIDE (LIBRIUM) 25 MG CAPSULE    Take 1 Cap by mouth three (3) times daily as needed for Anxiety for up to 7 days. Max Daily Amount: 75 mg. CLONIDINE HCL (CATAPRES) 0.2 MG TABLET    Take 0.2 mg by mouth two (2) times a day. COLCHICINE 0.6 MG TABLET    Take 0.6 mg by mouth daily. DOCUSATE SODIUM (COLACE) 100 MG CAPSULE    Take 100 mg by mouth three (3) times daily. FOLIC ACID (FOLVITE) 1 MG TABLET    Take 1 Tab by mouth daily. IPRATROPIUM-ALBUTEROL (COMBIVENT RESPIMAT)  MCG/ACTUATION INHALER    Take 1 Puff by inhalation as needed for Wheezing. LAMOTRIGINE (LAMICTAL) 150 MG TABLET    Take 150 mg by mouth daily. LIDOCAINE (LIDODERM) 5 %    Apply patch to the affected area for 12 hours a day and remove for 12 hours a day. LIPASE-PROTEASE-AMYLASE (CREON 24,000) 24,000-76,000 -120,000 UNIT CAPSULE    Take 1 Cap by mouth three (3) times daily (with meals). LORAZEPAM (ATIVAN) 0.5 MG TABLET    Take 2 Tabs by mouth every eight (8) hours as needed for Anxiety (or panic attacks). Max Daily Amount: 3 mg. MELATONIN 10 MG TAB    Take 10 mg by mouth nightly. MILK THISTLE PO    Take 175 mg by mouth two (2) times a day. MULTIVITAMIN (ONE A DAY) TABLET    Take 1 Tab by mouth daily. OMEPRAZOLE (PRILOSEC) 40 MG CAPSULE    Take 1 Cap by mouth daily. ONDANSETRON HCL (ZOFRAN) 4 MG TABLET    Take 2 Tabs by mouth every eight (8) hours as needed for Nausea. QUETIAPINE (SEROQUEL) 300 MG TABLET    Take 600 mg by mouth daily. SUCRALFATE (CARAFATE) 100 MG/ML SUSPENSION    Take 10 mL by mouth Before breakfast, lunch, dinner and at bedtime. THIAMINE HCL (B-1) 100 MG TABLET    Take 1 Tab by mouth daily (with breakfast). TOPIRAMATE (TOPAMAX) 100 MG TABLET    Take 100 mg by mouth two (2) times a day. TRAZODONE (DESYREL) 100 MG TABLET    Take 200 mg by mouth nightly.    These Medications have changed    No medications on file   Stop Taking    No medications on file

## 2020-11-12 NOTE — ED NOTES
Provider offered to assist pt with getting into a rehab center, pt declined. Discharge instructions reviewed with pt. No questions at time of discharge. Ambulatory to the lobby.

## 2020-11-12 NOTE — ED NOTES
Patient states she is NOT O2 dependent. Patient stated she has called a cab and is leaving. Provider made aware, and patient wheeled to lobby to wait for cab.

## 2020-11-12 NOTE — ED TRIAGE NOTES
Pt just discharged from ER a few hours ago. Pt herself called 911 again as she started to have chest pain again. Pt appears anxious. Has been seen for the same several times in this ER over the past 24 hrs. Pt states she has set up rehab at [facility] and starts next week.

## 2020-11-12 NOTE — DISCHARGE INSTRUCTIONS
Thank you so much for visiting us today. Please make sure to call your doctor today to be rechecked in 1-3 days. Bring your results from here with you when you do. Return immediately if any fevers, headaches, chest pain, difficulty breathing, abdominal pain, worsening or changing symptoms, or any other concerns.

## 2020-11-13 LAB
ATRIAL RATE: 119 BPM
CALCULATED P AXIS, ECG09: 76 DEGREES
CALCULATED R AXIS, ECG10: 77 DEGREES
CALCULATED T AXIS, ECG11: 74 DEGREES
DIAGNOSIS, 93000: NORMAL
P-R INTERVAL, ECG05: 130 MS
Q-T INTERVAL, ECG07: 314 MS
QRS DURATION, ECG06: 72 MS
QTC CALCULATION (BEZET), ECG08: 441 MS
VENTRICULAR RATE, ECG03: 119 BPM

## 2021-01-04 ENCOUNTER — TRANSCRIBE ORDER (OUTPATIENT)
Dept: SCHEDULING | Age: 56
End: 2021-01-04

## 2021-01-04 DIAGNOSIS — Z12.31 VISIT FOR SCREENING MAMMOGRAM: Primary | ICD-10-CM

## 2021-02-22 ENCOUNTER — HOSPITAL ENCOUNTER (OUTPATIENT)
Dept: MAMMOGRAPHY | Age: 56
Discharge: HOME | End: 2021-02-22
Attending: FAMILY MEDICINE
Payer: MEDICARE

## 2021-02-22 DIAGNOSIS — Z12.31 VISIT FOR SCREENING MAMMOGRAM: ICD-10-CM

## 2021-02-22 PROCEDURE — 77063 BREAST TOMOSYNTHESIS BI: CPT

## 2021-02-23 ENCOUNTER — HOSPITAL ENCOUNTER (OUTPATIENT)
Dept: LAB | Age: 56
Discharge: HOME | End: 2021-02-23
Payer: MEDICARE

## 2021-02-23 ENCOUNTER — TRANSCRIBE ORDER (OUTPATIENT)
Dept: REGISTRATION | Age: 56
End: 2021-02-23

## 2021-02-23 DIAGNOSIS — K70.9 ALCOHOL LIVER DAMAGE (HCC): Primary | ICD-10-CM

## 2021-02-23 DIAGNOSIS — K70.9 ALCOHOL LIVER DAMAGE (HCC): ICD-10-CM

## 2021-02-23 LAB
ALBUMIN SERPL-MCNC: 3.2 G/DL (ref 3.4–5)
ALBUMIN/GLOB SERPL: 0.8 {RATIO} (ref 0.8–1.7)
ALP SERPL-CCNC: 133 U/L (ref 45–117)
ALT SERPL-CCNC: 16 U/L (ref 13–56)
ANION GAP SERPL CALC-SCNC: 5 MMOL/L (ref 3–18)
AST SERPL-CCNC: 16 U/L (ref 10–38)
BASOPHILS # BLD: 0 K/UL (ref 0–0.1)
BASOPHILS NFR BLD: 0 % (ref 0–2)
BILIRUB SERPL-MCNC: 0.5 MG/DL (ref 0.2–1)
BUN SERPL-MCNC: 25 MG/DL (ref 7–18)
BUN/CREAT SERPL: 28 (ref 12–20)
CALCIUM SERPL-MCNC: 9.2 MG/DL (ref 8.5–10.1)
CHLORIDE SERPL-SCNC: 108 MMOL/L (ref 100–111)
CO2 SERPL-SCNC: 26 MMOL/L (ref 21–32)
CREAT SERPL-MCNC: 0.88 MG/DL (ref 0.6–1.3)
DIFFERENTIAL METHOD BLD: ABNORMAL
EOSINOPHIL # BLD: 0.4 K/UL (ref 0–0.4)
EOSINOPHIL NFR BLD: 4 % (ref 0–5)
ERYTHROCYTE [DISTWIDTH] IN BLOOD BY AUTOMATED COUNT: 14.4 % (ref 11.6–14.5)
GLOBULIN SER CALC-MCNC: 4.1 G/DL (ref 2–4)
GLUCOSE SERPL-MCNC: 105 MG/DL (ref 74–99)
HCT VFR BLD AUTO: 37 % (ref 35–45)
HGB BLD-MCNC: 11.6 G/DL (ref 12–16)
LIPASE SERPL-CCNC: 56 U/L (ref 73–393)
LYMPHOCYTES # BLD: 2.6 K/UL (ref 0.9–3.6)
LYMPHOCYTES NFR BLD: 27 % (ref 21–52)
MCH RBC QN AUTO: 29.8 PG (ref 24–34)
MCHC RBC AUTO-ENTMCNC: 31.4 G/DL (ref 31–37)
MCV RBC AUTO: 95.1 FL (ref 74–97)
MONOCYTES # BLD: 0.7 K/UL (ref 0.05–1.2)
MONOCYTES NFR BLD: 7 % (ref 3–10)
NEUTS SEG # BLD: 5.9 K/UL (ref 1.8–8)
NEUTS SEG NFR BLD: 62 % (ref 40–73)
PLATELET # BLD AUTO: 250 K/UL (ref 135–420)
PMV BLD AUTO: 10.6 FL (ref 9.2–11.8)
POTASSIUM SERPL-SCNC: 4.2 MMOL/L (ref 3.5–5.5)
PROT SERPL-MCNC: 7.3 G/DL (ref 6.4–8.2)
RBC # BLD AUTO: 3.89 M/UL (ref 4.2–5.3)
SODIUM SERPL-SCNC: 139 MMOL/L (ref 136–145)
WBC # BLD AUTO: 9.6 K/UL (ref 4.6–13.2)

## 2021-02-23 PROCEDURE — 85025 COMPLETE CBC W/AUTO DIFF WBC: CPT

## 2021-02-23 PROCEDURE — 36415 COLL VENOUS BLD VENIPUNCTURE: CPT

## 2021-02-23 PROCEDURE — 80053 COMPREHEN METABOLIC PANEL: CPT

## 2021-02-23 PROCEDURE — 83690 ASSAY OF LIPASE: CPT

## 2021-08-03 PROBLEM — K92.2 GI BLEED: Status: RESOLVED | Noted: 2018-03-08 | Resolved: 2021-08-03

## 2022-03-18 PROBLEM — K85.90 PANCREATITIS: Status: ACTIVE | Noted: 2017-09-14

## 2022-03-18 PROBLEM — G93.41 ACUTE METABOLIC ENCEPHALOPATHY: Status: ACTIVE | Noted: 2020-10-20

## 2022-03-18 PROBLEM — R07.9 CHEST PAIN: Status: ACTIVE | Noted: 2019-05-23

## 2022-03-18 PROBLEM — G93.40 ENCEPHALOPATHY: Status: ACTIVE | Noted: 2017-09-14

## 2022-03-18 PROBLEM — K20.90 ESOPHAGITIS: Status: ACTIVE | Noted: 2019-05-23

## 2022-03-18 PROBLEM — I16.0 HYPERTENSIVE URGENCY: Status: ACTIVE | Noted: 2020-05-01

## 2022-03-18 PROBLEM — F10.939 ALCOHOL WITHDRAWAL (HCC): Status: ACTIVE | Noted: 2018-03-08

## 2022-03-18 PROBLEM — E83.52 HYPERCALCEMIA: Status: ACTIVE | Noted: 2018-03-08

## 2022-03-19 PROBLEM — Z72.0 TOBACCO ABUSE: Status: ACTIVE | Noted: 2020-10-22

## 2022-03-19 PROBLEM — E87.1 HYPONATREMIA: Status: ACTIVE | Noted: 2018-03-08

## 2022-03-19 PROBLEM — N39.0 UTI (URINARY TRACT INFECTION): Status: ACTIVE | Noted: 2018-03-11

## 2022-03-19 PROBLEM — J40 BRONCHITIS: Status: ACTIVE | Noted: 2018-03-11

## 2022-03-19 PROBLEM — R42 DIZZINESS: Status: ACTIVE | Noted: 2017-09-14

## 2022-03-19 PROBLEM — R10.9 ABDOMINAL PAIN: Status: ACTIVE | Noted: 2019-01-15

## 2022-03-19 PROBLEM — N20.0 STAGHORN RENAL CALCULUS: Status: ACTIVE | Noted: 2018-04-24

## 2022-03-19 PROBLEM — K83.8 COMMON BILE DUCT DILATATION: Status: ACTIVE | Noted: 2019-10-11

## 2022-03-19 PROBLEM — D64.9 ANEMIA: Status: ACTIVE | Noted: 2017-09-14

## 2022-03-19 PROBLEM — I21.4 NSTEMI (NON-ST ELEVATED MYOCARDIAL INFARCTION) (HCC): Status: ACTIVE | Noted: 2019-01-15

## 2022-03-19 PROBLEM — R55 SYNCOPE: Status: ACTIVE | Noted: 2018-03-08

## 2022-03-19 PROBLEM — K92.2 UPPER GI BLEED: Status: ACTIVE | Noted: 2019-01-15

## 2022-03-19 PROBLEM — R33.8 ACUTE URINARY RETENTION: Status: ACTIVE | Noted: 2020-05-03

## 2022-03-19 PROBLEM — N39.0 RECURRENT UTI (URINARY TRACT INFECTION): Status: ACTIVE | Noted: 2018-04-24

## 2022-03-19 PROBLEM — E87.6 HYPOKALEMIA: Status: ACTIVE | Noted: 2017-09-14

## 2022-03-20 PROBLEM — E87.20 METABOLIC ACIDOSIS: Status: ACTIVE | Noted: 2019-01-15

## 2022-03-20 PROBLEM — M62.82 RHABDOMYOLYSIS: Status: ACTIVE | Noted: 2019-01-15

## 2022-03-20 PROBLEM — E87.5 HYPERKALEMIA: Status: ACTIVE | Noted: 2019-10-11

## 2022-03-20 PROBLEM — M25.532 LEFT WRIST PAIN: Status: ACTIVE | Noted: 2020-10-22

## 2022-03-20 PROBLEM — S06.5XAA SUBDURAL HEMORRHAGE, TRAUMATIC (HCC): Status: ACTIVE | Noted: 2020-10-20

## 2023-01-24 NOTE — ED TRIAGE NOTES
Inpatient Nutrition Assessment    Admit Date: 1/13/2023   Total duration of encounter: 11 days     Nutrition Recommendation/Prescription     -Continue heart healthy diet and Boost Plus (provides 360 kcal, 14 g protein per serving) BID for additional nutrition.     Communication of Recommendations: reviewed with patient/caregiver    Nutrition Assessment     Malnutrition Assessment/Nutrition-Focused Physical Exam    Malnutrition in the context of acute illness or injury  Degree of Malnutrition: does not meet criteria  Energy Intake: does not meet criteria  Interpretation of Weight Loss: does not meet criteria  Body Fat:does not meet criteria  Area of Body Fat Loss: does not meet criteria  Muscle Mass Loss: does not meet criteria  Area of Muscle Mass Loss: does not meet criteria  Fluid Accumulation: does not meet criteria  Edema: does not meet criteria   Reduced  Strength: unable to obtain  A minimum of two characteristics is recommended for diagnosis of either severe or non-severe malnutrition.    Chart Review    Reason Seen: length of stay and follow-up    Malnutrition Screening Tool Results   Have you recently lost weight without trying?: No  Have you been eating poorly because of a decreased appetite?: No   MST Score: 0     Diagnosis:  Aortic Valve Echogenic Structure/Density on Transthoracic Echocardiogram (1.17.23)  Acute Hypoxemic Respiratory Failure (Improved)  Pleural Effusion (Left) Status Post Thoracentesis with ~ 300 ML Fluid Removed  Hyponatremia  Hypertension  Hyperlipidemia  History of DVT  Primary Biliary Cirrhosis    Relevant Medical History: Hypertension, Hyperlipidemia, Biliary Cirrhosis, History of DVT, Obesity, Back Pain with Implanted Nerve Stimulator    Nutrition-Related Medications: Calcium-vitamin D3, questran, probiotic, MV, ondansetron, ursodiol  Calorie Containing IV Medications: no significant kcals from medications at this time    Nutrition-Related Labs:  1/19/23 Na 128, Cl 97, Ca 8,  Pt also c/o mouth pain and cough. "osmolality 269  23 Na 130, Ca 8.3    Diet/PN Order: Diet heart healthy  Oral Supplement Order: none  Tube Feeding Order: none  Appetite/Oral Intake: good/% of meals  Factors Affecting Nutritional Intake: none identified  Food/Sikhism/Cultural Preferences: none reported  Food Allergies: none reported    Skin Integrity: bruised (ecchymotic)  Wound(s):       Comments    23 Decreased appetite since admit, reports <50% meals. Willing to trial vanilla Boost. No nausea, vomiting diarrhea or constipation. UBW of 171-175lbs, no unintentional wt loss.     23 Pt out for VATS procedure.  in room, reports pt with much improved appetite. Eating most of meals and drinking all Boosts sent No GI complaints.     Anthropometrics    Height: 4' 10" (147.3 cm) Height Method: Stated  Last Weight: 79.4 kg (175 lb 0.7 oz) (23 1100) Weight Method: Bed Scale  BMI (Calculated): 36.6  BMI Classification: obese grade II (BMI 35-39.9)     Ideal Body Weight (IBW), Female: 90 lb     % Ideal Body Weight, Female (lb): 194.5 %                    Usual Body Weight (UBW), k.72 kg  % Usual Body Weight: 102.38     Usual Weight Provided By: patient    Wt Readings from Last 5 Encounters:   23 79.4 kg (175 lb 0.7 oz)   23 81.2 kg (179 lb)   23 79.4 kg (175 lb)   22 78 kg (172 lb)   22 78.2 kg (172 lb 6.4 oz)     Weight Change(s) Since Admission:  Admit Weight: 79.4 kg (175 lb) (23 1129)  23 no updated wt     Estimated Needs    Weight Used For Calorie Calculations: 79.4 kg (175 lb 0.7 oz)  Energy Calorie Requirements (kcal): 1383kcals/d (MSJ X 1.2SF)  Energy Need Method: Kcal/kg, [place]-St Jeor  Weight Used For Protein Calculations: 79.4 kg (175 lb 0.7 oz)  Protein Requirements: 79-95g/d (1-1.2g/kg)  Fluid Requirements (mL): 1383ml fl/d (1ml/kcal)  Temp: 97.4 °F (36.3 °C)       Enteral Nutrition    Patient not receiving enteral nutrition at this time.    Parenteral " Nutrition    Patient not receiving parenteral nutrition support at this time.    Evaluation of Received Nutrient Intake    Calories: meeting estimated needs  Protein: meeting estimated needs    Patient Education    Not applicable.    Nutrition Diagnosis     PES: Inadequate oral intake related to current medical condition as evidenced by <50% EER >/=5 days. (resolved)    Interventions/Goals     Intervention(s): collaboration with other providers  Goal: Meet greater than 75% of nutritional needs by follow-up. (goal met)    Monitoring & Evaluation     Dietitian will monitor food and beverage intake and weight change.  Nutrition Risk/Follow-Up: low (follow-up in 5-7 days)   Please consult if re-assessment needed sooner.

## 2023-01-30 DIAGNOSIS — Z12.31 VISIT FOR SCREENING MAMMOGRAM: Primary | ICD-10-CM

## 2023-02-03 DIAGNOSIS — Z12.31 VISIT FOR SCREENING MAMMOGRAM: Primary | ICD-10-CM

## 2023-02-06 DIAGNOSIS — Z12.31 VISIT FOR SCREENING MAMMOGRAM: Primary | ICD-10-CM

## 2024-11-27 NOTE — PROGRESS NOTES
Paula Brooks to P Em Rn Triage (supporting Wayne Clark DO)     11/26/24  8:11 AM  I need a referral to see Dr. Behar , following my ER visit .       Referral pended, please review and advise if appropriate.         Problem: Falls - Risk of 
Goal: *Absence of Falls Document Nell Brunner Fall Risk and appropriate interventions in the flowsheet. Outcome: Progressing Towards Goal 
Fall Risk Interventions: 
Mobility Interventions: Bed/chair exit alarm, Communicate number of staff needed for ambulation/transfer, Patient to call before getting OOB Mentation Interventions: Adequate sleep, hydration, pain control, Bed/chair exit alarm, Door open when patient unattended, Evaluate medications/consider consulting pharmacy, Toileting rounds Medication Interventions: Evaluate medications/consider consulting pharmacy, Patient to call before getting OOB, Teach patient to arise slowly, Bed/chair exit alarm Elimination Interventions: Call light in reach, Patient to call for help with toileting needs, Toileting schedule/hourly rounds History of Falls Interventions: Door open when patient unattended, Bed/chair exit alarm, Evaluate medications/consider consulting pharmacy

## (undated) DEVICE — FORCEPS BX L240CM JAW DIA2.8MM L CAP W/ NDL MIC MESH TOOTH

## (undated) DEVICE — CONTAINER PREFIL FRMLN 15ML --

## (undated) DEVICE — FLEX ADVANTAGE 1500CC: Brand: FLEX ADVANTAGE

## (undated) DEVICE — BITE BLK ENDOSCP AD 54FR GRN POLYETH ENDOSCP W STRP SLD

## (undated) DEVICE — BASIN EMESIS 500CC ROSE 250/CS 60/PLT: Brand: MEDEGEN MEDICAL PRODUCTS, LLC

## (undated) DEVICE — KENDALL 500 SERIES DIAPHORETIC FOAM MONITORING ELECTRODE - TEAR DROP SHAPE ( 30/PK): Brand: KENDALL

## (undated) DEVICE — KIT COLON W/ 1.1OZ LUB AND 2 END

## (undated) DEVICE — MEDI-VAC NON-CONDUCTIVE SUCTION TUBING: Brand: CARDINAL HEALTH

## (undated) DEVICE — SYR 50ML SLIP TIP NSAF LF STRL --